# Patient Record
Sex: MALE | Race: OTHER | HISPANIC OR LATINO | Employment: OTHER | URBAN - METROPOLITAN AREA
[De-identification: names, ages, dates, MRNs, and addresses within clinical notes are randomized per-mention and may not be internally consistent; named-entity substitution may affect disease eponyms.]

---

## 2019-01-01 ENCOUNTER — HOSPITAL ENCOUNTER (EMERGENCY)
Facility: HOSPITAL | Age: 34
Discharge: HOME | End: 2019-01-01
Admitting: EMERGENCY MEDICINE

## 2019-01-01 VITALS
WEIGHT: 137 LBS | BODY MASS INDEX: 22.82 KG/M2 | OXYGEN SATURATION: 98 % | SYSTOLIC BLOOD PRESSURE: 174 MMHG | TEMPERATURE: 98.2 F | DIASTOLIC BLOOD PRESSURE: 91 MMHG | HEART RATE: 98 BPM | HEIGHT: 65 IN | RESPIRATION RATE: 18 BRPM

## 2019-01-01 DIAGNOSIS — L03.211 FACIAL CELLULITIS: Primary | ICD-10-CM

## 2019-01-01 DIAGNOSIS — L02.01 FACIAL ABSCESS: ICD-10-CM

## 2019-01-01 PROCEDURE — 99283 EMERGENCY DEPT VISIT LOW MDM: CPT

## 2019-01-01 PROCEDURE — 96372 THER/PROPH/DIAG INJ SC/IM: CPT

## 2019-01-01 RX ORDER — ACETAMINOPHEN 325 MG/1
650 TABLET ORAL ONCE
Status: COMPLETED | OUTPATIENT
Start: 2019-01-01 | End: 2019-01-01

## 2019-01-01 RX ORDER — BACITRACIN, NEOMYCIN, POLYMYXIN B 400; 3.5; 5 [USP'U]/G; MG/G; [USP'U]/G
1 OINTMENT TOPICAL ONCE
Status: COMPLETED | OUTPATIENT
Start: 2019-01-01 | End: 2019-01-01

## 2019-01-01 RX ORDER — CEPHALEXIN 250 MG/1
500 CAPSULE ORAL 4 TIMES DAILY
Qty: 56 CAPSULE | Refills: 0 | Status: SHIPPED | OUTPATIENT
Start: 2019-01-01 | End: 2019-01-08

## 2019-01-01 RX ORDER — KETOROLAC TROMETHAMINE 30 MG/ML
15 INJECTION, SOLUTION INTRAMUSCULAR; INTRAVENOUS ONCE
Status: COMPLETED | OUTPATIENT
Start: 2019-01-01 | End: 2019-01-01

## 2019-01-01 RX ORDER — LIDOCAINE HYDROCHLORIDE AND EPINEPHRINE 10; 10 MG/ML; UG/ML
1 INJECTION, SOLUTION INFILTRATION; PERINEURAL ONCE
Status: COMPLETED | OUTPATIENT
Start: 2019-01-01 | End: 2019-01-01

## 2019-01-01 RX ORDER — LIDOCAINE HYDROCHLORIDE 10 MG/ML
1 INJECTION, SOLUTION EPIDURAL; INFILTRATION; INTRACAUDAL; PERINEURAL ONCE
Status: DISCONTINUED | OUTPATIENT
Start: 2019-01-01 | End: 2019-01-01

## 2019-01-01 RX ORDER — CEPHALEXIN 250 MG/1
500 CAPSULE ORAL ONCE
Status: COMPLETED | OUTPATIENT
Start: 2019-01-01 | End: 2019-01-01

## 2019-01-01 RX ADMIN — BACITRACIN, NEOMYCIN, POLYMYXIN B 1 SMALL APPLICATION: 400; 3.5; 5 OINTMENT TOPICAL at 17:55

## 2019-01-01 RX ADMIN — KETOROLAC TROMETHAMINE 15 MG: 30 INJECTION, SOLUTION INTRAMUSCULAR at 17:28

## 2019-01-01 RX ADMIN — LIDOCAINE HYDROCHLORIDE,EPINEPHRINE BITARTRATE 1 ML: 10; .01 INJECTION, SOLUTION INFILTRATION; PERINEURAL at 17:28

## 2019-01-01 RX ADMIN — ACETAMINOPHEN 650 MG: 325 TABLET, FILM COATED ORAL at 17:54

## 2019-01-01 RX ADMIN — CEPHALEXIN 500 MG: 250 CAPSULE ORAL at 17:54

## 2019-01-01 NOTE — DISCHARGE INSTRUCTIONS
Abscess   WHAT YOU NEED TO KNOW:   A warm compress may help your abscess drain  Your healthcare provider may make a cut in the abscess so it can drain  You may need surgery to remove an abscess that is on your hands or buttocks  DISCHARGE INSTRUCTIONS:   Return to the emergency department if:   · The area around your abscess becomes very painful, warm, or has red streaks  · You have a fever and chills  · Your heart is beating faster than usual      · You feel faint or confused  Contact your healthcare provider if:   · Your abscess gets bigger or does not get better  · Your abscess returns  · You have questions or concerns about your condition or care  Medicines: You may  need any of the following:  · Antibiotics  help treat a bacterial infection  · Acetaminophen  decreases pain and fever  It is available without a doctor's order  Ask how much to take and how often to take it  Follow directions  Acetaminophen can cause liver damage if not taken correctly  · NSAIDs , such as ibuprofen, help decrease swelling, pain, and fever  This medicine is available with or without a doctor's order  NSAIDs can cause stomach bleeding or kidney problems in certain people  If you take blood thinner medicine, always ask your healthcare provider if NSAIDs are safe for you  Always read the medicine label and follow directions  · Take your medicine as directed  Contact your healthcare provider if you think your medicine is not helping or if you have side effects  Tell him or her if you are allergic to any medicine  Keep a list of the medicines, vitamins, and herbs you take  Include the amounts, and when and why you take them  Bring the list or the pill bottles to follow-up visits  Carry your medicine list with you in case of an emergency  Self-care:   · Apply a warm compress to your abscess  This will help it open and drain  Wet a washcloth in warm, but not hot, water  Apply the compress for 10 minutes  Repeat this 4 times each day  Do not  press on an abscess or try to open it with a needle  You may push the bacteria deeper or into your blood  · Do not share your clothes, towels, or sheets with anyone  This can spread the infection to others  · Wash your hands often  This can help prevent the spread of germs  Use soap and water or an alcohol-based hand rub  Care for your wound after it is drained:   · Care for your wound as directed  If your healthcare provider says it is okay, carefully remove the bandage and gauze packing  You may need to soak the gauze to get it out of your wound  Clean your wound and the area around it as directed  Dry the area and put on new, clean bandages  Change your bandages when they get wet or dirty  · Ask your healthcare provider how to change the gauze in your wound  Keep track of how many pieces of gauze are placed inside the wound  Do not put too much packing in the wound  Do not pack the gauze too tightly in your wound  Follow up with your healthcare provider in 1 to 3 days: You may need to have your packing removed or your bandage changed  Write down your questions so you remember to ask them during your visits  © 2017 [facility] Information is for End User's use only and may not be sold, redistributed or otherwise used for commercial purposes  All illustrations and images included in CareNotes® are the copyrighted property of A D A M [employer]  or Rashaun Gunn  The above information is an  only  It is not intended as medical advice for individual conditions or treatments  Talk to your doctor, nurse or pharmacist before following any medical regimen to see if it is safe and effective for you  Cellulitis   WHAT YOU NEED TO KNOW:   Cellulitis is a skin infection caused by bacteria  Cellulitis may go away on its own or you may need treatment   Your healthcare provider may draw a [place] around the outside edges of your cellulitis  If your cellulitis spreads, your healthcare provider will see it outside of the [place]  DISCHARGE INSTRUCTIONS:   Call 911 if:   · You have sudden trouble breathing or chest pain  Return to the emergency department if:   · Your wound gets larger and more painful  · You feel a crackling under your skin when you touch it  · You have purple dots or bumps on your skin, or you see bleeding under your skin  · You have new swelling and pain in your legs  · The red, warm, swollen area gets larger  · You see red streaks coming from the infected area  Contact your healthcare provider if:   · You have a fever  · Your fever or pain does not go away or gets worse  · The area does not get smaller after 2 days of antibiotics  · Your skin is flaking or peeling off  · You have questions or concerns about your condition or care  Medicines:   · Antibiotics  help treat the bacterial infection  · NSAIDs , such as ibuprofen, help decrease swelling, pain, and fever  NSAIDs can cause stomach bleeding or kidney problems in certain people  If you take blood thinner medicine, always ask if NSAIDs are safe for you  Always read the medicine label and follow directions  Do not give these medicines to children under 10months of age without direction from your child's healthcare provider  · Acetaminophen  decreases pain and fever  It is available without a doctor's order  Ask how much to take and how often to take it  Follow directions  Read the labels of all other medicines you are using to see if they also contain acetaminophen, or ask your doctor or pharmacist  Acetaminophen can cause liver damage if not taken correctly  Do not use more than 4 grams (4,000 milligrams) total of acetaminophen in one day  · Take your medicine as directed  Contact your healthcare provider if you think your medicine is not helping or if you have side effects   Tell him or her if you are allergic to any medicine  Keep a list of the medicines, vitamins, and herbs you take  Include the amounts, and when and why you take them  Bring the list or the pill bottles to follow-up visits  Carry your medicine list with you in case of an emergency  Self-care:   · Elevate the area above the level of your heart  as often as you can  This will help decrease swelling and pain  Prop the area on pillows or blankets to keep it elevated comfortably  · Clean the area daily until the wound scabs over  Gently wash the area with soap and water  Pat dry  Use dressings as directed  · Place cool or warm, wet cloths on the area as directed  Use clean cloths and clean water  Leave it on the area until the cloth is room temperature  Pat the area dry with a clean, dry cloth  The cloths may help decrease pain  Prevent cellulitis:   · Do not scratch bug bites or areas of injury  You increase your risk for cellulitis by scratching these areas  · Do not share personal items, such as towels, clothing, and razors  · Clean exercise equipment  with germ-killing  before and after you use it  · Wash your hands often  Use soap and water  Wash your hands after you use the bathroom, change a child's diapers, or sneeze  Wash your hands before you prepare or eat food  Use lotion to prevent dry, cracked skin  · Wear pressure stockings as directed  You may be told to wear the stockings if you have peripheral edema  The stockings improve blood flow and decrease swelling  · Treat athlete's foot  This can help prevent the spread of a bacterial skin infection  Follow up with your healthcare provider within 3 days, or as directed: Your healthcare provider will check if your cellulitis is getting better  You may need different medicine  Write down your questions so you remember to ask them during your visits    © 2017 [address] [employer] Information is for End User's use only and may not be sold, redistributed or otherwise used for commercial purposes  All illustrations and images included in CareNotes® are the copyrighted property of A D A M [employer] Inc  or Rashaun Gunn  The above information is an  only  It is not intended as medical advice for individual conditions or treatments  Talk to your doctor, nurse or pharmacist before following any medical regimen to see if it is safe and effective for you  Cellulitis   WHAT YOU NEED TO KNOW:   Cellulitis is a skin infection caused by bacteria  Cellulitis may go away on its own or you may need treatment  Your healthcare provider may draw a [place] around the outside edges of your cellulitis  If your cellulitis spreads, your healthcare provider will see it outside of the [place]  DISCHARGE INSTRUCTIONS:   Call 911 if:   · You have sudden trouble breathing or chest pain  Return to the emergency department if:   · Your wound gets larger and more painful  · You feel a crackling under your skin when you touch it  · You have purple dots or bumps on your skin, or you see bleeding under your skin  · You have new swelling and pain in your legs  · The red, warm, swollen area gets larger  · You see red streaks coming from the infected area  Contact your healthcare provider if:   · You have a fever  · Your fever or pain does not go away or gets worse  · The area does not get smaller after 2 days of antibiotics  · Your skin is flaking or peeling off  · You have questions or concerns about your condition or care  Medicines:   · Antibiotics  help treat the bacterial infection  · NSAIDs , such as ibuprofen, help decrease swelling, pain, and fever  NSAIDs can cause stomach bleeding or kidney problems in certain people  If you take blood thinner medicine, always ask if NSAIDs are safe for you  Always read the medicine label and follow directions   Do not give these medicines to children under 10months of age without direction from your child's healthcare provider  · Acetaminophen  decreases pain and fever  It is available without a doctor's order  Ask how much to take and how often to take it  Follow directions  Read the labels of all other medicines you are using to see if they also contain acetaminophen, or ask your doctor or pharmacist  Acetaminophen can cause liver damage if not taken correctly  Do not use more than 4 grams (4,000 milligrams) total of acetaminophen in one day  · Take your medicine as directed  Contact your healthcare provider if you think your medicine is not helping or if you have side effects  Tell him or her if you are allergic to any medicine  Keep a list of the medicines, vitamins, and herbs you take  Include the amounts, and when and why you take them  Bring the list or the pill bottles to follow-up visits  Carry your medicine list with you in case of an emergency  Self-care:   · Elevate the area above the level of your heart  as often as you can  This will help decrease swelling and pain  Prop the area on pillows or blankets to keep it elevated comfortably  · Clean the area daily until the wound scabs over  Gently wash the area with soap and water  Pat dry  Use dressings as directed  · Place cool or warm, wet cloths on the area as directed  Use clean cloths and clean water  Leave it on the area until the cloth is room temperature  Pat the area dry with a clean, dry cloth  The cloths may help decrease pain  Prevent cellulitis:   · Do not scratch bug bites or areas of injury  You increase your risk for cellulitis by scratching these areas  · Do not share personal items, such as towels, clothing, and razors  · Clean exercise equipment  with germ-killing  before and after you use it  · Wash your hands often  Use soap and water  Wash your hands after you use the bathroom, change a child's diapers, or sneeze  Wash your hands before you prepare or eat food   Use lotion to prevent dry, cracked skin  · Wear pressure stockings as directed  You may be told to wear the stockings if you have peripheral edema  The stockings improve blood flow and decrease swelling  · Treat athlete's foot  This can help prevent the spread of a bacterial skin infection  Follow up with your healthcare provider within 3 days, or as directed: Your healthcare provider will check if your cellulitis is getting better  You may need different medicine  Write down your questions so you remember to ask them during your visits  © 2017 [facility] Information is for End User's use only and may not be sold, redistributed or otherwise used for commercial purposes  All illustrations and images included in CareNotes® are the copyrighted property of A D A M [employer] Inc  or Rashaun Gunn  The above information is an  only  It is not intended as medical advice for individual conditions or treatments  Talk to your doctor, nurse or pharmacist before following any medical regimen to see if it is safe and effective for you

## 2019-01-04 ENCOUNTER — HOSPITAL ENCOUNTER (EMERGENCY)
Facility: HOSPITAL | Age: 34
Discharge: HOME | End: 2019-01-04
Attending: EMERGENCY MEDICINE

## 2019-01-04 VITALS
BODY MASS INDEX: 22.82 KG/M2 | HEIGHT: 65 IN | HEART RATE: 66 BPM | DIASTOLIC BLOOD PRESSURE: 85 MMHG | OXYGEN SATURATION: 97 % | SYSTOLIC BLOOD PRESSURE: 129 MMHG | WEIGHT: 137 LBS | RESPIRATION RATE: 18 BRPM | TEMPERATURE: 98 F

## 2019-01-04 DIAGNOSIS — M54.9 BACK PAIN: ICD-10-CM

## 2019-01-04 DIAGNOSIS — M54.2 NECK PAIN, CHRONIC: Primary | ICD-10-CM

## 2019-01-04 DIAGNOSIS — G89.29 NECK PAIN, CHRONIC: Primary | ICD-10-CM

## 2019-01-04 DIAGNOSIS — R29.898 UPPER EXTREMITY DYSFUNCTION: ICD-10-CM

## 2019-01-04 DIAGNOSIS — Z87.39 HISTORY OF HERNIATED INTERVERTEBRAL DISC: ICD-10-CM

## 2019-01-04 LAB
ANION GAP BLD CALC-SCNC: 16 MMOL/L (ref 4–13)
BUN BLD-MCNC: 25 MG/DL (ref 5–25)
CA-I BLD-SCNC: 1.18 MMOL/L (ref 1.12–1.32)
CHLORIDE BLD-SCNC: 106 MMOL/L (ref 100–108)
CREAT BLD-MCNC: 0.9 MG/DL (ref 0.6–1.3)
GFR SERPL CREATININE-BSD FRML MDRD: 110 ML/MIN/1.73SQ M
GLUCOSE SERPL-MCNC: 111 MG/DL (ref 65–140)
HCT VFR BLD CALC: 46 % (ref 36.5–49.3)
HGB BLDA-MCNC: 15.6 G/DL (ref 12–17)
PCO2 BLD: 25 MMOL/L (ref 21–32)
POTASSIUM BLD-SCNC: 4 MMOL/L (ref 3.5–5.3)
SODIUM BLD-SCNC: 142 MMOL/L (ref 136–145)
SPECIMEN SOURCE: ABNORMAL

## 2019-01-04 PROCEDURE — 80047 BASIC METABLC PNL IONIZED CA: CPT

## 2019-01-04 PROCEDURE — 99283 EMERGENCY DEPT VISIT LOW MDM: CPT

## 2019-01-04 PROCEDURE — 85014 HEMATOCRIT: CPT

## 2019-01-04 PROCEDURE — 96375 TX/PRO/DX INJ NEW DRUG ADDON: CPT

## 2019-01-04 PROCEDURE — 96374 THER/PROPH/DIAG INJ IV PUSH: CPT

## 2019-01-04 RX ORDER — KETOROLAC TROMETHAMINE 30 MG/ML
15 INJECTION, SOLUTION INTRAMUSCULAR; INTRAVENOUS ONCE
Status: COMPLETED | OUTPATIENT
Start: 2019-01-04 | End: 2019-01-04

## 2019-01-04 RX ORDER — DIAZEPAM 5 MG/ML
5 INJECTION, SOLUTION INTRAMUSCULAR; INTRAVENOUS ONCE
Status: COMPLETED | OUTPATIENT
Start: 2019-01-04 | End: 2019-01-04

## 2019-01-04 RX ORDER — ACETAMINOPHEN 325 MG/1
975 TABLET ORAL ONCE
Status: COMPLETED | OUTPATIENT
Start: 2019-01-04 | End: 2019-01-04

## 2019-01-04 RX ORDER — DIAZEPAM 5 MG/1
5 TABLET ORAL EVERY 12 HOURS PRN
Qty: 10 TABLET | Refills: 0 | Status: SHIPPED | OUTPATIENT
Start: 2019-01-04 | End: 2019-05-06

## 2019-01-04 RX ORDER — LIDOCAINE 50 MG/G
1 PATCH TOPICAL ONCE
Status: DISCONTINUED | OUTPATIENT
Start: 2019-01-04 | End: 2019-01-05 | Stop reason: HOSPADM

## 2019-01-04 RX ADMIN — KETOROLAC TROMETHAMINE 15 MG: 30 INJECTION, SOLUTION INTRAMUSCULAR at 20:44

## 2019-01-04 RX ADMIN — Medication 5 MG: at 20:45

## 2019-01-04 RX ADMIN — LIDOCAINE 1 PATCH: 50 PATCH CUTANEOUS at 20:43

## 2019-01-04 RX ADMIN — ACETAMINOPHEN 975 MG: 325 TABLET, FILM COATED ORAL at 20:44

## 2019-01-05 NOTE — ED ATTENDING ATTESTATION
Pearl Pressley MD, saw and evaluated the patient  I have discussed the patient with the resident/non-physician practitioner and agree with the resident's/non-physician practitioner's findings, Plan of Care, and MDM as documented in the resident's/non-physician practitioner's note, except where noted  All available labs and Radiology studies were reviewed  At this point I agree with the current assessment done in the Emergency Department  I have conducted an independent evaluation of this patient a history and physical is as follows:      Critical Care Time  CritCare Time    Procedures     25 yo male with neck pain  Pt with hx of herniated disc due to wrestling injury and acutely worse with radiation up to temple and arm movement hurts  Pt presented to [facility] weeks ago for same complaints and ct neck showed disc herniation  Pt taking robaxin and ibuprofen at home with no relief  Pt with no urinary retention  No saddle anesthesia  Vss, afebrile, lungs cta, rrr, abdomen soft nontender, cervical tenderness, limited rom of arms due to pain, no neuro deficits, sensation intacts, pain meds for acute on chronic pain

## 2019-01-05 NOTE — ED PROVIDER NOTES
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 wrestling neck injury that he never got fixed that has progressively gotten worse over the past few years  Pt states he needs to set up surgery but just moved here  HPI    25-year-old male pmhx cervical neck herniated disc who presents for evaluation of neck pain  Patient says he has had neck pain for last several years due to an injury he obtained during wrestling  Patient says neck pain has recently worsened and any movement of arms bilaterally exacerbate the pain  Neck pain is a 10/10 and radiates up toward his temples  Patient also notes back pain and numbness of his back  Patient says he feels like he cant do anything due to neck pain  Denies fever, chills, chest pain, SOB, abdominal pain, nausea, vomiting, urinary retention/incontinence, fecal retention/incontinence, saddle anesthesia or dysuria  Patient was evaluated at [facility] on 12/18/2018 for similar complaint  Denies IVDA  Prior to Admission Medications   Prescriptions Last Dose Informant Patient Reported? Taking? cephalexin (KEFLEX) 250 mg capsule   No No   Sig: Take 2 capsules (500 mg total) by mouth 4 (four) times a day for 7 days      Facility-Administered Medications: None       Past Medical History:   Diagnosis Date    Asthma     Hypertension        History reviewed  No pertinent surgical history  History reviewed  No pertinent family history  I have reviewed and agree with the history as documented  Social History   Substance Use Topics    Smoking status: Current Every Day Smoker    Smokeless tobacco: Never Used    Alcohol use Yes        Review of Systems   Constitutional: Negative for chills, diaphoresis, fatigue and fever  HENT: Negative for congestion, rhinorrhea and sore throat  Eyes: Negative for photophobia and visual disturbance  Respiratory: Negative for cough, chest tightness and shortness of breath  Cardiovascular: Negative for chest pain and palpitations  Gastrointestinal: Negative for abdominal pain, blood in stool, constipation, diarrhea, nausea and vomiting  Genitourinary: Negative for dysuria, frequency and hematuria  Musculoskeletal: Positive for back pain, gait problem and neck pain  Negative for myalgias and neck stiffness  Skin: Negative for pallor and rash  Neurological: Negative for weakness, light-headedness, numbness and headaches  Hematological: Negative for adenopathy  Does not bruise/bleed easily  All other systems reviewed and are negative  Physical Exam  ED Triage Vitals   Temperature Pulse Respirations Blood Pressure SpO2   01/04/19 1856 01/04/19 1854 01/04/19 1854 01/04/19 1856 01/04/19 1854   98 °F (36 7 °C) (!) 110 18 150/99 98 %      Temp src Heart Rate Source Patient Position - Orthostatic VS BP Location FiO2 (%)   -- 01/04/19 2011 01/04/19 2011 01/04/19 2101 --    Monitor Sitting Right arm       Pain Score       01/04/19 1854       9           Orthostatic Vital Signs  Vitals:    01/04/19 2011 01/04/19 2101 01/04/19 2130 01/04/19 2200   BP: 145/63 144/79 138/88 129/85   Pulse: 87 88 77 66   Patient Position - Orthostatic VS: Sitting Sitting Sitting Sitting       Physical Exam   Constitutional: He is oriented to person, place, and time  Patient alert and oriented, appears non-toxic, tearful and appears to be in pain   HENT:   Head: Normocephalic and atraumatic  Mouth/Throat: Oropharynx is clear and moist    Eyes: Pupils are equal, round, and reactive to light  Conjunctivae and EOM are normal    Neck: Normal range of motion  Neck supple  Diffuse cervical neck tenderness on palpation    Cardiovascular: Normal rate, regular rhythm, normal heart sounds and intact distal pulses  Pulmonary/Chest: Effort normal and breath sounds normal  No respiratory distress  Abdominal: Soft  Bowel sounds are normal  He exhibits no distension  There is no tenderness   There is no guarding  Musculoskeletal: He exhibits tenderness  He exhibits no deformity  Diffuse midline and paraspinal lower thoracic tenderness on palpation, pain of shoulders and upper arms b/l on palpation and passive movement    Lymphadenopathy:     He has no cervical adenopathy  Neurological: He is alert and oriented to person, place, and time  No sensory deficit  He exhibits normal muscle tone  No facial asymmetry noted, CN 2-12 intact, limited ROM  of upper and lower extremities due to pain, muscle strength 5/5 throughout, DTRs normal, sensation intact throughout, no gait disturbance noted   Skin: Skin is warm and dry  Capillary refill takes less than 2 seconds  No rash noted  He is not diaphoretic  No erythema  No pallor  Psychiatric: He has a normal mood and affect  His behavior is normal  Judgment and thought content normal    Nursing note and vitals reviewed        ED Medications  Medications   diazepam (VALIUM) injection 5 mg (5 mg Intravenous Given 1/4/19 2045)   ketorolac (TORADOL) injection 15 mg (15 mg Intravenous Given 1/4/19 2044)   acetaminophen (TYLENOL) tablet 975 mg (975 mg Oral Given 1/4/19 2044)       Diagnostic Studies  Results Reviewed     Procedure Component Value Units Date/Time    POCT Chem 8+ [[id number]]  (Abnormal) Collected:  01/04/19 2056    Lab Status:  Final result Updated:  01/04/19 2100     SODIUM, I-STAT 142 mmol/l      Potassium, i-STAT 4 0 mmol/L      Chloride, istat 106 mmol/L      CO2, i-STAT 25 mmol/L      Anion Gap, i-STAT 16 (H) mmol/L      Calcium, Ionized i-STAT 1 18 mmol/L      BUN, I-STAT 25 mg/dl      Creatinine, i-STAT 0 9 mg/dl      eGFR 110 ml/min/1 73sq m      Glucose, i-STAT 111 mg/dl      Hct, i-STAT 46 %      Hgb, i-STAT 15 6 g/dl      Specimen Type VENOUS                 No orders to display         Procedures  Procedures      Phone Consults  ED Phone Contact    ED Course  ED Course as of Jan 05 0125 Fri Jan 04, 2019   2220 On reassessment, patient has improved ROM of upper and lower extremities  Patient notes he continues to have neck pain but radiation of pain and arm discomfort have resolved  MDM  Number of Diagnoses or Management Options  Back pain:   History of herniated intervertebral disc:   Neck pain, chronic:   Upper extremity dysfunction:   Diagnosis management comments: Impression: 29yo male presents for evaluation of neck pain   Ddx: acute on chronic neck pain (recent CT head/neck 12/18/18)  Plan: pain control, reassess    The patient was instructed to follow up as documented  Strict return precautions were discussed with the patient and the patient was instructed to return to the emergency department immediately if symptoms worsen  The patient/patient family member acknowledged and were in agreement with plan  CritCare Time    Disposition  Final diagnoses:   Neck pain, chronic   Upper extremity dysfunction   Back pain   History of herniated intervertebral disc     Time reflects when diagnosis was documented in both MDM as applicable and the Disposition within this note     Time User Action Codes Description Comment    1/4/2019 10:21 PM Veronica Spangler Add [M54 2,  G89 29] Neck pain, chronic     1/4/2019 10:31 PM Veronica Spangler Add [R29 898] Upper extremity dysfunction     1/4/2019 10:32 PM Chrisa Amandaos Add [M54 9] Back pain     1/4/2019 10:32 PM Chrisa Katos Add [Z87 39] History of herniated intervertebral disc       ED Disposition     ED Disposition Condition Comment    Discharge  [facility] discharge to home/self care  Condition at discharge: Stable        Follow-up Information     Follow up With Specialties Details Why Contact Info    [facility] Physical Therapy Go to As needed, If symptoms worsen [phone]    Santiago Frost MD Spine Surgery Schedule an appointment as soon as possible for a visit As needed, If symptoms worsen [address]    [facility] [id number]  [phone]            Discharge Medication List as of 1/4/2019 10:33 PM      START taking these medications    Details   diazepam (VALIUM) 5 mg tablet Take 1 tablet (5 mg total) by mouth every 12 (twelve) hours as needed for anxiety for up to 10 days, Starting Fri 1/4/2019, Until Mon 1/14/2019, Print         CONTINUE these medications which have NOT CHANGED    Details   cephalexin (KEFLEX) 250 mg capsule Take 2 capsules (500 mg total) by mouth 4 (four) times a day for 7 days, Starting Tue 1/1/2019, Until Tue 1/8/2019, Print           No discharge procedures on file  ED Provider  Attending physically available and evaluated Michelle Cardinal PARIKH managed the patient along with the ED Attending      Electronically Signed by         Angelic Huff MD  01/05/19 0609

## 2019-05-06 ENCOUNTER — HOSPITAL ENCOUNTER (EMERGENCY)
Facility: HOSPITAL | Age: 34
Discharge: HOME | End: 2019-05-06
Attending: EMERGENCY MEDICINE | Admitting: EMERGENCY MEDICINE

## 2019-05-06 VITALS
HEART RATE: 66 BPM | DIASTOLIC BLOOD PRESSURE: 92 MMHG | RESPIRATION RATE: 16 BRPM | SYSTOLIC BLOOD PRESSURE: 167 MMHG | TEMPERATURE: 97.5 F | OXYGEN SATURATION: 98 %

## 2019-05-06 DIAGNOSIS — M62.838 TRAPEZIUS MUSCLE SPASM: ICD-10-CM

## 2019-05-06 DIAGNOSIS — M54.2 NECK PAIN: Primary | ICD-10-CM

## 2019-05-06 PROCEDURE — 20552 NJX 1/MLT TRIGGER POINT 1/2: CPT | Performed by: EMERGENCY MEDICINE

## 2019-05-06 PROCEDURE — 99284 EMERGENCY DEPT VISIT MOD MDM: CPT | Performed by: EMERGENCY MEDICINE

## 2019-05-06 PROCEDURE — 96372 THER/PROPH/DIAG INJ SC/IM: CPT

## 2019-05-06 PROCEDURE — 99283 EMERGENCY DEPT VISIT LOW MDM: CPT

## 2019-05-06 RX ORDER — LIDOCAINE 50 MG/G
1 PATCH TOPICAL ONCE
Status: DISCONTINUED | OUTPATIENT
Start: 2019-05-06 | End: 2019-05-06 | Stop reason: HOSPADM

## 2019-05-06 RX ORDER — ACETAMINOPHEN 325 MG/1
975 TABLET ORAL ONCE
Status: COMPLETED | OUTPATIENT
Start: 2019-05-06 | End: 2019-05-06

## 2019-05-06 RX ORDER — LIDOCAINE HYDROCHLORIDE 10 MG/ML
20 INJECTION, SOLUTION EPIDURAL; INFILTRATION; INTRACAUDAL; PERINEURAL ONCE
Status: COMPLETED | OUTPATIENT
Start: 2019-05-06 | End: 2019-05-06

## 2019-05-06 RX ORDER — METHOCARBAMOL 500 MG/1
500 TABLET, FILM COATED ORAL EVERY 8 HOURS PRN
Qty: 20 TABLET | Refills: 0 | Status: SHIPPED | OUTPATIENT
Start: 2019-05-06 | End: 2019-11-26

## 2019-05-06 RX ORDER — KETOROLAC TROMETHAMINE 30 MG/ML
15 INJECTION, SOLUTION INTRAMUSCULAR; INTRAVENOUS ONCE
Status: COMPLETED | OUTPATIENT
Start: 2019-05-06 | End: 2019-05-06

## 2019-05-06 RX ADMIN — ACETAMINOPHEN 975 MG: 325 TABLET ORAL at 15:01

## 2019-05-06 RX ADMIN — KETOROLAC TROMETHAMINE 15 MG: 30 INJECTION, SOLUTION INTRAMUSCULAR at 15:00

## 2019-05-06 RX ADMIN — LIDOCAINE HYDROCHLORIDE 20 ML: 10 INJECTION, SOLUTION EPIDURAL; INFILTRATION; INTRACAUDAL; PERINEURAL at 16:10

## 2019-05-06 RX ADMIN — LIDOCAINE 1 PATCH: 50 PATCH TOPICAL at 15:00

## 2019-09-04 ENCOUNTER — HOSPITAL ENCOUNTER (EMERGENCY)
Facility: HOSPITAL | Age: 34
Discharge: HOME | End: 2019-09-04
Attending: EMERGENCY MEDICINE | Admitting: EMERGENCY MEDICINE

## 2019-09-04 VITALS
RESPIRATION RATE: 16 BRPM | WEIGHT: 165 LBS | HEIGHT: 65 IN | DIASTOLIC BLOOD PRESSURE: 78 MMHG | OXYGEN SATURATION: 98 % | TEMPERATURE: 98.3 F | SYSTOLIC BLOOD PRESSURE: 132 MMHG | HEART RATE: 53 BPM | BODY MASS INDEX: 27.49 KG/M2
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
## 2019-09-04 DIAGNOSIS — M54.2 NECK PAIN: ICD-10-CM

## 2019-09-04 PROCEDURE — 99284 EMERGENCY DEPT VISIT MOD MDM: CPT | Performed by: EMERGENCY MEDICINE

## 2019-09-04 PROCEDURE — 99284 EMERGENCY DEPT VISIT MOD MDM: CPT

## 2019-09-04 RX ORDER — LIDOCAINE 50 MG/G
1 PATCH TOPICAL ONCE
Status: DISCONTINUED | OUTPATIENT
Start: 2019-09-04 | End: 2019-09-04 | Stop reason: HOSPADM

## 2019-09-04 RX ORDER — ACETAMINOPHEN 325 MG/1
975 TABLET ORAL ONCE
Status: COMPLETED | OUTPATIENT
Start: 2019-09-04 | End: 2019-09-04

## 2019-09-04 RX ORDER — METHOCARBAMOL 500 MG/1
500 TABLET, FILM COATED ORAL ONCE
Status: DISCONTINUED | OUTPATIENT
Start: 2019-09-04 | End: 2019-09-04

## 2019-09-04 RX ADMIN — ACETAMINOPHEN 975 MG: 325 TABLET ORAL at 01:02

## 2019-09-04 RX ADMIN — LIDOCAINE 1 PATCH: 50 PATCH CUTANEOUS at 01:03
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
## 2019-09-27 ENCOUNTER — HOSPITAL ENCOUNTER (EMERGENCY)
Facility: HOSPITAL | Age: 34
Discharge: HOME | End: 2019-09-27
Attending: EMERGENCY MEDICINE | Admitting: EMERGENCY MEDICINE
Payer: MEDICARE

## 2019-09-27 VITALS
BODY MASS INDEX: 27.44 KG/M2 | RESPIRATION RATE: 20 BRPM | DIASTOLIC BLOOD PRESSURE: 65 MMHG | WEIGHT: 164.9 LBS | TEMPERATURE: 97.5 F | SYSTOLIC BLOOD PRESSURE: 147 MMHG | OXYGEN SATURATION: 96 % | HEART RATE: 96 BPM

## 2019-09-27 DIAGNOSIS — F41.9 ANXIETY: ICD-10-CM

## 2019-09-27 DIAGNOSIS — J45.901 ASTHMA EXACERBATION: Primary | ICD-10-CM

## 2019-09-27 LAB
ATRIAL RATE: 84 BPM
P AXIS: 85 DEGREES
PR INTERVAL: 122 MS
QRS AXIS: 67 DEGREES
QRSD INTERVAL: 86 MS
QT INTERVAL: 366 MS
QTC INTERVAL: 432 MS
T WAVE AXIS: 60 DEGREES
VENTRICULAR RATE: 84 BPM

## 2019-09-27 PROCEDURE — 99285 EMERGENCY DEPT VISIT HI MDM: CPT

## 2019-09-27 PROCEDURE — 93005 ELECTROCARDIOGRAM TRACING: CPT

## 2019-09-27 PROCEDURE — 94760 N-INVAS EAR/PLS OXIMETRY 1: CPT

## 2019-09-27 PROCEDURE — 93010 ELECTROCARDIOGRAM REPORT: CPT | Performed by: INTERNAL MEDICINE

## 2019-09-27 PROCEDURE — 96365 THER/PROPH/DIAG IV INF INIT: CPT

## 2019-09-27 PROCEDURE — 99284 EMERGENCY DEPT VISIT MOD MDM: CPT | Performed by: EMERGENCY MEDICINE

## 2019-09-27 PROCEDURE — 94644 CONT INHLJ TX 1ST HOUR: CPT

## 2019-09-27 RX ORDER — ALBUTEROL SULFATE 2.5 MG/3ML
1 SOLUTION RESPIRATORY (INHALATION) ONCE
Status: COMPLETED | OUTPATIENT
Start: 2019-09-27 | End: 2019-09-27

## 2019-09-27 RX ORDER — IPRATROPIUM BROMIDE AND ALBUTEROL SULFATE 2.5; .5 MG/3ML; MG/3ML
SOLUTION RESPIRATORY (INHALATION)
Status: COMPLETED
Start: 2019-09-27 | End: 2019-09-27

## 2019-09-27 RX ORDER — METHYLPREDNISOLONE SOD SUCC 125 MG
1 VIAL (EA) INJECTION ONCE
Status: COMPLETED | OUTPATIENT
Start: 2019-09-27 | End: 2019-09-27

## 2019-09-27 RX ORDER — PREDNISONE 20 MG/1
40 TABLET ORAL DAILY
Qty: 10 TABLET | Refills: 0 | Status: SHIPPED | OUTPATIENT
Start: 2019-09-27 | End: 2019-10-02

## 2019-09-27 RX ORDER — ALBUTEROL SULFATE 90 UG/1
2 AEROSOL, METERED RESPIRATORY (INHALATION) ONCE
Status: DISCONTINUED | OUTPATIENT
Start: 2019-09-27 | End: 2019-09-27

## 2019-09-27 RX ORDER — SODIUM CHLORIDE FOR INHALATION 0.9 %
3 VIAL, NEBULIZER (ML) INHALATION ONCE
Status: COMPLETED | OUTPATIENT
Start: 2019-09-27 | End: 2019-09-27

## 2019-09-27 RX ORDER — IPRATROPIUM BROMIDE AND ALBUTEROL SULFATE 2.5; .5 MG/3ML; MG/3ML
3 SOLUTION RESPIRATORY (INHALATION) ONCE
Status: COMPLETED | OUTPATIENT
Start: 2019-09-27 | End: 2019-09-27

## 2019-09-27 RX ORDER — ALBUTEROL SULFATE 90 UG/1
2 AEROSOL, METERED RESPIRATORY (INHALATION) EVERY 4 HOURS PRN
Qty: 1 INHALER | Refills: 0 | Status: SHIPPED | OUTPATIENT
Start: 2019-09-27

## 2019-09-27 RX ORDER — MAGNESIUM SULFATE HEPTAHYDRATE 40 MG/ML
2 INJECTION, SOLUTION INTRAVENOUS ONCE
Status: COMPLETED | OUTPATIENT
Start: 2019-09-27 | End: 2019-09-27

## 2019-09-27 RX ADMIN — MAGNESIUM SULFATE HEPTAHYDRATE 2 G: 40 INJECTION, SOLUTION INTRAVENOUS at 00:38

## 2019-09-27 RX ADMIN — ALBUTEROL SULFATE 10 MG: 2.5 SOLUTION RESPIRATORY (INHALATION) at 00:48

## 2019-09-27 RX ADMIN — IPRATROPIUM BROMIDE 1 MG: 0.5 SOLUTION RESPIRATORY (INHALATION) at 00:47

## 2019-09-27 RX ADMIN — ISODIUM CHLORIDE 3 ML: 0.03 SOLUTION RESPIRATORY (INHALATION) at 00:48

## 2019-09-27 NOTE — ED PROVIDER NOTES
History  Chief Complaint   Patient presents with    Shortness of Breath     pt c/o SOB hx of  Pt increase SOB and WOB and brought in by EMS  Pt given 1 duo neb, 75mg Solumedrol, and 1 albuterol     68-year-old male with history of asthma reviewed require intubation once before coming in for increased shortness of breath and wheezing, coming in from prison holding tank  Patient is very anxious on arrival and endorses increased work of breathing  Appears uncomfortable  Patient is unsure what triggered this, but believes it may be due to the anxiety after being arrested tonight  Patient says that he does not have an inhaler with him and was unable to be given one tonight  Usually his albuterol inhaler helps with his work of breathing and wheezing  Has had a recent upper respiratory infection as well which he thinks may be contributing to his symptoms  Endorses recent rhinorrhea and a scratchy throat but otherwise no fevers, chills, cough, GI symptoms  Has no known allergies  Denies any lightheadedness, dizziness, headache or other neurological symptoms  ROS is otherwise negative as below  History provided by:  Patient   used: No        Prior to Admission Medications   Prescriptions Last Dose Informant Patient Reported? Taking? methocarbamol (ROBAXIN) 500 mg tablet Not Taking at Unknown time  No No   Sig: Take 1 tablet (500 mg total) by mouth every 8 (eight) hours as needed for muscle spasms Do not work or drive while taking this medication  Patient not taking: Reported on 9/27/2019      Facility-Administered Medications: None       Past Medical History:   Diagnosis Date    Asthma     Chronic pain     Hypertension        Past Surgical History:   Procedure Laterality Date    THYROID SURGERY         History reviewed  No pertinent family history  I have reviewed and agree with the history as documented      Social History     Tobacco Use    Smoking status: Current Every Day Smoker     Packs/day: 0 50     Types: Cigarettes    Smokeless tobacco: Never Used   Substance Use Topics    Alcohol use: Yes     Comment: occasional    Drug use: No        Review of Systems   Constitutional: Negative for appetite change, chills, diaphoresis, fatigue and fever  HENT: Positive for congestion, rhinorrhea and sore throat  Negative for sinus pressure and sinus pain  Eyes: Negative for photophobia, redness and visual disturbance  Respiratory: Positive for shortness of breath and wheezing  Negative for cough and chest tightness  Cardiovascular: Negative for chest pain, palpitations and leg swelling  Gastrointestinal: Negative for abdominal pain, blood in stool, constipation, diarrhea, nausea and vomiting  Genitourinary: Negative for dysuria, frequency, hematuria and urgency  Musculoskeletal: Negative for back pain and gait problem  Skin: Negative for pallor, rash and wound  Neurological: Negative for dizziness, syncope, weakness, light-headedness, numbness and headaches  Psychiatric/Behavioral: Negative for confusion  The patient is nervous/anxious  Physical Exam  ED Triage Vitals [09/27/19 0028]   Temperature Pulse Respirations Blood Pressure SpO2   97 5 °F (36 4 °C) 92 22 (!) 176/73 100 %      Temp Source Heart Rate Source Patient Position - Orthostatic VS BP Location FiO2 (%)   Oral Monitor Lying Right arm --      Pain Score       No Pain             Orthostatic Vital Signs  Vitals:    09/27/19 0028 09/27/19 0055 09/27/19 0115 09/27/19 0200   BP: (!) 176/73  138/68 147/65   Pulse: 92 64 88 96   Patient Position - Orthostatic VS: Lying  Lying Lying       Physical Exam   Constitutional: He appears well-developed and well-nourished  No distress  HENT:   Head: Normocephalic and atraumatic  Nose: Nose normal    Mouth/Throat: Oropharynx is clear and moist  No oropharyngeal exudate  Eyes: Pupils are equal, round, and reactive to light   Conjunctivae are normal  Right eye exhibits no discharge  Left eye exhibits no discharge  Neck: Normal range of motion  Cardiovascular: Normal rate and regular rhythm  No murmur heard  Pulmonary/Chest: Breath sounds normal  No tachypnea  He is in respiratory distress  He has no wheezes  He has no rales  Tachypneic to 35  Audible wheezing present  Is able to speak  Abdominal: Soft  Bowel sounds are normal  He exhibits no distension  There is no tenderness  Musculoskeletal: Normal range of motion  He exhibits no edema or deformity  Right lower leg: He exhibits no edema  Left lower leg: He exhibits no edema  Neurological: He is alert  The patient is mentating normally  Skin: Skin is warm and dry  No rash noted  He is not diaphoretic  No pallor  Psychiatric: He has a normal mood and affect  Nursing note and vitals reviewed        ED Medications  Medications   magnesium sulfate 2 g/50 mL IVPB (premix) 2 g (0 g Intravenous Stopped 9/27/19 0112)   albuterol inhalation solution 10 mg (10 mg Nebulization Given 9/27/19 0048)     And   ipratropium (ATROVENT) 0 02 % inhalation solution 1 mg (1 mg Nebulization Given 9/27/19 0047)     And   sodium chloride 0 9 % inhalation solution 3 mL (3 mL Nebulization Given 9/27/19 0048)   albuterol (FOR EMS ONLY) (2 5 mg/3 mL) 0 083 % inhalation solution 2 5 mg (0 mg Does not apply Given to EMS 9/27/19 0044)   methylPREDNISolone sodium succinate (FOR EMS ONLY) (Solu-MEDROL) 125 MG injection 125 mg (0 mg Does not apply Given to EMS 9/27/19 0044)   ipratropium-albuterol (DUO-NEB) 0 5-2 5 mg/3 mL inhalation solution 3 mL (0 mL Nebulization Given to EMS 9/27/19 0049)       Diagnostic Studies  Results Reviewed     None                 No orders to display         Procedures  ECG 12 Lead Documentation Only  Date/Time: 9/27/2019 2:02 AM  Performed by: Kam Ramsey MD  Authorized by: Kam Ramsey MD     Indications / Diagnosis:  Asthma exacerbation  ECG reviewed by me, the ED Provider: yes Patient location:  ED  Previous ECG:     Previous ECG:  Compared to current    Similarity:  Changes noted  Interpretation:     Interpretation: normal    Rate:     ECG rate:  84    ECG rate assessment: normal    Rhythm:     Rhythm: sinus rhythm    Ectopy:     Ectopy: none    QRS:     QRS axis:  Normal  Conduction:     Conduction: normal    ST segments:     ST segments: nonspecific st changes in anterior leads normal for demographic  T waves:     T waves: normal    Comments:      Normal sinus  ED Course  ED Course as of Sep 30 0138   Fri Sep 27, 2019   0201 Patient no longer wheezing on exam  Will discharge to police custody  MDM  Number of Diagnoses or Management Options  Anxiety: minor  Asthma exacerbation: established and worsening  Diagnosis management comments: 78-year-old male presenting with acute asthma exacerbation after having significant anxiety from being incarcerated this evening  Patient has mild respiratory distress, normal oxygen saturation  Patient's respiratory distress improved after receiving a VEGA neb, Mag, and solumedrol  Patient did not require epinephrine or positive-pressure ventilation  Patient reports increased comfort after his anxiety started to resolve  Patient breathing normally on room air, with no audible wheezing at time of discharge  Discharged with prescription for albuterol inhaler as well as 5 days of 40 mg of prednisone  Discussed return precautions with patient including severe shortness of breath, severe wheezing unresponsive to albuterol inhaler, lightheadedness  Discharged into police custody         Amount and/or Complexity of Data Reviewed  Decide to obtain previous medical records or to obtain history from someone other than the patient: yes  Obtain history from someone other than the patient: yes    Risk of Complications, Morbidity, and/or Mortality  Presenting problems: minimal  Diagnostic procedures: minimal  Management options: minimal    Patient Progress  Patient progress: improved      Disposition  Final diagnoses:   Asthma exacerbation   Anxiety     Time reflects when diagnosis was documented in both MDM as applicable and the Disposition within this note     Time User Action Codes Description Comment    9/27/2019  1:59 AM Anne-Marie Galindo Add [J45 901] Asthma exacerbation     9/27/2019  1:59 AM Elizabeth Barakat Add [F41 9] Anxiety       ED Disposition     ED Disposition Condition Date/Time Comment    Discharge Good Fri Sep 27, 2019  2:12 AM Katerin Chin discharge to police custody  Patient is cleared for incarceration  Follow-up Information     Follow up With Specialties Details Why Contact Info Additional [address], [facility], Nurse Practitioner Schedule an appointment as soon as possible for a visit in 1 week For reevaluation as we discussed  [address]  Emergency Department Emergency Medicine Go to  As needed [address] [id number]  [facility], [address] [place] [address], [address]   [phone]          Discharge Medication List as of 9/27/2019  2:15 AM      START taking these medications    Details   albuterol (PROVENTIL HFA,VENTOLIN HFA) 90 mcg/act inhaler Inhale 2 puffs every 4 (four) hours as needed for wheezing, Starting Fri 9/27/2019, Print      predniSONE 20 mg tablet Take 2 tablets (40 mg total) by mouth daily for 5 days, Starting Fri 9/27/2019, Until Wed 10/2/2019, Print         CONTINUE these medications which have NOT CHANGED    Details   methocarbamol (ROBAXIN) 500 mg tablet Take 1 tablet (500 mg total) by mouth every 8 (eight) hours as needed for muscle spasms Do not work or drive while taking this medication  , Starting Mon 5/6/2019, Print           No discharge procedures on file      ED Provider  Attending physically available and tammy Cunningham I managed the patient along with the ED Attending      Electronically Signed by         Ras Lawson MD  09/30/19 7713

## 2019-09-27 NOTE — DISCHARGE INSTRUCTIONS
Patient is medically cleared for incarceration  Today were seen for an asthma exacerbation, your treated with steroids, albuterol, and magnesium  He had good improvement  Please follow up with her primary care doctor as needed for your asthma  You may return to the emergency department at any time if you experience severe shortness of breath, wheezing, chest pain, lightheadedness, dizziness, or any other symptoms that are new or concerning to you

## 2019-09-27 NOTE — ED ATTENDING ATTESTATION
9/27/2019  IShay MD, saw and evaluated the patient  I have discussed the patient with the resident/non-physician practitioner and agree with the resident's/non-physician practitioner's findings, Plan of Care, and MDM as documented in the resident's/non-physician practitioner's note, except where noted  All available labs and Radiology studies were reviewed  I was present for key portions of any procedure(s) performed by the resident/non-physician practitioner and I was immediately available to provide assistance  At this point I agree with the current assessment done in the Emergency Department  I have conducted an independent evaluation of this patient a history and physical is as follows:    ED Course     Emergency Department Note- Edwar Jeffers 37 y o  male MRN: [id number]    Unit/Bed#: ED 15 Encounter: [id number]    Edwar Jeffers is a 37 y o  male who presents with   Chief Complaint   Patient presents with    Shortness of Breath     pt c/o SOB hx of  Pt increase SOB and WOB and brought in by EMS  Pt given 1 duo neb, 75mg Solumedrol, and 1 albuterol         History of Present Illness   HPI:  Edwar Jeffers is a 37 y o  male who presents for evaluation of:  Dyspnea and increased work of breathing after being arrested  Patient has a long h/o asthma and has an inhaler and a nebulizer at home  Patient continues to smoke cigarettes; I have counseled smoking cessation  Review of Systems   Constitutional: Positive for chills  Negative for fever  HENT: Positive for congestion and sore throat  Respiratory: Positive for cough, shortness of breath and wheezing  Cardiovascular: Positive for chest pain  Negative for palpitations  Musculoskeletal: Positive for neck pain  Negative for back pain  All other systems reviewed and are negative        Historical Information   Past Medical History:   Diagnosis Date    Asthma     Chronic pain     Hypertension      Past Surgical History:   Procedure Laterality Date    THYROID SURGERY       Social History   Social History     Substance and Sexual Activity   Alcohol Use Yes    Comment: occasional     Social History     Substance and Sexual Activity   Drug Use No     Social History     Tobacco Use   Smoking Status Current Every Day Smoker    Packs/day: 0 50    Types: Cigarettes   Smokeless Tobacco Never Used     Family History: non-contributory    Meds/Allergies   all medications and allergies reviewed  No Known Allergies    Objective   First Vitals:   Blood Pressure: (!) 176/73 (19)  Pulse: 92 (19)  Temperature: 97 5 °F (36 4 °C) (19)  Temp Source: Oral (19)  Respirations: 22 (19)  Weight - Scale: 74 8 kg (164 lb 14 5 oz) (19)  SpO2: 100 % (19)    Current Vitals:   Blood Pressure: (!) 176/73 (19)  Pulse: 92 (19)  Temperature: 97 5 °F (36 4 °C) (19)  Temp Source: Oral (19)  Respirations: 22 (19)  Weight - Scale: 74 8 kg (164 lb 14 5 oz) (19)  SpO2: 100 % (19)    No intake or output data in the 24 hours ending 19 0043    Invasive Devices     Peripheral Intravenous Line            Peripheral IV 19 Right Hand less than 1 day                Physical Exam   Constitutional: He is oriented to person, place, and time  He appears well-developed and well-nourished  HENT:   Head: Normocephalic and atraumatic  Neck: Normal range of motion  Neck supple  Cardiovascular: Normal rate and regular rhythm  Pulmonary/Chest: Breath sounds normal  He has no wheezes  Abdominal: Soft  Bowel sounds are normal    Neurological: He is alert and oriented to person, place, and time  Skin: Skin is warm and dry  Capillary refill takes less than 2 seconds  Psychiatric: He has a normal mood and affect  His behavior is normal    Nursing note and vitals reviewed  Medical Decision Makin   Acute asthma flair: albuterol ipratropium neb; corticosteroids administered en route    No results found for this or any previous visit (from the past 36 hour(s))  No orders to display         Portions of the record may have been created with voice recognition software  Occasional wrong word or "sound a like" substitutions may have occurred due to the inherent limitations of voice recognition software  Read the chart carefully and recognize, using context, where substitutions have occurred          Critical Care Time  Procedures

## 2019-11-26 ENCOUNTER — APPOINTMENT (EMERGENCY)
Dept: RADIOLOGY | Facility: HOSPITAL | Age: 34
End: 2019-11-26
Payer: MEDICARE

## 2019-11-26 ENCOUNTER — HOSPITAL ENCOUNTER (EMERGENCY)
Facility: HOSPITAL | Age: 34
Discharge: HOME | End: 2019-11-26
Attending: EMERGENCY MEDICINE
Payer: MEDICARE

## 2019-11-26 VITALS
RESPIRATION RATE: 20 BRPM | BODY MASS INDEX: 27.46 KG/M2 | OXYGEN SATURATION: 95 % | DIASTOLIC BLOOD PRESSURE: 95 MMHG | SYSTOLIC BLOOD PRESSURE: 134 MMHG | TEMPERATURE: 98.3 F | WEIGHT: 165 LBS | HEART RATE: 92 BPM

## 2019-11-26 DIAGNOSIS — L03.311 CELLULITIS OF ABDOMINAL WALL: Primary | ICD-10-CM

## 2019-11-26 DIAGNOSIS — L50.3 DERMATOGRAPHIA: ICD-10-CM

## 2019-11-26 LAB
ANION GAP SERPL CALCULATED.3IONS-SCNC: 6 MMOL/L (ref 4–13)
BASOPHILS # BLD AUTO: 0.05 THOUSANDS/ΜL (ref 0–0.1)
BASOPHILS NFR BLD AUTO: 1 % (ref 0–1)
BUN SERPL-MCNC: 20 MG/DL (ref 5–25)
CALCIUM SERPL-MCNC: 9.1 MG/DL (ref 8.3–10.1)
CHLORIDE SERPL-SCNC: 109 MMOL/L (ref 100–108)
CO2 SERPL-SCNC: 28 MMOL/L (ref 21–32)
CREAT SERPL-MCNC: 1.05 MG/DL (ref 0.6–1.3)
EOSINOPHIL # BLD AUTO: 0.27 THOUSAND/ΜL (ref 0–0.61)
EOSINOPHIL NFR BLD AUTO: 5 % (ref 0–6)
ERYTHROCYTE [DISTWIDTH] IN BLOOD BY AUTOMATED COUNT: 14.8 % (ref 11.6–15.1)
GFR SERPL CREATININE-BSD FRML MDRD: 91 ML/MIN/1.73SQ M
GLUCOSE SERPL-MCNC: 92 MG/DL (ref 65–140)
HCT VFR BLD AUTO: 42.3 % (ref 36.5–49.3)
HGB BLD-MCNC: 14.4 G/DL (ref 12–17)
IMM GRANULOCYTES # BLD AUTO: 0.01 THOUSAND/UL (ref 0–0.2)
IMM GRANULOCYTES NFR BLD AUTO: 0 % (ref 0–2)
LYMPHOCYTES # BLD AUTO: 1.64 THOUSANDS/ΜL (ref 0.6–4.47)
LYMPHOCYTES NFR BLD AUTO: 30 % (ref 14–44)
MCH RBC QN AUTO: 32.7 PG (ref 26.8–34.3)
MCHC RBC AUTO-ENTMCNC: 34 G/DL (ref 31.4–37.4)
MCV RBC AUTO: 96 FL (ref 82–98)
MONOCYTES # BLD AUTO: 0.79 THOUSAND/ΜL (ref 0.17–1.22)
MONOCYTES NFR BLD AUTO: 14 % (ref 4–12)
NEUTROPHILS # BLD AUTO: 2.75 THOUSANDS/ΜL (ref 1.85–7.62)
NEUTS SEG NFR BLD AUTO: 50 % (ref 43–75)
NRBC BLD AUTO-RTO: 0 /100 WBCS
PLATELET # BLD AUTO: 273 THOUSANDS/UL (ref 149–390)
PMV BLD AUTO: 9.9 FL (ref 8.9–12.7)
POTASSIUM SERPL-SCNC: 3.7 MMOL/L (ref 3.5–5.3)
RBC # BLD AUTO: 4.41 MILLION/UL (ref 3.88–5.62)
SODIUM SERPL-SCNC: 143 MMOL/L (ref 136–145)
WBC # BLD AUTO: 5.51 THOUSAND/UL (ref 4.31–10.16)

## 2019-11-26 PROCEDURE — 74177 CT ABD & PELVIS W/CONTRAST: CPT

## 2019-11-26 PROCEDURE — 96374 THER/PROPH/DIAG INJ IV PUSH: CPT

## 2019-11-26 PROCEDURE — 99284 EMERGENCY DEPT VISIT MOD MDM: CPT | Performed by: EMERGENCY MEDICINE

## 2019-11-26 PROCEDURE — 85025 COMPLETE CBC W/AUTO DIFF WBC: CPT | Performed by: EMERGENCY MEDICINE

## 2019-11-26 PROCEDURE — 96375 TX/PRO/DX INJ NEW DRUG ADDON: CPT

## 2019-11-26 PROCEDURE — 36415 COLL VENOUS BLD VENIPUNCTURE: CPT | Performed by: EMERGENCY MEDICINE

## 2019-11-26 PROCEDURE — 99284 EMERGENCY DEPT VISIT MOD MDM: CPT

## 2019-11-26 PROCEDURE — 80048 BASIC METABOLIC PNL TOTAL CA: CPT | Performed by: EMERGENCY MEDICINE

## 2019-11-26 RX ORDER — ACETAMINOPHEN 325 MG/1
650 TABLET ORAL ONCE
Status: COMPLETED | OUTPATIENT
Start: 2019-11-26 | End: 2019-11-26

## 2019-11-26 RX ORDER — SULFAMETHOXAZOLE AND TRIMETHOPRIM 800; 160 MG/1; MG/1
1 TABLET ORAL 2 TIMES DAILY
Qty: 20 TABLET | Refills: 0 | Status: SHIPPED | OUTPATIENT
Start: 2019-11-26 | End: 2019-11-26 | Stop reason: CLARIF

## 2019-11-26 RX ORDER — CEPHALEXIN 500 MG/1
500 CAPSULE ORAL EVERY 6 HOURS SCHEDULED
Qty: 40 CAPSULE | Refills: 0 | Status: SHIPPED | OUTPATIENT
Start: 2019-11-26 | End: 2019-11-26 | Stop reason: CLARIF

## 2019-11-26 RX ORDER — CEPHALEXIN 500 MG/1
500 CAPSULE ORAL EVERY 6 HOURS SCHEDULED
Qty: 40 CAPSULE | Refills: 0 | Status: SHIPPED | OUTPATIENT
Start: 2019-11-26 | End: 2019-12-06

## 2019-11-26 RX ORDER — SULFAMETHOXAZOLE AND TRIMETHOPRIM 800; 160 MG/1; MG/1
1 TABLET ORAL 2 TIMES DAILY
Qty: 20 TABLET | Refills: 0 | Status: SHIPPED | OUTPATIENT
Start: 2019-11-26 | End: 2019-12-06

## 2019-11-26 RX ORDER — DIPHENHYDRAMINE HYDROCHLORIDE 50 MG/ML
25 INJECTION INTRAMUSCULAR; INTRAVENOUS ONCE
Status: COMPLETED | OUTPATIENT
Start: 2019-11-26 | End: 2019-11-26

## 2019-11-26 RX ORDER — KETOROLAC TROMETHAMINE 30 MG/ML
15 INJECTION, SOLUTION INTRAMUSCULAR; INTRAVENOUS ONCE
Status: COMPLETED | OUTPATIENT
Start: 2019-11-26 | End: 2019-11-26

## 2019-11-26 RX ADMIN — KETOROLAC TROMETHAMINE 15 MG: 30 INJECTION, SOLUTION INTRAMUSCULAR at 05:40

## 2019-11-26 RX ADMIN — DIPHENHYDRAMINE HYDROCHLORIDE 25 MG: 50 INJECTION, SOLUTION INTRAMUSCULAR; INTRAVENOUS at 04:29

## 2019-11-26 RX ADMIN — ACETAMINOPHEN 650 MG: 325 TABLET ORAL at 05:39

## 2019-11-26 RX ADMIN — IOHEXOL 100 ML: 350 INJECTION, SOLUTION INTRAVENOUS at 05:16

## 2019-11-26 NOTE — DISCHARGE INSTRUCTIONS
Return to the emergency department if you experience worsening symptoms    Including worsening of pain, vomiting, inability to move your bowels, fevers, or if infection seems to worsen    Take medications as prescribed for a total of 10 days    You may take either oral Benadryl as needed for pain or you may apply topical Benadryl to the itchy areas

## 2019-11-26 NOTE — ED ATTENDING ATTESTATION
11/26/2019  I, Belem Ruiz MD, saw and evaluated the patient  I have discussed the patient with the resident/non-physician practitioner and agree with the resident's/non-physician practitioner's findings, Plan of Care, and MDM as documented in the resident's/non-physician practitioner's note, except where noted  All available labs and Radiology studies were reviewed  I was present for key portions of any procedure(s) performed by the resident/non-physician practitioner and I was immediately available to provide assistance  At this point I agree with the current assessment done in the Emergency Department  I have conducted an independent evaluation of this patient a history and physical is as follows:    ED Course         Critical Care Time  Procedures    Patient is a 37year old male who presents with 2, what appear to be MRSA, abscesses along theabdominal wall  No fluctuance as he reports draining them himself  No f/c/s      + abdominal tenderness below the abscesses  No abdominal tenderness to any quadrant when palpating the non-abscess area of his abdomen  No vomiting or diarrhea  MDM 37 yom, suspect indurated drained abscess, will ct to rule out deep space abscess or abdominal muscle infection  Will ft abdomen, followup with pcp

## 2019-11-26 NOTE — ED PROVIDER NOTES
History  Chief Complaint   Patient presents with    Abscess     Patient reports, "Well I have these bumps that have developed on my abdomen and they are very painful " Also reports "flu-like symptoms, dizziness, and fevers"     41-year-old male past medical history significant for asthma who presents the emergency department for evaluation of abdominal pustules and a pruritic rash  Patient states that for the past week he has noticed to small areas of redness in his lower abdomen  He initially tried to pop and drain them on his own however they returned and appeared to have been worsening  He states they are more painful and have been draining some scant pus  He states for the same duration has also been dealing with a pruritic rash on his back  He states he was sexually intimate with a woman who had similar symptoms a few days prior to his symptom onset  He states he has tried some topical antibiotic ointment without improvement in symptoms  He denies any headache, fever, chills, nausea, vomiting, diarrhea, chest pain, shortness of breath  Prior to Admission Medications   Prescriptions Last Dose Informant Patient Reported? Taking? albuterol (PROVENTIL HFA,VENTOLIN HFA) 90 mcg/act inhaler Past Month at Unknown time  No Yes   Sig: Inhale 2 puffs every 4 (four) hours as needed for wheezing      Facility-Administered Medications: None       Past Medical History:   Diagnosis Date    Asthma     Chronic pain     Hypertension        Past Surgical History:   Procedure Laterality Date    THYROID SURGERY         History reviewed  No pertinent family history  I have reviewed and agree with the history as documented      Social History     Tobacco Use    Smoking status: Current Every Day Smoker     Packs/day: 0 50     Types: Cigarettes    Smokeless tobacco: Never Used   Substance Use Topics    Alcohol use: Yes     Comment: occasional    Drug use: Yes     Types: Marijuana     Comment: "Occasionally" Review of Systems   Constitutional: Negative for activity change, appetite change, chills, fatigue, fever and unexpected weight change  HENT: Negative for congestion, ear discharge, ear pain, nosebleeds, postnasal drip, rhinorrhea, sinus pressure, sinus pain, sore throat and tinnitus  Eyes: Negative for photophobia, pain, discharge, redness, itching and visual disturbance  Respiratory: Negative for cough, choking, chest tightness, shortness of breath and wheezing  Cardiovascular: Negative for chest pain, palpitations and leg swelling  Gastrointestinal: Positive for abdominal pain  Negative for blood in stool, constipation, diarrhea, nausea and vomiting  Genitourinary: Negative for difficulty urinating, discharge, dysuria, enuresis, flank pain, genital sores, hematuria and testicular pain  Musculoskeletal: Negative for myalgias, neck pain and neck stiffness  Skin: Negative for color change, pallor, rash and wound  Neurological: Negative for dizziness, seizures, syncope, light-headedness, numbness and headaches  Hematological: Negative  Psychiatric/Behavioral: Negative  Physical Exam  ED Triage Vitals [11/26/19 0359]   Temperature Pulse Respirations Blood Pressure SpO2   98 3 °F (36 8 °C) 92 20 134/95 95 %      Temp Source Heart Rate Source Patient Position - Orthostatic VS BP Location FiO2 (%)   Oral Monitor Standing Left arm --      Pain Score       Worst Possible Pain             Orthostatic Vital Signs  Vitals:    11/26/19 0359   BP: 134/95   Pulse: 92   Patient Position - Orthostatic VS: Standing       Physical Exam   Constitutional: He is oriented to person, place, and time  He appears well-developed and well-nourished  HENT:   Head: Normocephalic and atraumatic  Right Ear: External ear normal    Left Ear: External ear normal    Nose: Nose normal    Eyes: Pupils are equal, round, and reactive to light  Conjunctivae and EOM are normal    Neck: Normal range of motion  Neck supple  Cardiovascular: Normal rate, regular rhythm, normal heart sounds and intact distal pulses  No murmur heard  Pulmonary/Chest: Effort normal and breath sounds normal  No respiratory distress  He has no wheezes  Abdominal: Soft  Normal aorta and bowel sounds are normal  There is tenderness (In the areas marked)  There is no rigidity, no rebound, no guarding, no CVA tenderness, no tenderness at McBurney's point and negative Gaspar's sign  Marked Areas are tender to palpation, with no active drainage, the lesion on the right side of the abdomen has underlying induration  Musculoskeletal: Normal range of motion  He exhibits no edema or deformity  Neurological: He is alert and oriented to person, place, and time  No cranial nerve deficit  Skin: Skin is warm and dry  Capillary refill takes less than 2 seconds  Rash noted  Rash is maculopapular  Maculopapular rash with overlying excoriations located on the upper back  A raised pink rash noted the left upper quadrant of the abdomen consistent with dermatographia   Psychiatric: He has a normal mood and affect  His behavior is normal  Judgment and thought content normal    Nursing note and vitals reviewed        ED Medications  Medications   diphenhydrAMINE (BENADRYL) injection 25 mg (25 mg Intravenous Given 11/26/19 0429)   ketorolac (TORADOL) injection 15 mg (15 mg Intravenous Given 11/26/19 0540)   acetaminophen (TYLENOL) tablet 650 mg (650 mg Oral Given 11/26/19 0539)   iohexol (OMNIPAQUE) 350 MG/ML injection (MULTI-DOSE) 100 mL (100 mL Intravenous Given 11/26/19 0516)       Diagnostic Studies  Results Reviewed     Procedure Component Value Units Date/Time    Basic metabolic panel [[id number]]  (Abnormal) Collected:  11/26/19 0428    Lab Status:  Final result Specimen:  Blood from Arm, Right Updated:  11/26/19 0456     Sodium 143 mmol/L      Potassium 3 7 mmol/L      Chloride 109 mmol/L      CO2 28 mmol/L      ANION GAP 6 mmol/L      BUN 20 mg/dL      Creatinine 1 05 mg/dL      Glucose 92 mg/dL      Calcium 9 1 mg/dL      eGFR 91 ml/min/1 73sq m     Narrative:       [place] guidelines for Chronic Kidney Disease (CKD):     Stage 1 with normal or high GFR (GFR > 90 mL/min/1 73 square meters)    Stage 2 Mild CKD (GFR = 60-89 mL/min/1 73 square meters)    Stage 3A Moderate CKD (GFR = 45-59 mL/min/1 73 square meters)    Stage 3B Moderate CKD (GFR = 30-44 mL/min/1 73 square meters)    Stage 4 Severe CKD (GFR = 15-29 mL/min/1 73 square meters)    Stage 5 End Stage CKD (GFR <15 mL/min/1 73 square meters)  Note: GFR calculation is accurate only with a steady state creatinine    CBC and differential [[id number]]  (Abnormal) Collected:  11/26/19 0428    Lab Status:  Final result Specimen:  Blood from Arm, Right Updated:  11/26/19 0440     WBC 5 51 Thousand/uL      RBC 4 41 Million/uL      Hemoglobin 14 4 g/dL      Hematocrit 42 3 %      MCV 96 fL      MCH 32 7 pg      MCHC 34 0 g/dL      RDW 14 8 %      MPV 9 9 fL      Platelets 625 Thousands/uL      nRBC 0 /100 WBCs      Neutrophils Relative 50 %      Immat GRANS % 0 %      Lymphocytes Relative 30 %      Monocytes Relative 14 %      Eosinophils Relative 5 %      Basophils Relative 1 %      Neutrophils Absolute 2 75 Thousands/µL      Immature Grans Absolute 0 01 Thousand/uL      Lymphocytes Absolute 1 64 Thousands/µL      Monocytes Absolute 0 79 Thousand/µL      Eosinophils Absolute 0 27 Thousand/µL      Basophils Absolute 0 05 Thousands/µL                  CT abdomen pelvis with contrast   Final Result by Princess Hopkins MD (11/26 0530)      2 small areas of subcutaneous inflammation without evidence for organized abscess  No extension to underlying musculature              Workstation performed: [id number]               Procedures  Procedures        ED Course  ED Course as of Nov 26 0719   Tue Nov 26, 2019   0407 Blood Pressure: 134/95   0407 Temperature: 98 3 °F (36 8 °C) 0407 Pulse: 92   0407 Respirations: 20   0407 SpO2: 95 %                               [facility]  Number of Diagnoses or Management Options  Cellulitis of abdominal wall:   Dermatographia:   Diagnosis management comments: 41-year-old male presents the ED for evaluation a pruritic rash on his back as well as 2 painful wounds on his abdomen  Patient had questionable crepitus on his abdomen during physical examination  Will check basic labs as well as CT abdomen and pelvis to rule out deep infection  Will give Benadryl for itchiness  Will give Toradol and Tylenol for pain  CT scan demonstrated 2 small areas of subcutaneous inflammation without evidence for organized abscess  No extension to underlying muscle  His pruritus and pain improved with medication  Patient will be discharged home with prescription for Bactrim and Keflex as MRSA is suspected  Patient remained hemodynamically stable throughout ED course  He was given strict return precautions  Disposition  Final diagnoses:   Cellulitis of abdominal wall   Dermatographia     Time reflects when diagnosis was documented in both MDM as applicable and the Disposition within this note     Time User Action Codes Description Comment    11/26/2019  5:38 AM CheckMyranda Add [K67 758] Cellulitis of abdominal wall     11/26/2019  5:39 AM Myranda Soto Add [L50 3] Dermatographia       ED Disposition     ED Disposition Condition Date/Time Comment    Discharge Stable Tue Nov 26, 2019  5:37 AM Ag Chin discharge to home/self care              Follow-up Information     Follow up With Specialties Details Why Contact Info Additional [address], [facility] [employer] [facility], Nurse Practitioner  As needed [address] [facility]  [address]       [address] [place] [address] Emergency Department Emergency Medicine  If symptoms worsen [address] [place] [address]  [phone]  ED, [address] [place] [address], [facility]          Discharge Medication List as of 11/26/2019  5:49 AM      START taking these medications    Details   cephalexin (KEFLEX) 500 mg capsule Take 1 capsule (500 mg total) by mouth every 6 (six) hours for 10 days, Starting Tue 11/26/2019, Until Fri 12/6/2019, Normal      sulfamethoxazole-trimethoprim (BACTRIM DS) 800-160 mg per tablet Take 1 tablet by mouth 2 (two) times a day for 10 days smx-tmp DS (BACTRIM) 800-160 mg tabs (1tab q12 D10), Starting Tue 11/26/2019, Until Fri 12/6/2019, Normal         CONTINUE these medications which have NOT CHANGED    Details   albuterol (PROVENTIL HFA,VENTOLIN HFA) 90 mcg/act inhaler Inhale 2 puffs every 4 (four) hours as needed for wheezing, Starting Fri 9/27/2019, Print           No discharge procedures on file  ED Provider  Attending physically available and evaluated Jose Alejandroshelia Solanoservando PARIKH managed the patient along with the ED Attending      Electronically Signed by         Kash Yin MD  11/26/19 1950

## 2020-02-16 ENCOUNTER — APPOINTMENT (EMERGENCY)
Dept: RADIOLOGY | Facility: HOSPITAL | Age: 35
End: 2020-02-16
Payer: MEDICARE

## 2020-02-16 ENCOUNTER — HOSPITAL ENCOUNTER (EMERGENCY)
Facility: HOSPITAL | Age: 35
Discharge: HOME | End: 2020-02-16
Attending: EMERGENCY MEDICINE | Admitting: EMERGENCY MEDICINE
Payer: MEDICARE

## 2020-02-16 VITALS
WEIGHT: 180 LBS | BODY MASS INDEX: 29.95 KG/M2 | OXYGEN SATURATION: 97 % | DIASTOLIC BLOOD PRESSURE: 105 MMHG | TEMPERATURE: 98.1 F | HEART RATE: 102 BPM | SYSTOLIC BLOOD PRESSURE: 162 MMHG | RESPIRATION RATE: 20 BRPM

## 2020-02-16 DIAGNOSIS — S16.1XXA STRAIN OF NECK MUSCLE, INITIAL ENCOUNTER: Primary | ICD-10-CM

## 2020-02-16 DIAGNOSIS — M62.838 TRAPEZIUS MUSCLE SPASM: ICD-10-CM

## 2020-02-16 PROCEDURE — 20552 NJX 1/MLT TRIGGER POINT 1/2: CPT | Performed by: EMERGENCY MEDICINE

## 2020-02-16 PROCEDURE — 72040 X-RAY EXAM NECK SPINE 2-3 VW: CPT

## 2020-02-16 PROCEDURE — 99283 EMERGENCY DEPT VISIT LOW MDM: CPT

## 2020-02-16 PROCEDURE — 96372 THER/PROPH/DIAG INJ SC/IM: CPT

## 2020-02-16 PROCEDURE — 99284 EMERGENCY DEPT VISIT MOD MDM: CPT | Performed by: EMERGENCY MEDICINE

## 2020-02-16 RX ORDER — IBUPROFEN 800 MG/1
800 TABLET ORAL 3 TIMES DAILY
Qty: 21 TABLET | Refills: 0 | Status: SHIPPED | OUTPATIENT
Start: 2020-02-16 | End: 2021-02-06 | Stop reason: HOSPADM

## 2020-02-16 RX ORDER — BUPIVACAINE HYDROCHLORIDE 5 MG/ML
10 INJECTION, SOLUTION EPIDURAL; INTRACAUDAL ONCE
Status: COMPLETED | OUTPATIENT
Start: 2020-02-16 | End: 2020-02-16

## 2020-02-16 RX ORDER — ACETAMINOPHEN 325 MG/1
975 TABLET ORAL ONCE
Status: COMPLETED | OUTPATIENT
Start: 2020-02-16 | End: 2020-02-16

## 2020-02-16 RX ORDER — ACETAMINOPHEN 500 MG
1000 TABLET ORAL EVERY 8 HOURS
Qty: 30 TABLET | Refills: 0 | Status: SHIPPED | OUTPATIENT
Start: 2020-02-16 | End: 2021-02-06 | Stop reason: HOSPADM

## 2020-02-16 RX ORDER — KETOROLAC TROMETHAMINE 30 MG/ML
15 INJECTION, SOLUTION INTRAMUSCULAR; INTRAVENOUS ONCE
Status: COMPLETED | OUTPATIENT
Start: 2020-02-16 | End: 2020-02-16

## 2020-02-16 RX ADMIN — KETOROLAC TROMETHAMINE 15 MG: 30 INJECTION, SOLUTION INTRAMUSCULAR at 15:36

## 2020-02-16 RX ADMIN — ACETAMINOPHEN 975 MG: 325 TABLET ORAL at 15:36

## 2020-02-16 RX ADMIN — BUPIVACAINE HYDROCHLORIDE 10 ML: 5 INJECTION, SOLUTION EPIDURAL; INTRACAUDAL; PERINEURAL at 15:36

## 2020-02-17 ENCOUNTER — TELEPHONE (OUTPATIENT)
Dept: PHYSICAL THERAPY | Facility: OTHER | Age: 35
End: 2020-02-17

## 2020-02-17 NOTE — TELEPHONE ENCOUNTER
This nurse did attempt to call Pt with the ph # provided  The number was called twice and it was a "busy" signal     Pt current health insurance is not accept by us, and this will need to be verified by Pt      This was our first attempt to contract this PT
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
## 2020-02-19 ENCOUNTER — TELEPHONE (OUTPATIENT)
Dept: PHYSICAL THERAPY | Facility: OTHER | Age: 35
End: 2020-02-19

## 2020-02-19 NOTE — TELEPHONE ENCOUNTER
This nurse did attempt to call Pt  This is our second attempt, and there is a "busy" signal audible with Pt's ph      Referral closed

## 2020-03-09 ENCOUNTER — HOSPITAL ENCOUNTER (EMERGENCY)
Facility: HOSPITAL | Age: 35
Discharge: HOME | End: 2020-03-09
Attending: EMERGENCY MEDICINE | Admitting: EMERGENCY MEDICINE
Payer: MEDICARE

## 2020-03-09 ENCOUNTER — APPOINTMENT (EMERGENCY)
Dept: RADIOLOGY | Facility: HOSPITAL | Age: 35
End: 2020-03-09
Payer: MEDICARE

## 2020-03-09 VITALS
OXYGEN SATURATION: 98 % | TEMPERATURE: 97.9 F | HEIGHT: 65 IN | DIASTOLIC BLOOD PRESSURE: 90 MMHG | SYSTOLIC BLOOD PRESSURE: 159 MMHG | BODY MASS INDEX: 29.16 KG/M2 | HEART RATE: 100 BPM | RESPIRATION RATE: 20 BRPM | WEIGHT: 175 LBS

## 2020-03-09 DIAGNOSIS — M54.9 BACK PAIN: ICD-10-CM

## 2020-03-09 DIAGNOSIS — M25.562 KNEE PAIN, LEFT: ICD-10-CM

## 2020-03-09 DIAGNOSIS — M79.672 LEFT FOOT PAIN: Primary | ICD-10-CM

## 2020-03-09 PROCEDURE — 99284 EMERGENCY DEPT VISIT MOD MDM: CPT | Performed by: EMERGENCY MEDICINE

## 2020-03-09 PROCEDURE — 73560 X-RAY EXAM OF KNEE 1 OR 2: CPT

## 2020-03-09 PROCEDURE — 99283 EMERGENCY DEPT VISIT LOW MDM: CPT

## 2020-03-09 PROCEDURE — 73630 X-RAY EXAM OF FOOT: CPT

## 2020-03-09 RX ORDER — ACETAMINOPHEN 325 MG/1
975 TABLET ORAL ONCE
Status: COMPLETED | OUTPATIENT
Start: 2020-03-09 | End: 2020-03-09

## 2020-03-09 RX ORDER — NAPROXEN 500 MG/1
500 TABLET ORAL ONCE
Status: COMPLETED | OUTPATIENT
Start: 2020-03-09 | End: 2020-03-09

## 2020-03-09 RX ADMIN — ACETAMINOPHEN 975 MG: 325 TABLET ORAL at 19:48

## 2020-03-09 RX ADMIN — NAPROXEN 500 MG: 500 TABLET ORAL at 19:48

## 2020-03-10 ENCOUNTER — TELEPHONE (OUTPATIENT)
Dept: PHYSICAL THERAPY | Facility: OTHER | Age: 35
End: 2020-03-10

## 2020-03-10 NOTE — TELEPHONE ENCOUNTER
Nurse spoke with patient regarding referral to  [facility] and offerings  Patient is not interested in physical therapy and voiced his frustration with "walking around in pain"  Nurse assured him she would try to assist him in finding the appropriate care and/or get necessary information to do so  Patient confirmed he currently uses [facility] which is not accepted at our facilities/program  Patient stated he has a referral to Orthopedic surgeon also  Nurse instructed the patient to call the number on his insurance card or go on-line to assist him in finding a provider to evaluate his neck and back  Earlier in the conversation nurse let the patient know the EMR was reviewed and wanted to know if his complaints of severe pain were discussed at the ED visit yesterday for his foot  Patient stated he did not "talk about my neck, I was more focused on my foot injury"  Again, the nurse discussed the best way to get him seen and evaluated in a timely manner would be to contact his insurance for approved providers etc  Reminded to return to ED/UC if needed, but encouraged to make necessary calls  Patient agreed and appreciative of help  Nurse wished him well and told to call our program if needed in the future  Agreed

## 2020-03-10 NOTE — ED PROVIDER NOTES
History  Chief Complaint   Patient presents with    Foot Pain     Pt reports injuring his left foot about 4 days ago while playing basketball, pt then ran a few blocks on the foot and reports making it worse  Also c/o left knee pain      66-year-old male with no previous medical history presenting emergency department after sustaining a foot injury  Patient states he was playing basketball 4 days ago when he collided knees with another player and then slapped his foot on the ground  Since that point patient is noted dorsal swelling over his 3rd and 4th metatarsal on his left foot  Patient is also noted left medial knee pain over his proximal tibia  Patient notes some numbness and tingling overlying the swelling on his foot  Patient denies fractures of his foot in the past   Pain is worse with movement  Pain is less when not moving  Patient has tried Tylenol without relief of symptoms  Pain is 9/10 and described as sharp  Prior to Admission Medications   Prescriptions Last Dose Informant Patient Reported? Taking?   acetaminophen (TYLENOL) 500 mg tablet   No No   Sig: Take 2 tablets (1,000 mg total) by mouth every 8 (eight) hours   albuterol (PROVENTIL HFA,VENTOLIN HFA) 90 mcg/act inhaler   No No   Sig: Inhale 2 puffs every 4 (four) hours as needed for wheezing   ibuprofen (MOTRIN) 800 mg tablet   No No   Sig: Take 1 tablet (800 mg total) by mouth 3 (three) times a day      Facility-Administered Medications: None       Past Medical History:   Diagnosis Date    Asthma     Chronic pain     Hypertension        Past Surgical History:   Procedure Laterality Date    THYROID SURGERY         History reviewed  No pertinent family history  I have reviewed and agree with the history as documented      E-Cigarette/Vaping    E-Cigarette Use Never User      E-Cigarette/Vaping Substances     Social History     Tobacco Use    Smoking status: Current Every Day Smoker     Packs/day: 0 50     Types: Cigarettes    Smokeless tobacco: Never Used   Substance Use Topics    Alcohol use: Yes     Frequency: 4 or more times a week     Comment: occasional    Drug use: Yes     Types: Marijuana     Comment: "Occasionally"        Review of Systems   Musculoskeletal: Positive for gait problem and myalgias  Neurological: Positive for numbness  All other systems reviewed and are negative  Physical Exam  ED Triage Vitals [03/09/20 1920]   Temperature Pulse Respirations Blood Pressure SpO2   97 9 °F (36 6 °C) 100 20 159/90 98 %      Temp Source Heart Rate Source Patient Position - Orthostatic VS BP Location FiO2 (%)   Oral Monitor Sitting Left arm --      Pain Score       9             Orthostatic Vital Signs  Vitals:    03/09/20 1920   BP: 159/90   Pulse: 100   Patient Position - Orthostatic VS: Sitting       Physical Exam   Constitutional: He appears well-developed and well-nourished  No distress  HENT:   Head: Normocephalic and atraumatic  Right Ear: External ear normal    Left Ear: External ear normal    Eyes: Conjunctivae and EOM are normal    Neck: No JVD present  No tracheal deviation present  Cardiovascular: Normal rate, regular rhythm, normal heart sounds and intact distal pulses  No murmur heard  DP and PT pulses normal bilaterally   Pulmonary/Chest: Breath sounds normal  No stridor  No respiratory distress  He has no wheezes  He has no rales  Abdominal: Soft  Bowel sounds are normal  He exhibits no distension and no mass  There is no tenderness  There is no rebound and no guarding  Genitourinary:   Genitourinary Comments: Deferred   Musculoskeletal: He exhibits edema and tenderness  He exhibits no deformity  Pain on palpation of the medial aspect of the left knee  Neurological: He exhibits normal muscle tone  Coordination normal    Able to plantar and dorsiflex left foot  Able to wiggle toes  Sensation intact light touch in the left foot  Skin: Skin is warm and dry   Capillary refill takes less than 2 seconds  No rash noted  He is not diaphoretic  No erythema  No pallor  Edema overlying the 3rd and 4th metatarsal proximally on the left foot  Pain on palpation of the 3rd, 4th metatarsal on the left foot  Psychiatric: He has a normal mood and affect  His behavior is normal  Judgment and thought content normal    Nursing note and vitals reviewed  ED Medications  Medications   acetaminophen (TYLENOL) tablet 975 mg (975 mg Oral Given 3/9/20 1948)   naproxen (NAPROSYN) tablet 500 mg (500 mg Oral Given 3/9/20 1948)       Diagnostic Studies  Results Reviewed     None                 XR foot 3+ views LEFT   ED Interpretation by Dayton Thomas DO (03/09 2022)   Correction:  Left foot x-ray interpreted by me shows no fracture, no acute pathology      XR knee 1 or 2 views left   ED Interpretation by Dayton Thomas DO (03/09 2020)   Left knee x-ray interpreted by me shows no fracture, no dislocation            Procedures  Procedures      ED Course                               MDM  Number of Diagnoses or Management Options  Back pain: minor  Knee pain, left: new and requires workup  Left foot pain: new and requires workup  Diagnosis management comments: 28-year-old male presenting emergency department 4 days after sustaining an injury by playing basketball  Patient has edema overlying the 3rd 4th metatarsal on the left foot and pain on palpation of the medial aspect left knee  Differential diagnosis:  Sprains, strain, fracture, dislocation  Patient be evaluated with x-rays of the left foot and left knee  X-rays without acute findings  Patient be discharged home  Patient told to follow-up with orthopedic surgery in the next 3 days of continues to have pain  Patient come back to the emergency department if he has new or worsening symptoms  [facility] will be [facility] to [facility] as he has chronic cervical neck pain  Attempted to give crutches    Patient states that he would rather have a cane   Will prescribe a cane  Amount and/or Complexity of Data Reviewed  Tests in the radiology section of CPT®: ordered and reviewed  Review and summarize past medical records: yes    Risk of Complications, Morbidity, and/or Mortality  Presenting problems: moderate  Diagnostic procedures: moderate  Management options: moderate          Disposition  Final diagnoses:   Left foot pain   Knee pain, left   Back pain     Time reflects when diagnosis was documented in both MDM as applicable and the Disposition within this note     Time User Action Codes Description Comment    3/9/2020  8:38 PM East Otto Pollen Add [L48 538] Left foot pain     3/9/2020  8:38 PM [place] Otto Pollen Add [M25 562] Knee pain, left     3/9/2020  8:51 PM East Otto Pollen Add [M54 9] Back pain       ED Disposition     ED Disposition Condition Date/Time Comment    Discharge Stable Mon Mar 9, 2020  8:38 PM Katerin Chin discharge to home/self care  Follow-up Information     Follow up With Specialties Details Why Contact Info Additional Information    Placido Suarez MD Orthopedic Surgery  if you continue to have pain in 3 days [address] Emergency Department Emergency Medicine  If symptoms worsen [address] [place] [address]  [phone]  ED, [address] [place] [address], [address] [facility] [address] Program Physical Therapy Call   [phone]          Patient's Medications   Discharge Prescriptions    No medications on file     Outpatient Discharge Orders   Ann Heading       PDMP Review     None           ED Provider  Attending physically available and evaluated Yoanna Adhikari I managed the patient along with the ED Attending      Electronically Signed by         Christophe Cox DO  03/09/20 6129

## 2020-03-10 NOTE — DISCHARGE INSTRUCTIONS
Please rest, elevate, ice, compress your left foot  Use Tylenol and ibuprofen as needed for pain  If he continue have to pain in 3 days, follow-up with orthopedic surgery

## 2020-03-10 NOTE — ED ATTENDING ATTESTATION
3/9/2020  IAna DO, saw and evaluated the patient  I have discussed the patient with the resident/non-physician practitioner and agree with the resident's/non-physician practitioner's findings, Plan of Care, and MDM as documented in the resident's/non-physician practitioner's note, except where noted  All available labs and Radiology studies were reviewed  I was present for key portions of any procedure(s) performed by the resident/non-physician practitioner and I was immediately available to provide assistance  At this point I agree with the current assessment done in the Emergency Department  I have conducted an independent evaluation of this patient a history and physical is as follows:    18-year-old male patient with a history of a prior foot fracture as a teenager presents because 3 days ago when he was playing basketball his left foot slapped down, and his knee hit another player  He did not fall down or suffer additional trauma but since then has been having some pain in the left foot and the medial aspect of the left knee  No numbness or tingling, no weakness  Hurts to weight bear  General:  Patient is well-appearing  Head:  Atraumatic  Eyes:  Conjunctiva pink  ENT:  Mucous membranes are moist  Neck:  Supple  Cardiac:  S1-S2, without murmurs  Lungs:  Clear to auscultation bilaterally  Abdomen:  Soft, nontender, normal bowel sounds, no CVA tenderness, no tympany, no rigidity, no guarding  Extremities:  Left leg is visibly atraumatic  He has tenderness to the left medial joint line  No ligamentous laxity, negative Lachman, negative Nimesh, negative lever sign, negative valgus and varus stress test   Does have some mild tenderness and soft tissue swelling to his lateral left mid foot  No tenderness to the proximal metatarsals, some mild mid 4th and 5th metatarsal tenderness  He can plantar flex and dorsiflex the ankle and toes but does hurt to do so    He can do a straight leg raise   Sensation is normal throughout the entire leg  He has no patellar tenderness, it tracks midline  It is not high riding  DP and PT pulses are intact  Neurologic:  Awake, fluent speech, normal comprehension  AAOx3  Skin:  Pink warm and dry  Psychiatric:  Alert, pleasant, cooperative            ED Course     XR foot 3+ views LEFT   ED Interpretation   Correction:  Left foot x-ray interpreted by me shows no fracture, no acute pathology      XR knee 1 or 2 views left   ED Interpretation   Left knee x-ray interpreted by me shows no fracture, no dislocation          Will treat symptomatically  Supportive care, importance of follow-up and return precautions were discussed with the patient, who expressed understanding          Critical Care Time  Procedures

## 2020-03-24 ENCOUNTER — HOSPITAL ENCOUNTER (EMERGENCY)
Facility: HOSPITAL | Age: 35
Discharge: HOME | End: 2020-03-24
Attending: EMERGENCY MEDICINE | Admitting: EMERGENCY MEDICINE
Payer: MEDICARE

## 2020-03-24 ENCOUNTER — APPOINTMENT (EMERGENCY)
Dept: RADIOLOGY | Facility: HOSPITAL | Age: 35
End: 2020-03-24
Payer: MEDICARE

## 2020-03-24 VITALS
TEMPERATURE: 98.3 F | RESPIRATION RATE: 19 BRPM | WEIGHT: 170 LBS | SYSTOLIC BLOOD PRESSURE: 164 MMHG | HEART RATE: 94 BPM | OXYGEN SATURATION: 98 % | HEIGHT: 65 IN | BODY MASS INDEX: 28.32 KG/M2 | DIASTOLIC BLOOD PRESSURE: 93 MMHG

## 2020-03-24 DIAGNOSIS — R07.81 RIB PAIN ON LEFT SIDE: Primary | ICD-10-CM

## 2020-03-24 LAB
ALBUMIN SERPL BCP-MCNC: 3.9 G/DL (ref 3.5–5)
ALP SERPL-CCNC: 128 U/L (ref 46–116)
ALT SERPL W P-5'-P-CCNC: 35 U/L (ref 12–78)
ANION GAP SERPL CALCULATED.3IONS-SCNC: 5 MMOL/L (ref 4–13)
AST SERPL W P-5'-P-CCNC: 25 U/L (ref 5–45)
BASOPHILS # BLD AUTO: 0.08 THOUSANDS/ΜL (ref 0–0.1)
BASOPHILS NFR BLD AUTO: 1 % (ref 0–1)
BILIRUB SERPL-MCNC: 0.31 MG/DL (ref 0.2–1)
BUN SERPL-MCNC: 23 MG/DL (ref 5–25)
CALCIUM SERPL-MCNC: 8.7 MG/DL (ref 8.3–10.1)
CHLORIDE SERPL-SCNC: 114 MMOL/L (ref 100–108)
CO2 SERPL-SCNC: 25 MMOL/L (ref 21–32)
CREAT SERPL-MCNC: 0.95 MG/DL (ref 0.6–1.3)
EOSINOPHIL # BLD AUTO: 0.25 THOUSAND/ΜL (ref 0–0.61)
EOSINOPHIL NFR BLD AUTO: 3 % (ref 0–6)
ERYTHROCYTE [DISTWIDTH] IN BLOOD BY AUTOMATED COUNT: 14.5 % (ref 11.6–15.1)
GFR SERPL CREATININE-BSD FRML MDRD: 103 ML/MIN/1.73SQ M
GLUCOSE SERPL-MCNC: 82 MG/DL (ref 65–140)
HCT VFR BLD AUTO: 39.6 % (ref 36.5–49.3)
HGB BLD-MCNC: 13.9 G/DL (ref 12–17)
IMM GRANULOCYTES # BLD AUTO: 0.03 THOUSAND/UL (ref 0–0.2)
IMM GRANULOCYTES NFR BLD AUTO: 0 % (ref 0–2)
LIPASE SERPL-CCNC: 86 U/L (ref 73–393)
LYMPHOCYTES # BLD AUTO: 2.22 THOUSANDS/ΜL (ref 0.6–4.47)
LYMPHOCYTES NFR BLD AUTO: 24 % (ref 14–44)
MCH RBC QN AUTO: 33.7 PG (ref 26.8–34.3)
MCHC RBC AUTO-ENTMCNC: 35.1 G/DL (ref 31.4–37.4)
MCV RBC AUTO: 96 FL (ref 82–98)
MONOCYTES # BLD AUTO: 1.02 THOUSAND/ΜL (ref 0.17–1.22)
MONOCYTES NFR BLD AUTO: 11 % (ref 4–12)
NEUTROPHILS # BLD AUTO: 5.68 THOUSANDS/ΜL (ref 1.85–7.62)
NEUTS SEG NFR BLD AUTO: 61 % (ref 43–75)
NRBC BLD AUTO-RTO: 0 /100 WBCS
PLATELET # BLD AUTO: 310 THOUSANDS/UL (ref 149–390)
PMV BLD AUTO: 10.2 FL (ref 8.9–12.7)
POTASSIUM SERPL-SCNC: 3.7 MMOL/L (ref 3.5–5.3)
PROT SERPL-MCNC: 7.1 G/DL (ref 6.4–8.2)
RBC # BLD AUTO: 4.12 MILLION/UL (ref 3.88–5.62)
SODIUM SERPL-SCNC: 144 MMOL/L (ref 136–145)
WBC # BLD AUTO: 9.28 THOUSAND/UL (ref 4.31–10.16)

## 2020-03-24 PROCEDURE — 99284 EMERGENCY DEPT VISIT MOD MDM: CPT

## 2020-03-24 PROCEDURE — 74177 CT ABD & PELVIS W/CONTRAST: CPT

## 2020-03-24 PROCEDURE — 85025 COMPLETE CBC W/AUTO DIFF WBC: CPT | Performed by: EMERGENCY MEDICINE

## 2020-03-24 PROCEDURE — 36415 COLL VENOUS BLD VENIPUNCTURE: CPT | Performed by: EMERGENCY MEDICINE

## 2020-03-24 PROCEDURE — 99284 EMERGENCY DEPT VISIT MOD MDM: CPT | Performed by: EMERGENCY MEDICINE

## 2020-03-24 PROCEDURE — 80053 COMPREHEN METABOLIC PANEL: CPT | Performed by: EMERGENCY MEDICINE

## 2020-03-24 PROCEDURE — 83690 ASSAY OF LIPASE: CPT | Performed by: EMERGENCY MEDICINE

## 2020-03-24 PROCEDURE — 71260 CT THORAX DX C+: CPT

## 2020-03-24 PROCEDURE — 96365 THER/PROPH/DIAG IV INF INIT: CPT

## 2020-03-24 PROCEDURE — 96375 TX/PRO/DX INJ NEW DRUG ADDON: CPT

## 2020-03-24 RX ORDER — ACETAMINOPHEN 325 MG/1
975 TABLET ORAL ONCE
Status: COMPLETED | OUTPATIENT
Start: 2020-03-24 | End: 2020-03-24

## 2020-03-24 RX ORDER — LIDOCAINE 50 MG/G
1 PATCH TOPICAL ONCE
Status: DISCONTINUED | OUTPATIENT
Start: 2020-03-24 | End: 2020-03-24 | Stop reason: HOSPADM

## 2020-03-24 RX ORDER — SODIUM CHLORIDE, SODIUM GLUCONATE, SODIUM ACETATE, POTASSIUM CHLORIDE, MAGNESIUM CHLORIDE, SODIUM PHOSPHATE, DIBASIC, AND POTASSIUM PHOSPHATE .53; .5; .37; .037; .03; .012; .00082 G/100ML; G/100ML; G/100ML; G/100ML; G/100ML; G/100ML; G/100ML
1000 INJECTION, SOLUTION INTRAVENOUS ONCE
Status: COMPLETED | OUTPATIENT
Start: 2020-03-24 | End: 2020-03-24

## 2020-03-24 RX ORDER — KETOROLAC TROMETHAMINE 30 MG/ML
15 INJECTION, SOLUTION INTRAMUSCULAR; INTRAVENOUS ONCE
Status: COMPLETED | OUTPATIENT
Start: 2020-03-24 | End: 2020-03-24

## 2020-03-24 RX ADMIN — ACETAMINOPHEN 975 MG: 325 TABLET ORAL at 03:15

## 2020-03-24 RX ADMIN — KETOROLAC TROMETHAMINE 15 MG: 30 INJECTION, SOLUTION INTRAMUSCULAR at 03:13

## 2020-03-24 RX ADMIN — LIDOCAINE 1 PATCH: 50 PATCH TOPICAL at 03:16

## 2020-03-24 RX ADMIN — IOHEXOL 100 ML: 350 INJECTION, SOLUTION INTRAVENOUS at 03:46

## 2020-03-24 RX ADMIN — SODIUM CHLORIDE, SODIUM GLUCONATE, SODIUM ACETATE, POTASSIUM CHLORIDE, MAGNESIUM CHLORIDE, SODIUM PHOSPHATE, DIBASIC, AND POTASSIUM PHOSPHATE 1000 ML: .53; .5; .37; .037; .03; .012; .00082 INJECTION, SOLUTION INTRAVENOUS at 03:21
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
## 2020-07-09 ENCOUNTER — HOSPITAL ENCOUNTER (EMERGENCY)
Facility: HOSPITAL | Age: 35
Discharge: HOME | End: 2020-07-09
Attending: EMERGENCY MEDICINE | Admitting: EMERGENCY MEDICINE
Payer: MEDICARE

## 2020-07-09 VITALS
WEIGHT: 174 LBS | TEMPERATURE: 97.8 F | DIASTOLIC BLOOD PRESSURE: 97 MMHG | HEART RATE: 84 BPM | RESPIRATION RATE: 18 BRPM | OXYGEN SATURATION: 98 % | SYSTOLIC BLOOD PRESSURE: 161 MMHG | HEIGHT: 65 IN | BODY MASS INDEX: 28.99 KG/M2

## 2020-07-09 DIAGNOSIS — L23.7 POISON IVY DERMATITIS: Primary | ICD-10-CM

## 2020-07-09 PROCEDURE — 99282 EMERGENCY DEPT VISIT SF MDM: CPT

## 2020-07-09 PROCEDURE — 99284 EMERGENCY DEPT VISIT MOD MDM: CPT | Performed by: EMERGENCY MEDICINE

## 2020-07-09 RX ORDER — TRIAMCINOLONE ACETONIDE 1 MG/G
CREAM TOPICAL 2 TIMES DAILY
Qty: 30 G | Refills: 0 | Status: SHIPPED | OUTPATIENT
Start: 2020-07-09 | End: 2021-02-06 | Stop reason: HOSPADM

## 2020-07-09 RX ORDER — HYDROCODONE BITARTRATE AND ACETAMINOPHEN 7.5; 3 MG/1; MG/1
1 TABLET ORAL EVERY 6 HOURS PRN
COMMUNITY
End: 2020-08-05 | Stop reason: HOSPADM

## 2020-07-09 RX ORDER — PREDNISONE 20 MG/1
TABLET ORAL
Qty: 14 TABLET | Refills: 0 | Status: SHIPPED | OUTPATIENT
Start: 2020-07-09 | End: 2020-07-19

## 2020-07-09 NOTE — ED PROVIDER NOTES
History  Chief Complaint   Patient presents with    Rash     pt reports doing yardwork 3 day PTA  Strated having a rxn to BUE and right knee and right eyebrow  Has not improved came to be evaled     57-year-old male with history of asthma and hypertension presents emergency department for poison ivy exposure  Patient says that he was in his yd weeding and doing yd work 3 days ago  In the evening later that day he developed faint rash and pruritus to bilateral arms  The following day the rash significantly worsened and he has had clear yellow weeping from the rash  Rash is localized to bilateral arms from wrists all the way up towards mid humerus, which are the areas exposed during yard work  He has use topical Benadryl with mild relief  He denies any oral or facial swelling, chest tightness, wheezing, shortness of breath, abdominal pain, nausea, vomiting, diarrhea, any other symptoms or complaints  Rash   Location:  Shoulder/arm  Shoulder/arm rash location:  L upper arm, R upper arm, L forearm and R forearm  Quality: itchiness and weeping    Severity:  Moderate  Onset quality:  Gradual  Duration:  3 days  Timing:  Constant  Progression:  Worsening  Chronicity:  New  Context: plant contact    Relieved by: Antihistamines  Associated symptoms: no abdominal pain, no diarrhea, no fever, no headaches, no nausea, no shortness of breath and not vomiting        Prior to Admission Medications   Prescriptions Last Dose Informant Patient Reported? Taking?    HYDROcodone-acetaminophen (XODOL) 7 5-300 MG per tablet  Self Yes Yes   Sig: Take 1 tablet by mouth every 6 (six) hours as needed for moderate pain   acetaminophen (TYLENOL) 500 mg tablet   No Yes   Sig: Take 2 tablets (1,000 mg total) by mouth every 8 (eight) hours   albuterol (PROVENTIL HFA,VENTOLIN HFA) 90 mcg/act inhaler   No Yes   Sig: Inhale 2 puffs every 4 (four) hours as needed for wheezing   ibuprofen (MOTRIN) 800 mg tablet   No Yes   Sig: Take 1 tablet (800 mg total) by mouth 3 (three) times a day      Facility-Administered Medications: None       Past Medical History:   Diagnosis Date    Asthma     Chronic pain     Hypertension        Past Surgical History:   Procedure Laterality Date    THYROID SURGERY         History reviewed  No pertinent family history  I have reviewed and agree with the history as documented  E-Cigarette/Vaping    E-Cigarette Use Never User      E-Cigarette/Vaping Substances     Social History     Tobacco Use    Smoking status: Current Every Day Smoker     Packs/day: 0 50     Types: Cigarettes    Smokeless tobacco: Never Used   Substance Use Topics    Alcohol use: Yes     Frequency: 4 or more times a week     Comment: occasional    Drug use: Yes     Types: Marijuana     Comment: "Occasionally"        Review of Systems   Constitutional: Negative  Negative for chills and fever  HENT: Negative  Negative for congestion and rhinorrhea  Eyes: Negative  Respiratory: Negative  Negative for cough and shortness of breath  Cardiovascular: Negative  Negative for chest pain and leg swelling  Gastrointestinal: Negative  Negative for abdominal distention, abdominal pain, diarrhea, nausea and vomiting  Musculoskeletal: Negative  Negative for back pain and neck pain  Skin: Positive for rash  Neurological: Negative  Negative for light-headedness and headaches  Hematological: Negative  All other systems reviewed and are negative  Physical Exam  ED Triage Vitals [07/09/20 1541]   Temperature Pulse Respirations Blood Pressure SpO2   97 8 °F (36 6 °C) 84 18 161/97 98 %      Temp Source Heart Rate Source Patient Position - Orthostatic VS BP Location FiO2 (%)   Oral Monitor -- Right arm --      Pain Score       --             Orthostatic Vital Signs  Vitals:    07/09/20 1541   BP: 161/97   Pulse: 84       Physical Exam   Constitutional: He is oriented to person, place, and time   He appears well-developed and well-nourished  No distress  HENT:   Head: Normocephalic and atraumatic  Mouth/Throat: Oropharynx is clear and moist    Eyes: EOM are normal    Neck: Normal range of motion  Neck supple  Cardiovascular: Normal rate, regular rhythm, normal heart sounds and intact distal pulses  Exam reveals no gallop and no friction rub  No murmur heard  Pulmonary/Chest: Effort normal and breath sounds normal  No respiratory distress  He has no wheezes  He has no rales  Abdominal: Soft  There is no tenderness  There is no rebound and no guarding  Musculoskeletal: He exhibits no edema or tenderness  Neurological: He is alert and oriented to person, place, and time  Clear fluent speech   Skin: Skin is warm and dry  Capillary refill takes less than 2 seconds  Rash (Linear raised erythematous rash to bilateral upper extremities, sparing palms) noted  Psychiatric: He has a normal mood and affect  Nursing note and vitals reviewed  ED Medications  Medications - No data to display    Diagnostic Studies  Results Reviewed     None                 No orders to display         Procedures  Procedures      ED Course       US AUDIT      Most Recent Value   Initial Alcohol Screen: US AUDIT-C    1  How often do you have a drink containing alcohol? 3 Filed at: 07/09/2020 1542   2  How many drinks containing alcohol do you have on a typical day you are drinking? 4 Filed at: 07/09/2020 1542   3a  Male UNDER 65: How often do you have five or more drinks on one occasion? 3 Filed at: 07/09/2020 1542   Audit-C Score  (!) 10 Filed at: 07/09/2020 1542   Full Alcohol Screen: US AUDIT   4  How often during the last year have you found that you were not able to stop drinking once you had started? 0 Filed at: 07/09/2020 1542   5  How often during past year have you failed to do what was normally expected of you because of drinking? 0 Filed at: 07/09/2020 1542   6   How often in past year have you needed a first drink in the morning to get yourself going after a heavy drinking session? 0 Filed at: 07/09/2020 1542   7  How often in past year have you had feeling of guilt or remorse after drinking? 0 Filed at: 07/09/2020 1542   8  How often in past year have you been unable to remember what happened night before because you had been drinking? 0 Filed at: 07/09/2020 1542   9  Have you or someone else been injured as a result of your drinking? 0 Filed at: 07/09/2020 1542   10  Has a relative, friend, doctor or other health worker been concerned about your drinking and suggested you cut down?   0 Filed at: 07/09/2020 1542   AUDIT Total Score  10 Filed at: 07/09/2020 1542                  YANELI/DAST-10      Most Recent Value   How many times in the past year have you    Used an illegal drug or used a prescription medication for non-medical reasons? Never Filed at: 07/09/2020 1543                              MDM  Number of Diagnoses or Management Options  Poison ivy dermatitis:   Diagnosis management comments: Patient presenting with poison ivy dermatitis  Will send home on steroid taper and topical steroids  Strict ED return precautions provided should symptoms worsen and patient can otherwise follow up outpatient  Patient expresses an understanding and agreement with the plan and remains in good condition for discharge  Disposition  Final diagnoses:   Poison ivy dermatitis     Time reflects when diagnosis was documented in both MDM as applicable and the Disposition within this note     Time User Action Codes Description Comment    7/9/2020  4:03 PM Lynette Schuster Add [L23 7] Poison ivy dermatitis       ED Disposition     ED Disposition Condition Date/Time Comment    Discharge Good Thu Jul 9, 2020  4:03 PM Marc Chin discharge to home/self care              Follow-up Information     Follow up With Specialties Details Why Contact Info Additional [address] Emergency Department Emergency Medicine Go to  If symptoms worsen [address]  [phone]  ED, [address] [place] [address], [facility] [address]    [address]  Call in 1 day  [phone]             Patient's Medications   Discharge Prescriptions    PREDNISONE 20 MG TABLET    Take 2 tablets (40 mg total) by mouth daily for 5 days, THEN 1 tablet (20 mg total) daily for 3 days, THEN 0 5 tablets (10 mg total) daily for 2 days  Start Date: 7/9/2020  End Date: 7/19/2020       Order Dose: --       Quantity: 14 tablet    Refills: 0    TRIAMCINOLONE (KENALOG) 0 1 % CREAM    Apply topically 2 (two) times a day       Start Date: 7/9/2020  End Date: --       Order Dose: --       Quantity: 30 g    Refills: 0     No discharge procedures on file  PDMP Review     None           ED Provider  Attending physically available and evaluated Jaye Chance I managed the patient along with the ED Attending      Electronically Signed by         Roly Sheppard MD  07/09/20 8493

## 2020-07-14 ENCOUNTER — HOSPITAL ENCOUNTER (EMERGENCY)
Facility: HOSPITAL | Age: 35
Discharge: HOME | End: 2020-07-14
Attending: EMERGENCY MEDICINE | Admitting: EMERGENCY MEDICINE
Payer: MEDICARE

## 2020-07-14 VITALS
HEART RATE: 71 BPM | BODY MASS INDEX: 27.79 KG/M2 | SYSTOLIC BLOOD PRESSURE: 166 MMHG | RESPIRATION RATE: 18 BRPM | DIASTOLIC BLOOD PRESSURE: 103 MMHG | TEMPERATURE: 98.3 F | OXYGEN SATURATION: 99 % | WEIGHT: 167 LBS

## 2020-07-14 DIAGNOSIS — L02.422 FURUNCLE OF LEFT AXILLA: Primary | ICD-10-CM

## 2020-07-14 PROCEDURE — 99284 EMERGENCY DEPT VISIT MOD MDM: CPT | Performed by: EMERGENCY MEDICINE

## 2020-07-14 PROCEDURE — 99282 EMERGENCY DEPT VISIT SF MDM: CPT

## 2020-07-14 RX ORDER — SULFAMETHOXAZOLE AND TRIMETHOPRIM 800; 160 MG/1; MG/1
1 TABLET ORAL 2 TIMES DAILY
Qty: 14 TABLET | Refills: 0 | Status: SHIPPED | OUTPATIENT
Start: 2020-07-14 | End: 2020-07-21

## 2020-07-14 NOTE — ED PROVIDER NOTES
History  Chief Complaint   Patient presents with   [facility]     pt reports "I'm here to check up on my poison ivy and I have a cyst under my arm  I feel a lot of pressure  I just started using a new deodorant     Cyst     Patient is a previously healthy 38-year-old male who presents with pain and cysts" under his left armpit  Patient states that about 10 days ago he started using a new deodorant that burned when he put it on  Patient states that 2-3 days after that he started noticing the lumps underneath his left armpit that became significantly more painful  Patient states today they are a 9/10 and are only slightly improved with ibuprofen  The pain is localized to the left axilla but also radiates down the medial aspect of his arm  Patient still has full motor and sensory but states that it does hurt to move  Patient stopped using deodorant today  Patient denies any symptoms on the right armpit or anywhere else on the body  Patient also has tried new soap recently but states that it is all natural he has not noticed any reaction anywhere else  States he had previous symptoms similar to this in his teens but nothing since then  Patient is otherwise healthy besides a history of asthma that is unchanged  Patient smokes half a pack of cigarettes per day, drinks 4-5 drinks a day every weekend, and smokes marijuana once a month  Patient was here 6 days prior for poison ivy and has been taking steroids regularly  Patient states that rash has significantly improved and he has no further concerns regarding his poison ivy  Prior to Admission Medications   Prescriptions Last Dose Informant Patient Reported? Taking?    HYDROcodone-acetaminophen (XODOL) 7 5-300 MG per tablet  Self Yes Yes   Sig: Take 1 tablet by mouth every 6 (six) hours as needed for moderate pain   acetaminophen (TYLENOL) 500 mg tablet   No No   Sig: Take 2 tablets (1,000 mg total) by mouth every 8 (eight) hours   albuterol (PROVENTIL HFA,VENTOLIN HFA) 90 mcg/act inhaler   No Yes   Sig: Inhale 2 puffs every 4 (four) hours as needed for wheezing   ibuprofen (MOTRIN) 800 mg tablet 7/14/2020 at Unknown time  No Yes   Sig: Take 1 tablet (800 mg total) by mouth 3 (three) times a day   predniSONE 20 mg tablet 7/14/2020 at Unknown time  No Yes   Sig: Take 2 tablets (40 mg total) by mouth daily for 5 days, THEN 1 tablet (20 mg total) daily for 3 days, THEN 0 5 tablets (10 mg total) daily for 2 days  triamcinolone (KENALOG) 0 1 % cream   No No   Sig: Apply topically 2 (two) times a day      Facility-Administered Medications: None       Past Medical History:   Diagnosis Date    Asthma     Chronic pain     Hypertension        Past Surgical History:   Procedure Laterality Date    THYROID SURGERY         History reviewed  No pertinent family history  I have reviewed and agree with the history as documented  E-Cigarette/Vaping    E-Cigarette Use Never User      E-Cigarette/Vaping Substances     Social History     Tobacco Use    Smoking status: Current Every Day Smoker     Packs/day: 0 50     Types: Cigarettes    Smokeless tobacco: Never Used   Substance Use Topics    Alcohol use: Yes     Frequency: 4 or more times a week     Comment: occasional    Drug use: Yes     Types: Marijuana     Comment: "Occasionally"        Review of Systems   Constitutional: Negative for activity change, appetite change, chills, fatigue and fever  HENT: Negative for congestion, ear discharge, ear pain, sinus pressure, sinus pain and sore throat  Eyes: Negative for photophobia, pain, redness and visual disturbance  Respiratory: Positive for shortness of breath (Due to asthma; unchanged)  Negative for cough, chest tightness and wheezing  Cardiovascular: Negative for chest pain, palpitations and leg swelling  Gastrointestinal: Negative for abdominal distention, abdominal pain, blood in stool, diarrhea, nausea and vomiting     Endocrine: Negative for cold intolerance and heat intolerance  Genitourinary: Negative for dysuria, frequency and urgency  Musculoskeletal: Positive for neck pain (chronic; unchanged)  Negative for arthralgias, back pain, gait problem and neck stiffness  Skin: Positive for rash (Previous poison ivy encounter on bilateral arms; improved)  Negative for color change, pallor and wound  5 raised, painful 'cysts' in left axilla   Neurological: Negative for dizziness, tremors, syncope, weakness, numbness and headaches  Psychiatric/Behavioral: Negative for agitation, behavioral problems and confusion  The patient is not nervous/anxious  Physical Exam  ED Triage Vitals [07/14/20 1641]   Temperature Pulse Respirations Blood Pressure SpO2   98 3 °F (36 8 °C) 71 18 (!) 166/103 99 %      Temp Source Heart Rate Source Patient Position - Orthostatic VS BP Location FiO2 (%)   Tympanic Monitor Sitting Left arm --      Pain Score       9             Orthostatic Vital Signs  Vitals:    07/14/20 1641   BP: (!) 166/103   Pulse: 71   Patient Position - Orthostatic VS: Sitting       Physical Exam   Constitutional: He is oriented to person, place, and time  He appears well-developed and well-nourished  No distress  HENT:   Head: Normocephalic and atraumatic  Right Ear: External ear normal    Left Ear: External ear normal    Nose: Nose normal    Mouth/Throat: Oropharynx is clear and moist    Eyes: Pupils are equal, round, and reactive to light  Conjunctivae and EOM are normal    Neck: Normal range of motion  Neck supple  Cardiovascular: Normal rate, regular rhythm, normal heart sounds and intact distal pulses  No murmur heard  Pulmonary/Chest: Effort normal and breath sounds normal  No stridor  No respiratory distress  He has no wheezes  Abdominal: Soft  Bowel sounds are normal  He exhibits no distension and no mass  There is no tenderness  There is no guarding  Musculoskeletal: Normal range of motion   He exhibits no edema, tenderness or deformity  Neurological: He is alert and oriented to person, place, and time  A sensory deficit (Slight decreased sensation on medial aspect of L hand) is present  Skin: Skin is warm and dry  Capillary refill takes less than 2 seconds  Rash (Light, erythematous rash on bilateral arms; pt states from poison ivy and improved since prior visit) noted  He is not diaphoretic  There is erythema  No pallor  6 raised, erythematous furuncles in left axilla  Painful to palpation  No obvious drainage  Psychiatric: He has a normal mood and affect  His behavior is normal  Judgment and thought content normal    Vitals reviewed  ED Medications  Medications - No data to display    Diagnostic Studies  Results Reviewed     None                 No orders to display         Procedures  Procedures      ED Course                                           MDM  Number of Diagnoses or Management Options  Furuncle of left axilla:   Diagnosis management comments: Patient is a healthy 64-year-old male who presents with pain and a raised rash under left axilla  Exam pertinent for 6 apparent raised papules under left axilla are painful to palpation without obvious drainage  Differential diagnosis to include but not limited to furuncle, abscess, soft tissue inflammatory reaction, soft tissue infection  Based on history and exam showing raised papules near hair follicles with no apparent drainage, furuncle most likely diagnosis at this time  Discussed with patient diagnosis and plan to prescribe antibiotics for 1 week to improve symptoms  Requested that patient follow-up with PCP in 3 days to monitor healing and for further care  Advised patient to take ibuprofen and Tylenol for pain and to not use deodorant until completely resolved  Patient expressed understanding and is amenable to plan          Disposition  Final diagnoses:   Furuncle of left axilla     Time reflects when diagnosis was documented in both MDM as applicable and the Disposition within this note     Time User Action Codes Description Comment    7/14/2020  5:15 PM Alvin Rebolledo Add [L02 422] Furuncle of left axilla       ED Disposition     ED Disposition Condition Date/Time Comment    Discharge Stable Tue Jul 14, 2020  5:15 PM Phil Chin discharge to home/self care  Follow-up Information     Follow up With Specialties Details Why Contact Info Additional [address] Internal Medicine In 3 days For re-check [address] [facility]  [place] [address] [id number]  [address] [place] [address], [address] [facility] [address], East, [facility], [address] [facility], [address] [phone]          Discharge Medication List as of 7/14/2020  5:19 PM      START taking these medications    Details   sulfamethoxazole-trimethoprim (BACTRIM DS) 800-160 mg per tablet Take 1 tablet by mouth 2 (two) times a day for 7 days smx-tmp DS (BACTRIM) 800-160 mg tabs (1tab q12 D10), Starting Tue 7/14/2020, Until Tue 7/21/2020, Normal         CONTINUE these medications which have NOT CHANGED    Details   albuterol (PROVENTIL HFA,VENTOLIN HFA) 90 mcg/act inhaler Inhale 2 puffs every 4 (four) hours as needed for wheezing, Starting Fri 9/27/2019, Print      HYDROcodone-acetaminophen (XODOL) 7 5-300 MG per tablet Take 1 tablet by mouth every 6 (six) hours as needed for moderate pain, Historical Med      ibuprofen (MOTRIN) 800 mg tablet Take 1 tablet (800 mg total) by mouth 3 (three) times a day, Starting Sun 2/16/2020, Print      predniSONE 20 mg tablet Multiple Dosages:Starting Thu 7/9/2020, Last dose on Mon 7/13/2020, THEN Starting Tue 7/14/2020, Last dose on Thu 7/16/2020, THEN Starting Fri 7/17/2020, Last dose on Sat 7/18/2020Take 2 tablets (40 mg total) by mouth daily for 5 days, THEN 1 tablet  (20 mg total) daily for 3 days, THEN 0 5 tablets (10 mg total) daily for 2 days  , Print      acetaminophen (TYLENOL) 500 mg tablet Take 2 tablets (1,000 mg total) by mouth every 8 (eight) hours, Starting Sun 2/16/2020, Print      triamcinolone (KENALOG) 0 1 % cream Apply topically 2 (two) times a day, Starting Thu 7/9/2020, Print           No discharge procedures on file  PDMP Review     None           ED Provider  Attending physically available and evaluated Monroe Camar[place] I managed the patient along with the ED Attending      Electronically Signed by         Emi Kowalski MD  07/14/20 7990

## 2020-07-14 NOTE — ED NOTES
Dr Marta Valverde at bedside for patient evaluation      John Alford RN  07/14/20 [address] [place] [address]

## 2020-07-14 NOTE — DISCHARGE INSTRUCTIONS
Follow-up with PCP (contact info listed below) for re-check and further care  Return to ED with fever or significant worsening of symptoms

## 2020-07-14 NOTE — ED ATTENDING ATTESTATION
7/14/2020  IConnie DO, saw and evaluated the patient  I have discussed the patient with the resident/non-physician practitioner and agree with the resident's/non-physician practitioner's findings, Plan of Care, and MDM as documented in the resident's/non-physician practitioner's note, except where noted  All available labs and Radiology studies were reviewed  I was present for key portions of any procedure(s) performed by the resident/non-physician practitioner and I was immediately available to provide assistance  At this point I agree with the current assessment done in the Emergency Department  I have conducted an independent evaluation of this patient a history and physical is as follows:    Patient is a 38-year-old male presents for evaluation of irritation under his left armpit  He says about 1 week ago he began using a new deodorant and 2 or 3 days later noticed some red bumps and irritation underneath the left armpit  He stopped using deodorant 2 days ago  No trauma, no fever, no chills  Despite nurse's notes, there is no numbness or tingling  He says the pain is irritating, worse with moving around and better with remaining still  About 10 years ago he had a similar episode of irritation under the left armpit, treat with antibiotics resolved spontaneously  No recurrent episodes since then  He was recently seen and evaluated for poison ivy, currently on steroids for that, says that is resolving  General:  Patient is well-appearing  Head:  Atraumatic  Eyes:  Conjunctiva pink  ENT:  Mucous membranes are moist  Neck:  Supple  Extremities: In his left axillary area are 6 small erythematous furuncles, no palpable abscess, no purulent drainage or discharge  No bony tenderness to the bilateral arms, compartments are soft  He does have some linear streaking on the bilateral forearms, no purulent drainage or discharge    Neurologic:  Awake, fluent speech, normal comprehension, AAOx3  Skin: Pink warm and dry  Psychiatric:  Alert, pleasant, cooperative      ED Course     Patient appears to have some mild furuncles, also maybe some localized irritation from the deodorant  Will treat conservatively with Bactrim, does not have a PCP, will refer to area PCP  Supportive care, importance of follow-up and return precautions were discussed with the patient, who expressed understanding        Critical Care Time  Procedures

## 2020-07-25 ENCOUNTER — HOSPITAL ENCOUNTER (EMERGENCY)
Facility: HOSPITAL | Age: 35
Discharge: HOME | End: 2020-07-25
Admitting: EMERGENCY MEDICINE
Payer: MEDICARE

## 2020-07-25 VITALS
SYSTOLIC BLOOD PRESSURE: 135 MMHG | DIASTOLIC BLOOD PRESSURE: 86 MMHG | OXYGEN SATURATION: 99 % | RESPIRATION RATE: 20 BRPM | HEART RATE: 74 BPM | TEMPERATURE: 97.6 F

## 2020-07-25 DIAGNOSIS — Z20.2 POSSIBLE EXPOSURE TO STD: ICD-10-CM

## 2020-07-25 DIAGNOSIS — L02.429 FURUNCLE OF AXILLA: Primary | ICD-10-CM

## 2020-07-25 PROCEDURE — 99284 EMERGENCY DEPT VISIT MOD MDM: CPT | Performed by: EMERGENCY MEDICINE

## 2020-07-25 PROCEDURE — 99283 EMERGENCY DEPT VISIT LOW MDM: CPT

## 2020-07-25 NOTE — ED ATTENDING ATTESTATION
7/25/2020  ICheyenne MD, saw and evaluated the patient  I have discussed the patient with the resident/non-physician practitioner and agree with the resident's/non-physician practitioner's findings, Plan of Care, and MDM as documented in the resident's/non-physician practitioner's note, except where noted  All available labs and Radiology studies were reviewed  I was present for key portions of any procedure(s) performed by the resident/non-physician practitioner and I was immediately available to provide assistance  At this point I agree with the current assessment done in the Emergency Department  I have conducted an independent evaluation of this patient a history and physical is as follows: This is a 37 y o  old male who presents to the ED for evaluation of wound check  History left axillary furuncle  Comes in for re-evaluation  States pain is improved  Swelling is going down  No fevers  Of them did drain some purulent material   Of note patient states a lady friend that he has seen recently was diagnosed with chlamydia  He is requesting to be tested  No symptoms  Patient appears well nourished, normally developed, no acute distress  Vital signs as documented  Head exam is atraumatic  Pupils EOMI, no scleral icterus or corneal injection noted  Neck is supple without JVD  Respirations are normal without increase work of breathing or audible noises  Cardiac exam shows regular rate and rhythm  Patient is moving all extremities equally, able to ambulate with normal gait under own power  Two small furuncles palpated under the left axilla  Patient is awake, alert, oriented ×4 without focal neurologic deficit  Skin is warm, dry, intact without rash  A/P: This is a 37 y o  male who presents to the ED for evaluation of furuncle  Hearing well  Can continue treatment  Outpatient surgical follow-up as needed  Will test for STI  No symptoms will not treat at this time      ED Course         Critical Care Time  Procedures

## 2020-07-25 NOTE — ED PROVIDER NOTES
Final Diagnosis:  1  Furuncle of axilla    2  Possible exposure to STD        Chief Complaint   Patient presents with    Rash     pt states here for f/u from 7/14/20 ER visit with rash  pt states rash has calmed down on forearms but still evident under left armpit  pt also wants to be tested for an STD     HPI  Patient presents with multiple complaints  Patient states that he was seen for a furuncle approximately 1 week ago and while has been improving 1 area started to drain about a 3 days ago and he wanted to have it re-evaluated  He denies general pain in the area stating that only her 20 scrubbed extensively  No fever chills, radiation of pain  Patient also is concerned about a possible chlamydia infection  He states that he had recently met another individual on [address] [place] and received a text message from her saying that she had been diagnosed with chlamydia  He is now concerned that he may have infection  He denies any testicular pain, penile discharge, dysuria, hematuria, fever chills, nausea or vomiting     - No language barrier    - History obtained from patient  - There are no limitations to the history obtained  - Previous charting was reviewed    PMH:   has a past medical history of Asthma, Chronic pain, and Hypertension  PSH:   has a past surgical history that includes Thyroid surgery  Social History:  pack year history  Patient drinks occasionally and socially  Patient with no illicit use  I have reviewed and verified the patients nicotine usage in the History section in the patients chart  I have provided face-to-face nicotine cessation counseling for a period of time  with the patient, during which time the patient was alert, oriented, and receptive to counseling  The patient was offered the following interventions:   ROS:  Review of Systems   Constitutional: Negative for activity change, chills, fatigue and fever     Respiratory: Negative for cough and shortness of breath  Cardiovascular: Negative for chest pain and palpitations  Gastrointestinal: Negative for abdominal distention, abdominal pain, constipation, diarrhea, nausea and vomiting  Genitourinary: Negative for dysuria and hematuria  Musculoskeletal: Negative for arthralgias, myalgias and neck pain  Neurological: Negative for dizziness, syncope, light-headedness and headaches  All other systems reviewed and are negative  PE:   Vitals:    07/25/20 1816   BP: 135/86   BP Location: Right arm   Pulse: 74   Resp: 20   Temp: 97 6 °F (36 4 °C)   TempSrc: Oral   SpO2: 99%     Vitals reviewed by me  Physical Exam   Constitutional: He is oriented to person, place, and time  He appears well-developed and well-nourished  No distress  HENT:   Head: Normocephalic and atraumatic  Right Ear: External ear normal    Left Ear: External ear normal    Eyes: Pupils are equal, round, and reactive to light  Conjunctivae and EOM are normal  Right eye exhibits no discharge  Left eye exhibits no discharge  Neck: Normal range of motion  Neck supple  No JVD present  No tracheal deviation present  Cardiovascular: Normal rate, regular rhythm, normal heart sounds and intact distal pulses  Exam reveals no gallop and no friction rub  No murmur heard  Pulmonary/Chest: Effort normal and breath sounds normal  No respiratory distress  He has no wheezes  He has no rales  Abdominal: Soft  Bowel sounds are normal  He exhibits no distension and no mass  There is no tenderness  There is no guarding  Musculoskeletal: Normal range of motion  He exhibits no edema, tenderness or deformity  Two small, firm masses in left axilla  Non erythematous, non fluctuant  The bottom mass appears to have opened, drained, smaller than superior mass  Neurological: He is alert and oriented to person, place, and time  No cranial nerve deficit or sensory deficit  He exhibits normal muscle tone   Coordination normal    Skin: He is not diaphoretic  Psychiatric: He has a normal mood and affect  His behavior is normal  Judgment and thought content normal         A:  - Nursing note reviewed  No orders to display     Orders Placed This Encounter   Procedures    Chlamydia/GC amplified DNA by PCR     Labs Reviewed - No data to display    Procedure Note: EKG  Date/Time: 07/25/20 7:28 PM   Interpreted by: Naida Lubin  ECG reviewed by me, the ED Provider: yes   The EKG demonstrates:  Rhythm: normal sinus  Intervals: normal intervals  Axis: normal axis  QRS/Blocks: normal QRS  ST Changes: No acute ST Changes, no STD/BACILIO  Final Diagnosis:  1  Furuncle of axilla    2  Possible exposure to STD        P:  - no medical intervention needed at this time  -urine for GC and chlamydia  -follow-up with primary care provider    Medications - No data to display  Time reflects when diagnosis was documented in both MDM as applicable and the Disposition within this note     Time User Action Codes Description Comment    7/25/2020  6:27 PM Charlene Gomez Add [L02 429] Furuncle of axilla     7/25/2020  6:27 PM Charlene Gomez Add [Z20 2] Possible exposure to STD       ED Disposition     ED Disposition Condition Date/Time Comment    Discharge Stable Sat Jul 25, 2020  6:26 PM [place] [address] discharge to home/self care              Follow-up Information     Follow up With Specialties Details Why Contact Info Additional [address] Emergency Department Emergency Medicine   [address]  [phone]  ED, [address] [place] [address], [facility] [address]    [address]  [facility] Surgery  If repeating masses in axilla occur [address] [id number]  [address] [place] [address], [address] [place] [address], [phone] Discharge Medication List as of 7/25/2020  6:28 PM      CONTINUE these medications which have NOT CHANGED    Details   acetaminophen (TYLENOL) 500 mg tablet Take 2 tablets (1,000 mg total) by mouth every 8 (eight) hours, Starting Sun 2/16/2020, Print      albuterol (PROVENTIL HFA,VENTOLIN HFA) 90 mcg/act inhaler Inhale 2 puffs every 4 (four) hours as needed for wheezing, Starting Fri 9/27/2019, Print      HYDROcodone-acetaminophen (XODOL) 7 5-300 MG per tablet Take 1 tablet by mouth every 6 (six) hours as needed for moderate pain, Historical Med      ibuprofen (MOTRIN) 800 mg tablet Take 1 tablet (800 mg total) by mouth 3 (three) times a day, Starting Sun 2/16/2020, Print      triamcinolone (KENALOG) 0 1 % cream Apply topically 2 (two) times a day, Starting Thu 7/9/2020, Print           No discharge procedures on file  Prior to Admission Medications   Prescriptions Last Dose Informant Patient Reported? Taking? HYDROcodone-acetaminophen (XODOL) 7 5-300 MG per tablet  Self Yes No   Sig: Take 1 tablet by mouth every 6 (six) hours as needed for moderate pain   acetaminophen (TYLENOL) 500 mg tablet   No No   Sig: Take 2 tablets (1,000 mg total) by mouth every 8 (eight) hours   albuterol (PROVENTIL HFA,VENTOLIN HFA) 90 mcg/act inhaler   No No   Sig: Inhale 2 puffs every 4 (four) hours as needed for wheezing   ibuprofen (MOTRIN) 800 mg tablet   No No   Sig: Take 1 tablet (800 mg total) by mouth 3 (three) times a day   triamcinolone (KENALOG) 0 1 % cream   No No   Sig: Apply topically 2 (two) times a day      Facility-Administered Medications: None       Portions of the record may have been created with voice recognition software  Occasional wrong word or "sound a like" substitutions may have occurred due to the inherent limitations of voice recognition software  Read the chart carefully and recognize, using context, where substitutions have occurred      Electronically signed by:  Cesar Trivedi, PGY 3, MD Moody Judge MD  07/25/20 230 Cecelia Meng MD  07/25/20 Idalia Garner

## 2020-08-04 ENCOUNTER — APPOINTMENT (OUTPATIENT)
Dept: RADIOLOGY | Facility: HOSPITAL | Age: 35
End: 2020-08-04
Payer: MEDICARE

## 2020-08-04 ENCOUNTER — HOSPITAL ENCOUNTER (OUTPATIENT)
Facility: HOSPITAL | Age: 35
Setting detail: OBSERVATION
Discharge: HOME | End: 2020-08-05
Attending: EMERGENCY MEDICINE | Admitting: EMERGENCY MEDICINE
Payer: MEDICARE

## 2020-08-04 ENCOUNTER — APPOINTMENT (EMERGENCY)
Dept: RADIOLOGY | Facility: HOSPITAL | Age: 35
End: 2020-08-04
Payer: MEDICARE

## 2020-08-04 DIAGNOSIS — R20.2 PARESTHESIA AND PAIN OF BOTH UPPER EXTREMITIES: ICD-10-CM

## 2020-08-04 DIAGNOSIS — S13.101A TRAUMATIC DISC HERNIATION OF CERVICAL SPINE: ICD-10-CM

## 2020-08-04 DIAGNOSIS — M50.20 PROTRUSION OF CERVICAL INTERVERTEBRAL DISC: ICD-10-CM

## 2020-08-04 DIAGNOSIS — M79.602 PARESTHESIA AND PAIN OF BOTH UPPER EXTREMITIES: ICD-10-CM

## 2020-08-04 DIAGNOSIS — M79.601 PARESTHESIA AND PAIN OF BOTH UPPER EXTREMITIES: ICD-10-CM

## 2020-08-04 DIAGNOSIS — M54.2 NECK PAIN: Primary | ICD-10-CM

## 2020-08-04 PROBLEM — L02.429 FURUNCLE OF AXILLA: Status: ACTIVE | Noted: 2020-08-04
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
## 2020-08-04 PROBLEM — S06.0X0A CONCUSSION WITHOUT LOSS OF CONSCIOUSNESS: Status: ACTIVE | Noted: 2020-08-04

## 2020-08-04 PROBLEM — M25.511 ACUTE PAIN OF RIGHT SHOULDER: Status: ACTIVE | Noted: 2020-08-04

## 2020-08-04 PROBLEM — T30.0 BURN: Status: ACTIVE | Noted: 2020-08-04

## 2020-08-04 PROBLEM — F10.10 ALCOHOL ABUSE: Chronic | Status: ACTIVE | Noted: 2020-08-04

## 2020-08-04 LAB
ANION GAP SERPL CALCULATED.3IONS-SCNC: 3 MMOL/L (ref 4–13)
BASOPHILS # BLD AUTO: 0.04 THOUSANDS/ΜL (ref 0–0.1)
BASOPHILS NFR BLD AUTO: 1 % (ref 0–1)
BUN SERPL-MCNC: 21 MG/DL (ref 5–25)
CALCIUM SERPL-MCNC: 8.9 MG/DL (ref 8.3–10.1)
CHLORIDE SERPL-SCNC: 111 MMOL/L (ref 100–108)
CO2 SERPL-SCNC: 25 MMOL/L (ref 21–32)
CREAT SERPL-MCNC: 0.88 MG/DL (ref 0.6–1.3)
EOSINOPHIL # BLD AUTO: 0.19 THOUSAND/ΜL (ref 0–0.61)
EOSINOPHIL NFR BLD AUTO: 3 % (ref 0–6)
ERYTHROCYTE [DISTWIDTH] IN BLOOD BY AUTOMATED COUNT: 14.6 % (ref 11.6–15.1)
GFR SERPL CREATININE-BSD FRML MDRD: 111 ML/MIN/1.73SQ M
GLUCOSE SERPL-MCNC: 111 MG/DL (ref 65–140)
HCT VFR BLD AUTO: 42.4 % (ref 36.5–49.3)
HGB BLD-MCNC: 14.3 G/DL (ref 12–17)
IMM GRANULOCYTES # BLD AUTO: 0.02 THOUSAND/UL (ref 0–0.2)
IMM GRANULOCYTES NFR BLD AUTO: 0 % (ref 0–2)
LYMPHOCYTES # BLD AUTO: 1.15 THOUSANDS/ΜL (ref 0.6–4.47)
LYMPHOCYTES NFR BLD AUTO: 19 % (ref 14–44)
MCH RBC QN AUTO: 32.9 PG (ref 26.8–34.3)
MCHC RBC AUTO-ENTMCNC: 33.7 G/DL (ref 31.4–37.4)
MCV RBC AUTO: 98 FL (ref 82–98)
MONOCYTES # BLD AUTO: 0.87 THOUSAND/ΜL (ref 0.17–1.22)
MONOCYTES NFR BLD AUTO: 14 % (ref 4–12)
NEUTROPHILS # BLD AUTO: 3.85 THOUSANDS/ΜL (ref 1.85–7.62)
NEUTS SEG NFR BLD AUTO: 63 % (ref 43–75)
NRBC BLD AUTO-RTO: 0 /100 WBCS
PLATELET # BLD AUTO: 267 THOUSANDS/UL (ref 149–390)
PLATELET # BLD AUTO: 275 THOUSANDS/UL (ref 149–390)
PMV BLD AUTO: 9.6 FL (ref 8.9–12.7)
PMV BLD AUTO: 9.8 FL (ref 8.9–12.7)
POTASSIUM SERPL-SCNC: 4 MMOL/L (ref 3.5–5.3)
RBC # BLD AUTO: 4.35 MILLION/UL (ref 3.88–5.62)
SODIUM SERPL-SCNC: 139 MMOL/L (ref 136–145)
WBC # BLD AUTO: 6.12 THOUSAND/UL (ref 4.31–10.16)

## 2020-08-04 PROCEDURE — 80048 BASIC METABOLIC PNL TOTAL CA: CPT | Performed by: EMERGENCY MEDICINE

## 2020-08-04 PROCEDURE — 96372 THER/PROPH/DIAG INJ SC/IM: CPT

## 2020-08-04 PROCEDURE — G1004 CDSM NDSC: HCPCS

## 2020-08-04 PROCEDURE — 99244 OFF/OP CNSLTJ NEW/EST MOD 40: CPT | Performed by: PHYSICIAN ASSISTANT

## 2020-08-04 PROCEDURE — 72141 MRI NECK SPINE W/O DYE: CPT

## 2020-08-04 PROCEDURE — 70450 CT HEAD/BRAIN W/O DYE: CPT

## 2020-08-04 PROCEDURE — 99285 EMERGENCY DEPT VISIT HI MDM: CPT | Performed by: EMERGENCY MEDICINE

## 2020-08-04 PROCEDURE — 36415 COLL VENOUS BLD VENIPUNCTURE: CPT | Performed by: EMERGENCY MEDICINE

## 2020-08-04 PROCEDURE — 85049 AUTOMATED PLATELET COUNT: CPT | Performed by: PHYSICIAN ASSISTANT

## 2020-08-04 PROCEDURE — 73030 X-RAY EXAM OF SHOULDER: CPT

## 2020-08-04 PROCEDURE — 85025 COMPLETE CBC W/AUTO DIFF WBC: CPT | Performed by: EMERGENCY MEDICINE

## 2020-08-04 PROCEDURE — 72125 CT NECK SPINE W/O DYE: CPT

## 2020-08-04 PROCEDURE — 70498 CT ANGIOGRAPHY NECK: CPT

## 2020-08-04 PROCEDURE — 99218 PR INITIAL OBSERVATION CARE/DAY 30 MINUTES: CPT | Performed by: EMERGENCY MEDICINE

## 2020-08-04 PROCEDURE — 96374 THER/PROPH/DIAG INJ IV PUSH: CPT

## 2020-08-04 PROCEDURE — 99285 EMERGENCY DEPT VISIT HI MDM: CPT

## 2020-08-04 RX ORDER — AMOXICILLIN 250 MG
2 CAPSULE ORAL DAILY
Status: DISCONTINUED | OUTPATIENT
Start: 2020-08-04 | End: 2020-08-05 | Stop reason: HOSPADM

## 2020-08-04 RX ORDER — OXYCODONE HYDROCHLORIDE 10 MG/1
10 TABLET ORAL EVERY 4 HOURS PRN
Status: DISCONTINUED | OUTPATIENT
Start: 2020-08-04 | End: 2020-08-05 | Stop reason: HOSPADM

## 2020-08-04 RX ORDER — ONDANSETRON 2 MG/ML
4 INJECTION INTRAMUSCULAR; INTRAVENOUS EVERY 4 HOURS PRN
Status: DISCONTINUED | OUTPATIENT
Start: 2020-08-04 | End: 2020-08-05 | Stop reason: HOSPADM

## 2020-08-04 RX ORDER — MORPHINE SULFATE 4 MG/ML
4 INJECTION, SOLUTION INTRAMUSCULAR; INTRAVENOUS ONCE
Status: COMPLETED | OUTPATIENT
Start: 2020-08-04 | End: 2020-08-04

## 2020-08-04 RX ORDER — FOLIC ACID 1 MG/1
1 TABLET ORAL DAILY
Status: DISCONTINUED | OUTPATIENT
Start: 2020-08-04 | End: 2020-08-05 | Stop reason: HOSPADM

## 2020-08-04 RX ORDER — OXYCODONE HYDROCHLORIDE 5 MG/1
2.5 TABLET ORAL EVERY 4 HOURS PRN
Status: DISCONTINUED | OUTPATIENT
Start: 2020-08-04 | End: 2020-08-04

## 2020-08-04 RX ORDER — METHOCARBAMOL 500 MG/1
500 TABLET, FILM COATED ORAL EVERY 6 HOURS SCHEDULED
Status: DISCONTINUED | OUTPATIENT
Start: 2020-08-04 | End: 2020-08-05 | Stop reason: HOSPADM

## 2020-08-04 RX ORDER — KETOROLAC TROMETHAMINE 30 MG/ML
15 INJECTION, SOLUTION INTRAMUSCULAR; INTRAVENOUS ONCE
Status: COMPLETED | OUTPATIENT
Start: 2020-08-04 | End: 2020-08-04

## 2020-08-04 RX ORDER — THIAMINE MONONITRATE (VIT B1) 100 MG
100 TABLET ORAL DAILY
Status: DISCONTINUED | OUTPATIENT
Start: 2020-08-04 | End: 2020-08-05 | Stop reason: HOSPADM

## 2020-08-04 RX ORDER — HYDROMORPHONE HCL/PF 1 MG/ML
0.5 SYRINGE (ML) INJECTION EVERY 4 HOURS PRN
Status: DISCONTINUED | OUTPATIENT
Start: 2020-08-04 | End: 2020-08-05

## 2020-08-04 RX ORDER — GABAPENTIN 100 MG/1
100 CAPSULE ORAL 3 TIMES DAILY
Status: DISCONTINUED | OUTPATIENT
Start: 2020-08-04 | End: 2020-08-05 | Stop reason: HOSPADM

## 2020-08-04 RX ORDER — OXYCODONE HYDROCHLORIDE 5 MG/1
5 TABLET ORAL EVERY 4 HOURS PRN
Status: DISCONTINUED | OUTPATIENT
Start: 2020-08-04 | End: 2020-08-04

## 2020-08-04 RX ORDER — ACETAMINOPHEN 325 MG/1
975 TABLET ORAL EVERY 8 HOURS SCHEDULED
Status: DISCONTINUED | OUTPATIENT
Start: 2020-08-04 | End: 2020-08-05 | Stop reason: HOSPADM

## 2020-08-04 RX ORDER — OXYCODONE HYDROCHLORIDE 5 MG/1
5 TABLET ORAL EVERY 4 HOURS PRN
Status: DISCONTINUED | OUTPATIENT
Start: 2020-08-04 | End: 2020-08-05 | Stop reason: HOSPADM

## 2020-08-04 RX ORDER — ALBUTEROL SULFATE 90 UG/1
2 AEROSOL, METERED RESPIRATORY (INHALATION) EVERY 4 HOURS PRN
Status: DISCONTINUED | OUTPATIENT
Start: 2020-08-04 | End: 2020-08-05 | Stop reason: HOSPADM

## 2020-08-04 RX ADMIN — KETOROLAC TROMETHAMINE 15 MG: 30 INJECTION, SOLUTION INTRAMUSCULAR at 10:46

## 2020-08-04 RX ADMIN — OXYCODONE HYDROCHLORIDE 2.5 MG: 5 TABLET ORAL at 18:44

## 2020-08-04 RX ADMIN — MORPHINE SULFATE 4 MG: 4 INJECTION INTRAVENOUS at 12:53

## 2020-08-04 RX ADMIN — GABAPENTIN 100 MG: 100 CAPSULE ORAL at 21:24

## 2020-08-04 RX ADMIN — Medication 1 TABLET: at 15:50

## 2020-08-04 RX ADMIN — FOLIC ACID 1 MG: 1 TABLET ORAL at 15:51

## 2020-08-04 RX ADMIN — HYDROMORPHONE HYDROCHLORIDE 0.5 MG: 1 INJECTION, SOLUTION INTRAMUSCULAR; INTRAVENOUS; SUBCUTANEOUS at 21:24

## 2020-08-04 RX ADMIN — ACETAMINOPHEN 975 MG: 325 TABLET, FILM COATED ORAL at 21:23

## 2020-08-04 RX ADMIN — THIAMINE HCL TAB 100 MG 100 MG: 100 TAB at 15:51

## 2020-08-04 RX ADMIN — METHOCARBAMOL 500 MG: 500 TABLET, FILM COATED ORAL at 15:52

## 2020-08-04 RX ADMIN — IOHEXOL 85 ML: 350 INJECTION, SOLUTION INTRAVENOUS at 11:50

## 2020-08-04 RX ADMIN — GABAPENTIN 100 MG: 100 CAPSULE ORAL at 15:52

## 2020-08-04 RX ADMIN — ACETAMINOPHEN 975 MG: 325 TABLET, FILM COATED ORAL at 15:50

## 2020-08-04 RX ADMIN — NICOTINE 1 PATCH: 7 PATCH TRANSDERMAL at 15:50

## 2020-08-04 NOTE — ASSESSMENT & PLAN NOTE
- Status post fall with the below noted injury/issues  - Fall precautions   - PT and OT evaluation and treatment as indicated  - Case management consult for disposition planning

## 2020-08-04 NOTE — ASSESSMENT & PLAN NOTE
- Right shoulder pain following fall with point tenderness of the anterior right shoulder   - right shoulder x-ray negative for injury  - Weight-bearing as tolerated on the right upper extremity less additional injury identified   - Multimodal analgesic regimen   - PT and OT evaluation and treatment as indicated

## 2020-08-04 NOTE — ASSESSMENT & PLAN NOTE
- Suspect mild concussion secondary to blunt traumatic injury to the back of the head in setting of associated headaches and dizziness  - Symptomatic management with analgesia for headaches and meclizine p r n  dizziness   - PT and OT evaluation and treatment as indicated

## 2020-08-04 NOTE — ASSESSMENT & PLAN NOTE
- Right shoulder pain following fall with point tenderness of the anterior right shoulder   - Obtain right shoulder x-ray to rule out underlying injury  - Weight-bearing as tolerated on the right upper extremity less additional injury identified   - Multimodal analgesic regimen   - PT and OT evaluation and treatment as indicated

## 2020-08-04 NOTE — ASSESSMENT & PLAN NOTE
- Self reported alcohol use with patient taking 3-4 shots on a daily basis after work  - Case management consult appreciated  - CIWA protocol    No signs or symptoms of withdrawal

## 2020-08-04 NOTE — NURSING NOTE
Two packs of menthol [place] cigarettes, a black lighter, and a small black flask containing clear-colored liquor found in patient's dora pocket  Patient was agreeable to allowing me to empty flask  I explained that he was being monitored for alcohol withdrawal symptoms as well as neurological symptoms, which alcohol may suppress and may make it less likely to be able to identify  The patient became slightly upset when I asked to store the cigarettes and lighter in the medication lock box, and I agreed to store them in his pants in the closet  Charge VELMA Howard made aware of situation  Will closely monitor patient for altered mental status  Ирина Caruso with trauma made aware

## 2020-08-04 NOTE — ASSESSMENT & PLAN NOTE
- Subacute burn wound to the dorsal aspect of the left index finger, healing appropriately  - Local wound care as indicated only  - Analgesia as needed

## 2020-08-04 NOTE — ASSESSMENT & PLAN NOTE
- MRI cervical spine reveals disc herniations at C5-C6 and C6-C7 with flattening of the right side of the spinal cord as well as impingement of the nerve roots at C6 and C7 on the right   -neurosurgery evaluated and feels that this is likely not completely acute but exacerbated by his recent injury  -no immediate intervention needed, they will follow him clinically in 2 weeks in the office   - he is to maintain cervical spine precautions in a [place] collar at all times  No working or driving   - continue multimodal analgesic regimen   -patient remains neurologically intact without focal deficits  He does have ulnar nerve distribution paresthesias bilaterally but no weakness or decreased sensation    - patient has been up and ambulatory without difficulty today and is maintaining his cervical spine precautions

## 2020-08-04 NOTE — ED ATTENDING ATTESTATION
8/4/2020  ISharlene MD, saw and evaluated the patient  I have discussed the patient with the resident/non-physician practitioner and agree with the resident's/non-physician practitioner's findings, Plan of Care, and MDM as documented in the resident's/non-physician practitioner's note, except where noted  All available labs and Radiology studies were reviewed  I was present for key portions of any procedure(s) performed by the resident/non-physician practitioner and I was immediately available to provide assistance  At this point I agree with the current assessment done in the Emergency Department  I have conducted an independent evaluation of this patient a history and physical is as follows:    Patient presents to the emergency department after suffering a fall this morning prior to arrival   The patient states he was walking down steps during heavy rain when he slipped on the wet steps causing the fall backwards  The patient states he struck his head on the steps and since that time his has had head pain and neck pain  The patient denies loss of consciousness, change in mental status, weakness, change in speech, change in comprehension, or confusion  The patient does admit to numbness down the posterior aspect of both arms since the fall which has been stable  The patient denies any other injuries  Specifically the patient denies any back or extremity pain  The patient was able to ambulate to the car to be driven to the hospital   Physical exam demonstrates a male no acute distress  The patient is wearing a cervical collar  There is no external signs of craniofacial trauma  The facial bones are nontender  The eyes any ears are normal   The mouth is normal   There is mild tenderness of posterior neck without any deformity or crepitance  The anterior neck is nontender  The thoracic and lumbar spines are nontender  Remainder of the back is nontender    All extremities are nontender with full range of motion  The chest abdomen and pelvis are nontender  The patient has normal strength in all extremities  The patient reports decreased light touch to the posterior aspect of both arms from the shoulder to the pinky fingers  Skin has no rash  the patient is alert and oriented x3    Mental status is normal   ED Course         Critical Care Time  Procedures

## 2020-08-04 NOTE — ASSESSMENT & PLAN NOTE
- Posterior neck pain in the midline as well as in the bilateral paraspinal musculature, right greater than left  Likely secondary to contusion from blunt trauma and or musculoskeletal strain   - Obtain MRI of the cervical spine to rule out underlying injury not identified on CT scan  - Maintain cervical collar at all times unless cleared by Neurosurgery  - Initiate multimodal analgesic regimen  - Monitor neurologic exam   - PT and OT evaluation and treatment as indicated

## 2020-08-04 NOTE — ASSESSMENT & PLAN NOTE
- Status post fall with the below noted injury/issues  - Fall precautions   - PT and OT evaluation and treatment as indicated    - discharge home today

## 2020-08-04 NOTE — ASSESSMENT & PLAN NOTE
- Left axillary erythema and induration with concern for possible developing furuncle  No evidence of fluctuance on exam   Minimal erythema and no drainage noted on exam   - Continue to monitor for now  - Analgesia as needed  - If this becomes more significant or worsens, may need incision and drainage

## 2020-08-04 NOTE — ASSESSMENT & PLAN NOTE
Bilateral lateral forearm and 4th-5th finger numbness and tingling after a fall down steps this morning  · No weakness or urinary incontinence, patient c/o trouble with balance and unsteadiness since the fall  · + head strike, no LOC, no AC/AP medications    Imaging:  · CT cervical w/o, 8/4/2020: No fracture or malalignment  · CT head w/o, 8/4/2020: No intracranial abnormality  · CTA neck w/wo, 8/4/2020: negative CTA    Plan:  · Continue collar for now at all times  · MRI cervical spine w/o to evaluate for SCI  · PT/OT in collar  · Frequent neuro checks  · Pain control per primary team  · DVT ppx: SCDs, ok for pharm ppx  · No neurosurgical intervention is anticipated at this time    Neurosurgery will continue to follow after MRI is obtained  Please call with questions or concerns

## 2020-08-04 NOTE — PLAN OF CARE
Problem: PAIN - ADULT  Goal: Verbalizes/displays adequate comfort level or baseline comfort level  Description: Interventions:  - Encourage patient to monitor pain and request assistance  - Assess pain using appropriate pain scale  - Administer analgesics based on type and severity of pain and evaluate response  - Implement non-pharmacological measures as appropriate and evaluate response  - Consider cultural and social influences on pain and pain management  - Notify physician/advanced practitioner if interventions unsuccessful or patient reports new pain  Outcome: Progressing     Problem: SAFETY ADULT  Goal: Patient will remain free of falls  Description: INTERVENTIONS:  - Assess patient frequently for physical needs  -  Identify cognitive and physical deficits and behaviors that affect risk of falls    -  [place] fall precautions as indicated by assessment   - Educate patient/family on patient safety including physical limitations  - Instruct patient to call for assistance with activity based on assessment  - Modify environment to reduce risk of injury  - Consider OT/PT consult to assist with strengthening/mobility  Outcome: Progressing  Goal: Maintain or return to baseline ADL function  Description: INTERVENTIONS:  -  Assess patient's ability to carry out ADLs; assess patient's baseline for ADL function and identify physical deficits which impact ability to perform ADLs (bathing, care of mouth/teeth, toileting, grooming, dressing, etc )  - Assess/evaluate cause of self-care deficits   - Assess range of motion  - Assess patient's mobility; develop plan if impaired  - Assess patient's need for assistive devices and provide as appropriate  - Encourage maximum independence but intervene and supervise when necessary  - Involve family in performance of ADLs  - Assess for home care needs following discharge   - Consider OT consult to assist with ADL evaluation and planning for discharge  - Provide patient education as appropriate  Outcome: Progressing  Goal: Maintain or return mobility status to optimal level  Description: INTERVENTIONS:  - Assess patient's baseline mobility status (ambulation, transfers, stairs, etc )    - Identify cognitive and physical deficits and behaviors that affect mobility  - Identify mobility aids required to assist with transfers and/or ambulation (gait belt, sit-to-stand, lift, walker, cane, etc )  - [place] fall precautions as indicated by assessment  - Record patient progress and toleration of activity level on Mobility SBAR; progress patient to next Phase/Stage  - Instruct patient to call for assistance with activity based on assessment  - Consider rehabilitation consult to assist with strengthening/weightbearing, etc   Outcome: Progressing     Problem: DISCHARGE PLANNING  Goal: Discharge to home or other facility with appropriate resources  Description: INTERVENTIONS:  - Identify barriers to discharge w/patient and caregiver  - Arrange for needed discharge resources and transportation as appropriate  - Identify discharge learning needs (meds, wound care, etc )  - Arrange for interpretive services to assist at discharge as needed  - Refer to Case Management Department for coordinating discharge planning if the patient needs post-hospital services based on physician/advanced practitioner order or complex needs related to functional status, cognitive ability, or social support system  Outcome: Progressing     Problem: Knowledge Deficit  Goal: Patient/family/caregiver demonstrates understanding of disease process, treatment plan, medications, and discharge instructions  Description: Complete learning assessment and assess knowledge base    Interventions:  - Provide teaching at level of understanding  - Provide teaching via preferred learning methods  Outcome: Progressing

## 2020-08-04 NOTE — ASSESSMENT & PLAN NOTE
- Paresthesias and pain in the bilateral upper extremities of unclear etiology with no obvious injuries identified on CT scans   - MRI results as stated above  Continue [place] collar and cervical spine precautions per Neurosurgery and follow up with Neurosurgery as an outpatient  No need for intervention at this time  - Initiate multimodal analgesic regimen  - patient has no weakness or decreased sensation on exam   - PT and OT evaluation and treatment as indicated

## 2020-08-04 NOTE — ASSESSMENT & PLAN NOTE
- Suspect mild concussion secondary to blunt traumatic injury to the back of the head in setting of associated headaches and dizziness  - Symptomatic management with analgesia for headaches and meclizine p r n  dizziness   - PT and OT evaluation and treatment as indicated  -follow-up with Trauma in 2 week  I discussed the importance of cognitive rest and activity to tolerance

## 2020-08-04 NOTE — CONSULTS
Consult- Patria Calvert 1985, 37 y o  male MRN: [id number]    Unit/Bed#: ED 23 Encounter: [id number]    Primary Care Provider: No primary care provider on file  Date and time admitted to hospital: 8/4/2020  9:33 AM      Inpatient consult to Neurosurgery  Consult performed by: Ese Pereira PA-C  Consult ordered by: Landen Beck PA-C      Imaging personally reviewed 8/4/2020 at 2:15pm  Patient examined at bedside 8/4/2020 at 2:30pm    * Paresthesia and pain of both upper extremities  Assessment & Plan  Bilateral lateral forearm and 4th-5th finger numbness and tingling after a fall down steps this morning  · No weakness or urinary incontinence, patient c/o trouble with balance and unsteadiness since the fall  · + head strike, no LOC, no AC/AP medications    Imaging:  · CT cervical w/o, 8/4/2020: No fracture or malalignment  · CT head w/o, 8/4/2020: No intracranial abnormality  · CTA neck w/wo, 8/4/2020: negative CTA    Plan:  · Continue collar for now at all times  · MRI cervical spine w/o to evaluate for SCI  · PT/OT in collar  · Frequent neuro checks  · Pain control per primary team  · DVT ppx: SCDs, ok for pharm ppx  · No neurosurgical intervention is anticipated at this time    Neurosurgery will continue to follow after MRI is obtained  Please call with questions or concerns  Alcohol abuse  Assessment & Plan  Patient admits to 4-5 drinks daily  Monitor for signs of ETOH withdrawal    [address]  S/p fall  See plan above      History of Present Illness     HPI: Ptaria Calvert is a 37y o  year old male with PMH including asthma who presents after a fall down his porch steps this morning in the rain  This happened at approximately 6:30am as he was leaving for work  He states he landed on his back and hit his head and neck  He denies LOC and remembers the entire fall  He is complaining of neck pain, headache, right sided ear pain, and tingling in his bilateral 4th and 5th fingers   He denies weakness or symptoms in his legs other than loss of balance secondary to dizziness  He denies difficulty with fine motor skills  He denies urinary incontinence but also has not urinated since early this morning  Imaging in the ED was negative for abnormality in his head and neck  MRI will be obtained to evaluated for SCI  Review of Systems   Constitutional: Negative  HENT: Negative  Eyes: Negative  Negative for photophobia and visual disturbance  Respiratory: Negative  Negative for chest tightness and shortness of breath  Cardiovascular: Negative  Negative for chest pain  Gastrointestinal: Negative  Endocrine: Negative  Genitourinary: Negative  Musculoskeletal: Positive for neck pain and neck stiffness  Negative for arthralgias  Skin: Negative  Neurological: Positive for dizziness, numbness and headaches  Negative for weakness and light-headedness  Hematological: Negative  Psychiatric/Behavioral: Negative  Historical Information   Past Medical History:   Diagnosis Date    Asthma     Chronic pain     Hypertension     Thyroglossal duct cyst      Past Surgical History:   Procedure Laterality Date    THYROGLOSSAL DUCT EXCISION      THYROID SURGERY       Social History     Substance and Sexual Activity   Alcohol Use Yes    Alcohol/week: 3 0 - 4 0 standard drinks    Types: 3 - 4 Shots of liquor per week    Frequency: 4 or more times a week    Comment: Typically takes 3-4 shots of liquor daily after work     Social History     Substance and Sexual Activity   Drug Use Yes    Types: Marijuana    Comment: "Occasionally"     Social History     Tobacco Use   Smoking Status Current Every Day Smoker    Packs/day: 0 50    Types: Cigarettes   Smokeless Tobacco Never Used     History reviewed  No pertinent family history      Meds/Allergies   all current active meds have been reviewed, current meds:   Current Facility-Administered Medications   Medication Dose Route Frequency    acetaminophen (TYLENOL) tablet 975 mg  975 mg Oral Q8H [address]    albuterol (PROVENTIL HFA,VENTOLIN HFA) inhaler 2 puff  2 puff Inhalation Q4H PRN    enoxaparin (LOVENOX) subcutaneous injection 30 mg  30 mg Subcutaneous [id number]    folic acid (FOLVITE) tablet 1 mg  1 mg Oral Daily    gabapentin (NEURONTIN) capsule 100 mg  100 mg Oral TID    methocarbamol (ROBAXIN) tablet 500 mg  500 mg Oral Q6H [address]    multivitamin-minerals (CENTRUM) tablet 1 tablet  1 tablet Oral Daily    naloxone (NARCAN) 0 04 mg/mL syringe 0 04 mg  0 04 mg Intravenous Q1MIN PRN    nicotine (NICODERM CQ) 7 mg/24hr TD 24 hr patch 1 patch  1 patch Transdermal Daily    ondansetron (ZOFRAN) injection 4 mg  4 mg Intravenous Q4H PRN    oxyCODONE (ROXICODONE) IR tablet 2 5 mg  2 5 mg Oral Q4H PRN    oxyCODONE (ROXICODONE) IR tablet 5 mg  5 mg Oral Q4H PRN    senna-docusate sodium (SENOKOT S) 8 6-50 mg per tablet 2 tablet  2 tablet Oral Daily    thiamine (VITAMIN B1) tablet 100 mg  100 mg Oral Daily    and PTA meds:   Prior to Admission Medications   Prescriptions Last Dose Informant Patient Reported? Taking? HYDROcodone-acetaminophen (XODOL) 7 5-300 MG per tablet  Self Yes Yes   Sig: Take 1 tablet by mouth every 6 (six) hours as needed for moderate pain   acetaminophen (TYLENOL) 500 mg tablet   No Yes   Sig: Take 2 tablets (1,000 mg total) by mouth every 8 (eight) hours   albuterol (PROVENTIL HFA,VENTOLIN HFA) 90 mcg/act inhaler   No Yes   Sig: Inhale 2 puffs every 4 (four) hours as needed for wheezing   ibuprofen (MOTRIN) 800 mg tablet   No Yes   Sig: Take 1 tablet (800 mg total) by mouth 3 (three) times a day   triamcinolone (KENALOG) 0 1 % cream   No Yes   Sig: Apply topically 2 (two) times a day      Facility-Administered Medications: None     No Known Allergies    Objective   I/O     None          Physical Exam   Constitutional: He is oriented to person, place, and time  He appears well-developed     HENT:   Head: Normocephalic and atraumatic  Eyes: Pupils are equal, round, and reactive to light  Neck:   Cervical collar in place  Decreased ROM with extension - pain limited  TTP bilateral paraspinal muscles    Cardiovascular: Normal rate  Pulmonary/Chest: Effort normal  No respiratory distress  Abdominal: Soft  Normal appearance  Musculoskeletal: Normal range of motion  Neurological: He is alert and oriented to person, place, and time  He has normal strength  Reflex Scores:       Tricep reflexes are 3+ on the right side and 3+ on the left side  Bicep reflexes are 3+ on the right side and 3+ on the left side  Brachioradialis reflexes are 3+ on the right side and 3+ on the left side  Patellar reflexes are 2+ on the right side and 2+ on the left side  Achilles reflexes are 2+ on the right side and 2+ on the left side  Skin: Skin is warm and dry  Psychiatric: His speech is normal and behavior is normal  Mood, judgment and thought content normal    Nursing note and vitals reviewed  Neurologic Exam     Mental Status   Oriented to person, place, and time  Follows 2 step commands  Attention: normal  Concentration: normal    Speech: speech is normal   Level of consciousness: alert  Knowledge: good  Able to perform simple calculations  Able to name object  Able to repeat  Normal comprehension  Cranial Nerves   Cranial nerves II through XII intact  CN III, IV, VI   Pupils are equal, round, and reactive to light  Motor Exam   Muscle bulk: normal  Overall muscle tone: normal    Strength   Strength 5/5 throughout       Sensory Exam   Numbness and tingling bilateral 5th fingers  Decreased sensation bilateral lateral forearms, R>L  SILT in all other major dermatomes     Gait, Coordination, and Reflexes     Tremor   Resting tremor: absent    Reflexes   Right brachioradialis: 3+  Left brachioradialis: 3+  Right biceps: 3+  Left biceps: 3+  Right triceps: 3+  Left triceps: 3+  Right patellar: 2+  Left patellar: 2+  Right achilles: 2+  Left achilles: 2+  Right Alexandre: present (trace)  Left Alexandre: absent  Right ankle clonus: absent  Left ankle clonus: absent        Vitals:Blood pressure 139/71, pulse 77, temperature 98 °F (36 7 °C), temperature source Oral, resp  rate 16, SpO2 99 %  ,There is no height or weight on file to calculate BMI  Lab Results:   Results from last 7 days   Lab Units 08/04/20  1046   WBC Thousand/uL 6 12   HEMOGLOBIN g/dL 14 3   HEMATOCRIT % 42 4   PLATELETS Thousands/uL 275   NEUTROS PCT % 63   MONOS PCT % 14*     Results from last 7 days   Lab Units 08/04/20  1046   POTASSIUM mmol/L 4 0   CHLORIDE mmol/L 111*   CO2 mmol/L 25   BUN mg/dL 21   CREATININE mg/dL 0 88   CALCIUM mg/dL 8 9                 No results found for: TROPONINT  ABG:No results found for: PHART, MUA0LMU, PO2ART, LXT0YIJ, Y0QNFRAJ, BEART, SOURCE    Imaging Studies: I have personally reviewed pertinent reports  and I have personally reviewed pertinent films in PACS     Ct Head Without Contrast    Result Date: 8/4/2020  Narrative: CT BRAIN - WITHOUT CONTRAST INDICATION:   fall with head strike ?loc  COMPARISON:  None  TECHNIQUE:  CT examination of the brain was performed  In addition to axial images, coronal 2D reformatted images were created and submitted for interpretation  Radiation dose length product (DLP) for this visit:  874 7 mGy-cm   This examination, like all CT scans performed in the [facility], was performed utilizing techniques to minimize radiation dose exposure, including the use of iterative reconstruction and automated exposure control  IMAGE QUALITY:  Diagnostic  FINDINGS: PARENCHYMA:  No intracranial mass, mass effect or midline shift  No CT signs of acute infarction  No acute parenchymal hemorrhage  VENTRICLES AND EXTRA-AXIAL SPACES:  Normal for the patient's age  VISUALIZED ORBITS AND PARANASAL SINUSES:  Unremarkable   CALVARIUM AND EXTRACRANIAL SOFT TISSUES: Normal      Impression: No acute intracranial abnormality  Workstation performed: [id number]     Cta Neck With And Without Contrast    Result Date: 8/4/2020  Narrative: CTA NECK INDICATION: Trauma - r/o cervical artery dissection COMPARISON: None TECHNIQUE:  0 625 mm images from the aortic arch through the [place] after administration of IV contrast   This examination, like all CT scans performed in the [facility], was performed utilizing techniques to minimize radiation  dose exposure, including the use of iterative reconstruction and automated exposure control  3-D reconstructions and multiplanar MIP images were obtained  3D rendering was performed on an independent workstation  Rad dose 422 39 mGy-cm IV Contrast:  85 mL of iohexol (OMNIPAQUE)  IMAGE QUALITY:   Diagnostic  FINDINGS: CERVICAL VASCULATURE AORTIC ARCH AND GREAT VESSELS:  Incidentally noted  abberrant right subclavian artery  Otherwise normal aortic arch RIGHT VERTEBRAL ARTERY CERVICAL SEGMENT:  Normal origin  The vessel is normal in caliber throughout the neck  LEFT VERTEBRAL ARTERY CERVICAL SEGMENT:  Normal origin  The vessel is normal in caliber throughout the neck  RIGHT EXTRACRANIAL CAROTID SEGMENT:  Normal caliber common carotid artery  Normal bifurcation and cervical internal carotid artery  No stenosis or dissection  LEFT EXTRACRANIAL CAROTID SEGMENT:  Normal caliber common carotid artery  Normal bifurcation and cervical internal carotid artery  No stenosis or dissection  NASCET criteria was used to determine the degree of internal carotid artery diameter stenosis  INTRACRANIAL VASCULATURE ANTERIOR CIRCULATION: The visualized intracranial internal carotid arteries are unremarkable  Normal visualized anterior and middle cerebral artery branches  POSTERIOR CIRCULATION: The intracranial portions of the vertebral arteries are unremarkable with normal posterior inferior cerebellar artery origins    Normal basilar artery and posterior cerebral arteries  Posterior communicating arteries are unremarkable  DURAL SINUSES:  The visualized dural venous sinuses are unremarkable  Please note this is not a complete CT angiogram of the brain  BONY STRUCTURES:  No acute osseous abnormality  SOFT TISSUES OF THE NECK:   Unremarkable  THORACIC INLET:  Unremarkable  VISUALIZED BRAIN PARENCHYMA:  No acute intracranial pathology  NASCET criteria was used to determine the degree of internal carotid artery diameter stenosis  Impression: Normal CT angiography  Workstation performed: [id number]     Ct Cervical Spine Without Contrast    Result Date: 8/4/2020  Narrative: CT CERVICAL SPINE - WITHOUT CONTRAST INDICATION:   fall  COMPARISON:  None  TECHNIQUE:  CT examination of the cervical spine was performed without intravenous contrast   Contiguous axial images were obtained  Sagittal and coronal reconstructions were performed  Radiation dose length product (DLP) for this visit:  356 38 mGy-cm   This examination, like all CT scans performed in the [facility], was performed utilizing techniques to minimize radiation dose exposure, including the use of iterative  reconstruction and automated exposure control  IMAGE QUALITY:  Diagnostic  FINDINGS: ALIGNMENT:  Normal alignment of the cervical spine  No subluxation  VERTEBRAL BODIES:  No fracture  DEGENERATIVE CHANGES:  No significant cervical degenerative changes are noted  PREVERTEBRAL AND PARASPINAL SOFT TISSUES:  Unremarkable  THORACIC INLET:  Normal      Impression: No cervical spine fracture or traumatic malalignment  Workstation performed: [id number]     EKG, Pathology, and Other Studies: I have personally reviewed pertinent reports        VTE Prophylaxis: Sequential compression device [place] Holter)     Code Status: Level 1 - Full Code  Advance Directive and Living Will:      Power of :    POLST:      Counseling / Coordination of Care  I spent 45 minutes with the patient

## 2020-08-04 NOTE — H&P
H&P- Stephannie Halsted 1985, 37 y o  male MRN: [id number]    Unit/Bed#: ED 23 Encounter: [id number]    Primary Care Provider: No primary care provider on file  Date and time admitted to hospital: 8/4/2020  9:33 AM      Fall  Assessment & Plan  - Status post fall with the below noted injury/issues  - Fall precautions   - PT and OT evaluation and treatment as indicated  - Case management consult for disposition planning  * Paresthesia and pain of both upper extremities  Assessment & Plan  - Paresthesias and pain in the bilateral upper extremities of unclear etiology with no obvious injuries identified on CT scans   - Await Neurosurgery evaluation and recommendations  - Obtain MRI of the cervical spine to rule out underlying injury not identified on CT scan  - Maintain cervical collar at all times unless cleared by Neurosurgery  - Initiate multimodal analgesic regimen  - Monitor neurologic exam   - PT and OT evaluation and treatment as indicated  Neck pain  Assessment & Plan  - Posterior neck pain in the midline as well as in the bilateral paraspinal musculature, right greater than left  Likely secondary to contusion from blunt trauma and or musculoskeletal strain   - Obtain MRI of the cervical spine to rule out underlying injury not identified on CT scan  - Maintain cervical collar at all times unless cleared by Neurosurgery  - Initiate multimodal analgesic regimen  - Monitor neurologic exam   - PT and OT evaluation and treatment as indicated  Concussion without loss of consciousness  Assessment & Plan  - Suspect mild concussion secondary to blunt traumatic injury to the back of the head in setting of associated headaches and dizziness  - Symptomatic management with analgesia for headaches and meclizine p r n  dizziness   - PT and OT evaluation and treatment as indicated      Acute pain of right shoulder  Assessment & Plan  - Right shoulder pain following fall with point tenderness of the anterior right shoulder   - Obtain right shoulder x-ray to rule out underlying injury  - Weight-bearing as tolerated on the right upper extremity less additional injury identified   - Multimodal analgesic regimen   - PT and OT evaluation and treatment as indicated  Alcohol abuse  Assessment & Plan  - Self reported alcohol use with patient taking 3-4 shots on a daily basis after work  - Case management consult   - CIWA protocol  Burn  Assessment & Plan  - Subacute burn wound to the dorsal aspect of the left index finger, healing appropriately  - Local wound care as indicated only  - Analgesia as needed  Furuncle of axilla  Assessment & Plan  - Left axillary erythema and induration with concern for possible developing furuncle  No evidence of fluctuance on exam   Minimal erythema and no drainage noted on exam   - Continue to monitor for now  - Analgesia as needed  - If this becomes more significant or worsens, may need incision and drainage  H&P Exam - Trauma   Adelaida Hossein 37 y o  male MRN: [id number]  Unit/Bed#: ED 23 Encounter: [id number]    Assessment/Plan   Trauma Alert: Evaluation  Model of Arrival: [facility]  Trauma Team: Attending Dr Lucy Church and Savage Mcnally PA-C  Consultants: Neurosurgery: I spoke with Jackelin Li NP 8/4/2020 at 1:58 PM  Time Called 1:58 PM    Trauma Active Problems and Trauma Plan: See above  Chief Complaint:  My head and neck hurt      History of Present Illness   HPI:  Adelaida Catalan is a 37 y o  male who presents with severe pain in the back of his head and neck  The patient reports that he slipped on some stairs in the rain this morning causing him to fall and strike the back of his head, neck and upper back  He denies losing consciousness  He experienced immediate pain in the back of his head, posterior neck and upper back    The back pain has since resolved, but the head and neck pain persist   He immediately experienced dizziness following the fall which did initially improve  He attempted to go to work, but had to leave work due to persistent/worsening pain and recurrent dizziness  He notes the head and neck pain are worst on the right side  In addition to those symptoms, he also noted burning pain shooting from his neck down his shoulders into his arms and the ring finger and little finger bilaterally  He denies any chest pain, abdominal pain, pelvic pain or lower extremity pain  He denies any nausea or vomiting  Mechanism:Fall    Review of Systems   Constitutional: Positive for activity change (Decreased activity secondary to pain)  Negative for appetite change, chills, fatigue, fever and unexpected weight change  HENT: Negative for congestion, ear pain, facial swelling, hearing loss and sore throat  Eyes: Negative  Negative for photophobia, pain and visual disturbance  Respiratory: Negative  Negative for cough, chest tightness, shortness of breath and wheezing  Cardiovascular: Negative  Negative for chest pain and leg swelling  Gastrointestinal: Negative  Negative for abdominal distention, abdominal pain, constipation, diarrhea, nausea and vomiting  Endocrine: Negative  Genitourinary: Negative  Negative for difficulty urinating, dysuria, flank pain, frequency and hematuria  Musculoskeletal: Positive for myalgias (Pain in the bilateral neck musculature and shoulders, right greater than left) and neck pain  Negative for arthralgias and back pain  Skin: Negative  Negative for color change, pallor, rash and wound  Allergic/Immunologic: Negative  Neurological: Positive for dizziness, numbness (Numbness in the bilateral upper extremities) and headaches  Negative for syncope, weakness and light-headedness  Hematological: Negative  Psychiatric/Behavioral: Negative  Negative for agitation and confusion  12-point, complete review of systems was reviewed and negative except as stated above         Historical Information   Efforts to obtain history included the following sources: other medical personnel, obtained from other records    Past Medical History:   Diagnosis Date    Asthma     Chronic pain     Hypertension     Thyroglossal duct cyst      Past Surgical History:   Procedure Laterality Date    THYROGLOSSAL DUCT EXCISION      THYROID SURGERY       Social History   Social History     Substance and Sexual Activity   Alcohol Use Yes    Alcohol/week: 3 0 - 4 0 standard drinks    Types: 3 - 4 Shots of liquor per week    Frequency: 4 or more times a week    Comment: Typically takes 3-4 shots of liquor daily after work     Social History     Substance and Sexual Activity   Drug Use Yes    Types: Marijuana    Comment: "Occasionally"     Social History     Tobacco Use   Smoking Status Current Every Day Smoker    Packs/day: 0 50    Types: Cigarettes   Smokeless Tobacco Never Used     E-Cigarette/Vaping    E-Cigarette Use Never User      E-Cigarette/Vaping Substances       There is no immunization history on file for this patient    Last Tetanus: Unknown  Family History: Non-contributory  Unable to obtain/limited by N/A      Meds/Allergies   all current active meds have been reviewed and current meds:   Current Facility-Administered Medications   Medication Dose Route Frequency    acetaminophen (TYLENOL) tablet 975 mg  975 mg Oral Q8H [facility]    albuterol (PROVENTIL HFA,VENTOLIN HFA) inhaler 2 puff  2 puff Inhalation Q4H PRN    enoxaparin (LOVENOX) subcutaneous injection 30 mg  30 mg Subcutaneous [id number]    folic acid (FOLVITE) tablet 1 mg  1 mg Oral Daily    gabapentin (NEURONTIN) capsule 100 mg  100 mg Oral TID    methocarbamol (ROBAXIN) tablet 500 mg  500 mg Oral Q6H [facility]    multivitamin-minerals (CENTRUM) tablet 1 tablet  1 tablet Oral Daily    naloxone (NARCAN) 0 04 mg/mL syringe 0 04 mg  0 04 mg Intravenous Q1MIN PRN    nicotine (NICODERM CQ) 7 mg/24hr TD 24 hr patch 1 patch  1 patch Transdermal Daily    ondansetron (ZOFRAN) injection 4 mg  4 mg Intravenous Q4H PRN    oxyCODONE (ROXICODONE) IR tablet 2 5 mg  2 5 mg Oral Q4H PRN    oxyCODONE (ROXICODONE) IR tablet 5 mg  5 mg Oral Q4H PRN    senna-docusate sodium (SENOKOT S) 8 6-50 mg per tablet 2 tablet  2 tablet Oral Daily    thiamine (VITAMIN B1) tablet 100 mg  100 mg Oral Daily       No Known Allergies      PHYSICAL EXAM    PE limited by: N/A    Objective   Vitals:   First set: Temperature: 98 °F (36 7 °C) (08/04/20 0944)  Pulse: 84 (08/04/20 0944)  Respirations: 16 (08/04/20 0944)  Blood Pressure: (!) 152/111 (08/04/20 0944)    Primary Survey:   (A) Airway:  Pain and intact  (B) Breathing:  Clear and equal bilaterally  (C) Circulation: Pulses:   normal, carotid  2/4, pedal  2/4, radial  2/4 and femoral  2/4  (D) Disabliity:  GCS Total:  15, Eye Opening:   Spontaneous = 4, Motor Response: Obeys commands = 6 and Verbal Response:  Oriented = 5  (E) Expose:  Completed    Secondary Survey: (Click on Physical Exam tab above)  Physical Exam  Vitals signs and nursing note reviewed  Constitutional:       General: He is not in acute distress  Appearance: Normal appearance  He is normal weight  He is not diaphoretic  Interventions: Cervical collar in place  HENT:      Head: Normocephalic and atraumatic  No abrasion, contusion or laceration  Right Ear: Hearing and external ear normal       Left Ear: Hearing and external ear normal       Nose: Nose normal  No nasal deformity, septal deviation, laceration or nasal tenderness  Eyes:      Extraocular Movements: Extraocular movements intact  Right eye: No nystagmus  Left eye: No nystagmus  Conjunctiva/sclera: Conjunctivae normal       Pupils: Pupils are equal, round, and reactive to light  Comments: Pupils 3 mm, round, reactive bilaterally   Neck:      Musculoskeletal: Neck supple   Spinous process tenderness and muscular tenderness (Midline cervical spine tenderness as well as tenderness in the bilateral paraspinal musculature, right greater than left ) present  Trachea: Trachea normal       Comments: Cervical collar in place  Cardiovascular:      Rate and Rhythm: Normal rate and regular rhythm  Pulses: Normal pulses  Carotid pulses are 2+ on the right side and 2+ on the left side  Radial pulses are 2+ on the right side and 2+ on the left side  Femoral pulses are 2+ on the right side and 2+ on the left side  Dorsalis pedis pulses are 2+ on the right side and 2+ on the left side  Heart sounds: Normal heart sounds  No murmur  No friction rub  No gallop  Pulmonary:      Effort: Pulmonary effort is normal  No tachypnea, accessory muscle usage, prolonged expiration or respiratory distress  Breath sounds: Normal breath sounds  No stridor or decreased air movement  No decreased breath sounds, wheezing, rhonchi or rales  Chest:      Chest wall: No deformity, tenderness or crepitus  Abdominal:      General: Abdomen is flat  Bowel sounds are normal  There is no distension  Palpations: Abdomen is soft  Tenderness: There is no abdominal tenderness  There is no right CVA tenderness, left CVA tenderness, guarding or rebound  Musculoskeletal: Normal range of motion  General: Tenderness (Mild tenderness over the anterior right shoulder) present  No swelling, deformity or signs of injury  Right shoulder: He exhibits tenderness (Right anterior shoulder tenderness) and swelling  He exhibits normal range of motion, no deformity and no pain  Cervical back: He exhibits tenderness and pain  He exhibits no deformity  Thoracic back: He exhibits no tenderness, no deformity and no pain  Lumbar back: He exhibits no tenderness, no deformity and no pain  Right lower leg: No edema  Left lower leg: No edema  Skin:     General: Skin is warm and dry  Capillary Refill: Capillary refill takes less than 2 seconds  Coloration: Skin is not jaundiced or pale  Findings: Erythema present  No bruising, lesion or rash  Neurological:      General: No focal deficit present  Mental Status: He is alert and oriented to person, place, and time  Mental status is at baseline  GCS: GCS eye subscore is 4  GCS verbal subscore is 5  GCS motor subscore is 6  Sensory: Sensation is intact  No sensory deficit  Motor: No weakness or tremor  Psychiatric:         Mood and Affect: Mood normal          Invasive Devices     Peripheral Intravenous Line            Peripheral IV 08/04/20 Left Antecubital less than 1 day                Lab Results:   Results: I have personally reviewed pertinent reports   , BMP/CMP:   Lab Results   Component Value Date    SODIUM 139 08/04/2020    K 4 0 08/04/2020     (H) 08/04/2020    CO2 25 08/04/2020    BUN 21 08/04/2020    CREATININE 0 88 08/04/2020    CALCIUM 8 9 08/04/2020    EGFR 111 08/04/2020    and CBC:   Lab Results   Component Value Date    WBC 6 12 08/04/2020    HGB 14 3 08/04/2020    HCT 42 4 08/04/2020    MCV 98 08/04/2020     08/04/2020    MCH 32 9 08/04/2020    MCHC 33 7 08/04/2020    RDW 14 6 08/04/2020    MPV 9 8 08/04/2020    NRBC 0 08/04/2020     Imaging/EKG Studies: Results: I have personally reviewed pertinent reports     and I have personally reviewed pertinent films in PACS  Other Studies: N/A    Code Status: Level 1 - Full Code  Advance Directive and Living Will:      Power of :    POLST:

## 2020-08-04 NOTE — ASSESSMENT & PLAN NOTE
- Paresthesias and pain in the bilateral upper extremities of unclear etiology with no obvious injuries identified on CT scans   - Await Neurosurgery evaluation and recommendations  - Obtain MRI of the cervical spine to rule out underlying injury not identified on CT scan  - Maintain cervical collar at all times unless cleared by Neurosurgery  - Initiate multimodal analgesic regimen  - Monitor neurologic exam   - PT and OT evaluation and treatment as indicated

## 2020-08-04 NOTE — ED PROVIDER NOTES
History  Chief Complaint   Patient presents with    Fall     Pt presents with severe neck pain after slipping and falling yesterday  ?LOC -LOC +head strike   Neck Pain     24-year-old male  presents with head and neck pain after fall today  Patient says he was descending steps, slipped backwards and fell hitting his head and neck  Denies LOC, n/v  Able to ambulated since falling  Says he was on his way to work  Says the pain has been constant, radiating from his neck to the bottom of his head  He thought about taking a few shots of alcohol to help with the pain but decided against it  He says he was encouraged to come to the hospital by his friends  Endorses numbness and tingling in his 4th and 5th digits extending up to the triceps area bilaterally  Denies any history of spinal surgery or hardware  Says he has not attempted to urinate since the fall  Asking for pain medication and work note  Several recent visits to the emergency department  Prior to Admission Medications   Prescriptions Last Dose Informant Patient Reported? Taking?    HYDROcodone-acetaminophen (XODOL) 7 5-300 MG per tablet Not Taking at Unknown time Self Yes No   Sig: Take 1 tablet by mouth every 6 (six) hours as needed for moderate pain   acetaminophen (TYLENOL) 500 mg tablet Past Week at Unknown time  No Yes   Sig: Take 2 tablets (1,000 mg total) by mouth every 8 (eight) hours   albuterol (PROVENTIL HFA,VENTOLIN HFA) 90 mcg/act inhaler 8/4/2020 at Unknown time  No Yes   Sig: Inhale 2 puffs every 4 (four) hours as needed for wheezing   ibuprofen (MOTRIN) 800 mg tablet 8/4/2020 at Unknown time  No Yes   Sig: Take 1 tablet (800 mg total) by mouth 3 (three) times a day   triamcinolone (KENALOG) 0 1 % cream Not Taking at Unknown time  No No   Sig: Apply topically 2 (two) times a day   Patient not taking: Reported on 8/4/2020      Facility-Administered Medications: None       Past Medical History:   Diagnosis Date    Asthma  Chronic pain     Hypertension     Thyroglossal duct cyst        Past Surgical History:   Procedure Laterality Date    THYROGLOSSAL DUCT EXCISION      THYROID SURGERY      THYROID SURGERY         History reviewed  No pertinent family history  I have reviewed and agree with the history as documented  E-Cigarette/Vaping    E-Cigarette Use Never User      E-Cigarette/Vaping Substances    Nicotine No     THC No     CBD No     Flavoring No     Other No     Unknown No      Social History     Tobacco Use    Smoking status: Current Every Day Smoker     Packs/day: 0 50     Types: Cigarettes    Smokeless tobacco: Never Used   Substance Use Topics    Alcohol use: Yes     Alcohol/week: 3 0 - 4 0 standard drinks     Types: 3 - 4 Shots of liquor per week     Frequency: 4 or more times a week     Comment: Typically takes 3-4 shots of liquor daily after work   [employer] use: Yes     Types: Marijuana     Comment: "Occasionally"        Review of Systems   Constitutional: Negative for chills and fever  HENT: Negative for ear pain, sinus pain and sore throat  Eyes: Negative for pain  Respiratory: Negative for shortness of breath  Cardiovascular: Negative for chest pain  Gastrointestinal: Negative for abdominal pain, diarrhea, nausea and vomiting  Genitourinary: Negative for difficulty urinating, dysuria and flank pain  Musculoskeletal: Positive for neck pain  Negative for back pain  Neurological: Positive for numbness and headaches  All other systems reviewed and are negative        Physical Exam  ED Triage Vitals [08/04/20 0944]   Temperature Pulse Respirations Blood Pressure SpO2   98 °F (36 7 °C) 84 16 (!) 152/111 98 %      Temp Source Heart Rate Source Patient Position - Orthostatic VS BP Location FiO2 (%)   Oral -- Sitting Left arm --      Pain Score       Worst Possible Pain             Orthostatic Vital Signs  Vitals:    08/04/20 1526 08/04/20 1527 08/04/20 1937 08/04/20 1959   BP: 134/83 134/83 135/80    Pulse:  66 58 58   Patient Position - Orthostatic VS:           Physical Exam  Vitals signs and nursing note reviewed  Constitutional:       General: He is not in acute distress  Appearance: He is well-developed  He is not ill-appearing, toxic-appearing or diaphoretic  HENT:      Head: Normocephalic and atraumatic  Comments: Occiput visualization limited by c collar     Nose: Nose normal       Mouth/Throat:      Mouth: Mucous membranes are moist    Eyes:      General: No scleral icterus  Right eye: No discharge  Left eye: No discharge  Extraocular Movements: Extraocular movements intact  Conjunctiva/sclera: Conjunctivae normal       Pupils: Pupils are equal, round, and reactive to light  Neck:      Comments: c collar  Cardiovascular:      Rate and Rhythm: Normal rate  Pulses: Normal pulses  Heart sounds: Normal heart sounds  Pulmonary:      Effort: Pulmonary effort is normal  No respiratory distress  Breath sounds: Normal breath sounds  No stridor  No wheezing, rhonchi or rales  Abdominal:      General: There is no distension  Palpations: Abdomen is soft  Tenderness: There is no abdominal tenderness  There is no guarding or rebound  Skin:     General: Skin is warm and dry  Capillary Refill: Capillary refill takes less than 2 seconds  Neurological:      Mental Status: He is alert and oriented to person, place, and time  Cranial Nerves: No cranial nerve deficit  Sensory: Sensory deficit (ulnar distrubtion b/l) present  Motor: No weakness     Psychiatric:      Comments: Irritable, appears depressed         ED Medications  Medications   albuterol (PROVENTIL HFA,VENTOLIN HFA) inhaler 2 puff (has no administration in time range)   nicotine (NICODERM CQ) 7 mg/24hr TD 24 hr patch 1 patch (1 patch Transdermal Medication Applied 8/4/20 1550)   enoxaparin (LOVENOX) subcutaneous injection 30 mg (30 mg Subcutaneous Not Given 8/4/20 1553)   thiamine (VITAMIN B1) tablet 100 mg (100 mg Oral Given 2/1/33 3271)   folic acid (FOLVITE) tablet 1 mg (1 mg Oral Given 8/4/20 1551)   multivitamin-minerals (CENTRUM) tablet 1 tablet (1 tablet Oral Given 8/4/20 1550)   naloxone (NARCAN) 0 04 mg/mL syringe 0 04 mg (has no administration in time range)   methocarbamol (ROBAXIN) tablet 500 mg (500 mg Oral Not Given 8/4/20 1716)   senna-docusate sodium (SENOKOT S) 8 6-50 mg per tablet 2 tablet (2 tablets Oral Not Given 8/4/20 1553)   gabapentin (NEURONTIN) capsule 100 mg (100 mg Oral Given 8/4/20 1552)   ondansetron (ZOFRAN) injection 4 mg (has no administration in time range)   acetaminophen (TYLENOL) tablet 975 mg (975 mg Oral Given 8/4/20 1550)   oxyCODONE (ROXICODONE) IR tablet 5 mg (has no administration in time range)   oxyCODONE (ROXICODONE) immediate release tablet 10 mg (has no administration in time range)   HYDROmorphone (DILAUDID) injection 0 5 mg (has no administration in time range)   ketorolac (TORADOL) injection 15 mg (15 mg Intramuscular Given 8/4/20 1046)   iohexol (OMNIPAQUE) 350 MG/ML injection (MULTI-DOSE) 85 mL (85 mL Intravenous Given 8/4/20 1150)   morphine (PF) 4 mg/mL injection 4 mg (4 mg Intravenous Given 8/4/20 1253)       Diagnostic Studies  Results Reviewed     Procedure Component Value Units Date/Time    Platelet count [[id number]]  (Normal) Collected:  08/04/20 1557    Lab Status:  Final result Specimen:  Blood from Hand, Right Updated:  08/04/20 1607     Platelets 730 Thousands/uL      MPV 9 6 fL     Basic metabolic panel [[id number]]  (Abnormal) Collected:  08/04/20 1046    Lab Status:  Final result Specimen:  Blood from Arm, Left Updated:  08/04/20 1118     Sodium 139 mmol/L      Potassium 4 0 mmol/L      Chloride 111 mmol/L      CO2 25 mmol/L      ANION GAP 3 mmol/L      BUN 21 mg/dL      Creatinine 0 88 mg/dL      Glucose 111 mg/dL      Calcium 8 9 mg/dL      eGFR 111 ml/min/1 73sq m     Narrative:       Consolidated [employer] guidelines for Chronic Kidney Disease (CKD):     Stage 1 with normal or high GFR (GFR > 90 mL/min/1 73 square meters)    Stage 2 Mild CKD (GFR = 60-89 mL/min/1 73 square meters)    Stage 3A Moderate CKD (GFR = 45-59 mL/min/1 73 square meters)    Stage 3B Moderate CKD (GFR = 30-44 mL/min/1 73 square meters)    Stage 4 Severe CKD (GFR = 15-29 mL/min/1 73 square meters)    Stage 5 End Stage CKD (GFR <15 mL/min/1 73 square meters)  Note: GFR calculation is accurate only with a steady state creatinine    CBC and differential [[id number]]  (Abnormal) Collected:  08/04/20 1046    Lab Status:  Final result Specimen:  Blood from Arm, Left Updated:  08/04/20 1057     WBC 6 12 Thousand/uL      RBC 4 35 Million/uL      Hemoglobin 14 3 g/dL      Hematocrit 42 4 %      MCV 98 fL      MCH 32 9 pg      MCHC 33 7 g/dL      RDW 14 6 %      MPV 9 8 fL      Platelets 987 Thousands/uL      nRBC 0 /100 WBCs      Neutrophils Relative 63 %      Immat GRANS % 0 %      Lymphocytes Relative 19 %      Monocytes Relative 14 %      Eosinophils Relative 3 %      Basophils Relative 1 %      Neutrophils Absolute 3 85 Thousands/µL      Immature Grans Absolute 0 02 Thousand/uL      Lymphocytes Absolute 1 15 Thousands/µL      Monocytes Absolute 0 87 Thousand/µL      Eosinophils Absolute 0 19 Thousand/µL      Basophils Absolute 0 04 Thousands/µL                  MRI cervical spine wo contrast   Final Result by Samuel Miller DO (08/04 1981)      Protrusion-type disc herniations are noted particularly at C5-6 and C6-7 flattening the right side of the cord most significantly at C5-6  This probable impingement on the exiting right C6 and exiting right C7 nerve roots  No definite cord signal    abnormality  Mild flattening of the ventral cord at C4-5 secondary to a small central and right paramedian protrusion type disc herniation  There is mild diffuse developmental spinal canal narrowing        Consultation with spinal surgery service recommended  The study was marked in [facility] for immediate notification  Workstation performed: [id number]         XR shoulder 2+ vw right   Final Result by Alfredo Villegas MD (08/04 1538)      No acute osseous abnormality  Workstation performed: [id number]         CT head without contrast   Final Result by Washington Sun DO (08/04 1238)      No acute intracranial abnormality  Workstation performed: [id number]         CT cervical spine without contrast   Final Result by Washington Sun DO (08/04 1240)      No cervical spine fracture or traumatic malalignment  Workstation performed: [id number]         CTA neck with and without contrast   Final Result by Washington Sun DO (08/04 1236)      Normal CT angiography  Workstation performed: [id number]               Procedures  Procedures      ED Course  ED Course as of Aug 04 2119   Tue Aug 04, 2020   1238 Platelet Count: 083                                           [facility]  Number of Diagnoses or Management Options  Traumatic disc herniation of cervical spine:   Diagnosis management comments: CT head, C-spine, CT neck negative  Toradol given for pain  Patient requesting pain medication, administered 4 mg morphine  Given reported new neurological symptoms will consult Trauma Service    Trauma to admit, will obtain MRI        Disposition  Final diagnoses:   Traumatic disc herniation of cervical spine     Time reflects when diagnosis was documented in both MDM as applicable and the Disposition within this note     Time User Action Codes Description Comment    8/4/2020  1:55 PM Lenon Ouch Add [M54 2] Neck pain     8/4/2020  1:55 PM Lenon Ouch Add [R20 2,  M79 601,  M79 602] Paresthesia and pain of both upper extremities     8/4/2020  8:03 PM Cira Barakat Add [T97 862R] Traumatic disc herniation of cervical spine       ED Disposition     None      Follow-up Information None         Current Discharge Medication List      CONTINUE these medications which have NOT CHANGED    Details   acetaminophen (TYLENOL) 500 mg tablet Take 2 tablets (1,000 mg total) by mouth every 8 (eight) hours  Qty: 30 tablet, Refills: 0    Associated Diagnoses: Strain of neck muscle, initial encounter; Trapezius muscle spasm      albuterol (PROVENTIL HFA,VENTOLIN HFA) 90 mcg/act inhaler Inhale 2 puffs every 4 (four) hours as needed for wheezing  Qty: 1 Inhaler, Refills: 0    Comments: Substitution to a formulary equivalent within the same pharmaceutical class is authorized  Associated Diagnoses: Asthma exacerbation      ibuprofen (MOTRIN) 800 mg tablet Take 1 tablet (800 mg total) by mouth 3 (three) times a day  Qty: 21 tablet, Refills: 0    Associated Diagnoses: Strain of neck muscle, initial encounter; Trapezius muscle spasm      HYDROcodone-acetaminophen (XODOL) 7 5-300 MG per tablet Take 1 tablet by mouth every 6 (six) hours as needed for moderate pain      triamcinolone (KENALOG) 0 1 % cream Apply topically 2 (two) times a day  Qty: 30 g, Refills: 0    Associated Diagnoses: Poison ivy dermatitis           No discharge procedures on file  PDMP Review     None           ED Provider  Attending physically available and evaluated Patria Calvert I managed the patient along with the ED Attending      Electronically Signed by         Basia Muller MD  08/04/20 8691

## 2020-08-05 VITALS
WEIGHT: 170 LBS | RESPIRATION RATE: 20 BRPM | SYSTOLIC BLOOD PRESSURE: 169 MMHG | OXYGEN SATURATION: 98 % | HEIGHT: 66 IN | DIASTOLIC BLOOD PRESSURE: 104 MMHG | BODY MASS INDEX: 27.32 KG/M2 | TEMPERATURE: 98.2 F | HEART RATE: 59 BPM

## 2020-08-05 PROBLEM — M50.20 PROTRUSION OF CERVICAL INTERVERTEBRAL DISC: Status: ACTIVE | Noted: 2020-08-04

## 2020-08-05 PROCEDURE — 99244 OFF/OP CNSLTJ NEW/EST MOD 40: CPT | Performed by: NURSE PRACTITIONER

## 2020-08-05 PROCEDURE — 97129 THER IVNTJ 1ST 15 MIN: CPT

## 2020-08-05 PROCEDURE — NC001 PR NO CHARGE: Performed by: PHYSICIAN ASSISTANT

## 2020-08-05 PROCEDURE — 97166 OT EVAL MOD COMPLEX 45 MIN: CPT

## 2020-08-05 PROCEDURE — 99217 PR OBSERVATION CARE DISCHARGE MANAGEMENT: CPT | Performed by: EMERGENCY MEDICINE

## 2020-08-05 PROCEDURE — 97163 PT EVAL HIGH COMPLEX 45 MIN: CPT

## 2020-08-05 RX ORDER — HYDROMORPHONE HCL/PF 1 MG/ML
0.2 SYRINGE (ML) INJECTION EVERY 4 HOURS PRN
Status: DISCONTINUED | OUTPATIENT
Start: 2020-08-05 | End: 2020-08-05 | Stop reason: HOSPADM

## 2020-08-05 RX ORDER — OXYCODONE HYDROCHLORIDE 5 MG/1
TABLET ORAL
Qty: 30 TABLET | Refills: 0 | Status: SHIPPED | OUTPATIENT
Start: 2020-08-05 | End: 2020-08-20

## 2020-08-05 RX ORDER — METHOCARBAMOL 500 MG/1
500 TABLET, FILM COATED ORAL EVERY 6 HOURS SCHEDULED
Qty: 60 TABLET | Refills: 0 | Status: SHIPPED | OUTPATIENT
Start: 2020-08-05 | End: 2020-08-05

## 2020-08-05 RX ORDER — METHOCARBAMOL 500 MG/1
500 TABLET, FILM COATED ORAL EVERY 6 HOURS SCHEDULED
Qty: 60 TABLET | Refills: 0 | Status: ON HOLD | OUTPATIENT
Start: 2020-08-05 | End: 2021-02-06 | Stop reason: SDUPTHER

## 2020-08-05 RX ORDER — GABAPENTIN 100 MG/1
100 CAPSULE ORAL 3 TIMES DAILY
Qty: 60 CAPSULE | Refills: 0 | Status: SHIPPED | OUTPATIENT
Start: 2020-08-05 | End: 2020-08-05

## 2020-08-05 RX ORDER — AMOXICILLIN 250 MG
2 CAPSULE ORAL DAILY
Refills: 0
Start: 2020-08-06 | End: 2022-02-02 | Stop reason: SDUPTHER

## 2020-08-05 RX ORDER — GABAPENTIN 100 MG/1
100 CAPSULE ORAL 3 TIMES DAILY
Qty: 60 CAPSULE | Refills: 0 | Status: SHIPPED | OUTPATIENT
Start: 2020-08-05 | End: 2021-02-06 | Stop reason: HOSPADM

## 2020-08-05 RX ORDER — OXYCODONE HYDROCHLORIDE 5 MG/1
TABLET ORAL
Qty: 30 TABLET | Refills: 0 | Status: SHIPPED | OUTPATIENT
Start: 2020-08-05 | End: 2020-08-05

## 2020-08-05 RX ADMIN — METHOCARBAMOL 500 MG: 500 TABLET, FILM COATED ORAL at 00:21

## 2020-08-05 RX ADMIN — ACETAMINOPHEN 975 MG: 325 TABLET, FILM COATED ORAL at 05:57

## 2020-08-05 RX ADMIN — HYDROMORPHONE HYDROCHLORIDE 0.2 MG: 1 INJECTION, SOLUTION INTRAMUSCULAR; INTRAVENOUS; SUBCUTANEOUS at 16:28

## 2020-08-05 RX ADMIN — METHOCARBAMOL 500 MG: 500 TABLET, FILM COATED ORAL at 12:56

## 2020-08-05 RX ADMIN — NICOTINE 1 PATCH: 7 PATCH TRANSDERMAL at 09:11

## 2020-08-05 RX ADMIN — Medication 1 TABLET: at 09:08

## 2020-08-05 RX ADMIN — OXYCODONE HYDROCHLORIDE 10 MG: 10 TABLET ORAL at 01:44

## 2020-08-05 RX ADMIN — THIAMINE HCL TAB 100 MG 100 MG: 100 TAB at 09:08

## 2020-08-05 RX ADMIN — METHOCARBAMOL 500 MG: 500 TABLET, FILM COATED ORAL at 05:57

## 2020-08-05 RX ADMIN — HYDROMORPHONE HYDROCHLORIDE 0.5 MG: 1 INJECTION, SOLUTION INTRAMUSCULAR; INTRAVENOUS; SUBCUTANEOUS at 06:41

## 2020-08-05 RX ADMIN — ACETAMINOPHEN 975 MG: 325 TABLET, FILM COATED ORAL at 13:01

## 2020-08-05 RX ADMIN — OXYCODONE HYDROCHLORIDE 10 MG: 10 TABLET ORAL at 05:57

## 2020-08-05 RX ADMIN — GABAPENTIN 100 MG: 100 CAPSULE ORAL at 09:08

## 2020-08-05 RX ADMIN — FOLIC ACID 1 MG: 1 TABLET ORAL at 09:08

## 2020-08-05 NOTE — UTILIZATION REVIEW
Initial Clinical Review    Admission: Date/Time/Statement:   Admission Orders (From admission, onward)     Ordered        08/04/20 1417  Place in Observation  Once                   Orders Placed This Encounter   Procedures    Place in Observation     Standing Status:   Standing     Number of Occurrences:   1     Order Specific Question:   Admitting Physician     Answer:   Onur Houser [[id number]]     Order Specific Question:   Level of Care     Answer:   Med Surg [16]     Order Specific Question:   Bed Type     Answer:   Trauma [7]     ED Arrival Information     Expected Arrival Acuity Means of Arrival Escorted By Service Admission Type    - 8/4/2020 09:03 Emergent Walk-In Self Trauma Emergency    Arrival Complaint    Fall        Chief Complaint   Patient presents with    Fall     Pt presents with severe neck pain after slipping and falling yesterday  ?LOC -LOC +head strike   Neck Pain     Assessment/Plan:   Mr Adams Jhaveri is a 36 yo male who presents to the ED from home as a trauma eval with c/o severe immediate pain in back of head and neck after slip and fall with + back of head strike, neck and upper back with dizziness  Pain is worse on R side  No LOC  Back  Pain resolved but head and neck pain persists described as shooting pain from neck down to shoulder and into arms and ring and little fingers bilaterally  In ED no obvious injuries on CT  PMH:  Alcohol abuse, burn , furuncle of axilla  He is admitted to OBSERVATION status with Parasthesia and pain BUE - neurosurgery eval, MRI C spine, maintain C collar, mulitmodal analgesia, neuro exams  Neck pain - PT/OT  Concussion w/o loss of consciousness - mild concussion , Meclizine PRN  Acute R shoulder pain - R shoulder xray, WBAT RUE  Alcohol abuse - CIWA  Subacute burn to L index finger - local wound care        ED Triage Vitals [08/04/20 0944]   Temperature Pulse Respirations Blood Pressure SpO2   98 °F (36 7 °C) 84 16 (!) 152/111 98 %      Temp Source Heart Rate Source Patient Position - Orthostatic VS BP Location FiO2 (%)   Oral -- Sitting Left arm --      Pain Score       Worst Possible Pain          Wt Readings from Last 1 Encounters:   08/04/20 77 1 kg (170 lb)     Additional Vital Signs:   08/05/20 07:39:51   97 7 °F (36 5 °C)   56   18   143/96   112             08/05/20 03:22:10   97 5 °F (36 4 °C)   52Abnormal        121/73   89             08/04/20 22:57:52   97 7 °F (36 5 °C)   55   19   144/106Abnormal     119   98 %   None (Room air)   Lying    08/04/20 1959      58            92 %          08/04/20 19:37:24   98 6 °F (37 °C)   58   20   135/80   98             08/04/20 1915                     None (Room air)       08/04/20 15:27:53   98 5 °F (36 9 °C)   66   16   134/83   100   97 %          08/04/20 15:26:19   98 5 °F (36 9 °C)         134/83   100             08/04/20 1406      77   16   139/71      99 %      Lying      Pertinent Labs/Diagnostic Test Results:     8/4 CT head, CT C spine, CTA neck, Xray R shoulder - no acute injury/disease  8/4 MRI C spine - Protrusion-type disc herniations are noted particularly at C5-6 and C6-7 flattening the right side of the cord most significantly at C5-6  This probable impingement on the exiting right C6 and exiting right C7 nerve roots  No definite cord signal abnormality  Mild flattening of the ventral cord at C4-5 secondary to a small central and right paramedian protrusion type disc herniation  There is mild diffuse developmental spinal canal narrowing    Consultation with spinal surgery service recommended    Results from last 7 days   Lab Units 08/04/20  1557 08/04/20  1046   WBC Thousand/uL  --  6 12   HEMOGLOBIN g/dL  --  14 3   HEMATOCRIT %  --  42 4   PLATELETS Thousands/uL 267 275   NEUTROS ABS Thousands/µL  --  3 85         Results from last 7 days   Lab Units 08/04/20  1046   SODIUM mmol/L 139   POTASSIUM mmol/L 4 0   CHLORIDE mmol/L 111*   CO2 mmol/L 25 ANION GAP mmol/L 3*   BUN mg/dL 21   CREATININE mg/dL 0 88   EGFR ml/min/1 73sq m 111   CALCIUM mg/dL 8 9     Results from last 7 days   Lab Units 08/04/20  1046   GLUCOSE RANDOM mg/dL 111     ED Treatment:   Medication Administration from 08/04/2020 0902 to 08/04/2020 1519    Date/Time Order Dose Route Action   08/04/2020 1046 ketorolac (TORADOL) injection 15 mg 15 mg Intramuscular Given   08/04/2020 1150 iohexol (OMNIPAQUE) 350 MG/ML injection (MULTI-DOSE) 85 mL 85 mL Intravenous Given   08/04/2020 1253 morphine (PF) 4 mg/mL injection 4 mg 4 mg Intravenous Given        Past Medical History:   Diagnosis Date    Asthma     Chronic pain     Hypertension     Thyroglossal duct cyst      Present on Admission:   Neck pain   Paresthesia and pain of both upper extremities   Fall   Concussion without loss of consciousness   Acute pain of right shoulder   Alcohol abuse   Burn   Furuncle of axilla    Admitting Diagnosis: Neck pain [M54 2]  Paresthesia and pain of both upper extremities [R20 2, M79 601, M79 602]  Unspecified multiple injuries, initial encounter [T07  XXXA]     Age/Sex: 37 y o  male     Admission Orders:  Scheduled Medications:  acetaminophen, 975 mg, Oral, Q8H PETER  enoxaparin, 30 mg, Subcutaneous, T85G  folic acid, 1 mg, Oral, Daily  gabapentin, 100 mg, Oral, TID  methocarbamol, 500 mg, Oral, Q6H PETER  multivitamin-minerals, 1 tablet, Oral, Daily  nicotine, 1 patch, Transdermal, Daily  senna-docusate sodium, 2 tablet, Oral, Daily  thiamine, 100 mg, Oral, Daily    Continuous IV Infusions:     PRN Meds:  albuterol, 2 puff, Inhalation, Q4H PRN  HYDROmorphone, 0 5 mg, Intravenous, Q4H PRN - x1 8/4, 8/5  naloxone, 0 04 mg, Intravenous, Q1MIN PRN  ondansetron, 4 mg, Intravenous, Q4H PRN  oxyCODONE, 10 mg, Oral, Q4H PRN - x 2 8/5  oxyCODONE, 5 mg, Oral, Q4H PRN  Oxycodone 2 5 mg oral PRN - x 1 8/4 and d/c     SCDs  Cande collar  Chair 3 x daily  Neuro checks q 4 hr  CIWA - initial 0, 3, 4, 1, 0   Regular diet  IP CONSULT TO NEUROSURGERY  IP CONSULT TO CASE MANAGEMENT      Network Utilization Review Department  [email or web address]  ATTENTION: Please call with any questions or concerns to [phone] and carefully listen to the prompts so that you are directed to the right person  All voicemails are confidential   Ty Limb all requests for admission clinical reviews, approved or denied determinations and any other requests to dedicated fax number below belonging to the campus where the patient is receiving treatment   List of dedicated fax numbers for the Facilities:  [address] DENIALS (Administrative/Medical Necessity) [phone]   [address] (Maternity/NICU/Pediatrics) [phone]   Linda Garcia [phone]   George Bruce [phone]   Shari Heady [phone]   Hettie Lips [phone]   [address] [place] [address] [phone]   [facility]  [phone]   [address] [facility]  [address] [facility] [address] [facility] [phone]

## 2020-08-05 NOTE — ASSESSMENT & PLAN NOTE
Bilateral lateral forearm and 4th-5th finger numbness and tingling after a fall down steps 8/4  · No weakness or urinary incontinence, patient c/o trouble with balance and unsteadiness since the fall  · + head strike, no LOC, no AC/AP medications    Imaging:  · CT cervical w/o, 8/4/2020: No fracture or malalignment  · CT head w/o, 8/4/2020: No intracranial abnormality  · CTA neck w/wo, 8/4/2020: negative CTA  · MRI cervical spine 8/4/20:Protrusion-type disc herniations are noted particularly at C5-6 and C6-7 flattening the right side of the cord most significantly at C5-6  This probable impingement on the exiting right C6 and exiting right C7 nerve roots  No definite cord signal abnormality  Mild flattening of the ventral cord at C4-5 secondary to a small central and right paramedian protrusion type disc herniation  There is mild diffuse developmental spinal canal narrowing  Plan:  · Continue neurological exams  · Aspen collar to be worn at all times, abilio collar for showering  · Ordered Vista collar  · Reviewed imaging with patient  · Medical management and pain control per primary team  · Mobilize patient as tolerated  · PT/OT eval  · DVT ppx: SCDs and SQ Lovenox   · No neurosurgical intervention is anticipated at this time  · Possible surgical discussion and ongoing monitoring of patient's symptoms and neurological status, patient will follow-up in 2 weeks in outpatient office  Neurosurgery will sign off, patient will follow-up in 2 weeks in outpatient, call if any questions or concerns

## 2020-08-05 NOTE — OCCUPATIONAL THERAPY NOTE
Occupational Therapy Evaluation     Patient Name: Jamilah Vail  [id number] Date: 8/5/2020  Problem List  Principal Problem:    Paresthesia and pain of both upper extremities  Active Problems:    Neck pain    Fall    Concussion without loss of consciousness    Acute pain of right shoulder    Alcohol abuse    Burn    Furuncle of axilla    Past Medical History  Past Medical History:   Diagnosis Date    Asthma     Chronic pain     Hypertension     Thyroglossal duct cyst      Past Surgical History  Past Surgical History:   Procedure Laterality Date    THYROGLOSSAL DUCT EXCISION      THYROID SURGERY      THYROID SURGERY             08/05/20 1525   Note Type   Note type Eval/Treat   Restrictions/Precautions   Weight Bearing Precautions Per Order No   Braces or Orthoses C/S Collar   Other Precautions Spinal precautions; Telemetry;Pain   Pain Assessment   Pain Assessment Tool Carreon-Baker FACES   Carreon-Baker FACES Pain Rating 4   Pain Location/Orientation Location: Neck   Hospital Pain Intervention(s) Ambulation/increased activity;Repositioned; Emotional support; Rest   Home Living   Type of Home Apartment  (3rd floor no elevator access)   Home Layout One level;Stairs to enter with rails   Bathroom Shower/Tub Tub/shower unit   Bathroom Toilet Standard   Bathroom Equipment   (no DME at baseline)   Bathroom Accessibility Accessible   Prior Function   Level of [place] Independent with ADLs and functional mobility   Lives With Alone   Receives Help From Family, friends   ADL Assistance Independent   IADLs Independent   Falls in the last 6 months 1 to 4   Vocational Full time employment   Lifestyle   Autonomy I with ADL's/IADL's, no  AD with functional ambulation, +drives, works full time   Reciprocal Relationships tenants, family   Service to Others full time employement, 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   [address] staying active   ADL   [address] 7  Independent   Grooming Assistance 7  Independent   UB Bathing Assistance 7  Independent-educated pt on tub/shower transfers with step in method to increase I /safety with bathing   LB [address] 7  Independent-educated pt on compensatory techniques to adhere to spinal precautions with LB dressing tasks  Toileting Assistance  7  Independent   Bed Mobility   Supine to Sit 7  Independent   Additional Comments pt left in chair upon departure with all needs met   Transfers   Sit to Stand 7  Independent   Stand to Sit 7  Independent   Functional Mobility   Functional Mobility 6  Modified independent   Additional Comments mod I without AD household distance funtional mobility, no LOB, unsteadiness, +C/S collar donned   Balance   Static Sitting Normal   Dynamic Sitting Normal   Static Standing Good   Dynamic Standing Good   Ambulatory Fair +   Activity Tolerance   Activity Tolerance Patient tolerated treatment well   Medical Staff Made Aware PT   Nurse Made Aware RN cleared pt for therapy   RUE Assessment   RUE Assessment WFL   LUE Assessment   LUE Assessment WFL   Hand Function   Gross Motor Coordination Functional   Fine Motor Coordination Functional   Sensation   Additional Comments pt reports numbness to R/L 4th and 5th fingers runing up forearm -not limiting self care tasks or hand writing with cognitive evaluation   Vision-Basic Assessment   Current Vision No visual deficits   Vision - Complex Assessment   Acuity Able to read normal print without difficulty   Cognition   Overall Cognitive Status [facility]   Arousal/Participation Alert; Cooperative   Attention Within functional limits   Orientation Level Oriented X4   Memory Within functional limits   Following Commands Follows all commands and directions without difficulty   Comments pt demonstrated WFL cognition with MOCA testing see results below   Cognition Assessment Tools MOCA   Score 29   Assessment Limitation Decreased high-level ADLs   Prognosis Good   Assessment Pt is a 37 y o  male who was admitted to [facility] on 8/4/2020 with fall down steps landing on back in the rain  +head and neck strike,  Paresthesia and pain of both upper extremities, +C/S collar to be worn , imaging/work up negative for acute changes, alcohol abuse  Pt's problem list also includes PMH of   At baseline pt was completing I with ADL's/IADL's, no AD with functional ambulation, +drives, works full time  Pt lives alone on 3rd floor apartment 2 full flights of stairs/no elevator access  Currently pt requires mod I for overall ADLS and mod I without AD for functional mobility/transfers  Pt currently presents with impairments in the following categories -steps to enter environment and difficulty performing IADLS    These impairments, as well as pt's fatigue, pain and decreased caregiver support  limit pt's ability to safely engage in all baseline areas of occupation, includinglaundry , driving, house maintenance, meal prep, cleaning and work/volunteer work  From [employer] standpoint, recommend home with family support upon D/C  OT will continue to follow to address the below stated goals  Goals   Patient Goals go home    STG Time Frame 1-3   Short Term Goal #1 *Pt to participate in further cognitive testing with good attention and participation to assist with safe d/c recommendations   Short Term Goal #2 *Demonstrate good recall with 1-2 home safety techniques and spinal precautions to ensure safety/I with all ADL's/IADL's  Plan   Treatment Interventions Continued evaluation; Energy conservation;Equipment evaluation/education;Patient/family training   Goal Expiration Date 08/08/20   OT Treatment Day 1   OT Frequency 3-5x/wk   Additional Treatment Session   Start Time 1505   End Time 8905   Treatment Assessment Pt seen for an additional OT treatment session with focus on MOCA cognitive assessment, home safety/fall prevention and C/S collar care with spinal precaution education  Pt scored a 29/30 with cognitive testing which indicates a normal score  Pt receptive to all above education and verbalized understanding  From OT standpoint recommend home with family support, no DME Needs at this time   No further acute OT needs indicated at this time - Recommend continued oob for meals, ambulation to/from BR, setup for self care tasks and mobility in hallway with nursing/restorative - d/c from caseload with above recommendations   Recommendation   OT Discharge Recommendation Return to previous environment with social support   Equipment Recommended   (no DME needs)   OT - OK to Discharge Yes   Barthel Index   Feeding 10   Bathing 5   Grooming Score 5   Dressing Score 10   Bladder Score 10   Bowels Score 10   Toilet Use Score 10   Transfers (Bed/Chair) Score 15   Mobility (Level Surface) Score 10   Stairs Score 10   Barthel Index Score 95   Modified Tillman Scale   Modified Tillman Scale 2   Raina Manning MOT, OTR/L

## 2020-08-05 NOTE — UTILIZATION REVIEW
Notification of Observation Admission/Observation Authorization Request   This is a Notification of Observation Admission for [address]  Be advised that this patient was admitted to our facility under Observation Status  Contact Fani Garnett at [phone] for additional admission information  [facility]  DEDICATED [phone]  Patient Name:   Jamilah Vail   YOB: 1985       [address]   [address]:   [facility]  Tax ID: [id number]  NPI: [id number] Attending Provider/NPI: Vielka Roach [[id number]]   Place of Service Code: 25     Place of Service Name:  CPT Code for Observation:  [facility]  CPT  / CPT 37971   Start Date: 08/04/2020     Discharge Date & Time: No discharge date for patient encounter  Type of Admission: Observation Status Discharge Disposition (if discharged): Home/Self Care   Patient Diagnoses: Neck pain [M54 2]  Paresthesia and pain of both upper extremities [R20 2, M79 601, M79 602]  Unspecified multiple injuries, initial encounter [T07  XXXA]     Orders: Admission Orders (From admission, onward)     Ordered        08/04/20 1417  Place in Observation  Once                    Assigned Utilization Review Contact: Fani Garnett  Utilization   Network Utilization Review Department  Phone: [phone]; Fax [phone]  Email: [email or web address]   ATTENTION PAYERS: Please call the assigned Utilization  directly with any questions or concerns ALL voicemails in the department are confidential  Send all requests for admission clinical reviews, approved or denied determinations and any other requests to dedicated fax number belonging to the campus where the patient is receiving treatment

## 2020-08-05 NOTE — PROGRESS NOTES
Upon administering IV dilaudid for breakthrough pain the patient stated "flush it more"  IV medication flushed in normal fashion as per hospital protocol

## 2020-08-05 NOTE — DISCHARGE SUMMARY
Discharge- Janine Ahn 1985, 37 y o  male MRN: [id number]    Unit/Bed#: [facility] 803-01 Encounter: [id number]    Primary Care Provider: No primary care provider on file  Date and time admitted to hospital: 8/4/2020  9:33 AM        Fall  Assessment & Plan  - Status post fall with the below noted injury/issues  - Fall precautions   - PT and OT evaluation and treatment as indicated  - discharge home today    Protrusion of cervical intervertebral disc  Assessment & Plan  - MRI cervical spine reveals disc herniations at C5-C6 and C6-C7 with flattening of the right side of the spinal cord as well as impingement of the nerve roots at C6 and C7 on the right   -neurosurgery evaluated and feels that this is likely not completely acute but exacerbated by his recent injury  -no immediate intervention needed, they will follow him clinically in 2 weeks in the office   - he is to maintain cervical spine precautions in a Vista collar at all times  No working or driving   - continue multimodal analgesic regimen   -patient remains neurologically intact without focal deficits  He does have ulnar nerve distribution paresthesias bilaterally but no weakness or decreased sensation  - patient has been up and ambulatory without difficulty today and is maintaining his cervical spine precautions    Furuncle of axilla  Assessment & Plan  - Left axillary erythema and induration with concern for possible developing furuncle  No evidence of fluctuance on exam   Minimal erythema and no drainage noted on exam   - Continue to monitor for now  - Analgesia as needed  - If this becomes more significant or worsens, may need incision and drainage  Burn  Assessment & Plan  - Subacute burn wound to the dorsal aspect of the left index finger, healing appropriately  - Local wound care as indicated only  - Analgesia as needed      Alcohol abuse  Assessment & Plan  - Self reported alcohol use with patient taking 3-4 shots on a daily basis after work  - Case management consult appreciated  - [facility] protocol  No signs or symptoms of withdrawal     Acute pain of right shoulder  Assessment & Plan  - Right shoulder pain following fall with point tenderness of the anterior right shoulder   - right shoulder x-ray negative for injury  - Weight-bearing as tolerated on the right upper extremity less additional injury identified   - Multimodal analgesic regimen   - PT and OT evaluation and treatment as indicated  Concussion without loss of consciousness  Assessment & Plan  - Suspect mild concussion secondary to blunt traumatic injury to the back of the head in setting of associated headaches and dizziness  - Symptomatic management with analgesia for headaches and meclizine p r n  dizziness   - PT and OT evaluation and treatment as indicated  -follow-up with Trauma in 2 week  I discussed the importance of cognitive rest and activity to tolerance  * Paresthesia and pain of both upper extremities  Assessment & Plan  - Paresthesias and pain in the bilateral upper extremities of unclear etiology with no obvious injuries identified on CT scans   - MRI results as stated above  Continue Vista collar and cervical spine precautions per Neurosurgery and follow up with Neurosurgery as an outpatient  No need for intervention at this time  - Initiate multimodal analgesic regimen  - patient has no weakness or decreased sensation on exam   - PT and OT evaluation and treatment as indicated  Resolved Problems  Date Reviewed: 8/5/2020    None          Admission Date:   Admission Orders (From admission, onward)     Ordered        08/04/20 1417  Place in Observation  Once                     Admitting Diagnosis: Neck pain [M54 2]  Paresthesia and pain of both upper extremities [R20 2, M79 601, M79 602]  Unspecified multiple injuries, initial encounter [T07  XXXA]    HPI:  As documented by Umair Dyson Germain Gallo is a 37 y o  male who presents with severe pain in the back of his head and neck  The patient reports that he slipped on some stairs in the rain this morning causing him to fall and strike the back of his head, neck and upper back  He denies losing consciousness  He experienced immediate pain in the back of his head, posterior neck and upper back  The back pain has since resolved, but the head and neck pain persist   He immediately experienced dizziness following the fall which did initially improve  He attempted to go to work, but had to leave work due to persistent/worsening pain and recurrent dizziness  He notes the head and neck pain are worst on the right side  In addition to those symptoms, he also noted burning pain shooting from his neck down his shoulders into his arms and the ring finger and little finger bilaterally  He denies any chest pain, abdominal pain, pelvic pain or lower extremity pain  He denies any nausea or vomiting "    Procedures Performed: No orders of the defined types were placed in this encounter  Summary of Hospital Course:  Patient was placed on the trauma service following fall down steps when he sustained bilateral upper extremity paresthesias and neck pain  He was placed in a cervical collar and ultimately had a MRI of his cervical spine after CT see cervical spine and CT head were negative for acute injury  His MRI of the cervical spine showed a trip protrusion type disc herniation at C5-C6 and C6-C7 Flattening of the right side of the cord most significantly at C5-C6 , with probable impingement on the exiting right C6 an exiting right C7 nerve roots  Neurosurgery followed up these results and examined the patient and due to no focal neurological deficits the and the appearance of his imaging it seems that this was not completely acute injury and he likely had some chronic disc herniations that were exacerbated by this injury    He was recommended conservative management for the time being in a Finch collar with outpatient follow-up with them in our office in 2 weeks  He is to maintain cervical spine precautions and pain controlled with multimodal analgesic regimen  He is not to be doing any heavy lifting, pushing, pulling, bending, twisting, crawling  He did have some postconcussive symptoms including mild headache and dizziness, however he was able to be up and ambulatory without difficulty  He was ready to go home on 08/05/2020 and was in agreement with follow-up as an outpatient with Neurosurgery and with Trauma regarding his cervical spine disc protrusions and his concussion  Today he states he does have some discomfort in his neck but it is relieved with his multimodal pain regimen  He only has paresthesias in his ulnar nerve distribution in his right and left 4th and 5th digits  He has no lack of sensation and no weakness  He has no new complaints on tertiary exam today  Exam:  GEN:  No acute distress  HEENT:  +Vista collar in place  NEURO:  GCS 15, nonfocal exam; + sensation intact throughout all dermatomes to light touch and strength is 5/5 throughout all extremities  CV:  Regular rate and rhythm, no murmurs gallops or rubs  PULM:  Clear to auscultation bilaterally  GI:  Soft, nontender, nondistended  :  Voiding  MSK:  Moving all extremities equally with full strength  SKIN:  Pink, warm, dry      Significant Findings, Care, Treatment and Services Provided:   Xr Shoulder 2+ Vw Right    Result Date: 8/4/2020  Impression: No acute osseous abnormality  Workstation performed: [id number]     Ct Head Without Contrast    Result Date: 8/4/2020  Impression: No acute intracranial abnormality  Workstation performed: [id number]     Cta Neck With And Without Contrast    Result Date: 8/4/2020  Impression: Normal CT angiography  Workstation performed: [id number]     Ct Cervical Spine Without Contrast    Result Date: 8/4/2020  Impression: No cervical spine fracture or traumatic malalignment  Workstation performed: [id number]     Mri Cervical Spine Wo Contrast    Result Date: 8/4/2020  Impression: Protrusion-type disc herniations are noted particularly at C5-6 and C6-7 flattening the right side of the cord most significantly at C5-6  This probable impingement on the exiting right C6 and exiting right C7 nerve roots  No definite cord signal abnormality  Mild flattening of the ventral cord at C4-5 secondary to a small central and right paramedian protrusion type disc herniation  There is mild diffuse developmental spinal canal narrowing  Consultation with spinal surgery service recommended  The study was marked in [facility] for immediate notification  Workstation performed: [id number]       Complications: none    Condition at Discharge: good         Discharge instructions/Information to patient and family:   See after visit summary for information provided to patient and family  Provisions for Follow-Up Care:  See after visit summary for information related to follow-up care and any pertinent home health orders  PCP: No primary care provider on file  Disposition: Home    Planned Readmission: No    Discharge Statement   I spent 30 minutes discharging the patient  This time was spent on the day of discharge  I had direct contact with the patient on the day of discharge  Additional documentation is required if more than 30 minutes were spent on discharge  Discharge Medications:  See after visit summary for reconciled discharge medications provided to patient and family

## 2020-08-05 NOTE — PROGRESS NOTES
Progress Note - Daly Villafuerte 1985, 37 y o  male MRN: [id number]    Unit/Bed#: [facility] 803-01 Encounter: [id number]    Primary Care Provider: No primary care provider on file  Date and time admitted to hospital: 8/4/2020  9:33 AM        * Paresthesia and pain of both upper extremities  Assessment & Plan  Bilateral lateral forearm and 4th-5th finger numbness and tingling after a fall down steps 8/4  · No weakness or urinary incontinence, patient c/o trouble with balance and unsteadiness since the fall  · + head strike, no LOC, no AC/AP medications    Imaging:  · CT cervical w/o, 8/4/2020: No fracture or malalignment  · CT head w/o, 8/4/2020: No intracranial abnormality  · CTA neck w/wo, 8/4/2020: negative CTA  · MRI cervical spine 8/4/20:Protrusion-type disc herniations are noted particularly at C5-6 and C6-7 flattening the right side of the cord most significantly at C5-6  This probable impingement on the exiting right C6 and exiting right C7 nerve roots  No definite cord signal abnormality  Mild flattening of the ventral cord at C4-5 secondary to a small central and right paramedian protrusion type disc herniation  There is mild diffuse developmental spinal canal narrowing  Plan:  · Continue neurological exams  · Aspen collar to be worn at all times, abilio collar for showering  · Ordered Vista collar  · Reviewed imaging with patient  · Medical management and pain control per primary team  · Mobilize patient as tolerated  · PT/OT eval  · DVT ppx: SCDs and SQ Lovenox   · No neurosurgical intervention is anticipated at this time  · Possible surgical discussion and ongoing monitoring of patient's symptoms and neurological status, patient will follow-up in 2 weeks in outpatient office  Neurosurgery will sign off, patient will follow-up in 2 weeks in outpatient, call if any questions or concerns            Alcohol abuse  Assessment & Plan  · Patient admits to 4-5 drinks daily  · Monitor for signs of ETOH withdrawal  · CIWA protocol ordered   · Continue thiamine and folic acid per primary team    [address]  See plan above    Subjective/Objective      Chief Complaint: "I dont know what is going on"    Subjective:  Patient complaining of slowly pulsating pain in neck 9/10 with occasional shocks into the arms or down his back  He reports his pain is well controlled on current regimen  Patient continues to report decreased sensation and tingling in bilateral lateral forearms into 4th and 5th fingers  Patient reports headache and dizziness is constant with intermittent blurry vision  He denies any chest pain, shortness of breath, abdominal pain, nausea, vomiting, diarrhea, no problems with bowel or bladder, no new weakness or numbness/tingling  Patient reports having a BM today  Objective:  Patient comfortably sitting on the edge of bed, NAD  I/O       08/03 0701 - 08/04 0700 08/04 0701 - 08/05 0700 08/05 0701 - 08/06 0700    P  O   700 300    Total Intake(mL/kg)  700 (9 1) 300 (3 9)    Net  +700 +300                 Invasive Devices     Peripheral Intravenous Line            Peripheral IV 08/04/20 Left Antecubital 1 day                Physical Exam:  Vitals: Blood pressure (!) 169/104, pulse 59, temperature 98 2 °F (36 8 °C), temperature source Oral, resp  rate 20, height 5' 6" (1 676 m), weight 77 1 kg (170 lb), SpO2 98 %  ,Body mass index is 27 44 kg/m²      General appearance: alert, appears stated age, cooperative and no distress  Head: Normocephalic, without obvious abnormality, atraumatic  Eyes: EOMI, PERRL, conjugate gaze  Neck:  Aspen collar in place, supple, symmetrical, trachea midline and some mild tenderness to palpation  Lungs: non labored breathing  Heart: regular heart rate  Neurologic:   Mental status: Alert, oriented x3, thought content appropriate, speech is clear, following commands  Cranial nerves: grossly intact (Cranial nerves II-XII)  Sensory: normal to LT in all extremities x4 with reported numbness and tingling in bilateral lateral forearms into 4th and 5th fingers, JPS and DST intact  Motor: moving all extremities without focal weakness strength 5/5 throughout  Reflexes: 3+ and symmetric in BUEs and +2 and symmetric in BLEs, trace R sided Hoffmans noted, no clonus appreciated  Coordination: finger to nose normal bilaterally, no drift bilaterally      Lab Results:  Results from last 7 days   Lab Units 08/04/20  1557 08/04/20  1046   WBC Thousand/uL  --  6 12   HEMOGLOBIN g/dL  --  14 3   HEMATOCRIT %  --  42 4   PLATELETS Thousands/uL 267 275   NEUTROS PCT %  --  63   MONOS PCT %  --  14*     Results from last 7 days   Lab Units 08/04/20  1046   POTASSIUM mmol/L 4 0   CHLORIDE mmol/L 111*   CO2 mmol/L 25   BUN mg/dL 21   CREATININE mg/dL 0 88   CALCIUM mg/dL 8 9                 No results found for: TROPONINT  ABG:No results found for: PHART, PDI2XWJ, PO2ART, PME0ZBV, W4HPCTYX, BEART, SOURCE    Imaging Studies: I have personally reviewed pertinent reports  and I have personally reviewed pertinent films in PACS    Xr Shoulder 2+ Vw Right    Result Date: 8/4/2020  Impression: No acute osseous abnormality  Workstation performed: [id number]     Ct Head Without Contrast    Result Date: 8/4/2020  Impression: No acute intracranial abnormality  Workstation performed: [id number]     Cta Neck With And Without Contrast    Result Date: 8/4/2020  Impression: Normal CT angiography  Workstation performed: [id number]     Ct Cervical Spine Without Contrast    Result Date: 8/4/2020  Impression: No cervical spine fracture or traumatic malalignment  Workstation performed: [id number]     Mri Cervical Spine Wo Contrast    Result Date: 8/4/2020  Impression: Protrusion-type disc herniations are noted particularly at C5-6 and C6-7 flattening the right side of the cord most significantly at C5-6  This probable impingement on the exiting right C6 and exiting right C7 nerve roots    No definite cord signal abnormality  Mild flattening of the ventral cord at C4-5 secondary to a small central and right paramedian protrusion type disc herniation  There is mild diffuse developmental spinal canal narrowing  Consultation with spinal surgery service recommended  The study was marked in [facility] for immediate notification  Workstation performed: [id number]       EKG, Pathology, and Other Studies: I have personally reviewed pertinent reports        VTE Pharmacologic Prophylaxis: Enoxaparin (Lovenox)    VTE Mechanical Prophylaxis: sequential compression device

## 2020-08-05 NOTE — ORTHOTIC NOTE
Orthotic Note            Date: 8/5/2020      Patient Name: Jeremie Tirado        Time: 11:35am    Reason for Consult:  Patient Active Problem List   Diagnosis    Neck pain    Paresthesia and pain of both upper extremities    Fall    Concussion without loss of consciousness    Acute pain of right shoulder    Alcohol abuse    Burn    Furuncle of axilla   Aspen Classic Collar/Cande Shower Collar    I was present to follow up with patient and deliver/educate patient on [employer]  Donning/doffing reviewed an additional padding changed education for Classic Collar  Patient understands and has no further questions  My contact information, classic collar pads, and Cande Shower collar at bedside  Recommendations:  Please call Mobility Coordinator at ext  [phone] in regards to bracing instruction and/or adjustment  Teo Shoemaker Mobility Coordinator LCFo, LCOF, ASOP R  O T, O B T

## 2020-08-05 NOTE — PHYSICAL THERAPY NOTE
Physical Therapy Evaluation    Patient's Name: Jerri Vazquez    Admitting Diagnosis  Neck pain [M54 2]  Paresthesia and pain of both upper extremities [R20 2, M79 601, M79 602]  Unspecified multiple injuries, initial encounter [T07  XXXA]    Problem List  Patient Active Problem List   Diagnosis    Protrusion of cervical intervertebral disc    Paresthesia and pain of both upper extremities    Fall    Concussion without loss of consciousness    Acute pain of right shoulder    Alcohol abuse    Burn    Furuncle of axilla       Past Medical History  Past Medical History:   Diagnosis Date    Asthma     Chronic pain     Hypertension     Thyroglossal duct cyst        Past Surgical History  Past Surgical History:   Procedure Laterality Date    THYROGLOSSAL DUCT EXCISION      THYROID SURGERY      THYROID SURGERY          08/05/20 1506   Note Type   Note type Eval only   Pain Assessment   Pain Assessment Tool 0-10   Pain Score 6   Pain Location/Orientation Orientation: Mid;Location: Neck  (posterior)   Hospital Pain Intervention(s) Repositioned; Ambulation/increased activity   Home Living   Type of Home Apartment  (3rd floor)   Home Layout Stairs to enter with rails  (5 [address]ACIL[address], 2 flights to apartment)   Bathroom Shower/Tub Tub/shower unit   Prior Function   Level of [place] Independent with ADLs and functional mobility   Lives With Alone   Falls in the last 6 months 1 to 4   Vocational Full time employment   Comments Fall down wet concrete stairs, manager of apartment building/gas station      Restrictions/Precautions   Weight Bearing Precautions Per Order No   Braces or Orthoses C/S Collar   Other Precautions Spinal precautions;Pain   Cognition   Overall Cognitive Status WFL   Arousal/Participation Alert   Attention Within functional limits   Orientation Level Oriented X4   Memory Within functional limits   Following Commands Follows all commands and directions without difficulty   RLE Assessment   RLE Assessment WNL   LLE Assessment   LLE Assessment WNL   Light Touch   RLE Light Touch Grossly intact   LLE Light Touch Grossly intact   Bed Mobility   Supine to Sit 7  Independent   Transfers   Sit to Stand 7  Independent   Stand to Sit 7  Independent   Additional Comments no AD   Ambulation/Elevation   Gait pattern Excessively slow   Gait Assistance 5  Supervision   Additional items Assist x 1   Assistive Device None   Distance 100 ft, initial supervision secondary to dizziness, decreased pace, improved with distance  Following 100' independent    Stair Management Assistance 5  Supervision   Additional items Assist x 1   Stair Management Technique One rail R;Reciprocal   Number of Stairs 7  (VC's for sequencing, awareness, positioning)   Balance   Static Sitting Normal   Dynamic Sitting Normal   Static Standing Fair +   Dynamic Standing Fair +   Ambulatory Fair +   Activity Tolerance   Activity Tolerance Patient tolerated treatment well   Medical Staff Made Aware Cleared by neurosurgery   Nurse Made Aware RN updated  Pt with OT at end of session   Assessment   Assessment Pt is a 37 y o  male seen for PT evaluation s/p admit to [facility] on 8/4/2020  Pt was admitted with a primary dx of: fall resulting in paresthesia of B/L UE's  PT now consulted for assessment of mobility and d/c needs  Pt with Up in chair orders  Pts current comorbidities and personal factors effecting treatment include: Asthma, HTN, Concussion, Alcohol abuse, lives in 2rd floor apartment, employed full-time  Pts current clinical presentation is Unstable/ Unpredictable (high complexity) due to Ongoing medical management for primary dx, Fall risk, Increased assistance needed from caregiver at current time, dizziness with mobility, pain with mobility  Prior to admission, pt was independent, resides alone   Upon evaluation, pt currently is independent with bed mobility; independent with transfers and requires supervision for ambulation 100 ft without AD  After first 100' dizziness resolved, pt able to climb 7 steps with 1 HR  Pt educated on C/S precautions, safety with stair climbing given limited visual feedback, pt verbalized understand and able to demonstrate appropriately  Pt with no concerns regarding potential discharge home today  No further acute PT needs identified  At conclusion of PT session pt left with OT at conclusion of session with phone and call bell within reach  Pt denies any further questions at this time  Recommend home with family care upon hospital D/C     Goals   Patient Goals "to go home today"   Plan   PT Frequency One time visit   Recommendation   PT Discharge Recommendation Return to previous environment with social support  (home with family assistance)   PT - OK to Discharge Yes   Barthel Index   Feeding 10   Bathing 5   Grooming Score 5   Dressing Score 10   Bladder Score 10   Bowels Score 10   Toilet Use Score 10   Transfers (Bed/Chair) Score 15   Mobility (Level Surface) Score 10   Stairs Score 5   Barthel Index Score 90       Sotero Burleson, PT, DPT

## 2020-08-05 NOTE — NURSING NOTE
Patient called and said he wishes to "speak to someone about the plan" because he "feels like he's just floating here " He was told he "just needed a CT scan", which I believe the patient is referring to the MRI C-spine he had, and he would be discharged  Nothing in the trauma H&P or neurosurgery consult note seems to reflect this  Katia Alfaro with trauma made aware via [employer], will be up to see the patient when he can  Will communicate this with patient

## 2020-08-05 NOTE — PROGRESS NOTES
Upon assessing patient's pain he stated "I am just having anxiety and that's why I took a swig of alcohol earlier"  When further questioned about when he had done this he said "5 minutes before you walked in earlier"  The patient was referring to when I had originally came into the room to do his shift assessment, upon when I had found a flask filled with clear liquor in which I dumped down the sink

## 2020-08-05 NOTE — PLAN OF CARE
Problem: OCCUPATIONAL THERAPY ADULT  Goal: Performs self-care activities at highest level of function for planned discharge setting  See evaluation for individualized goals  Description: Treatment Interventions: Continued evaluation, Energy conservation, Equipment evaluation/education, Patient/family training  Equipment Recommended: (no DME needs)       See flowsheet documentation for full assessment, interventions and recommendations  Note: Limitation: Decreased high-level ADLs  Prognosis: Good  Assessment: Pt is a 37 y o  male who was admitted to [facility] on 8/4/2020 with fall down steps landing on back in the rain  +head and neck strike,  Paresthesia and pain of both upper extremities, +C/S collar to be worn , imaging/work up negative for acute changes, alcohol abuse  Pt's problem list also includes PMH of   At baseline pt was completing I with ADL's/IADL's, no AD with functional ambulation, +drives, works full time  Pt lives alone on 3rd floor apartment 2 full flights of stairs/no elevator access  Currently pt requires mod I for overall ADLS and mod I without AD for functional mobility/transfers  Pt currently presents with impairments in the following categories -steps to enter environment and difficulty performing IADLS    These impairments, as well as pt's fatigue, pain and decreased caregiver support  limit pt's ability to safely engage in all baseline areas of occupation, includinglaundry , driving, house maintenance, meal prep, cleaning and work/volunteer work  From [facility] standpoint, recommend home with family support upon D/C  OT will continue to follow to address the below stated goals  OT Discharge Recommendation: Return to previous environment with social support  OT - OK to Discharge:  Yes

## 2020-08-05 NOTE — DISCHARGE INSTRUCTIONS
Neurosurgery discharge instructions:      VISTA collar at all times except for showering change to abilio (peach) collar   No bending, twisting or heavy lifting  No pushing or pulling over 10lbs  No strenuous activities  NO DRIVING  **Please notify MD immediately if you have increased neck or arm pain  New numbness and/or weakness in your arm  Difficulty swallowing or breathing especially while lying down  Numbness or weakness in arms or legs  Increased difficulty walking **    Seek medical attn if you develop worsening headaches, dizziness, visual changes, persistent nausea/vomiting, numbness/weakness/tingling of the extremities  Limit reading, texting, computer use and television to 15 minute intervals as tolerated  No working or driving until cleared  No strenuous physical activity until cleared by trauma  No contact sports for 6 weeks  Avoid repeat head trauma for 6 weeks

## 2020-08-05 NOTE — ASSESSMENT & PLAN NOTE
· Patient admits to 4-5 drinks daily  · Monitor for signs of ETOH withdrawal  · [facility] protocol ordered   · Continue thiamine and folic acid per primary team

## 2020-08-06 NOTE — UTILIZATION REVIEW
Notification of Discharge  This is a Notification of Discharge from our facility [facility]  Please be advised that this patient has been discharge from our facility  Below you will find the admission and discharge date and time including the patients disposition  PRESENTATION DATE: 8/4/2020  9:33 AM  OBS ADMISSION DATE:   IP ADMISSION DATE: N/A N/A   DISCHARGE DATE: 8/5/2020  4:43 PM  DISPOSITION: Home/Self Care Home/Self Care   Admission Orders listed below:  Admission Orders (From admission, onward)     Ordered        08/04/20 1417  Place in Observation  Once                   Please contact the UR Department if additional information is required to close this patient's authorization/case  [address] [place] Utilization Review Department  Main: [phone] x carefully listen to the prompts  All voicemails are confidential   [email or web address]  Send all requests for admission clinical reviews, approved or denied determinations and any other requests to dedicated fax number below belonging to the campus where the patient is receiving treatment   List of dedicated fax numbers:  [address] DENIALS (Administrative/Medical Necessity) [phone]   [address] (Maternity/NICU/Pediatrics) [phone]   Jeremiah Paiz [phone]   Nida Cortes [phone]   [facility] [phone]   [address] [phone]   [facility]  [phone]   [address] [facility]  [address] [facility] [address] [facility] [phone]

## 2020-08-11 ENCOUNTER — TELEPHONE (OUTPATIENT)
Dept: NEUROSURGERY | Facility: CLINIC | Age: 35
End: 2020-08-11

## 2020-08-11 NOTE — TELEPHONE ENCOUNTER
08/11/2020-CALLED PT, CONFIRMED 08/21/2020 APT WITH PT     08/05/2020-(STUART)WILL SIGN OFF, PATIENT WILL FOLLOW UP IN 2 WEEKS FOR POSSIBLE SURGERY DISCUSSION

## 2020-08-19 ENCOUNTER — TELEPHONE (OUTPATIENT)
Dept: NEUROSURGERY | Facility: CLINIC | Age: 35
End: 2020-08-19

## 2020-08-19 NOTE — TELEPHONE ENCOUNTER
Pt called reporting he has an appt tomorrow and Friday  Clarified trauma is tomorrow here Fri  He request 1 or 2 more pain pills until appt  Suggested he contact trauma since they prescribed and we do not manage unless post-op    He was agreeable

## 2020-08-20 ENCOUNTER — OFFICE VISIT (OUTPATIENT)
Dept: SURGERY | Facility: CLINIC | Age: 35
End: 2020-08-20
Payer: MEDICARE

## 2020-08-20 ENCOUNTER — TELEPHONE (OUTPATIENT)
Dept: NEUROSURGERY | Facility: CLINIC | Age: 35
End: 2020-08-20

## 2020-08-20 VITALS — HEIGHT: 66 IN | BODY MASS INDEX: 27.48 KG/M2 | TEMPERATURE: 97.9 F | WEIGHT: 171 LBS | HEART RATE: 66 BPM

## 2020-08-20 DIAGNOSIS — M54.2 CERVICAL SPINE PAIN: Primary | ICD-10-CM

## 2020-08-20 DIAGNOSIS — S06.0X0D CONCUSSION WITHOUT LOSS OF CONSCIOUSNESS, SUBSEQUENT ENCOUNTER: ICD-10-CM

## 2020-08-20 PROCEDURE — 99214 OFFICE O/P EST MOD 30 MIN: CPT | Performed by: SURGERY

## 2020-08-20 RX ORDER — OXYCODONE HYDROCHLORIDE 5 MG/1
5 TABLET ORAL EVERY 4 HOURS PRN
Qty: 5 TABLET | Refills: 0 | Status: SHIPPED | OUTPATIENT
Start: 2020-08-20 | End: 2021-02-06 | Stop reason: HOSPADM

## 2020-08-20 NOTE — ASSESSMENT & PLAN NOTE
- patient has symptoms related to neck pain  - he is having referred pain from spasm that is radiating from his neck to his head causing tension headaches  - he is asymptomatic otherwise from a concussion standpoint  - would recommend no further PT or OT  - will discharge from the trauma team  - however concerns secondary to patient coming into outpatient office with no cervical collar on  - he was supposed to be wearing his cervical collar at all times per Neurosurgery  - he does have an outpatient appointment on 08/21/2020  - he continues to complain of pain in his neck as radiating down both his arms in which an MRI was completed in his hospitalization  - he states that his pain is no worse than when he left the hospital however is not getting better  - he reports no new symptoms otherwise  - he is requesting oxycodone; I informed him that he will be given 5 tablets of the 5 mg however no further scripts will be provided by this office  - I informed him that we will be ordering an acute pain consultation for spine and back  - neurosurgery was made aware

## 2020-08-20 NOTE — PROGRESS NOTES
Office Visit - trauma  Macy Moya MRN: [id number]  Encounter: [id number]    Assessment and Plan    Problem List Items Addressed This Visit        Nervous and Auditory    Concussion without loss of consciousness     - patient has symptoms related to neck pain  - he is having referred pain from spasm that is radiating from his neck to his head causing tension headaches  - he is asymptomatic otherwise from a concussion standpoint  - would recommend no further PT or OT  - will discharge from the trauma team  - however concerns secondary to patient coming into outpatient office with no cervical collar on  - he was supposed to be wearing his cervical collar at all times per Neurosurgery  - he does have an outpatient appointment on 08/21/2020  - he continues to complain of pain in his neck as radiating down both his arms in which an MRI was completed in his hospitalization  - he states that his pain is no worse than when he left the hospital however is not getting better  - he reports no new symptoms otherwise  - he is requesting oxycodone; I informed him that he will be given 5 tablets of the 5 mg however no further scripts will be provided by this office  - I informed him that we will be ordering an acute pain consultation for spine and back  - neurosurgery was made aware           Other Visit Diagnoses     Cervical spine pain    -  Primary    Relevant Medications    oxyCODONE (ROXICODONE) 5 mg immediate release tablet    Other Relevant Orders    Ambulatory referral to Pain Management        Disposition:  DC from Trauma service  Outpatient follow-up neurosurgery    Follow-up the Spine and Pain service team     Chief Complaint:  Macy Moya is a 37 y o  male who presents for Fall (f/u fall)    Subjective      Past Medical History  Past Medical History:   Diagnosis Date    Asthma     Chronic pain     Hypertension     Thyroglossal duct cyst        Past Surgical History  Past Surgical History:   Procedure Laterality Date    THYROGLOSSAL DUCT EXCISION      THYROID SURGERY      THYROID SURGERY         Family History  Family History   Problem Relation Age of Onset    Hypertension Mother     No Known Problems Father        Social History  Social History     Socioeconomic History    Marital status: Single     Spouse name: None    Number of children: None    Years of education: None    Highest education level: None   Occupational History    None   Social Needs    Financial resource strain: None    Food insecurity     Worry: None     Inability: None    Transportation needs     Medical: None     Non-medical: None   Tobacco Use    Smoking status: Current Every Day Smoker     Packs/day: 0 50     Types: Cigarettes    Smokeless tobacco: Never Used   Substance and Sexual Activity    Alcohol use:  Yes     Alcohol/week: 3 0 - 4 0 standard drinks     Types: 3 - 4 Shots of liquor per week     Frequency: 4 or more times a week     Comment: Typically takes 3-4 shots of liquor daily after work   [facility] Drug use: Yes     Types: Marijuana     Comment: "Occasionally"    Sexual activity: None   Lifestyle    Physical activity     Days per week: None     Minutes per session: None    Stress: None   Relationships    Social connections     Talks on phone: None     Gets together: None     Attends [employer]oration[employer] service: None     Active member of club or organization: None     Attends meetings of clubs or organizations: None     Relationship status: None    Intimate partner violence     Fear of current or ex partner: None     Emotionally abused: None     Physically abused: None     Forced sexual activity: None   Other Topics Concern    None   Social History Narrative    None        Medications  Current Outpatient Medications on File Prior to Visit   Medication Sig Dispense Refill    acetaminophen (TYLENOL) 500 mg tablet Take 2 tablets (1,000 mg total) by mouth every 8 (eight) hours 30 tablet 0    albuterol (PROVENTIL HFA,VENTOLIN HFA) 90 mcg/act inhaler Inhale 2 puffs every 4 (four) hours as needed for wheezing 1 Inhaler 0    gabapentin (NEURONTIN) 100 mg capsule Take 1 capsule (100 mg total) by mouth 3 (three) times a day 60 capsule 0    ibuprofen (MOTRIN) 800 mg tablet Take 1 tablet (800 mg total) by mouth 3 (three) times a day 21 tablet 0    methocarbamol (ROBAXIN) 500 mg tablet Take 1 tablet (500 mg total) by mouth every 6 (six) hours 60 tablet 0    senna-docusate sodium (SENOKOT S) 8 6-50 mg per tablet Take 2 tablets by mouth daily  0    [DISCONTINUED] oxyCODONE (ROXICODONE) 5 mg immediate release tablet 5-10 mg p o  Every 4 hours as needed for moderate to severe pain 30 tablet 0    triamcinolone (KENALOG) 0 1 % cream Apply topically 2 (two) times a day (Patient not taking: Reported on 8/4/2020) 30 g 0     No current facility-administered medications on file prior to visit  Allergies  No Known Allergies    Review of Systems   Constitutional: Negative for activity change, appetite change and fever  HENT: Negative for ear discharge, ear pain, rhinorrhea, sore throat and trouble swallowing  Eyes: Negative for photophobia, pain and redness  Respiratory: Negative for apnea, cough, chest tightness, shortness of breath and stridor  Cardiovascular: Negative for chest pain and palpitations  Gastrointestinal: Negative for abdominal distention, abdominal pain, nausea and vomiting  Endocrine: Negative for cold intolerance and heat intolerance  Genitourinary: Negative  Musculoskeletal: Positive for neck pain and neck stiffness  Negative for arthralgias and back pain  Skin: Negative  Neurological: Positive for numbness  Negative for dizziness, weakness and light-headedness  Hematological: Negative  Objective  Vitals:    08/20/20 1400   Pulse: 66   Temp: 97 9 °F (36 6 °C)       Physical Exam  Constitutional:       General: He is not in acute distress  Appearance: Normal appearance     HENT:      Head: Comments: Currently not wearing a cervical collar  Nose: Nose normal       Mouth/Throat:      Mouth: Mucous membranes are moist       Pharynx: Oropharynx is clear  Eyes:      Extraocular Movements: Extraocular movements intact  Pupils: Pupils are equal, round, and reactive to light  Neck:      Musculoskeletal: Neck rigidity and muscular tenderness present  Comments: Currently not wearing cervical collar; currently complaining of paraspinal neck tenderness; paresthesias ranging down bilateral arms that is similar to previous exam    Cardiovascular:      Rate and Rhythm: Normal rate and regular rhythm  Pulses: Normal pulses  Heart sounds: Normal heart sounds  Pulmonary:      Effort: Pulmonary effort is normal       Breath sounds: Normal breath sounds  Abdominal:      General: Bowel sounds are normal  There is no distension  Palpations: Abdomen is soft  Tenderness: There is no abdominal tenderness  Musculoskeletal: Normal range of motion  General: No swelling or tenderness  Comments: Range of motion intact in upper extremity; slightly decreased sensation on the ulnar distribution of bilateral upper extremities   Skin:     General: Skin is warm and dry  Neurological:      General: No focal deficit present  Mental Status: He is alert and oriented to person, place, and time

## 2020-08-21 ENCOUNTER — OFFICE VISIT (OUTPATIENT)
Dept: NEUROSURGERY | Facility: CLINIC | Age: 35
End: 2020-08-21
Payer: MEDICARE

## 2020-08-21 ENCOUNTER — TRANSCRIBE ORDERS (OUTPATIENT)
Dept: RADIOLOGY | Facility: HOSPITAL | Age: 35
End: 2020-08-21

## 2020-08-21 VITALS
TEMPERATURE: 98.7 F | HEART RATE: 86 BPM | DIASTOLIC BLOOD PRESSURE: 100 MMHG | RESPIRATION RATE: 16 BRPM | SYSTOLIC BLOOD PRESSURE: 142 MMHG | WEIGHT: 170 LBS | BODY MASS INDEX: 27.32 KG/M2 | HEIGHT: 66 IN

## 2020-08-21 DIAGNOSIS — M50.20 CERVICAL DISC HERNIATION: Primary | ICD-10-CM

## 2020-08-21 PROCEDURE — 99214 OFFICE O/P EST MOD 30 MIN: CPT | Performed by: NURSE PRACTITIONER

## 2020-08-21 NOTE — ASSESSMENT & PLAN NOTE
Presents for 2-week post-hospital follow up for C5-C6 HNP resulting in bilateral lateral forearm and 4th-5th finger numbness and tingling after a fall down steps 8/3/2020  · Continues to endorse significant neck pain radiating down bilateral arms with C8 distribution numbness  · See by trauma team OP yesterday, referred to pain management for acute on chronic neck and back pain  Imaging:  · MRI cervical spine 8/4/2020: Protrusion-type disc herniations are noted particularly at C5-6 and C6-7 flattening the right side of the cord most significantly at C5-6  This probable impingement on the exiting right C6 and exiting right C7 nerve roots  No definite cord signal Abnormality  Mild flattening of the ventral cord at C4-5 secondary to a small central and right paramedian protrusion type disc herniation  There is mild diffuse developmental spinal canal narrowing  · CT cervical w/o, 8/4/2020: No fracture or malalignment  · CTA neck w/wo, 8/4/2020: negative CTA    Plan:  · Sent patient for cervical flexion/extension x-rays  Patient unable to obtain them in [place] 2/2 insurance  Will have to obtain in [place] and then follow up here again once completed  · Discussed again that patient may be a candidate for surgical intervention but that the primary purpose of this would be to reduce radicular symptoms and that he should still follow-up with pain management for pain control

## 2020-08-21 NOTE — PROGRESS NOTES
Neurosurgery Office Note  Prosper James 38 y o  male MRN: [id number]      Assessment/Plan     Protrusion of cervical intervertebral disc  Presents for 2-week post-hospital follow up for C5-C6 HNP resulting in bilateral lateral forearm and 4th-5th finger numbness and tingling after a fall down steps 8/3/2020  · Continues to endorse significant neck pain radiating down bilateral arms with C8 distribution numbness  · See by trauma team OP yesterday, referred to pain management for acute on chronic neck and back pain  Imaging:  · MRI cervical spine 8/4/2020: Protrusion-type disc herniations are noted particularly at C5-6 and C6-7 flattening the right side of the cord most significantly at C5-6  This probable impingement on the exiting right C6 and exiting right C7 nerve roots  No definite cord signal Abnormality  Mild flattening of the ventral cord at C4-5 secondary to a small central and right paramedian protrusion type disc herniation  There is mild diffuse developmental spinal canal narrowing  · CT cervical w/o, 8/4/2020: No fracture or malalignment  · CTA neck w/wo, 8/4/2020: negative CTA    Plan:  · Sent patient for cervical flexion/extension x-rays  Patient unable to obtain them in [place] 2/2 insurance  Will have to obtain in [place] and then follow up here again once completed  · Discussed again that patient may be a candidate for surgical intervention but that the primary purpose of this would be to reduce radicular symptoms and that he should still follow-up with pain management for pain control  Paresthesia and pain of both upper extremities  See above  Diagnoses and all orders for this visit:    Cervical disc herniation  -     XR spine cervical complete 6+ vw flex/ext/obl;  Future            CHIEF COMPLAINT    Chief Complaint   Patient presents with    Follow-up    Neck Pain       HISTORY    History of Present Illness     38y o  year old male with a history of asthma who initially presented to the hospital on 08/03/2020 after a fall down his porch steps in the rain  He landed on his back and his head and neck and has been complaining of occipital pain, pressure and midline neck and predominantly right shoulder pain since then  He also endorses some numbness to his bilateral 4th and 5th fingers  For this reason he was placed in an [place] collar  He had an MRI obtained at the time that she indicated a herniated disc at the C5-6 level  He was discharged from the hospital a few days later  He continues to endorse significant pain particularly at his right trapezius area with significant muscle spasm and pressure  States he has a lot of pain with any range of motion of his arms  Especially shoulder abduction  He has been wearing his [place] collar  He also continues to endorse the bilateral 4th and 5th finger numbness that has not improved  He is taking gabapentin, Robaxin, and oxycodone 3 times a day without any relief of his symptoms  He states his hand writing has become worse, he is right handed  He also endorses dropping objects out of both of his hands  He denies any radicular pain that shoots down his arms  HPI    See Discussion    REVIEW OF SYSTEMS    Review of Systems   Constitutional: Positive for appetite change (decreased)  HENT: Negative  Eyes: Positive for visual disturbance (Positional changes)  Respiratory: Positive for shortness of breath (at times)  H/o Asthma   Cardiovascular: Negative  Gastrointestinal: Positive for nausea (due to dizzines/Headaches)  Endocrine: Negative  Genitourinary: Positive for urgency  Negative for frequency  Musculoskeletal: Positive for myalgias (along shoulder and neck), neck pain (radiates to b/l arms  Constant pressure on his neck that goes up into his head) and neck stiffness  Patient takes Robaxin 500mg 2x day, Oxycodone 5mg 3 x a day which provide minimal relief   Skin: Negative  Allergic/Immunologic: Negative  Neurological: Positive for dizziness, tremors (arms get shaky at times, fingers twitch), speech difficulty (slurred speech at times), weakness (b/l arms ) and numbness  Headaches: due to pain  Hematological: Negative  Psychiatric/Behavioral: Positive for confusion, decreased concentration and sleep disturbance (due to pain)  All other systems reviewed and are negative  Meds/Allergies     Current Outpatient Medications   Medication Sig Dispense Refill    acetaminophen (TYLENOL) 500 mg tablet Take 2 tablets (1,000 mg total) by mouth every 8 (eight) hours 30 tablet 0    albuterol (PROVENTIL HFA,VENTOLIN HFA) 90 mcg/act inhaler Inhale 2 puffs every 4 (four) hours as needed for wheezing 1 Inhaler 0    gabapentin (NEURONTIN) 100 mg capsule Take 1 capsule (100 mg total) by mouth 3 (three) times a day 60 capsule 0    methocarbamol (ROBAXIN) 500 mg tablet Take 1 tablet (500 mg total) by mouth every 6 (six) hours 60 tablet 0    oxyCODONE (ROXICODONE) 5 mg immediate release tablet Take 1 tablet (5 mg total) by mouth every 4 (four) hours as needed for moderate pain for up to 20 dosesMax Daily Amount: 30 mg 5 tablet 0    triamcinolone (KENALOG) 0 1 % cream Apply topically 2 (two) times a day 30 g 0    ibuprofen (MOTRIN) 800 mg tablet Take 1 tablet (800 mg total) by mouth 3 (three) times a day (Patient not taking: Reported on 8/21/2020) 21 tablet 0    senna-docusate sodium (SENOKOT S) 8 6-50 mg per tablet Take 2 tablets by mouth daily (Patient not taking: Reported on 8/21/2020)  0     No current facility-administered medications for this visit          No Known Allergies    PAST HISTORY    Past Medical History:   Diagnosis Date    Asthma     Chronic pain     Hypertension     Thyroglossal duct cyst        Past Surgical History:   Procedure Laterality Date    THYROGLOSSAL DUCT EXCISION      THYROID SURGERY      THYROID SURGERY         Social History     Tobacco Use    Smoking status: Current Every Day Smoker     Packs/day: 1 00     Types: Cigarettes    Smokeless tobacco: Never Used   Substance Use Topics    Alcohol use: Yes     Alcohol/week: 3 0 - 4 0 standard drinks     Types: 3 - 4 Shots of liquor per week     Frequency: 4 or more times a week     Comment: Typically takes 3-4 shots of liquor daily after work   Edna Mariscal Drug use: Yes     Types: Marijuana     Comment: "Occasionally"       Family History   Problem Relation Age of Onset    Hypertension Mother     No Known Problems Father          Above history personally reviewed  EXAM    Vitals:Blood pressure 142/100, pulse 86, temperature 98 7 °F (37 1 °C), temperature source Tympanic, resp  rate 16, height 5' 6" (1 676 m), weight 77 1 kg (170 lb)  ,Body mass index is 27 44 kg/m²  Physical Exam  Constitutional:       General: He is not in acute distress  Appearance: He is well-developed  He is not diaphoretic  Eyes:      General:         Right eye: No discharge  Left eye: No discharge  Conjunctiva/sclera: Conjunctivae normal       Pupils: Pupils are equal, round, and reactive to light  Neck:      Musculoskeletal: Normal range of motion and neck supple  Muscular tenderness present  Comments: [place] collar in place  Midline tenderness throughout cervical spine  Right trapezius tenderness  Cardiovascular:      Rate and Rhythm: Normal rate and regular rhythm  Pulmonary:      Effort: Pulmonary effort is normal  No respiratory distress  Breath sounds: Normal breath sounds  Abdominal:      General: Bowel sounds are normal  There is no distension  Palpations: Abdomen is soft  Tenderness: There is no abdominal tenderness  Musculoskeletal: Normal range of motion  Skin:     General: Skin is warm and dry  Neurological:      Mental Status: He is alert and oriented to person, place, and time  Cranial Nerves: No cranial nerve deficit  Sensory: No sensory deficit  Motor: No weakness        Coordination: Coordination normal       Gait: Gait normal       Deep Tendon Reflexes: Reflexes normal    Psychiatric:         Behavior: Behavior normal          Thought Content: Thought content normal          Judgment: Judgment normal          Neurologic Exam     Mental Status   Oriented to person, place, and time  Cranial Nerves     CN III, IV, VI   Pupils are equal, round, and reactive to light  MEDICAL DECISION MAKING    Imaging Studies:     Xr Shoulder 2+ Vw Right    Result Date: 8/4/2020  Narrative: RIGHT SHOULDER INDICATION:   trauma; pain  COMPARISON:  None VIEWS:  XR SHOULDER 2+ VW RIGHT FINDINGS: There is no acute fracture or dislocation  No significant degenerative changes  No lytic or blastic osseous lesion  Soft tissues are unremarkable  Impression: No acute osseous abnormality  Workstation performed: [id number]     Ct Head Without Contrast    Result Date: 8/4/2020  Narrative: CT BRAIN - WITHOUT CONTRAST INDICATION:   fall with head strike ?loc  COMPARISON:  None  TECHNIQUE:  CT examination of the brain was performed  In addition to axial images, coronal 2D reformatted images were created and submitted for interpretation  Radiation dose length product (DLP) for this visit:  874 7 mGy-cm   This examination, like all CT scans performed in the [facility], was performed utilizing techniques to minimize radiation dose exposure, including the use of iterative reconstruction and automated exposure control  IMAGE QUALITY:  Diagnostic  FINDINGS: PARENCHYMA:  No intracranial mass, mass effect or midline shift  No CT signs of acute infarction  No acute parenchymal hemorrhage  VENTRICLES AND EXTRA-AXIAL SPACES:  Normal for the patient's age  VISUALIZED ORBITS AND PARANASAL SINUSES:  Unremarkable  CALVARIUM AND EXTRACRANIAL SOFT TISSUES:  Normal      Impression: No acute intracranial abnormality   Workstation performed: [id number]     Cta Neck With And Without Contrast    Result Date: 8/4/2020  Narrative: CTA NECK INDICATION: Trauma - r/o cervical artery dissection COMPARISON: None TECHNIQUE:  0 625 mm images from the aortic arch through the [place] after administration of IV contrast   This examination, like all CT scans performed in the [facility], was performed utilizing techniques to minimize radiation  dose exposure, including the use of iterative reconstruction and automated exposure control  3-D reconstructions and multiplanar MIP images were obtained  3D rendering was performed on an independent workstation  Rad dose 422 39 mGy-cm IV Contrast:  85 mL of iohexol (OMNIPAQUE)  IMAGE QUALITY:   Diagnostic  FINDINGS: CERVICAL VASCULATURE AORTIC ARCH AND GREAT VESSELS:  Incidentally noted  abberrant right subclavian artery  Otherwise normal aortic arch RIGHT VERTEBRAL ARTERY CERVICAL SEGMENT:  Normal origin  The vessel is normal in caliber throughout the neck  LEFT VERTEBRAL ARTERY CERVICAL SEGMENT:  Normal origin  The vessel is normal in caliber throughout the neck  RIGHT EXTRACRANIAL CAROTID SEGMENT:  Normal caliber common carotid artery  Normal bifurcation and cervical internal carotid artery  No stenosis or dissection  LEFT EXTRACRANIAL CAROTID SEGMENT:  Normal caliber common carotid artery  Normal bifurcation and cervical internal carotid artery  No stenosis or dissection  NASCET criteria was used to determine the degree of internal carotid artery diameter stenosis  INTRACRANIAL VASCULATURE ANTERIOR CIRCULATION: The visualized intracranial internal carotid arteries are unremarkable  Normal visualized anterior and middle cerebral artery branches  POSTERIOR CIRCULATION: The intracranial portions of the vertebral arteries are unremarkable with normal posterior inferior cerebellar artery origins  Normal basilar artery and posterior cerebral arteries  Posterior communicating arteries are unremarkable   DURAL SINUSES:  The visualized dural venous sinuses are unremarkable  Please note this is not a complete CT angiogram of the brain  BONY STRUCTURES:  No acute osseous abnormality  SOFT TISSUES OF THE NECK:   Unremarkable  THORACIC INLET:  Unremarkable  VISUALIZED BRAIN PARENCHYMA:  No acute intracranial pathology  NASCET criteria was used to determine the degree of internal carotid artery diameter stenosis  Impression: Normal CT angiography  Workstation performed: [id number]     Ct Cervical Spine Without Contrast    Result Date: 8/4/2020  Narrative: CT CERVICAL SPINE - WITHOUT CONTRAST INDICATION:   fall  COMPARISON:  None  TECHNIQUE:  CT examination of the cervical spine was performed without intravenous contrast   Contiguous axial images were obtained  Sagittal and coronal reconstructions were performed  Radiation dose length product (DLP) for this visit:  356 38 mGy-cm   This examination, like all CT scans performed in the [facility], was performed utilizing techniques to minimize radiation dose exposure, including the use of iterative  reconstruction and automated exposure control  IMAGE QUALITY:  Diagnostic  FINDINGS: ALIGNMENT:  Normal alignment of the cervical spine  No subluxation  VERTEBRAL BODIES:  No fracture  DEGENERATIVE CHANGES:  No significant cervical degenerative changes are noted  PREVERTEBRAL AND PARASPINAL SOFT TISSUES:  Unremarkable  THORACIC INLET:  Normal      Impression: No cervical spine fracture or traumatic malalignment  Workstation performed: [id number]     Mri Cervical Spine Wo Contrast    Result Date: 8/4/2020  Narrative: MRI CERVICAL SPINE WITHOUT CONTRAST INDICATION: eval cervical spine for possible traumatic injury ; Blunt trauma to neck with pain and paraesthesias   COMPARISON:  Prior CT earlier the same day  TECHNIQUE:  Sagittal T1, sagittal T2, sagittal inversion recovery, axial T2, axial  2D merge IMAGE QUALITY:  Diagnostic FINDINGS: ALIGNMENT:  Normal alignment of the cervical spine    No compression fracture  No subluxation  No scoliosis  MARROW SIGNAL:  Normal marrow signal is identified within the visualized bony structures  No discrete marrow lesion  CERVICAL AND VISUALIZED THORACIC CORD:  Normal signal within the visualized cord  PREVERTEBRAL AND PARASPINAL SOFT TISSUES:  Normal  VISUALIZED POSTERIOR FOSSA:  The visualized posterior fossa demonstrates no abnormal signal  CERVICAL DISC SPACES:  There is diffuse developmental spinal canal narrowing from C3 through C7  C2-C3:  Normal  C3-C4:  Normal  C4-C5:  Central and right paramedian protrusion type disc herniation superimposed on developmental spinal canal narrowing noted  There is flattening the ventral cord without cord signal abnormality  C5-C6:  Developmental spinal canal narrowing identified with a superimposed moderate to large right paramedian protrusion type disc herniation which flattens the right side of the cord without definite cord signal abnormality  Probable impingement on exiting right C6 nerve roots  C6-C7:  Moderate size right paramedian protrusion type disc herniation superimposed on developmental canal narrowing  There is slight flattening the ventral cord  Correlate for right C7 radiculopathy  C7-T1:  Normal  UPPER THORACIC DISC SPACES:  Normal      Impression: Protrusion-type disc herniations are noted particularly at C5-6 and C6-7 flattening the right side of the cord most significantly at C5-6  This probable impingement on the exiting right C6 and exiting right C7 nerve roots  No definite cord signal abnormality  Mild flattening of the ventral cord at C4-5 secondary to a small central and right paramedian protrusion type disc herniation  There is mild diffuse developmental spinal canal narrowing  Consultation with spinal surgery service recommended  The study was marked in [facility] for immediate notification  Workstation performed: [id number]       I have personally reviewed pertinent reports     and I have personally reviewed pertinent films in PACS

## 2020-10-10 ENCOUNTER — HOSPITAL ENCOUNTER (EMERGENCY)
Facility: HOSPITAL | Age: 35
Discharge: HOME | End: 2020-10-10
Attending: EMERGENCY MEDICINE | Admitting: EMERGENCY MEDICINE
Payer: MEDICARE

## 2020-10-10 VITALS
TEMPERATURE: 98.2 F | SYSTOLIC BLOOD PRESSURE: 147 MMHG | WEIGHT: 165 LBS | RESPIRATION RATE: 17 BRPM | BODY MASS INDEX: 26.52 KG/M2 | DIASTOLIC BLOOD PRESSURE: 87 MMHG | OXYGEN SATURATION: 98 % | HEART RATE: 65 BPM | HEIGHT: 66 IN

## 2020-10-10 DIAGNOSIS — L03.211 FACIAL CELLULITIS: ICD-10-CM

## 2020-10-10 DIAGNOSIS — L02.01 FACIAL ABSCESS: Primary | ICD-10-CM

## 2020-10-10 PROCEDURE — 99282 EMERGENCY DEPT VISIT SF MDM: CPT

## 2020-10-10 PROCEDURE — 10060 I&D ABSCESS SIMPLE/SINGLE: CPT | Performed by: EMERGENCY MEDICINE

## 2020-10-10 PROCEDURE — 99284 EMERGENCY DEPT VISIT MOD MDM: CPT | Performed by: EMERGENCY MEDICINE

## 2020-10-10 RX ORDER — ACETAMINOPHEN 325 MG/1
650 TABLET ORAL ONCE
Status: COMPLETED | OUTPATIENT
Start: 2020-10-10 | End: 2020-10-10

## 2020-10-10 RX ORDER — CEPHALEXIN 500 MG/1
500 CAPSULE ORAL EVERY 6 HOURS SCHEDULED
Qty: 20 CAPSULE | Refills: 0 | Status: SHIPPED | OUTPATIENT
Start: 2020-10-10 | End: 2020-10-15

## 2020-10-10 RX ORDER — LIDOCAINE HYDROCHLORIDE 10 MG/ML
10 INJECTION, SOLUTION EPIDURAL; INFILTRATION; INTRACAUDAL; PERINEURAL ONCE
Status: COMPLETED | OUTPATIENT
Start: 2020-10-10 | End: 2020-10-10

## 2020-10-10 RX ORDER — IBUPROFEN 600 MG/1
600 TABLET ORAL ONCE
Status: COMPLETED | OUTPATIENT
Start: 2020-10-10 | End: 2020-10-10

## 2020-10-10 RX ADMIN — ACETAMINOPHEN 650 MG: 325 TABLET, FILM COATED ORAL at 15:39

## 2020-10-10 RX ADMIN — LIDOCAINE HYDROCHLORIDE 10 ML: 10 INJECTION, SOLUTION EPIDURAL; INFILTRATION; INTRACAUDAL at 15:43

## 2020-10-10 RX ADMIN — IBUPROFEN 600 MG: 600 TABLET, FILM COATED ORAL at 15:39

## 2020-11-29 ENCOUNTER — HOSPITAL ENCOUNTER (EMERGENCY)
Facility: HOSPITAL | Age: 35
Discharge: HOME | End: 2020-11-29
Attending: EMERGENCY MEDICINE
Payer: MEDICARE

## 2020-11-29 VITALS
SYSTOLIC BLOOD PRESSURE: 138 MMHG | BODY MASS INDEX: 25.5 KG/M2 | RESPIRATION RATE: 16 BRPM | TEMPERATURE: 98.1 F | WEIGHT: 158 LBS | DIASTOLIC BLOOD PRESSURE: 69 MMHG | OXYGEN SATURATION: 97 % | HEART RATE: 76 BPM

## 2020-11-29 DIAGNOSIS — L02.91 ABSCESS: Primary | ICD-10-CM

## 2020-11-29 PROCEDURE — 99282 EMERGENCY DEPT VISIT SF MDM: CPT | Performed by: EMERGENCY MEDICINE

## 2020-11-29 PROCEDURE — 10061 I&D ABSCESS COMP/MULTIPLE: CPT | Performed by: EMERGENCY MEDICINE

## 2020-11-29 PROCEDURE — 99282 EMERGENCY DEPT VISIT SF MDM: CPT

## 2020-11-29 RX ORDER — CHLORHEXIDINE GLUCONATE 4 G/100ML
1 SOLUTION TOPICAL DAILY PRN
Qty: 120 ML | Refills: 0 | Status: SHIPPED | OUTPATIENT
Start: 2020-11-29 | End: 2020-11-29 | Stop reason: SDUPTHER

## 2020-11-29 RX ORDER — CHLORHEXIDINE GLUCONATE 4 G/100ML
1 SOLUTION TOPICAL
Qty: 118 ML | Refills: 0 | Status: SHIPPED | OUTPATIENT
Start: 2020-11-29 | End: 2021-02-06 | Stop reason: HOSPADM

## 2020-11-29 RX ORDER — CHLORHEXIDINE GLUCONATE 4 G/100ML
1 SOLUTION TOPICAL DAILY PRN
Qty: 120 ML | Refills: 0 | Status: SHIPPED | OUTPATIENT
Start: 2020-11-29 | End: 2021-02-06 | Stop reason: HOSPADM

## 2020-11-29 RX ORDER — LIDOCAINE HYDROCHLORIDE AND EPINEPHRINE 10; 10 MG/ML; UG/ML
10 INJECTION, SOLUTION INFILTRATION; PERINEURAL ONCE
Status: COMPLETED | OUTPATIENT
Start: 2020-11-29 | End: 2020-11-29

## 2020-11-29 RX ADMIN — LIDOCAINE HYDROCHLORIDE AND EPINEPHRINE 10 ML: 10; 10 INJECTION, SOLUTION INFILTRATION; PERINEURAL at 21:26

## 2020-12-08 ENCOUNTER — HOSPITAL ENCOUNTER (EMERGENCY)
Facility: HOSPITAL | Age: 35
Discharge: LEFT BEFORE DISCHARGE | End: 2020-12-08
Attending: EMERGENCY MEDICINE | Admitting: EMERGENCY MEDICINE
Payer: MEDICARE

## 2020-12-08 VITALS
OXYGEN SATURATION: 97 % | RESPIRATION RATE: 18 BRPM | WEIGHT: 155.7 LBS | TEMPERATURE: 98.2 F | HEART RATE: 80 BPM | DIASTOLIC BLOOD PRESSURE: 93 MMHG | SYSTOLIC BLOOD PRESSURE: 165 MMHG | BODY MASS INDEX: 25.02 KG/M2 | HEIGHT: 66 IN

## 2020-12-08 DIAGNOSIS — M54.2 NECK PAIN: Primary | ICD-10-CM

## 2020-12-08 PROCEDURE — 99284 EMERGENCY DEPT VISIT MOD MDM: CPT | Performed by: EMERGENCY MEDICINE

## 2020-12-08 PROCEDURE — 99283 EMERGENCY DEPT VISIT LOW MDM: CPT

## 2020-12-08 RX ORDER — ACETAMINOPHEN 325 MG/1
975 TABLET ORAL ONCE
Status: COMPLETED | OUTPATIENT
Start: 2020-12-08 | End: 2020-12-08

## 2020-12-08 RX ORDER — GABAPENTIN 100 MG/1
100 CAPSULE ORAL ONCE
Status: COMPLETED | OUTPATIENT
Start: 2020-12-08 | End: 2020-12-08

## 2020-12-08 RX ORDER — METHOCARBAMOL 500 MG/1
500 TABLET, FILM COATED ORAL ONCE
Status: COMPLETED | OUTPATIENT
Start: 2020-12-08 | End: 2020-12-08

## 2020-12-08 RX ADMIN — ACETAMINOPHEN 975 MG: 325 TABLET, FILM COATED ORAL at 17:13

## 2020-12-08 RX ADMIN — GABAPENTIN 100 MG: 100 CAPSULE ORAL at 17:13

## 2020-12-08 RX ADMIN — METHOCARBAMOL TABLETS 500 MG: 500 TABLET, COATED ORAL at 17:13

## 2021-02-03 ENCOUNTER — APPOINTMENT (INPATIENT)
Dept: RADIOLOGY | Facility: HOSPITAL | Age: 36
DRG: 347 | End: 2021-02-03
Payer: MEDICARE

## 2021-02-03 ENCOUNTER — HOSPITAL ENCOUNTER (INPATIENT)
Facility: HOSPITAL | Age: 36
LOS: 3 days | Discharge: HOME | DRG: 347 | End: 2021-02-06
Attending: EMERGENCY MEDICINE | Admitting: INTERNAL MEDICINE
Payer: MEDICARE

## 2021-02-03 DIAGNOSIS — M50.20 CERVICAL DISC HERNIATION: ICD-10-CM

## 2021-02-03 DIAGNOSIS — M50.20 PROTRUSION OF CERVICAL INTERVERTEBRAL DISC: ICD-10-CM

## 2021-02-03 DIAGNOSIS — M54.2 NECK PAIN: Primary | ICD-10-CM

## 2021-02-03 DIAGNOSIS — R53.1 WEAKNESS: ICD-10-CM

## 2021-02-03 DIAGNOSIS — R20.2 PARESTHESIA AND PAIN OF BOTH UPPER EXTREMITIES: ICD-10-CM

## 2021-02-03 DIAGNOSIS — R42 DISEQUILIBRIUM: ICD-10-CM

## 2021-02-03 DIAGNOSIS — K59.03 DRUG-INDUCED CONSTIPATION: ICD-10-CM

## 2021-02-03 DIAGNOSIS — M79.601 PARESTHESIA AND PAIN OF BOTH UPPER EXTREMITIES: ICD-10-CM

## 2021-02-03 DIAGNOSIS — M79.602 PARESTHESIA AND PAIN OF BOTH UPPER EXTREMITIES: ICD-10-CM

## 2021-02-03 PROBLEM — R03.0 ELEVATED BLOOD PRESSURE READING: Status: ACTIVE | Noted: 2021-02-03

## 2021-02-03 PROBLEM — M51.26 LUMBAR DISC HERNIATION: Status: ACTIVE | Noted: 2021-02-03

## 2021-02-03 LAB
ALBUMIN SERPL BCP-MCNC: 4.1 G/DL (ref 3.5–5)
ALP SERPL-CCNC: 73 U/L (ref 46–116)
ALT SERPL W P-5'-P-CCNC: 34 U/L (ref 12–78)
ANION GAP SERPL CALCULATED.3IONS-SCNC: 5 MMOL/L (ref 4–13)
AST SERPL W P-5'-P-CCNC: 18 U/L (ref 5–45)
BASOPHILS # BLD AUTO: 0.09 THOUSANDS/ΜL (ref 0–0.1)
BASOPHILS NFR BLD AUTO: 2 % (ref 0–1)
BILIRUB SERPL-MCNC: 0.54 MG/DL (ref 0.2–1)
BUN SERPL-MCNC: 23 MG/DL (ref 5–25)
CALCIUM SERPL-MCNC: 8.9 MG/DL (ref 8.3–10.1)
CHLORIDE SERPL-SCNC: 113 MMOL/L (ref 100–108)
CO2 SERPL-SCNC: 21 MMOL/L (ref 21–32)
CREAT SERPL-MCNC: 0.92 MG/DL (ref 0.6–1.3)
EOSINOPHIL # BLD AUTO: 0.21 THOUSAND/ΜL (ref 0–0.61)
EOSINOPHIL NFR BLD AUTO: 3 % (ref 0–6)
ERYTHROCYTE [DISTWIDTH] IN BLOOD BY AUTOMATED COUNT: 13.2 % (ref 11.6–15.1)
GFR SERPL CREATININE-BSD FRML MDRD: 106 ML/MIN/1.73SQ M
GLUCOSE SERPL-MCNC: 97 MG/DL (ref 65–140)
HCT VFR BLD AUTO: 41.5 % (ref 36.5–49.3)
HGB BLD-MCNC: 14.7 G/DL (ref 12–17)
IMM GRANULOCYTES # BLD AUTO: 0.02 THOUSAND/UL (ref 0–0.2)
IMM GRANULOCYTES NFR BLD AUTO: 0 % (ref 0–2)
LYMPHOCYTES # BLD AUTO: 1.78 THOUSANDS/ΜL (ref 0.6–4.47)
LYMPHOCYTES NFR BLD AUTO: 29 % (ref 14–44)
MCH RBC QN AUTO: 33.4 PG (ref 26.8–34.3)
MCHC RBC AUTO-ENTMCNC: 35.4 G/DL (ref 31.4–37.4)
MCV RBC AUTO: 94 FL (ref 82–98)
MONOCYTES # BLD AUTO: 0.85 THOUSAND/ΜL (ref 0.17–1.22)
MONOCYTES NFR BLD AUTO: 14 % (ref 4–12)
NEUTROPHILS # BLD AUTO: 3.16 THOUSANDS/ΜL (ref 1.85–7.62)
NEUTS SEG NFR BLD AUTO: 52 % (ref 43–75)
NRBC BLD AUTO-RTO: 0 /100 WBCS
PLATELET # BLD AUTO: 276 THOUSANDS/UL (ref 149–390)
PMV BLD AUTO: 10.1 FL (ref 8.9–12.7)
POTASSIUM SERPL-SCNC: 3.7 MMOL/L (ref 3.5–5.3)
PROT SERPL-MCNC: 7.6 G/DL (ref 6.4–8.2)
RBC # BLD AUTO: 4.4 MILLION/UL (ref 3.88–5.62)
SODIUM SERPL-SCNC: 139 MMOL/L (ref 136–145)
WBC # BLD AUTO: 6.11 THOUSAND/UL (ref 4.31–10.16)

## 2021-02-03 PROCEDURE — 96376 TX/PRO/DX INJ SAME DRUG ADON: CPT

## 2021-02-03 PROCEDURE — G1004 CDSM NDSC: HCPCS

## 2021-02-03 PROCEDURE — 72141 MRI NECK SPINE W/O DYE: CPT

## 2021-02-03 PROCEDURE — 80053 COMPREHEN METABOLIC PANEL: CPT | Performed by: EMERGENCY MEDICINE

## 2021-02-03 PROCEDURE — 99285 EMERGENCY DEPT VISIT HI MDM: CPT

## 2021-02-03 PROCEDURE — 96375 TX/PRO/DX INJ NEW DRUG ADDON: CPT

## 2021-02-03 PROCEDURE — 82746 ASSAY OF FOLIC ACID SERUM: CPT | Performed by: INTERNAL MEDICINE

## 2021-02-03 PROCEDURE — 85025 COMPLETE CBC W/AUTO DIFF WBC: CPT | Performed by: EMERGENCY MEDICINE

## 2021-02-03 PROCEDURE — 70551 MRI BRAIN STEM W/O DYE: CPT

## 2021-02-03 PROCEDURE — 99285 EMERGENCY DEPT VISIT HI MDM: CPT | Performed by: EMERGENCY MEDICINE

## 2021-02-03 PROCEDURE — 36415 COLL VENOUS BLD VENIPUNCTURE: CPT | Performed by: EMERGENCY MEDICINE

## 2021-02-03 PROCEDURE — 82607 VITAMIN B-12: CPT | Performed by: INTERNAL MEDICINE

## 2021-02-03 PROCEDURE — 72148 MRI LUMBAR SPINE W/O DYE: CPT

## 2021-02-03 PROCEDURE — 96374 THER/PROPH/DIAG INJ IV PUSH: CPT

## 2021-02-03 RX ORDER — HYDROMORPHONE HCL/PF 1 MG/ML
0.5 SYRINGE (ML) INJECTION ONCE
Status: COMPLETED | OUTPATIENT
Start: 2021-02-03 | End: 2021-02-03

## 2021-02-03 RX ORDER — METHOCARBAMOL 500 MG/1
500 TABLET, FILM COATED ORAL ONCE
Status: COMPLETED | OUTPATIENT
Start: 2021-02-03 | End: 2021-02-03

## 2021-02-03 RX ORDER — FENTANYL CITRATE 50 UG/ML
50 INJECTION, SOLUTION INTRAMUSCULAR; INTRAVENOUS ONCE
Status: COMPLETED | OUTPATIENT
Start: 2021-02-03 | End: 2021-02-03

## 2021-02-03 RX ORDER — GABAPENTIN 100 MG/1
100 CAPSULE ORAL ONCE
Status: COMPLETED | OUTPATIENT
Start: 2021-02-03 | End: 2021-02-03

## 2021-02-03 RX ORDER — HYDROMORPHONE HCL/PF 1 MG/ML
1 SYRINGE (ML) INJECTION ONCE
Status: COMPLETED | OUTPATIENT
Start: 2021-02-03 | End: 2021-02-03

## 2021-02-03 RX ORDER — DEXAMETHASONE SODIUM PHOSPHATE 4 MG/ML
4 INJECTION, SOLUTION INTRA-ARTICULAR; INTRALESIONAL; INTRAMUSCULAR; INTRAVENOUS; SOFT TISSUE ONCE
Status: COMPLETED | OUTPATIENT
Start: 2021-02-03 | End: 2021-02-03

## 2021-02-03 RX ADMIN — METHOCARBAMOL 500 MG: 500 TABLET ORAL at 19:07

## 2021-02-03 RX ADMIN — HYDROMORPHONE HYDROCHLORIDE 0.5 MG: 1 INJECTION, SOLUTION INTRAMUSCULAR; INTRAVENOUS; SUBCUTANEOUS at 19:07

## 2021-02-03 RX ADMIN — DEXAMETHASONE SODIUM PHOSPHATE 4 MG: 4 INJECTION INTRA-ARTICULAR; INTRALESIONAL; INTRAMUSCULAR; INTRAVENOUS; SOFT TISSUE at 19:07

## 2021-02-03 RX ADMIN — FENTANYL CITRATE 50 MCG: 50 INJECTION INTRAMUSCULAR; INTRAVENOUS at 21:49

## 2021-02-03 RX ADMIN — HYDROMORPHONE HYDROCHLORIDE 1 MG: 1 INJECTION, SOLUTION INTRAMUSCULAR; INTRAVENOUS; SUBCUTANEOUS at 20:32

## 2021-02-03 RX ADMIN — GABAPENTIN 100 MG: 100 CAPSULE ORAL at 19:07

## 2021-02-03 RX ADMIN — HYDROMORPHONE HYDROCHLORIDE 0.5 MG: 1 INJECTION, SOLUTION INTRAMUSCULAR; INTRAVENOUS; SUBCUTANEOUS at 19:58

## 2021-02-04 PROBLEM — R42 DIZZINESS: Status: ACTIVE | Noted: 2021-02-04

## 2021-02-04 PROBLEM — J45.909 ASTHMA: Status: ACTIVE | Noted: 2021-02-04

## 2021-02-04 PROBLEM — R29.898 WEAKNESS OF BOTH LOWER EXTREMITIES: Status: ACTIVE | Noted: 2021-02-03

## 2021-02-04 PROBLEM — Z72.0 TOBACCO ABUSE: Status: ACTIVE | Noted: 2021-02-04

## 2021-02-04 LAB
FOLATE SERPL-MCNC: 6.6 NG/ML (ref 3.1–17.5)
VIT B12 SERPL-MCNC: 255 PG/ML (ref 100–900)

## 2021-02-04 PROCEDURE — 99254 IP/OBS CNSLTJ NEW/EST MOD 60: CPT | Performed by: PSYCHIATRY & NEUROLOGY

## 2021-02-04 PROCEDURE — 36415 COLL VENOUS BLD VENIPUNCTURE: CPT | Performed by: STUDENT IN AN ORGANIZED HEALTH CARE EDUCATION/TRAINING PROGRAM

## 2021-02-04 PROCEDURE — 99254 IP/OBS CNSLTJ NEW/EST MOD 60: CPT | Performed by: NURSE PRACTITIONER

## 2021-02-04 RX ORDER — ONDANSETRON 2 MG/ML
4 INJECTION INTRAMUSCULAR; INTRAVENOUS EVERY 6 HOURS PRN
Status: DISCONTINUED | OUTPATIENT
Start: 2021-02-04 | End: 2021-02-06 | Stop reason: HOSPADM

## 2021-02-04 RX ORDER — GABAPENTIN 100 MG/1
100 CAPSULE ORAL 3 TIMES DAILY
Status: DISCONTINUED | OUTPATIENT
Start: 2021-02-04 | End: 2021-02-06 | Stop reason: HOSPADM

## 2021-02-04 RX ORDER — FENTANYL CITRATE 50 UG/ML
50 INJECTION, SOLUTION INTRAMUSCULAR; INTRAVENOUS ONCE
Status: COMPLETED | OUTPATIENT
Start: 2021-02-04 | End: 2021-02-04

## 2021-02-04 RX ORDER — AMOXICILLIN 250 MG
2 CAPSULE ORAL DAILY
Status: DISCONTINUED | OUTPATIENT
Start: 2021-02-04 | End: 2021-02-06 | Stop reason: HOSPADM

## 2021-02-04 RX ORDER — OXYCODONE HYDROCHLORIDE 10 MG/1
10 TABLET ORAL EVERY 4 HOURS PRN
Status: DISCONTINUED | OUTPATIENT
Start: 2021-02-04 | End: 2021-02-04

## 2021-02-04 RX ORDER — OXYCODONE HYDROCHLORIDE 5 MG/1
5 TABLET ORAL EVERY 4 HOURS PRN
Status: DISCONTINUED | OUTPATIENT
Start: 2021-02-04 | End: 2021-02-04

## 2021-02-04 RX ORDER — NICOTINE 21 MG/24HR
1 PATCH, TRANSDERMAL 24 HOURS TRANSDERMAL DAILY
Status: DISCONTINUED | OUTPATIENT
Start: 2021-02-04 | End: 2021-02-06 | Stop reason: HOSPADM

## 2021-02-04 RX ORDER — DULOXETIN HYDROCHLORIDE 30 MG/1
30 CAPSULE, DELAYED RELEASE ORAL 2 TIMES DAILY
Status: DISCONTINUED | OUTPATIENT
Start: 2021-02-04 | End: 2021-02-06 | Stop reason: HOSPADM

## 2021-02-04 RX ORDER — OXYCODONE HYDROCHLORIDE 5 MG/1
5 TABLET ORAL EVERY 4 HOURS PRN
Status: DISCONTINUED | OUTPATIENT
Start: 2021-02-04 | End: 2021-02-06 | Stop reason: HOSPADM

## 2021-02-04 RX ORDER — POLYETHYLENE GLYCOL 3350 17 G/17G
17 POWDER, FOR SOLUTION ORAL DAILY PRN
Status: DISCONTINUED | OUTPATIENT
Start: 2021-02-04 | End: 2021-02-06 | Stop reason: HOSPADM

## 2021-02-04 RX ORDER — MORPHINE SULFATE 4 MG/ML
4 INJECTION, SOLUTION INTRAMUSCULAR; INTRAVENOUS EVERY 4 HOURS PRN
Status: DISCONTINUED | OUTPATIENT
Start: 2021-02-04 | End: 2021-02-06 | Stop reason: HOSPADM

## 2021-02-04 RX ORDER — LIDOCAINE 50 MG/G
1 PATCH TOPICAL DAILY
Status: COMPLETED | OUTPATIENT
Start: 2021-02-04 | End: 2021-02-04

## 2021-02-04 RX ORDER — OXYCODONE HYDROCHLORIDE 10 MG/1
10 TABLET ORAL EVERY 4 HOURS PRN
Status: DISCONTINUED | OUTPATIENT
Start: 2021-02-04 | End: 2021-02-06 | Stop reason: HOSPADM

## 2021-02-04 RX ORDER — ACETAMINOPHEN 325 MG/1
975 TABLET ORAL EVERY 8 HOURS SCHEDULED
Status: DISCONTINUED | OUTPATIENT
Start: 2021-02-04 | End: 2021-02-06 | Stop reason: HOSPADM

## 2021-02-04 RX ORDER — METHOCARBAMOL 500 MG/1
500 TABLET, FILM COATED ORAL EVERY 6 HOURS SCHEDULED
Status: DISCONTINUED | OUTPATIENT
Start: 2021-02-04 | End: 2021-02-04

## 2021-02-04 RX ORDER — ALBUTEROL SULFATE 90 UG/1
2 AEROSOL, METERED RESPIRATORY (INHALATION) EVERY 4 HOURS PRN
Status: DISCONTINUED | OUTPATIENT
Start: 2021-02-04 | End: 2021-02-06 | Stop reason: HOSPADM

## 2021-02-04 RX ORDER — METHOCARBAMOL 500 MG/1
750 TABLET, FILM COATED ORAL EVERY 6 HOURS SCHEDULED
Status: DISCONTINUED | OUTPATIENT
Start: 2021-02-04 | End: 2021-02-06 | Stop reason: HOSPADM

## 2021-02-04 RX ORDER — HYDRALAZINE HYDROCHLORIDE 20 MG/ML
10 INJECTION INTRAMUSCULAR; INTRAVENOUS EVERY 6 HOURS PRN
Status: DISCONTINUED | OUTPATIENT
Start: 2021-02-04 | End: 2021-02-06 | Stop reason: HOSPADM

## 2021-02-04 RX ADMIN — MORPHINE SULFATE 4 MG: 4 INJECTION INTRAVENOUS at 18:09

## 2021-02-04 RX ADMIN — GABAPENTIN 100 MG: 100 CAPSULE ORAL at 22:05

## 2021-02-04 RX ADMIN — MORPHINE SULFATE 2 MG: 2 INJECTION, SOLUTION INTRAMUSCULAR; INTRAVENOUS at 02:41

## 2021-02-04 RX ADMIN — METHOCARBAMOL 500 MG: 500 TABLET ORAL at 02:43

## 2021-02-04 RX ADMIN — GABAPENTIN 100 MG: 100 CAPSULE ORAL at 09:19

## 2021-02-04 RX ADMIN — ACETAMINOPHEN 975 MG: 325 TABLET, FILM COATED ORAL at 22:05

## 2021-02-04 RX ADMIN — GABAPENTIN 100 MG: 100 CAPSULE ORAL at 17:25

## 2021-02-04 RX ADMIN — DOCUSATE SODIUM AND SENNOSIDES 2 TABLET: 8.6; 5 TABLET ORAL at 09:19

## 2021-02-04 RX ADMIN — DULOXETINE 30 MG: 30 CAPSULE, DELAYED RELEASE ORAL at 17:55

## 2021-02-04 RX ADMIN — Medication 1 PATCH: at 09:19

## 2021-02-04 RX ADMIN — FENTANYL CITRATE 50 MCG: 50 INJECTION INTRAMUSCULAR; INTRAVENOUS at 01:00

## 2021-02-04 RX ADMIN — GABAPENTIN 100 MG: 100 CAPSULE ORAL at 02:43

## 2021-02-04 RX ADMIN — METHOCARBAMOL 500 MG: 500 TABLET ORAL at 05:52

## 2021-02-04 RX ADMIN — METHOCARBAMOL 750 MG: 500 TABLET, FILM COATED ORAL at 17:55

## 2021-02-04 RX ADMIN — ACETAMINOPHEN 975 MG: 325 TABLET, FILM COATED ORAL at 05:52

## 2021-02-04 RX ADMIN — OXYCODONE HYDROCHLORIDE 10 MG: 10 TABLET ORAL at 15:30

## 2021-02-04 RX ADMIN — OXYCODONE HYDROCHLORIDE 10 MG: 10 TABLET ORAL at 20:42

## 2021-02-04 RX ADMIN — OXYCODONE HYDROCHLORIDE 10 MG: 10 TABLET ORAL at 11:35

## 2021-02-04 RX ADMIN — OXYCODONE HYDROCHLORIDE 5 MG: 5 TABLET ORAL at 04:35

## 2021-02-04 RX ADMIN — MORPHINE SULFATE 2 MG: 2 INJECTION, SOLUTION INTRAMUSCULAR; INTRAVENOUS at 08:20

## 2021-02-04 RX ADMIN — ALBUTEROL SULFATE 2 PUFF: 90 AEROSOL, METERED RESPIRATORY (INHALATION) at 17:26

## 2021-02-04 RX ADMIN — OXYCODONE HYDROCHLORIDE 5 MG: 5 TABLET ORAL at 08:20

## 2021-02-04 RX ADMIN — MORPHINE SULFATE 4 MG: 4 INJECTION INTRAVENOUS at 22:10

## 2021-02-04 RX ADMIN — MORPHINE SULFATE 2 MG: 2 INJECTION, SOLUTION INTRAMUSCULAR; INTRAVENOUS at 08:51

## 2021-02-04 RX ADMIN — METHOCARBAMOL 500 MG: 500 TABLET ORAL at 11:35

## 2021-02-04 RX ADMIN — MORPHINE SULFATE 4 MG: 4 INJECTION INTRAVENOUS at 13:52

## 2021-02-04 RX ADMIN — LIDOCAINE 1 PATCH: 50 PATCH TOPICAL at 09:19

## 2021-02-04 NOTE — UTILIZATION REVIEW
Initial Clinical Review    Admission: Date/Time/Statement:   Admission Orders (From admission, onward)     Ordered        02/03/21 2217  Inpatient Admission  Once                   Orders Placed This Encounter   Procedures    Inpatient Admission     Standing Status:   Standing     Number of Occurrences:   1     Order Specific Question:   Level of Care     Answer:   Med Surg [16]     Order Specific Question:   Estimated length of stay     Answer:   More than 2 Midnights     Order Specific Question:   Certification     Answer:   I certify that inpatient services are medically necessary for this patient for a duration of greater than two midnights  See H&P and MD Progress Notes for additional information about the patient's course of treatment  ED Arrival Information     Expected Arrival Acuity Means of Arrival Escorted By Service Admission Type    - 2/3/2021 17:07 Urgent [facility] Hospitalist Urgent    Arrival Complaint    neck pain         Chief Complaint   Patient presents with    Neck Pain     chronic neck pain states he needs something for pain he cannot take it anymore  10/10 pain  reports his hands are numb and he is dizzy     Assessment/Plan:   43y Male to ED presents with Acute on chronic neck pain with numbness to medial aspect of the arm, forearm extending to the 4th and 5th finger of both hands and weak / drooping of objects/ change in handwriting  In addition c/o episodes of dizziness, blurring of vision, seeing spots on the sides, confusion, decreased concentration and trouble sleep due to pain  Per pt since his injury back in August he had been c/o significant neck pain, muscle spasms and pressure  Cervical pain started at high school age when pt used to wrestle  PMH for C5-6 and C6-7 disc herniations, traumatic injury 8/2020, asthma and tobacco abuse  Noted elevated BP in ED   Pt given Robaxin 500 mg x 1, gabapentin 100 mg x 1, Decadron 4 mg x 1, Dilaudid 0 5 mg x 1 and 1 mg x 1 and fentanyl 50 mcg x1    2/4 Neurosurgery cons; Cervical disc herniation, Dizziness and Weakness of BLE  MRI cervical spine wo 2/4/2021: Reidentified protrusion-type disc herniations at C5/C6 and C6-C7 causing mild flattening of the right aspect of the cervical spinal cord with no cord signal abnormality  There is mild diffuse developmental spinal canal narrowing   The degree of degenerative changes have not significant changed when compared to an MRI cervical spine dated August 4, 2020  Continues to endorse significant cervical pain with bilateral arm numbness, along with urinary dribbling and urge incontinence, dizziness and dysarthria  No motor weakness appreciated on exam  Subjective diminished sensation bilaterally at C8 distribution  No myelopathy or radiculopathy  Continue pain control  Recommending Outpt f/u    2/4 Acute Pain Service cons; Schedule Tylenol, Gabapentin and Lidocaine  Suggest increase Robaxin to 750 mg po q6h  Prn Oxycodone and Morphine  2/4 Neurology cons; Started on Cymbalta for neck pain  Pain control  Vit B57 and folic acid level pending  Low suspicion for cauda equina syndrome       ED Triage Vitals   Temperature Pulse Respirations Blood Pressure SpO2   02/03/21 2001 02/03/21 1721 02/03/21 1721 02/03/21 1830 02/03/21 1721   98 1 °F (36 7 °C) 102 20 (!) 183/113 97 %      Temp Source Heart Rate Source Patient Position - Orthostatic VS BP Location FiO2 (%)   02/03/21 2001 02/03/21 1721 02/03/21 2000 02/03/21 2000 --   Oral Monitor Lying Right arm       Pain Score       02/03/21 1907       Worst Possible Pain          Wt Readings from Last 1 Encounters:   02/03/21 70 3 kg (155 lb)     Additional Vital Signs:   02/04/21 1114  --  59  18  157/90  --  97 %  None (Room air)  Sitting   02/04/21 0438  --  65  17  134/75  --  99 %  None (Room air)       02/03/21 2001  98 1 °F (36 7 °C)  --  --  --  --  --  --  --   02/03/21 2000  --  88  17  166/103Abnormal   125  96 %  None (Room air)  Lying Pertinent Labs/Diagnostic Test Results:   2/4  MRI Brain - Unremarkable unenhanced MRI of the brain  MRI Lumbar Spine - No evidence of acute lumbar spine injury  Mild degenerative changes of lower lumbar spine causing no significant canal stenosis or neural foraminal narrowing  MRI Cervical Spine - No evidence of acute cervical spine injury  Reidentified protrusion-type disc herniations at C5/C6 and C6-C7 causing mild flattening of the right aspect of the cervical spinal cord with no cord signal abnormality  There is mild diffuse developmental spinal canal narrowing  The degree of   degenerative changes have not significant changed when compared to an MRI cervical spine dated August 4, 2020        Results from last 7 days   Lab Units 02/03/21 1958   WBC Thousand/uL 6 11   HEMOGLOBIN g/dL 14 7   HEMATOCRIT % 41 5   PLATELETS Thousands/uL 276   NEUTROS ABS Thousands/µL 3 16         Results from last 7 days   Lab Units 02/03/21 1958   SODIUM mmol/L 139   POTASSIUM mmol/L 3 7   CHLORIDE mmol/L 113*   CO2 mmol/L 21   ANION GAP mmol/L 5   BUN mg/dL 23   CREATININE mg/dL 0 92   EGFR ml/min/1 73sq m 106   CALCIUM mg/dL 8 9     Results from last 7 days   Lab Units 02/03/21 1958   AST U/L 18   ALT U/L 34   ALK PHOS U/L 73   TOTAL PROTEIN g/dL 7 6   ALBUMIN g/dL 4 1   TOTAL BILIRUBIN mg/dL 0 54         Results from last 7 days   Lab Units 02/03/21 1958   GLUCOSE RANDOM mg/dL 97       ED Treatment:   Medication Administration from 02/03/2021 1706 to 02/04/2021 1226       Date/Time Order Dose Route Action     02/03/2021 1907 HYDROmorphone (DILAUDID) injection 0 5 mg 0 5 mg Intravenous Given     02/03/2021 1907 methocarbamol (ROBAXIN) tablet 500 mg 500 mg Oral Given     02/03/2021 1907 gabapentin (NEURONTIN) capsule 100 mg 100 mg Oral Given     02/03/2021 1907 dexamethasone (DECADRON) injection 4 mg 4 mg Intravenous Given     02/03/2021 1958 HYDROmorphone (DILAUDID) injection 0 5 mg 0 5 mg Intravenous Given 02/03/2021 2032 HYDROmorphone (DILAUDID) injection 1 mg 1 mg Intravenous Given     02/03/2021 2149 fentanyl citrate (PF) 100 MCG/2ML 50 mcg 50 mcg Intravenous Given     02/04/2021 0100 fentanyl citrate (PF) 100 MCG/2ML 50 mcg 50 mcg Intravenous Given     02/04/2021 0919 gabapentin (NEURONTIN) capsule 100 mg 100 mg Oral Given     02/04/2021 0243 gabapentin (NEURONTIN) capsule 100 mg 100 mg Oral Given     02/04/2021 1135 methocarbamol (ROBAXIN) tablet 500 mg 500 mg Oral Given     02/04/2021 0552 methocarbamol (ROBAXIN) tablet 500 mg 500 mg Oral Given     02/04/2021 0243 methocarbamol (ROBAXIN) tablet 500 mg 500 mg Oral Given     02/04/2021 0919 nicotine (NICODERM CQ) 14 mg/24hr TD 24 hr patch 1 patch 1 patch Transdermal Medication Applied     02/04/2021 0919 senna-docusate sodium (SENOKOT S) 8 6-50 mg per tablet 2 tablet 2 tablet Oral Given     02/04/2021 0552 acetaminophen (TYLENOL) tablet 975 mg 975 mg Oral Given     02/04/2021 0919 lidocaine (LIDODERM) 5 % patch 1 patch 1 patch Topical Medication Applied     02/04/2021 0820 morphine injection 2 mg 2 mg Intravenous Given     02/04/2021 0241 morphine injection 2 mg 2 mg Intravenous Given     02/04/2021 1135 oxyCODONE (ROXICODONE) IR tablet 5 mg   Oral See Alternative     02/04/2021 0820 oxyCODONE (ROXICODONE) IR tablet 5 mg 5 mg Oral Given     02/04/2021 0435 oxyCODONE (ROXICODONE) IR tablet 5 mg 5 mg Oral Given     02/04/2021 1135 oxyCODONE (ROXICODONE) immediate release tablet 10 mg 10 mg Oral Given     02/04/2021 0820 oxyCODONE (ROXICODONE) immediate release tablet 10 mg   Oral See Alternative     02/04/2021 0435 oxyCODONE (ROXICODONE) immediate release tablet 10 mg   Oral See Alternative     02/04/2021 0851 morphine injection 2 mg 2 mg Intravenous Given        Past Medical History:   Diagnosis Date    Asthma     Chronic pain     Hypertension     Thyroglossal duct cyst      Present on Admission:  **None**      Admitting Diagnosis: No admission diagnoses are documented for this encounter  Age/Sex: 38 y o  male     Admission Orders:  Scheduled Medications:  acetaminophen, 975 mg, Oral, Q8H [facility]  gabapentin, 100 mg, Oral, TID  lidocaine, 1 patch, Topical, Daily  methocarbamol, 750 mg, Oral, Q6H [facility]  nicotine, 1 patch, Transdermal, Daily  senna-docusate sodium, 2 tablet, Oral, Daily      Continuous IV Infusions:     PRN Meds:  albuterol, 2 puff, Inhalation, Q4H PRN  morphine injection, 4 mg, Intravenous, Q4H PRN  naloxone, 0 04 mg, Intravenous, Q1MIN PRN  ondansetron, 4 mg, Intravenous, Q6H PRN  oxyCODONE, 5 mg, Oral, Q4H PRN    Or  oxyCODONE, 10 mg, Oral, Q4H PRN  polyethylene glycol, 17 g, Oral, Daily PRN        IP CONSULT TO CASE MANAGEMENT  IP CONSULT TO NEUROSURGERY  IP CONSULT TO ACUTE PAIN SERVICE  IP CONSULT TO NEUROLOGY    Network Utilization Review Department  ATTENTION: Please call with any questions or concerns to [phone] and carefully listen to the prompts so that you are directed to the right person  All voicemails are confidential   Cynthia Hirsch all requests for admission clinical reviews, approved or denied determinations and any other requests to dedicated fax number below belonging to the campus where the patient is receiving treatment   List of dedicated fax numbers for the Facilities:  [address] DENIALS (Administrative/Medical Necessity) [phone]   [address] (Maternity/NICU/Pediatrics) [phone]   [address] Lori Tian [address] (  Luis Whitehead "Lynda" 103) [address] [place] [address] Abraham Arevalo 1481 [phone]   [address] [place] [address] [facility] [phone]

## 2021-02-04 NOTE — UTILIZATION REVIEW
Notification of Inpatient Admission/Inpatient Authorization Request   This is a Notification of Inpatient Admission for 5 Tobias Pérez  Be advised that this patient was admitted to our facility under Inpatient Status  Contact Alonzo Sheppard at [phone] for additional admission information  [facility] Melyssa Limon [phone]  Patient Name:   Jennyfer Landrum   YOB: 1985       [address]   [address]:   Jose Chauhan  Tax ID: [id number]  NPI: [id number] Attending Provider/NPI:  Phone:  Address: Vielka Duong [[id number]]  [phone]  Same as Facility   Place of Service Code: 24 Place of Service Name:  [address] [place] [address]   Start Date: 2/3/21 2217     Discharge Date & Time: No discharge date for patient encounter  Type of Admission: Inpatient Status Discharge Disposition (if discharged): Elopement/ER Elopement   Patient Diagnoses: Neck pain [M54 2]  Disequilibrium [R42]  Weakness [R53 1]  Cervical disc herniation [M50 20]     Orders: Admission Orders (From admission, onward)     Ordered        02/03/21 2217  Inpatient Admission  Once                    Assigned Utilization Review Contact: Alonzo Sheppard   Utilization   Network Utilization Review Department  Phone: [phone]; Fax [phone]  Email: [email or web address]   ATTENTION PAYERS: Please call the assigned Utilization  directly with any questions or concerns ALL voicemails in the department are confidential  Send all requests for admission clinical reviews, approved or denied determinations and any other requests to dedicated fax number belonging to the campus where the patient is receiving treatment

## 2021-02-04 NOTE — ASSESSMENT & PLAN NOTE
Patient complaining of ambulatory dysfunction and urinary incontinence/stress incontinence  Imaging:  · MRI lumbar wo 2/4/2021: No evidence of acute lumbar spine injury  Mild degenerative changes of lower lumbar spine causing no significant canal stenosis or neural foraminal narrowing  Imaging unremarkable for any lumbar pathology that would cause these symptoms

## 2021-02-04 NOTE — CONSULTS
Consult- Eulice Galeazzi 1985, 38 y o  male MRN: [id number]    Unit/Bed#: ED 24 Encounter: [id number]    Primary Care Provider: No primary care provider on file  Date and time admitted to hospital: 2/3/2021  6:03 PM      Inpatient consult to Neurosurgery  Consult performed by: SALAZAR Gamboa  Consult ordered by: Rogelio Garcia MD          Dizziness  Assessment & Plan  Complains of dizziness and dysarthria since injury in August     Imaging:  MRI brain wo 2/4/2021: Unremarkable unenhanced MRI of the brain  Weakness of both lower extremities  Assessment & Plan  Patient complaining of ambulatory dysfunction and urinary incontinence/stress incontinence  Imaging:  · MRI lumbar wo 2/4/2021: No evidence of acute lumbar spine injury  Mild degenerative changes of lower lumbar spine causing no significant canal stenosis or neural foraminal narrowing  Imaging unremarkable for any lumbar pathology that would cause these symptoms  Cervical disc herniation  Assessment & Plan  Presents to the ED for evaluation of severe neck pain ever since injury in August   · Seen outpatient on 8/21/2020 for  C5-C6 HNP resulting in bilateral lateral forearm and 4th-5th finger numbness and tingling after a fall down steps 8/3/2020  · Continues to endorse significant neck pain radiating down bilateral arms with C8 distribution numbness  · Previously had recommended flexion/extension x-rays and further outpatient follow up for consideration of arthroplasty vs fusion but this was not completed  · Continues to endorse significant cervical pain with bilateral arm numbness, along with urinary dribbling and urge incontinence, dizziness and dysarthria  No motor weakness appreciated on exam  Subjective diminished sensation bilaterally at C8 distribution  No myelopathy or radiculopathy       Imaging:  · MRI cervical spine wo 2/4/2021: Reidentified protrusion-type disc herniations at C5/C6 and C6-C7 causing mild flattening of the right aspect of the cervical spinal cord with no cord signal abnormality  There is mild diffuse developmental spinal canal narrowing  The degree of degenerative changes have not significant changed when compared to an MRI cervical spine dated August 4, 2020  Plan:  · Continue pain control per primary team  Recommend referral to pain management  · Will schedule outpatient follow up, recommend obtaining previously ordered flexion/extension x-rays prior to appointment  · PT/OT evaluation as ordered  · DVT ppx: SCDs  Follow up outpatient in 2-4 weeks as scheduled  Neurosurgery will sign off  Please call with any questions or concerns  History of Present Illness     HPI: Linda Ma is a 38 y o  male with PMH including Tobacco use who presents to the emergency department last night complaining of severe subacute neck pain  He states that on 08/03/2020 he slipped on his porch in the rain and fell hitting his neck and the back of his head  Ever since that time he has been experiencing what he describes as severe neck pain radiating up into his ears and at his occiput radiating down into his neck bilaterally  He continues to complain of numbness at his bilateral 4th and 5th digits  He was evaluated by us in August outpatient and an MRI imaging was noted with herniated disc at C5-6  We had discussed possible surgical intervention including a total disc replacement versus fusion and had recommended that he follow-up with flexion-extension x-rays but this was never completed  He states when he presented to the hospital for his x-rays they "could not find him in the system" and so he never followed up  He states since that time his pain has been ongoing and has not abated whatsoever  He has continued to take ibuprofen and muscle relaxers for the pain that have not relieved any of his symptoms    He has not been working since August   He states he continues to experience numbness in his fingers and is often dropping objects  He denies any pain radiating down his arms  He states he presented to the emergency department today because he could no longer stand the pain  He also complains of new symptoms of dizziness, difficulty speaking, and urinary stress incontinence  He has started placing paper towels in his underwear secondary to the incontinence  He also states that he is having difficulty with ambulation  He denies any bowel incontinence  He denies any saddle anesthesia  Review of Systems   Constitutional: Positive for activity change  Negative for appetite change and fatigue  HENT: Negative for ear pain, hearing loss, nosebleeds, postnasal drip, tinnitus, trouble swallowing and voice change  Eyes: Negative for pain and visual disturbance  Respiratory: Negative for chest tightness and shortness of breath  Cardiovascular: Negative for chest pain, palpitations and leg swelling  Gastrointestinal: Negative for abdominal pain, diarrhea, nausea and vomiting  Genitourinary: Positive for enuresis and urgency  Musculoskeletal: Positive for neck pain and neck stiffness  Negative for back pain  Skin: Negative for color change and pallor  Neurological: Positive for dizziness, speech difficulty, weakness and numbness  Negative for tremors, seizures, syncope, facial asymmetry, light-headedness and headaches  Psychiatric/Behavioral: Positive for decreased concentration  Negative for agitation, behavioral problems and confusion         Historical Information   Past Medical History:   Diagnosis Date    Asthma     Chronic pain     Hypertension     Thyroglossal duct cyst      Past Surgical History:   Procedure Laterality Date    THYROGLOSSAL DUCT EXCISION      THYROID SURGERY      THYROID SURGERY       Social History     Substance and Sexual Activity   Alcohol Use Yes    Alcohol/week: 3 0 - 4 0 standard drinks    Types: 3 - 4 Shots of liquor per week    Frequency: 4 or more times a week    Comment: Typically takes 3-4 shots of liquor daily after work     Social History     Substance and Sexual Activity   Drug Use Yes    Types: Marijuana    Comment: "Occasionally"     Social History     Tobacco Use   Smoking Status Current Every Day Smoker    Packs/day: 1 00    Types: Cigarettes   Smokeless Tobacco Never Used     Family History   Problem Relation Age of Onset    Hypertension Mother     No Known Problems Father        Meds/Allergies   all current active meds have been reviewed and current meds:   Current Facility-Administered Medications   Medication Dose Route Frequency    acetaminophen (TYLENOL) tablet 975 mg  975 mg Oral Q8H [address]    albuterol (PROVENTIL HFA,VENTOLIN HFA) inhaler 2 puff  2 puff Inhalation Q4H PRN    gabapentin (NEURONTIN) capsule 100 mg  100 mg Oral TID    lidocaine (LIDODERM) 5 % patch 1 patch  1 patch Topical Daily    methocarbamol (ROBAXIN) tablet 500 mg  500 mg Oral Q6H PETER    morphine (PF) 4 mg/mL injection 4 mg  4 mg Intravenous Q4H PRN    morphine injection 2 mg  2 mg Intravenous Once    naloxone (NARCAN) 0 04 mg/mL syringe 0 04 mg  0 04 mg Intravenous Q1MIN PRN    nicotine (NICODERM CQ) 14 mg/24hr TD 24 hr patch 1 patch  1 patch Transdermal Daily    ondansetron (ZOFRAN) injection 4 mg  4 mg Intravenous Q6H PRN    oxyCODONE (ROXICODONE) IR tablet 5 mg  5 mg Oral Q4H PRN    Or    oxyCODONE (ROXICODONE) immediate release tablet 10 mg  10 mg Oral Q4H PRN    polyethylene glycol (MIRALAX) packet 17 g  17 g Oral Daily PRN    senna-docusate sodium (SENOKOT S) 8 6-50 mg per tablet 2 tablet  2 tablet Oral Daily     No Known Allergies    Objective   I/O     None          Physical Exam  Constitutional:       General: He is not in acute distress  Appearance: He is well-developed  He is not diaphoretic  Eyes:      General:         Right eye: No discharge  Left eye: No discharge        Extraocular Movements: EOM normal       Conjunctiva/sclera: Conjunctivae normal       Pupils: Pupils are equal, round, and reactive to light  Neck:      Musculoskeletal: Normal range of motion and neck supple  Pulmonary:      Effort: Pulmonary effort is normal  No respiratory distress  Abdominal:      General: Bowel sounds are normal  There is no distension  Palpations: Abdomen is soft  Tenderness: There is no abdominal tenderness  Musculoskeletal: Normal range of motion  Skin:     General: Skin is warm and dry  Neurological:      Mental Status: He is alert and oriented to person, place, and time  Cranial Nerves: No cranial nerve deficit  Sensory: No sensory deficit  Motor: No weakness  Coordination: Coordination normal  Finger-Nose-Finger Test normal       Deep Tendon Reflexes: Reflexes normal       Reflex Scores:       Tricep reflexes are 2+ on the right side and 2+ on the left side  Bicep reflexes are 2+ on the right side and 2+ on the left side  Brachioradialis reflexes are 2+ on the right side and 2+ on the left side  Patellar reflexes are 2+ on the right side and 2+ on the left side  Achilles reflexes are 2+ on the right side and 2+ on the left side  Psychiatric:         Speech: Speech normal          Behavior: Behavior normal          Thought Content: Thought content normal          Judgment: Judgment normal        Neurologic Exam     Mental Status   Oriented to person, place, and time  Oriented to person  Oriented to place  Oriented to time  Oriented to year, month and date  Registration: recalls 3 of 3 objects  Attention: normal  Concentration: normal    Speech: speech is normal   Level of consciousness: alert  Knowledge: good and consistent with education  Able to name object  Cranial Nerves   Cranial nerves II through XII intact  CN III, IV, VI   Pupils are equal, round, and reactive to light  Extraocular motions are normal    Right pupil: Size: 3 mm  Shape: regular  Reactivity: brisk  Consensual response: intact  Accommodation: intact  Left pupil: Size: 3 mm  Shape: regular  Reactivity: brisk  Consensual response: intact  Accommodation: intact  Nystagmus: none   Diplopia: none  Conjugate gaze: present    CN V   Right facial sensation deficit: none  Left facial sensation deficit: none    CN VII   Facial expression full, symmetric       CN VIII   Hearing: intact    CN IX, X   Palate: symmetric    CN XI   Right sternocleidomastoid strength: normal  Left sternocleidomastoid strength: normal  Right trapezius strength: normal  Left trapezius strength: normal    CN XII   Tongue: not atrophic  Fasciculations: absent  Tongue deviation: none    Motor Exam   Muscle bulk: normal  Overall muscle tone: normal  Right arm pronator drift: absent  Left arm pronator drift: absent    Strength   Right deltoid: 5/5  Left deltoid: 5/5  Right biceps: 5/5  Left biceps: 5/5  Right triceps: 5/5  Left triceps: 5/5  Right wrist flexion: 5/5  Left wrist flexion: 5/5  Right wrist extension: 5/5  Left wrist extension: 5/5  Right interossei: 5/5  Left interossei: 5/5  Right quadriceps: 5/5  Left quadriceps: 5/5  Right hamstrin/5  Left hamstrin/5  Right anterior tibial: 5/5  Left anterior tibial: 5/5  Right posterior tibial: 5/5  Left posterior tibial: 5/5  Right peroneal: 5/5  Left peroneal: 5/5  Right gastroc: 5/5  Left gastroc: 5/5    Sensory Exam   Light touch normal    Proprioception normal    Sensory deficit distribution on right: C8  Sensory deficit distribution on left: C8    Gait, Coordination, and Reflexes     Coordination   Finger to nose coordination: normal    Tremor   Resting tremor: absent  Intention tremor: absent  Action tremor: absent    Reflexes   Right brachioradialis: 2+  Left brachioradialis: 2+  Right biceps: 2+  Left biceps: 2+  Right triceps: 2+  Left triceps: 2+  Right patellar: 2+  Left patellar: 2+  Right achilles: 2+  Left achilles: 2+  Right : 2+  Left : 2+  Right Alexandre: absent  Left Alexandre: absent  Right ankle clonus: absent  Left ankle clonus: absent      Vitals:Blood pressure 134/75, pulse 65, temperature 98 1 °F (36 7 °C), temperature source Oral, resp  rate 17, weight 70 3 kg (155 lb), SpO2 99 %  ,Body mass index is 25 02 kg/m²  Lab Results:   Results from last 7 days   Lab Units 02/03/21 1958   WBC Thousand/uL 6 11   HEMOGLOBIN g/dL 14 7   HEMATOCRIT % 41 5   PLATELETS Thousands/uL 276   NEUTROS PCT % 52   MONOS PCT % 14*     Results from last 7 days   Lab Units 02/03/21 1958   POTASSIUM mmol/L 3 7   CHLORIDE mmol/L 113*   CO2 mmol/L 21   BUN mg/dL 23   CREATININE mg/dL 0 92   CALCIUM mg/dL 8 9   ALK PHOS U/L 73   ALT U/L 34   AST U/L 18                 No results found for: TROPONINT  ABG:No results found for: PHART, GUD5ASN, PO2ART, NTM3JRC, I8TRIFMW, BEART, SOURCE    Imaging Studies: I have personally reviewed pertinent reports  and I have personally reviewed pertinent films in PACS    Mri Brain Wo Contrast    Result Date: 2/4/2021  Impression: Unremarkable unenhanced MRI of the brain  Workstation performed: [id number]     Mri Cervical Spine Wo Contrast    Result Date: 2/4/2021  Impression: No evidence of acute cervical spine injury  Reidentified protrusion-type disc herniations at C5/C6 and C6-C7 causing mild flattening of the right aspect of the cervical spinal cord with no cord signal abnormality  There is mild diffuse developmental spinal canal narrowing  The degree of degenerative changes have not significant changed when compared to an MRI cervical spine dated August 4, 2020  Workstation performed: [id number]     Mri Lumbar Spine Wo Contrast    Result Date: 2/4/2021  Impression: No evidence of acute lumbar spine injury  Mild degenerative changes of lower lumbar spine causing no significant canal stenosis or neural foraminal narrowing   Workstation performed: [id number]       EKG, Pathology, and Other Studies: I have personally reviewed pertinent reports  and I have personally reviewed pertinent films in PACS    VTE Prophylaxis: Sequential compression device (Venodyne)     Code Status: Level 1 - Full Code  Advance Directive and Living Will:      Power of :    POLST:      Counseling / Coordination of Care  I spent 20 minutes with the patient

## 2021-02-04 NOTE — ASSESSMENT & PLAN NOTE
Patient does report bilateral lower extremity weakness and does admit to bladder incontinence and numbness of buttocks and rectal area, however he does not exhibit any true BLE weakness on exam   Low suspicion for cauda equina syndrome

## 2021-02-04 NOTE — ED PROVIDER NOTES
History  Chief Complaint   Patient presents with    Neck Pain     chronic neck pain states he needs something for pain he cannot take it anymore  10/10 pain  reports his hands are numb and he is dizzy     40-year-old male with multiple cervical disc herniations presents with neck pain, dizziness, weakness, and numbness  Patient has had multiple cervical disc herniations for many years now  He used to follow with a pain specialist when he lived in [place]  Since he moved out here, he has not been seen by a pain management physician and has not been taking any pain medications aside from over-the-counter Advil  Patient had a traumatic injury in August of 2020 and stayed in the hospital here under the Trauma Service  Patient had a new cervical MRI at that time  He was seen by neurosurgery who determined that his new pain was likely exacerbated by his existing disc herniations  It was determined the patient could follow-up in the office  In the office, it was determined that patient was not a candidate for surgery because he was still in the acute phase of healing after his trauma  Patient states that since his trauma he has been having worsening pain, disequilibrium, weakness, and numbness and tingling in his upper extremities  Patient states that his disequilibrium has gotten so bad that he is unable to walk correctly  He says he feels like he is on a rocking ship  Patient says that his pain is so severe he is having a hard time concentrating  He is having a hard time writing due to decreased  and pain in his hands  Prior to Admission Medications   Prescriptions Last Dose Informant Patient Reported?  Taking?   acetaminophen (TYLENOL) 500 mg tablet   No No   Sig: Take 2 tablets (1,000 mg total) by mouth every 8 (eight) hours   albuterol (PROVENTIL HFA,VENTOLIN HFA) 90 mcg/act inhaler   No No   Sig: Inhale 2 puffs every 4 (four) hours as needed for wheezing   chlorhexidine (HIBICLENS) 4 % external liquid   No No   Sig: Apply 1 application topically every 14 (fourteen) days   chlorhexidine (HIBICLENS) 4 % external liquid   No No   Sig: Apply 1 application topically daily as needed for wound care   gabapentin (NEURONTIN) 100 mg capsule   No No   Sig: Take 1 capsule (100 mg total) by mouth 3 (three) times a day   ibuprofen (MOTRIN) 800 mg tablet   No No   Sig: Take 1 tablet (800 mg total) by mouth 3 (three) times a day   methocarbamol (ROBAXIN) 500 mg tablet   No No   Sig: Take 1 tablet (500 mg total) by mouth every 6 (six) hours   oxyCODONE (ROXICODONE) 5 mg immediate release tablet   No No   Sig: Take 1 tablet (5 mg total) by mouth every 4 (four) hours as needed for moderate pain for up to 20 dosesMax Daily Amount: 30 mg   senna-docusate sodium (SENOKOT S) 8 6-50 mg per tablet   No No   Sig: Take 2 tablets by mouth daily   Patient not taking: Reported on 8/21/2020   triamcinolone (KENALOG) 0 1 % cream   No No   Sig: Apply topically 2 (two) times a day      Facility-Administered Medications: None       Past Medical History:   Diagnosis Date    Asthma     Chronic pain     Hypertension     Thyroglossal duct cyst        Past Surgical History:   Procedure Laterality Date    THYROGLOSSAL DUCT EXCISION      THYROID SURGERY      THYROID SURGERY         Family History   Problem Relation Age of Onset    Hypertension Mother     No Known Problems Father      I have reviewed and agree with the history as documented  E-Cigarette/Vaping    E-Cigarette Use Never User      E-Cigarette/Vaping Substances    Nicotine No     THC No     CBD No     Flavoring No     Other No     Unknown No      Social History     Tobacco Use    Smoking status: Current Every Day Smoker     Packs/day: 1 00     Types: Cigarettes    Smokeless tobacco: Never Used   Substance Use Topics    Alcohol use:  Yes     Alcohol/week: 3 0 - 4 0 standard drinks     Types: 3 - 4 Shots of liquor per week     Frequency: 4 or more times a week     Comment: Typically takes 3-4 shots of liquor daily after work   [facility] Drug use: Yes     Types: Marijuana     Comment: "Occasionally"        Review of Systems   Constitutional: Negative for appetite change, chills, fatigue and fever  HENT: Negative  Eyes: Negative  Respiratory: Negative for cough, chest tightness and shortness of breath  Cardiovascular: Negative for chest pain and palpitations  Gastrointestinal: Negative for abdominal pain, diarrhea, nausea and vomiting  Endocrine: Negative  Genitourinary: Negative for difficulty urinating and hematuria  Musculoskeletal: Positive for gait problem, neck pain and neck stiffness  Negative for arthralgias and myalgias  Skin: Negative for pallor and rash  Allergic/Immunologic: Negative  Neurological: Positive for dizziness, weakness and numbness  Negative for light-headedness and headaches  Hematological: Negative  Physical Exam  ED Triage Vitals   Temperature Pulse Respirations Blood Pressure SpO2   02/03/21 2001 02/03/21 1721 02/03/21 1721 02/03/21 1830 02/03/21 1721   98 1 °F (36 7 °C) 102 20 (!) 183/113 97 %      Temp Source Heart Rate Source Patient Position - Orthostatic VS BP Location FiO2 (%)   02/03/21 2001 02/03/21 1721 02/03/21 2000 02/03/21 2000 --   Oral Monitor Lying Right arm       Pain Score       02/03/21 1907       Worst Possible Pain             Orthostatic Vital Signs  Vitals:    02/03/21 1721 02/03/21 1830 02/03/21 2000 02/03/21 2030   BP:  (!) 183/113 (!) 166/103 159/98   Pulse: 102  88 84   Patient Position - Orthostatic VS:   Lying Lying       Physical Exam  Vitals signs and nursing note reviewed  Constitutional:       General: He is in acute distress  Appearance: Normal appearance  Comments: tearful   HENT:      Head: Normocephalic and atraumatic  Right Ear: Tympanic membrane is not perforated, erythematous, retracted or bulging        Left Ear: Tympanic membrane is not perforated, erythematous, retracted or bulging  Eyes:      Conjunctiva/sclera: Conjunctivae normal    Neck:      Musculoskeletal: Decreased range of motion  Neck rigidity, pain with movement and muscular tenderness present  No spinous process tenderness  Cardiovascular:      Rate and Rhythm: Normal rate and regular rhythm  Pulmonary:      Effort: Pulmonary effort is normal    Skin:     General: Skin is warm and dry  Neurological:      General: No focal deficit present  Mental Status: He is alert and oriented to person, place, and time  Cranial Nerves: Cranial nerves are intact  Motor: No atrophy  Coordination: Finger-Nose-Finger Test normal       Gait: Gait abnormal       Comments: Decreased sensation and numbness/tingling in the ulnar distribution  Upper extremity weakness, maybe secondary to pain           ED Medications  Medications   HYDROmorphone (DILAUDID) injection 0 5 mg (0 5 mg Intravenous Given 2/3/21 1907)   methocarbamol (ROBAXIN) tablet 500 mg (500 mg Oral Given 2/3/21 1907)   gabapentin (NEURONTIN) capsule 100 mg (100 mg Oral Given 2/3/21 1907)   dexamethasone (DECADRON) injection 4 mg (4 mg Intravenous Given 2/3/21 1907)   HYDROmorphone (DILAUDID) injection 0 5 mg (0 5 mg Intravenous Given 2/3/21 1958)   HYDROmorphone (DILAUDID) injection 1 mg (1 mg Intravenous Given 2/3/21 2032)   fentanyl citrate (PF) 100 MCG/2ML 50 mcg (50 mcg Intravenous Given 2/3/21 2149)       Diagnostic Studies  Results Reviewed     Procedure Component Value Units Date/Time    Comprehensive metabolic panel [[id number]]  (Abnormal) Collected: 02/03/21 1958    Lab Status: Final result Specimen: Blood from Arm, Left Updated: 02/03/21 2029     Sodium 139 mmol/L      Potassium 3 7 mmol/L      Chloride 113 mmol/L      CO2 21 mmol/L      ANION GAP 5 mmol/L      BUN 23 mg/dL      Creatinine 0 92 mg/dL      Glucose 97 mg/dL      Calcium 8 9 mg/dL      AST 18 U/L      ALT 34 U/L      Alkaline Phosphatase 73 U/L      Total Protein 7 6 g/dL      Albumin 4 1 g/dL      Total Bilirubin 0 54 mg/dL      eGFR 106 ml/min/1 73sq m     Narrative:      [place] guidelines for Chronic Kidney Disease (CKD):     Stage 1 with normal or high GFR (GFR > 90 mL/min/1 73 square meters)    Stage 2 Mild CKD (GFR = 60-89 mL/min/1 73 square meters)    Stage 3A Moderate CKD (GFR = 45-59 mL/min/1 73 square meters)    Stage 3B Moderate CKD (GFR = 30-44 mL/min/1 73 square meters)    Stage 4 Severe CKD (GFR = 15-29 mL/min/1 73 square meters)    Stage 5 End Stage CKD (GFR <15 mL/min/1 73 square meters)  Note: GFR calculation is accurate only with a steady state creatinine    CBC and differential [[id number]]  (Abnormal) Collected: 02/03/21 1958    Lab Status: Final result Specimen: Blood from Arm, Left Updated: 02/03/21 2012     WBC 6 11 Thousand/uL      RBC 4 40 Million/uL      Hemoglobin 14 7 g/dL      Hematocrit 41 5 %      MCV 94 fL      MCH 33 4 pg      MCHC 35 4 g/dL      RDW 13 2 %      MPV 10 1 fL      Platelets 306 Thousands/uL      nRBC 0 /100 WBCs      Neutrophils Relative 52 %      Immat GRANS % 0 %      Lymphocytes Relative 29 %      Monocytes Relative 14 %      Eosinophils Relative 3 %      Basophils Relative 2 %      Neutrophils Absolute 3 16 Thousands/µL      Immature Grans Absolute 0 02 Thousand/uL      Lymphocytes Absolute 1 78 Thousands/µL      Monocytes Absolute 0 85 Thousand/µL      Eosinophils Absolute 0 21 Thousand/µL      Basophils Absolute 0 09 Thousands/µL                  MRI brain wo contrast    (Results Pending)   MRI cervical spine wo contrast    (Results Pending)         Procedures  Procedures      ED Course  ED Course as of Feb 03 2220   Wed Feb 03, 2021   1942 Pt feeling a little better but still having a significant amount of pain      2033 Dilaudid added                                             MDM  Number of Diagnoses or Management Options  Cervical disc herniation: established and improving  Disequilibrium: new and requires workup  Neck pain: established and improving  Weakness: established and improving  Diagnosis management comments: 57-year-old male with cervical disc herniation presents with neck pain, disequilibrium, weakness, and numbness and tingling  We will treat patient's pain and see we can get new MRI considering the new symptoms of disequilibrium  Amount and/or Complexity of Data Reviewed  Clinical lab tests: ordered and reviewed  Tests in the radiology section of CPT®: ordered and reviewed    Risk of Complications, Morbidity, and/or Mortality  Presenting problems: moderate  Diagnostic procedures: moderate  Management options: moderate    Patient Progress  Patient progress: improved    Patient felt better after receiving medications here in ED  He was admitted to [facility] pending MRI  Disposition  Final diagnoses:   Neck pain   Cervical disc herniation   Disequilibrium   Weakness     Time reflects when diagnosis was documented in both MDM as applicable and the Disposition within this note     Time User Action Codes Description Comment    2/3/2021 10:15 PM Mera Cormier Add [M54 2] Neck pain     2/3/2021 10:16 PM Mera Cormier Add [M50 20] Cervical disc herniation     2/3/2021 10:16 PM Mera Cormier Add [R42] Disequilibrium     2/3/2021 10:16 PM Mera Cormier Add [R53 1] Weakness       ED Disposition     ED Disposition Condition Date/Time Comment    Admit Fair Wed Feb 3, 2021 10:15 PM Case was discussed with Dr Cony Jones and the patient's admission status was agreed to be Admission Status: inpatient status to the service of Dr Larisa Levi   Follow-up Information    None         Patient's Medications   Discharge Prescriptions    No medications on file     No discharge procedures on file  PDMP Review       Value Time User    PDMP Reviewed  Yes 8/20/2020  2:20 PM Leonard Sheridan PA-C           ED Provider  Attending physically available and evaluated Elnoria Bence I managed the patient along with the ED Attending      Electronically Signed by         Lori Galan DO  02/03/21 [id number]

## 2021-02-04 NOTE — PLAN OF CARE
Problem: Potential for Falls  Goal: Patient will remain free of falls  Description: INTERVENTIONS:  - Assess patient frequently for physical needs  -  Identify cognitive and physical deficits and behaviors that affect risk of falls  -  [place] fall precautions as indicated by assessment   - Educate patient/family on patient safety including physical limitations  - Instruct patient to call for assistance with activity based on assessment  - Modify environment to reduce risk of injury  - Consider OT/PT consult to assist with strengthening/mobility  Outcome: Progressing     Problem: SAFETY ADULT  Goal: Patient will remain free of falls  Description: INTERVENTIONS:  - Assess patient frequently for physical needs  -  Identify cognitive and physical deficits and behaviors that affect risk of falls    -  [place] fall precautions as indicated by assessment   - Educate patient/family on patient safety including physical limitations  - Instruct patient to call for assistance with activity based on assessment  - Modify environment to reduce risk of injury  - Consider OT/PT consult to assist with strengthening/mobility  Outcome: Progressing  Goal: Maintain or return to baseline ADL function  Description: INTERVENTIONS:  -  Assess patient's ability to carry out ADLs; assess patient's baseline for ADL function and identify physical deficits which impact ability to perform ADLs (bathing, care of mouth/teeth, toileting, grooming, dressing, etc )  - Assess/evaluate cause of self-care deficits   - Assess range of motion  - Assess patient's mobility; develop plan if impaired  - Assess patient's need for assistive devices and provide as appropriate  - Encourage maximum independence but intervene and supervise when necessary  - Involve family in performance of ADLs  - Assess for home care needs following discharge   - Consider OT consult to assist with ADL evaluation and planning for discharge  - Provide patient education as appropriate  Outcome: Progressing  Goal: Maintain or return mobility status to optimal level  Description: INTERVENTIONS:  - Assess patient's baseline mobility status (ambulation, transfers, stairs, etc )    - Identify cognitive and physical deficits and behaviors that affect mobility  - Identify mobility aids required to assist with transfers and/or ambulation (gait belt, sit-to-stand, lift, walker, cane, etc )  - [place] fall precautions as indicated by assessment  - Record patient progress and toleration of activity level on Mobility SBAR; progress patient to next Phase/Stage  - Instruct patient to call for assistance with activity based on assessment  - Consider rehabilitation consult to assist with strengthening/weightbearing, etc   Outcome: Progressing     Problem: DISCHARGE PLANNING  Goal: Discharge to home or other facility with appropriate resources  Description: INTERVENTIONS:  - Identify barriers to discharge w/patient and caregiver  - Arrange for needed discharge resources and transportation as appropriate  - Identify discharge learning needs (meds, wound care, etc )  - Arrange for interpretive services to assist at discharge as needed  - Refer to Case Management Department for coordinating discharge planning if the patient needs post-hospital services based on physician/advanced practitioner order or complex needs related to functional status, cognitive ability, or social support system  Outcome: Progressing     Problem: PAIN - ADULT  Goal: Verbalizes/displays adequate comfort level or baseline comfort level  Description: Interventions:  - Encourage patient to monitor pain and request assistance  - Assess pain using appropriate pain scale  - Administer analgesics based on type and severity of pain and evaluate response  - Implement non-pharmacological measures as appropriate and evaluate response  - Consider cultural and social influences on pain and pain management  - Notify physician/advanced practitioner if interventions unsuccessful or patient reports new pain  Outcome: Not Progressing

## 2021-02-04 NOTE — ASSESSMENT & PLAN NOTE
Complains of dizziness and dysarthria since injury in August     Imaging:  MRI brain wo 2/4/2021: Unremarkable unenhanced MRI of the brain

## 2021-02-04 NOTE — ASSESSMENT & PLAN NOTE
Presents to the ED for evaluation of severe neck pain ever since injury in August   · Seen outpatient on 8/21/2020 for  C5-C6 HNP resulting in bilateral lateral forearm and 4th-5th finger numbness and tingling after a fall down steps 8/3/2020  · Continues to endorse significant neck pain radiating down bilateral arms with C8 distribution numbness  · Previously had recommended flexion/extension x-rays and further outpatient follow up for consideration of arthroplasty vs fusion but this was not completed  · Continues to endorse significant cervical pain with bilateral arm numbness, along with urinary dribbling and urge incontinence, dizziness and dysarthria  No motor weakness appreciated on exam  Subjective diminished sensation bilaterally at C8 distribution  No myelopathy or radiculopathy  Imaging:  · MRI cervical spine wo 2/4/2021: Reidentified protrusion-type disc herniations at C5/C6 and C6-C7 causing mild flattening of the right aspect of the cervical spinal cord with no cord signal abnormality  There is mild diffuse developmental spinal canal narrowing  The degree of degenerative changes have not significant changed when compared to an MRI cervical spine dated August 4, 2020  Plan:  · Continue pain control per primary team  Recommend referral to pain management  · Will schedule outpatient follow up, recommend obtaining previously ordered flexion/extension x-rays prior to appointment  · PT/OT evaluation as ordered  · DVT ppx: SCDs  Follow up outpatient in 2-4 weeks as scheduled  Neurosurgery will sign off  Please call with any questions or concerns

## 2021-02-04 NOTE — ASSESSMENT & PLAN NOTE
- Acute on chronic neck pain, patient with known C5-C6 and C6-C7 herniation secondary to traumatic injury (August 2020)  - Repeat MRI C spine imaging (02/04) reidentified the herniations at C5-C6 and C6-C7 causing mild flattening of the right aspect of cervical spine without cord signal abnormality  - Started on Cymbalta 30 mg BID  - Plan to obtain flexion/extension x-rays in the outpatient setting and follow up with Neurosurgery in 2-4 weeks for considerations of arthroplasty vs fusion

## 2021-02-04 NOTE — H&P
INTERNAL MEDICINE RESIDENCY ADMISSION H&P     Name: Maeve Cunningham   Age & Sex: 38 y o  male   MRN: [id number]  Unit/Bed#: ED 24   Encounter: [id number]  Primary Care Provider: No primary care provider on file  Code Status: Level 1 - Full Code  Admission Status: INPATIENT   Disposition: Patient requires Med/Surg    Admit to team: SOD Team B     ASSESSMENT/PLAN     Principal Problem:    Neck pain  Active Problems:    Lumbar disc herniation    Cervical disc herniation    Elevated blood pressure reading    Asthma    Tobacco abuse    Dizziness      * Neck pain  Assessment & Plan  Patient with history of C5-6 and C6-7 disc herniations presenting with acute on chronic intractable neck pain and b/l arm and hand numbness along the medial aspect of the arm, forearm and hand and b/l weak   MRI cervical spine completed showed no evidence of acute cervical spine injury  Reidentified protrusion-type disc herniations at C5/C6 and C6-C7 causing mild flattening of the right aspect of the cervical spinal cord with no cord signal abnormality and mild diffuse developmental spinal canal narrowing  The degree of degenerative changes have not significant changed when compared to an MRI cervical spine dated August 4, 2020  Plan:  Neurosurgery consult  Multimodal pain regimen: Lidocaine patch, Tylenol, Robaxin, Gabapentin, Oxycodone and morphine as needed  Can consider acute pain service consult  Scheduled bowel regimen  P r n  Narcan  PT/OT  Weakness of both lower extremities  Assessment & Plan  Patient with history of L4-5 and L5-S1 disc bluge and L5-S1 degenartive change on MRI 2009 [facility]  Now presenting with lower spine pain and bilateral leg weakness, no numbness or tingling, no urine retention questionable urine incontinence, no fecal incontinence or saddle anesthesia  Did report instability and 2 falls  Power 5/5 bilaterally and sensation intact  Straight leg raise test negative     MRI lumbar spine showed no evidence of acute lumbar spine injury, mild degenerative changes of lower lumbar spine causing no significant canal stenosis or neural foraminal narrowing  Unclear etiology, possibly lumbar strain    Plan:  - Analgesia as outlined above   - PT/OT          Dizziness  Assessment & Plan  Patient with complains of dizzy spells associated with blurry vision-described as room spinning  Had a normal CT angiography in 8/2020  BPV vs nonspecific dizziness  Plan:    Can try meclizine if persistent symptoms  Outpatient vestibular rehab         Tobacco abuse  Assessment & Plan  - Smokes half pack per day  - Nicotine patch ordered  - Advised tobacco cessation  Asthma  Assessment & Plan  - Albuterol inhaler as needed  Elevated blood pressure reading  Assessment & Plan  - Likely secondary to acute pain  - Continue to monitor  Cervical disc herniation  Assessment & Plan  See plan under neck pain  VTE Pharmacologic Prophylaxis: Reason for no pharmacologic prophylaxis low risk   VTE Mechanical Prophylaxis: sequential compression device    CHIEF COMPLAINT     Chief Complaint   Patient presents with    Neck Pain     chronic neck pain states he needs something for pain he cannot take it anymore  10/10 pain  reports his hands are numb and he is dizzy      HISTORY OF PRESENT ILLNESS     Renetta Bello is a 38 y o  patient with past medical history of C5-6 and C6-7 disc herniations, traumatic injury 8/2020, asthma and tobacco abuse presented to the ED with complaints of acute on chronic neck pain  Patient's cervical pain started as early as high school age when he used to wrestle  He did not have any cervical injury at that time and had negative evaluation and was followed by pain management in [place]  In August 2020 patient sustained a traumatic injury where he slipped on stairs causing him to fall and strike the back of his head and neck    MRI of the cervical spine at that time showed protrusion type disc herniation at C5-C6 and C6-C7  Neurosurgery was consulted and recommended conservative management  He followed with trauma service as well as Neurosurgery post discharge and was prescribed short course of oxycodone as well as gabapentin, Robaxin, Tylenol and was referred to pain management  He was sent for cervical fixation/extension x-rays to confirm no occult instability and evaluate stability for disc replacement versus fusion  This was not completed as patient was unable to obtain them in [place] secondary to insurance  Patient today is very anxious, tearful and crying throughout our encounter  He tells me that since his injury back in August he has been complaining of significant neck pain, muscle spasms and pressure  His pain is exacerbated by any motion of his neck and upper extremities  He also complains of numbness along the medial aspect of the arm, forearm extending to the 4th and 5th finger of both hands  This is associated with weak  and dropping of objects and change in handwriting  Patient also mentioned bilateral leg weakness and 2 episodes where he felt excruciating pain of his lower back extending to his thighs when jogging or bouncing down the stairs causing him to collapse and fall  No lower extremity numbness  He also tells me that he has had symptoms of urinary urgency and incontinence and has sometimes used "toilet paper as pads" for the dripping  No fecal incontinence  Also mentions intermittent episodes of dizziness, blurring of vision, seeing spots on the sides, confusion, decreased concentration and trouble sleep due to pain  In the ED initial vital signs significant for elevated blood pressure  CBC and CMP unremarkable  MRI cervical spine, lumbar spine and brain completed  Patient received Robaxin 500 mg x 1, gabapentin 100 mg x 1, Decadron 4 mg x 1, Dilaudid 0 5 mg x 1 and 1 mg x 1 and fentanyl 50 mcg x 1   Patient admitted for intractable acute on chronic neck pain  REVIEW OF SYSTEMS     Review of Systems   Constitutional: Negative for chills and fever  Respiratory: Negative for cough, shortness of breath and wheezing  Cardiovascular: Negative for chest pain and leg swelling  Gastrointestinal: Negative for abdominal pain, nausea and vomiting  Musculoskeletal: Positive for back pain and neck pain  Neurological: Positive for dizziness, weakness and numbness  OBJECTIVE     Vitals:    21 2030 21 2201 21 0115   BP:  159/98 154/92 169/99   BP Location:  Right arm Right arm Right arm   Pulse:  84 79 78   Resp:  18 17 19   Temp: 98 1 °F (36 7 °C)      TempSrc: Oral      SpO2:  95% 98% 99%   Weight:          Temperature:   Temp (24hrs), Av 1 °F (36 7 °C), Min:98 1 °F (36 7 °C), Max:98 1 °F (36 7 °C)    Temperature: 98 1 °F (36 7 °C)  Intake & Output:  I/O     None        Weights:   IBW: -88 kg    Body mass index is 25 02 kg/m²  Weight (last 2 days)     Date/Time   Weight    21 1721   70 3 (155)            Physical Exam  Constitutional:       General: He is in acute distress  Comments: tearful and crying throughout the encounter    HENT:      Head: Normocephalic and atraumatic  Neck:      Musculoskeletal: No muscular tenderness  Comments: Limited ROM  Cardiovascular:      Rate and Rhythm: Normal rate and regular rhythm  Heart sounds: Normal heart sounds  No murmur  Pulmonary:      Effort: No respiratory distress  Breath sounds: Normal breath sounds  No wheezing  Abdominal:      General: There is no distension  Palpations: Abdomen is soft  Tenderness: There is no abdominal tenderness  Musculoskeletal:      Right lower leg: No edema  Left lower leg: No edema  Skin:     General: Skin is warm  Neurological:      Mental Status: He is alert and oriented to person, place, and time  Cranial Nerves: No cranial nerve deficit        Comments: Lower extremities power 5/5, sensation intact, straight leg raise negative  Upper extremities: limited exam proximally due to pain, intact power distally  PAST MEDICAL HISTORY     Past Medical History:   Diagnosis Date    Asthma     Chronic pain     Hypertension     Thyroglossal duct cyst      PAST SURGICAL HISTORY     Past Surgical History:   Procedure Laterality Date    THYROGLOSSAL DUCT EXCISION      THYROID SURGERY      THYROID SURGERY       SOCIAL & FAMILY HISTORY     Social History     Substance and Sexual Activity   Alcohol Use Yes    Alcohol/week: 3 0 - 4 0 standard drinks    Types: 3 - 4 Shots of liquor per week    Frequency: 4 or more times a week    Comment: Typically takes 3-4 shots of liquor daily after work     Substance and Sexual Activity   Alcohol Use Yes    Alcohol/week: 3 0 - 4 0 standard drinks    Types: 3 - 4 Shots of liquor per week    Frequency: 4 or more times a week    Comment: Typically takes 3-4 shots of liquor daily after work        Substance and Sexual Activity   Drug Use Yes    Types: Marijuana    Comment: "Occasionally"     Social History     Tobacco Use   Smoking Status Current Every Day Smoker    Packs/day: 1 00    Types: Cigarettes   Smokeless Tobacco Never Used     Family History   Problem Relation Age of Onset    Hypertension Mother     No Known Problems Father      LABORATORY DATA     Labs: I have personally reviewed pertinent reports      Results from last 7 days   Lab Units 02/03/21 1958   WBC Thousand/uL 6 11   HEMOGLOBIN g/dL 14 7   HEMATOCRIT % 41 5   PLATELETS Thousands/uL 276   NEUTROS PCT % 52   MONOS PCT % 14*      Results from last 7 days   Lab Units 02/03/21 1958   POTASSIUM mmol/L 3 7   CHLORIDE mmol/L 113*   CO2 mmol/L 21   BUN mg/dL 23   CREATININE mg/dL 0 92   CALCIUM mg/dL 8 9   ALK PHOS U/L 73   ALT U/L 34   AST U/L 18                          Micro:  No results found for: BLOODCX, URINECX, WOUNDCULT, SPUTUMCULTUR  IMAGING & DIAGNOSTIC TESTS     Imaging: I have personally reviewed pertinent reports  No results found  EKG, Pathology, and Other Studies: I have personally reviewed pertinent reports  ALLERGIES   No Known Allergies  MEDICATIONS PRIOR TO ARRIVAL     Prior to Admission medications    Medication Sig Start Date End Date Taking?  Authorizing Provider   acetaminophen (TYLENOL) 500 mg tablet Take 2 tablets (1,000 mg total) by mouth every 8 (eight) hours 2/16/20   Davon Espino MD   albuterol (PROVENTIL HFA,VENTOLIN HFA) 90 mcg/act inhaler Inhale 2 puffs every 4 (four) hours as needed for wheezing 9/27/19   Nazanin Gutiérrez MD   chlorhexidine (HIBICLENS) 4 % external liquid Apply 1 application topically every 14 (fourteen) days 11/29/20   Richelle Whitfield MD   chlorhexidine (HIBICLENS) 4 % external liquid Apply 1 application topically daily as needed for wound care 11/29/20   Gallo Baig,    gabapentin (NEURONTIN) 100 mg capsule Take 1 capsule (100 mg total) by mouth 3 (three) times a day 8/5/20   Marbella Adam PA-C   ibuprofen (MOTRIN) 800 mg tablet Take 1 tablet (800 mg total) by mouth 3 (three) times a day 2/16/20   Davon Espino MD   methocarbamol (ROBAXIN) 500 mg tablet Take 1 tablet (500 mg total) by mouth every 6 (six) hours 8/5/20   Deepa Deleon PA-C   oxyCODONE (ROXICODONE) 5 mg immediate release tablet Take 1 tablet (5 mg total) by mouth every 4 (four) hours as needed for moderate pain for up to 20 dosesMax Daily Amount: 30 mg 8/20/20   Leonard Sheridan PA-C   senna-docusate sodium (SENOKOT S) 8 6-50 mg per tablet Take 2 tablets by mouth daily  Patient not taking: Reported on 8/21/2020 8/6/20   Osvaldo Deleon PA-C   triamcinolone (KENALOG) 0 1 % cream Apply topically 2 (two) times a day 7/9/20   Db Schuster MD     MEDICATIONS ADMINISTERED IN LAST 24 HOURS     Medication Administration - last 24 hours from 02/03/2021 0258 to 02/04/2021 0258       Date/Time Order Dose Route Action Action by     02/03/2021 8377 HYDROmorphone (DILAUDID) injection 0 5 mg 0 5 mg Intravenous Given Petr Nguyen RN     02/03/2021 1907 methocarbamol (ROBAXIN) tablet 500 mg 500 mg Oral Given Petr Nguyen RN     02/03/2021 1907 gabapentin (NEURONTIN) capsule 100 mg 100 mg Oral Given Petr Nguyen RN     02/03/2021 1907 dexamethasone (DECADRON) injection 4 mg 4 mg Intravenous Given Petr Nguyen RN     02/03/2021 1958 HYDROmorphone (DILAUDID) injection 0 5 mg 0 5 mg Intravenous Given Claudia Hernandez RN     02/03/2021 2032 HYDROmorphone (DILAUDID) injection 1 mg 1 mg Intravenous Given Claudia Hernandez RN     02/03/2021 2149 fentanyl citrate (PF) 100 MCG/2ML 50 mcg 50 mcg Intravenous Given Claudia Hernandez RN     02/04/2021 0100 fentanyl citrate (PF) 100 MCG/2ML 50 mcg 50 mcg Intravenous Given Edgar Morfin RN     02/04/2021 0243 gabapentin (NEURONTIN) capsule 100 mg 100 mg Oral Given Edgar Morfin RN     02/04/2021 0243 methocarbamol (ROBAXIN) tablet 500 mg 500 mg Oral Given Claudia Hernandez RN     02/04/2021 0241 morphine injection 2 mg 2 mg Intravenous Given Edgar Morfin RN        CURRENT MEDICATIONS            Admission Time  I spent 45 minutes admitting the patient  This involved direct patient contact where I performed a full history and physical, reviewing previous records, and reviewing laboratory and other diagnostic studies  Portions of the record may have been created with voice recognition software  Occasional wrong word or "sound a like" substitutions may have occurred due to the inherent limitations of voice recognition software    Read the chart carefully and recognize, using context, where substitutions have occurred     ==  Magaly Garcia MD  [facility]  Internal Medicine Residency PGY-2

## 2021-02-04 NOTE — ASSESSMENT & PLAN NOTE
Kris Dumont is a 38 y o  male with chronic neck pain, known C5-6 and C6-7 disc herniations from traumatic injury (August 2020), tobacco abuse, asthma, who presents to [facility] ED on 02/03/2021 with worsening of baseline neck pain, bilateral upper extremity numbness, bilateral  weakness, bilateral lower extremity weakness, ambulatory dysfunction, dizziness, and speech difficulties  Neurology asked to evaluate the patient for disequilibrium as patient has been reporting imbalance issues since his fall in August 2020      - Likely medication induced  Patient initially reports he had only been taking ibuprofen for pain  Upon further discussion, patient did report he had been talking Percocets that he had been prescribed from years ago as well as cyclobenzaprine    - Suspicion patients reported difficulty concentrating, slurred speech, and fogginess is also related to his medications  Plan:  - No further neuroimaging needed at this time   - Pending: Vitamin A08, folic  - Started on Cymbalta for neck pain  - Recommend cutting back on sedating medications/narcotics and introducing neuropathic pain medications    - PT/OT  - Pain management on board  - No further inpatient neurologic recommendations at this time  Call with questions    Imaging/Labs:  - MRI brain 02/04/2021:  · Unremarkable unenhanced MRI of the brain  - MRI lumbar spine 02/04/2021:  · No evidence of acute lumbar spine injury  · Mild degenerative changes of lower lumbar spine causing no significant canal stenosis or neural foraminal narrowing  - MRI C-spine 02/04/2021:  · No evidence of acute cervical spine injury  · Re-identified protrusion type disc herniations at C5-C6 and C6-C7 causing mild flattening of the right aspect of the cervical spinal cord with no cord signal abnormality  · There is mild diffuse developmental spinal canal narrowing    The degree of degenerative changes have not significantly changed when compared to MRI C-spine on 08/04/2020

## 2021-02-04 NOTE — ASSESSMENT & PLAN NOTE
Patient with history of L4-5 and L5-S1 disc bluge and L5-S1 degenartive change on MRI 2009 in [facility]  Now presenting with lower spine pain and bilateral leg weakness, no numbness or tingling, no urine retention questionable urine incontinence, no fecal incontinence or saddle anesthesia  Did report instability and 2 falls       Plan:  - Analgesia as outlined above   - PT/OT

## 2021-02-04 NOTE — ASSESSMENT & PLAN NOTE
- Elevated BP on presentation, 183/113   - Concern this may be related to his pain  - Goal normotension, medical management per primary team

## 2021-02-04 NOTE — CONSULTS
Consult Note- Acute Pain Service   Edda Zelaya 38 y o  male MRN: [id number]  Unit/Bed#: ED 24 Encounter: [id number]               Assessment/Plan     Assessment:   Patient Active Problem List   Diagnosis    Cervical disc herniation    Paresthesia and pain of both upper extremities    Fall    Concussion without loss of consciousness    Acute pain of right shoulder    Alcohol abuse    Burn    Furuncle of axilla    Neck pain    Elevated blood pressure reading    Weakness of both lower extremities    Asthma    Tobacco abuse    Dizziness      Edda Zelaya is a 38 y o  male  With a history of neck pain since August of 2020 following a fall at that time  Patient states he has had chronic neck pain since that point which has gotten worse over the past several days  Came to the emergency room due to uncontrolled pain  Plan:    Continue Tylenol 975 mg p o  q 8 hours scheduled   Continue oxycodone 5 mg p o  q 4 hours p r n  moderate pain   Continue oxycodone 10 mg p o  q 4 hours p r n  severe pain   Continue morphine 4 mg IV q 4 hours p r n  breakthrough pain   Continue gabapentin 100 mg p o  t i d  scheduled   Suggest increase Robaxin to 750 mg p o  q 6 hours scheduled   Continue lidocaine patch for up to 12 hours daily   Continue bowel regimen to avoid opioid induced constipation   Agree with Neurosurgery, patient would benefit from referral to outpatient pain management  Imaging reveals no changes since his initial injury 6 months ago and no neurosurgical interventions are planned   Patient would likely benefit from Neurology consult secondary to his complaints of dizziness, visual issues, etc    At discharge, continue Tylenol, gabapentin, Robaxin, lidocaine patch, bowel regimen as currently ordered   At discharge, suggest oxycodone 5 mg p o  q 4 hours p r n  moderate to severe pain times 5-7 days  APS will sign off at this time  Thank you for the consult   All opioids and other analgesics to be written at discretion of primary team  Please call  / [phone] or [facility] Acute Pain [facility] (/ between [phone] and on weekends) with questions or concerns    History of Present Illness    Admit Date:  2/3/2021  Hospital Day:  1 day  Primary Service:  Hospitalist  Attending Provider:  Rocio Tracey MD  Reason for Consult / Principal Problem:   Neck pain  HPI: Edwar Jeffers is a 38 y o  male who presents with  Worsening in the neck pain over the past several days  Patient has a history of chronic neck pain for which he has previously seen pain management following a fall in August of 2020  Patient was lost to follow-up and has been using over-the-counter medications without relief  States his pain increased over the past several days and feels this may be secondary to shoveling snow  Patient also seen by Neurosurgery at the time of his initial injury and was ordered flexion-extension and oblique x-rays of the cervical spine as an outpatient  This showed no acute abnormalities  Patient also complains of several neurologic complaints including blurred vision, seeing "spots", dizziness, ambulatory dysfunction  Patient also requesting a  due to inability to concentrate secondary to the pain  Patient also complains of numbness in a "thin line" along the triceps, medial forearm and into the 4th and 5th digits bilaterally  Current pain location(s):   Midline and bilateral posterior neck  Pain Scale:    10/10  Quality:  Sore, aching, "grinding"  Current Analgesic regimen:    Tylenol 975 mg p o  q 8 hours scheduled  Oxycodone 5 mg p o  q 4 hours p r n  moderate pain  Oxycodone 10 mg p o  q 4 hours p r n  severe pain  Morphine 4 mg IV q 4 hours p r n  breakthrough pain  Gabapentin 100 mg p o  t i d  scheduled  Robaxin 500 mg p o  q 6 hours scheduled  Lidocaine patch for up to 12 hours daily      Pain History:   History of chronic neck pain x6 months  Pain Management Provider:   Patient seen by pain management provider in [place], cannot recall provider's name  I have reviewed the patient's controlled substance dispensing history in the Prescription Drug Monitoring Program in compliance with the [facility] regulations before prescribing any controlled substances  Past 1 year review of PDMP:  Fill Date ID   Written Drug Qty Days Prescriber Rx # Pharmacy Refill   Daily Dose* Pymt Type      08/20/2020  1   08/20/2020  Oxycodone Hcl 5 MG Tablet  5 00  5 Merril Humbles   [id number]   Pen (2250)   0  7 50 MME  [place]   08/05/2020  2   08/05/2020  Oxycodone Hcl 5 MG Tablet  30 00  5 Da Panola Siu   [id number]   Rit (7345)   0  45 00 MME  [place]         Consults    Review of Systems   Eyes: Positive for visual disturbance  Musculoskeletal: Positive for gait problem, neck pain and neck stiffness  Neurological: Positive for dizziness, speech difficulty, numbness and headaches  All other systems reviewed and are negative        Historical Information   Past Medical History:   Diagnosis Date    Asthma     Chronic pain     Hypertension     Thyroglossal duct cyst      Past Surgical History:   Procedure Laterality Date    THYROGLOSSAL DUCT EXCISION      THYROID SURGERY      THYROID SURGERY       Social History   Social History     Substance and Sexual Activity   Alcohol Use Yes    Alcohol/week: 3 0 - 4 0 standard drinks    Types: 3 - 4 Shots of liquor per week    Frequency: 4 or more times a week    Comment: Typically takes 3-4 shots of liquor daily after work     Social History     Substance and Sexual Activity   Drug Use Yes    Types: Marijuana    Comment: "Occasionally"     Social History     Tobacco Use   Smoking Status Current Every Day Smoker    Packs/day: 1 00    Types: Cigarettes   Smokeless Tobacco Never Used     Family History:   Family History   Problem Relation Age of Onset    Hypertension Mother     No Known Problems Father Meds/Allergies   all current active meds have been reviewed, current meds:   Current Facility-Administered Medications   Medication Dose Route Frequency    acetaminophen (TYLENOL) tablet 975 mg  975 mg Oral Q8H [facility]    albuterol (PROVENTIL HFA,VENTOLIN HFA) inhaler 2 puff  2 puff Inhalation Q4H PRN    gabapentin (NEURONTIN) capsule 100 mg  100 mg Oral TID    lidocaine (LIDODERM) 5 % patch 1 patch  1 patch Topical Daily    methocarbamol (ROBAXIN) tablet 500 mg  500 mg Oral Q6H [facility]    morphine (PF) 4 mg/mL injection 4 mg  4 mg Intravenous Q4H PRN    naloxone (NARCAN) 0 04 mg/mL syringe 0 04 mg  0 04 mg Intravenous Q1MIN PRN    nicotine (NICODERM CQ) 14 mg/24hr TD 24 hr patch 1 patch  1 patch Transdermal Daily    ondansetron (ZOFRAN) injection 4 mg  4 mg Intravenous Q6H PRN    oxyCODONE (ROXICODONE) IR tablet 5 mg  5 mg Oral Q4H PRN    Or    oxyCODONE (ROXICODONE) immediate release tablet 10 mg  10 mg Oral Q4H PRN    polyethylene glycol (MIRALAX) packet 17 g  17 g Oral Daily PRN    senna-docusate sodium (SENOKOT S) 8 6-50 mg per tablet 2 tablet  2 tablet Oral Daily    and PTA meds:   Prior to Admission Medications   Prescriptions Last Dose Informant Patient Reported?  Taking?   acetaminophen (TYLENOL) 500 mg tablet Not Taking at Unknown time  No No   Sig: Take 2 tablets (1,000 mg total) by mouth every 8 (eight) hours   Patient not taking: Reported on 2/4/2021   albuterol (PROVENTIL HFA,VENTOLIN HFA) 90 mcg/act inhaler Not Taking at Unknown time  No No   Sig: Inhale 2 puffs every 4 (four) hours as needed for wheezing   Patient not taking: Reported on 2/4/2021   chlorhexidine (HIBICLENS) 4 % external liquid Not Taking at Unknown time  No No   Sig: Apply 1 application topically every 14 (fourteen) days   Patient not taking: Reported on 2/4/2021   chlorhexidine (HIBICLENS) 4 % external liquid Not Taking at Unknown time  No No   Sig: Apply 1 application topically daily as needed for wound care   Patient not taking: Reported on 2/4/2021   gabapentin (NEURONTIN) 100 mg capsule Not Taking at Unknown time  No No   Sig: Take 1 capsule (100 mg total) by mouth 3 (three) times a day   Patient not taking: Reported on 2/4/2021   ibuprofen (MOTRIN) 800 mg tablet Not Taking at Unknown time  No No   Sig: Take 1 tablet (800 mg total) by mouth 3 (three) times a day   Patient not taking: Reported on 2/4/2021   methocarbamol (ROBAXIN) 500 mg tablet Not Taking at Unknown time  No No   Sig: Take 1 tablet (500 mg total) by mouth every 6 (six) hours   Patient not taking: Reported on 2/4/2021   oxyCODONE (ROXICODONE) 5 mg immediate release tablet Not Taking at Unknown time  No No   Sig: Take 1 tablet (5 mg total) by mouth every 4 (four) hours as needed for moderate pain for up to 20 dosesMax Daily Amount: 30 mg   Patient not taking: Reported on 2/4/2021   senna-docusate sodium (SENOKOT S) 8 6-50 mg per tablet Not Taking at Unknown time  No No   Sig: Take 2 tablets by mouth daily   Patient not taking: Reported on 8/21/2020   triamcinolone (KENALOG) 0 1 % cream Not Taking at Unknown time  No No   Sig: Apply topically 2 (two) times a day   Patient not taking: Reported on 2/4/2021      Facility-Administered Medications: None       No Known Allergies    Objective   Temp:  [98 1 °F (36 7 °C)] 98 1 °F (36 7 °C)  HR:  [] 65  Resp:  [17-20] 17  BP: (134-183)/() 134/75  No intake or output data in the 24 hours ending 02/04/21 1046    Physical Exam  Vitals signs and nursing note reviewed  Constitutional:       General: He is awake  He is not in acute distress  Appearance: He is not ill-appearing, toxic-appearing or diaphoretic  Comments: Appears uncomfortable  HENT:      Head: Normocephalic and atraumatic  Eyes:      Conjunctiva/sclera: Conjunctivae normal       Pupils: Pupils are equal, round, and reactive to light  Neck:      Musculoskeletal: Decreased range of motion   Pain with movement, spinous process tenderness and muscular tenderness present  No edema  Cardiovascular:      Rate and Rhythm: Normal rate and regular rhythm  Skin:     General: Skin is warm and dry  Neurological:      General: No focal deficit present  Mental Status: He is alert and oriented to person, place, and time  GCS: GCS eye subscore is 4  GCS verbal subscore is 5  GCS motor subscore is 6  Psychiatric:         Behavior: Behavior is cooperative  Lab Results:   I have personally reviewed pertinent labs  , CBC:   Lab Results   Component Value Date    WBC 6 11 02/03/2021    HGB 14 7 02/03/2021    HCT 41 5 02/03/2021    MCV 94 02/03/2021     02/03/2021    MCH 33 4 02/03/2021    MCHC 35 4 02/03/2021    RDW 13 2 02/03/2021    MPV 10 1 02/03/2021    NRBC 0 02/03/2021   , CMP:   Lab Results   Component Value Date    SODIUM 139 02/03/2021    K 3 7 02/03/2021     (H) 02/03/2021    CO2 21 02/03/2021    BUN 23 02/03/2021    CREATININE 0 92 02/03/2021    CALCIUM 8 9 02/03/2021    AST 18 02/03/2021    ALT 34 02/03/2021    ALKPHOS 73 02/03/2021    EGFR 106 02/03/2021   , BMP:  Lab Results   Component Value Date    SODIUM 139 02/03/2021    K 3 7 02/03/2021     (H) 02/03/2021    CO2 21 02/03/2021    BUN 23 02/03/2021    CREATININE 0 92 02/03/2021    GLUC 97 02/03/2021    CALCIUM 8 9 02/03/2021    AGAP 5 02/03/2021    EGFR 106 02/03/2021   , PT/PTT:No results found for: PT, PTT    Imaging Studies: I have personally reviewed pertinent reports  EKG, Pathology, and Other Studies: I have personally reviewed pertinent reports  Counseling / Coordination of Care  Total floor / unit time spent today Level 5 = 110 minutes  Greater than 50% of total time was spent with the patient and / or family counseling and / or coordination of care   A description of the counseling / coordination of care:  Patient interview, physical examination, review of PDMP, review of medical record, review of imaging and laboratory data, development of pain management plan, discussion of pain management plan with patient and primary service  Please note that the APS provides consultative services regarding pain management only  With the exception of ketamine and epidural infusions and except when indicated, final decisions regarding starting or changing doses of analgesic medications are at the discretion of the consulting service  Off hours consultation and/or medication management is generally not available      Kahlil Conner PA-C  Acute Pain Service

## 2021-02-04 NOTE — ASSESSMENT & PLAN NOTE
Patient with history of C5-6 and C6-7 disc herniations presenting with acute on chronic intractable neck pain and b/l arm and hand numbness along the medial aspect of the arm, forearm and hand and b/l weak   MRI cervical spine completed showed no evidence of acute cervical spine injury  Reidentified protrusion-type disc herniations at C5/C6 and C6-C7 causing mild flattening of the right aspect of the cervical spinal cord with no cord signal abnormality and mild diffuse developmental spinal canal narrowing  The degree of degenerative changes have not significant changed when compared to an MRI cervical spine dated August 4, 2020  Plan:  · Neuro surgery signed off; no acute interventions  · Neurology assessed and no further workup necessary  · Pain regimen  · Lidocaine patch  · Tylenol 975 mg t i d   · Robaxin 750 mg Q 6 hour  · Cymbalta 30 mg b i d   · Gabapentin 100 mg t i d   · Oxycodone 5 mg q 4 hours p r n  Moderate pain  · Oxycodone 10 mg q 4 hours severe pain  · Morphine 4 mg q 4 hours breakthrough pain  · No further recommendations forearm acute pain at this time   · Scheduled bowel regimen  · P r n  Narcan    · PT/OT to evaluate

## 2021-02-05 ENCOUNTER — TELEPHONE (OUTPATIENT)
Dept: NEUROSURGERY | Facility: CLINIC | Age: 36
End: 2021-02-05

## 2021-02-05 PROBLEM — E53.8 VITAMIN B12 DEFICIENCY: Status: ACTIVE | Noted: 2021-02-05

## 2021-02-05 PROCEDURE — 97167 OT EVAL HIGH COMPLEX 60 MIN: CPT

## 2021-02-05 PROCEDURE — 97163 PT EVAL HIGH COMPLEX 45 MIN: CPT

## 2021-02-05 RX ORDER — ACETAMINOPHEN 325 MG/1
975 TABLET ORAL EVERY 8 HOURS SCHEDULED
Qty: 90 TABLET | Refills: 0 | Status: CANCELLED | OUTPATIENT
Start: 2021-02-05 | End: 2021-02-15

## 2021-02-05 RX ORDER — CALCIUM CARBONATE 200(500)MG
500 TABLET,CHEWABLE ORAL DAILY PRN
Status: DISCONTINUED | OUTPATIENT
Start: 2021-02-05 | End: 2021-02-06 | Stop reason: HOSPADM

## 2021-02-05 RX ORDER — DULOXETIN HYDROCHLORIDE 30 MG/1
30 CAPSULE, DELAYED RELEASE ORAL 2 TIMES DAILY
Qty: 60 CAPSULE | Refills: 0 | Status: CANCELLED | OUTPATIENT
Start: 2021-02-05 | End: 2021-03-07

## 2021-02-05 RX ORDER — METHOCARBAMOL 750 MG/1
750 TABLET, FILM COATED ORAL EVERY 6 HOURS SCHEDULED
Qty: 56 TABLET | Refills: 0 | Status: CANCELLED | OUTPATIENT
Start: 2021-02-05 | End: 2021-02-19

## 2021-02-05 RX ORDER — GABAPENTIN 100 MG/1
100 CAPSULE ORAL 3 TIMES DAILY
Qty: 90 CAPSULE | Refills: 0 | Status: CANCELLED | OUTPATIENT
Start: 2021-02-05 | End: 2021-03-07

## 2021-02-05 RX ADMIN — GABAPENTIN 100 MG: 100 CAPSULE ORAL at 21:21

## 2021-02-05 RX ADMIN — OXYCODONE HYDROCHLORIDE 10 MG: 10 TABLET ORAL at 16:07

## 2021-02-05 RX ADMIN — OXYCODONE HYDROCHLORIDE 10 MG: 10 TABLET ORAL at 02:43

## 2021-02-05 RX ADMIN — MORPHINE SULFATE 4 MG: 4 INJECTION INTRAVENOUS at 21:37

## 2021-02-05 RX ADMIN — OXYCODONE HYDROCHLORIDE 10 MG: 10 TABLET ORAL at 20:24

## 2021-02-05 RX ADMIN — ONDANSETRON 4 MG: 2 INJECTION INTRAMUSCULAR; INTRAVENOUS at 09:07

## 2021-02-05 RX ADMIN — METHOCARBAMOL 750 MG: 500 TABLET, FILM COATED ORAL at 02:45

## 2021-02-05 RX ADMIN — Medication 1 PATCH: at 09:09

## 2021-02-05 RX ADMIN — DULOXETINE 30 MG: 30 CAPSULE, DELAYED RELEASE ORAL at 09:07

## 2021-02-05 RX ADMIN — METHOCARBAMOL 750 MG: 500 TABLET, FILM COATED ORAL at 09:08

## 2021-02-05 RX ADMIN — ONDANSETRON 4 MG: 2 INJECTION INTRAMUSCULAR; INTRAVENOUS at 20:25

## 2021-02-05 RX ADMIN — MORPHINE SULFATE 4 MG: 4 INJECTION INTRAVENOUS at 17:06

## 2021-02-05 RX ADMIN — ACETAMINOPHEN 975 MG: 325 TABLET, FILM COATED ORAL at 22:10

## 2021-02-05 RX ADMIN — MORPHINE SULFATE 4 MG: 4 INJECTION INTRAVENOUS at 09:26

## 2021-02-05 RX ADMIN — DOCUSATE SODIUM AND SENNOSIDES 2 TABLET: 8.6; 5 TABLET ORAL at 09:07

## 2021-02-05 RX ADMIN — ACETAMINOPHEN 975 MG: 325 TABLET, FILM COATED ORAL at 06:27

## 2021-02-05 RX ADMIN — GABAPENTIN 100 MG: 100 CAPSULE ORAL at 09:07

## 2021-02-05 RX ADMIN — ALBUTEROL SULFATE 2 PUFF: 90 AEROSOL, METERED RESPIRATORY (INHALATION) at 15:58

## 2021-02-05 RX ADMIN — METHOCARBAMOL 750 MG: 500 TABLET, FILM COATED ORAL at 14:12

## 2021-02-05 RX ADMIN — GABAPENTIN 100 MG: 100 CAPSULE ORAL at 15:58

## 2021-02-05 RX ADMIN — ACETAMINOPHEN 975 MG: 325 TABLET, FILM COATED ORAL at 14:10

## 2021-02-05 RX ADMIN — CALCIUM CARBONATE (ANTACID) CHEW TAB 500 MG 500 MG: 500 CHEW TAB at 21:21

## 2021-02-05 RX ADMIN — DULOXETINE 30 MG: 30 CAPSULE, DELAYED RELEASE ORAL at 18:22

## 2021-02-05 RX ADMIN — METHOCARBAMOL 750 MG: 500 TABLET, FILM COATED ORAL at 20:19

## 2021-02-05 RX ADMIN — OXYCODONE HYDROCHLORIDE 10 MG: 10 TABLET ORAL at 06:53

## 2021-02-05 RX ADMIN — ALBUTEROL SULFATE 2 PUFF: 90 AEROSOL, METERED RESPIRATORY (INHALATION) at 06:41

## 2021-02-05 RX ADMIN — POLYETHYLENE GLYCOL 3350 17 G: 17 POWDER, FOR SOLUTION ORAL at 20:41

## 2021-02-05 NOTE — PLAN OF CARE
Problem: Potential for Falls  Goal: Patient will remain free of falls  Description: INTERVENTIONS:  - Assess patient frequently for physical needs  -  Identify cognitive and physical deficits and behaviors that affect risk of falls  -  [place] fall precautions as indicated by assessment   - Educate patient/family on patient safety including physical limitations  - Instruct patient to call for assistance with activity based on assessment  - Modify environment to reduce risk of injury  - Consider OT/PT consult to assist with strengthening/mobility  Outcome: Progressing     Problem: PAIN - ADULT  Goal: Verbalizes/displays adequate comfort level or baseline comfort level  Description: Interventions:  - Encourage patient to monitor pain and request assistance  - Assess pain using appropriate pain scale  - Administer analgesics based on type and severity of pain and evaluate response  - Implement non-pharmacological measures as appropriate and evaluate response  - Consider cultural and social influences on pain and pain management  - Notify physician/advanced practitioner if interventions unsuccessful or patient reports new pain  Outcome: Progressing     Problem: SAFETY ADULT  Goal: Patient will remain free of falls  Description: INTERVENTIONS:  - Assess patient frequently for physical needs  -  Identify cognitive and physical deficits and behaviors that affect risk of falls    -  [place] fall precautions as indicated by assessment   - Educate patient/family on patient safety including physical limitations  - Instruct patient to call for assistance with activity based on assessment  - Modify environment to reduce risk of injury  - Consider OT/PT consult to assist with strengthening/mobility  Outcome: Progressing  Goal: Maintain or return to baseline ADL function  Description: INTERVENTIONS:  -  Assess patient's ability to carry out ADLs; assess patient's baseline for ADL function and identify physical deficits which impact ability to perform ADLs (bathing, care of mouth/teeth, toileting, grooming, dressing, etc )  - Assess/evaluate cause of self-care deficits   - Assess range of motion  - Assess patient's mobility; develop plan if impaired  - Assess patient's need for assistive devices and provide as appropriate  - Encourage maximum independence but intervene and supervise when necessary  - Involve family in performance of ADLs  - Assess for home care needs following discharge   - Consider OT consult to assist with ADL evaluation and planning for discharge  - Provide patient education as appropriate  Outcome: Progressing  Goal: Maintain or return mobility status to optimal level  Description: INTERVENTIONS:  - Assess patient's baseline mobility status (ambulation, transfers, stairs, etc )    - Identify cognitive and physical deficits and behaviors that affect mobility  - Identify mobility aids required to assist with transfers and/or ambulation (gait belt, sit-to-stand, lift, walker, cane, etc )  - [place] fall precautions as indicated by assessment  - Record patient progress and toleration of activity level on Mobility SBAR; progress patient to next Phase/Stage  - Instruct patient to call for assistance with activity based on assessment  - Consider rehabilitation consult to assist with strengthening/weightbearing, etc   Outcome: Progressing     Problem: DISCHARGE PLANNING  Goal: Discharge to home or other facility with appropriate resources  Description: INTERVENTIONS:  - Identify barriers to discharge w/patient and caregiver  - Arrange for needed discharge resources and transportation as appropriate  - Identify discharge learning needs (meds, wound care, etc )  - Arrange for interpretive services to assist at discharge as needed  - Refer to Case Management Department for coordinating discharge planning if the patient needs post-hospital services based on physician/advanced practitioner order or complex needs related to functional status, cognitive ability, or social support system  Outcome: Progressing

## 2021-02-05 NOTE — UTILIZATION REVIEW
Continued Stay Review    Date: 02-05-21                     Current Patient Class: inpatient  Current Level of Care: medical    HPI:35 y o  male initially admitted on 02-03-21    Assessment/Plan: Patient with history of L4-5 and L5-S1 disc bluge and L5-S1 degenartive change on MRI 2009 in [facility]  Now presenting with lower spine pain and bilateral leg weakness, no numbness or tingling, no urine retention questionable urine incontinence, no fecal incontinence or saddle anesthesia  Did report instability and 2 falls  :  · Neuro surgery signed off; no acute interventions  · Neurology assessed and no further workup necessary  · Pain regimen  ? Lidocaine patch  ? Tylenol 975 mg t i d   ? Robaxin 750 mg Q 6 hour  ? Cymbalta 30 mg b i d   ? Gabapentin 100 mg t i d   ? Oxycodone 5 mg q 4 hours p r n  Moderate pain  ? Oxycodone 10 mg q 4 hours severe pain    X 5   ? Morphine 4 mg q 4 hours breakthrough pain   X 1   ? No further recommendations forearm acute pain at this time   · Scheduled bowel regimen  · P r n  Narcan    PT/OT to evaluate    Pertinent Labs/Diagnostic Results:       Results from last 7 days   Lab Units 02/03/21 1958   WBC Thousand/uL 6 11   HEMOGLOBIN g/dL 14 7   HEMATOCRIT % 41 5   PLATELETS Thousands/uL 276   NEUTROS ABS Thousands/µL 3 16         Results from last 7 days   Lab Units 02/03/21 1958   SODIUM mmol/L 139   POTASSIUM mmol/L 3 7   CHLORIDE mmol/L 113*   CO2 mmol/L 21   ANION GAP mmol/L 5   BUN mg/dL 23   CREATININE mg/dL 0 92   EGFR ml/min/1 73sq m 106   CALCIUM mg/dL 8 9     Results from last 7 days   Lab Units 02/03/21 1958   AST U/L 18   ALT U/L 34   ALK PHOS U/L 73   TOTAL PROTEIN g/dL 7 6   ALBUMIN g/dL 4 1   TOTAL BILIRUBIN mg/dL 0 54         Results from last 7 days   Lab Units 02/03/21 1958   GLUCOSE RANDOM mg/dL 97     Vital Signs:   Date/Time  Temp  Pulse  Resp  BP  MAP (mmHg)  SpO2  O2 Device  Patient Position - Orthostatic VS   02/05/21 0800  97 8 °F (36 6 °C)  59  16 146/82  --  99 %  None (Room air)  Lying   02/05/21 0627  --  --  --  151/97  --  --  --  Lying   02/05/21 0343  --  --  --  --  --  --  --  --   Comment rows:   OBSERV: sleeping at 02/05/21 0343   02/05/21 0226  97 5 °F (36 4 °C)  58  16  148/91  --  96 %  None (Room air)  Lying   Comment rows:   OBSERV: sleeping; woke w/vitals at 02/05/21 0226 02/04/21 2325  --  --  --  176/94Abnormal   --  --  --  Sitting   02/04/21 2220  --  54Abnormal   --  184/111Abnormal   --  --  --  Lying   02/04/21 2045  97 6 °F (36 4 °C)  64  20  154/108Abnormal   --  100 %  None (Room air)  Lying   02/04/21 1500  98 4 °F (36 9 °C)  66  20  179/119Abnormal   --  99 %  None (Room air)  Lying   02/04/21 1335  --  70  22  139/81  --  100 %  None (Room air)  Lying   02/04/21 1114  --  59  18  157/90  --  97 %  None (Room air)           Medications:   Scheduled Medications:  acetaminophen, 975 mg, Oral, Q8H PETER  DULoxetine, 30 mg, Oral, BID  gabapentin, 100 mg, Oral, TID  methocarbamol, 750 mg, Oral, Q6H PETER  nicotine, 1 patch, Transdermal, Daily  senna-docusate sodium, 2 tablet, Oral, Daily      Continuous IV Infusions:     PRN Meds:  albuterol, 2 puff, Inhalation, Q4H PRN  hydrALAZINE, 10 mg, Intravenous, Q6H PRN  morphine injection, 4 mg, Intravenous, Q4H PRN  naloxone, 0 04 mg, Intravenous, Q1MIN PRN  ondansetron, 4 mg, Intravenous, Q6H PRN  oxyCODONE, 5 mg, Oral, Q4H PRN    Or  oxyCODONE, 10 mg, Oral, Q4H PRN  polyethylene glycol, 17 g, Oral, Daily PRN        Discharge Plan: [facility]    Network Utilization Review Department  ATTENTION: Please call with any questions or concerns to [phone] and carefully listen to the prompts so that you are directed to the right person  All voicemails are confidential   Sudhakar Tadeo all requests for admission clinical reviews, approved or denied determinations and any other requests to dedicated fax number below belonging to the campus where the patient is receiving treatment   List of dedicated fax numbers for the Facilities:  FACILITY NAME UR FAX NUMBER   ADMISSION DENIALS (Administrative/Medical Necessity) [phone]   [address]  (Maternity/NICU/Pediatrics) [address] [facility],[address] Lori Tian 2530 (Chuy Morrissey) [address] [place] [address] Abraham Arevalo [address] [place], [place] [address] [phone]

## 2021-02-05 NOTE — OCCUPATIONAL THERAPY NOTE
Occupational Therapy Evaluation      Maribelia Leodan    2/5/2021    Principal Problem:    Neck pain  Active Problems:    Cervical disc herniation    Elevated blood pressure reading    Weakness of both lower extremities    Asthma    Tobacco abuse    Dizziness    Vitamin B12 deficiency      Past Medical History:   Diagnosis Date    Asthma     Chronic pain     Hypertension     Thyroglossal duct cyst        Past Surgical History:   Procedure Laterality Date    THYROGLOSSAL DUCT EXCISION      THYROID SURGERY      THYROID SURGERY          02/05/21 1350   OT Last Visit   OT Visit Date 02/05/21   Note Type   Note type Evaluation   Restrictions/Precautions   Weight Bearing Precautions Per Order No   Other Precautions Fall Risk;Pain   Pain Assessment   Pain Assessment Tool Pain Assessment not indicated - pt denies pain   Pain Score Worst Possible Pain   Pain Location/Orientation Location: Head;Location: Neck   Home Living   Type of Home House   Bathroom Shower/Tub Tub/shower unit   Bathroom Toilet Standard   Additional Comments pt reports living in the basement of his mothers home   Prior Function   Level of [place] Independent with ADLs and functional mobility   Lives With Alone; Family   Receives Help From Friend(s); Family   ADL Assistance Independent   IADLs Needs assistance   Comments pt is an inconsistent historian, and sometimes contradicts self    Lifestyle   Autonomy pt reports being independent w self care but with difficulty due to neck stiffness and pain      Reciprocal Relationships reports having a girlfriend, a sister and another friend for support   Intrinsic Gratification used to love to go to they gym, wrestle   Psychosocial   Psychosocial (WDL) WDL   Subjective   Subjective "I have so much pain I can't even think straight"   ADL   Eating Assistance 7  Independent   Grooming Assistance 7  Independent   UB Bathing Assistance 7  Independent   LB Dressing Assistance 7  Independent   Additional Comments pt able to complete self care by himself, increased time   Bed Mobility   Supine to Sit 6  Modified independent   Sit to Supine 6  Modified independent   Transfers   Sit to Stand 7  Independent   Stand to Sit 7  Independent   Stand pivot 5  Supervision   Functional Mobility   Functional Mobility 5  Supervision   Additional Comments 2 small instances of LOB while walking in room   Balance   Static Sitting Good   Static Standing Fair   Dynamic Standing Poor   Activity Tolerance   Activity Tolerance Patient limited by pain   Medical Staff Made Aware PT [place]   Nurse Made Aware OK to see per RN   RUE Assessment   RUE Assessment   (WFL, able to reach top of head, states he cannot lift higher)   LUE Assessment   LUE Assessment   (same as R UE)   Hand Function   Gross Motor Coordination Functional   Fine Motor Coordination Functional   Sensation   Additional Comments c/o some tingling 2 fingers R hand   Cognition   Overall Cognitive Status Impaired   Arousal/Participation Alert; Cooperative   Attention Attends with cues to redirect   Orientation Level Oriented X4   Comments pt reports feeling disoriented, cant keep a straight thought, difficulty w attention etc  Pt reports "I think I need a social  worker, someone who can keep track of me and all my appointments"   Assessment   Limitation Decreased ADL status; Decreased self-care trans;Decreased high-level ADLs   Assessment Pt is a 38 y o  male seen for OT evaluation s/p admit to [facility] on 2/3/2021 w/ Neck pain  Pt reports dizziness, weakness/numbness  He has been seen by both neurosurgery and acute pain department  Comorbidities affecting pt's functional performance at time of assessment include: cervical disc herniation, asthma, tobacco abuse, fall, concussion, alcohol abuse, chronic pain  Pt has MULTIPLE visits to the ED for varies complaints    Personal factors affecting pt at time of IE include:steps to enter environment, behavioral pattern and difficulty performing IADLS   Prior to admission, pt was living in his moms basement, being independent w self care (w difficulty per patient)  Upon evaluation: Pt demonstrates the ability to complete self care by himself w increased time  He c/o severe pain, being unable to move well  He also repeatedly kept saying "I can't concentrate, I can't keep a straight thought, I am confused and disoriented"  Pt with decreased balance in stance, had a couple of losses of balance  Pt scored  70/100 on the barthel index  Based on findings from OT evaluation and functional performance deficits, pt has been identified as a  Highcomplexity evaluation  Pt was educated on going for Outpt PT for pain, outpt OT for ongoing cog assessment etc due to hx of concussion, etc   From OT standpoint, recommendation at time of d/c would be home w family support and outpt therapy  At this time, skilled OT while in acute care is not indicated   Will DC OT   Goals   Patient Goals to be pain free   Plan   OT Frequency Eval only   Recommendation   OT Discharge Recommendation Return to previous environment with social support   OT - OK to Discharge Yes   Additional Comments  outpt OT for formal cognitive assessment due to patient reports of difficulty concentrating/thinking etc   AM-PAC Daily Activity Inpatient   Lower Body Dressing 4   Bathing 4   Toileting 4   Upper Body Dressing 4   Grooming 4   Eating 4   Daily Activity Raw Score 24   Daily Activity Standardized Score (Calc for Raw Score >=11) 57 54   Barthel Index   Feeding 10   Bathing 5   Grooming Score 5   Dressing Score 10   Bladder Score 10   Bowels Score 10   Toilet Use Score 10   Transfers (Bed/Chair) Score 10   Mobility (Level Surface) Score 0   Stairs Score 0   Barthel Index Score 70

## 2021-02-05 NOTE — CASE MANAGEMENT
Case management consult received for outpatient resources  CM spoke with bedside RN who states pt needs information on pain management specialist      TOMA spoke w/ pt bedside and provided pt with contact information for [facility] [phone]  CM inquired into whether pt has obtained a PCP since his last admission to hospital  Pt states he still has no PCP at this time  CM also [facility] [address] [facility]# [phone]  Pt stating he has issues with his current health insurance plan that prevented him from finding PCP previously and states he is in process of switching his insurance  CM informed pt that if has any problems finding an outpatient provider he can call the ph# on the back of his insurance card and they can direct him to the closest providers in network with his insurance  Pt verbalized understanding  CM asked pt if he would like TOMA to call any friends or family and provide them with information she just gave for followup care so they can assist him if needed  Pt declined - stating he will call and he has cellphone bedside  CM encouraged pt to follow up with pain management specialist and PCP  Pt stated to understand  Patient states to have no d/c concerns or additional questions at this time

## 2021-02-05 NOTE — PROGRESS NOTES
INTERNAL MEDICINE RESIDENCY PROGRESS NOTE     Name: Charlotte Canada   Age & Sex: 38 y o  male   MRN: [id number]  Unit/Bed#: [facility] 863-02   Encounter: [id number]  Team: SOD Team B     PATIENT INFORMATION     Name: Charlotte Canada   Age & Sex: 38 y o  male   MRN: [id number]  Hospital Stay Days: 2    ASSESSMENT/PLAN     Principal Problem:    Neck pain  Active Problems:    Dizziness    Cervical disc herniation    Elevated blood pressure reading    Weakness of both lower extremities    Asthma    Tobacco abuse      * Neck pain  Assessment & Plan  Patient with history of C5-6 and C6-7 disc herniations presenting with acute on chronic intractable neck pain and b/l arm and hand numbness along the medial aspect of the arm, forearm and hand and b/l weak   MRI cervical spine completed showed no evidence of acute cervical spine injury  Reidentified protrusion-type disc herniations at C5/C6 and C6-C7 causing mild flattening of the right aspect of the cervical spinal cord with no cord signal abnormality and mild diffuse developmental spinal canal narrowing  The degree of degenerative changes have not significant changed when compared to an MRI cervical spine dated August 4, 2020  Plan:  Neuro surgery signed off; no acute interventions  Neurology assessed and no further workup necessary  Pain regimen  Lidocaine patch  Tylenol 975 mg t i d   Robaxin 750 mg Q 6 hour  Cymbalta 30 mg b i d   Gabapentin 100 mg t i d   Oxycodone 5 mg q 4 hours p r n  Moderate pain  Oxycodone 10 mg q 4 hours severe pain  Morphine 4 mg q 4 hours breakthrough pain  No further recommendations forearm acute pain at this time   Scheduled bowel regimen  P r n  Narcan  PT/OT to evaluate    Dizziness  Assessment & Plan  Neurology evaluated and recommend outpatient follow-up with PCP      Tobacco abuse  Assessment & Plan  - Smokes half pack per day  - Nicotine patch ordered  - Advised tobacco cessation      Asthma  Assessment & Plan  - Albuterol inhaler as needed  Weakness of both lower extremities  Assessment & Plan  Patient with history of L4-5 and L5-S1 disc bluge and L5-S1 degenartive change on MRI 2009 in [facility]  Now presenting with lower spine pain and bilateral leg weakness, no numbness or tingling, no urine retention questionable urine incontinence, no fecal incontinence or saddle anesthesia  Did report instability and 2 falls  Plan:  - Analgesia as outlined above   - PT/OT          Elevated blood pressure reading  Assessment & Plan  - Likely secondary to acute pain  - Continue to monitor  Cervical disc herniation  Assessment & Plan  See plan under neck pain  Disposition:  Continue inpatient treatment  SUBJECTIVE     Patient seen and examined  No acute events overnight  Slight distress on exam and complaining of pain and upper neck and head  OBJECTIVE     Vitals:    21 2325 21 0226 21 0627 21 0800   BP: (!) 176/94 148/91 151/97 146/82   BP Location: Right arm Right arm Right arm Right arm   Pulse:  58  59   Resp:  16  16   Temp:  97 5 °F (36 4 °C)  97 8 °F (36 6 °C)   TempSrc:  Oral  Oral   SpO2:  96%  99%   Weight:       Height:          Temperature:   Temp (24hrs), Av 8 °F (36 6 °C), Min:97 5 °F (36 4 °C), Max:98 4 °F (36 9 °C)    Temperature: 97 8 °F (36 6 °C)  Intake & Output:  I/O     None        Weights:   IBW: 63 8 kg    Body mass index is 25 98 kg/m²  Weight (last 2 days)     Date/Time   Weight    21   --    Comment rows:    OBSERV: sleeping at 21   --    Comment rows:    OBSERV: sleeping; woke w/vitals at 21 1809   73 (160 94)    21 1721   70 3 (155)            Physical Exam  Vitals signs and nursing note reviewed  Constitutional:       Appearance: He is well-developed  HENT:      Head: Normocephalic and atraumatic  Eyes:      General: No scleral icterus       Conjunctiva/sclera: Conjunctivae normal  Cardiovascular:      Rate and Rhythm: Normal rate and regular rhythm  Heart sounds: Normal heart sounds  No murmur  No friction rub  No gallop  Pulmonary:      Effort: Pulmonary effort is normal  No respiratory distress  Breath sounds: Normal breath sounds  No wheezing or rales  Abdominal:      General: Bowel sounds are normal  There is no distension  Palpations: Abdomen is soft  Tenderness: There is no abdominal tenderness  Musculoskeletal: Normal range of motion  Comments: Diffuse tenderness when palpating trapezius upper neck   Strength difficult to assess given consider rule on a pain 4+/5 all extremities   Skin:     General: Skin is warm  Findings: No rash  Neurological:      Mental Status: He is alert and oriented to person, place, and time  LABORATORY DATA     Labs: I have personally reviewed pertinent reports  Results from last 7 days   Lab Units 02/03/21 1958   WBC Thousand/uL 6 11   HEMOGLOBIN g/dL 14 7   HEMATOCRIT % 41 5   PLATELETS Thousands/uL 276   NEUTROS PCT % 52   MONOS PCT % 14*      Results from last 7 days   Lab Units 02/03/21 1958   POTASSIUM mmol/L 3 7   CHLORIDE mmol/L 113*   CO2 mmol/L 21   BUN mg/dL 23   CREATININE mg/dL 0 92   CALCIUM mg/dL 8 9   ALK PHOS U/L 73   ALT U/L 34   AST U/L 18                            IMAGING & DIAGNOSTIC TESTING     Radiology Results: I have personally reviewed pertinent reports  Mri Brain Wo Contrast    Result Date: 2/4/2021  Impression: Unremarkable unenhanced MRI of the brain  Workstation performed: [id number]     Mri Cervical Spine Wo Contrast    Result Date: 2/4/2021  Impression: No evidence of acute cervical spine injury  Reidentified protrusion-type disc herniations at C5/C6 and C6-C7 causing mild flattening of the right aspect of the cervical spinal cord with no cord signal abnormality  There is mild diffuse developmental spinal canal narrowing    The degree of degenerative changes have not significant changed when compared to an MRI cervical spine dated August 4, 2020  Workstation performed: [id number]     Mri Lumbar Spine Wo Contrast    Result Date: 2/4/2021  Impression: No evidence of acute lumbar spine injury  Mild degenerative changes of lower lumbar spine causing no significant canal stenosis or neural foraminal narrowing  Workstation performed: [id number]     Other Diagnostic Testing: I have personally reviewed pertinent reports  ACTIVE MEDICATIONS     Current Facility-Administered Medications   Medication Dose Route Frequency    acetaminophen (TYLENOL) tablet 975 mg  975 mg Oral Q8H [address]    albuterol (PROVENTIL HFA,VENTOLIN HFA) inhaler 2 puff  2 puff Inhalation Q4H PRN    DULoxetine (CYMBALTA) delayed release capsule 30 mg  30 mg Oral BID    gabapentin (NEURONTIN) capsule 100 mg  100 mg Oral TID    hydrALAZINE (APRESOLINE) injection 10 mg  10 mg Intravenous Q6H PRN    methocarbamol (ROBAXIN) tablet 750 mg  750 mg Oral Q6H [address]    morphine (PF) 4 mg/mL injection 4 mg  4 mg Intravenous Q4H PRN    naloxone (NARCAN) 0 04 mg/mL syringe 0 04 mg  0 04 mg Intravenous Q1MIN PRN    nicotine (NICODERM CQ) 14 mg/24hr TD 24 hr patch 1 patch  1 patch Transdermal Daily    ondansetron (ZOFRAN) injection 4 mg  4 mg Intravenous Q6H PRN    oxyCODONE (ROXICODONE) IR tablet 5 mg  5 mg Oral Q4H PRN    Or    oxyCODONE (ROXICODONE) immediate release tablet 10 mg  10 mg Oral Q4H PRN    polyethylene glycol (MIRALAX) packet 17 g  17 g Oral Daily PRN    senna-docusate sodium (SENOKOT S) 8 6-50 mg per tablet 2 tablet  2 tablet Oral Daily       VTE Pharmacologic Prophylaxis: Reason for no pharmacologic prophylaxis low risk   VTE Mechanical Prophylaxis: sequential compression device    Portions of the record may have been created with voice recognition software  Occasional wrong word or "sound a like" substitutions may have occurred due to the inherent limitations of voice recognition software    Read the chart carefully and recognize, using context, where substitutions have occurred   ==  Yas Montelongo  Internal Medicine Residency PGY-3

## 2021-02-05 NOTE — TELEPHONE ENCOUNTER
PLEASE SEE 02/11/2021 MAY[facility]'S TELEPHONE NOTE      02/05/2021-PT STILL IN HOSPITAL  03/01/2021 [facility] W/C-SPINE XRAY       ----- Message from Stevie Rodrigues sent at 2/4/2021  8:51 AM EST -----  Can we please schedule this gentleman for outpatient visit as snpx with [facility] in 2-4 weeks? Thanks  I have ordered x-rays

## 2021-02-05 NOTE — PHYSICAL THERAPY NOTE
Physical Therapy Evaluation     Patient's Name: Romayne Gaines    Admitting Diagnosis  Neck pain [M54 2]  Disequilibrium [R42]  Weakness [R53 1]  Cervical disc herniation [M50 20]    Problem List  Patient Active Problem List   Diagnosis    Cervical disc herniation    Paresthesia and pain of both upper extremities    Fall    Concussion without loss of consciousness    Acute pain of right shoulder    Alcohol abuse    Burn    Furuncle of axilla    Neck pain    Elevated blood pressure reading    Weakness of both lower extremities    Asthma    Tobacco abuse    Dizziness    Vitamin B12 deficiency       Past Medical History  Past Medical History:   Diagnosis Date    Asthma     Chronic pain     Hypertension     Thyroglossal duct cyst        Past Surgical History  Past Surgical History:   Procedure Laterality Date    THYROGLOSSAL DUCT EXCISION      THYROID SURGERY      THYROID SURGERY          02/05/21 1351   PT Last Visit   PT Visit Date 02/05/21   Note Type   Note type Evaluation   Pain Assessment   Pain Assessment Tool Pain Assessment not indicated - pt denies pain   Pain Score Worst Possible Pain   Pain Location/Orientation Location: Neck   Hospital Pain Intervention(s) Repositioned; Ambulation/increased activity   Home Living   Type of [address] [place] [address] One level;Stairs to enter with rails   Bathroom Shower/Tub Tub/shower unit   [employer] Standard   Additional Comments Pt reports living in basement at [facility], has full flight with B/L HR   Prior Function   Level of [employer] Independent with ADLs and functional mobility   Lives With Alone; Family;Friend(s)   Receives Help From Friend(s); Family   ADL Assistance Independent   IADLs Needs assistance   Comments Pt inconsistant with A available and PLOF   Restrictions/Precautions   Weight Bearing Precautions Per Order No   Other Precautions Fall Risk;Pain;Multiple lines; Impulsive   General   Family/Caregiver Present No   Cognition Orientation Level Oriented X4   Comments Reports frequent confusion   RLE Assessment   RLE Assessment WFL   LLE Assessment   LLE Assessment WFL   Coordination   Movements are Fluid and Coordinated 0   Coordination and Movement Description slow and guarded with pain   Sensation   (pt reports numbness in ring and little finger on each hand)   Bed Mobility   Supine to Sit 6  Modified independent   Sit to Supine 6  Modified independent   Transfers   Sit to Stand 5  Supervision   Stand to Sit 5  Supervision   Stand pivot 5  Supervision   Ambulation/Elevation   Gait pattern Excessively slow; Shuffling;Decreased foot clearance   Gait Assistance 4  Minimal assist   Additional items Assist x 1   Assistive Device None   Distance 12+12   Balance   Static Sitting Good   Static Standing Fair +   Ambulatory Poor   Endurance Deficit   Endurance Deficit Yes   Endurance Deficit Description limited by willingness to participate 2* to pain   Activity Tolerance   Activity Tolerance Patient limited by fatigue;Patient limited by pain   Medical Staff Made Aware OT   Nurse Made Aware yes, nsg gave clearance to work with pt   Assessment   Prognosis Good   Problem List Impaired balance;Decreased endurance;Decreased mobility;Pain;Decreased safety awareness;Decreased cognition;Decreased coordination   Assessment Pt is 38 y o  male seen for PT evaluation s/p admit to [facility] on 2/3/2021 w/ Neck pain  PT consulted to assess pt's functional mobility and d/c needs  Order placed for PT eval and tx  Comorbidities affecting pt's physical performance at time of assessment include:  has a past medical history of Asthma, Chronic pain, Hypertension, and Thyroglossal duct cyst  PTA, pt was ambulates community distances and elevations and has 15 BACILIO  Pt inconsistent throughout interview with prior appointments stating "I have so much confusion"  Initially reported that never had injury prior to fall in august when all pain began/ confusion  Later reporting that he has been seen in the past by pain management 4 years prior  Pt has been in hospital multiple times for various complaints  Unable to recall type of physician or group seen  Pt reports he has not attempted OP PT for cervical pain or post concussive symptoms  Personal factors affecting pt at time of IE include: stairs to enter home, decreased cognition, positive fall history, inability to perform IADLs and inability to perform ADLs  Please find objective findings from PT assessment regarding body systems outlined above with impairments and limitations including impaired balance, gait deviations, pain, decreased activity tolerance and decreased safety awareness  Pt demonstrated ability to complete bed mobility with increased time and S  Noted increased pain sitting EOB  Good dynamic sitting balance to complete LE dressing  Pt reports increased dizziness in standing which he contributed to medication he received in the AM  Noted 2 LOB during ambulation requiring lateral stepping response & close therapist S/tactile cue to recover  The following objective measures performed on IE also reveal limitations: AM-PAC 6-Clicks: 23/50  Pt's clinical presentation is currently unstable/unpredictable seen in pt's presentation of pain  Pt to benefit from continued PT tx to address deficits as defined above and maximize level of functional independent mobility and consistency  Pt perseverating on wishes to have case management to follow him and A with all appointments and need for cervical surgery  From PT/mobility standpoint, recommendation at time of d/c would be home with increased support from family and friends, OP PT pending progress in order to facilitate return to PLOF  Goals   Patient Goals To have sx and be painfree   STG Expiration Date 02/17/21   Short Term Goal #1 1  Complete bed mobility and transfers I to decrease need for caregiver in home   2  Ambulate 300' I to complete household and community mobility without A  3  Improve dynamic balance to good to decrease need for UE support during ambulation  4  Be educated & demonstate 12 steps to be able to enter home without A  Plan   Treatment/Interventions OT; Spoke to case management;Spoke to nursing;Gait training;Bed mobility; Patient/family training; Endurance training;LE strengthening/ROM; Functional transfer training   PT Frequency Other (Comment);2-3x/wk   Recommendation   PT Discharge Recommendation Return to previous environment with social support;Home with skilled therapy  (recommend pt have increased A prn 2* to LOB   OP PT)   PT - OK to Discharge Yes   Additional Comments with increased A    AM-PAC Basic Mobility Inpatient   Turning in Bed Without Bedrails 4   Lying on Back to Sitting on Edge of Flat Bed 4   Moving Bed to Chair 4   Standing Up From Chair 3   Walk in Room 2   Climb 3-5 Stairs 2   Basic Mobility Inpatient Raw Score 19   Basic Mobility Standardized Score 42 48           Faith Bhatti, PT

## 2021-02-06 VITALS
TEMPERATURE: 96.4 F | OXYGEN SATURATION: 99 % | DIASTOLIC BLOOD PRESSURE: 92 MMHG | BODY MASS INDEX: 25.86 KG/M2 | RESPIRATION RATE: 16 BRPM | HEIGHT: 66 IN | WEIGHT: 160.94 LBS | HEART RATE: 64 BPM | SYSTOLIC BLOOD PRESSURE: 139 MMHG

## 2021-02-06 RX ORDER — OXYCODONE HYDROCHLORIDE 10 MG/1
10 TABLET ORAL EVERY 4 HOURS PRN
Qty: 42 TABLET | Refills: 0 | Status: SHIPPED | OUTPATIENT
Start: 2021-02-06 | End: 2021-02-13

## 2021-02-06 RX ORDER — LIDOCAINE 50 MG/G
1 PATCH TOPICAL DAILY
Qty: 30 PATCH | Refills: 0 | Status: SHIPPED | OUTPATIENT
Start: 2021-02-06 | End: 2022-02-02 | Stop reason: ALTCHOICE

## 2021-02-06 RX ORDER — POLYETHYLENE GLYCOL 3350 17 G/17G
17 POWDER, FOR SOLUTION ORAL DAILY PRN
Qty: 30 EACH | Refills: 0 | Status: SHIPPED | OUTPATIENT
Start: 2021-02-06 | End: 2022-02-02 | Stop reason: SDUPTHER

## 2021-02-06 RX ORDER — CELECOXIB 100 MG/1
100 CAPSULE ORAL 2 TIMES DAILY
Qty: 60 CAPSULE | Refills: 0 | Status: SHIPPED | OUTPATIENT
Start: 2021-02-06 | End: 2022-02-02 | Stop reason: ALTCHOICE

## 2021-02-06 RX ORDER — ACETAMINOPHEN 325 MG/1
975 TABLET ORAL EVERY 8 HOURS SCHEDULED
Qty: 1 BOTTLE | Refills: 1 | Status: SHIPPED | OUTPATIENT
Start: 2021-02-06 | End: 2022-04-04 | Stop reason: HOSPADM

## 2021-02-06 RX ORDER — DULOXETIN HYDROCHLORIDE 30 MG/1
30 CAPSULE, DELAYED RELEASE ORAL 2 TIMES DAILY
Qty: 60 CAPSULE | Refills: 0 | Status: SHIPPED | OUTPATIENT
Start: 2021-02-06 | End: 2022-02-02 | Stop reason: ALTCHOICE

## 2021-02-06 RX ORDER — METHOCARBAMOL 500 MG/1
500 TABLET, FILM COATED ORAL EVERY 6 HOURS PRN
Qty: 60 TABLET | Refills: 0 | Status: SHIPPED | OUTPATIENT
Start: 2021-02-06 | End: 2022-02-02 | Stop reason: ALTCHOICE

## 2021-02-06 RX ADMIN — MORPHINE SULFATE 4 MG: 4 INJECTION INTRAVENOUS at 03:38

## 2021-02-06 RX ADMIN — GABAPENTIN 100 MG: 100 CAPSULE ORAL at 08:39

## 2021-02-06 RX ADMIN — MORPHINE SULFATE 4 MG: 4 INJECTION INTRAVENOUS at 08:36

## 2021-02-06 RX ADMIN — METHOCARBAMOL 750 MG: 500 TABLET, FILM COATED ORAL at 08:38

## 2021-02-06 RX ADMIN — OXYCODONE HYDROCHLORIDE 10 MG: 10 TABLET ORAL at 02:38

## 2021-02-06 RX ADMIN — OXYCODONE HYDROCHLORIDE 10 MG: 10 TABLET ORAL at 06:45

## 2021-02-06 RX ADMIN — ALBUTEROL SULFATE 2 PUFF: 90 AEROSOL, METERED RESPIRATORY (INHALATION) at 09:07

## 2021-02-06 RX ADMIN — MORPHINE SULFATE 4 MG: 4 INJECTION INTRAVENOUS at 12:42

## 2021-02-06 RX ADMIN — DOCUSATE SODIUM AND SENNOSIDES 2 TABLET: 8.6; 5 TABLET ORAL at 08:53

## 2021-02-06 RX ADMIN — METHOCARBAMOL 750 MG: 500 TABLET, FILM COATED ORAL at 02:38

## 2021-02-06 RX ADMIN — OXYCODONE HYDROCHLORIDE 10 MG: 10 TABLET ORAL at 11:12

## 2021-02-06 RX ADMIN — ALBUTEROL SULFATE 2 PUFF: 90 AEROSOL, METERED RESPIRATORY (INHALATION) at 05:02

## 2021-02-06 RX ADMIN — ACETAMINOPHEN 975 MG: 325 TABLET, FILM COATED ORAL at 05:49

## 2021-02-06 RX ADMIN — DULOXETINE 30 MG: 30 CAPSULE, DELAYED RELEASE ORAL at 08:39

## 2021-02-06 NOTE — PLAN OF CARE
Problem: Potential for Falls  Goal: Patient will remain free of falls  Description: INTERVENTIONS:  - Assess patient frequently for physical needs  -  Identify cognitive and physical deficits and behaviors that affect risk of falls  -  [place] fall precautions as indicated by assessment   - Educate patient/family on patient safety including physical limitations  - Instruct patient to call for assistance with activity based on assessment  - Modify environment to reduce risk of injury  - Consider OT/PT consult to assist with strengthening/mobility  Outcome: Adequate for Discharge     Problem: PAIN - ADULT  Goal: Verbalizes/displays adequate comfort level or baseline comfort level  Description: Interventions:  - Encourage patient to monitor pain and request assistance  - Assess pain using appropriate pain scale  - Administer analgesics based on type and severity of pain and evaluate response  - Implement non-pharmacological measures as appropriate and evaluate response  - Consider cultural and social influences on pain and pain management  - Notify physician/advanced practitioner if interventions unsuccessful or patient reports new pain  Outcome: Adequate for Discharge     Problem: SAFETY ADULT  Goal: Patient will remain free of falls  Description: INTERVENTIONS:  - Assess patient frequently for physical needs  -  Identify cognitive and physical deficits and behaviors that affect risk of falls    -  [place] fall precautions as indicated by assessment   - Educate patient/family on patient safety including physical limitations  - Instruct patient to call for assistance with activity based on assessment  - Modify environment to reduce risk of injury  - Consider OT/PT consult to assist with strengthening/mobility  Outcome: Adequate for Discharge  Goal: Maintain or return to baseline ADL function  Description: INTERVENTIONS:  -  Assess patient's ability to carry out ADLs; assess patient's baseline for ADL function and identify physical deficits which impact ability to perform ADLs (bathing, care of mouth/teeth, toileting, grooming, dressing, etc )  - Assess/evaluate cause of self-care deficits   - Assess range of motion  - Assess patient's mobility; develop plan if impaired  - Assess patient's need for assistive devices and provide as appropriate  - Encourage maximum independence but intervene and supervise when necessary  - Involve family in performance of ADLs  - Assess for home care needs following discharge   - Consider OT consult to assist with ADL evaluation and planning for discharge  - Provide patient education as appropriate  Outcome: Adequate for Discharge  Goal: Maintain or return mobility status to optimal level  Description: INTERVENTIONS:  - Assess patient's baseline mobility status (ambulation, transfers, stairs, etc )    - Identify cognitive and physical deficits and behaviors that affect mobility  - Identify mobility aids required to assist with transfers and/or ambulation (gait belt, sit-to-stand, lift, walker, cane, etc )  - [place] fall precautions as indicated by assessment  - Record patient progress and toleration of activity level on Mobility SBAR; progress patient to next Phase/Stage  - Instruct patient to call for assistance with activity based on assessment  - Consider rehabilitation consult to assist with strengthening/weightbearing, etc   Outcome: Adequate for Discharge     Problem: DISCHARGE PLANNING  Goal: Discharge to home or other facility with appropriate resources  Description: INTERVENTIONS:  - Identify barriers to discharge w/patient and caregiver  - Arrange for needed discharge resources and transportation as appropriate  - Identify discharge learning needs (meds, wound care, etc )  - Arrange for interpretive services to assist at discharge as needed  - Refer to Case Management Department for coordinating discharge planning if the patient needs post-hospital services based on physician/advanced practitioner order or complex needs related to functional status, cognitive ability, or social support system  Outcome: Completed

## 2021-02-06 NOTE — NURSING NOTE
Patient called this nurse to room this morning and asked for PRN morphine  I told patient I had his morning medication and I would like to give him them, take his vital signs and then evaluate what PRN pain medication he could receive  Patient became very angry stating he would not allow me to take vital signs or take his scheduled medication until he received morphine  I attempted to explain to the patient that morphine can lower his blood pressure, so I wanted to ensure it was safe to give it to him, however, he began screaming and cursing at this nurse stating I was not listening to him and that he wanted a new nurse  Informed patient he was on a pediatric floor and that he could not be cursing or screaming so I would give him some time to calm down while I message the physician to see if we can get the dosage of his medications increased  Patient continued to curse and scream and request new nurse  Charge nurse made aware  Report given to [place], RN who will take over patient's care at this time

## 2021-02-06 NOTE — DISCHARGE INSTRUCTIONS
Cervical Disc Herniation   WHAT YOU NEED TO KNOW:   Cervical disc herniation occurs when a cervical disc bulges out  Cervical discs are natural, spongy cushions between the vertebrae (bones) in your neck  The bulging disc may press on your nerves or spinal cord  DISCHARGE INSTRUCTIONS:   Return to the emergency department if:   · You suddenly have trouble breathing  · You lose feeling in one or both of your arms  · You are suddenly not able to move your neck, or one or both of your arms  · You are not able to move one or both of your legs  Contact your healthcare provider if:   · Your pain gets worse, even after you take medicine  · Your voice suddenly becomes hoarse  · You have trouble swallowing  · You have questions or concerns about your condition or care  Medicines: You may need any of the following:  · NSAIDs , such as ibuprofen, help decrease swelling and pain  NSAIDs can cause stomach bleeding or kidney problems in certain people  If you take blood thinner medicine, always ask your healthcare provider if NSAIDs are safe for you  Always read the medicine label and follow directions  · Prescription pain medicine  may be given  Ask how to take this medicine safely  · Muscle relaxers  decrease pain and muscle spasms  · Take your medicine as directed  Contact your healthcare provider if you think your medicine is not helping or if you have side effects  Tell him of her if you are allergic to any medicine  Keep a list of the medicines, vitamins, and herbs you take  Include the amounts, and when and why you take them  Bring the list or the pill bottles to follow-up visits  Carry your medicine list with you in case of an emergency  Activity:  Your healthcare provider may have you rest in bed to prevent further injury to your neck  Ask how long you should rest and when you can return to your daily activities    Heat:  Apply heat on your neck for 20 to 30 minutes every 2 hours for as many days as directed  Heat helps decrease pain and muscle spasms  Ice:  Apply ice on your neck for 15 to 20 minutes every hour or as directed  Use an ice pack, or put crushed ice in a plastic bag  Cover it with a towel before you apply it to your skin  Ice helps prevent tissue damage and decreases swelling and pain  Physical therapy:  A physical therapist teaches you exercises to make your neck muscles stronger  A physical therapist also teaches you stretches to decrease your pain  Follow up with your healthcare provider as directed:  Write down your questions so you remember to ask them during your visits  © Copyright [address] Information is for End User's use only and may not be sold, redistributed or otherwise used for commercial purposes  All illustrations and images included in CareNotes® are the copyrighted property of [employer]  or Renee Quinonez  The above information is an  only  It is not intended as medical advice for individual conditions or treatments  Talk to your doctor, nurse or pharmacist before following any medical regimen to see if it is safe and effective for you

## 2021-02-06 NOTE — DISCHARGE SUMMARY
INTERNAL MEDICINE RESIDENCY DISCHARGE SUMMARY     Romayne Gaines   38 y o  male  MRN: [id number]  Room/Bed: [facility] 863/[facility] 468-37     [address] [facility]    Encounter: [id number]    Principal Problem:    Neck pain  Active Problems:    Cervical disc herniation    Elevated blood pressure reading    Weakness of both lower extremities    Asthma    Tobacco abuse    Dizziness    Vitamin B12 deficiency      [address] COURSE     Patient is a 66-year-old male with past medical history significant for chronic neck pain secondary to C5-C7 cervical disc herniation  Patient presented secondary to acute worsening of his neck pain  Repeat MRI of the cervical spine re-identified protrusion type disc herniations at C5-C6 and C6-C7 causing mild flattening of the right aspect of the cervical cord with no signal abnormality  Degree of degenerative changes were not significantly changed from prior MRI in August of 2020  Was evaluated by neuro surgery who recommend no interventions at this time and recommend outpatient follow-up  Patient was also evaluated by acute pain service and medications were adjusted to help with better pain control  Patient also had been complaining of dizziness and disequilibrium and was evaluated by Neurology who recommended outpatient follow-up with PCP  Patient must establish with primary care doctor outside of the hospital and will need to establish with acute pain as well  Patient was discharged on a regimen of scheduled Tylenol, Celebrex, Cymbalta, in addition to lidocaine patches and Robaxin as needed  Patient was provided with a prescription for oxycodone 10 mg to be used every 4 hours as needed for severe pain (42 pills) for a total of 7 day supply    Patient understands that this medication is only to be prescribed in the short term and that he will need to establish with pain management and continue with multimodal pain approach for long-term care of his chronic pain     DISCHARGE INFORMATION       Physical Exam  Constitutional:       Appearance: Normal appearance  He is not ill-appearing  HENT:      Head: Normocephalic and atraumatic  Eyes:      General: No scleral icterus  Right eye: No discharge  Left eye: No discharge  Cardiovascular:      Rate and Rhythm: Normal rate and regular rhythm  Heart sounds: No murmur  No friction rub  Pulmonary:      Effort: Pulmonary effort is normal       Breath sounds: Normal breath sounds  No wheezing or rales  Abdominal:      General: Abdomen is flat  There is no distension  Palpations: Abdomen is soft  Tenderness: There is no abdominal tenderness  Musculoskeletal:         General: Tenderness (Diffuse tenderness to palpation in trapezius area) present  No swelling or deformity  Skin:     General: Skin is warm and dry  Findings: No erythema  Neurological:      Mental Status: He is alert and oriented to person, place, and time  Mental status is at baseline  Motor: No weakness  Psychiatric:         Mood and Affect: Mood normal          Behavior: Behavior normal        PCP at Discharge: None    Admitting Provider: Shruti Sherman MD  Admission Date: 2/3/2021    Discharge Provider: No att  providers found  Discharge Date:  2/6/21    Discharge Disposition: Home/Self Care  Discharge Condition: stable  Discharge with Lines: no    Discharge Diet: regular diet  Activity Restrictions: none  Test Results Pending at Discharge: None    Discharge Diagnoses:  Principal Problem:    Neck pain  Active Problems:    Cervical disc herniation    Elevated blood pressure reading    Weakness of both lower extremities    Asthma    Tobacco abuse    Dizziness    Vitamin B12 deficiency  Resolved Problems:    * No resolved hospital problems   *    Consulting Providers:     Diagnostic & Therapeutic Procedures Performed:  Mri Brain Wo Contrast    Result Date: 2/4/2021  Impression: Unremarkable unenhanced MRI of the brain  Workstation performed: [id number]     Mri Cervical Spine Wo Contrast    Result Date: 2/4/2021  Impression: No evidence of acute cervical spine injury  Reidentified protrusion-type disc herniations at C5/C6 and C6-C7 causing mild flattening of the right aspect of the cervical spinal cord with no cord signal abnormality  There is mild diffuse developmental spinal canal narrowing  The degree of degenerative changes have not significant changed when compared to an MRI cervical spine dated August 4, 2020  Workstation performed: [id number]     Mri Lumbar Spine Wo Contrast    Result Date: 2/4/2021  Impression: No evidence of acute lumbar spine injury  Mild degenerative changes of lower lumbar spine causing no significant canal stenosis or neural foraminal narrowing   Workstation performed: [id number]     Code Status: Level 1 - Full Code  Advance Directive & Living Will: <no information>  Power of :    POLST:    Discharge Medication List as of 2/6/2021  1:01 PM      START taking these medications    Details   celecoxib (CeleBREX) 100 mg capsule Take 1 capsule (100 mg total) by mouth 2 (two) times a day, Starting Sat 2/6/2021, Normal      DULoxetine (CYMBALTA) 30 mg delayed release capsule Take 1 capsule (30 mg total) by mouth 2 (two) times a day, Starting Sat 2/6/2021, Normal      lidocaine (LIDODERM) 5 % Apply 1 patch topically daily Remove & Discard patch within 12 hours or as directed by MD, Starting Sat 2/6/2021, Normal      polyethylene glycol (MIRALAX) 17 g packet Take 17 g by mouth daily as needed (Constipation), Starting Sat 2/6/2021, Normal           Discharge Medication List as of 2/6/2021  1:01 PM      STOP taking these medications       chlorhexidine (HIBICLENS) 4 % external liquid Comments:   Reason for Stopping:         chlorhexidine (HIBICLENS) 4 % external liquid Comments:   Reason for Stopping:         gabapentin (NEURONTIN) 100 mg capsule Comments:   Reason for Stopping: ibuprofen (MOTRIN) 800 mg tablet Comments:   Reason for Stopping:         triamcinolone (KENALOG) 0 1 % cream Comments:   Reason for Stopping:             Discharge Medication List as of 2/6/2021  1:01 PM      CONTINUE these medications which have CHANGED    Details   acetaminophen (TYLENOL) 325 mg tablet Take 3 tablets (975 mg total) by mouth every 8 (eight) hours, Starting Sat 2/6/2021, Normal      methocarbamol (ROBAXIN) 500 mg tablet Take 1 tablet (500 mg total) by mouth every 6 (six) hours as needed for muscle spasms, Starting Sat 2/6/2021, Normal      oxyCODONE (ROXICODONE) 10 MG TABS Take 1 tablet (10 mg total) by mouth every 4 (four) hours as needed for severe pain for up to 7 daysMax Daily Amount: 60 mg, Starting Sat 2/6/2021, Until Sat 2/13/2021, Normal           Discharge Medication List as of 2/6/2021  1:01 PM      CONTINUE these medications which have NOT CHANGED    Details   albuterol (PROVENTIL HFA,VENTOLIN HFA) 90 mcg/act inhaler Inhale 2 puffs every 4 (four) hours as needed for wheezing, Starting Fri 9/27/2019, Print      senna-docusate sodium (SENOKOT S) 8 6-50 mg per tablet Take 2 tablets by mouth daily, Starting Thu 8/6/2020, No Print           Allergies:  No Known Allergies    FOLLOW-UP     PCP Outpatient Follow-up:  Provided with information for [facility] to establish with PCP    Consulting Providers Follow-up:  Pain management    Active Issues Requiring Follow-up:   none    Discharge Statement:   I spent 30 minutes minutes discharging the patient  This time was spent on the day of discharge  I had direct contact with the patient on the day of discharge  Additional documentation is required if more than 30 minutes were spent on discharge  Portions of the record may have been created with voice recognition software  Occasional wrong word or "sound a like" substitutions may have occurred due to the inherent limitations of voice recognition software    Read the chart carefully and recognize, using context, where substitutions have occurred     ==  Tyler Tristan, [address] [facility]  Internal Medicine Resident PGY-3

## 2021-02-11 ENCOUNTER — TELEPHONE (OUTPATIENT)
Dept: NEUROSURGERY | Facility: CLINIC | Age: 36
End: 2021-02-11

## 2021-02-11 NOTE — TELEPHONE ENCOUNTER
02/11/2021-CALLED PT, CONFIRMED 03/01/2021 APT AND TO HAVE XRAY'S COMPLETED PRIOR TO APT  02/04/2021-(MARCELLE)SIGNED OFF  FOLLOW UP IN 2 WEEKS OUTPATIENT WITH X-RAYS

## 2021-02-26 DIAGNOSIS — M50.20 CERVICAL DISC HERNIATION: Primary | ICD-10-CM

## 2021-02-27 DIAGNOSIS — M54.2 NECK PAIN: ICD-10-CM

## 2021-02-27 DIAGNOSIS — M50.20 CERVICAL DISC HERNIATION: ICD-10-CM

## 2021-03-01 ENCOUNTER — TELEPHONE (OUTPATIENT)
Dept: NEUROSURGERY | Facility: CLINIC | Age: 36
End: 2021-03-01

## 2021-03-01 NOTE — TELEPHONE ENCOUNTER
03/01/2021-CALLED PT, LEFT MESSAGE ON MACHINE TO RESCHEDULE TODAY'S "NO SHOW" APT, XRAY NEEDED PRIOR TO APT, AND TO ADVISE THAT SINCE HE IS A HOSPITAL F/U WE CAN SEE HIM FOR 1 VISIT BUT DO NOT PAR WITH HIS INSURANCE

## 2021-03-14 RX ORDER — CELECOXIB 100 MG/1
CAPSULE ORAL
Qty: 60 CAPSULE | Refills: 0 | OUTPATIENT
Start: 2021-03-14

## 2021-03-14 RX ORDER — DULOXETIN HYDROCHLORIDE 30 MG/1
CAPSULE, DELAYED RELEASE ORAL
Qty: 60 CAPSULE | Refills: 0 | OUTPATIENT
Start: 2021-03-14

## 2021-03-17 ENCOUNTER — TELEPHONE (OUTPATIENT)
Dept: NEUROSURGERY | Facility: CLINIC | Age: 36
End: 2021-03-17

## 2021-10-27 ENCOUNTER — HOSPITAL ENCOUNTER (EMERGENCY)
Facility: HOSPITAL | Age: 36
Discharge: HOME | End: 2021-10-27
Attending: EMERGENCY MEDICINE | Admitting: EMERGENCY MEDICINE
Payer: MEDICARE

## 2021-10-27 VITALS
HEART RATE: 87 BPM | SYSTOLIC BLOOD PRESSURE: 157 MMHG | DIASTOLIC BLOOD PRESSURE: 106 MMHG | RESPIRATION RATE: 24 BRPM | OXYGEN SATURATION: 99 %

## 2021-10-27 DIAGNOSIS — M54.2 NECK PAIN: ICD-10-CM

## 2021-10-27 DIAGNOSIS — M79.601 PARESTHESIA AND PAIN OF BOTH UPPER EXTREMITIES: ICD-10-CM

## 2021-10-27 DIAGNOSIS — M50.20 CERVICAL DISC HERNIATION: Primary | ICD-10-CM

## 2021-10-27 DIAGNOSIS — M79.602 PARESTHESIA AND PAIN OF BOTH UPPER EXTREMITIES: ICD-10-CM

## 2021-10-27 DIAGNOSIS — R20.2 PARESTHESIA AND PAIN OF BOTH UPPER EXTREMITIES: ICD-10-CM

## 2021-10-27 PROCEDURE — 96372 THER/PROPH/DIAG INJ SC/IM: CPT

## 2021-10-27 PROCEDURE — 99283 EMERGENCY DEPT VISIT LOW MDM: CPT

## 2021-10-27 PROCEDURE — 99284 EMERGENCY DEPT VISIT MOD MDM: CPT | Performed by: EMERGENCY MEDICINE

## 2021-10-27 RX ORDER — LIDOCAINE 50 MG/G
1 PATCH TOPICAL DAILY
Qty: 7 PATCH | Refills: 0 | Status: SHIPPED | OUTPATIENT
Start: 2021-10-27 | End: 2022-02-23 | Stop reason: ALTCHOICE

## 2021-10-27 RX ORDER — METHOCARBAMOL 500 MG/1
500 TABLET, FILM COATED ORAL 2 TIMES DAILY
Qty: 20 TABLET | Refills: 0 | Status: SHIPPED | OUTPATIENT
Start: 2021-10-27 | End: 2022-02-02 | Stop reason: ALTCHOICE

## 2021-10-27 RX ORDER — LIDOCAINE 50 MG/G
1 PATCH TOPICAL ONCE
Status: DISCONTINUED | OUTPATIENT
Start: 2021-10-27 | End: 2021-10-27 | Stop reason: HOSPADM

## 2021-10-27 RX ORDER — KETOROLAC TROMETHAMINE 30 MG/ML
15 INJECTION, SOLUTION INTRAMUSCULAR; INTRAVENOUS ONCE
Status: COMPLETED | OUTPATIENT
Start: 2021-10-27 | End: 2021-10-27

## 2021-10-27 RX ORDER — OXYCODONE HYDROCHLORIDE 10 MG/1
10 TABLET ORAL ONCE
Status: COMPLETED | OUTPATIENT
Start: 2021-10-27 | End: 2021-10-27

## 2021-10-27 RX ORDER — METHOCARBAMOL 500 MG/1
500 TABLET, FILM COATED ORAL ONCE
Status: COMPLETED | OUTPATIENT
Start: 2021-10-27 | End: 2021-10-27

## 2021-10-27 RX ORDER — OXYCODONE HYDROCHLORIDE 5 MG/1
5 TABLET ORAL EVERY 4 HOURS PRN
Qty: 15 TABLET | Refills: 0 | Status: SHIPPED | OUTPATIENT
Start: 2021-10-27 | End: 2022-02-02 | Stop reason: ALTCHOICE

## 2021-10-27 RX ORDER — ACETAMINOPHEN 325 MG/1
975 TABLET ORAL ONCE
Status: COMPLETED | OUTPATIENT
Start: 2021-10-27 | End: 2021-10-27

## 2021-10-27 RX ORDER — IBUPROFEN 600 MG/1
600 TABLET ORAL EVERY 6 HOURS PRN
Qty: 30 TABLET | Refills: 0 | Status: SHIPPED | OUTPATIENT
Start: 2021-10-27 | End: 2022-02-23 | Stop reason: CLARIF

## 2021-10-27 RX ORDER — HYDROMORPHONE HYDROCHLORIDE 2 MG/1
1 TABLET ORAL ONCE
Status: DISCONTINUED | OUTPATIENT
Start: 2021-10-27 | End: 2021-10-27 | Stop reason: HOSPADM

## 2021-10-27 RX ORDER — GABAPENTIN 100 MG/1
100 CAPSULE ORAL ONCE
Status: DISCONTINUED | OUTPATIENT
Start: 2021-10-27 | End: 2021-10-27

## 2021-10-27 RX ORDER — OXYCODONE HCL 10 MG/1
10 TABLET, FILM COATED, EXTENDED RELEASE ORAL EVERY 12 HOURS SCHEDULED
Status: DISCONTINUED | OUTPATIENT
Start: 2021-10-27 | End: 2021-10-27

## 2021-10-27 RX ADMIN — METHOCARBAMOL 500 MG: 500 TABLET ORAL at 16:05

## 2021-10-27 RX ADMIN — ACETAMINOPHEN 975 MG: 325 TABLET, FILM COATED ORAL at 16:04

## 2021-10-27 RX ADMIN — LIDOCAINE 5% 1 PATCH: 700 PATCH TOPICAL at 16:05

## 2021-10-27 RX ADMIN — KETOROLAC TROMETHAMINE 15 MG: 30 INJECTION, SOLUTION INTRAMUSCULAR at 16:05

## 2021-10-27 RX ADMIN — OXYCODONE HYDROCHLORIDE 10 MG: 10 TABLET ORAL at 16:05

## 2021-11-22 ENCOUNTER — HOSPITAL ENCOUNTER (OUTPATIENT)
Facility: HOSPITAL | Age: 36
Setting detail: OBSERVATION
Discharge: LEFT BEFORE DISCHARGE | End: 2021-11-22
Attending: EMERGENCY MEDICINE | Admitting: INTERNAL MEDICINE
Payer: COMMERCIAL

## 2021-11-22 ENCOUNTER — APPOINTMENT (EMERGENCY)
Dept: RADIOLOGY | Facility: HOSPITAL | Age: 36
End: 2021-11-22
Payer: COMMERCIAL

## 2021-11-22 VITALS
TEMPERATURE: 97.9 F | HEIGHT: 66 IN | SYSTOLIC BLOOD PRESSURE: 167 MMHG | HEART RATE: 86 BPM | BODY MASS INDEX: 24.91 KG/M2 | DIASTOLIC BLOOD PRESSURE: 90 MMHG | WEIGHT: 155 LBS | OXYGEN SATURATION: 100 % | RESPIRATION RATE: 20 BRPM

## 2021-11-22 DIAGNOSIS — F11.10 NARCOTIC ABUSE (HCC): ICD-10-CM

## 2021-11-22 DIAGNOSIS — R52 INTRACTABLE PAIN: Primary | ICD-10-CM

## 2021-11-22 DIAGNOSIS — Z76.5 DRUG-SEEKING BEHAVIOR: ICD-10-CM

## 2021-11-22 DIAGNOSIS — M50.20 CERVICAL DISC HERNIATION: ICD-10-CM

## 2021-11-22 DIAGNOSIS — R06.2 WHEEZING: ICD-10-CM

## 2021-11-22 DIAGNOSIS — M54.2 NECK PAIN: ICD-10-CM

## 2021-11-22 PROCEDURE — NC001 PR NO CHARGE: Performed by: INTERNAL MEDICINE

## 2021-11-22 PROCEDURE — 72141 MRI NECK SPINE W/O DYE: CPT

## 2021-11-22 PROCEDURE — 96376 TX/PRO/DX INJ SAME DRUG ADON: CPT

## 2021-11-22 PROCEDURE — 99285 EMERGENCY DEPT VISIT HI MDM: CPT

## 2021-11-22 PROCEDURE — G1004 CDSM NDSC: HCPCS

## 2021-11-22 PROCEDURE — 99285 EMERGENCY DEPT VISIT HI MDM: CPT | Performed by: EMERGENCY MEDICINE

## 2021-11-22 PROCEDURE — 96374 THER/PROPH/DIAG INJ IV PUSH: CPT

## 2021-11-22 PROCEDURE — 96375 TX/PRO/DX INJ NEW DRUG ADDON: CPT

## 2021-11-22 PROCEDURE — 94640 AIRWAY INHALATION TREATMENT: CPT

## 2021-11-22 RX ORDER — KETOROLAC TROMETHAMINE 30 MG/ML
15 INJECTION, SOLUTION INTRAMUSCULAR; INTRAVENOUS ONCE
Status: COMPLETED | OUTPATIENT
Start: 2021-11-22 | End: 2021-11-22

## 2021-11-22 RX ORDER — DIAZEPAM 5 MG/ML
5 INJECTION, SOLUTION INTRAMUSCULAR; INTRAVENOUS ONCE
Status: COMPLETED | OUTPATIENT
Start: 2021-11-22 | End: 2021-11-22

## 2021-11-22 RX ORDER — HYDROMORPHONE HCL/PF 1 MG/ML
1 SYRINGE (ML) INJECTION
Status: DISCONTINUED | OUTPATIENT
Start: 2021-11-22 | End: 2021-11-23 | Stop reason: HOSPADM

## 2021-11-22 RX ORDER — MORPHINE SULFATE 15 MG/1
15 TABLET ORAL EVERY 4 HOURS PRN
Status: DISCONTINUED | OUTPATIENT
Start: 2021-11-22 | End: 2021-11-23 | Stop reason: HOSPADM

## 2021-11-22 RX ORDER — HYDROMORPHONE HCL/PF 1 MG/ML
0.5 SYRINGE (ML) INJECTION ONCE
Status: DISCONTINUED | OUTPATIENT
Start: 2021-11-22 | End: 2021-11-22

## 2021-11-22 RX ORDER — MORPHINE SULFATE 15 MG/1
15 TABLET, FILM COATED, EXTENDED RELEASE ORAL EVERY 8 HOURS SCHEDULED
Status: DISCONTINUED | OUTPATIENT
Start: 2021-11-22 | End: 2021-11-23 | Stop reason: HOSPADM

## 2021-11-22 RX ORDER — FENTANYL CITRATE 50 UG/ML
1 INJECTION, SOLUTION INTRAMUSCULAR; INTRAVENOUS ONCE
Status: COMPLETED | OUTPATIENT
Start: 2021-11-22 | End: 2021-11-22

## 2021-11-22 RX ORDER — HYDROMORPHONE HCL/PF 1 MG/ML
1 SYRINGE (ML) INJECTION ONCE
Status: COMPLETED | OUTPATIENT
Start: 2021-11-22 | End: 2021-11-22

## 2021-11-22 RX ORDER — SENNOSIDES 8.6 MG
1 TABLET ORAL
Status: DISCONTINUED | OUTPATIENT
Start: 2021-11-22 | End: 2021-11-23 | Stop reason: HOSPADM

## 2021-11-22 RX ORDER — NICOTINE 21 MG/24HR
1 PATCH, TRANSDERMAL 24 HOURS TRANSDERMAL DAILY
Status: DISCONTINUED | OUTPATIENT
Start: 2021-11-23 | End: 2021-11-23 | Stop reason: HOSPADM

## 2021-11-22 RX ORDER — ALBUTEROL SULFATE 2.5 MG/3ML
5 SOLUTION RESPIRATORY (INHALATION) ONCE
Status: COMPLETED | OUTPATIENT
Start: 2021-11-22 | End: 2021-11-22

## 2021-11-22 RX ORDER — HYDROMORPHONE HCL/PF 1 MG/ML
1 SYRINGE (ML) INJECTION ONCE
Status: DISCONTINUED | OUTPATIENT
Start: 2021-11-22 | End: 2021-11-22

## 2021-11-22 RX ORDER — MORPHINE SULFATE 4 MG/ML
4 INJECTION, SOLUTION INTRAMUSCULAR; INTRAVENOUS ONCE
Status: COMPLETED | OUTPATIENT
Start: 2021-11-22 | End: 2021-11-22

## 2021-11-22 RX ORDER — METHOCARBAMOL 500 MG/1
500 TABLET, FILM COATED ORAL EVERY 6 HOURS SCHEDULED
Status: DISCONTINUED | OUTPATIENT
Start: 2021-11-23 | End: 2021-11-23 | Stop reason: HOSPADM

## 2021-11-22 RX ORDER — ACETAMINOPHEN 325 MG/1
975 TABLET ORAL EVERY 8 HOURS
Status: DISCONTINUED | OUTPATIENT
Start: 2021-11-22 | End: 2021-11-23 | Stop reason: HOSPADM

## 2021-11-22 RX ORDER — POLYETHYLENE GLYCOL 3350 17 G/17G
17 POWDER, FOR SOLUTION ORAL DAILY
Status: DISCONTINUED | OUTPATIENT
Start: 2021-11-23 | End: 2021-11-23 | Stop reason: HOSPADM

## 2021-11-22 RX ADMIN — KETOROLAC TROMETHAMINE 15 MG: 30 INJECTION, SOLUTION INTRAMUSCULAR at 19:46

## 2021-11-22 RX ADMIN — Medication 0.5 MG: at 14:02

## 2021-11-22 RX ADMIN — IPRATROPIUM BROMIDE 0.5 MG: 0.5 SOLUTION RESPIRATORY (INHALATION) at 14:02

## 2021-11-22 RX ADMIN — HYDROMORPHONE HYDROCHLORIDE 1 MG: 1 INJECTION, SOLUTION INTRAMUSCULAR; INTRAVENOUS; SUBCUTANEOUS at 18:09

## 2021-11-22 RX ADMIN — MORPHINE SULFATE 4 MG: 4 INJECTION INTRAVENOUS at 19:45

## 2021-11-22 RX ADMIN — DIAZEPAM 5 MG: 10 INJECTION, SOLUTION INTRAMUSCULAR; INTRAVENOUS at 13:59

## 2021-11-22 RX ADMIN — ALBUTEROL SULFATE 5 MG: 2.5 SOLUTION RESPIRATORY (INHALATION) at 14:00

## 2021-11-22 RX ADMIN — DIAZEPAM 5 MG: 10 INJECTION, SOLUTION INTRAMUSCULAR; INTRAVENOUS at 16:15

## 2022-02-02 ENCOUNTER — OFFICE VISIT (OUTPATIENT)
Dept: FAMILY MEDICINE CLINIC | Facility: CLINIC | Age: 37
End: 2022-02-02
Payer: MEDICARE

## 2022-02-02 VITALS
DIASTOLIC BLOOD PRESSURE: 84 MMHG | HEIGHT: 66 IN | OXYGEN SATURATION: 98 % | SYSTOLIC BLOOD PRESSURE: 142 MMHG | WEIGHT: 147 LBS | HEART RATE: 78 BPM | BODY MASS INDEX: 23.63 KG/M2 | RESPIRATION RATE: 17 BRPM | TEMPERATURE: 97.6 F

## 2022-02-02 DIAGNOSIS — R20.2 PARESTHESIA AND PAIN OF EXTREMITY: ICD-10-CM

## 2022-02-02 DIAGNOSIS — M50.20 CERVICAL DISC HERNIATION: Primary | ICD-10-CM

## 2022-02-02 DIAGNOSIS — M79.609 PARESTHESIA AND PAIN OF EXTREMITY: ICD-10-CM

## 2022-02-02 DIAGNOSIS — Z13.6 SCREENING FOR CARDIOVASCULAR CONDITION: ICD-10-CM

## 2022-02-02 DIAGNOSIS — Z13.1 SCREENING FOR DIABETES MELLITUS: ICD-10-CM

## 2022-02-02 DIAGNOSIS — R39.81 FUNCTIONAL URINARY INCONTINENCE: ICD-10-CM

## 2022-02-02 DIAGNOSIS — R15.1 FECAL SMEARING: ICD-10-CM

## 2022-02-02 DIAGNOSIS — R53.83 FATIGUE, UNSPECIFIED TYPE: ICD-10-CM

## 2022-02-02 DIAGNOSIS — K59.03 DRUG-INDUCED CONSTIPATION: ICD-10-CM

## 2022-02-02 DIAGNOSIS — Z13.29 SCREENING FOR THYROID DISORDER: ICD-10-CM

## 2022-02-02 PROBLEM — R32 URINARY INCONTINENCE: Status: ACTIVE | Noted: 2022-02-02

## 2022-02-02 PROCEDURE — 99204 OFFICE O/P NEW MOD 45 MIN: CPT | Performed by: FAMILY MEDICINE

## 2022-02-02 RX ORDER — OXYCODONE HYDROCHLORIDE AND ACETAMINOPHEN 5; 325 MG/1; MG/1
1 TABLET ORAL EVERY 4 HOURS PRN
Qty: 30 TABLET | Refills: 0 | Status: SHIPPED | OUTPATIENT
Start: 2022-02-02 | End: 2022-02-23 | Stop reason: ALTCHOICE

## 2022-02-02 RX ORDER — TIZANIDINE HYDROCHLORIDE 4 MG/1
4 CAPSULE, GELATIN COATED ORAL 3 TIMES DAILY PRN
Qty: 90 CAPSULE | Refills: 0 | Status: SHIPPED | OUTPATIENT
Start: 2022-02-02 | End: 2022-02-23 | Stop reason: CLARIF

## 2022-02-02 RX ORDER — POLYETHYLENE GLYCOL 3350 17 G/17G
17 POWDER, FOR SOLUTION ORAL DAILY PRN
Qty: 30 EACH | Refills: 0 | Status: SHIPPED | OUTPATIENT
Start: 2022-02-02

## 2022-02-02 RX ORDER — AMOXICILLIN 250 MG
2 CAPSULE ORAL DAILY
Refills: 0
Start: 2022-02-02 | End: 2022-04-04 | Stop reason: HOSPADM
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
## 2022-02-02 NOTE — ASSESSMENT & PLAN NOTE
Patient is drug-induced constipation secondary to medications  Will refill MiraLax and Senokot today  Advised patient take as needed    Follow-up in month

## 2022-02-02 NOTE — ASSESSMENT & PLAN NOTE
Some fecal incontinence may be secondary to neck trauma  Advised patient to schedule regular bathroom breaks

## 2022-02-02 NOTE — ASSESSMENT & PLAN NOTE
Will continue to monitor for now      Will address neck issue 1st     Advised patient to go to back from regularly to urinate  Follow-up on next visit

## 2022-02-02 NOTE — PROGRESS NOTES
Assessment/Plan:    Urinary incontinence  Will continue to monitor for now  Will address neck issue 1st     Advised patient to go to back from regularly to urinate  Follow-up on next visit    Fecal smearing  Some fecal incontinence may be secondary to neck trauma  Advised patient to schedule regular bathroom breaks  Paresthesia and pain of extremity  Patient is having paresthesias and pain throughout all limbs  This likely secondary from neck trauma  Rest of management as in cervical disc herniation    Cervical disc herniation  ER notes, labs and imaging reviewed  Patient has had quite complex medical history since trauma from 2016  Patient is in quite severe pain in limited due to injuries  Will have patient start on Percocet 5 mg q 4 hours p r n  for severe pain  In the meanwhile, will have patient on scheduled Tylenol 500 mg 2 tabs t i d  and also ibuprofen 600 mg b i d  as needed for  Start patient on Zanaflex 4 mg t i d  p r n  for muscle spasms  Referral placed for a neuro surgery in pain management for surgical evaluation as well as management of pain  Advised patient to schedule appointment also to get on to cancellation list   Once establish with pain management will have intake over regarding pain meds  Patient will likely stay benefit from surgical intervention and may also require injections for pain or ablation  Follow-up in 1 month    Drug-induced constipation  Patient is drug-induced constipation secondary to medications  Will refill MiraLax and Senokot today  Advised patient take as needed  Follow-up in month       Diagnoses and all orders for this visit:    Cervical disc herniation  -     Ambulatory Referral to Neurosurgery; Future  -     Ambulatory Referral to Pain Management; Future  -     TiZANidine (ZANAFLEX) 4 MG capsule;  Take 1 capsule (4 mg total) by mouth 3 (three) times a day as needed for muscle spasms  -     oxyCODONE-acetaminophen (Percocet) 5-325 mg per tablet; Take 1 tablet by mouth every 4 (four) hours as needed for moderate pain Max Daily Amount: 6 tablets    Functional urinary incontinence  -     Ambulatory Referral to Neurosurgery; Future    Fecal smearing  -     Ambulatory Referral to Neurosurgery; Future    Paresthesia and pain of extremity  -     Ambulatory Referral to Neurosurgery; Future  -     Ambulatory Referral to Pain Management; Future    Drug-induced constipation  -     polyethylene glycol (MIRALAX) 17 g packet; Take 17 g by mouth daily as needed (Constipation)  -     senna-docusate sodium (SENOKOT S) 8 6-50 mg per tablet; Take 2 tablets by mouth daily    Screening for thyroid disorder  -     TSH, 3rd generation with Free T4 reflex; Future    Screening for cardiovascular condition  -     Comprehensive metabolic panel; Future  -     Lipid panel; Future    Fatigue, unspecified type  -     CBC and differential; Future    Screening for diabetes mellitus  -     HEMOGLOBIN A1C W/ EAG ESTIMATION; Future          Subjective:   Chief Complaint   Patient presents with   [place] [address] Maintenance   Topic Date Due    Hepatitis C Screening  Never done    COVID-19 Vaccine (1) Never done    Pneumococcal Vaccine: Pediatrics (0 to 5 Years) and At-Risk Patients (6 to 59 Years) (1 of 2 - PPSV23) Never done    HIV Screening  Never done    Annual Physical  Never done    DTaP,Tdap,and Td Vaccines (1 - Tdap) Never done    Influenza Vaccine (1) Never done    Depression Screening  02/02/2023    BMI: Adult  02/02/2023    HIB Vaccine  Aged Out    Hepatitis B Vaccine  Aged Out    IPV Vaccine  Aged Out    Hepatitis A Vaccine  Aged Out    Meningococcal ACWY Vaccine  Aged Out    HPV Vaccine  Aged Out        Patient ID: Larry Contreras is a 43 y o  male  HPI    Patient presents to \[facility]"" care  Patient fell down stairs in 2016 causing multiple disc herniations in his neck  Since then patient has been pain    Pain is gotten progressively worse over time  Due to insurance limitations/lack insurance is, patient was unable to get appropriate care  Patient was most recently hospital 11/22/2021 for intractable pain  MRI showing severe C6-C7 right neural foraminal narrowing  Also there is stenosis of the central canal multiple levels  Patient has been on pain medications, muscle relaxers, OTC Tylenol and ibuprofen with minimal improvement  Would like referral to nurse surgery in pain management  Prior injury not on medications  No allergies medications  Currently states that pain radiates up and down neck causing severe headaches, dizziness, he instability  Also neck pain radiates down arms and legs  Causing numbness and tingling  Patient has noted also some fecal and urinary incontinence  Started recently minute noticing erectile dysfunction as well  The following portions of the patient's history were reviewed and updated as appropriate: allergies, current medications, past family history, past medical history, past social history, past surgical history, and problem list     Review of Systems   Constitutional: Negative for chills and fever  HENT: Negative for congestion  Eyes: Negative for visual disturbance  Respiratory: Negative for cough and shortness of breath  Cardiovascular: Negative for chest pain and palpitations  Gastrointestinal: Positive for constipation and nausea  Negative for abdominal pain, diarrhea and vomiting  Genitourinary: Negative for dysuria  Musculoskeletal: Positive for neck pain  Negative for back pain  Skin: Negative for rash  Neurological: Positive for dizziness and headaches  Objective:  /84 (BP Location: Left arm, Patient Position: Sitting, Cuff Size: Adult)   Pulse 78   Temp 97 6 °F (36 4 °C) (Tympanic)   Resp 17   Ht 5' 6" (1 676 m)   Wt 66 7 kg (147 lb)   SpO2 98%   BMI 23 73 kg/m²      Physical Exam  Vitals and nursing note reviewed     Constitutional: Comments: Patient was wearing neck brace on exam   Examination was severely limited due to neck pain  HENT:      Head: Normocephalic  Eyes:      Conjunctiva/sclera: Conjunctivae normal    Neck:      Comments: Unable to perform neck exam due to neck brace  Cardiovascular:      Rate and Rhythm: Normal rate and regular rhythm  Pulses: Normal pulses  Heart sounds: No murmur heard  Pulmonary:      Effort: Pulmonary effort is normal  No respiratory distress  Breath sounds: No wheezing, rhonchi or rales  Abdominal:      General: Bowel sounds are normal  There is no distension  Palpations: Abdomen is soft  Tenderness: There is no abdominal tenderness  Musculoskeletal:         General: No swelling  Neurological:      Mental Status: He is alert  Comments: Strength was 5/5 in  strength  Unable to do flexion extension of extremities secondary to pain  Strength in lower extremities 5/5 in extension flexion of knee               This note has been constructed using a voice recognition system  There may be translation, syntax, or grammatical errors  If you have an questions, please contact the dictating provider

## 2022-02-02 NOTE — ASSESSMENT & PLAN NOTE
Patient is having paresthesias and pain throughout all limbs  This likely secondary from neck trauma      Rest of management as in cervical disc herniation

## 2022-02-08 ENCOUNTER — OFFICE VISIT (OUTPATIENT)
Dept: NEUROSURGERY | Facility: CLINIC | Age: 37
End: 2022-02-08
Payer: MEDICARE

## 2022-02-08 VITALS
WEIGHT: 147 LBS | DIASTOLIC BLOOD PRESSURE: 100 MMHG | HEART RATE: 88 BPM | TEMPERATURE: 97.6 F | SYSTOLIC BLOOD PRESSURE: 140 MMHG | HEIGHT: 66 IN | RESPIRATION RATE: 16 BRPM | BODY MASS INDEX: 23.63 KG/M2

## 2022-02-08 DIAGNOSIS — M50.20 CERVICAL DISC HERNIATION: ICD-10-CM

## 2022-02-08 DIAGNOSIS — R15.1 FECAL SMEARING: ICD-10-CM

## 2022-02-08 DIAGNOSIS — M79.609 PARESTHESIA AND PAIN OF EXTREMITY: ICD-10-CM

## 2022-02-08 DIAGNOSIS — R20.2 PARESTHESIA AND PAIN OF EXTREMITY: ICD-10-CM

## 2022-02-08 DIAGNOSIS — R39.81 FUNCTIONAL URINARY INCONTINENCE: ICD-10-CM

## 2022-02-08 PROCEDURE — 99213 OFFICE O/P EST LOW 20 MIN: CPT | Performed by: NEUROLOGICAL SURGERY

## 2022-02-08 NOTE — ASSESSMENT & PLAN NOTE
Presents for evaluation of severe neck, low back pain with radiation down his arms with associated numbness and tingling  · Ongoing symptoms since fall backwards down steps 8/3/2020  · States symptoms are diffuse and severe, wearing aspen collar off and on since injury and back brace  States he was told to wear collar by us at last visit  · Seen in various EDs multiple times for same symptoms, states he was told he has cord compression and needs surgical intervention  · Last seen by our service 2/4/2021 during hospitalization for above symptoms  Was scheduled for follow up but unable to do so due to issues with insurance  · On exam with very limited ROM of neck secondary to pain  Generalized upper and lower extremity strength weakness that appears antalgic  No upper motor neuron signs  No signs of myelopathy  Imaging:  · MRI cervical spine wo, 11/22/2021: Disc and uncovertebral osteophytosis and protrusion at C6-7 resulting in severe right neural foraminal narrowing, not significantly changed since the prior exam     Plan:  · When I asked patient if I could examine him he requested to see another provider stating he no longer wanted me to evaluate him and that he wanted to see someone else  · Dr Angel Luis Pleitez and I reviewed his imaging with him and his girlfriend thoroughly  The patient demanded that Dr Angel Luis Pleitez operate on him stating he is certain he has "cord compression " He told Dr Angel Luis Pleitez that he is being rude and that he can "get whatever I want because I want surgery to fix this pain " He repeatedly told Dr Angel Luis Pleitez that he does not know what he is talking about and threatened to punch him if he pulled on his arms during his physical exam   · Recommended the patient follow up with pain management as recommended by his PCP  He states they have not been phoned by pain management yet to schedule an appointment     · We also recommended that he stop wearing cervical brace as this is causing neck and core muscle strength atrophy  Patient states "it helps me" and placed it back on before leaving the room  He then began cursing both me and Dr Calin Fletcher upon his departure   · I advised the  and staff that this patient is belligerent and threatening and poses a risk to providers and staff  I recommended that he no longer be allowed to see anyone in our practice

## 2022-02-08 NOTE — PROGRESS NOTES
Neurosurgery Office Note  Anne-Marie Guaman 43 y o  male MRN: [id number]      Assessment/Plan     Cervical disc herniation  Presents for evaluation of severe neck, low back pain with radiation down his arms with associated numbness and tingling  · Ongoing symptoms since fall backwards down steps 8/3/2020  · States symptoms are diffuse and severe, wearing aspen collar off and on since injury and back brace  States he was told to wear collar by us at last visit  · Seen in various EDs multiple times for same symptoms, states he was told he has cord compression and needs surgical intervention  · Last seen by our service 2/4/2021 during hospitalization for above symptoms  Was scheduled for follow up but unable to do so due to issues with insurance  · On exam with very limited ROM of neck secondary to pain  Generalized upper and lower extremity strength weakness that appears antalgic  No upper motor neuron signs  No signs of myelopathy  Imaging:  · MRI cervical spine wo, 11/22/2021: Disc and uncovertebral osteophytosis and protrusion at C6-7 resulting in severe right neural foraminal narrowing, not significantly changed since the prior exam     Plan:  · When I asked patient if I could examine him he requested to see another provider stating he no longer wanted me to evaluate him and that he wanted to see someone else  · Dr Aline Muse and I reviewed his imaging with him and his girlfriend thoroughly  The patient demanded that Dr Aline Muse operate on him stating he is certain he has "cord compression " He told Dr Aline Muse that he is being rude and that he can "get whatever I want because I want surgery to fix this pain " He repeatedly told Dr Aline Muse that he does not know what he is talking about and threatened to punch him if he pulled on his arms during his physical exam   · Recommended the patient follow up with pain management as recommended by his PCP   He states they have not been phoned by pain management yet to schedule an appointment  · We also recommended that he stop wearing cervical brace as this is causing neck and core muscle strength atrophy  Patient states "it helps me" and placed it back on before leaving the room  He then began cursing both me and Dr Juni Lainez upon his departure   · I advised the  and staff that this patient is belligerent and threatening and poses a risk to providers and staff  I recommended that he no longer be allowed to see anyone in our practice  Diagnoses and all orders for this visit:    Cervical disc herniation  -     Ambulatory Referral to Neurosurgery    Functional urinary incontinence  -     Ambulatory Referral to Neurosurgery    Fecal smearing  -     Ambulatory Referral to Neurosurgery    Paresthesia and pain of extremity  -     Ambulatory Referral to Neurosurgery            CHIEF COMPLAINT    Chief Complaint   Patient presents with    Consult    Neck Pain       HISTORY    History of Present Illness     43y o  year old male with past medical history asthma, chronic neck pain, who presents for evaluation of neck pain  He has previously been seen by our practice during hospitalization in August of 2020 after sustaining a fall backwards onto his head  When I asked more specifically what had happened to him he states he cannot remember when exactly he fell and he does not remember exactly what happened  He states ever since that time he has had ongoing severe neck pain with associated arm pain and burning and numbness and tingling  Also states that he has begun to lose continence of his bowel and bladder since that time  He is experiencing ambulatory dysfunction as well and states that his legs give out on him and he falls  Previously we have recommended conservative management after reviewing his MRI and he was scheduled to follow-up with flexion-extension x-rays but was unable to do so secondary to insurance issues    He states since that time he has acquired new insurance and now presents for his surgery  He states that he is still taking Percocet and oxycodone for his pain without any relief  States that feels like he walks in his under arms at all times that her digging into him causing severe pain  He is right-hand dominant  He states that the pain begins at the base of his skull and moves around his entire body  He is unable to specify exactly where his worse pain is because he states his whole entire body hurts  He asks me Ysabel Query you going to help me?  " He states that he was told in the past that he needs surgery for cord compression  He appears pale and anxious  He states when he looks up he see stars secondary to the degree of pain that he experiences  He states that he has been wearing his cervical brace on and off since his original injury because he was told by our practice to do so  He states he has difficulty using a knife and fork as well as gripping objects secondary to his numbness and weakness in his bilateral arms  He states that his right arm is slightly worse than his left  He appears very agitated and very limited in his movements and range of motion secondary to his discomfort  HPI    See Discussion    REVIEW OF SYSTEMS    Review of Systems   Eyes: Negative  Respiratory: Negative  Cardiovascular: Negative  Gastrointestinal: Negative  Bowel incontinence    Genitourinary: Negative  Per pt difficulty controlling urination      Musculoskeletal: Positive for gait problem (due to weakness in legs) and neck pain (pain radiates b/l shoulder and arms  )  Previously [facility] by [facility] 08/21/2020   wearing cervical collar     8/10 neck pain    Taking oxycodone acetaminophen 5mg and is not helping pain currently     PT: NONE    LAURENCE: referral placed for rosalba   Neurological: Positive for weakness (b/l arms and b/l legs) and numbness (b/l arms and b/l legs)  Hematological: Does not bruise/bleed easily  Psychiatric/Behavioral: Negative  ROS reviewed and edited as needed  Meds/Allergies     Current Outpatient Medications   Medication Sig Dispense Refill    acetaminophen (TYLENOL) 325 mg tablet Take 3 tablets (975 mg total) by mouth every 8 (eight) hours 1 Bottle 1    albuterol (PROVENTIL HFA,VENTOLIN HFA) 90 mcg/act inhaler Inhale 2 puffs every 4 (four) hours as needed for wheezing 1 Inhaler 0    ibuprofen (MOTRIN) 600 mg tablet Take 1 tablet (600 mg total) by mouth every 6 (six) hours as needed for mild pain 30 tablet 0    lidocaine (Lidoderm) 5 % Apply 1 patch topically daily Remove & Discard patch within 12 hours or as directed by MD 7 patch 0    oxyCODONE-acetaminophen (Percocet) 5-325 mg per tablet Take 1 tablet by mouth every 4 (four) hours as needed for moderate pain Max Daily Amount: 6 tablets 30 tablet 0    polyethylene glycol (MIRALAX) 17 g packet Take 17 g by mouth daily as needed (Constipation) 30 each 0    senna-docusate sodium (SENOKOT S) 8 6-50 mg per tablet Take 2 tablets by mouth daily  0    TiZANidine (ZANAFLEX) 4 MG capsule Take 1 capsule (4 mg total) by mouth 3 (three) times a day as needed for muscle spasms 90 capsule 0     No current facility-administered medications for this visit  No Known Allergies    PAST HISTORY    Past Medical History:   Diagnosis Date    Asthma     Chronic pain     Hypertension     Thyroglossal duct cyst        Past Surgical History:   Procedure Laterality Date    THYROGLOSSAL DUCT EXCISION      THYROID SURGERY      THYROID SURGERY         Social History     Tobacco Use    Smoking status: Current Every Day Smoker     Packs/day: 0 25     Types: Cigarettes    Smokeless tobacco: Never Used    Tobacco comment: 5 cigerettes a day    Vaping Use    Vaping Use: Never used   Substance Use Topics    Alcohol use:  Yes     Alcohol/week: 3 0 - 4 0 standard drinks     Types: 3 - 4 Shots of liquor per week     Comment: Typically takes 3-4 shots of liquor daily after work   Salomón Dhaliwal Drug use: Yes     Types: Marijuana     Comment: "Occasionally"       Family History   Problem Relation Age of Onset    Hypertension Mother     No Known Problems Father          Above history personally reviewed  EXAM    Vitals:Blood pressure 140/100, pulse 88, temperature 97 6 °F (36 4 °C), resp  rate 16, height 5' 6" (1 676 m), weight 66 7 kg (147 lb)  ,Body mass index is 23 73 kg/m²  Physical Exam  Constitutional:       Comments: Exam deferred to attending as patient requested that I not examine him  Neurologic Exam      MEDICAL DECISION MAKING    Imaging Studies:     No results found  I have personally reviewed pertinent reports     and I have personally reviewed pertinent films in PACS

## 2022-02-10 ENCOUNTER — HOSPITAL ENCOUNTER (INPATIENT)
Facility: HOSPITAL | Age: 37
LOS: 1 days | Discharge: LEFT BEFORE DISCHARGE | DRG: 347 | End: 2022-02-11
Attending: EMERGENCY MEDICINE | Admitting: INTERNAL MEDICINE
Payer: MEDICARE

## 2022-02-10 DIAGNOSIS — M54.2 NECK PAIN: ICD-10-CM

## 2022-02-10 DIAGNOSIS — M50.20 CERVICAL DISC HERNIATION: ICD-10-CM

## 2022-02-10 DIAGNOSIS — G89.29 CHRONIC PAIN: Primary | ICD-10-CM

## 2022-02-10 DIAGNOSIS — M54.9 BACK PAIN: ICD-10-CM

## 2022-02-10 DIAGNOSIS — R52 INTRACTABLE PAIN: ICD-10-CM

## 2022-02-10 DIAGNOSIS — K59.03 DRUG-INDUCED CONSTIPATION: ICD-10-CM

## 2022-02-10 PROBLEM — F11.10 OPIOID ABUSE (HCC): Status: ACTIVE | Noted: 2022-02-10

## 2022-02-10 LAB
ANION GAP SERPL CALCULATED.3IONS-SCNC: 7 MMOL/L (ref 4–13)
BASOPHILS # BLD AUTO: 0.06 THOUSANDS/ΜL (ref 0–0.1)
BASOPHILS NFR BLD AUTO: 1 % (ref 0–1)
BUN SERPL-MCNC: 18 MG/DL (ref 5–25)
CALCIUM SERPL-MCNC: 9.1 MG/DL (ref 8.3–10.1)
CHLORIDE SERPL-SCNC: 110 MMOL/L (ref 100–108)
CO2 SERPL-SCNC: 23 MMOL/L (ref 21–32)
CREAT SERPL-MCNC: 0.97 MG/DL (ref 0.6–1.3)
EOSINOPHIL # BLD AUTO: 0.1 THOUSAND/ΜL (ref 0–0.61)
EOSINOPHIL NFR BLD AUTO: 2 % (ref 0–6)
ERYTHROCYTE [DISTWIDTH] IN BLOOD BY AUTOMATED COUNT: 13.1 % (ref 11.6–15.1)
GFR SERPL CREATININE-BSD FRML MDRD: 98 ML/MIN/1.73SQ M
GLUCOSE SERPL-MCNC: 115 MG/DL (ref 65–140)
HCT VFR BLD AUTO: 39.1 % (ref 36.5–49.3)
HGB BLD-MCNC: 13.8 G/DL (ref 12–17)
IMM GRANULOCYTES # BLD AUTO: 0.01 THOUSAND/UL (ref 0–0.2)
IMM GRANULOCYTES NFR BLD AUTO: 0 % (ref 0–2)
LYMPHOCYTES # BLD AUTO: 1.44 THOUSANDS/ΜL (ref 0.6–4.47)
LYMPHOCYTES NFR BLD AUTO: 22 % (ref 14–44)
MCH RBC QN AUTO: 31.9 PG (ref 26.8–34.3)
MCHC RBC AUTO-ENTMCNC: 35.3 G/DL (ref 31.4–37.4)
MCV RBC AUTO: 91 FL (ref 82–98)
MONOCYTES # BLD AUTO: 0.6 THOUSAND/ΜL (ref 0.17–1.22)
MONOCYTES NFR BLD AUTO: 9 % (ref 4–12)
NEUTROPHILS # BLD AUTO: 4.32 THOUSANDS/ΜL (ref 1.85–7.62)
NEUTS SEG NFR BLD AUTO: 66 % (ref 43–75)
NRBC BLD AUTO-RTO: 0 /100 WBCS
PLATELET # BLD AUTO: 304 THOUSANDS/UL (ref 149–390)
PMV BLD AUTO: 9.2 FL (ref 8.9–12.7)
POTASSIUM SERPL-SCNC: 3.4 MMOL/L (ref 3.5–5.3)
RBC # BLD AUTO: 4.32 MILLION/UL (ref 3.88–5.62)
SODIUM SERPL-SCNC: 140 MMOL/L (ref 136–145)
WBC # BLD AUTO: 6.53 THOUSAND/UL (ref 4.31–10.16)

## 2022-02-10 PROCEDURE — 80048 BASIC METABOLIC PNL TOTAL CA: CPT | Performed by: INTERNAL MEDICINE

## 2022-02-10 PROCEDURE — 99285 EMERGENCY DEPT VISIT HI MDM: CPT | Performed by: EMERGENCY MEDICINE

## 2022-02-10 PROCEDURE — 99223 1ST HOSP IP/OBS HIGH 75: CPT | Performed by: INTERNAL MEDICINE

## 2022-02-10 PROCEDURE — 85025 COMPLETE CBC W/AUTO DIFF WBC: CPT | Performed by: INTERNAL MEDICINE

## 2022-02-10 PROCEDURE — 99285 EMERGENCY DEPT VISIT HI MDM: CPT

## 2022-02-10 PROCEDURE — 99447 NTRPROF PH1/NTRNET/EHR 11-20: CPT | Performed by: EMERGENCY MEDICINE

## 2022-02-10 RX ORDER — ALBUTEROL SULFATE 90 UG/1
2 AEROSOL, METERED RESPIRATORY (INHALATION) EVERY 4 HOURS PRN
Status: DISCONTINUED | OUTPATIENT
Start: 2022-02-10 | End: 2022-02-11 | Stop reason: HOSPADM

## 2022-02-10 RX ORDER — DIAZEPAM 5 MG/1
5 TABLET ORAL ONCE
Status: COMPLETED | OUTPATIENT
Start: 2022-02-10 | End: 2022-02-10

## 2022-02-10 RX ORDER — DIAZEPAM 5 MG/1
5 TABLET ORAL EVERY 6 HOURS PRN
Status: DISCONTINUED | OUTPATIENT
Start: 2022-02-10 | End: 2022-02-10

## 2022-02-10 RX ORDER — POLYETHYLENE GLYCOL 3350 17 G/17G
17 POWDER, FOR SOLUTION ORAL DAILY
Status: DISCONTINUED | OUTPATIENT
Start: 2022-02-11 | End: 2022-02-11 | Stop reason: HOSPADM

## 2022-02-10 RX ORDER — ACETAMINOPHEN 325 MG/1
975 TABLET ORAL EVERY 8 HOURS SCHEDULED
Status: DISCONTINUED | OUTPATIENT
Start: 2022-02-10 | End: 2022-02-10

## 2022-02-10 RX ORDER — IBUPROFEN 600 MG/1
600 TABLET ORAL EVERY 6 HOURS PRN
Status: DISCONTINUED | OUTPATIENT
Start: 2022-02-10 | End: 2022-02-11

## 2022-02-10 RX ORDER — CALCIUM CARBONATE 200(500)MG
1000 TABLET,CHEWABLE ORAL DAILY PRN
Status: DISCONTINUED | OUTPATIENT
Start: 2022-02-10 | End: 2022-02-11 | Stop reason: HOSPADM

## 2022-02-10 RX ORDER — LIDOCAINE 50 MG/G
2 PATCH TOPICAL ONCE
Status: COMPLETED | OUTPATIENT
Start: 2022-02-10 | End: 2022-02-11

## 2022-02-10 RX ORDER — SENNOSIDES 8.6 MG
1 TABLET ORAL DAILY
Status: DISCONTINUED | OUTPATIENT
Start: 2022-02-11 | End: 2022-02-11 | Stop reason: HOSPADM

## 2022-02-10 RX ORDER — NICOTINE 21 MG/24HR
1 PATCH, TRANSDERMAL 24 HOURS TRANSDERMAL DAILY
Status: DISCONTINUED | OUTPATIENT
Start: 2022-02-11 | End: 2022-02-11 | Stop reason: HOSPADM

## 2022-02-10 RX ORDER — HYDROMORPHONE HCL/PF 1 MG/ML
0.5 SYRINGE (ML) INJECTION EVERY 4 HOURS PRN
Status: DISCONTINUED | OUTPATIENT
Start: 2022-02-10 | End: 2022-02-11 | Stop reason: HOSPADM

## 2022-02-10 RX ORDER — AMOXICILLIN 250 MG
2 CAPSULE ORAL DAILY
Status: DISCONTINUED | OUTPATIENT
Start: 2022-02-11 | End: 2022-02-11 | Stop reason: HOSPADM

## 2022-02-10 RX ORDER — POLYETHYLENE GLYCOL 3350 17 G/17G
17 POWDER, FOR SOLUTION ORAL DAILY PRN
Status: DISCONTINUED | OUTPATIENT
Start: 2022-02-10 | End: 2022-02-11 | Stop reason: HOSPADM

## 2022-02-10 RX ORDER — METHOCARBAMOL 500 MG/1
500 TABLET, FILM COATED ORAL ONCE
Status: COMPLETED | OUTPATIENT
Start: 2022-02-10 | End: 2022-02-10

## 2022-02-10 RX ORDER — OXYCODONE HYDROCHLORIDE AND ACETAMINOPHEN 5; 325 MG/1; MG/1
2 TABLET ORAL EVERY 6 HOURS PRN
Status: DISCONTINUED | OUTPATIENT
Start: 2022-02-10 | End: 2022-02-11 | Stop reason: HOSPADM

## 2022-02-10 RX ORDER — ONDANSETRON 2 MG/ML
4 INJECTION INTRAMUSCULAR; INTRAVENOUS EVERY 6 HOURS PRN
Status: DISCONTINUED | OUTPATIENT
Start: 2022-02-10 | End: 2022-02-11 | Stop reason: HOSPADM

## 2022-02-10 RX ORDER — DOCUSATE SODIUM 100 MG/1
100 CAPSULE, LIQUID FILLED ORAL 2 TIMES DAILY
Status: DISCONTINUED | OUTPATIENT
Start: 2022-02-10 | End: 2022-02-11 | Stop reason: HOSPADM

## 2022-02-10 RX ORDER — OXYCODONE HYDROCHLORIDE AND ACETAMINOPHEN 5; 325 MG/1; MG/1
1 TABLET ORAL EVERY 4 HOURS PRN
Status: DISCONTINUED | OUTPATIENT
Start: 2022-02-10 | End: 2022-02-11 | Stop reason: HOSPADM

## 2022-02-10 RX ORDER — LIDOCAINE 50 MG/G
1 PATCH TOPICAL DAILY
Status: DISCONTINUED | OUTPATIENT
Start: 2022-02-11 | End: 2022-02-11 | Stop reason: HOSPADM

## 2022-02-10 RX ORDER — OLANZAPINE 10 MG/1
10 INJECTION, POWDER, LYOPHILIZED, FOR SOLUTION INTRAMUSCULAR ONCE
Status: COMPLETED | OUTPATIENT
Start: 2022-02-10 | End: 2022-02-10

## 2022-02-10 RX ORDER — TIZANIDINE 2 MG/1
4 TABLET ORAL 3 TIMES DAILY PRN
Status: DISCONTINUED | OUTPATIENT
Start: 2022-02-10 | End: 2022-02-11

## 2022-02-10 RX ADMIN — IBUPROFEN 600 MG: 600 TABLET ORAL at 20:10

## 2022-02-10 RX ADMIN — OXYCODONE HYDROCHLORIDE AND ACETAMINOPHEN 1 TABLET: 5; 325 TABLET ORAL at 19:41

## 2022-02-10 RX ADMIN — TIZANIDINE 4 MG: 2 TABLET ORAL at 21:15

## 2022-02-10 RX ADMIN — DIAZEPAM 5 MG: 5 TABLET ORAL at 20:10

## 2022-02-10 RX ADMIN — OLANZAPINE 10 MG: 10 INJECTION, POWDER, LYOPHILIZED, FOR SOLUTION INTRAMUSCULAR at 21:33

## 2022-02-10 RX ADMIN — HYDROMORPHONE HYDROCHLORIDE 0.5 MG: 1 INJECTION, SOLUTION INTRAMUSCULAR; INTRAVENOUS; SUBCUTANEOUS at 21:16

## 2022-02-10 RX ADMIN — DIAZEPAM 5 MG: 5 TABLET ORAL at 16:45

## 2022-02-10 RX ADMIN — DOCUSATE SODIUM 100 MG: 100 CAPSULE ORAL at 21:14

## 2022-02-10 RX ADMIN — METHOCARBAMOL 500 MG: 500 TABLET ORAL at 16:44

## 2022-02-10 RX ADMIN — OXYCODONE HYDROCHLORIDE AND ACETAMINOPHEN 2 TABLET: 5; 325 TABLET ORAL at 23:54

## 2022-02-10 RX ADMIN — LIDOCAINE 5% 2 PATCH: 700 PATCH TOPICAL at 16:45

## 2022-02-10 NOTE — ED NOTES
Pt screaming and banging on railing in room  When provided pt with meds he stated "this isn't going to work! When i'm in the hospital they give me morphine! I need something stronger " explained to pt in the ER we start with non-narcotic meds for chronic pain relief  Pt agitated and reluctant to swallow meds  Pt swallowed meds and continued to thrash in stretcher       Jose Daniel Swain RN  02/10/22 [id number]
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
## 2022-02-10 NOTE — ED NOTES
Pt screaming from room "I NEED A CALL BELL, SOMEONE GIVE ME A CALL BELL!!!" rn @ bedside speaking with pt, charge RN also at bedside  Security called @ this time   Pt continues to scream profanities @ RN     Carmen Carl RN  02/10/22 3511

## 2022-02-10 NOTE — ED NOTES
Pt sleeping on stretcher with mouth open, appears comfortable at this time       Pablo Jalloh, VELMA  02/10/22 4070
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
## 2022-02-10 NOTE — ED NOTES
Pt screaming @ Dr Gianna Talbot  Security on stand-by outside of room  Dr Gianna Talbot explained to pt that he is now admitted to the hsopital and Admitting will handle pain medication orders from here forward  Pt continues to scream @ Dr Gianna Talbot left the room  Pt began to ring call bell and ask for pain meds  Explained to pt that we need to wait for the admitting team to see him, there is no other pain medications ordered       Kristal Murillo RN  02/10/22 9154

## 2022-02-11 VITALS
RESPIRATION RATE: 20 BRPM | BODY MASS INDEX: 24.91 KG/M2 | OXYGEN SATURATION: 98 % | TEMPERATURE: 98.1 F | WEIGHT: 155 LBS | HEART RATE: 52 BPM | DIASTOLIC BLOOD PRESSURE: 90 MMHG | SYSTOLIC BLOOD PRESSURE: 140 MMHG | HEIGHT: 66 IN

## 2022-02-11 LAB
ALBUMIN SERPL BCP-MCNC: 3.7 G/DL (ref 3.5–5)
ALP SERPL-CCNC: 62 U/L (ref 46–116)
ALT SERPL W P-5'-P-CCNC: 27 U/L (ref 12–78)
ANION GAP SERPL CALCULATED.3IONS-SCNC: 4 MMOL/L (ref 4–13)
AST SERPL W P-5'-P-CCNC: 11 U/L (ref 5–45)
BASOPHILS # BLD AUTO: 0.06 THOUSANDS/ΜL (ref 0–0.1)
BASOPHILS NFR BLD AUTO: 1 % (ref 0–1)
BILIRUB SERPL-MCNC: 0.61 MG/DL (ref 0.2–1)
BUN SERPL-MCNC: 16 MG/DL (ref 5–25)
CALCIUM SERPL-MCNC: 9.1 MG/DL (ref 8.3–10.1)
CHLORIDE SERPL-SCNC: 111 MMOL/L (ref 100–108)
CO2 SERPL-SCNC: 26 MMOL/L (ref 21–32)
CREAT SERPL-MCNC: 0.89 MG/DL (ref 0.6–1.3)
EOSINOPHIL # BLD AUTO: 0.2 THOUSAND/ΜL (ref 0–0.61)
EOSINOPHIL NFR BLD AUTO: 3 % (ref 0–6)
ERYTHROCYTE [DISTWIDTH] IN BLOOD BY AUTOMATED COUNT: 13.3 % (ref 11.6–15.1)
GFR SERPL CREATININE-BSD FRML MDRD: 108 ML/MIN/1.73SQ M
GLUCOSE SERPL-MCNC: 96 MG/DL (ref 65–140)
HCT VFR BLD AUTO: 39.2 % (ref 36.5–49.3)
HGB BLD-MCNC: 13.7 G/DL (ref 12–17)
IMM GRANULOCYTES # BLD AUTO: 0.01 THOUSAND/UL (ref 0–0.2)
IMM GRANULOCYTES NFR BLD AUTO: 0 % (ref 0–2)
LYMPHOCYTES # BLD AUTO: 2.05 THOUSANDS/ΜL (ref 0.6–4.47)
LYMPHOCYTES NFR BLD AUTO: 31 % (ref 14–44)
MAGNESIUM SERPL-MCNC: 2 MG/DL (ref 1.6–2.6)
MCH RBC QN AUTO: 31.9 PG (ref 26.8–34.3)
MCHC RBC AUTO-ENTMCNC: 34.9 G/DL (ref 31.4–37.4)
MCV RBC AUTO: 91 FL (ref 82–98)
MONOCYTES # BLD AUTO: 0.78 THOUSAND/ΜL (ref 0.17–1.22)
MONOCYTES NFR BLD AUTO: 12 % (ref 4–12)
NEUTROPHILS # BLD AUTO: 3.42 THOUSANDS/ΜL (ref 1.85–7.62)
NEUTS SEG NFR BLD AUTO: 53 % (ref 43–75)
NRBC BLD AUTO-RTO: 0 /100 WBCS
PHOSPHATE SERPL-MCNC: 4.4 MG/DL (ref 2.7–4.5)
PLATELET # BLD AUTO: 312 THOUSANDS/UL (ref 149–390)
PMV BLD AUTO: 9.5 FL (ref 8.9–12.7)
POTASSIUM SERPL-SCNC: 3.3 MMOL/L (ref 3.5–5.3)
PROT SERPL-MCNC: 7.1 G/DL (ref 6.4–8.2)
RBC # BLD AUTO: 4.29 MILLION/UL (ref 3.88–5.62)
SODIUM SERPL-SCNC: 141 MMOL/L (ref 136–145)
WBC # BLD AUTO: 6.52 THOUSAND/UL (ref 4.31–10.16)

## 2022-02-11 PROCEDURE — 99217 PR OBSERVATION CARE DISCHARGE MANAGEMENT: CPT | Performed by: INTERNAL MEDICINE

## 2022-02-11 PROCEDURE — 99232 SBSQ HOSP IP/OBS MODERATE 35: CPT | Performed by: INTERNAL MEDICINE

## 2022-02-11 PROCEDURE — 97163 PT EVAL HIGH COMPLEX 45 MIN: CPT

## 2022-02-11 PROCEDURE — 80053 COMPREHEN METABOLIC PANEL: CPT | Performed by: INTERNAL MEDICINE

## 2022-02-11 PROCEDURE — 85025 COMPLETE CBC W/AUTO DIFF WBC: CPT | Performed by: INTERNAL MEDICINE

## 2022-02-11 PROCEDURE — NC001 PR NO CHARGE: Performed by: PHYSICIAN ASSISTANT

## 2022-02-11 PROCEDURE — 83735 ASSAY OF MAGNESIUM: CPT | Performed by: INTERNAL MEDICINE

## 2022-02-11 PROCEDURE — 84100 ASSAY OF PHOSPHORUS: CPT | Performed by: INTERNAL MEDICINE

## 2022-02-11 RX ORDER — GABAPENTIN 100 MG/1
100 CAPSULE ORAL 3 TIMES DAILY
Status: DISCONTINUED | OUTPATIENT
Start: 2022-02-11 | End: 2022-02-11 | Stop reason: HOSPADM

## 2022-02-11 RX ORDER — ACETAMINOPHEN 325 MG/1
975 TABLET ORAL EVERY 8 HOURS SCHEDULED
Status: DISCONTINUED | OUTPATIENT
Start: 2022-02-11 | End: 2022-02-11 | Stop reason: HOSPADM

## 2022-02-11 RX ORDER — CYCLOBENZAPRINE HCL 10 MG
5 TABLET ORAL EVERY 6 HOURS PRN
Status: DISCONTINUED | OUTPATIENT
Start: 2022-02-11 | End: 2022-02-11 | Stop reason: HOSPADM

## 2022-02-11 RX ORDER — IBUPROFEN 600 MG/1
600 TABLET ORAL EVERY 6 HOURS SCHEDULED
Status: DISCONTINUED | OUTPATIENT
Start: 2022-02-11 | End: 2022-02-11 | Stop reason: HOSPADM

## 2022-02-11 RX ORDER — POTASSIUM CHLORIDE 20 MEQ/1
20 TABLET, EXTENDED RELEASE ORAL ONCE
Status: COMPLETED | OUTPATIENT
Start: 2022-02-11 | End: 2022-02-11

## 2022-02-11 RX ADMIN — HYDROMORPHONE HYDROCHLORIDE 0.5 MG: 1 INJECTION, SOLUTION INTRAMUSCULAR; INTRAVENOUS; SUBCUTANEOUS at 07:22

## 2022-02-11 RX ADMIN — IBUPROFEN 600 MG: 600 TABLET ORAL at 04:45

## 2022-02-11 RX ADMIN — ACETAMINOPHEN 975 MG: 325 TABLET, FILM COATED ORAL at 11:05

## 2022-02-11 RX ADMIN — DOCUSATE SODIUM 100 MG: 100 CAPSULE ORAL at 08:20

## 2022-02-11 RX ADMIN — OXYCODONE HYDROCHLORIDE AND ACETAMINOPHEN 2 TABLET: 5; 325 TABLET ORAL at 05:08

## 2022-02-11 RX ADMIN — SENNOSIDES AND DOCUSATE SODIUM 2 TABLET: 50; 8.6 TABLET ORAL at 08:21

## 2022-02-11 RX ADMIN — TIZANIDINE 4 MG: 2 TABLET ORAL at 02:45

## 2022-02-11 RX ADMIN — OXYCODONE HYDROCHLORIDE AND ACETAMINOPHEN 2 TABLET: 5; 325 TABLET ORAL at 11:05

## 2022-02-11 RX ADMIN — OXYCODONE HYDROCHLORIDE AND ACETAMINOPHEN 2 TABLET: 5; 325 TABLET ORAL at 16:45

## 2022-02-11 RX ADMIN — HYDROMORPHONE HYDROCHLORIDE 0.5 MG: 1 INJECTION, SOLUTION INTRAMUSCULAR; INTRAVENOUS; SUBCUTANEOUS at 02:47

## 2022-02-11 RX ADMIN — CYCLOBENZAPRINE HYDROCHLORIDE 5 MG: 10 TABLET, FILM COATED ORAL at 14:41

## 2022-02-11 RX ADMIN — POTASSIUM CHLORIDE 20 MEQ: 1500 TABLET, EXTENDED RELEASE ORAL at 11:04

## 2022-02-11 RX ADMIN — IBUPROFEN 600 MG: 600 TABLET ORAL at 16:44

## 2022-02-11 RX ADMIN — GABAPENTIN 100 MG: 100 CAPSULE ORAL at 11:05

## 2022-02-11 RX ADMIN — HYDROMORPHONE HYDROCHLORIDE 0.5 MG: 1 INJECTION, SOLUTION INTRAMUSCULAR; INTRAVENOUS; SUBCUTANEOUS at 14:38

## 2022-02-11 RX ADMIN — IBUPROFEN 600 MG: 600 TABLET ORAL at 11:05

## 2022-02-11 RX ADMIN — DOCUSATE SODIUM 100 MG: 100 CAPSULE ORAL at 16:44

## 2022-02-11 RX ADMIN — POLYETHYLENE GLYCOL 3350 17 G: 17 POWDER, FOR SOLUTION ORAL at 08:21

## 2022-02-11 RX ADMIN — GABAPENTIN 100 MG: 100 CAPSULE ORAL at 16:44

## 2022-02-11 NOTE — ASSESSMENT & PLAN NOTE
Bowel regimen  Encouraged adequate fiber hydration and physical activity towards a regular bowel movement
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
## 2022-02-11 NOTE — UTILIZATION REVIEW
Inpatient Admission Authorization Request   NOTIFICATION OF INPATIENT ADMISSION/INPATIENT AUTHORIZATION REQUEST   SERVICING FACILITY:   [facility]  Address: [address] [facility], [address] [facility] [address]  Tax ID: [id number]  NPI: [id number]  Place of Service: Inpatient [address] Code: 24     ATTENDING PROVIDER:  Attending Name and NPI#: Rafa Farrell, [place] [[id number]]  Address: [address] [place] [address], [address] [place] [address]  Phone: [phone]     UTILIZATION REVIEW CONTACT:  [facility] Utilization   Network Utilization Review Department  Phone: [phone]  Fax: [phone]  Email: [email or web address]     PHYSICIAN ADVISORY SERVICES:  FOR [id number] REVIEW - MEDICAL NECESSITY DENIAL  Phone: [phone]  Fax: [phone]  Email: [email or web address]     TYPE OF REQUEST:  Inpatient Status     ADMISSION INFORMATION:  ADMISSION DATE/TIME: 2/10/22  4:56 PM  PATIENT DIAGNOSIS CODE/DESCRIPTION:  Back pain [M54 9]  Chronic pain [G89 29]  Pain [R52]  Cervical disc herniation [M50 20]  DISCHARGE DATE/TIME: No discharge date for patient encounter  DISCHARGE DISPOSITION (IF DISCHARGED): Left against medical advice or discontinued care     IMPORTANT INFORMATION:  Please contact the [facility] directly with any questions or concerns regarding this request  Department voicemails are confidential     Send requests for admission clinical reviews, concurrent reviews, approvals, and administrative denials due to lack of clinical to fax [phone]

## 2022-02-11 NOTE — DISCHARGE SUMMARY
[address] [facility]  Discharge- Dylan Mcelroy 1985, 43 y o  male MRN: [id number]  Unit/Bed#: [facility] 809-01 Encounter: [id number]  Primary Care Provider: Pineda Zelaya DO   Date and time admitted to hospital: 2/10/2022  3:32 PM    * Cervical disc herniation  Assessment & Plan  Patient reports severe intractable cervical neck pain radiating to his lower extremities back  Follow-up on MRI imaging of cervical spine  Analgesics for pain management service  Neurosurgery input noted  Supportive care      Opioid abuse [facility])  Assessment & Plan  Reports he has been using medications from the Street  He is requesting increasing dose of opioid medications  Reports intractable pain however he seems to be comfortable  Acute Pain service input noted  Judicious use of opioids    Drug-induced constipation  Assessment & Plan  Bowel regimen  Encouraged adequate fiber hydration and physical activity towards a regular bowel movement    Tobacco abuse  Assessment & Plan  Tobacco cessation encouraged          Discharge Summary - [facility]    Patient Information: Dylan Mcelroy 43 y o  male MRN: [id number]  [facility] Encounter: [id number]    Discharging Physician / Practitioner: Indu Schmidt MD  PCP: Pineda Zelaya DO  Admission Date: 2/10/2022  Discharge Date: 02/11/22    Disposition:     Other: Against medical advice, home     Reason for Admission:  Neck pain    Discharge Diagnoses:     Principal Problem:    Cervical disc herniation  Active Problems:    Neck pain    Tobacco abuse    Fecal smearing    Urinary incontinence    Drug-induced constipation    Opioid abuse ([address] )  Resolved Problems:    * No resolved hospital problems   *      Consultations During Hospital Stay:  · Neurosurgery  · Acute Pain service    Procedures Performed:     · None       Hospital Course:     Dylan Mcelroy is a 43 y o  male patient who originally presented to the hospital on 2/10/2022 due to neck pain   Patient with history of cervical disc disease presented with neck pain reported intractable pain  He also reported symptoms of bowel or bladder incontinence  He was admitted seen in consultation with Neurosurgery and acute Pain service  He was provided with analgesics including IV opioid analgesics  Informed by RN patient would like to be discharged  Discussed with the patient reports he would like to be discharged presently, he reports he has plans for outpatient rehabilitation along with his family and friends  He has signed against medical advice form  He reports his symptoms are improved, is ambulating the room  Kindly review the chart for details  Condition at Discharge: fair     Discharge Day Visit / Exam:     * Please refer to separate progress note for these details *    Discussion with Family:     Discharge plan discussed with the patient, reports he is keeping his family updated  Outpatient follow-up with primary care physician      Discharge instructions/Information to patient and family:   See after visit summary for information provided to patient and family  Provisions for Follow-Up Care:  See after visit summary for information related to follow-up care and any pertinent home health orders  Planned Readmission: no    Discharge Statement:  I spent 45 minutes discharging the patient  This time was spent on the day of discharge  I had direct contact with the patient on the day of discharge  Greater than 50% of the total time was spent examining patient, answering all patient questions, arranging and discussing plan of care with patient as well as directly providing post-discharge instructions  Additional time then spent on discharge activities  Discharge Medications:  See after visit summary for reconciled discharge medications provided to patient and family  ** Please Note: This note has been constructed using a voice recognition system   **

## 2022-02-11 NOTE — OCCUPATIONAL THERAPY NOTE
Occupational Therapy Cancel Note     02/11/22 0939   Note Type   Note type Cancelled Session   Cancel Reasons Medical status       OT orders received and chart reviewed  Pt admitted to [facility] with cervical disc herniation  Pt with inpatient consult to neurosurgery and acute pain management  Will hold skilled OT services at this time  Will continue to follow and complete OT evaluation as appropriate         Westley Diaz MS, OTR/L

## 2022-02-11 NOTE — ASSESSMENT & PLAN NOTE
· Patient states that he takes 1/3 of a 30 mg Percocet 3 times a day that he gets on the street for his continuing pain  · States that he can not get prescriptions or see physicians to manage his pain due to insurance issues  · Denies use of more than this amount Percocet in a day, use of any other drugs, alcohol use  · Patient was counseled on use of medications not prescribed by a provider and the risks thereof  · Due to patient's use of non-prescribed opioids, would not discharge patient with prescription for opioid pain medication

## 2022-02-11 NOTE — H&P
[address] 1985, 43 y o  male MRN: [id number]  Unit/Bed#: [facility] 809-01 Encounter: [id number]  Primary Care Provider: Jennifer Birmingham DO   Date and time admitted to hospital: 2/10/2022  3:32 PM    * Cervical disc herniation  Assessment & Plan  Patient is in a lot of pain and he is obtaining medications outpatient  He is here for help from acute pain management  zanaflex 4 mg tid prn  Percocet 5-325 mg every 4 hours  Percocet 5-325 mg x 2 every 6 hours  Added dilaudid 0 5 mg every 4 hours as needed for break through pain  Discussed with toxicology appreciate recommendations, will give opioids overnight and  No benzodiazapines   Needs to see acute pain management tomorrow  MRI of C spine consider   Neurosurgery evaluation, there is no change in patient condition      Opioid abuse [facility])  Assessment & Plan  Patient says he uses highest doses to feel better  After he yung tomorrow discuss if he wants to stop opioid use  Acute pain assistance in stopping opioid, could not inform me if he wants to stop    Drug-induced constipation  Assessment & Plan  Senna and docusate   MIralax daily       Urinary incontinence  Assessment & Plan  Patient is overdosing on medications, unsure if this has something to do with it or C spine injury has   Would need neurosurgery evaluation  Fecal smearing  Assessment & Plan  Unsure if this has to do with C spine injury  Educate patient regarding frequent stooling and avoid accidents  VTE Pharmacologic Prophylaxis: VTE Score: 1 Low Risk (Score 0-2) - Encourage Ambulation  Code Status: Level 1 - Full Code   Discussion with family: Updated  (uncle) via phone  Anticipated Length of Stay: Patient will be admitted on an observation basis with an anticipated length of stay of less than 2 midnights secondary to needs pain management      Total Time for Visit, including Counseling / Coordination of Care: 45 minutes Greater than 50% of this total time spent on direct patient counseling and coordination of care  Chief Complaint: pain neck     History of Present Illness:  Crow Franco is a 43 y o  male with a PMH of c spine injury with disc herniation who presents with increased pain, intractable  He is using street drugs iv and po due to pain his uncle informed me  Patient was agitated and not coopering to help with questions  He will need repeat MRI C-spine, neurosurgery evaluation and acute pain management  Review of Systems:  Review of Systems   Constitutional: Negative for chills and fever  HENT: Negative for ear pain and sore throat  Eyes: Negative for pain and visual disturbance  Respiratory: Negative for cough and shortness of breath  Cardiovascular: Negative for chest pain and palpitations  Gastrointestinal: Negative for abdominal pain and vomiting  Genitourinary: Negative for dysuria and hematuria  Musculoskeletal: Positive for gait problem, neck pain and neck stiffness  Negative for arthralgias and back pain  Skin: Negative for color change and rash  Neurological: Positive for weakness  Negative for seizures and syncope  Psychiatric/Behavioral: Positive for agitation  The patient is nervous/anxious and is hyperactive  All other systems reviewed and are negative  Past Medical and Surgical History:   Past Medical History:   Diagnosis Date    Asthma     Chronic pain     Hypertension     Thyroglossal duct cyst        Past Surgical History:   Procedure Laterality Date    THYROGLOSSAL DUCT EXCISION      THYROID SURGERY      THYROID SURGERY         Meds/Allergies:  Prior to Admission medications    Medication Sig Start Date End Date Taking?  Authorizing Provider   acetaminophen (TYLENOL) 325 mg tablet Take 3 tablets (975 mg total) by mouth every 8 (eight) hours 2/6/21  Yes Max Theta Downer, DO   albuterol (PROVENTIL HFA,VENTOLIN HFA) 90 mcg/act inhaler Inhale 2 puffs every 4 (four) hours as needed for wheezing 9/27/19  Yes Eb Fragoso MD   ibuprofen (MOTRIN) 600 mg tablet Take 1 tablet (600 mg total) by mouth every 6 (six) hours as needed for mild pain 10/27/21  Yes Kay Freire MD   lidocaine (Lidoderm) 5 % Apply 1 patch topically daily Remove & Discard patch within 12 hours or as directed by MD 10/27/21  Yes Kay Freire MD   oxyCODONE-acetaminophen (Percocet) 5-325 mg per tablet Take 1 tablet by mouth every 4 (four) hours as needed for moderate pain Max Daily Amount: 6 tablets 2/2/22  Yes Brigid Chopra, DO   polyethylene glycol (MIRALAX) 17 g packet Take 17 g by mouth daily as needed (Constipation) 2/2/22  Yes Brigid Michael Keysha DO   senna-docusate sodium (SENOKOT S) 8 6-50 mg per tablet Take 2 tablets by mouth daily 2/2/22  Yes Brigid Chopra DO   TiZANidine (ZANAFLEX) 4 MG capsule Take 1 capsule (4 mg total) by mouth 3 (three) times a day as needed for muscle spasms 2/2/22  Yes Brigid Chopra DO     I have reviewed home medications with patient personally      Allergies: No Known Allergies    Social History:  Marital Status: Single   Occupation:    Patient Pre-hospital Living Situation: Home  Patient Pre-hospital Level of Mobility: walks  Patient Pre-hospital Diet Restrictions: regular   Substance Use History:   Social History     Substance and Sexual Activity   Alcohol Use Yes    Alcohol/week: 3 0 - 4 0 standard drinks    Types: 3 - 4 Shots of liquor per week    Comment: Typically takes 3-4 shots of liquor daily after work     Social History     Tobacco Use   Smoking Status Current Every Day Smoker    Packs/day: 0 25    Types: Cigarettes   Smokeless Tobacco Never Used   Tobacco Comment    5 cigerettes a day      Social History     Substance and Sexual Activity   Drug Use Yes    Types: Marijuana    Comment: "Occasionally"       Family History:  Family History   Problem Relation Age of Onset    Hypertension Mother     No Known Problems Father        Physical Exam: Vitals:   Blood Pressure: 147/75 (02/10/22 2157)  Pulse: 64 (02/10/22 2157)  Temperature: 98 9 °F (37 2 °C) (02/10/22 2157)  Temp Source: Oral (02/10/22 1948)  Respirations: 16 (02/10/22 2157)  Height: 5' 6" (167 6 cm) (02/10/22 1948)  Weight - Scale: 70 3 kg (155 lb) (02/10/22 1948)  SpO2: 96 % (02/10/22 2157)    Physical Exam  Vitals and nursing note reviewed  Constitutional:       Appearance: He is well-developed  HENT:      Head: Normocephalic and atraumatic  Eyes:      Conjunctiva/sclera: Conjunctivae normal    Cardiovascular:      Rate and Rhythm: Normal rate and regular rhythm  Heart sounds: No murmur heard  Pulmonary:      Effort: Pulmonary effort is normal  No respiratory distress  Breath sounds: Normal breath sounds  Abdominal:      Palpations: Abdomen is soft  Tenderness: There is no abdominal tenderness  Musculoskeletal:      Cervical back: Neck supple  Skin:     General: Skin is warm and dry  Neurological:      Mental Status: He is alert  Additional Data:     Lab Results:  Results from last 7 days   Lab Units 02/10/22  2035   WBC Thousand/uL 6 53   HEMOGLOBIN g/dL 13 8   HEMATOCRIT % 39 1   PLATELETS Thousands/uL 304   NEUTROS PCT % 66   LYMPHS PCT % 22   MONOS PCT % 9   EOS PCT % 2     Results from last 7 days   Lab Units 02/10/22  2035   SODIUM mmol/L 140   POTASSIUM mmol/L 3 4*   CHLORIDE mmol/L 110*   CO2 mmol/L 23   BUN mg/dL 18   CREATININE mg/dL 0 97   ANION GAP mmol/L 7   CALCIUM mg/dL 9 1   GLUCOSE RANDOM mg/dL 115                       Imaging: Reviewed radiology reports from this admission including: MRI spine  No orders to display       EKG and Other Studies Reviewed on Admission:   · EKG: NSR  HR 90      ** Please Note: This note has been constructed using a voice recognition system   **

## 2022-02-11 NOTE — ASSESSMENT & PLAN NOTE
Patient says he uses highest doses to feel better  After he yung tomorrow discuss if he wants to stop opioid use     Acute pain assistance in stopping opioid, could not inform me if he wants to stop
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
## 2022-02-11 NOTE — ASSESSMENT & PLAN NOTE
Reports he has been using medications from the Street  He is requesting increasing dose of opioid medications  Reports intractable pain however he seems to be comfortable  Acute Pain service input noted  Judicious use of opioids

## 2022-02-11 NOTE — ASSESSMENT & PLAN NOTE
Patient is in a lot of pain and he is obtaining medications outpatient  He is here for help from acute pain management  zanaflex 4 mg tid prn  Percocet 5-325 mg every 4 hours  Percocet 5-325 mg x 2 every 6 hours  Added dilaudid 0 5 mg every 4 hours as needed for break through pain     Discussed with toxicology appreciate recommendations, will give opioids overnight and  No benzodiazapines   Needs to see acute pain management tomorrow  MRI of C spine consider   Neurosurgery evaluation, there is no change in patient condition
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
## 2022-02-11 NOTE — CONSULTS
INTERPROFESSIONAL (PHONE) [facility]  Coretha Ganser 43 y o  male MRN: [id number]  Unit/Bed#: [facility] 809-01 Encounter: [id number]      Reason for Consult / Principal Problem: opioid use  Consults  02/10/22  Discussed with Dr Sergio Herrera    ASSESSMENT:  Severe cervical pain with radiculopathy     RECOMMENDATIONS:  Continue routine medical evaluation  For pain, consider reaching out to pain management  I would be liberal with pain medicine during initial evaluation and consider use of weight-based IV morphine while avoiding any benzodiazepines  Pain dose ketamine is also a consideration  For further questions, please contact the medical  on call via [employer] or throughl the [employer] or [employer]  Please see additional teaching note below:    Hx and PE limited by the dynamics of a phone consultation  I have not personally interviewed or evaluated the patient, but only advised based on the information provided to me  Primary provider is responsible for all clinical decisions  Pertinent history, physical exam and clinical findings and course discussed: Coretha Ganser is a 43y o  year old male who presents with radiculopathy and severe pain, requesting opioids     Review of systems and physical exam not performed by me      Historical Information   Past Medical History:   Diagnosis Date    Asthma     Chronic pain     Hypertension     Thyroglossal duct cyst      Past Surgical History:   Procedure Laterality Date    THYROGLOSSAL DUCT EXCISION      THYROID SURGERY      THYROID SURGERY       Social History   Social History     Substance and Sexual Activity   Alcohol Use Yes    Alcohol/week: 3 0 - 4 0 standard drinks    Types: 3 - 4 Shots of liquor per week    Comment: Typically takes 3-4 shots of liquor daily after work     Social History     Substance and Sexual Activity   Drug Use Yes    Types: Marijuana    Comment: "Occasionally"     Social History     Tobacco Use   Smoking Status Current Every Day Smoker    Packs/day: 0 25    Types: Cigarettes   Smokeless Tobacco Never Used   Tobacco Comment    5 cigerettes a day      Family History   Problem Relation Age of Onset    Hypertension Mother     No Known Problems Father         Prior to Admission medications    Medication Sig Start Date End Date Taking?  Authorizing Provider   acetaminophen (TYLENOL) 325 mg tablet Take 3 tablets (975 mg total) by mouth every 8 (eight) hours 2/6/21  Yes Tyler Ibarra,    albuterol (PROVENTIL HFA,VENTOLIN HFA) 90 mcg/act inhaler Inhale 2 puffs every 4 (four) hours as needed for wheezing 9/27/19  Yes Juanita Chandler MD   ibuprofen (MOTRIN) 600 mg tablet Take 1 tablet (600 mg total) by mouth every 6 (six) hours as needed for mild pain 10/27/21  Yes Jeremiah Conklin MD   lidocaine (Lidoderm) 5 % Apply 1 patch topically daily Remove & Discard patch within 12 hours or as directed by MD 10/27/21  Yes Jeremiah Conklin MD   oxyCODONE-acetaminophen (Percocet) 5-325 mg per tablet Take 1 tablet by mouth every 4 (four) hours as needed for moderate pain Max Daily Amount: 6 tablets 2/2/22  Yes Brigid Chopra DO   polyethylene glycol (MIRALAX) 17 g packet Take 17 g by mouth daily as needed (Constipation) 2/2/22  Yes Brigid Chopra DO   senna-docusate sodium (SENOKOT S) 8 6-50 mg per tablet Take 2 tablets by mouth daily 2/2/22  Yes Brigid Chopra DO   TiZANidine (ZANAFLEX) 4 MG capsule Take 1 capsule (4 mg total) by mouth 3 (three) times a day as needed for muscle spasms 2/2/22  Yes Brigid Chopra DO       Current Facility-Administered Medications   Medication Dose Route Frequency    albuterol (PROVENTIL HFA,VENTOLIN HFA) inhaler 2 puff  2 puff Inhalation Q4H PRN    calcium carbonate (TUMS) chewable tablet 1,000 mg  1,000 mg Oral Daily PRN    docusate sodium (COLACE) capsule 100 mg  100 mg Oral BID    HYDROmorphone (DILAUDID) injection 0 5 mg  0 5 mg Intravenous Q4H PRN    ibuprofen (MOTRIN) tablet 600 mg  600 mg Oral Q6H PRN    [START ON 2/11/2022] lidocaine (LIDODERM) 5 % patch 1 patch  1 patch Topical Daily    lidocaine (LIDODERM) 5 % patch 2 patch  2 patch Topical Once    [START ON 2/11/2022] nicotine (NICODERM CQ) 14 mg/24hr TD 24 hr patch 1 patch  1 patch Transdermal Daily    OLANZapine (ZyPREXA) IM injection 10 mg  10 mg Intramuscular Once    ondansetron (ZOFRAN) injection 4 mg  4 mg Intravenous Q6H PRN    oxyCODONE-acetaminophen (PERCOCET) 5-325 mg per tablet 1 tablet  1 tablet Oral Q4H PRN    oxyCODONE-acetaminophen (PERCOCET) 5-325 mg per tablet 2 tablet  2 tablet Oral Q6H PRN    polyethylene glycol (MIRALAX) packet 17 g  17 g Oral Daily PRN    [START ON 2/11/2022] polyethylene glycol (MIRALAX) packet 17 g  17 g Oral Daily    [START ON 2/11/2022] senna (SENOKOT) tablet 8 6 mg  1 tablet Oral Daily    [START ON 2/11/2022] senna-docusate sodium (SENOKOT S) 8 6-50 mg per tablet 2 tablet  2 tablet Oral Daily    tiZANidine (ZANAFLEX) tablet 4 mg  4 mg Oral TID PRN       No Known Allergies    Objective     No intake or output data in the 24 hours ending 02/10/22 2057    Invasive Devices:   Peripheral IV 02/10/22 Left Antecubital (Active)   Site Assessment WDL; Clean;Dry; Intact 02/10/22 1918   Dressing Type Transparent 02/10/22 1918   Line Status Blood return noted; Flushed 02/10/22 1918   Dressing Status Clean;Dry; Intact 02/10/22 1918       Vitals   Vitals:    02/10/22 1542 02/10/22 1948   BP: (!) 168/119 146/91   TempSrc: Tympanic Oral   Pulse: 102 81   Resp: 18 20   Patient Position - Orthostatic VS: Sitting    Temp: 97 9 °F (36 6 °C) 98 1 °F (36 7 °C)       Lab Results: I have personally reviewed pertinent reports        Labs:    Results from last 7 days   Lab Units 02/10/22  2035   WBC Thousand/uL 6 53   HEMOGLOBIN g/dL 13 8   HEMATOCRIT % 39 1   PLATELETS Thousands/uL 304   NEUTROS PCT % 66   LYMPHS PCT % 22   MONOS PCT % 9        Counseling / Coordination of Care  Total time spent today 15 minutes  This was a phone consultation

## 2022-02-11 NOTE — PLAN OF CARE
Problem: Prexisting or High Potential for Compromised Skin Integrity  Goal: Skin integrity is maintained or improved  Description: INTERVENTIONS:  - Identify patients at risk for skin breakdown  - Assess and monitor skin integrity  - Assess and monitor nutrition and hydration status  - Monitor labs   - Assess for incontinence   - Turn and reposition patient  - Assist with mobility/ambulation  - Relieve pressure over bony prominences  - Avoid friction and shearing  - Provide appropriate hygiene as needed including keeping skin clean and dry  - Evaluate need for skin moisturizer/barrier cream  - Collaborate with interdisciplinary team   - Patient/family teaching  - Consider wound care consult   Outcome: Progressing     Problem: Potential for Falls  Goal: Patient will remain free of falls  Description: INTERVENTIONS:  - Educate patient/family on patient safety including physical limitations  - Instruct patient to call for assistance with activity   - Consult OT/PT to assist with strengthening/mobility   - Keep Call bell within reach  - Keep bed low and locked with side rails adjusted as appropriate  - Keep care items and personal belongings within reach  - Initiate and maintain comfort rounds  - Make Fall Risk Sign visible to staff  - Offer Toileting every two Hours, in advance of need  - Initiate/Maintain bedalarm  - Apply yellow socks and bracelet for high fall risk patients  - Consider moving patient to room near nurses station  Outcome: Progressing     Problem: MOBILITY - ADULT  Goal: Maintain or return to baseline ADL function  Description: INTERVENTIONS:  -  Assess patient's ability to carry out ADLs; assess patient's baseline for ADL function and identify physical deficits which impact ability to perform ADLs (bathing, care of mouth/teeth, toileting, grooming, dressing, etc )  - Assess/evaluate cause of self-care deficits   - Assess range of motion  - Assess patient's mobility; develop plan if impaired  - Assess patient's need for assistive devices and provide as appropriate  - Encourage maximum independence but intervene and supervise when necessary  - Involve family in performance of ADLs  - Assess for home care needs following discharge   - Consider OT consult to assist with ADL evaluation and planning for discharge  - Provide patient education as appropriate  Outcome: Not Progressing  Goal: Maintains/Returns to pre admission functional level  Description: INTERVENTIONS:  - Perform BMAT or MOVE assessment daily    - Set and communicate daily mobility goal to care team and patient/family/caregiver  - Collaborate with rehabilitation services on mobility goals if consulted  - Perform Range of Motion three times a day  - Reposition patient every two hours    - Dangle patient three times a day  - Stand patient three times a day  - Ambulate patient three times a day  - Out of bed to chair three times a day   - Out of bed for meals three times a day  - Out of bed for toileting  - Record patient progress and toleration of activity level   Outcome: Not Progressing

## 2022-02-11 NOTE — PLAN OF CARE
Problem: MOBILITY - ADULT  Goal: Maintain or return to baseline ADL function  Description: INTERVENTIONS:  -  Assess patient's ability to carry out ADLs; assess patient's baseline for ADL function and identify physical deficits which impact ability to perform ADLs (bathing, care of mouth/teeth, toileting, grooming, dressing, etc )  - Assess/evaluate cause of self-care deficits   - Assess range of motion  - Assess patient's mobility; develop plan if impaired  - Assess patient's need for assistive devices and provide as appropriate  - Encourage maximum independence but intervene and supervise when necessary  - Involve family in performance of ADLs  - Assess for home care needs following discharge   - Consider OT consult to assist with ADL evaluation and planning for discharge  - Provide patient education as appropriate  Outcome: Not Progressing  Goal: Maintains/Returns to pre admission functional level  Description: INTERVENTIONS:  - Perform BMAT or MOVE assessment daily    - Set and communicate daily mobility goal to care team and patient/family/caregiver     - Collaborate with rehabilitation services on mobility goals if consulted  - Out of bed for toileting  - Record patient progress and toleration of activity level   Outcome: Not Progressing     Problem: Potential for Falls  Goal: Patient will remain free of falls  Description: INTERVENTIONS:  - Educate patient/family on patient safety including physical limitations  - Instruct patient to call for assistance with activity   - Consult OT/PT to assist with strengthening/mobility   - Keep Call bell within reach  - Keep bed low and locked with side rails adjusted as appropriate  - Keep care items and personal belongings within reach  - Initiate and maintain comfort rounds  - Make Fall Risk Sign visible to staff  - Apply yellow socks and bracelet for high fall risk patients  - Consider moving patient to room near nurses station  Outcome: Not Progressing     Problem: PAIN - ADULT  Goal: Verbalizes/displays adequate comfort level or baseline comfort level  Description: Interventions:  - Encourage patient to monitor pain and request assistance  - Assess pain using appropriate pain scale  - Administer analgesics based on type and severity of pain and evaluate response  - Implement non-pharmacological measures as appropriate and evaluate response  - Consider cultural and social influences on pain and pain management  - Notify physician/advanced practitioner if interventions unsuccessful or patient reports new pain  Outcome: Not Progressing     Problem: SAFETY ADULT  Goal: Maintain or return to baseline ADL function  Description: INTERVENTIONS:  -  Assess patient's ability to carry out ADLs; assess patient's baseline for ADL function and identify physical deficits which impact ability to perform ADLs (bathing, care of mouth/teeth, toileting, grooming, dressing, etc )  - Assess/evaluate cause of self-care deficits   - Assess range of motion  - Assess patient's mobility; develop plan if impaired  - Assess patient's need for assistive devices and provide as appropriate  - Encourage maximum independence but intervene and supervise when necessary  - Involve family in performance of ADLs  - Assess for home care needs following discharge   - Consider OT consult to assist with ADL evaluation and planning for discharge  - Provide patient education as appropriate  Outcome: Not Progressing  Goal: Maintains/Returns to pre admission functional level  Description: INTERVENTIONS:  - Perform BMAT or MOVE assessment daily    - Set and communicate daily mobility goal to care team and patient/family/caregiver  - Collaborate with rehabilitation services on mobility goals if consulted  - Reposition patient every 2 hours    - Out of bed for toileting  - Record patient progress and toleration of activity level   Outcome: Not Progressing  Goal: Patient will remain free of falls  Description: INTERVENTIONS:  - Educate patient/family on patient safety including physical limitations  - Instruct patient to call for assistance with activity   - Consult OT/PT to assist with strengthening/mobility   - Keep Call bell within reach  - Keep bed low and locked with side rails adjusted as appropriate  - Keep care items and personal belongings within reach  - Initiate and maintain comfort rounds  - Make Fall Risk Sign visible to staff  - Offer Toileting every 2 Hours, in advance of need  - Apply yellow socks and bracelet for high fall risk patients  - Consider moving patient to room near nurses station  Outcome: Not Progressing     Problem: Knowledge Deficit  Goal: Patient/family/caregiver demonstrates understanding of disease process, treatment plan, medications, and discharge instructions  Description: Complete learning assessment and assess knowledge base    Interventions:  - Provide teaching at level of understanding  - Provide teaching via preferred learning methods  Outcome: Not Progressing

## 2022-02-11 NOTE — CONSULTS
[facility]  Consult Note - En Ahn 1985, 43 y o  male MRN: [id number]  Unit/Bed#: [facility] 809-01 Encounter: [id number]  Primary Care Provider: Jose Ruiz,    Date and time admitted to hospital: 2/10/2022  3:32 PM    Opioid abuse [facility])  Assessment & Plan  · Patient states that he takes 1/3 of a 30 mg Percocet 3 times a day that he gets on the street for his continuing pain  · States that he can not get prescriptions or see physicians to manage his pain due to insurance issues  · Denies use of more than this amount Percocet in a day, use of any other drugs, alcohol use  · Patient was counseled on use of medications not prescribed by a provider and the risks thereof  · Due to patient's use of non-prescribed opioids, would not discharge patient with prescription for opioid pain medication  Tobacco abuse  Assessment & Plan   Smokers have been shown to require higher doses of opioid pain medication and are more likely to develop chronic pain   Encourage smoking cessation  Neck pain  Assessment & Plan  · Patient with reported chronic neck pain secondary to bulging disc  · States he has been unable to get this treated secondary to inadequate insurance coverage  · Patient states pain worsened significantly 2 days ago without any known inciting factors  · Of note, patient was seen in the neurosurgery office 3 days ago with complaints of severe pain in his neck radiating down the back, into the head and in to both arms and legs along with urinary incontinence  · Per the documentation of that office visit, patient was belligerent and stormed out of the office  · Also per the documentation from Neurosurgery, there is no evidence of any particular reason for surgical intervention, although patient states that he needs this  · Discussed patient's current complaints with Neurosurgery who states that there is no plan for intervention    · On my arrival in the patient's room, he was yelling out complaining of pain  · Continue Tylenol 975 mg p o  q 8 hours scheduled  · Continue Neurontin 100 mg p o  t i d  scheduled  · Suggest ibuprofen 600 mg p o  q 6 hours scheduled  · Continue lidocaine patch, on for 12 hours and off for 12 hours  · Continue Percocet 5-325 mg, 1 tablet p o  q 6 hours p r n  moderate pain or 2 tablets p o  q 6 hours p r n  severe pain     · Suggest discontinue IV Dilaudid  · Suggest discontinue Zanaflex  · Suggest start Flexeril 5 mg p o  q 6 hours p r n  muscle spasm  · Continue bowel regimen while on opioid pain medication to avoid opioid induced constipation  · Patient would benefit from outpatient pain management  · It would be unsafe to discharge patient home with prescription for opioid pain medications secondary to his admission of using opioid medications which were not prescribed to him  · As the patient's neck pain is acute, there are no new findings on imaging, and neurosurgery plans no interventions, the acute pain service will sign off  Patria Calvert is a 43 y o  male  complaining of severe neck pain      APS will sign off at this time  Thank you for the consult  All opioids and other analgesics to be written at discretion of primary team  Please contact Acute Pain Service - [facility] via menuvox from [phone] with additional questions or concerns  See [employer] or [facility] for additional contacts and after hours information  History of Present Illness    Admit Date:  2/10/2022  Hospital Day:  1 day  Primary Service:  Hospitalist  Attending Provider:  Arlyn Koyanagi, MD  Reason for Consult / Principal Problem:  Neck pain  HPI: Patria Clavert is a 43 y o  male who presents with complaint of severe posterior neck pain which radiates to his head, down his back and into all 4 extremities  Complains of intermittent weakness, urinary incontinence, fecal smearing, headaches and inability to concentrate    Patient fell in 2020 and has had chronic neck pain since that time  Imaging at that time showed disc herniation with right C6-C7 foraminal narrowing  Also noted was mild cervical canal stenosis with no cord compression  Patient has been seen in the emergency room several times for this since the injury occurred and has been seen by Neurosurgery  Patient has also had several admissions for this complaint and has signed out [facility] previously  Repeat imaging, including on this admission, show no new or different abnormalities  Patient was seen 3 days ago by Neurosurgery who again felt that no intervention was necessary  Patient states that during that visit he was treated rudely by the neurosurgical providers  Notes from the neurosurgical provider state that the patient was belligerent and yelling at them  Patient reportedly stormed out of the office at that time  Patient has also had a scheduled appointment with Neurosurgery at [facility] on 3/8/21, but did not show up for that appointment  Currently, patient yelling out prior to my arrival   On my arrival in the room, patient was writhing around in bed moaning loudly and stating I need something stronger for pain  Patient states his pain is greater than a 10/10 in his neck and radiates to his head, lower back and all 4 extremities  When asked further questions, patient states that his pain is so bad that he cannot concentrate in order to answer questions  Patient, however, did answer more questions and also admitted that he has been using Percocet 30 mg that he gets on the street  He states that he takes 1/3 of this tablet 3 times a day  He denies any other drug use, alcohol use  Current pain location(s):  Neck  Pain Scale:   10/10  Quality:  It just hurts  Current Analgesic regimen:    Ibuprofen 600 mg p o  q 6 hours p r n  mild pain  Zanaflex 4 mg p o  t i d  scheduled  Percocet 5-325 mg, 1 tablet p o  Q 6 hours p r n  moderate pain    Percocet 5-325 mg, 2 tablets p o  q 6 hours p r n  severe pain  Dilaudid 0 5 mg IV Q 4 hours p r n  breakthrough pain  Pain History:  Reported history of chronic pain  Pain Management Provider:  None    I have reviewed the patient's controlled substance dispensing history in the Prescription Drug Monitoring Program in compliance with the [facility] regulations before prescribing any controlled substances  Past 1 year review of PDMP:  Fill Date ID   Written Drug Qty Days Prescriber Rx # Pharmacy Refill   Daily Dose* Pymt Type      02/02/2022  2   02/02/2022  Oxycodone-Acetaminophen 5-325    30 00  5 Hs Li,   [id number]   Pen (9690)   0  45 00 [place]   10/27/2021  2   10/27/2021  Oxycodone Hcl (Ir) 5 MG Tablet    15 00  3 Br Nakul   [id number]   Pen ([id number])   0  37 50 [place]         Consults    Review of Systems   Musculoskeletal: Positive for arthralgias, back pain, myalgias and neck pain  Neurological: Positive for weakness and headaches  All other systems reviewed and are negative        Historical Information   Past Medical History:   Diagnosis Date    Asthma     Chronic pain     Hypertension     Thyroglossal duct cyst      Past Surgical History:   Procedure Laterality Date    THYROGLOSSAL DUCT EXCISION      THYROID SURGERY      THYROID SURGERY       Social History   Social History     Substance and Sexual Activity   Alcohol Use Not Currently    Alcohol/week: 3 0 - 4 0 standard drinks    Types: 3 - 4 Shots of liquor per week     Social History     Substance and Sexual Activity   Drug Use Yes    Types: Marijuana    Comment: "Occasionally"     Social History     Tobacco Use   Smoking Status Current Every Day Smoker    Packs/day: 0 25    Types: Cigarettes   Smokeless Tobacco Never Used   Tobacco Comment    5 cigerettes a day      Family History:   Family History   Problem Relation Age of Onset    Hypertension Mother     No Known Problems Father        Meds/Allergies   all current active meds have been reviewed, current meds:   Current Facility-Administered Medications   Medication Dose Route Frequency    acetaminophen (TYLENOL) tablet 975 mg  975 mg Oral Q8H [address]    albuterol (PROVENTIL HFA,VENTOLIN HFA) inhaler 2 puff  2 puff Inhalation Q4H PRN    calcium carbonate (TUMS) chewable tablet 1,000 mg  1,000 mg Oral Daily PRN    cyclobenzaprine (FLEXERIL) tablet 5 mg  5 mg Oral Q6H PRN    docusate sodium (COLACE) capsule 100 mg  100 mg Oral BID    gabapentin (NEURONTIN) capsule 100 mg  100 mg Oral TID    HYDROmorphone (DILAUDID) injection 0 5 mg  0 5 mg Intravenous Q4H PRN    ibuprofen (MOTRIN) tablet 600 mg  600 mg Oral Q6H PETER    lidocaine (LIDODERM) 5 % patch 1 patch  1 patch Topical Daily    nicotine (NICODERM CQ) 14 mg/24hr TD 24 hr patch 1 patch  1 patch Transdermal Daily    ondansetron (ZOFRAN) injection 4 mg  4 mg Intravenous Q6H PRN    oxyCODONE-acetaminophen (PERCOCET) 5-325 mg per tablet 1 tablet  1 tablet Oral Q4H PRN    oxyCODONE-acetaminophen (PERCOCET) 5-325 mg per tablet 2 tablet  2 tablet Oral Q6H PRN    polyethylene glycol (MIRALAX) packet 17 g  17 g Oral Daily PRN    polyethylene glycol (MIRALAX) packet 17 g  17 g Oral Daily    senna (SENOKOT) tablet 8 6 mg  1 tablet Oral Daily    senna-docusate sodium (SENOKOT S) 8 6-50 mg per tablet 2 tablet  2 tablet Oral Daily    and PTA meds:   Prior to Admission Medications   Prescriptions Last Dose Informant Patient Reported? Taking?    TiZANidine (ZANAFLEX) 4 MG capsule 2/10/2022 at 0900  No Yes   Sig: Take 1 capsule (4 mg total) by mouth 3 (three) times a day as needed for muscle spasms   acetaminophen (TYLENOL) 325 mg tablet 2/9/2022 at Unknown time  No Yes   Sig: Take 3 tablets (975 mg total) by mouth every 8 (eight) hours   albuterol (PROVENTIL HFA,VENTOLIN HFA) 90 mcg/act inhaler 2/10/2022 at Unknown time  No Yes   Sig: Inhale 2 puffs every 4 (four) hours as needed for wheezing   ibuprofen (MOTRIN) 600 mg tablet 2/10/2022 at 0800  No Yes   Sig: Take 1 tablet (600 mg total) by mouth every 6 (six) hours as needed for mild pain   lidocaine (Lidoderm) 5 % 2/10/2022 at 0800  No Yes   Sig: Apply 1 patch topically daily Remove & Discard patch within 12 hours or as directed by MD   oxyCODONE-acetaminophen (Percocet) 5-325 mg per tablet 2/9/2022 at Unknown time  No Yes   Sig: Take 1 tablet by mouth every 4 (four) hours as needed for moderate pain Max Daily Amount: 6 tablets   polyethylene glycol (MIRALAX) 17 g packet Past Week at Unknown time  No Yes   Sig: Take 17 g by mouth daily as needed (Constipation)   senna-docusate sodium (SENOKOT S) 8 6-50 mg per tablet Past Week at Unknown time  No Yes   Sig: Take 2 tablets by mouth daily      Facility-Administered Medications: None       No Known Allergies    Objective   Temp:  [97 9 °F (36 6 °C)-98 9 °F (37 2 °C)] 98 2 °F (36 8 °C)  HR:  [] 52  Resp:  [16-20] 16  BP: (146-168)/() 146/89  No intake or output data in the 24 hours ending 02/11/22 1341    Physical Exam  Vitals and nursing note reviewed  Constitutional:       General: He is awake  He is in acute distress  Appearance: He is not ill-appearing, toxic-appearing or diaphoretic  Comments: Writhing in bed and yelling out  Eyes:      Conjunctiva/sclera: Conjunctivae normal       Pupils: Pupils are equal, round, and reactive to light  Cardiovascular:      Rate and Rhythm: Normal rate and regular rhythm  Skin:     General: Skin is warm and dry  Neurological:      Mental Status: He is alert and oriented to person, place, and time  GCS: GCS eye subscore is 4  GCS verbal subscore is 5  GCS motor subscore is 6  Psychiatric:         Attention and Perception: Attention normal          Speech: Speech normal          Behavior: Behavior is agitated and hyperactive  Behavior is cooperative  Lab Results:   I have personally reviewed pertinent labs  , CBC:   Lab Results   Component Value Date    WBC 6 52 02/11/2022    HGB 13 7 02/11/2022    HCT 39 2 02/11/2022    MCV 91 02/11/2022     02/11/2022    MCH 31 9 02/11/2022    MCHC 34 9 02/11/2022    RDW 13 3 02/11/2022    MPV 9 5 02/11/2022    NRBC 0 02/11/2022   , CMP:   Lab Results   Component Value Date    SODIUM 141 02/11/2022    K 3 3 (L) 02/11/2022     (H) 02/11/2022    CO2 26 02/11/2022    BUN 16 02/11/2022    CREATININE 0 89 02/11/2022    CALCIUM 9 1 02/11/2022    AST 11 02/11/2022    ALT 27 02/11/2022    ALKPHOS 62 02/11/2022    EGFR 108 02/11/2022   , BMP:  Lab Results   Component Value Date    SODIUM 141 02/11/2022    K 3 3 (L) 02/11/2022     (H) 02/11/2022    CO2 26 02/11/2022    BUN 16 02/11/2022    CREATININE 0 89 02/11/2022    GLUC 96 02/11/2022    CALCIUM 9 1 02/11/2022    AGAP 4 02/11/2022    EGFR 108 02/11/2022   , PT/PTT:No results found for: PT, PTT Estimated Creatinine Clearance: 101 6 mL/min (by C-G formula based on SCr of 0 89 mg/dL)  Imaging Studies: I have personally reviewed pertinent reports  EKG, Pathology, and Other Studies: I have personally reviewed pertinent reports  Counseling / Coordination of Care  Greater than 50% of total time was spent with the patient and / or family counseling and / or coordination of care  A description of the counseling / coordination of care:  Patient interview, physical examination, review of PDMP, review of medical record, review of imaging and laboratory data, development of pain management plan, discussion of pain management plan with patient and primary service  Please note that the APS provides consultative services regarding pain management only  With the exception of ketamine and epidural infusions and except when indicated, final decisions regarding starting or changing doses of analgesic medications are at the discretion of the consulting service  Off hours consultation and/or medication management is generally not available      Ernst Canseco PA-C  Acute Pain Service

## 2022-02-11 NOTE — ASSESSMENT & PLAN NOTE
· Patient with reported chronic neck pain secondary to bulging disc  · States he has been unable to get this treated secondary to inadequate insurance coverage  · Patient states pain worsened significantly 2 days ago without any known inciting factors  · Of note, patient was seen in the neurosurgery office 3 days ago with complaints of severe pain in his neck radiating down the back, into the head and in to both arms and legs along with urinary incontinence  · Per the documentation of that office visit, patient was belligerent and stormed out of the office  · Also per the documentation from Neurosurgery, there is no evidence of any particular reason for surgical intervention, although patient states that he needs this  · Discussed patient's current complaints with Neurosurgery who states that there is no plan for intervention  · On my arrival in the patient's room, he was yelling out complaining of pain  · Continue Tylenol 975 mg p o  q 8 hours scheduled  · Continue Neurontin 100 mg p o  t i d  scheduled  · Suggest ibuprofen 600 mg p o  q 6 hours scheduled  · Continue lidocaine patch, on for 12 hours and off for 12 hours  · Continue Percocet 5-325 mg, 1 tablet p o  q 6 hours p r n  moderate pain or 2 tablets p o  q 6 hours p r n  severe pain     · Suggest discontinue IV Dilaudid  · Suggest discontinue Zanaflex  · Suggest start Flexeril 5 mg p o  q 6 hours p r n  muscle spasm  · Continue bowel regimen while on opioid pain medication to avoid opioid induced constipation  · Patient would benefit from outpatient pain management  · It would be unsafe to discharge patient home with prescription for opioid pain medications secondary to his admission of using opioid medications which were not prescribed to him  · As the patient's neck pain is acute, there are no new findings on imaging, and neurosurgery plans no interventions, the acute pain service will sign off
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
## 2022-02-11 NOTE — ASSESSMENT & PLAN NOTE
Unsure if this has to do with C spine injury  Educate patient regarding frequent stooling and avoid accidents

## 2022-02-11 NOTE — PHYSICAL THERAPY NOTE
Physical Therapy Evaluation    Patient Name: Jeanmarie Ames    IHMSZ'O Date: 2/11/2022     Problem List  Principal Problem:    Cervical disc herniation  Active Problems:    Neck pain    Tobacco abuse    Fecal smearing    Urinary incontinence    Drug-induced constipation    Opioid abuse [facility])       Past Medical History  Past Medical History:   Diagnosis Date    Asthma     Chronic pain     Hypertension     Thyroglossal duct cyst         Past Surgical History  Past Surgical History:   Procedure Laterality Date    THYROGLOSSAL DUCT EXCISION      THYROID SURGERY      THYROID SURGERY          02/11/22 1527   PT Last Visit   PT Visit Date 02/11/22   Note Type   Note type Evaluation   Pain Assessment   Pain Assessment Tool 0-10   Pain Score 10 - Worst Possible Pain   Pain Location/Orientation Location: Neck; Location: Back; Location: Hip;Orientation: Bilateral   Hospital Pain Intervention(s) Medication (See MAR); Repositioned; Ambulation/increased activity; Emotional support;Relaxation technique   Restrictions/Precautions   Weight Bearing Precautions Per Order No   Braces or Orthoses   (d/c c/s collar per neurosurgery OP visit )   Other Precautions Spinal precautions;Cognitive; Bed Alarm; Chair Alarm; Fall Risk;Pain   Home Living   Type of Home House  (boarding house )   Home Layout Bed/bath upstairs;Stairs to enter with rails  (2 Full flights of stairs to 3rd floor living area  )   Additional Comments Pt agitated throughout session  above info obtained from EMR  pt denies any DME use PTA   Prior Function   Level of [place] Independent with ADLs and functional mobility   Lives With Significant other   Receives Help From Family   ADL Assistance Independent   IADLs Independent   Comments pt reports living with fiance who works during day  Per chart review, pt able to get help from others in boarding home      General   Additional Pertinent History Per neurosurgery note: pt first evaluated in august 2020 s/p fall down steps with c/o b/l forearm and 4th and 5th digit numbness and tingling without red flag symptoms  "MRI at that time demonstrated mostly right sided foraminal narrowing at C5-6 and C6-7 without cord signal abnormality or significant cord compression despite mild diffuse developmental spinal canal narrowing  At that time patient was kept in a cervical spine collar and was meant to follow-up in the office in 2 weeks"  Seen again in hospital feb 2021 with c/o of ongoing pain but unable to follow up in office 2* insurance issues  Recently seen 2/8/22 in office for ongoing symptoms after getting insurance  Continues to c/ of pain, numbness and new incontinence of bowel and bladder as well as ambulatory dysfunction  Pt unable to specify his symptoms saying his whole body hurt  He was wearing c-spine collar at the time  "MRI completed in 11/2021 reviewed with patient and did not show significant cord compression or cord injury, he does have foraminal narrowing at C6-7 however given his diffuse symptoms, bowel or bladder incontinence, etcetera, this would not explain his current presentation, very much out of proportion to his imaging"  Pt was instructed he no longer required c/s collar and became very resistance and belligerent throughout exam  " It was documented that patient became combative, verbally abusive and implied threat of physical harm  He ended up leaving the office abruptly, security was called, and office staff and manager were alerted that patient would no longer be seen in our office moving forward "  Per EMR, pt continues to belligerent demanding pain medication and intervention and has been seen in multiple hospital systems for same  "Can review imaging if needed once completed however it is unlikely that any surgical intervention will ever be offered to this patient and again NSX will not be seeing this patient "   During PT exam, pt with difficulty explaining symptoms, crying, and visibly agitated  Pt fixated on being provided pain medications (ex morphine) and required redirection to complete exam  Presentation does not seem to be correlated to c/s disc herniations other than UE wong C7-C8 numbness  Family/Caregiver Present No   Cognition   Arousal/Participation Responsive   Orientation Level Oriented X4   Memory Within functional limits   Following Commands Follows one step commands with increased time or repetition   Comments pt very agitated and tearful regarding current situation and not being able to get medications he desires  Provided emotional support and redirection   RLE Assessment   RLE Assessment X  (unwilling to participate in MMT)   Strength RLE   RLE Overall Strength 3+/5  (functionally )   LLE Assessment   LLE Assessment X  (unwilling to participate in MMT)   Strength LLE   LLE Overall Strength 3+/5  (functionally)   Bed Mobility   Supine to Sit 5  Supervision   Additional items HOB elevated; Increased time required;Verbal cues   Sit to Supine 4  Minimal assistance   Additional items Assist x 1; Increased time required;LE management   Transfers   Sit to Stand 4  Minimal assistance   Additional items Assist x 1; Increased time required;Verbal cues   Stand to Sit 4  Minimal assistance   Additional items Assist x 1; Increased time required;Verbal cues   Additional Comments Pt stood from bed with knees crouched and pushed self up with LE and b/l UE; inconsistent with true knee buckling    Ambulation/Elevation   Gait pattern Improper Weight shift; Antalgic;Decreased foot clearance; Step to;Short stride; Excessively slow   Gait Assistance 4  Minimal assist   Additional items Assist x 1;Verbal cues   Assistive Device Rolling walker   Distance 3'x2  (to<>From chair )   Ambulation/Elevation Additional Comments cues for step sequencing   Crying and leaning forward over walker throughout  limited by b/l hip spasms   Balance   Static Sitting Good   Dynamic Sitting Fair   Static Standing Poor +   Dynamic Standing Poor +   Ambulatory Poor +   Endurance Deficit   Endurance Deficit Yes   Endurance Deficit Description pain   Activity Tolerance   Activity Tolerance Patient limited by pain   Medical Staff Made Tamela Bledsoe RN present at time of eval   Nurse Made Aware yes   Assessment   Prognosis Fair   Problem List Decreased strength;Decreased endurance; Impaired balance;Decreased mobility; Impaired judgement;Decreased safety awareness;Pain;Orthopedic restrictions;Decreased range of motion   Assessment Pt is a 43 y o  male [facility] with PMH of c/s injury with disc herniation (2020) with increased intractable pain  Per chart review and uncle, he has been using IV and po street drugs due to pain  Please see neurosurgery note on 2/11/2022 for pt's clinical course s/p neck injury in 2020  Pt has the following comorbidities which affect their treatment:h/o asthma, chronic pain, opioid abuse as well as personal factors including 2 full flights of stairs to access home  Pt has a high complexity clinical presentation due to Ongoing medical management for primary dx, Increased reliance on more restrictive AD compared to baseline, Decreased activity tolerance compared to baseline, Fall risk, Increased assistance needed from caregiver at current time, Cog status, Diagnostic imaging pending, Continuous pulse oximetry monitoring  , and PMH  PT was consulted to evaluate pt's functional mobility and discharge needs  Upon evaluation, patient required S/minAx1 for bed mobility, minAx1 for STS transfers, minAx1 for steps to chair  Pt's functional impairments include: decreased strength, balance, activity tolerance, and mobility, UE paraesthesias, and elevated pain  Pt very agitated and upset throughout session about not receiving morphine as he did last admission  Pt educated on harm of using pain medications like morphine on pain receptors and recovery process   Pt requesting summary of PT evaluation  Informed pt that from PT evaluation, UE symptoms seem consisted with prior c/s injury  However, other LE symptoms and presentation do not appear consistent and will need to find further explanation  Pt with no response  At conclusion of eval, pt remained supine in bed with bed alarm, phone, call bell, and all other personal needs within reach  Pt would benefit from skilled PT to address their functional mobility limitations  The patient's AM-PAC Basic Mobility Inpatient Short Form Raw Score is 16  A Raw score of less than or equal to 16 suggests the patient may benefit from discharge to post-acute rehabilitation services  Please also refer to the recommendation of the Physical Therapist for safe discharge planning  However, pt mainly limited by pain and anticipate pt progress during hospital stay  D/C recommendations are home with OPPT pending progress  Barriers to Discharge Inaccessible home environment  (2 Full flights of stairs to access room )   Goals   Patient Goals to get pain medication   STG Expiration Date 02/25/22   Short Term Goal #1 In 14 days, pt will: 1) perform bed mobility with mod I to promote functional independence  2) perform transfers with mod I to promote safety  3) ambulate 150' with mod I and least restrictive device to promote safe return to home environment  4) negotiate 13 stairs with mod I to promote safe return to home  5) improve balance grades by 1/2 to promote safety with functional mobility  6) improve strength grades by 1/2 to promote safety with functional mobility  PT Treatment Day 0   Plan   Treatment/Interventions Functional transfer training;LE strengthening/ROM; Elevations; Therapeutic exercise; Endurance training;Patient/family training;Equipment eval/education; Bed mobility;Gait training;Spoke to nursing   PT Frequency 3-5x/wk   Recommendation   PT Discharge Recommendation Home with outpatient rehabilitation  (pending progress ) Equipment Recommended [address] [facility] Recommended Wheeled walker   Additional Comments pt requires further progress prior to d/c home      AM-PAC Basic Mobility Inpatient   Turning in Bed Without Bedrails 3   Lying on Back to Sitting on Edge of Flat Bed 3   Moving Bed to Chair 3   Standing Up From Chair 3   Walk in Room 2   Climb 3-5 Stairs 2   Basic Mobility Inpatient Raw Score 16   Basic Mobility Standardized Score 38 32   Highest Level Of Mobility   -[facility] Goal 5: Stand one or more mins   -[facility] Highest Level of Mobility 4: Move to chair/commode   -[facility] Goal Achieved No   Modified Hays Scale   Modified Hays Scale 4     Pato Izquierdo, PT, DPT

## 2022-02-11 NOTE — PROGRESS NOTES
[address] [facility]  Progress Note - Sheran Collet 1985, 43 y o  male MRN: [id number]  Unit/Bed#: [facility] 809-01 Encounter: [id number]  Primary Care Provider: Kellie Lewis DO   Date and time admitted to hospital: 2/10/2022  3:32 PM    * Cervical disc herniation  Assessment & Plan  Patient reports severe intractable cervical neck pain radiating to his lower extremities back  Follow-up on MRI imaging of cervical spine  Analgesics for pain management service  Neurosurgery input noted  Supportive care      Opioid abuse [facility])  Assessment & Plan  Reports he has been using medications from the Street  He is requesting increasing dose of opioid medications  Reports intractable pain however he seems to be comfortable  Acute Pain service input noted  Judicious use of opioids    Drug-induced constipation  Assessment & Plan  Bowel regimen  Encouraged adequate fiber hydration and physical activity towards a regular bowel movement    Tobacco abuse  Assessment & Plan  Tobacco cessation encouraged            VTE Pharmacologic Prophylaxis: VTE Score: 1 Low Risk (Score 0-2) - Encourage Ambulation  Patient Centered Rounds: I performed bedside rounds with nursing staff today  Discussions with Specialists or Other Care Team Provider:  Acute Pain Service, Neurosurgery    Education and Discussions with Family / Patient: Discussed with the patient  Time Spent for Care: 30 minutes  More than 50% of total time spent on counseling and coordination of care as described above      Current Length of Stay: 1 day(s)  Current Patient Status: Inpatient   Certification Statement: The patient will continue to require additional inpatient hospital stay due to As outlined  Discharge Plan: Awaiting cervical imaging, symptomatic improvement    Code Status: Level 1 - Full Code    Subjective:     Reports severe pain in his neck radiating to his entire spine upper extremities  He reports he needs increasing doses of opioids for control of his pain  He complains he is not receiving opioids he needs    History chart labs medications reviewed    Objective:     Vitals:   Temp (24hrs), Av 3 °F (36 8 °C), Min:98 1 °F (36 7 °C), Max:98 9 °F (37 2 °C)    Temp:  [98 1 °F (36 7 °C)-98 9 °F (37 2 °C)] 98 1 °F (36 7 °C)  HR:  [52-81] 52  Resp:  [16-20] 20  BP: (140-147)/(75-91) 140/90  SpO2:  [96 %-100 %] 98 %  Body mass index is 25 02 kg/m²       Input and Output Summary (last 24 hours):   No intake or output data in the 24 hours ending 22 1637    Physical Exam:   Physical Exam     Comfortably in bed  Neck supple  Lungs diminished breath sounds bases  No additional sounds  Heart sounds S1-S2 noted  Abdomen soft  Awake obeys simple commands  No pedal edema  No rash    Additional Data:     Labs:  Results from last 7 days   Lab Units 22  0514   WBC Thousand/uL 6 52   HEMOGLOBIN g/dL 13 7   HEMATOCRIT % 39 2   PLATELETS Thousands/uL 312   NEUTROS PCT % 53   LYMPHS PCT % 31   MONOS PCT % 12   EOS PCT % 3     Results from last 7 days   Lab Units 22  0514   SODIUM mmol/L 141   POTASSIUM mmol/L 3 3*   CHLORIDE mmol/L 111*   CO2 mmol/L 26   BUN mg/dL 16   CREATININE mg/dL 0 89   ANION GAP mmol/L 4   CALCIUM mg/dL 9 1   ALBUMIN g/dL 3 7   TOTAL BILIRUBIN mg/dL 0 61   ALK PHOS U/L 62   ALT U/L 27   AST U/L 11   GLUCOSE RANDOM mg/dL 96                       Lines/Drains:  Invasive Devices  Report    Peripheral Intravenous Line            Peripheral IV 02/10/22 Left Antecubital <1 day                Imaging: Reviewed radiology reports from this admission including: Prior cervical MRI noted    Recent Cultures (last 7 days):         Last 24 Hours Medication List:   Current Facility-Administered Medications   Medication Dose Route Frequency Provider Last Rate    acetaminophen  975 mg Oral Q8H [facility] Devonte Ashford MD      albuterol  2 puff Inhalation Q4H PRN Saul Guzman MD      calcium carbonate  1,000 mg Oral Daily PRN Flaco Hawkins MD      cyclobenzaprine  5 mg Oral Q6H PRN Hang Singleton MD      docusate sodium  100 mg Oral BID Flaco Hawkins MD      gabapentin  100 mg Oral TID Hang Singleton MD      HYDROmorphone  0 5 mg Intravenous Q4H PRN Flaco Hawkins MD      ibuprofen  600 mg Oral Q6H Lisa Leo MD      lidocaine  1 patch Topical Daily Flaco Hawkins MD      nicotine  1 patch Transdermal Daily Flaco Hawkins MD      ondansetron  4 mg Intravenous Q6H PRN Flaco Hawkins MD      oxyCODONE-acetaminophen  1 tablet Oral Q4H PRN Flaco Hawkins MD      oxyCODONE-acetaminophen  2 tablet Oral Q6H PRN Flaco Hawkins MD      polyethylene glycol  17 g Oral Daily PRN Flaco Hawkins MD      polyethylene glycol  17 g Oral Daily Flaco Hawkins MD      senna  1 tablet Oral Daily Flaco Hawkins MD      senna-docusate sodium  2 tablet Oral Daily Flaco Hawkins MD          Today, Patient Was Seen By: Hang Singleton MD    **Please Note: This note may have been constructed using a voice recognition system  **

## 2022-02-11 NOTE — ASSESSMENT & PLAN NOTE
 Smokers have been shown to require higher doses of opioid pain medication and are more likely to develop chronic pain   Encourage smoking cessation

## 2022-02-11 NOTE — ASSESSMENT & PLAN NOTE
Patient is overdosing on medications, unsure if this has something to do with it or C spine injury has   Would need neurosurgery evaluation

## 2022-02-11 NOTE — ASSESSMENT & PLAN NOTE
Patient reports severe intractable cervical neck pain radiating to his lower extremities back  Follow-up on MRI imaging of cervical spine  Analgesics for pain management service  Neurosurgery input noted  Supportive care

## 2022-02-11 NOTE — UTILIZATION REVIEW
Initial Clinical Review    Admission: Date/Time/Statement:   Admission Orders (From admission, onward)     Ordered        02/10/22 1656  Inpatient Admission  Once                      Orders Placed This Encounter   Procedures    Inpatient Admission     Standing Status:   Standing     Number of Occurrences:   1     Order Specific Question:   Level of Care     Answer:   Med Surg [16]     Order Specific Question:   Estimated length of stay     Answer:   More than 2 Midnights     Order Specific Question:   Certification     Answer:   I certify that inpatient services are medically necessary for this patient for a duration of greater than two midnights  See H&P and MD Progress Notes for additional information about the patient's course of treatment  ED Arrival Information     Expected Arrival Acuity    - 2/10/2022 15:32 Less Urgent         Means of arrival Escorted by Service Admission type    Ambulance [facility] EMS Hospitalist Urgent         Arrival complaint    Pain        Chief Complaint   Patient presents with    Pain     pt with cervical disc herniation, reports chronic pain  Initial Presentation: 44 y/o male presents to ED by EMS with c/o neck pain that radiates down arms and legs  Pt with PMHx of c-spine injury with disc herniation presenting now with increased intractable pain  Pt family member reports pt has been using IV and po street drugs to help with pain  Pt very agitated in ED demanding narcotic IV morphine for pain  Admit inpatient to M/S/Tele unit with Intractable pain, h/o cervical disc herniation  -- Toxicology consulted  Pain management; percocet prn, IV dilaudid added  No benzos  APS consulted  MRI c-spine ordered  Bowel regimen  NSx consult  Toxicology tele-consult 2/10 --  A: Severe cervical pain with radiculopathy    RECOMMENDATIONS: Continue routine medical evaluation  For pain, consider reaching out to pain management   I would be liberal with pain medicine during initial evaluation and consider use of weight-based IV morphine while avoiding any benzodiazepines  Pain dose ketamine is also a consideration  NSx consult 2/11 -- Pt with reported chronic neck pain secondary to bulging disc  , states he has been unable to get this treated 2/2 inadequate insurance coverage  · Patient states pain worsened significantly 2 days ago without any known inciting factors  · Of note, patient was seen in the neurosurgery office 3 days ago with complaints of severe pain in his neck radiating down the back, into the head and in to both arms and legs along with urinary incontinence  · Per the documentation of that office visit, patient was belligerent and stormed out of the office  · Also per the documentation from Neurosurgery, there is no evidence of any particular reason for surgical intervention, although patient states that he needs this  · Discussed patient's current complaints with Neurosurgery who states that there is no plan for intervention  · On my arrival in the patient's room, he was yelling out complaining of pain  · Continue Tylenol 975 mg p o  q 8 hours scheduled  · Continue Neurontin 100 mg p o  t i d  scheduled  · Suggest ibuprofen 600 mg p o  q 6 hours scheduled  · Continue lidocaine patch, on for 12 hours and off for 12 hours  · Continue Percocet 5-325 mg, 1 tablet p o  q 6 hours p r n  moderate pain or 2 tablets p o  q 6 hours p r n  severe pain     · Suggest discontinue IV Dilaudid  · Suggest discontinue Zanaflex  · Suggest start Flexeril 5 mg p o  q 6 hours p r n  muscle spasm  · Continue bowel regimen while on opioid pain medication to avoid opioid induced constipation  · Patient would benefit from outpatient pain management  · It would be unsafe to discharge patient home with prescription for opioid pain medications secondary to his admission of using opioid medications which were not prescribed to him          ED Triage Vitals   Temperature Pulse Respirations Blood Pressure SpO2   02/10/22 1542 02/10/22 1542 02/10/22 1542 02/10/22 1542 02/10/22 1542   97 9 °F (36 6 °C) 102 18 (!) 168/119 100 %      Temp Source Heart Rate Source Patient Position - Orthostatic VS BP Location FiO2 (%)   02/10/22 1542 02/10/22 1542 02/10/22 1542 02/10/22 1542 --   Tympanic Monitor Sitting Right arm       Pain Score       02/10/22 1941       10 - Worst Possible Pain          Wt Readings from Last 1 Encounters:   02/10/22 70 3 kg (155 lb)     Additional Vital Signs:   Date/Time Temp Pulse Resp BP MAP (mmHg) SpO2 O2 Device   02/11/22 07:13:52 98 2 °F (36 8 °C) 52 Abnormal  -- 146/89 108 98 % --   02/11/22 07:13:35 98 2 °F (36 8 °C) -- 16 146/89 108 -- --   02/10/22 21:57:21 98 9 °F (37 2 °C) 64 16 147/75 99 96 % --   02/10/22 19:48:02 98 1 °F (36 7 °C) 81 20 146/91 109 100 % --   02/10/22 1545 -- -- -- -- -- -- None (Room air)   02/10/22 1542 97 9 °F (36 6 °C) 102 18 168/119 Abnormal  137 100 % None (Room air)       Pertinent Labs/Diagnostic Test Results:     Results from last 7 days   Lab Units 02/11/22  0514 02/10/22  2035   WBC Thousand/uL 6 52 6 53   HEMOGLOBIN g/dL 13 7 13 8   HEMATOCRIT % 39 2 39 1   PLATELETS Thousands/uL 312 304   NEUTROS ABS Thousands/µL 3 42 4 32     Results from last 7 days   Lab Units 02/11/22  0514 02/10/22  2035   SODIUM mmol/L 141 140   POTASSIUM mmol/L 3 3* 3 4*   CHLORIDE mmol/L 111* 110*   CO2 mmol/L 26 23   ANION GAP mmol/L 4 7   BUN mg/dL 16 18   CREATININE mg/dL 0 89 0 97   EGFR ml/min/1 73sq m 108 98   CALCIUM mg/dL 9 1 9 1   PHOSPHORUS mg/dL 4 4  --      Results from last 7 days   Lab Units 02/11/22 0514   AST U/L 11   ALT U/L 27   ALK PHOS U/L 62   TOTAL PROTEIN g/dL 7 1   ALBUMIN g/dL 3 7   TOTAL BILIRUBIN mg/dL 0 61     Results from last 7 days   Lab Units 02/11/22  0514 02/10/22  2035   GLUCOSE RANDOM mg/dL 96 115         ED Treatment:   Medication Administration from 02/10/2022 1532 to 02/10/2022 1939       Date/Time Order Dose Route Action     02/10/2022 1644 methocarbamol (ROBAXIN) tablet 500 mg 500 mg Oral Given     02/10/2022 1645 lidocaine (LIDODERM) 5 % patch 2 patch 2 patch Topical Medication Applied     02/10/2022 1645 diazepam (VALIUM) tablet 5 mg 5 mg Oral Given     Past Medical History:   Diagnosis Date    Asthma     Chronic pain     Hypertension     Thyroglossal duct cyst      Present on Admission:   Cervical disc herniation   Fecal smearing   Urinary incontinence   Drug-induced constipation   Opioid abuse ([facility] [address] )      Admitting Diagnosis: Back pain [M54 9]  Chronic pain [G89 29]  Pain [R52]  Cervical disc herniation [M50 20]  Age/Sex: 43 y o  male  Admission Orders:  Scheduled Medications:  docusate sodium, 100 mg, Oral, BID  lidocaine, 1 patch, Topical, Daily  nicotine, 1 patch, Transdermal, Daily  polyethylene glycol, 17 g, Oral, Daily  senna, 1 tablet, Oral, Daily  senna-docusate sodium, 2 tablet, Oral, Daily    PRN Meds:  albuterol, 2 puff, Inhalation, Q4H PRN  calcium carbonate, 1,000 mg, Oral, Daily PRN  HYDROmorphone, 0 5 mg, Intravenous, Q4H PRN 2/10 x1, 2/11 x2  ibuprofen, 600 mg, Oral, Q6H PRN 2/10 x1, 2/11 x1  ondansetron, 4 mg, Intravenous, Q6H PRN  oxyCODONE-acetaminophen, 1 tablet, Oral, Q4H PRN  oxyCODONE-acetaminophen, 2 tablet, Oral, Q6H PRN 2/10 x1, 2/11 x1  polyethylene glycol, 17 g, Oral, Daily PRN  tiZANidine, 4 mg, Oral, TID PRN 2/10 x1, 2/11 x1        IP CONSULT TO ACUTE PAIN SERVICE  IP CONSULT TO NEUROSURGERY  IP CONSULT TO CASE MANAGEMENT    Network Utilization Review Department  ATTENTION: Please call with any questions or concerns to [phone] and carefully listen to the prompts so that you are directed to the right person  All voicemails are confidential   Sophia Monk all requests for admission clinical reviews, approved or denied determinations and any other requests to dedicated fax number below belonging to the campus where the patient is receiving treatment   List of dedicated fax numbers for the Facilities:  FACILITY NAME UR FAX NUMBER   ADMISSION DENIALS (Administrative/Medical Necessity) [phone]   [address] (Maternity/NICU/Pediatrics) [facility],7[address] Floor [address]  [phone]   Karlee Kimbrough [address] [place] [address] [id number] [place] [address] [address] [facility] [place] [address] [phone]

## 2022-02-12 ENCOUNTER — NURSE TRIAGE (OUTPATIENT)
Dept: OTHER | Facility: OTHER | Age: 37
End: 2022-02-12
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
## 2022-02-12 NOTE — TELEPHONE ENCOUNTER
Regarding: neck/back pain  ----- Message from [facility] sent at 2/12/2022  1:54 PM EST -----  "I am calling about my spouse, hes in a lot of pain in neck and back and we just had a few questions prior to the office re-opening again on Monday"

## 2022-02-12 NOTE — TELEPHONE ENCOUNTER
Regarding: neck/back pain  ----- Message from Panchito Cadena sent at 2/12/2022  2:55 PM EST -----  Federico Onofre called back again after missing first call

## 2022-02-14 ENCOUNTER — PATIENT OUTREACH (OUTPATIENT)
Dept: FAMILY MEDICINE CLINIC | Facility: CLINIC | Age: 37
End: 2022-02-14

## 2022-02-14 ENCOUNTER — TRANSITIONAL CARE MANAGEMENT (OUTPATIENT)
Dept: FAMILY MEDICINE CLINIC | Facility: CLINIC | Age: 37
End: 2022-02-14

## 2022-02-14 DIAGNOSIS — Z78.9 NEED FOR FOLLOW-UP BY SOCIAL WORKER: Primary | ICD-10-CM

## 2022-02-14 NOTE — PROGRESS NOTES
Spoke with Nilda López and Teresa López has never been seen by pain management per Teresa Flores  Provided pain management resources for Comprehesive pain management and Dr Chas Flores asked for addiction specialist who also treat medical conditions  I will have Fatmata Alonso  reach out with those resources  Teresa Flores said to call the home number to be able to speak to her and Nilda López at the same time  They have my contact information if needed

## 2022-02-16 ENCOUNTER — PATIENT OUTREACH (OUTPATIENT)
Dept: FAMILY MEDICINE CLINIC | Facility: CLINIC | Age: 37
End: 2022-02-16

## 2022-02-16 NOTE — PROGRESS NOTES
If the patient has was not discharged from Neurosurgery at [facility], please refer him over there  Otherwise patient will likely need to go out of town to get care  [facility] in [place], [place] or going [place] to [place]

## 2022-02-16 NOTE — PROGRESS NOTES
Referral received with request for OP SWCM to assist patient in finding Addiction Medicine Provider  PCP noted that patient informed medical team of opiate use  Patient has history with [facility] and [facility] Neurosurgery teams as well  OP RNCM Fay Blas provided patient and patient's Sahra Nolasco with resources for [facility] & Dr Lia Jiang  Spoke with patient's SO Ekaterinachencho Hyattbhavin (on medical communication consent form)  Ekaterina Monroe confirmed that she and patient discussed addiction treatment over the weekend and patient is interested in seeking assistance for this  Ekaterina Monroe stated she does not think patient has participated in treatment programs in the past   Patient last used substances last week; did not use over the weekend  Patient buys "what he can find" per Ekaterina Monroe; usually percocets  Discussed inpatient, detox, and outpatient services  Ekaterina Monroe understanding of the differences  Discussed available assistance through [facility] to discuss treatment options and the Centers of Excellence that can assist with Level of Care Assessment for treatment programs  Provided information to Ekaterina Monroe for [facility] and Dr Osmel Aviles in [place]  Ekaterina Monroe will review with patient  Ekaterina Monroe works out of town Monday through Friday  States she transports patient when she can and also stated he received [employer] application  Offered to assist with completion of this  Ekaterina Monroe states she will help patient with completing  Informed of physical evaluation that he will need to complete  Patient has been in communication with Pain Management team per Ekaterina Monroe  Patient met with Neurosurgeon on 2/8 but Ekaterina Monroe stated this appt did not go well  She asked for new recommendation  Will route to Dr Agustín Buitrago for further advisement on this

## 2022-02-21 ENCOUNTER — PATIENT OUTREACH (OUTPATIENT)
Dept: FAMILY MEDICINE CLINIC | Facility: CLINIC | Age: 37
End: 2022-02-21

## 2022-02-21 NOTE — PROGRESS NOTES
Dr Riaz Chow suggested [facility] Neurosurgery, [address], or towards [place] as patient's SO Bernadette states [address] is not an option  Chart reviewed  Patient had previous appt scheduled with [facility] Neurosurgery on 3/8/2021 but did not attend  Spoke with Bonnie Chatman who would like to try [facility] Neurosurgery again  Provided contact information (found below)  Bonnie Chatman and Belinda Brittle to still discuss addiction medicine options and information previously provided by OP [facility]  Encouraged Bonnie Chatman to call with any questions  Bonnie Chatman was appreciative for this information  Will follow      [facility] Neurosurgery  [address], [address] [place] [address], [address]  [phone]

## 2022-02-23 ENCOUNTER — OFFICE VISIT (OUTPATIENT)
Dept: FAMILY MEDICINE CLINIC | Facility: CLINIC | Age: 37
End: 2022-02-23
Payer: MEDICARE

## 2022-02-23 VITALS
RESPIRATION RATE: 17 BRPM | DIASTOLIC BLOOD PRESSURE: 78 MMHG | HEIGHT: 66 IN | HEART RATE: 86 BPM | WEIGHT: 158 LBS | SYSTOLIC BLOOD PRESSURE: 136 MMHG | TEMPERATURE: 97 F | BODY MASS INDEX: 25.39 KG/M2 | OXYGEN SATURATION: 98 %

## 2022-02-23 DIAGNOSIS — Z09 HOSPITAL DISCHARGE FOLLOW-UP: ICD-10-CM

## 2022-02-23 DIAGNOSIS — M54.2 NECK PAIN: Primary | ICD-10-CM

## 2022-02-23 DIAGNOSIS — M50.20 CERVICAL DISC HERNIATION: ICD-10-CM

## 2022-02-23 PROCEDURE — 99495 TRANSJ CARE MGMT MOD F2F 14D: CPT | Performed by: FAMILY MEDICINE

## 2022-02-23 RX ORDER — CYCLOBENZAPRINE HCL 5 MG
5 TABLET ORAL EVERY 8 HOURS PRN
Qty: 90 TABLET | Refills: 1 | Status: SHIPPED | OUTPATIENT
Start: 2022-02-23 | End: 2022-04-04 | Stop reason: HOSPADM

## 2022-02-23 RX ORDER — TIZANIDINE 4 MG/1
TABLET ORAL
COMMUNITY
Start: 2022-02-02 | End: 2022-02-23 | Stop reason: CLARIF

## 2022-02-23 RX ORDER — IBUPROFEN 600 MG/1
600 TABLET ORAL EVERY 6 HOURS PRN
Qty: 120 TABLET | Refills: 0 | Status: SHIPPED | OUTPATIENT
Start: 2022-02-23 | End: 2022-04-04 | Stop reason: HOSPADM

## 2022-02-23 RX ORDER — GABAPENTIN 100 MG/1
100 CAPSULE ORAL 3 TIMES DAILY
Qty: 90 CAPSULE | Refills: 1 | Status: SHIPPED | OUTPATIENT
Start: 2022-02-23 | End: 2022-04-04 | Stop reason: HOSPADM

## 2022-02-23 NOTE — ASSESSMENT & PLAN NOTE
Patient presents for transition care  patient was admitted to the hospital for severe pain and pain  signed out AMA after day because he felt that his pain was not being managed  Advised to follow up with Neurosurgery as an outpatient  Previous neurosurgery notes reviewed  The patient appeared to have been possibly belligerent in the office and was discharged from the practice  Review the chart by Neurosurgery in size states that he does not need any surgical intervention at that time  May be better served with pain management  Discussed with patient about results  Patient states that he has not called pain management because he is unable to focus during the day  Girlfriend has yet to call either  will have staff contact the pain management as well as [facility] Neurosurgery to schedule appointments and then contact the patient about appointment times  Per pain management while in the inpatient setting, recommended no narcotics being given as patient has been using opiates off the streets  Will not prescribe any additional narcotic medications  per recommendations, will start patient on Flexeril 5 milligrams t i d  p r n , gabapentin 100 milligrams t i d , scheduled doses of acetaminophen 975 milligrams t i d  and ibuprofen 600 milligrams q i d   Contacted  to help with appointments  Will contact patient once appointments arrange  Advised patient to go to ED for any worsening pain or if pain is have control      Follow-up in 3 months

## 2022-02-23 NOTE — PROGRESS NOTES
Assessment/Plan:     Cervical disc herniation  Patient presents for transition care  patient was admitted to the hospital for severe pain and pain  signed out AMA after day because he felt that his pain was not being managed  Advised to follow up with Neurosurgery as an outpatient  Previous neurosurgery notes reviewed  The patient appeared to have been possibly belligerent in the office and was discharged from the practice  Review the chart by Neurosurgery in [facility] the [facility] states that he does not need any surgical intervention at that time  May be better served with pain management  Discussed with patient about results  Patient states that he has not called pain management because he is unable to focus during the day  Girlfriend has yet to call either  will have staff contact the pain management as well as [facility] Neurosurgery to schedule appointments and then contact the patient about appointment times  Per pain management while in the inpatient setting, recommended no narcotics being given as patient has been using opiates off the streets  Will not prescribe any additional narcotic medications  per recommendations, will start patient on Flexeril 5 milligrams t i d  p r n , gabapentin 100 milligrams t i d , scheduled doses of acetaminophen 975 milligrams t i d  and ibuprofen 600 milligrams q i d   Contacted  to help with appointments  Will contact patient once appointments arrange  Advised patient to go to ED for any worsening pain or if pain is have control  Follow-up in 3 months       Diagnoses and all orders for this visit:    Neck pain  -     ibuprofen (MOTRIN) 600 mg tablet; Take 1 tablet (600 mg total) by mouth every 6 (six) hours as needed for mild pain  -     gabapentin (Neurontin) 100 mg capsule; Take 1 capsule (100 mg total) by mouth 3 (three) times a day  -     cyclobenzaprine (FLEXERIL) 5 mg tablet;  Take 1 tablet (5 mg total) by mouth every 8 (eight) hours as needed for muscle spasms    Cervical disc herniation  -     ibuprofen (MOTRIN) 600 mg tablet; Take 1 tablet (600 mg total) by mouth every 6 (six) hours as needed for mild pain  -     gabapentin (Neurontin) 100 mg capsule; Take 1 capsule (100 mg total) by mouth 3 (three) times a day  -     cyclobenzaprine (FLEXERIL) 5 mg tablet; Take 1 tablet (5 mg total) by mouth every 8 (eight) hours as needed for muscle spasms    Hospital discharge follow-up    Other orders  -     Discontinue: tiZANidine (ZANAFLEX) 4 mg tablet; TAKE 1 TABLET BY MOUTH 3 TIMES A DAY AS NEEDED FOR MUSCLE SPASMS  Subjective:     Patient ID: Cal Ba is a 43 y o  male  HPI    Patient was recently in the hospital for severe neck and body pain  Patient was admitted on 02/10 and discharged on 02/11  Patient left AMA from hospital because he felt that his pain was not being managed  Today patient states that he is in severe pain  Not wearing his C-collar  Able to move his arms and legs  Although walking so at a slower rate  States that he is not a drug addict a  Needs help pain and possibly surgery  Has not use street drugs since becoming established  Patient states that he has not called pain management or [facility] her surgery at to make appointments because he is unable to do so due to pain  Girlfriend who has been trying to help with this but works full-time  Review of Systems   Constitutional: Negative for chills and fever  HENT: Negative for congestion  Respiratory: Negative for cough and shortness of breath  Cardiovascular: Negative for chest pain and palpitations  Gastrointestinal: Negative for nausea and vomiting  Genitourinary: Negative for dysuria  Musculoskeletal: Positive for back pain and neck pain  Skin: Negative for wound  Neurological: Positive for dizziness  Negative for headaches  Objective:     Physical Exam  Vitals and nursing note reviewed     Constitutional: General: He is not in acute distress  HENT:      Head: Normocephalic and atraumatic  Eyes:      Conjunctiva/sclera: Conjunctivae normal    Cardiovascular:      Rate and Rhythm: Normal rate and regular rhythm  Pulses: Normal pulses  Heart sounds: No murmur heard  Pulmonary:      Effort: Pulmonary effort is normal  No respiratory distress  Breath sounds: No wheezing, rhonchi or rales  Abdominal:      General: There is no distension  Palpations: Abdomen is soft  Tenderness: There is no abdominal tenderness  Musculoskeletal:         General: No swelling  Comments: Patient exam is limited secondary to pain  Patient does not want to move head or neck too far in rotation, extension or flexion due to pain  Was able to observe patient moved arms and hands up to at least 60 degrees in flexion with no problems  Neurological:      Mental Status: He is alert  Psychiatric:      Comments: Mood is tearful  Vitals:    02/23/22 1015   BP: 136/78   BP Location: Left arm   Patient Position: Sitting   Cuff Size: Adult   Pulse: 86   Resp: 17   Temp: (!) 97 °F (36 1 °C)   TempSrc: Tympanic   SpO2: 98%   Weight: 71 7 kg (158 lb)   Height: 5' 6" (1 676 m)       Transitional Care Management Review:  Oziel Garner is a 43 y o  male here for TCM follow up  During the TCM phone call patient stated:    TCM Call (since 1/23/2022)     Date and time call was made  2/14/2022  [address] reviewed  Records reviewed        Date of Admission  02/10/22    Date of discharge  02/11/22    Diagnosis  Cervical disc herniation    Disposition  Home    Current Symptoms  --  Body pain, mostly in the spine       TCM Call (since 1/23/2022)     Scheduled for follow up?   Yes    Did you obtain your prescribed medications  No    Why were you unable to obtain your medications  There were no new medications prescribed     Do you need help managing your prescriptions or medications  No    Is transportation to your appointment needed  Yes    I have advised the patient to call PCP with any new or worsening symptoms  Mihir Lewis MA     Living Arrangements  Spouse or Significiant other    Are you recieving any outpatient services  No    Are you recieving home care services  No    Are you using any community resources  No    Current waiver services  No    Have you fallen in the last 12 months  Yes    How many times  Patient reports more falls than he can count     Interperter language line needed  No    Counseling  Patient    Counseling topics  Activities of daily living        This note has been constructed using a voice recognition system  There may be translation, syntax, or grammatical errors  If you have an questions, please contact the dictating provider        Brigid Manuel, DO

## 2022-02-24 ENCOUNTER — PATIENT OUTREACH (OUTPATIENT)
Dept: FAMILY MEDICINE CLINIC | Facility: CLINIC | Age: 37
End: 2022-02-24

## 2022-02-24 NOTE — PROGRESS NOTES
Spoke with [facility] pain management referral their department was declined   A list of other pain specialists was mailed to patient on 2/17/22

## 2022-02-24 NOTE — PROGRESS NOTES
Spoke with Patrice Soto, provided him appointment information for [facility] neurosurgery 3/14/22 at 140 pm  He has his MRI disc instructed to bring it to appointment

## 2022-02-24 NOTE — PROGRESS NOTES
Spoke with [facility] neurosurgery appointment made for Monday 3/14/22 at 140pm  Patient needs to bring disc for MRI

## 2022-02-24 NOTE — PROGRESS NOTES
Spoke with Karen Palacios again he so far has not received the list of other pain specialist he an call but he does not check his mailbox daily due to limited mobility  I did inform him I provided ted with two pain management providers on 2/14/22 when I spoke with her  He try them also

## 2022-03-02 ENCOUNTER — PATIENT OUTREACH (OUTPATIENT)
Dept: FAMILY MEDICINE CLINIC | Facility: CLINIC | Age: 37
End: 2022-03-02

## 2022-03-02 NOTE — PROGRESS NOTES
Chart reviewed  OP RNCM Elena Bhardwaj assisted patient in scheduling Neurosurgery appt for 3/14 at 1:40PM   Patient aware to bring MRI disco to upcoming appt  [place] also provided list of other pain specialists that patient can outreach to schedule new patient appt  Patient had not yet reviewed this list of providers  Patient's SO Rebekah Cunningham was provided with pain management provider information as well  Call placed to check status and also follow-up on Substance Use Treatment Program information that was discussed previously with Rebekah Cunningham  Briefly spoke with Higinio Yari who stated he is not doing well today; hands are shaking and he is in a lot of pain  He asked OP SWCM for help to find Pain Management Specialist   Asked if Higinio Greenberg received list of Pain Management Specialists from [facility]  Patient states he received this but did not call any offices yet as he is by himself and cannot concentrate to complete a phone call due to the pain he is in  Patient then stated he needed to end call with OP SWCM due to pain level  Attempted to outreach patient's SO Rebekah Cunningham to follow-up and ask if she was able to review the substance use treatment resources & pain management specialist information  No answer, voicemail left, and awaiting return call

## 2022-03-03 ENCOUNTER — PATIENT OUTREACH (OUTPATIENT)
Dept: FAMILY MEDICINE CLINIC | Facility: CLINIC | Age: 37
End: 2022-03-03

## 2022-03-03 NOTE — PROGRESS NOTES
Spoke with Dilcia Stanford he did receive list of pain management doctors in the mail   Per Dilcia Stanford it is in his room and he will find it has not called anyone yet  Reminded him of appointment with Neurosurgery on 3/14/22

## 2022-03-04 ENCOUNTER — PATIENT OUTREACH (OUTPATIENT)
Dept: INTERNAL MEDICINE CLINIC | Facility: CLINIC | Age: 37
End: 2022-03-04

## 2022-03-04 NOTE — PROGRESS NOTES
Message received from patient's Sohail Rizvibury in response to OP SWCM's recent outreach to discuss substance use treatment options and pain management providers  Return call placed to [facility]; no answer, voicemail left, and awaiting return call  Per chart review, OP RNCM Maria E Roblero spoke with patient yesterday who confirmed he received list of pain management providers    He will review this list

## 2022-03-17 ENCOUNTER — PATIENT OUTREACH (OUTPATIENT)
Dept: FAMILY MEDICINE CLINIC | Facility: CLINIC | Age: 37
End: 2022-03-17

## 2022-03-17 NOTE — PROGRESS NOTES
Left message with my contact information for patient to return my call if he needs my assistance       Confirmed with [facility] neurosurgery patient did not show for appointments on 3/14/22 and 3/15/22

## 2022-03-18 ENCOUNTER — PATIENT OUTREACH (OUTPATIENT)
Dept: FAMILY MEDICINE CLINIC | Facility: CLINIC | Age: 37
End: 2022-03-18

## 2022-03-18 NOTE — PROGRESS NOTES
Call placed to patient to check status, follow-up on substance use disorder treatment programs (resources provided), addiction medicine options (resources provider), pain management options (list provided), and missed neurosurgery appts on 3/14 & 3/15  Briefly spoke with patient who was in a lot of pain, is aware he missed recent neurosurgery appts, and states he needs to establish care with pain management provider before rescheduling neurosurgery appt  Asked if patient called pain management providers to schedule new patient appt; patient did not and lost list   Requested list be sent to [facility] for Meredith Centeno to inform of this  Email sent with below information  Will follow  OP RNCM Jeannie Ledbetter aware      Pain Management  [facility] [phone]  Dr Alfa Kim [phone]  [address] Neurosurgery  [address], [address] [facility], [address]  [phone]

## 2022-03-31 ENCOUNTER — HOSPITAL ENCOUNTER (INPATIENT)
Facility: HOSPITAL | Age: 37
LOS: 4 days | Discharge: HOME | DRG: 751 | End: 2022-04-04
Attending: STUDENT IN AN ORGANIZED HEALTH CARE EDUCATION/TRAINING PROGRAM | Admitting: STUDENT IN AN ORGANIZED HEALTH CARE EDUCATION/TRAINING PROGRAM
Payer: COMMERCIAL

## 2022-03-31 ENCOUNTER — HOSPITAL ENCOUNTER (EMERGENCY)
Facility: HOSPITAL | Age: 37
End: 2022-03-31
Attending: EMERGENCY MEDICINE | Admitting: EMERGENCY MEDICINE
Payer: MEDICARE

## 2022-03-31 ENCOUNTER — PATIENT OUTREACH (OUTPATIENT)
Dept: FAMILY MEDICINE CLINIC | Facility: CLINIC | Age: 37
End: 2022-03-31

## 2022-03-31 VITALS
OXYGEN SATURATION: 100 % | RESPIRATION RATE: 20 BRPM | DIASTOLIC BLOOD PRESSURE: 96 MMHG | SYSTOLIC BLOOD PRESSURE: 150 MMHG | TEMPERATURE: 97.3 F | HEART RATE: 94 BPM

## 2022-03-31 DIAGNOSIS — F11.10 OPIOID ABUSE (HCC): ICD-10-CM

## 2022-03-31 DIAGNOSIS — R45.851 SUICIDAL IDEATIONS: ICD-10-CM

## 2022-03-31 DIAGNOSIS — R52 INTRACTABLE PAIN: ICD-10-CM

## 2022-03-31 DIAGNOSIS — F33.9 RECURRENT MAJOR DEPRESSIVE DISORDER (HCC): Primary | ICD-10-CM

## 2022-03-31 DIAGNOSIS — Z00.8 MEDICAL CLEARANCE FOR PSYCHIATRIC ADMISSION: Primary | ICD-10-CM

## 2022-03-31 DIAGNOSIS — M50.20 CERVICAL DISC HERNIATION: ICD-10-CM

## 2022-03-31 DIAGNOSIS — Z00.8 MEDICAL CLEARANCE FOR PSYCHIATRIC ADMISSION: ICD-10-CM

## 2022-03-31 DIAGNOSIS — Z72.0 TOBACCO ABUSE: ICD-10-CM

## 2022-03-31 DIAGNOSIS — M54.2 NECK PAIN: ICD-10-CM

## 2022-03-31 LAB
AMPHETAMINES SERPL QL SCN: POSITIVE
BARBITURATES UR QL: NEGATIVE
BENZODIAZ UR QL: NEGATIVE
COCAINE UR QL: NEGATIVE
ETHANOL EXG-MCNC: 0 MG/DL
FLUAV RNA RESP QL NAA+PROBE: NEGATIVE
FLUBV RNA RESP QL NAA+PROBE: NEGATIVE
METHADONE UR QL: NEGATIVE
OPIATES UR QL SCN: POSITIVE
OXYCODONE+OXYMORPHONE UR QL SCN: POSITIVE
PCP UR QL: NEGATIVE
RSV RNA RESP QL NAA+PROBE: NEGATIVE
SARS-COV-2 RNA RESP QL NAA+PROBE: NEGATIVE
THC UR QL: POSITIVE

## 2022-03-31 PROCEDURE — 82075 ASSAY OF BREATH ETHANOL: CPT | Performed by: EMERGENCY MEDICINE

## 2022-03-31 PROCEDURE — 99285 EMERGENCY DEPT VISIT HI MDM: CPT | Performed by: EMERGENCY MEDICINE

## 2022-03-31 PROCEDURE — 99285 EMERGENCY DEPT VISIT HI MDM: CPT

## 2022-03-31 PROCEDURE — 80307 DRUG TEST PRSMV CHEM ANLYZR: CPT | Performed by: EMERGENCY MEDICINE

## 2022-03-31 PROCEDURE — 0241U HB NFCT DS VIR RESP RNA 4 TRGT: CPT | Performed by: EMERGENCY MEDICINE

## 2022-03-31 RX ORDER — BISACODYL 10 MG
10 SUPPOSITORY, RECTAL RECTAL DAILY PRN
Status: DISCONTINUED | OUTPATIENT
Start: 2022-03-31 | End: 2022-04-04 | Stop reason: HOSPADM

## 2022-03-31 RX ORDER — IBUPROFEN 600 MG/1
600 TABLET ORAL EVERY 6 HOURS PRN
Status: CANCELLED | OUTPATIENT
Start: 2022-03-31

## 2022-03-31 RX ORDER — MINERAL OIL AND PETROLATUM 150; 830 MG/G; MG/G
1 OINTMENT OPHTHALMIC
Status: CANCELLED | OUTPATIENT
Start: 2022-03-31

## 2022-03-31 RX ORDER — OLANZAPINE 10 MG/1
10 INJECTION, POWDER, LYOPHILIZED, FOR SOLUTION INTRAMUSCULAR
Status: DISCONTINUED | OUTPATIENT
Start: 2022-03-31 | End: 2022-04-04 | Stop reason: HOSPADM

## 2022-03-31 RX ORDER — LORAZEPAM 2 MG/ML
2 INJECTION INTRAMUSCULAR EVERY 6 HOURS PRN
Status: CANCELLED | OUTPATIENT
Start: 2022-03-31

## 2022-03-31 RX ORDER — OLANZAPINE 2.5 MG/1
2.5 TABLET ORAL
Status: CANCELLED | OUTPATIENT
Start: 2022-03-31

## 2022-03-31 RX ORDER — LOPERAMIDE HYDROCHLORIDE 2 MG/1
2 CAPSULE ORAL 4 TIMES DAILY PRN
Status: DISCONTINUED | OUTPATIENT
Start: 2022-03-31 | End: 2022-04-04 | Stop reason: HOSPADM

## 2022-03-31 RX ORDER — IPRATROPIUM BROMIDE AND ALBUTEROL SULFATE 2.5; .5 MG/3ML; MG/3ML
3 SOLUTION RESPIRATORY (INHALATION)
Status: DISCONTINUED | OUTPATIENT
Start: 2022-03-31 | End: 2022-03-31 | Stop reason: HOSPADM

## 2022-03-31 RX ORDER — NICOTINE 21 MG/24HR
1 PATCH, TRANSDERMAL 24 HOURS TRANSDERMAL DAILY
Status: DISCONTINUED | OUTPATIENT
Start: 2022-04-01 | End: 2022-04-04 | Stop reason: HOSPADM

## 2022-03-31 RX ORDER — IBUPROFEN 400 MG/1
800 TABLET ORAL EVERY 8 HOURS PRN
Status: CANCELLED | OUTPATIENT
Start: 2022-03-31

## 2022-03-31 RX ORDER — HYDROXYZINE 50 MG/1
100 TABLET, FILM COATED ORAL
Status: DISCONTINUED | OUTPATIENT
Start: 2022-03-31 | End: 2022-04-04 | Stop reason: HOSPADM

## 2022-03-31 RX ORDER — DIPHENHYDRAMINE HYDROCHLORIDE 50 MG/ML
50 INJECTION INTRAMUSCULAR; INTRAVENOUS EVERY 6 HOURS PRN
Status: DISCONTINUED | OUTPATIENT
Start: 2022-03-31 | End: 2022-04-04 | Stop reason: HOSPADM

## 2022-03-31 RX ORDER — CLONIDINE HYDROCHLORIDE 0.1 MG/1
0.1 TABLET ORAL 2 TIMES DAILY PRN
Status: DISCONTINUED | OUTPATIENT
Start: 2022-03-31 | End: 2022-04-01

## 2022-03-31 RX ORDER — OLANZAPINE 10 MG/1
5 INJECTION, POWDER, LYOPHILIZED, FOR SOLUTION INTRAMUSCULAR
Status: DISCONTINUED | OUTPATIENT
Start: 2022-03-31 | End: 2022-04-04 | Stop reason: HOSPADM

## 2022-03-31 RX ORDER — TRAZODONE HYDROCHLORIDE 50 MG/1
50 TABLET ORAL
Status: DISCONTINUED | OUTPATIENT
Start: 2022-03-31 | End: 2022-04-04 | Stop reason: HOSPADM

## 2022-03-31 RX ORDER — POLYETHYLENE GLYCOL 3350 17 G/17G
17 POWDER, FOR SOLUTION ORAL DAILY PRN
Status: DISCONTINUED | OUTPATIENT
Start: 2022-03-31 | End: 2022-04-04 | Stop reason: HOSPADM

## 2022-03-31 RX ORDER — DIPHENHYDRAMINE HYDROCHLORIDE 50 MG/ML
50 INJECTION INTRAMUSCULAR; INTRAVENOUS EVERY 6 HOURS PRN
Status: CANCELLED | OUTPATIENT
Start: 2022-03-31

## 2022-03-31 RX ORDER — OLANZAPINE 2.5 MG/1
2.5 TABLET ORAL
Status: DISCONTINUED | OUTPATIENT
Start: 2022-03-31 | End: 2022-04-04 | Stop reason: HOSPADM

## 2022-03-31 RX ORDER — BENZTROPINE MESYLATE 1 MG/1
1 TABLET ORAL
Status: DISCONTINUED | OUTPATIENT
Start: 2022-03-31 | End: 2022-04-04 | Stop reason: HOSPADM

## 2022-03-31 RX ORDER — IBUPROFEN 800 MG/1
800 TABLET ORAL EVERY 8 HOURS PRN
Status: DISCONTINUED | OUTPATIENT
Start: 2022-03-31 | End: 2022-04-01

## 2022-03-31 RX ORDER — TRAZODONE HYDROCHLORIDE 50 MG/1
50 TABLET ORAL
Status: CANCELLED | OUTPATIENT
Start: 2022-03-31

## 2022-03-31 RX ORDER — AMOXICILLIN 250 MG
1 CAPSULE ORAL DAILY PRN
Status: DISCONTINUED | OUTPATIENT
Start: 2022-03-31 | End: 2022-04-04 | Stop reason: HOSPADM

## 2022-03-31 RX ORDER — CLONIDINE HYDROCHLORIDE 0.1 MG/1
0.1 TABLET ORAL 2 TIMES DAILY PRN
Status: CANCELLED | OUTPATIENT
Start: 2022-03-31

## 2022-03-31 RX ORDER — OLANZAPINE 10 MG/1
5 INJECTION, POWDER, LYOPHILIZED, FOR SOLUTION INTRAMUSCULAR
Status: CANCELLED | OUTPATIENT
Start: 2022-03-31

## 2022-03-31 RX ORDER — NICOTINE 21 MG/24HR
1 PATCH, TRANSDERMAL 24 HOURS TRANSDERMAL DAILY
Status: CANCELLED | OUTPATIENT
Start: 2022-04-01

## 2022-03-31 RX ORDER — IBUPROFEN 600 MG/1
600 TABLET ORAL EVERY 6 HOURS PRN
Status: DISCONTINUED | OUTPATIENT
Start: 2022-03-31 | End: 2022-04-04 | Stop reason: HOSPADM

## 2022-03-31 RX ORDER — POLYETHYLENE GLYCOL 3350 17 G/17G
17 POWDER, FOR SOLUTION ORAL DAILY PRN
Status: CANCELLED | OUTPATIENT
Start: 2022-03-31

## 2022-03-31 RX ORDER — HYDROXYZINE HYDROCHLORIDE 25 MG/1
25 TABLET, FILM COATED ORAL
Status: CANCELLED | OUTPATIENT
Start: 2022-03-31

## 2022-03-31 RX ORDER — HYDROXYZINE HYDROCHLORIDE 25 MG/1
50 TABLET, FILM COATED ORAL
Status: CANCELLED | OUTPATIENT
Start: 2022-03-31

## 2022-03-31 RX ORDER — LOPERAMIDE HYDROCHLORIDE 2 MG/1
2 CAPSULE ORAL 4 TIMES DAILY PRN
Status: CANCELLED | OUTPATIENT
Start: 2022-03-31

## 2022-03-31 RX ORDER — METHOCARBAMOL 500 MG/1
500 TABLET, FILM COATED ORAL EVERY 6 HOURS PRN
Status: DISCONTINUED | OUTPATIENT
Start: 2022-03-31 | End: 2022-04-04 | Stop reason: HOSPADM

## 2022-03-31 RX ORDER — BENZTROPINE MESYLATE 1 MG/1
1 TABLET ORAL
Status: CANCELLED | OUTPATIENT
Start: 2022-03-31

## 2022-03-31 RX ORDER — HYDROXYZINE 50 MG/1
50 TABLET, FILM COATED ORAL
Status: DISCONTINUED | OUTPATIENT
Start: 2022-03-31 | End: 2022-04-04 | Stop reason: HOSPADM

## 2022-03-31 RX ORDER — OXYCODONE HYDROCHLORIDE 5 MG/1
5 TABLET ORAL ONCE
Status: COMPLETED | OUTPATIENT
Start: 2022-03-31 | End: 2022-03-31

## 2022-03-31 RX ORDER — MAGNESIUM HYDROXIDE/ALUMINUM HYDROXICE/SIMETHICONE 120; 1200; 1200 MG/30ML; MG/30ML; MG/30ML
30 SUSPENSION ORAL EVERY 4 HOURS PRN
Status: CANCELLED | OUTPATIENT
Start: 2022-03-31

## 2022-03-31 RX ORDER — OLANZAPINE 10 MG/1
10 INJECTION, POWDER, LYOPHILIZED, FOR SOLUTION INTRAMUSCULAR
Status: CANCELLED | OUTPATIENT
Start: 2022-03-31

## 2022-03-31 RX ORDER — LORAZEPAM 2 MG/ML
2 INJECTION INTRAMUSCULAR EVERY 6 HOURS PRN
Status: DISCONTINUED | OUTPATIENT
Start: 2022-03-31 | End: 2022-04-04 | Stop reason: HOSPADM

## 2022-03-31 RX ORDER — METHOCARBAMOL 500 MG/1
500 TABLET, FILM COATED ORAL EVERY 6 HOURS PRN
Status: CANCELLED | OUTPATIENT
Start: 2022-03-31

## 2022-03-31 RX ORDER — MINERAL OIL AND PETROLATUM 150; 830 MG/G; MG/G
1 OINTMENT OPHTHALMIC
Status: DISCONTINUED | OUTPATIENT
Start: 2022-03-31 | End: 2022-04-04 | Stop reason: HOSPADM

## 2022-03-31 RX ORDER — IBUPROFEN 400 MG/1
400 TABLET ORAL EVERY 4 HOURS PRN
Status: CANCELLED | OUTPATIENT
Start: 2022-03-31

## 2022-03-31 RX ORDER — OLANZAPINE 5 MG/1
5 TABLET ORAL
Status: DISCONTINUED | OUTPATIENT
Start: 2022-03-31 | End: 2022-04-04 | Stop reason: HOSPADM

## 2022-03-31 RX ORDER — OLANZAPINE 10 MG/1
10 TABLET ORAL
Status: DISCONTINUED | OUTPATIENT
Start: 2022-03-31 | End: 2022-04-04 | Stop reason: HOSPADM

## 2022-03-31 RX ORDER — BENZTROPINE MESYLATE 1 MG/ML
1 INJECTION INTRAMUSCULAR; INTRAVENOUS
Status: DISCONTINUED | OUTPATIENT
Start: 2022-03-31 | End: 2022-04-04 | Stop reason: HOSPADM

## 2022-03-31 RX ORDER — OLANZAPINE 10 MG/1
10 TABLET ORAL
Status: CANCELLED | OUTPATIENT
Start: 2022-03-31

## 2022-03-31 RX ORDER — ONDANSETRON 4 MG/1
4 TABLET, ORALLY DISINTEGRATING ORAL EVERY 6 HOURS PRN
Status: CANCELLED | OUTPATIENT
Start: 2022-03-31

## 2022-03-31 RX ORDER — DICYCLOMINE HYDROCHLORIDE 10 MG/1
10 CAPSULE ORAL 4 TIMES DAILY PRN
Status: DISCONTINUED | OUTPATIENT
Start: 2022-03-31 | End: 2022-04-04 | Stop reason: HOSPADM

## 2022-03-31 RX ORDER — DICYCLOMINE HYDROCHLORIDE 10 MG/1
10 CAPSULE ORAL 4 TIMES DAILY PRN
Status: CANCELLED | OUTPATIENT
Start: 2022-03-31

## 2022-03-31 RX ORDER — HYDROXYZINE HYDROCHLORIDE 25 MG/1
100 TABLET, FILM COATED ORAL
Status: CANCELLED | OUTPATIENT
Start: 2022-03-31

## 2022-03-31 RX ORDER — BENZTROPINE MESYLATE 1 MG/ML
1 INJECTION INTRAMUSCULAR; INTRAVENOUS
Status: CANCELLED | OUTPATIENT
Start: 2022-03-31

## 2022-03-31 RX ORDER — ONDANSETRON 4 MG/1
4 TABLET, ORALLY DISINTEGRATING ORAL EVERY 6 HOURS PRN
Status: DISCONTINUED | OUTPATIENT
Start: 2022-03-31 | End: 2022-04-04 | Stop reason: HOSPADM

## 2022-03-31 RX ORDER — MAGNESIUM HYDROXIDE/ALUMINUM HYDROXICE/SIMETHICONE 120; 1200; 1200 MG/30ML; MG/30ML; MG/30ML
30 SUSPENSION ORAL EVERY 4 HOURS PRN
Status: DISCONTINUED | OUTPATIENT
Start: 2022-03-31 | End: 2022-04-04 | Stop reason: HOSPADM

## 2022-03-31 RX ORDER — HYDROXYZINE HYDROCHLORIDE 25 MG/1
25 TABLET, FILM COATED ORAL
Status: DISCONTINUED | OUTPATIENT
Start: 2022-03-31 | End: 2022-04-04 | Stop reason: HOSPADM

## 2022-03-31 RX ORDER — OLANZAPINE 5 MG/1
5 TABLET ORAL
Status: CANCELLED | OUTPATIENT
Start: 2022-03-31

## 2022-03-31 RX ORDER — IBUPROFEN 400 MG/1
400 TABLET ORAL EVERY 4 HOURS PRN
Status: DISCONTINUED | OUTPATIENT
Start: 2022-03-31 | End: 2022-04-04 | Stop reason: HOSPADM

## 2022-03-31 RX ORDER — AMOXICILLIN 250 MG
1 CAPSULE ORAL DAILY PRN
Status: CANCELLED | OUTPATIENT
Start: 2022-03-31

## 2022-03-31 RX ORDER — BISACODYL 10 MG
10 SUPPOSITORY, RECTAL RECTAL DAILY PRN
Status: CANCELLED | OUTPATIENT
Start: 2022-03-31

## 2022-03-31 RX ADMIN — IPRATROPIUM BROMIDE AND ALBUTEROL SULFATE 3 ML: 2.5; .5 SOLUTION RESPIRATORY (INHALATION) at 13:50

## 2022-03-31 RX ADMIN — OXYCODONE HYDROCHLORIDE 5 MG: 5 TABLET ORAL at 16:18

## 2022-03-31 RX ADMIN — IBUPROFEN 800 MG: 800 TABLET ORAL at 21:51
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
## 2022-03-31 NOTE — ED NOTES
Pt aware of need for urine sample, unable to provide at this time, water given to pt  1:1 remains at bedside      Michael Singh RN  03/31/22 5073

## 2022-03-31 NOTE — ED NOTES
Report called to [facility] 2W   Pt left with all belongings and cell phone via wheelchair with ambulance crew      Alicia Marte RN  03/31/22 7444
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
## 2022-03-31 NOTE — ED NOTES
Pt laying in bed talking on cell phone no distress noted   1:1 remains at [address], RN  03/31/22 5199

## 2022-03-31 NOTE — ED ATTENDING ATTESTATION
3/31/2022  I, Sen Frias MD, saw and evaluated the patient  I have discussed the patient with the resident/non-physician practitioner and agree with the resident's/non-physician practitioner's findings, Plan of Care, and MDM as documented in the resident's/non-physician practitioner's note, except where noted  All available labs and Radiology studies were reviewed  I was present for key portions of any procedure(s) performed by the resident/non-physician practitioner and I was immediately available to provide assistance  At this point I agree with the current assessment done in the Emergency Department    I have conducted an independent evaluation of this patient a history and physical is as follows:  Patient has narcotic  misuse disorder he obtains is narcotics on the street  He does not inject IV drugs  Patient is here for depression and suicidal thoughts  Apparently barricaded himself in his room threatening to kill himself police were called and brought him to the emergency room  Patient has a history chronic pain  He denies fever chills cough denies abdominal pain denies vomiting or diarrhea denies melena  Denies alcohol use  Exam patient is in no acute distress lungs clear heart regular abdomen soft nontender neurologic exam nonfocal    Crisis consult  201 signed   ED Course         Critical Care Time  Procedures

## 2022-03-31 NOTE — ED NOTES
Insurance Authorization for admission:   Phone call placed to [employer] number: [phone]     Spoke to Stella     4 days approved  Level of care: inpatient psych   Review on **  Authorization # Accepting facility will need to call for auth number upon admission         EVS (Eligibility Verification System) called - [phone]    Automated system indicates: Tappr

## 2022-03-31 NOTE — ED NOTES
Pt ambulated to bathroom with steady gait and 1:1, aware of need for urine sample     Kim Freitas RN  03/31/22 3663

## 2022-03-31 NOTE — NURSING NOTE
Adriana Childs   Upon arrival PT belongings came in ed bags   nike air max shoes black and red   Umair hat   Black joggers  Silver watch   Keys   cellphone    box and usb cord   Blue and gray tank top   Blue sweatshirt hooded   Bed side   White socks pt came in upon arrival   Underwear   No wallet uppon arrival     Pt did not want his jogger strings taken out  By this writer MARCIE LARA  PT will be in hospital attire  Clothing will remain in locker

## 2022-03-31 NOTE — ED PROVIDER NOTES
History  Chief Complaint   Patient presents with    Psychiatric Evaluation     Pt tested his girlfriend stating he was going to hang himself off the back porch, and said he was going to cut hid face to "get the demons out "  PD was called and he baracaded himself inside  HPI     44 yo M with past medical history of neck injury in 2020 with chronic neuropathic pain who presents for psychiatric evaluation  Patient was brought in by EMS and police  Per EMS, patient was talking to his girlfriend on the phone and made comments that he was going to hang himself  He said he was going to cut his face and his scalp to "cut the demons out " His girlfriend was concerned and called for police to come  He was barricaded in his room for about 30 minutes before coming outside  Patient denies making these comments  He denies SI or HI  Denies auditory or visual hallucinations  He states he has chronic pain all over his body since his neck injury two years ago  Denies new or worsening pain  Denies fevers, chills, shortness of breath, chest pain, abdominal pain, nausea or vomiting  Endorses marijuana use, denies any other recreational drug use  Endorses occasional alcohol use  Prior to Admission Medications   Prescriptions Last Dose Informant Patient Reported?  Taking?   acetaminophen (TYLENOL) 325 mg tablet   No No   Sig: Take 3 tablets (975 mg total) by mouth every 8 (eight) hours   albuterol (PROVENTIL HFA,VENTOLIN HFA) 90 mcg/act inhaler   No No   Sig: Inhale 2 puffs every 4 (four) hours as needed for wheezing   cyclobenzaprine (FLEXERIL) 5 mg tablet   No No   Sig: Take 1 tablet (5 mg total) by mouth every 8 (eight) hours as needed for muscle spasms   gabapentin (Neurontin) 100 mg capsule  Self No No   Sig: Take 1 capsule (100 mg total) by mouth 3 (three) times a day   Patient taking differently: Take 100 mg by mouth 3 (three) times a day Pt reports 200mg BID    ibuprofen (MOTRIN) 600 mg tablet   No No   Sig: Take 1 tablet (600 mg total) by mouth every 6 (six) hours as needed for mild pain   polyethylene glycol (MIRALAX) 17 g packet   No No   Sig: Take 17 g by mouth daily as needed (Constipation)   senna-docusate sodium (SENOKOT S) 8 6-50 mg per tablet   No No   Sig: Take 2 tablets by mouth daily      Facility-Administered Medications: None       Past Medical History:   Diagnosis Date    Asthma     Chronic pain     Hypertension     Thyroglossal duct cyst        Past Surgical History:   Procedure Laterality Date    THYROGLOSSAL DUCT EXCISION      THYROID SURGERY      THYROID SURGERY         Family History   Problem Relation Age of Onset    Hypertension Mother     No Known Problems Father      I have reviewed and agree with the history as documented  E-Cigarette/Vaping    E-Cigarette Use Current Every Day User     Cartridges/Day 1      E-Cigarette/Vaping Substances    Nicotine Yes     THC No     CBD No     Flavoring Yes     Other No     Unknown No      Social History     Tobacco Use    Smoking status: Former Smoker     Packs/day: 0 25     Types: Cigarettes    Smokeless tobacco: Never Used    Tobacco comment: 5 cigerettes a day    Vaping Use    Vaping Use: Every day    Substances: Nicotine, Flavoring   Substance Use Topics    Alcohol use: Not Currently    Drug use: Yes     Types: Marijuana, Methamphetamines        Review of Systems   Constitutional: Negative for chills and fever  HENT: Negative for congestion, rhinorrhea and sore throat  Respiratory: Negative for shortness of breath  Cardiovascular: Negative for chest pain  Gastrointestinal: Negative for abdominal pain, diarrhea, nausea and vomiting  Genitourinary: Negative for dysuria, frequency, hematuria and urgency  Musculoskeletal: Negative for arthralgias and myalgias  Skin: Negative for rash  Neurological: Negative for dizziness, weakness, light-headedness, numbness and headaches     All other systems reviewed and are negative  Physical Exam  ED Triage Vitals   Temperature Pulse Respirations Blood Pressure SpO2   03/31/22 1319 03/31/22 1319 03/31/22 1319 03/31/22 1319 03/31/22 1319   (!) 97 3 °F (36 3 °C) 94 20 150/96 100 %      Temp Source Heart Rate Source Patient Position - Orthostatic VS BP Location FiO2 (%)   03/31/22 1319 03/31/22 1319 -- 03/31/22 1319 --   Oral Monitor  Right arm       Pain Score       03/31/22 1618       8             Orthostatic Vital Signs  Vitals:    03/31/22 1319   BP: 150/96   Pulse: 94       Physical Exam  Vitals and nursing note reviewed  Constitutional:       General: He is not in acute distress  Appearance: Normal appearance  He is well-developed and normal weight  He is not ill-appearing, toxic-appearing or diaphoretic  HENT:      Head: Normocephalic and atraumatic  Comments: Old scabs and abrasions over the face and chin  Right Ear: Tympanic membrane normal       Left Ear: Tympanic membrane normal       Nose: Nose normal       Mouth/Throat:      Mouth: Mucous membranes are moist       Pharynx: Oropharynx is clear  Eyes:      Extraocular Movements: Extraocular movements intact  Conjunctiva/sclera: Conjunctivae normal    Cardiovascular:      Rate and Rhythm: Normal rate and regular rhythm  Pulses: Normal pulses  Heart sounds: Normal heart sounds  No murmur heard  No friction rub  No gallop  Pulmonary:      Effort: Pulmonary effort is normal  No respiratory distress  Breath sounds: Normal breath sounds  No wheezing or rales  Abdominal:      General: There is no distension  Palpations: Abdomen is soft  Tenderness: There is no abdominal tenderness  There is no guarding or rebound  Musculoskeletal:         General: No swelling or tenderness  Cervical back: Neck supple  Right lower leg: No edema  Left lower leg: No edema  Skin:     General: Skin is warm and dry  Coloration: Skin is not pale        Findings: No erythema or rash  Neurological:      General: No focal deficit present  Mental Status: He is alert and oriented to person, place, and time  Cranial Nerves: No cranial nerve deficit  Sensory: No sensory deficit  Motor: No weakness  ED Medications  Medications   oxyCODONE (ROXICODONE) IR tablet 5 mg (5 mg Oral Given 3/31/22 1618)       Diagnostic Studies  Results Reviewed     Procedure Component Value Units Date/Time    Rapid drug screen, urine [[id number]]  (Abnormal) Collected: 03/31/22 1537    Lab Status: Final result Specimen: Urine, Other Updated: 03/31/22 1724     Amph/Meth UR Positive     Barbiturate Ur Negative     Benzodiazepine Urine Negative     Cocaine Urine Negative     Methadone Urine Negative     Opiate Urine Positive     PCP Ur Negative     THC Urine Positive     Oxycodone Urine Positive    Narrative:      Presumptive report  If requested, specimen will be sent to reference lab for confirmation  FOR MEDICAL PURPOSES ONLY  IF CONFIRMATION NEEDED PLEASE CONTACT THE LAB WITHIN 5 DAYS  Drug Screen Cutoff Levels:  AMPHETAMINE/METHAMPHETAMINES  1000 ng/mL  BARBITURATES     200 ng/mL  BENZODIAZEPINES     200 ng/mL  COCAINE      300 ng/mL  METHADONE      300 ng/mL  OPIATES      300 ng/mL  PHENCYCLIDINE     25 ng/mL  THC       50 ng/mL  OXYCODONE      100 ng/mL    COVID/FLU/RSV [[id number]]  (Normal) Collected: 03/31/22 1347    Lab Status: Final result Specimen: Nares from Nose Updated: 03/31/22 1434     SARS-CoV-2 Negative     INFLUENZA A PCR Negative     INFLUENZA B PCR Negative     RSV PCR Negative    Narrative:      FOR PEDIATRIC PATIENTS - copy/paste COVID Guidelines URL to browser: [email or web address]  [employer]    SARS-CoV-2 assay is a Nucleic Acid Amplification assay intended for the  qualitative detection of nucleic acid from SARS-CoV-2 in nasopharyngeal  swabs   Results are for the presumptive identification of SARS-CoV-2 RNA     Positive results are indicative of infection with SARS-CoV-2, the virus  causing COVID-19, but do not rule out bacterial infection or co-infection  with other viruses  Laboratories within the [place] and its  territories are required to report all positive results to the appropriate  public health authorities  Negative results do not preclude SARS-CoV-2  infection and should not be used as the sole basis for treatment or other  patient management decisions  Negative results must be combined with  clinical observations, patient history, and epidemiological information  This test has not been FDA cleared or approved  This test has been authorized by FDA under an Emergency Use Authorization  (EUA)  This test is only authorized for the duration of time the  declaration that circumstances exist justifying the authorization of the  emergency use of an in vitro diagnostic tests for detection of SARS-CoV-2  virus and/or diagnosis of COVID-19 infection under section 564(b)(1) of  the Act, 21 U  S C  790QBD-3(R)(3), unless the authorization is terminated  or revoked sooner  The test has been validated but independent review by FDA  and CLIA is pending  Test performed using "[employer] GeneXpert: This RT-PCR assay targets N2,  a region unique to SARS-CoV-2  A conserved region in the E-gene was chosen  for pan-Sarbecovirus detection which includes SARS-CoV-2  POCT alcohol breath test [[id number]]  (Normal) Resulted: 03/31/22 1348    Lab Status: Final result Updated: 03/31/22 1348     EXTBreath Alcohol 0 000                 No orders to display         Procedures  Procedures      ED Course  ED Course as of 03/31/22 2030   Thu Mar 31, 2022   1342 Patient medically cleared for behavioral health treatment  201 signed, pending placement      1638 Accepted at [facility], pending transport time                             SBIRT 20yo+      Most Recent Value   SBIRT (22 yo +)    In order to provide better care to our patients, we are screening all of our patients for alcohol and drug use  Would it be okay to ask you these screening questions? Unable to answer at this time Filed at: 03/31/2022 1351                MDM     42 yo M with past medical history of neck injury in 2020 with chronic neuropathic pain who presents for psychiatric evaluation  Patient is medically cleared for crisis evaluation  Will check UDS, COVID and BAT  Patient will be offered 201, if he declines, he will require 302 as he has demonstrated behavior that he may be a danger to himself  Patient signed 12  Patient accepted at [facility]  EMTALA completed  Transported to hospitals  Disposition  Final diagnoses:   Suicidal ideations     Time reflects when diagnosis was documented in both MDM as applicable and the Disposition within this note     Time User Action Codes Description Comment    3/31/2022  4:01 PM Rudy Scott Add [Z00 8] Medical clearance for psychiatric admission     3/31/2022  4:01 PM Rudy  Add [F11 10] Opioid abuse ([address] )     3/31/2022  4:01 PM Rudy Mc Add [M50 20] Cervical disc herniation     3/31/2022  4:48 PM Junious Port Add [Q27 490] Suicidal ideations       ED Disposition     ED Disposition Condition Date/Time Comment    Transfer to [address] [place] [address] Mar 31, 2022  4:48 PM Noemi Nathan should be transferred out to [facility] and has been medically cleared          MD Documentation      Most Recent Value   Accepting Physician PA [address] [place] [address] Name, Jeannathaogreggzakia 64 2w   Sending MD [address] Name, Enriqueta  2w      Follow-up Information    None         Discharge Medication List as of 3/31/2022  6:17 PM      CONTINUE these medications which have NOT CHANGED    Details   acetaminophen (TYLENOL) 325 mg tablet Take 3 tablets (975 mg total) by mouth every 8 (eight) hours, Starting Sat 2/6/2021, Normal      albuterol (PROVENTIL HFA,VENTOLIN HFA) 90 mcg/act inhaler Inhale 2 puffs every 4 (four) hours as needed for wheezing, Starting Fri 9/27/2019, Print      cyclobenzaprine (FLEXERIL) 5 mg tablet Take 1 tablet (5 mg total) by mouth every 8 (eight) hours as needed for muscle spasms, Starting Wed 2/23/2022, Normal      gabapentin (Neurontin) 100 mg capsule Take 1 capsule (100 mg total) by mouth 3 (three) times a day, Starting Wed 2/23/2022, Normal      ibuprofen (MOTRIN) 600 mg tablet Take 1 tablet (600 mg total) by mouth every 6 (six) hours as needed for mild pain, Starting Wed 2/23/2022, Normal      polyethylene glycol (MIRALAX) 17 g packet Take 17 g by mouth daily as needed (Constipation), Starting Wed 2/2/2022, Normal      senna-docusate sodium (SENOKOT S) 8 6-50 mg per tablet Take 2 tablets by mouth daily, Starting Wed 2/2/2022, No Print           No discharge procedures on file  PDMP Review       Value Time User    PDMP Reviewed  Yes 3/31/2022  3:57 PM Coni Scanlon PA-C           ED Provider  Attending physically available and evaluated Renetta Eugenia PARIKH managed the patient along with the ED Attending      Electronically Signed by         Lona Khalil MD  03/31/22 2030

## 2022-03-31 NOTE — ED NOTES
Pt talking on cell phone no distress noted  Updated on plan of care   Verbalized understanding 1:1 remains at [address], RN  03/31/22 2671

## 2022-03-31 NOTE — PLAN OF CARE
Problem: SELF HARM/SUICIDALITY  Goal: Will have no self-injury during hospital stay  Description: INTERVENTIONS:  - Q 15 MINUTES: Routine safety checks  - Q WAKING SHIFT & PRN: Assess risk to determine if routine checks are adequate to maintain patient safety  - Encourage patient to participate actively in care by formulating a plan to combat response to suicidal ideation, identify supports and resources  Outcome: Progressing     Problem: ADAMS  Goal: Will exhibit normal sleep and speech and no impulsivity  Description: INTERVENTIONS:  - Administer medication as ordered  - Set limits on impulsive behavior  - Make attempts to decrease external stimuli as possible  Outcome: Progressing     Problem: SUBSTANCE USE/ABUSE  Goal: Will have no detox symptoms and will verbalize plan for changing substance-related behavior  Description: INTERVENTIONS:  - Monitor physical status and assess for symptoms of withdrawal  - Administer medication as ordered  - Provide emotional support with 1 on 1 interaction with staff  - Encourage recovery focused program/ addiction education  - Assess for verbalization of changing behaviors related to substance abuse  - Initiate consults and referrals as appropriate (Case Management, Spiritual Care, etc )  Outcome: Progressing  Goal: By discharge, will develop insight into their chemical dependency and sustain motivation to continue in recovery  Description: INTERVENTIONS:  - Attends all daily group sessions and scheduled AA groups  - Actively practices coping skills through participation in the therapeutic community and adherence to program rules  - Reviews and completes assignments from individual treatment plan  - Assist patient development of understanding of their personal cycle of addiction and relapse triggers  Outcome: Progressing  Goal: By discharge, patient will have ongoing treatment plan addressing chemical dependency  Description: INTERVENTIONS:  - Assist patient with resources and/or appointments for ongoing recovery based living  Outcome: Progressing     Problem: SLEEP DISTURBANCE  Goal: Will exhibit normal sleeping pattern  Description: Interventions:  -  Assess the patients sleep pattern, noting recent changes  - Administer medication as ordered  - Decrease environmental stimuli, including noise, as appropriate during the night  - Encourage the patient to actively participate in unit groups and or exercise during the day to enhance ability to achieve adequate sleep at night  - Assess the patient, in the morning, encouraging a description of sleep experience  Outcome: Progressing     Problem: SKIN/TISSUE INTEGRITY - ADULT  Goal: Incision(s), wounds(s) or drain site(s) healing without S/S of infection  Description: INTERVENTIONS  - Assess and document dressing, incision, wound bed, drain sites and surrounding tissue  - Provide patient and family education  -Outcome: Progressing

## 2022-03-31 NOTE — ED NOTES
Pt resting with eyes closed, 1:1 remains at bedside  Pt covered with blankets for comfort   No distress noted     Suly Bui RN  03/31/22 4766

## 2022-03-31 NOTE — ED NOTES
Patient is accepted at [facility] 2w  Patient is accepted by MAURIC[facility] Degroot per Celine    Waiting for pickup time  Patient may go to the floor before 2000 or after 2300      Nurse report is to be called to [phone] prior to patient transfer

## 2022-03-31 NOTE — ED CARE HANDOFF
Patient is medically cleared for inpatient behavioral health        Edgar Arellano MD  03/31/22 6950

## 2022-03-31 NOTE — ED NOTES
Pt comes to the ed along with [facility] [employer]  She stated pts girlfriend Ashkan Menchaca called police after pt texted her that he was going to hang himself from a balcony  Police went to pts home where he initially barricaded himself inside but soon let police inside  Pt is tearful and appears to be in physical pain  Pt states he has nerve pain from his neck and admits to using percocets for the pain  Pt claims to have a prescription at home  Pt denies other D&A use with the exception of THC  Pt denies any past psych hx but admits to depression  Pt denies current suicidal ideation but the officer said pts girlfriend is on her way to the ed  The girlfriend is willing to petition a 36 however pt agreed to sign a 201 for inpatient psych tx  He denies homicidal ideations as well as hallucinations  The girlfriend told police that pt also said he wanted to cut the demons off his face  He does have several red marks on his face  Pt is alert and oriented X4  He is calm and cooperative at this time  Sleep and appetite are reported as good

## 2022-04-01 ENCOUNTER — PATIENT OUTREACH (OUTPATIENT)
Dept: FAMILY MEDICINE CLINIC | Facility: CLINIC | Age: 37
End: 2022-04-01

## 2022-04-01 PROBLEM — F39 MOOD DISORDER (HCC): Status: ACTIVE | Noted: 2022-04-01

## 2022-04-01 PROBLEM — Z00.8 MEDICAL CLEARANCE FOR PSYCHIATRIC ADMISSION: Status: ACTIVE | Noted: 2022-04-01

## 2022-04-01 PROBLEM — F33.9 RECURRENT MAJOR DEPRESSIVE DISORDER (HCC): Status: ACTIVE | Noted: 2022-04-01

## 2022-04-01 PROBLEM — F19.94 SUBSTANCE INDUCED MOOD DISORDER (HCC): Status: ACTIVE | Noted: 2022-04-01

## 2022-04-01 PROBLEM — F06.30 MOOD DISORDER DUE TO MEDICAL CONDITION: Status: ACTIVE | Noted: 2022-04-01

## 2022-04-01 PROBLEM — F41.9 ANXIETY: Status: ACTIVE | Noted: 2022-04-01

## 2022-04-01 LAB
ALBUMIN SERPL BCP-MCNC: 4.1 G/DL (ref 3.5–5)
ALP SERPL-CCNC: 70 U/L (ref 46–116)
ALT SERPL W P-5'-P-CCNC: 22 U/L (ref 12–78)
ANION GAP SERPL CALCULATED.3IONS-SCNC: 10 MMOL/L (ref 4–13)
AST SERPL W P-5'-P-CCNC: 32 U/L (ref 5–45)
ATRIAL RATE: 56 BPM
BASOPHILS # BLD AUTO: 0.05 THOUSANDS/ΜL (ref 0–0.1)
BASOPHILS NFR BLD AUTO: 1 % (ref 0–1)
BILIRUB SERPL-MCNC: 0.6 MG/DL (ref 0.2–1)
BUN SERPL-MCNC: 23 MG/DL (ref 5–25)
CALCIUM SERPL-MCNC: 9.1 MG/DL (ref 8.3–10.1)
CHLORIDE SERPL-SCNC: 104 MMOL/L (ref 100–108)
CHOLEST SERPL-MCNC: 158 MG/DL
CO2 SERPL-SCNC: 26 MMOL/L (ref 21–32)
CREAT SERPL-MCNC: 1.1 MG/DL (ref 0.6–1.3)
EOSINOPHIL # BLD AUTO: 0.4 THOUSAND/ΜL (ref 0–0.61)
EOSINOPHIL NFR BLD AUTO: 6 % (ref 0–6)
ERYTHROCYTE [DISTWIDTH] IN BLOOD BY AUTOMATED COUNT: 14 % (ref 11.6–15.1)
EST. AVERAGE GLUCOSE BLD GHB EST-MCNC: 100 MG/DL
GFR SERPL CREATININE-BSD FRML MDRD: 84 ML/MIN/1.73SQ M
GLUCOSE P FAST SERPL-MCNC: 95 MG/DL (ref 65–99)
GLUCOSE SERPL-MCNC: 95 MG/DL (ref 65–140)
HBA1C MFR BLD: 5.1 %
HCT VFR BLD AUTO: 46.5 % (ref 36.5–49.3)
HDLC SERPL-MCNC: 35 MG/DL
HGB BLD-MCNC: 15.9 G/DL (ref 12–17)
IMM GRANULOCYTES # BLD AUTO: 0.01 THOUSAND/UL (ref 0–0.2)
IMM GRANULOCYTES NFR BLD AUTO: 0 % (ref 0–2)
LDLC SERPL CALC-MCNC: 106 MG/DL (ref 0–100)
LYMPHOCYTES # BLD AUTO: 1.7 THOUSANDS/ΜL (ref 0.6–4.47)
LYMPHOCYTES NFR BLD AUTO: 24 % (ref 14–44)
MCH RBC QN AUTO: 32 PG (ref 26.8–34.3)
MCHC RBC AUTO-ENTMCNC: 34.2 G/DL (ref 31.4–37.4)
MCV RBC AUTO: 94 FL (ref 82–98)
MONOCYTES # BLD AUTO: 0.85 THOUSAND/ΜL (ref 0.17–1.22)
MONOCYTES NFR BLD AUTO: 12 % (ref 4–12)
NEUTROPHILS # BLD AUTO: 4.09 THOUSANDS/ΜL (ref 1.85–7.62)
NEUTS SEG NFR BLD AUTO: 57 % (ref 43–75)
NONHDLC SERPL-MCNC: 123 MG/DL
NRBC BLD AUTO-RTO: 0 /100 WBCS
P AXIS: 39 DEGREES
PLATELET # BLD AUTO: 319 THOUSANDS/UL (ref 149–390)
PMV BLD AUTO: 9.8 FL (ref 8.9–12.7)
POTASSIUM SERPL-SCNC: 3.7 MMOL/L (ref 3.5–5.3)
PR INTERVAL: 124 MS
PROT SERPL-MCNC: 7.8 G/DL (ref 6.4–8.2)
QRS AXIS: 36 DEGREES
QRSD INTERVAL: 92 MS
QT INTERVAL: 448 MS
QTC INTERVAL: 432 MS
RBC # BLD AUTO: 4.97 MILLION/UL (ref 3.88–5.62)
SODIUM SERPL-SCNC: 140 MMOL/L (ref 136–145)
T WAVE AXIS: 20 DEGREES
TRIGL SERPL-MCNC: 84 MG/DL
TSH SERPL DL<=0.05 MIU/L-ACNC: 0.7 UIU/ML (ref 0.36–3.74)
VENTRICULAR RATE: 56 BPM
WBC # BLD AUTO: 7.1 THOUSAND/UL (ref 4.31–10.16)

## 2022-04-01 PROCEDURE — 93005 ELECTROCARDIOGRAM TRACING: CPT

## 2022-04-01 PROCEDURE — 80053 COMPREHEN METABOLIC PANEL: CPT | Performed by: PHYSICIAN ASSISTANT

## 2022-04-01 PROCEDURE — 84443 ASSAY THYROID STIM HORMONE: CPT | Performed by: PHYSICIAN ASSISTANT

## 2022-04-01 PROCEDURE — 83036 HEMOGLOBIN GLYCOSYLATED A1C: CPT | Performed by: PHYSICIAN ASSISTANT

## 2022-04-01 PROCEDURE — 85025 COMPLETE CBC W/AUTO DIFF WBC: CPT | Performed by: PHYSICIAN ASSISTANT

## 2022-04-01 PROCEDURE — 93010 ELECTROCARDIOGRAM REPORT: CPT | Performed by: INTERNAL MEDICINE

## 2022-04-01 PROCEDURE — 99253 IP/OBS CNSLTJ NEW/EST LOW 45: CPT

## 2022-04-01 PROCEDURE — 99223 1ST HOSP IP/OBS HIGH 75: CPT | Performed by: STUDENT IN AN ORGANIZED HEALTH CARE EDUCATION/TRAINING PROGRAM

## 2022-04-01 PROCEDURE — 80061 LIPID PANEL: CPT | Performed by: PHYSICIAN ASSISTANT

## 2022-04-01 PROCEDURE — 86592 SYPHILIS TEST NON-TREP QUAL: CPT | Performed by: PHYSICIAN ASSISTANT

## 2022-04-01 RX ORDER — DULOXETIN HYDROCHLORIDE 30 MG/1
30 CAPSULE, DELAYED RELEASE ORAL DAILY
Status: DISCONTINUED | OUTPATIENT
Start: 2022-04-01 | End: 2022-04-01

## 2022-04-01 RX ORDER — GABAPENTIN 300 MG/1
300 CAPSULE ORAL 3 TIMES DAILY
Status: DISCONTINUED | OUTPATIENT
Start: 2022-04-01 | End: 2022-04-02

## 2022-04-01 RX ORDER — BACITRACIN, NEOMYCIN, POLYMYXIN B 400; 3.5; 5 [USP'U]/G; MG/G; [USP'U]/G
1 OINTMENT TOPICAL 2 TIMES DAILY PRN
Status: DISCONTINUED | OUTPATIENT
Start: 2022-04-01 | End: 2022-04-04 | Stop reason: HOSPADM

## 2022-04-01 RX ORDER — CLONIDINE HYDROCHLORIDE 0.1 MG/1
0.2 TABLET ORAL EVERY 6 HOURS PRN
Status: DISCONTINUED | OUTPATIENT
Start: 2022-04-01 | End: 2022-04-04 | Stop reason: HOSPADM

## 2022-04-01 RX ORDER — BACITRACIN, NEOMYCIN, POLYMYXIN B 400; 3.5; 5 [USP'U]/G; MG/G; [USP'U]/G
1 OINTMENT TOPICAL 2 TIMES DAILY PRN
Status: CANCELLED | OUTPATIENT
Start: 2022-04-01

## 2022-04-01 RX ORDER — ALBUTEROL SULFATE 90 UG/1
2 AEROSOL, METERED RESPIRATORY (INHALATION) EVERY 4 HOURS PRN
Status: DISCONTINUED | OUTPATIENT
Start: 2022-04-01 | End: 2022-04-04 | Stop reason: HOSPADM

## 2022-04-01 RX ORDER — DULOXETIN HYDROCHLORIDE 20 MG/1
20 CAPSULE, DELAYED RELEASE ORAL DAILY
Status: DISCONTINUED | OUTPATIENT
Start: 2022-04-01 | End: 2022-04-04 | Stop reason: HOSPADM

## 2022-04-01 RX ORDER — GABAPENTIN 100 MG/1
100 CAPSULE ORAL 3 TIMES DAILY
Status: DISCONTINUED | OUTPATIENT
Start: 2022-04-01 | End: 2022-04-01

## 2022-04-01 RX ORDER — OXYCODONE HYDROCHLORIDE 5 MG/1
5 TABLET ORAL EVERY 6 HOURS PRN
Status: DISCONTINUED | OUTPATIENT
Start: 2022-04-01 | End: 2022-04-02

## 2022-04-01 RX ORDER — QUETIAPINE FUMARATE 25 MG/1
50 TABLET, FILM COATED ORAL
Status: DISCONTINUED | OUTPATIENT
Start: 2022-04-01 | End: 2022-04-02

## 2022-04-01 RX ORDER — ACETAMINOPHEN 325 MG/1
975 TABLET ORAL EVERY 8 HOURS SCHEDULED
Status: COMPLETED | OUTPATIENT
Start: 2022-04-01 | End: 2022-04-03

## 2022-04-01 RX ORDER — LIDOCAINE 50 MG/G
3 PATCH TOPICAL DAILY
Status: DISCONTINUED | OUTPATIENT
Start: 2022-04-01 | End: 2022-04-04 | Stop reason: HOSPADM

## 2022-04-01 RX ADMIN — IBUPROFEN 800 MG: 800 TABLET ORAL at 11:11

## 2022-04-01 RX ADMIN — METHOCARBAMOL 500 MG: 500 TABLET ORAL at 02:15

## 2022-04-01 RX ADMIN — ALBUTEROL SULFATE 2 PUFF: 90 AEROSOL, METERED RESPIRATORY (INHALATION) at 17:08

## 2022-04-01 RX ADMIN — DICYCLOMINE HYDROCHLORIDE 10 MG: 10 CAPSULE ORAL at 08:42

## 2022-04-01 RX ADMIN — QUETIAPINE FUMARATE 50 MG: 25 TABLET ORAL at 21:51

## 2022-04-01 RX ADMIN — ACETAMINOPHEN 325MG 975 MG: 325 TABLET ORAL at 08:40

## 2022-04-01 RX ADMIN — NICOTINE 1 PATCH: 14 PATCH, EXTENDED RELEASE TRANSDERMAL at 08:42

## 2022-04-01 RX ADMIN — CLONIDINE HYDROCHLORIDE 0.1 MG: 0.1 TABLET ORAL at 01:49

## 2022-04-01 RX ADMIN — HYDROXYZINE HYDROCHLORIDE 100 MG: 50 TABLET, FILM COATED ORAL at 08:42

## 2022-04-01 RX ADMIN — ACETAMINOPHEN 325MG 975 MG: 325 TABLET ORAL at 21:51

## 2022-04-01 RX ADMIN — METHOCARBAMOL 500 MG: 500 TABLET ORAL at 15:51

## 2022-04-01 RX ADMIN — METHOCARBAMOL 500 MG: 500 TABLET ORAL at 08:42

## 2022-04-01 RX ADMIN — GABAPENTIN 300 MG: 300 CAPSULE ORAL at 15:51

## 2022-04-01 RX ADMIN — HYDROXYZINE HYDROCHLORIDE 50 MG: 50 TABLET, FILM COATED ORAL at 02:15

## 2022-04-01 RX ADMIN — DULOXETINE HYDROCHLORIDE 20 MG: 20 CAPSULE, DELAYED RELEASE ORAL at 11:11

## 2022-04-01 RX ADMIN — GABAPENTIN 100 MG: 100 CAPSULE ORAL at 08:40

## 2022-04-01 RX ADMIN — LIDOCAINE 5% 3 PATCH: 700 PATCH TOPICAL at 08:41

## 2022-04-01 RX ADMIN — IBUPROFEN 600 MG: 600 TABLET ORAL at 20:12

## 2022-04-01 RX ADMIN — GABAPENTIN 300 MG: 300 CAPSULE ORAL at 20:11

## 2022-04-01 RX ADMIN — OXYCODONE HYDROCHLORIDE 5 MG: 5 TABLET ORAL at 17:24

## 2022-04-01 RX ADMIN — ACETAMINOPHEN 325MG 975 MG: 325 TABLET ORAL at 13:03

## 2022-04-01 RX ADMIN — BACITRACIN, NEOMYCIN, POLYMYXIN B 1 SMALL APPLICATION: 400; 3.5; 5 OINTMENT TOPICAL at 21:52

## 2022-04-01 RX ADMIN — CLONIDINE HYDROCHLORIDE 0.2 MG: 0.1 TABLET ORAL at 11:11

## 2022-04-01 RX ADMIN — ALBUTEROL SULFATE 2 PUFF: 90 AEROSOL, METERED RESPIRATORY (INHALATION) at 09:13

## 2022-04-01 NOTE — PLAN OF CARE
Problem: SELF HARM/SUICIDALITY  Goal: Will have no self-injury during hospital stay  Description: INTERVENTIONS:  - Q 15 MINUTES: Routine safety checks  - Q WAKING SHIFT & PRN: Assess risk to determine if routine checks are adequate to maintain patient safety  - Encourage patient to participate actively in care by formulating a plan to combat response to suicidal ideation, identify supports and resources  Outcome: Progressing     Problem: ADAMS  Goal: Will exhibit normal sleep and speech and no impulsivity  Description: INTERVENTIONS:  - Administer medication as ordered  - Set limits on impulsive behavior  - Make attempts to decrease external stimuli as possible  Outcome: Progressing     Problem: SLEEP DISTURBANCE  Goal: Will exhibit normal sleeping pattern  Description: Interventions:  -  Assess the patients sleep pattern, noting recent changes  - Administer medication as ordered  - Decrease environmental stimuli, including noise, as appropriate during the night  - Encourage the patient to actively participate in unit groups and or exercise during the day to enhance ability to achieve adequate sleep at night  - Assess the patient, in the morning, encouraging a description of sleep experience  Outcome: Progressing     Problem: SKIN/TISSUE INTEGRITY - ADULT  Goal: Incision(s), wounds(s) or drain site(s) healing without S/S of infection  Description: INTERVENTIONS  - Assess and document dressing, incision, wound bed, drain sites and surrounding tissue  - Provide patient and family education  Outcome: Progressing     Problem: Ineffective Coping  Goal: Participates in unit activities  Description: Interventions:  - Provide therapeutic environment   - Provide required programming   - Redirect inappropriate behaviors   Outcome: Progressing     Problem: SUBSTANCE USE/ABUSE  Goal: Will have no detox symptoms and will verbalize plan for changing substance-related behavior  Description: INTERVENTIONS:  - Monitor physical status and assess for symptoms of withdrawal  - Administer medication as ordered  - Provide emotional support with 1 on 1 interaction with staff  - Encourage recovery focused program/ addiction education  - Assess for verbalization of changing behaviors related to substance abuse  - Initiate consults and referrals as appropriate (Case Management, Spiritual Care, etc )  Outcome: Not Progressing  Goal: By discharge, will develop insight into their chemical dependency and sustain motivation to continue in recovery  Description: INTERVENTIONS:  - Attends all daily group sessions and scheduled AA groups  - Actively practices coping skills through participation in the therapeutic community and adherence to program rules  - Reviews and completes assignments from individual treatment plan  - Assist patient development of understanding of their personal cycle of addiction and relapse triggers  Outcome: Not Progressing  Goal: By discharge, patient will have ongoing treatment plan addressing chemical dependency  Description: INTERVENTIONS:  - Assist patient with resources and/or appointments for ongoing recovery based living  Outcome: Not Progressing

## 2022-04-01 NOTE — NURSING NOTE
Pt irritable at times, focused on getting Percocet for pain  Pt told they would need to discuss further with provider in morning  Received Ibuprofen at 2151  Pt approached desk at [phone] stating, "I have a hole in my head and blood is pouring out " Writer examined head, there is a reddened area to the top of head, just off to the Right  No bleeding  Pt was offered an ice pack but stated would rather place a napkin on it   Encouraged not to touch but rather discuss with medical provider in AM

## 2022-04-01 NOTE — ASSESSMENT & PLAN NOTE
Admission labs pending  Vitals stable   UDS positive for THC, Methamphetamines, Oxycodone/opiates  COVID-19 negative  EKG reveals NSR HR 56,   Medically stable for continued inpatient psychiatric treatment based on available results
· Continue PRN albuterol   · Patient denies wheezing/cough/SOB currently
· Patient is s/p neck injury in 2020 sustained during fall backwards resulting in significant chronic pain, has been previously evaluated by outpatient neuro   · Last MRI 11/2021: Disc and uncovertebral osteophytosis and protrusion at C6-7 resulting in severe right neural foraminal narrowing, not significantly changed since the prior exam   · Patient states he takes percocet "from the street" for pain control since having difficulties with prior prescription  · PDMP reviewed- patient was prescribed acetaminophen/oxycodone in February 2022, has not been filled since  · Currently experiencing typical patterns of diffuse pain, he states the pain radiates from his spine to "everywhere" with associated paresthesias but no weakness- no worsening or new symptoms currently  · Will prescribe scheduled tylenol x 3 days currently + re-initiation of home gabapentin 100 mg TID (patient has not been taking)  · Will add lidocaine patches for back as needed  · Monitor symptoms and LFTs  · Outpatient follow up with neurosurgery recommended
acuteness of illness

## 2022-04-01 NOTE — NURSING NOTE
Pt is a 10 from [facility] ED, made suicidal statement to hand self to girlfriend who called PD, per report, pt barricaded self in room, stating pt was going to cut demons out of their face  UDS+ meth, opiates/oxy, THC  Pt only admits to [facility] and states being prescribed oxycodone 15mg, no documentation in PTA meds  Reports chronic neck pain/nerve pain from a past fall  Reports PMH Vertigo, placed on Fall Risk  Pt has multiple abrasions to face, Right LE, Right lower back  Tangential, labile, somatic during admission

## 2022-04-01 NOTE — CONSULTS
[address] Potter 1985, 43 y o  male MRN: [id number]  Unit/Bed#: [facility] 261-01 Encounter: [id number]  Primary Care Provider: Joanna Encinas DO   Date and time admitted to hospital: 3/31/2022  6:45 PM    Inpatient consult for Medical Clearance for [facility] patient  Consult performed by: Gene Celestin PA-C  Consult ordered by: Veronica Aldana PA-C          Medical clearance for psychiatric admission  Assessment & Plan  Admission labs pending  Vitals stable   UDS positive for THC, Methamphetamines, Oxycodone/opiates  COVID-19 negative  EKG reveals NSR HR 56,   Medically stable for continued inpatient psychiatric treatment based on available results    Paresthesia and pain of extremity  Assessment & Plan  · Patient is s/p neck injury in 2020 sustained during fall backwards resulting in significant chronic pain, has been previously evaluated by outpatient neuro   · Last MRI 11/2021: Disc and uncovertebral osteophytosis and protrusion at C6-7 resulting in severe right neural foraminal narrowing, not significantly changed since the prior exam   · Patient states he takes percocet "from the street" for pain control since having difficulties with prior prescription  · PDMP reviewed- patient was prescribed acetaminophen/oxycodone in February 2022, has not been filled since  · Currently experiencing typical patterns of diffuse pain, he states the pain radiates from his spine to "everywhere" with associated paresthesias but no weakness- no worsening or new symptoms currently  · Will prescribe scheduled tylenol x 3 days currently + re-initiation of home gabapentin 100 mg TID (patient has not been taking)  · Will add lidocaine patches for back as needed  · Monitor symptoms and LFTs  · Outpatient follow up with neurosurgery recommended     Asthma  Assessment & Plan  · Continue PRN albuterol   · Patient denies wheezing/cough/SOB currently       Counseling / Coordination of Care Time: 30 minutes  Greater than 50% of total time spent on patient counseling and coordination of care  Collaboration of Care: Were Recommendations Directly Discussed with Primary Treatment Team? - No     History of Present Illness:  (Patient resting during interview with eyes closed, intermittently responds to questions with brief answers  ROS and physical exam very limited secondary to patient minimal cooperation )    Henry Luke is a 43 y o  male with PMH of neck injury in 2020 with chronic generalized pain/paresthesias who is originally admitted to the psychiatry service due to expressions of SI  We are consulted for medical clearance for admission to [facility] and treatment of underlying psychiatric illness  Patient endorses significant back pain with radiation to "everywhere" following his neck injury in 2020  He states he was previously prescribed percocet but has not been taking medications recently and was obtaining percocet from street  Patient states he does use his albuterol inhaler for asthma frequently (nearly daily) but denies symptoms at this time  Patient should continue all prior to admission medications as prescribed by primary care provider/outpatient specialists  Patient endorses smoking 1/2 pack daily of tobacco cigarettes but denies drinking or drug use  UDS was positive for methamphetamines, opiates/oxycodone, THC  Available admission lab work and vitals are acceptable  Patient complains of diffuse significant pain radiating from his neck/back to "everywhere"  He states the pain is not different than his usual patterns  Patient appears medically stable for inpatient psychiatric treatment at this time  Review of Systems:    Review of Systems   Constitutional: Positive for fatigue  Negative for chills and fever  HENT: Negative for congestion and sore throat  Respiratory: Negative for cough, shortness of breath and wheezing      Cardiovascular: Negative for chest pain  Gastrointestinal:        Patient did not respond regarding N/V/D   Genitourinary:        Patient did not respond regarding dysuria, incontinence, retention   Musculoskeletal: Positive for arthralgias, back pain, myalgias, neck pain and neck stiffness  Skin: Negative for rash and wound  Denies rashes/wounds but did note his history of falls   Neurological: Negative for dizziness and headaches  All other systems reviewed and are negative  Past Medical and Surgical History:     Past Medical History:   Diagnosis Date    Asthma     Chronic pain     Hypertension     Thyroglossal duct cyst        Past Surgical History:   Procedure Laterality Date    THYROGLOSSAL DUCT EXCISION      THYROID SURGERY      THYROID SURGERY         Meds/Allergies:    PTA meds:   Prior to Admission Medications   Prescriptions Last Dose Informant Patient Reported?  Taking?   acetaminophen (TYLENOL) 325 mg tablet   No No   Sig: Take 3 tablets (975 mg total) by mouth every 8 (eight) hours   albuterol (PROVENTIL HFA,VENTOLIN HFA) 90 mcg/act inhaler   No No   Sig: Inhale 2 puffs every 4 (four) hours as needed for wheezing   cyclobenzaprine (FLEXERIL) 5 mg tablet   No No   Sig: Take 1 tablet (5 mg total) by mouth every 8 (eight) hours as needed for muscle spasms   gabapentin (Neurontin) 100 mg capsule  Self No No   Sig: Take 1 capsule (100 mg total) by mouth 3 (three) times a day   Patient taking differently: Take 100 mg by mouth 3 (three) times a day Pt reports 200mg BID    ibuprofen (MOTRIN) 600 mg tablet   No No   Sig: Take 1 tablet (600 mg total) by mouth every 6 (six) hours as needed for mild pain   polyethylene glycol (MIRALAX) 17 g packet   No No   Sig: Take 17 g by mouth daily as needed (Constipation)   senna-docusate sodium (SENOKOT S) 8 6-50 mg per tablet   No No   Sig: Take 2 tablets by mouth daily      Facility-Administered Medications: None       Allergies: No Known Allergies    Social History:     Marital Status: Single    Substance Use History:   Social History     Substance and Sexual Activity   Alcohol Use Not Currently     Social History     Tobacco Use   Smoking Status Former Smoker    Packs/day: 0 25    Types: Cigarettes   Smokeless Tobacco Never Used   Tobacco Comment    5 cigerettes a day      Social History     Substance and Sexual Activity   Drug Use Yes    Types: Marijuana, Methamphetamines       Family History:    Family History   Problem Relation Age of Onset    Hypertension Mother     No Known Problems Father        Physical Exam:     Vitals:   Blood Pressure: (!) 157/112 (04/01/22 0737)  Pulse: 78 (04/01/22 0737)  Temperature: 98 7 °F (37 1 °C) (04/01/22 0737)  Temp Source: Tympanic (04/01/22 0737)  Respirations: 16 (04/01/22 0737)  Height: 5' 5" (165 1 cm) (03/31/22 1847)  Weight - Scale: 70 3 kg (155 lb) (03/31/22 1847)  SpO2: 100 % (04/01/22 0149)    Physical Exam  Vitals and nursing note reviewed  Constitutional:       General: He is not in acute distress  Comments: Patient laying with eyes closed but responds to voice   HENT:      Head: Normocephalic and atraumatic  Eyes:      Comments: Eyes closed   Cardiovascular:      Rate and Rhythm: Normal rate and regular rhythm  Pulmonary:      Effort: Pulmonary effort is normal  No respiratory distress  Breath sounds: Normal breath sounds  No wheezing, rhonchi or rales  Abdominal:      Comments: Unable to assess due to patient cooperation   Musculoskeletal:      Right lower leg: No edema  Left lower leg: No edema  Skin:     General: Skin is warm and dry  Comments: Unable to assess for wounds/rash due to patient cooperation    Neurological:      Mental Status: He is oriented to person, place, and time           Additional Data:     Lab Results: pending                     No results found for: HGBA1C        EKG, Pathology, and Other Studies Reviewed on Admission:   · EKG (04/01/2022): NSR HR 56 QTC 432    ** Please Note: This note has been constructed using a voice recognition system   **

## 2022-04-01 NOTE — CMS CERTIFICATION NOTE
Recertification: Based upon physical, mental and social evaluations, I certify that inpatient psychiatric services continue to be medically necessary for this patient for a duration of 7 midnights for the treatment of  Recurrent major depressive disorder ([facility] [address] )   Available alternative community resources still do not meet the patient's mental health care needs  I further attest that an established written individualized plan of care has been updated and is outlined in the patient's medical records

## 2022-04-01 NOTE — PROGRESS NOTES
Chart reviewed  Patient was brought to ED by police department due to suicidal plan  Patient signed 12 and was admitted to [facility] Inpatient Psychiatry Unit  Will follow

## 2022-04-01 NOTE — H&P
Psychiatric Evaluation - [place] [address] Giuseppe 43 y o  male MRN: [id number]  Unit/Bed#: U 261-01 Encounter: [id number]    Assessment/Plan   Principal Problem:    Recurrent major depressive disorder ([facility] )  Active Problems:    Paresthesia and pain of extremity    Asthma    Medical clearance for psychiatric admission    Mood disorder ([facility] )    Anxiety    Substance induced mood disorder ([place] [address] )    Mood disorder due to medical condition    Plan:     Admit to [address] on 201 status for safety and treatment  No 1:1 CO needed at this time as patient feels safe on the unit  Start Cymbalta 20 mg PO Daily  Increase gabapentin to 300 mg daily  Seroquel 50 mg PO HS  Check admission labs  Collaborate with family for baseline assessment and disposition planning  Case discussed with treatment team     Treatment options and alternatives were reviewed with the patient, who concurs with the above plan  Risks, benefits, and possible side effects of medications were explained to the patient, and he verbalizes understanding       -----------------------------------    Chief Complaint: "I wanted to die"    History of Present Illness     Per ED provider on 3/31: "44 yo M with past medical history of neck injury in 2020 with chronic neuropathic pain who presents for psychiatric evaluation  Patient was brought in by EMS and police  Per EMS, patient was talking to his girlfriend on the phone and made comments that he was going to hang himself  He said he was going to cut his face and his scalp to "cut the demons out " His girlfriend was concerned and called for police to come  He was barricaded in his room for about 30 minutes before coming outside  Patient denies making these comments  He denies SI or HI  Denies auditory or visual hallucinations  He states he has chronic pain all over his body since his neck injury two years ago  Denies new or worsening pain   Denies fevers, chills, shortness of breath, chest pain, abdominal pain, nausea or vomiting  Endorses marijuana use, denies any other recreational drug use  Endorses occasional alcohol use "    Per Crisis worker on 3/31: "Pt comes to the ed along with Alexsander Yeager  She stated pts girlfriend Ger Saucedo called police after pt texted her that he was going to hang himself from a balcony  Police went to pts home where he initially barricaded himself inside but soon let police inside  Pt is tearful and appears to be in physical pain  Pt states he has nerve pain from his neck and admits to using percocets for the pain  Pt claims to have a prescription at home  Pt denies other D&A use with the exception of THC  Pt denies any past psych hx but admits to depression  Pt denies current suicidal ideation but the officer said pts girlfriend is on her way to the ed  The girlfriend is willing to petition a [phone] however pt agreed to sign a 201 for inpatient psych tx  He denies homicidal ideations as well as hallucinations  The girlfriend told police that pt also said he wanted to cut the demons off his face  He does have several red marks on his face  Pt is alert and oriented X4  He is calm and cooperative at this time  Sleep and appetite are reported as good  "    This is 42 yo male with hx of depression/anxiety, chronic pain and substance use admitted to inpatient unit on voluntary status for worsening of mood and suicidal ideations with plan in the context of psychosocial stressors, pain and substance use  Patient endorses depressed mood, anhedonia, low energy, poor motivation, anxiety and poor sleep  He also endorses paranoia, nightmares and hypervigilance  Patient appears guarded, withdrawn, depressed and anxious     Psychiatric Review Of Systems:  Problems with sleep: yes, decreased  Appetite changes: no  Weight changes: no  Low energy/anergy: yes  Low interest/pleasure/anhedonia: yes  Somatic symptoms: yes  Anxiety/panic: anxiety  Hilaria: no  Guilt/hopeless: no  Self injurious behavior/risky behavior: yes    Medical Review Of Systems:  Complete review of systems is negative except as noted above  02/02/2022  1   02/02/2022  Oxycodone-Acetaminophen 5-325    30 00  5 Hs Li   [id number]   Pen (1764)    45 00 MME  [place]   10/27/2021  1   10/27/2021  Oxycodone Hcl (Ir) 5 MG Tablet    15 00  3 Br Nakul   [id number]   Pen (6164)    37 50 MME  [place]       Historical Information     Psychiatric History:   Psychiatric medication trial: Cymbalta gabapentin  Inpatient hospitalizations: Yes "for emotional; breakdown"  Suicide attempts: patient denies  Violent behavior: patient denies  Outpatient treatment: NO    Substance Abuse History:  Social History     Tobacco Use    Smoking status: Former Smoker     Packs/day: 0 25     Types: Cigarettes    Smokeless tobacco: Never Used    Tobacco comment: 5 cigerettes a day    Vaping Use    Vaping Use: Every day    Substances: Nicotine, Flavoring   Substance Use Topics    Alcohol use: Not Currently    Drug use: Yes     Types: Marijuana, Methamphetamines      Patient reports occassional opiates, cannabis and methamphetamines use  UDS + Meth THC and opiates  I have assessed this patient for substance use within the past 12 months  I spent time with Neymar Acharya in counseling and education on risk of substance abuse  I assessed motivation and encouraged him for treatment as appropriate  Family Psychiatric History:   Patient denies any known family history of psychiatric illness, suicide attempt, or substance abuse    Social History:  Education: high school diploma/GED  Learning Disabilities: denies  Occupational History: unemployed  Functioning Relationships: good support system    Other Pertinent History: None      Traumatic History:   Abuse: denies  Other Traumatic Events: none reported    Past Medical History:   Past Medical History:   Diagnosis Date    Asthma     Chronic pain     Hypertension     Thyroglossal duct cyst -----------------------------------  Objective    Temp:  [97 3 °F (36 3 °C)-98 7 °F (37 1 °C)] 98 7 °F (37 1 °C)  HR:  [] 78  Resp:  [16-20] 16  BP: (150-169)/() 157/112    Mental Status Evaluation:  Appearance:  drowsy, no eye contact, marginal grooming/hygiene, disheveled and facial scabs   Behavior:  calm, guarded and evasive   Speech:  slow, soft, scant and coherent   Mood:  depressed, anxious and irritable   Affect:  constricted, blunted and irritable   Thought Process:  Organized, logical, goal-directed   Thought Content: mild paranoia   Perceptual disturbances: no reported hallucinations and does not appear to be responding to internal stimuli at this time   Risk Potential: No active or passive suicidal or homicidal ideation was verbalized during interview   Sensorium: person, place and time/date   Memory: recent and remote memory grossly intact   Consciousness: alert and awake   Attention: attention span appeared shorter than expected for age   Insight:  Improving   Judgment: Fair   Gait/Station: normal gait/station   Motor Activity: no abnormal movements     Meds/Allergies   No Known Allergies  all current active meds have been reviewed    Behavioral Health Medications: all current active meds have been reviewed  Changes as above  Laboratory results:  I have personally reviewed all pertinent laboratory/tests results    Recent Results (from the past 48 hour(s))   COVID/FLU/RSV    Collection Time: 03/31/22  1:47 PM    Specimen: Nose; Nares   Result Value Ref Range    SARS-CoV-2 Negative Negative    INFLUENZA A PCR Negative Negative    INFLUENZA B PCR Negative Negative    RSV PCR Negative Negative   POCT alcohol breath test    Collection Time: 03/31/22  1:48 PM   Result Value Ref Range    EXTBreath Alcohol 0 000    Rapid drug screen, urine    Collection Time: 03/31/22  3:37 PM   Result Value Ref Range    Amph/Meth UR Positive (A) Negative    Barbiturate Ur Negative Negative    Benzodiazepine Urine Negative Negative    Cocaine Urine Negative Negative    Methadone Urine Negative Negative    Opiate Urine Positive (A) Negative    PCP Ur Negative Negative    THC Urine Positive (A) Negative    Oxycodone Urine Positive (A) Negative   CBC and differential    Collection Time: 04/01/22  6:35 AM   Result Value Ref Range    WBC 7 10 4 31 - 10 16 Thousand/uL    RBC 4 97 3 88 - 5 62 Million/uL    Hemoglobin 15 9 12 0 - 17 0 g/dL    Hematocrit 46 5 36 5 - 49 3 %    MCV 94 82 - 98 fL    MCH 32 0 26 8 - 34 3 pg    MCHC 34 2 31 4 - 37 4 g/dL    RDW 14 0 11 6 - 15 1 %    MPV 9 8 8 9 - 12 7 fL    Platelets 303 345 - 157 Thousands/uL    nRBC 0 /100 WBCs    Neutrophils Relative 57 43 - 75 %    Immat GRANS % 0 0 - 2 %    Lymphocytes Relative 24 14 - 44 %    Monocytes Relative 12 4 - 12 %    Eosinophils Relative 6 0 - 6 %    Basophils Relative 1 0 - 1 %    Neutrophils Absolute 4 09 1 85 - 7 62 Thousands/µL    Immature Grans Absolute 0 01 0 00 - 0 20 Thousand/uL    Lymphocytes Absolute 1 70 0 60 - 4 47 Thousands/µL    Monocytes Absolute 0 85 0 17 - 1 22 Thousand/µL    Eosinophils Absolute 0 40 0 00 - 0 61 Thousand/µL    Basophils Absolute 0 05 0 00 - 0 10 Thousands/µL   Comprehensive metabolic panel    Collection Time: 04/01/22  6:35 AM   Result Value Ref Range    Sodium 140 136 - 145 mmol/L    Potassium 3 7 3 5 - 5 3 mmol/L    Chloride 104 100 - 108 mmol/L    CO2 26 21 - 32 mmol/L    ANION GAP 10 4 - 13 mmol/L    BUN 23 5 - 25 mg/dL    Creatinine 1 10 0 60 - 1 30 mg/dL    Glucose 95 65 - 140 mg/dL    Glucose, Fasting 95 65 - 99 mg/dL    Calcium 9 1 8 3 - 10 1 mg/dL    AST 32 5 - 45 U/L    ALT 22 12 - 78 U/L    Alkaline Phosphatase 70 46 - 116 U/L    Total Protein 7 8 6 4 - 8 2 g/dL    Albumin 4 1 3 5 - 5 0 g/dL    Total Bilirubin 0 60 0 20 - 1 00 mg/dL    eGFR 84 ml/min/1 73sq m   Lipid panel    Collection Time: 04/01/22  6:35 AM   Result Value Ref Range    Cholesterol 158 See Comment mg/dL    Triglycerides 84 See Comment mg/dL    HDL, Direct 35 (L) >=40 mg/dL    LDL Calculated 106 (H) 0 - 100 mg/dL    Non-HDL-Chol (CHOL-HDL) 123 mg/dl   TSH, 3rd generation with Free T4 reflex    Collection Time: 04/01/22  6:35 AM   Result Value Ref Range    TSH 3RD GENERATON 0 698 0 358 - 3 740 uIU/mL              -----------------------------------    Risks / Benefits of Treatment:     Risks, benefits, and possible side effects of medications explained to patient  The patient verbalizes understanding and agreement for treatment  Counseling / Coordination of Care:     Patient's presentation on admission and proposed treatment plan were discussed with the treatment team   Diagnosis, medication changes and treatment plan were reviewed with the patient  Recent stressors were discussed with the patient  Events leading to admission were reviewed with the patient  Importance of medication and treatment compliance was reviewed with the patient  Inpatient Psychiatric Certification:     Certification: Based upon physical, mental and social evaluations, I certify that inpatient psychiatric services are medically necessary for this patient for a duration of 7 midnights for the treatment of Recurrent major depressive disorder ([facility] [address] )          This note has been constructed using a voice recognition system  There may be translation, syntax, or grammatical errors  If you have any questions, please contact the dictating provider

## 2022-04-01 NOTE — TREATMENT PLAN
TREATMENT PLAN REVIEW - Marina [address] 43 y o  1985 male MRN: [id number]    [address] [place] [address] Room / Bed: Naresh Licona/[facility] 753-50 Encounter: [id number]          Admit Date/Time:  3/31/2022  6:45 PM    Treatment Team: Attending Provider: Esdras Johnston MD; Consulting Physician: Erika Pereira MD; Care Manager: Janki العراقي RN; Security: Dipak Keatings; Registered Nurse: Nadia Pascual, VELMA; Charge Nurse: Marci Muñoz RN; Registered Nurse: Sarah Dallas, VELMA; Occupational Therapy Assistant: ANIL Lennon; : Elias Allen; Patient Care Technician: Brenden Ortiz; Patient Care Technician: Edelmira Phillips; Security: López Johnston    Diagnosis: Principal Problem:    Recurrent major depressive disorder [facility])  Active Problems:    Paresthesia and pain of extremity    Asthma    Medical clearance for psychiatric admission    Mood disorder ([facility] )    Anxiety    Substance induced mood disorder ([place] [address] )    Mood disorder due to medical condition      Patient Strengths/Assets: cooperative, motivation for treatment/growth, patient is on a voluntary commitment    Patient Barriers/Limitations: poor self-care, substance abuse    Short Term Goals: decrease in depressive symptoms, decrease in anxiety symptoms, decrease in paranoid thoughts, decrease in suicidal thoughts, decrease in self abusive behaviors, improvement in self care, sleep improvement, improvement in appetite    Long Term Goals: improvement in depression, improvement in anxiety, resolution of depressive symptoms, stabilization of mood, free of suicidal thoughts, no self abusive behavior, adequate self care, adequate sleep, adequate appetite    Progress Towards Goals: starting psychiatric medications as prescribed, starting opioid detoxification protocol    Recommended Treatment: medication management, patient medication education, group therapy, milieu therapy, continued Behavioral Health psychiatric evaluation/assessment process    Treatment Frequency: daily medication monitoring, group and milieu therapy daily, monitoring through interdisciplinary rounds, monitoring through weekly patient care conferences    Expected Discharge Date:  5-7 days    Discharge Plan: referral for outpatient medication management with a psychiatrist, referral for outpatient psychotherapy    Treatment Plan Created/Updated By: Siri Quesada MD

## 2022-04-01 NOTE — PROGRESS NOTES
Status: Pt is a 201 from [facility], who presented via police after texting his girlfriend suicidal statements with plan to hang self  She contacted police & Pt barricaded himself inside, but did come out after 20-30 minutes  Pt has chronic neck/back pain from a fall/injury about 2 years ago  Pt cooperative upon arrival to the unit  He slept in the quiet room, so as not to disturb his roommate further  Pt awake several times diaphoretic, & reporting nerve pain in arm when checking blood pressure  Pt appears to be going through opiate withdraw    Reviewed readmit score: 19(Yellow), AUDIT: 0, PAWSS: 0 , BAT: 0, UDS: +amph/meth, opiate, thc, & oxycodone  Medication: to be reviewed / PRN - Clonidine, Atarax, Motrin, & Robaxin  D/C: TBD / new admission     04/01/22 [address] Type Daily Rounds   Team Members Present   Team Members Present Physician;Nurse;;Occupational Therapist   Physician Team Member Dr Ave Dejesus / Alex Reid / Jacqui Song / Stacey Ryder Team Member MARCELA [facility] Management Team Member Elvi Casey / Charleen Dean   OT Team Member Renny   Patient/Family Present   Patient Present No   Patient's Family Present No

## 2022-04-01 NOTE — NURSING NOTE
Scant, witdraw to self/bed  Reports poor sleep overnight  States "I'm really going through it right now" referring to sx of withdraw  PRN Robaxin, Bently and Atarax provided as comfort medications  Appear to be effective on follow up assessment as Pt is seen sleeping in bed  Denies SI/HI/AVH  Compliant with scheduled medications

## 2022-04-01 NOTE — NURSING NOTE
Pt reports swelling and aching in his spine and arms at change of shift, received scheduled gabapentin and PRN Robaxin 500 mg PO for muscle spasms at 1551  Pt reported increasing pain rated "12/10," states pain is worse than earlier, states "I can't walk, I can't move " Pt was shouting due to pain and appeared to be writhing and grimacing, pt states "everyone is focused on my withdrawal but this pain will still be there " Pt reports that he feels inflamed and that severe pain is spreading from his neck and spine into his arms and legs and around his abdomen  Pt reported that he was prescribed Percocet, Dr Suresh Chisholm was notified and reported that pt had been prescribed Percocet for five days a month ago and recommended contacting [facility]  PA Cecily Najjar was notified of pt's pain and ordered PRN oxycodone 5 mg PO for 3 days  Pt received PRN oxycodone 5 mg PO at 1724 and reports PRN was partially effective, pt states "it's always a 10 but it was higher before " Pt denies SI/HI or AH/VH  Out in milieu to use phones but otherwise resting in his room  Pt requested topical antibiotic for numerous abrasions, small bumps on his scalp, and reports having nicked himself in his groin area while shaving prior to admission, reports it looks "angry" and he wants to avoid it getting worse  Dr Kate Garcia notified and PRN neosporin ordered and administered

## 2022-04-01 NOTE — NURSING NOTE
Pt offered and accepted the Quiet Room to sleep, as to not disturb roommate who was already awoken  Pt awake several times, diaphoretic, soaking through hospital gowns  BP checked for parameters, 169/109, 73  During Vitals pt yelling that BP cuff was causing nerve pain in Left arm, stomping foot hard on floor  Cuff removed, deep breathing tried to help pt calm  PRN Clonidine 0 1mg given po at 0149 for what appears to be opiate withdrawal symptoms  Pt reported being on "Percocet 15mg" upon admission  Did receive a dose of Oxycodone IR 5mg po in ED at 1618  UDS+ for opiates/oxycodone, meth/amph, THC  Pt denies all drug use other than THC  Pt then reported bleeding from ear  Writer assessed the Left ear, which was actively bleeding  Instructed pt to stop picking scabs  Provided tissues  Pt then out at desk within minutes with c/o asthma symptoms  At this time received Atarax 50mg and Robaxin 500mg po  Will continue to monitor

## 2022-04-02 PROCEDURE — 99232 SBSQ HOSP IP/OBS MODERATE 35: CPT | Performed by: PSYCHIATRY & NEUROLOGY

## 2022-04-02 RX ORDER — QUETIAPINE FUMARATE 100 MG/1
100 TABLET, FILM COATED ORAL
Status: DISCONTINUED | OUTPATIENT
Start: 2022-04-02 | End: 2022-04-04 | Stop reason: HOSPADM

## 2022-04-02 RX ORDER — GABAPENTIN 400 MG/1
400 CAPSULE ORAL 3 TIMES DAILY
Status: DISCONTINUED | OUTPATIENT
Start: 2022-04-02 | End: 2022-04-03

## 2022-04-02 RX ORDER — OXYCODONE HYDROCHLORIDE 5 MG/1
5 TABLET ORAL EVERY 4 HOURS PRN
Status: DISCONTINUED | OUTPATIENT
Start: 2022-04-02 | End: 2022-04-03

## 2022-04-02 RX ADMIN — ACETAMINOPHEN 325MG 975 MG: 325 TABLET ORAL at 06:12

## 2022-04-02 RX ADMIN — GABAPENTIN 300 MG: 300 CAPSULE ORAL at 09:13

## 2022-04-02 RX ADMIN — METHOCARBAMOL 500 MG: 500 TABLET ORAL at 09:17

## 2022-04-02 RX ADMIN — CLONIDINE HYDROCHLORIDE 0.2 MG: 0.1 TABLET ORAL at 11:28

## 2022-04-02 RX ADMIN — GABAPENTIN 400 MG: 400 CAPSULE ORAL at 22:00

## 2022-04-02 RX ADMIN — DULOXETINE HYDROCHLORIDE 20 MG: 20 CAPSULE, DELAYED RELEASE ORAL at 09:13

## 2022-04-02 RX ADMIN — NICOTINE 1 PATCH: 14 PATCH, EXTENDED RELEASE TRANSDERMAL at 09:15

## 2022-04-02 RX ADMIN — LIDOCAINE 5% 3 PATCH: 700 PATCH TOPICAL at 09:17

## 2022-04-02 RX ADMIN — ACETAMINOPHEN 325MG 975 MG: 325 TABLET ORAL at 22:11

## 2022-04-02 RX ADMIN — ACETAMINOPHEN 325MG 975 MG: 325 TABLET ORAL at 16:00

## 2022-04-02 RX ADMIN — OXYCODONE HYDROCHLORIDE 5 MG: 5 TABLET ORAL at 12:10

## 2022-04-02 RX ADMIN — OXYCODONE HYDROCHLORIDE 5 MG: 5 TABLET ORAL at 06:36

## 2022-04-02 RX ADMIN — ALBUTEROL SULFATE 2 PUFF: 90 AEROSOL, METERED RESPIRATORY (INHALATION) at 11:53

## 2022-04-02 RX ADMIN — GABAPENTIN 300 MG: 300 CAPSULE ORAL at 16:10

## 2022-04-02 RX ADMIN — QUETIAPINE FUMARATE 100 MG: 100 TABLET ORAL at 22:11

## 2022-04-02 NOTE — NURSING NOTE
Pt cont's to struggle with chronic pain issues and remains primarily focused on same  Denies any thoughts of suicide or self harm  Ate bfst in room due to weakness at that time and difficulty ambulating  BP's have been elevated, 189/89 at 1126, pt slightly diaphoretic, c/o severe pain at that time, given clonidine 0 2mg at 1130 and oxycodone 5mg at 1210 as ordered Q 6 hours prn  Pt also utilizing lidocaine patches and prn robaxin as needed  Pt states on follow up that pain is reduced, BP also improved 122/74  Pt has been ambulating in hallway at times and on phone, at times more steady and gait improved  Denies any N/V/D or other s/s of opiate withdrawal  "I think I'm through the worst of that"  States he is depressed over his ongoing health issues, questions whether or not he should have surgery "I've had different opinions from doctors"  Pt currently in bed, resting quietly, not interactive in the milieu, withdrawn to self  Restlessness/irritability improved at present

## 2022-04-02 NOTE — TREATMENT TEAM
04/02/22 0700   Team Meeting   Meeting Type Daily Rounds   Team Members Present   Team Members Present Physician;Nurse   Physician Team Member Dr Mitchell Uribe Team Member [facility]   Patient/Family Present   Patient Present No   Patient's Family Present No     Daily Rounds: Pt denies SI/HI/AVH  Screaming in pain in the evening  Improved with motrin and heat pack, able to sleep  Oxy PRN ordered x3 days    C/o infected area to groin-will ask [facility] to eval

## 2022-04-02 NOTE — NURSING NOTE
At this time pt reports have "the least amount of pain I have had in a long time " Last received Tylenol at 1610  Discussed hx of fall and pain several years ago, states that no doctor is helping to manage pain  Pt is calm, cooperative  Appears restless and fidgety during conversation  Denies SI, HI, AVH  Waiting for someone to drop of belongings this evening

## 2022-04-03 PROCEDURE — 99232 SBSQ HOSP IP/OBS MODERATE 35: CPT | Performed by: PSYCHIATRY & NEUROLOGY

## 2022-04-03 RX ORDER — OXYCODONE HYDROCHLORIDE 5 MG/1
5 TABLET ORAL
Status: DISCONTINUED | OUTPATIENT
Start: 2022-04-03 | End: 2022-04-04

## 2022-04-03 RX ADMIN — CLONIDINE HYDROCHLORIDE 0.2 MG: 0.1 TABLET ORAL at 14:00

## 2022-04-03 RX ADMIN — QUETIAPINE FUMARATE 100 MG: 100 TABLET ORAL at 22:21

## 2022-04-03 RX ADMIN — GABAPENTIN 500 MG: 100 CAPSULE ORAL at 22:21

## 2022-04-03 RX ADMIN — OXYCODONE HYDROCHLORIDE 5 MG: 5 TABLET ORAL at 10:13

## 2022-04-03 RX ADMIN — OXYCODONE HYDROCHLORIDE 5 MG: 5 TABLET ORAL at 05:28

## 2022-04-03 RX ADMIN — ACETAMINOPHEN 325MG 975 MG: 325 TABLET ORAL at 22:21

## 2022-04-03 RX ADMIN — METHOCARBAMOL 500 MG: 500 TABLET ORAL at 09:05

## 2022-04-03 RX ADMIN — LIDOCAINE 5% 3 PATCH: 700 PATCH TOPICAL at 10:15

## 2022-04-03 RX ADMIN — GABAPENTIN 400 MG: 400 CAPSULE ORAL at 09:04

## 2022-04-03 RX ADMIN — NICOTINE 1 PATCH: 14 PATCH, EXTENDED RELEASE TRANSDERMAL at 09:09

## 2022-04-03 RX ADMIN — GABAPENTIN 500 MG: 100 CAPSULE ORAL at 16:13

## 2022-04-03 RX ADMIN — ACETAMINOPHEN 325MG 975 MG: 325 TABLET ORAL at 14:00

## 2022-04-03 RX ADMIN — OXYCODONE HYDROCHLORIDE 5 MG: 5 TABLET ORAL at 14:00

## 2022-04-03 RX ADMIN — ALBUTEROL SULFATE 2 PUFF: 90 AEROSOL, METERED RESPIRATORY (INHALATION) at 09:07

## 2022-04-03 RX ADMIN — ALBUTEROL SULFATE 2 PUFF: 90 AEROSOL, METERED RESPIRATORY (INHALATION) at 17:07

## 2022-04-03 RX ADMIN — ACETAMINOPHEN 325MG 975 MG: 325 TABLET ORAL at 05:59

## 2022-04-03 RX ADMIN — DULOXETINE HYDROCHLORIDE 20 MG: 20 CAPSULE, DELAYED RELEASE ORAL at 09:04

## 2022-04-03 RX ADMIN — OXYCODONE HYDROCHLORIDE 5 MG: 5 TABLET ORAL at 20:49

## 2022-04-03 RX ADMIN — NICOTINE POLACRILEX 4 MG: 4 GUM, CHEWING BUCCAL at 18:28

## 2022-04-03 NOTE — NURSING NOTE
Pt c/o severe pain this am even after prn of oxy 5mg given by previous shift at 0530  Having difficulty ambulating to dayroom for bfst, unable to remain in sitting position, ate 100% of bfst in room in laying position on side  Pt crying off and on, yelling out in pain  Seen by medical PA  New orders for scheduled oxy 5mg explained to pt by PA with RN present  Pt cont's to request increase in pain med to oxy 10mg  Dose to remain at 5mg with increased frequency, and pt to take consistently per PA, therefore medication is now scheduled regularly  Pt also instructed to utilize prn robaxin, given at 0905, and that pt may take oxy and robaxin together if needed for more effective pain relief  Pt cont's to deny current SI although his mood is depressed, anxious, and pt appears in despair regarding ongoing chronic pain issues  Withdrawn to room/bed throughout the morning

## 2022-04-03 NOTE — PLAN OF CARE
Problem: SELF HARM/SUICIDALITY  Goal: Will have no self-injury during hospital stay  Description: INTERVENTIONS:  - Q 15 MINUTES: Routine safety checks  - Q WAKING SHIFT & PRN: Assess risk to determine if routine checks are adequate to maintain patient safety  - Encourage patient to participate actively in care by formulating a plan to combat response to suicidal ideation, identify supports and resources  Outcome: Progressing     Problem: ADAMS  Goal: Will exhibit normal sleep and speech and no impulsivity  Description: INTERVENTIONS:  - Administer medication as ordered  - Set limits on impulsive behavior  - Make attempts to decrease external stimuli as possible  Outcome: Progressing     Problem: SUBSTANCE USE/ABUSE  Goal: Will have no detox symptoms and will verbalize plan for changing substance-related behavior  Description: INTERVENTIONS:  - Monitor physical status and assess for symptoms of withdrawal  - Administer medication as ordered  - Provide emotional support with 1 on 1 interaction with staff  - Encourage recovery focused program/ addiction education  - Assess for verbalization of changing behaviors related to substance abuse  - Initiate consults and referrals as appropriate (Case Management, Spiritual Care, etc )  Outcome: Progressing  Goal: By discharge, will develop insight into their chemical dependency and sustain motivation to continue in recovery  Description: INTERVENTIONS:  - Attends all daily group sessions and scheduled AA groups  - Actively practices coping skills through participation in the therapeutic community and adherence to program rules  - Reviews and completes assignments from individual treatment plan  - Assist patient development of understanding of their personal cycle of addiction and relapse triggers  Outcome: Progressing  Goal: By discharge, patient will have ongoing treatment plan addressing chemical dependency  Description: INTERVENTIONS:  - Assist patient with resources and/or appointments for ongoing recovery based living  Outcome: Progressing     Problem: SLEEP DISTURBANCE  Goal: Will exhibit normal sleeping pattern  Description: Interventions:  -  Assess the patients sleep pattern, noting recent changes  - Administer medication as ordered  - Decrease environmental stimuli, including noise, as appropriate during the night  - Encourage the patient to actively participate in unit groups and or exercise during the day to enhance ability to achieve adequate sleep at night  - Assess the patient, in the morning, encouraging a description of sleep experience  Outcome: Progressing     Problem: SKIN/TISSUE INTEGRITY - ADULT  Goal: Incision(s), wounds(s) or drain site(s) healing without S/S of infection  Description: INTERVENTIONS  - Assess and document dressing, incision, wound bed, drain sites and surrounding tissue  - Provide patient and family education  Outcome: Progressing     Problem: Ineffective Coping  Goal: Participates in unit activities  Description: Interventions:  - Provide therapeutic environment   - Provide required programming   - Redirect inappropriate behaviors   Outcome: Progressing     Problem: DISCHARGE PLANNING  Goal: Discharge to home or other facility with appropriate resources  Description: INTERVENTIONS:  - Identify barriers to discharge w/patient and caregiver  - Arrange for needed discharge resources and transportation as appropriate  - Identify discharge learning needs (meds, wound care, etc )  - Arrange for interpretive services to assist at discharge as needed  - Refer to Case Management Department for coordinating discharge planning if the patient needs post-hospital services based on physician/advanced practitioner order or complex needs related to functional status, cognitive ability, or social support system  Outcome: Progressing

## 2022-04-03 NOTE — QUICK NOTE
Regarding patient's chronic pain secondary to prior neck injury:     Patient continues to c/o severe uncontrolled pain while on BHU, states that he would like 10 mg oxycodone as opposed to 5 mg  He states he has been taking 10 mg prescribed to him as outpatient prior to admission  Upon PDMP review, patient was last prescribed oxycodone/acetaminophen 5-325 30 tablets for 5 days (equivalent to 5 mg oxy q 4 hours) in February 2022  Patient is currently prescribed 5 mg oxycodone q 4 hrs prn severe pain as of 4/2 (was previously q 6 hr)  Per chart review, patient has only received 5 doses total since admission, which is likely contributing to uncontrolled pain  At this time, encourage patient to take oxycodone 5 mg q 4 hours *as needed* for severe pain prior to increasing dose to 10 mg as patient does state decent pain relief with 5 mg  Continue with additional pain meds including:  - gabapentin 400 mg TID  - robaxin 500 mg q 8hr prn muscle spasm/pain   - lidocaine patches   - scheduled tylenol     Continue to monitor patient symptoms and provide prn medications when patient voices severe pain  Patient will need referral to outpatient pain management and neurosurgery following discharge

## 2022-04-03 NOTE — TREATMENT TEAM
04/03/22 0700   Team Meeting   Meeting Type Daily Rounds   Team Members Present   Team Members Present Physician;Nurse   Physician Team Member Dr Binta Gamez Team Member 31 Saray Mcgowan   Patient/Family Present   Patient Present No   Patient's Family Present No     Daily Rounds: Pt denies SI/HI/AVH  Focused on pain and wanting increased dose of Oxycodone  [facility] ordered oxy IR 5mg scheduled q4 hours

## 2022-04-03 NOTE — NURSING NOTE
Patient woke this morning at 0530 in sever 10/10 pain using the wall handrail to lower himself to the floor on one knee  Patient received prn oxycodone IR 5mg for severe pain  Patient shallow breathing with tears streaming down face holding wall handrail tightly  Patient has scheduled tylenol 975mg at 0600  Contacted [facility] provider Camron Perez, acknowledged patient to receive scheduled tylenol dose at 0600

## 2022-04-03 NOTE — NURSING NOTE
Pt has been visible and social on unit throughout the afternoon  Showered to help with physical pain in back  Reports frustration with OP doctors and ED doctors "shunning me" when pt is talking about pain management  Pt states that in [place] pt had "proper treatment" for their back/neck pain and is not able to find that since being in [place]  Pt reports mood is good  Pt then began crying during conversation and repeating previous statements about OP and ED doctors and then looking at pt as if pt is "young and healthy and fit and does need help " Pt states "I didn't want to have to go to the streets to use drugs, but I wanted to be able to move around " Pt denies any needs, grateful that oxycodone is now scheduled

## 2022-04-03 NOTE — PROGRESS NOTES
Progress Note - [address] 43 y o  male MRN: [id number]  Unit/Bed#: U 261-01 Encounter: [id number]    Assessment/Plan   Principal Problem:    Recurrent major depressive disorder ([facility] )  Active Problems:    Paresthesia and pain of extremity    Asthma    Medical clearance for psychiatric admission    Mood disorder ([facility] )    Anxiety    Substance induced mood disorder (HCC)    Mood disorder due to medical condition  Patient is in better spirits today with clear improvements in his pain control that have made him less perseverative on this fact  Internal Medicine was able to schedule his oxycodone which has provided pain relief today  Mood was improved last night with pain control but did have some disrupted sleep and pain in the evening  Did encourage patient to continue using Lidoderm patches, heat packs, and Motrin for pain in addition to his scheduled oxycodone  Very appreciative to this provider and medical team   Nerve pain was also notably improved on gabapentin which will be further increased to help with daytime anxiety and his neuropathic pain  Will continue Seroquel at current dose without changes he reports his sleep otherwise is well controlled and he feels rested  Continue Cymbalta at current dose for mood and anxiety support  Patient denies any safety concerning symptoms or hallucinogenic material and does not appear to be responding to internal stimuli  Denies nicotine cravings currently  Will continue to monitor  Recommended Treatment:   1) Continue Cymbalta DR 60 mg PO QD for mood and anxiety  2) Increase Gabapentin to 500 mg PO TID for daytime anxiety and pain  3) Continue Seroquel 100 mg PO QHS for agitation and sleep support  4) Continue use of Lidoderm patches, heat packs, and motrin for pain  5) IM scheduled patient Oxycodone Q4 hours    Continue with group therapy, milieu therapy and occupational therapy      Continue frequent safety checks and vitals per unit protocol  Case discussed with treatment team   Risks, benefits and possible side effects of Medications: Risks, benefits, and possible side effects of medications have been explained to the patient, who verbalizes understanding    ------------------------------------------------------------    Subjective: Per nursing report, Bradley Dillno has been cooperative on the unit and compliant with medications  Today, Bradley Dillon is consenting for safety on the unit  He reports his current mood is pretty good    Indicates that better pain control has assisted with decreased anxiety which remains at mild level  Does report increased dose of gabapentin was helpful for his neuropathic pain and inquires about additional help/increase of this  Does report nicotine cravings are currently under control  Denies any feelings of depression  Denies SI/HI/AVH  States that yesterday afternoon his psychiatric symptoms were under best control which continues in today but did have some disrupted sleep last night due to his pain but overall does feel rested today  States he is eating without difficulty  Denies any side effects to medication regimen or other physical symptoms at this time  Per nursing staff, patient did signed 72 hour notice on Friday at 2128 which will need to be addressed by tomorrow 04/04/2022  Felt better with pain control now that his medications are being prescribed every 4 hours  Blood pressure is also under better control      Progress Toward Goals: slow improvement    Psychiatric Review of Systems:  Behavior over the last 24 hours: mild improvement  Sleep: improving  Appetite: adequate  Medication side effects: none verbalized  ROS: pain diffuse, both neuropathic and musculoskeletal, improved    Vital signs in last 24 hours:  Temp:  [98 °F (36 7 °C)-99 1 °F (37 3 °C)] 98 °F (36 7 °C)  HR:  [53-67] 53  Resp:  [16-18] 18  BP: (120-131)/(73-87) 131/87    Mental Status Exam:  Appearance:  alert, good eye contact, appears stated age, casually dressed, marginal grooming/hygiene and facial scabs   Behavior:  calm, cooperative, guarded and sitting comfortably   Motor: no abnormal movements, restless and fidgety and normal gait and balance   Speech:  spontaneous, normal rate, normal volume and coherent   Mood:  "pretty good"   Affect:  mood-congruent, constricted, anxious and brighter than previous   Thought Process:  perseverative, improved   Thought Content: no verbalized delusions or overt paranoia, somatic preoccupation   Perceptual disturbances: no reported hallucinations and does not appear to be responding to internal stimuli at this time   Risk Potential: No active or passive suicidal or homicidal ideation was verbalized during interview, Low potential for aggression based on previous behavior   Cognition: oriented to self and situation, memory grossly intact, appears to be of average intelligence, normal abstract reasoning, attention span appeared shorter than expected for age and cognition not formally tested   Insight:  Fair   Judgment: Fair     Current Medications:  Current Facility-Administered Medications   Medication Dose Route Frequency Provider Last Rate    acetaminophen  975 mg Oral [facility] Judy Armstrong, PA-C      albuterol  2 puff Inhalation Q4H PRN Carla Greenberg MD      aluminum-magnesium hydroxide-simethicone  30 mL Oral Q4H PRN Larinda Flies, PA-C      artificial tear  1 application Both Eyes K1Y PRN Larinda Flies, PA-C      benztropine  1 mg Intramuscular Q4H PRN Max 6/day Larinda Flies, PA-C      benztropine  1 mg Oral Q4H PRN Max 6/day Larinda Flies, PA-C      bisacodyl  10 mg Rectal Daily PRN Larinda Flies, PA-C      cloNIDine  0 2 mg Oral Q6H PRN Jose Garcia MD      dicyclomine  10 mg Oral 4x Daily PRN Larinda Flies, PA-C      hydrOXYzine HCL  50 mg Oral Q6H PRN Max 4/day Larinda Flies, PA-C      Or    diphenhydrAMINE  50 mg Intramuscular Q6H PRN Stacey Aparicio PA-C      DULoxetine  20 mg Oral Daily Fay Gallardo MD      gabapentin  500 mg Oral TID Dilip Downy, DO      hydrOXYzine HCL  100 mg Oral Q6H PRN Max 4/day Stacey Aparicio PA-C      Or    LORazepam  2 mg Intramuscular Q6H PRN Stacey Aparicio PA-C      hydrOXYzine HCL  25 mg Oral Q6H PRN Max 4/day Stacey Aparicio PA-C      ibuprofen  400 mg Oral Q4H PRN Stacey Aparicio PA-C      ibuprofen  600 mg Oral Q6H PRN Stacey Aparicio PA-C      lidocaine  3 patch Topical Daily [place], [place]      loperamide  2 mg Oral 4x Daily PRN Stacey Aparicio PA-C      methocarbamol  500 mg Oral Q6H PRN Stacey Aparicio PA-C      neomycin-bacitracin-polymyxin b  1 small application Topical BID PRN Marti Peters MD      nicotine  1 patch Transdermal Daily Stacey Aparciio PA-C      nicotine polacrilex  4 mg Oral Q2H PRN Stacey Aparicio PA-C      OLANZapine  10 mg Oral Q3H PRN Max 3/day Stacey Aparicio PA-C      Or    OLANZapine  10 mg Intramuscular Q3H PRN Max 3/day Stacey Aparicio PA-C      OLANZapine  5 mg Oral Q3H PRN Max 6/day Stacey Aparicio PA-C      Or    OLANZapine  5 mg Intramuscular Q3H PRN Max 6/day Stacey Aparicio PA-C      OLANZapine  2 5 mg Oral Q3H PRN Max 8/day Stacey Aparicio PA-C      ondansetron  4 mg Oral Q6H PRN Stacey Aparicio PA-C      oxyCODONE  5 mg Oral Q4H [facility] Judy Armstrong PA-C      polyethylene glycol  17 g Oral Daily PRN Stacey Aparicio PA-C      QUEtiapine  100 mg Oral HS Dilip Downy, DO      senna-docusate sodium  1 tablet Oral Daily PRN Stacey Aparicio PA-C      traZODone  50 mg Oral HS PRN Stacey Aparicio PA-C         Behavioral Health Medications: all current active meds have been reviewed  Changes as in plan section above  Laboratory results:  I have personally reviewed all pertinent laboratory/tests results  No results found for this or any previous visit (from the past 48 hour(s))       Dilip Downy, DO

## 2022-04-04 VITALS
TEMPERATURE: 97.8 F | OXYGEN SATURATION: 98 % | HEIGHT: 65 IN | SYSTOLIC BLOOD PRESSURE: 107 MMHG | BODY MASS INDEX: 25.79 KG/M2 | DIASTOLIC BLOOD PRESSURE: 63 MMHG | RESPIRATION RATE: 14 BRPM | HEART RATE: 58 BPM | WEIGHT: 154.76 LBS

## 2022-04-04 PROBLEM — F39 MOOD DISORDER (HCC): Status: RESOLVED | Noted: 2022-04-01 | Resolved: 2022-04-04

## 2022-04-04 PROBLEM — Z00.8 MEDICAL CLEARANCE FOR PSYCHIATRIC ADMISSION: Status: RESOLVED | Noted: 2022-04-01 | Resolved: 2022-04-04

## 2022-04-04 LAB — RPR SER QL: NORMAL

## 2022-04-04 PROCEDURE — 99238 HOSP IP/OBS DSCHRG MGMT 30/<: CPT | Performed by: PHYSICIAN ASSISTANT

## 2022-04-04 RX ORDER — DULOXETIN HYDROCHLORIDE 20 MG/1
20 CAPSULE, DELAYED RELEASE ORAL DAILY
Qty: 30 CAPSULE | Refills: 0 | Status: SHIPPED | OUTPATIENT
Start: 2022-04-04 | End: 2022-05-04

## 2022-04-04 RX ORDER — NICOTINE 21 MG/24HR
1 PATCH, TRANSDERMAL 24 HOURS TRANSDERMAL DAILY
Qty: 28 PATCH | Refills: 0 | Status: SHIPPED | OUTPATIENT
Start: 2022-04-04 | End: 2022-05-02

## 2022-04-04 RX ORDER — GABAPENTIN 100 MG/1
500 CAPSULE ORAL 3 TIMES DAILY
Qty: 450 CAPSULE | Refills: 0 | Status: SHIPPED | OUTPATIENT
Start: 2022-04-04 | End: 2022-05-04

## 2022-04-04 RX ORDER — QUETIAPINE FUMARATE 100 MG/1
100 TABLET, FILM COATED ORAL
Qty: 30 TABLET | Refills: 0 | Status: SHIPPED | OUTPATIENT
Start: 2022-04-04 | End: 2022-05-04

## 2022-04-04 RX ADMIN — DULOXETINE HYDROCHLORIDE 20 MG: 20 CAPSULE, DELAYED RELEASE ORAL at 07:38

## 2022-04-04 RX ADMIN — METHOCARBAMOL 500 MG: 500 TABLET ORAL at 07:40

## 2022-04-04 RX ADMIN — ALBUTEROL SULFATE 2 PUFF: 90 AEROSOL, METERED RESPIRATORY (INHALATION) at 05:50

## 2022-04-04 RX ADMIN — NICOTINE 1 PATCH: 14 PATCH, EXTENDED RELEASE TRANSDERMAL at 08:05

## 2022-04-04 RX ADMIN — OXYCODONE HYDROCHLORIDE 5 MG: 5 TABLET ORAL at 05:40

## 2022-04-04 RX ADMIN — GABAPENTIN 500 MG: 100 CAPSULE ORAL at 07:38

## 2022-04-04 NOTE — DISCHARGE INSTR - OTHER ORDERS
You requested names for pain management providers  Your primary care provider, had mailed you the following list of providers to contact:  Pain Management  [facility] [phone]  Dr Shana Lentz [phone]  [address]  [address], [address] [facility], [address]  [address] declined any referrals for therapy/psychiatric medication management  Included in your discharge folder is a [address], if you change your mind & would want services  Crisis Intervention services are available 24 hours a day by calling [phone]  The crisis unit is located at [place] [address]  Services include telephone counseling, mobile crisis, walk-in crisis, and assistance in accessing inpatient care and short-term crisis residential services  The PEER LINE is a toll-free phone number for people in [facility] who are seeking a listening ear for additional support in their recovery from mental illness  The PEER LINE is peer-run and peer-friendly  Callers to the [address] will speak directly to other individuals who have experienced the mental health recovery process  Hours of operation: 8:30 am - 4:30 pm, Monday through Friday   [phone] ([phone])   [facility]  [address]   [address] [facility], [address]    Text CONNECT to 156284 from anywhere in the [place], anytime, about any type of crisis  A live, trained Crisis Counselor receives the text and lets you know that they are here to listen  The volunteer Crisis Counselor will help you move from a hot moment to a cool moment  Warm Line: [phone], [phone], [phone]  If it is not quite a crisis, but you want to talk to someone, 24 hours/day, 7 days/week:  Someone to listen; someone who cares      The [facility] ([facility]) offers various education & support groups for you & your family  For more information visit their website at http://www [employer]/     Dial 2-1-1 to get connected/get help  Free, confidential information & referral available 24/7: Aging Services, Child & Youth Services, Counseling, Education/Training, Food/Shelter/Clothing, Health Services, Parenting, Substance Abuse, Support Groups, Volunteer Opportunities, & much more  Phone: 2-1-1 or [phone], Web: Marcelina Crain, Email: Abby@[employer]    The [address] are open to all mental health consumers in [facility] who are interested in meeting people and making new friends  They provide a friendly social atmosphere with scheduled daily activities including games, arts & crafts, discussion and education groups, vocational activities, and much more  Light refreshments are served daily  The locations are:    [facility] - operated by [address] [place] [address]   Phone: [phone]   Fax: [phone]   E-mail: [email or web address]  Hours of Operation:  Monday 3:00 PM - 8:00 PM  Tuesday 12:00 PM - 8:00 PM   Wednesday 3:00 PM - 8:00 PM  Thursday 12:00 PM - 8:00 PM   Friday 3:00 PM - 8:00 PM   Saturday Closed     [address] [facility] - operated by [place] [address]  Phone: [phone]  Fax: [phone]  Hours of Operation  Monday 12:00 PM - 8:00 PM  Tuesday 12:00 PM - 8:00 PM  Wednesday 12:00 PM - 8:00 PM   Thursday 12:00 PM - 8:00 PM  Friday 12:00 PM - 9:00 PM   Saturday 11:00 AM - 4:00 PM  [facility] transportation is available on scheduled days for transportation      Psych Rehab Program:  Modeled after the [employer] program in [place], the [employer] offers consumers interested in fulfilling work a guaranteed place to come, to belong, and to enjoy meaningful relationships as they seek the confidence and skills necessary to lead vocationally productive and socially satisfying lives  Here is their contact information:  [address], [address] [place] [address]  Phone: [phone]  [email or web address]  This site is open Monday thru Thursday from 8:30 am till 4:00 pm and Fridays from 8:30 am till 3:00 pm  Consumers are encouraged to stop by for a visit  After-hour social activities are also scheduled

## 2022-04-04 NOTE — NURSING NOTE
Patient appeared asleep without discomfort throughout the night  Will continue to monitor for safety till shift change  Patient received scheduled Oxycodone 5 mg for generalized pain at 5:40 Am  Redirected for screaming at nurse and demanding all medications at this time "my medications were all put close together, my gabapentin, Dilaudid and Motrin !!"  Pt given education on prescribed medication frequency,he did not seem pleased but was  able to quickly calm down and went back to his room

## 2022-04-04 NOTE — NURSING NOTE
Patient's scheduled Oxycodone 5 mg for pain currently held  Patient  in bed appears sound  asleep without distress  Clinical judgment/not to interfer with sleep  Med will be administered if pain disrupts patient's sleep

## 2022-04-04 NOTE — NURSING NOTE
Pt states he is being discharged today  Reports feeling much better as his pain is relatively controlled on current pain regimine  Given prn robaxin this am at 0831 which was effective  Pt ambulation improved, gait steady, less guarding noted  Reinforced with pt need to follow through with recommended treatment for medical issues in order to avoid relapse and cont'd drug abuse to control pain  Pt acknowledges but insight is limited   Denies any current SI

## 2022-04-04 NOTE — PLAN OF CARE
Problem: SELF HARM/SUICIDALITY  Goal: Will have no self-injury during hospital stay  Description: INTERVENTIONS:  - Q 15 MINUTES: Routine safety checks  - Q WAKING SHIFT & PRN: Assess risk to determine if routine checks are adequate to maintain patient safety  - Encourage patient to participate actively in care by formulating a plan to combat response to suicidal ideation, identify supports and resources  Outcome: Adequate for Discharge     Problem: ADAMS  Goal: Will exhibit normal sleep and speech and no impulsivity  Description: INTERVENTIONS:  - Administer medication as ordered  - Set limits on impulsive behavior  - Make attempts to decrease external stimuli as possible  Outcome: Adequate for Discharge     Problem: SUBSTANCE USE/ABUSE  Goal: Will have no detox symptoms and will verbalize plan for changing substance-related behavior  Description: INTERVENTIONS:  - Monitor physical status and assess for symptoms of withdrawal  - Administer medication as ordered  - Provide emotional support with 1 on 1 interaction with staff  - Encourage recovery focused program/ addiction education  - Assess for verbalization of changing behaviors related to substance abuse  - Initiate consults and referrals as appropriate (Case Management, Spiritual Care, etc )  Outcome: Adequate for Discharge  Goal: By discharge, will develop insight into their chemical dependency and sustain motivation to continue in recovery  Description: INTERVENTIONS:  - Attends all daily group sessions and scheduled AA groups  - Actively practices coping skills through participation in the therapeutic community and adherence to program rules  - Reviews and completes assignments from individual treatment plan  - Assist patient development of understanding of their personal cycle of addiction and relapse triggers  Outcome: Adequate for Discharge  Goal: By discharge, patient will have ongoing treatment plan addressing chemical dependency  Description: INTERVENTIONS:  - Assist patient with resources and/or appointments for ongoing recovery based living  Outcome: Adequate for Discharge     Problem: SLEEP DISTURBANCE  Goal: Will exhibit normal sleeping pattern  Description: Interventions:  -  Assess the patients sleep pattern, noting recent changes  - Administer medication as ordered  - Decrease environmental stimuli, including noise, as appropriate during the night  - Encourage the patient to actively participate in unit groups and or exercise during the day to enhance ability to achieve adequate sleep at night  - Assess the patient, in the morning, encouraging a description of sleep experience  Outcome: Adequate for Discharge     Problem: SKIN/TISSUE INTEGRITY - ADULT  Goal: Incision(s), wounds(s) or drain site(s) healing without S/S of infection  Description: INTERVENTIONS  - Assess and document dressing, incision, wound bed, drain sites and surrounding tissue  - Provide patient and family education  Outcome: Adequate for Discharge     Problem: Ineffective Coping  Goal: Participates in unit activities  Description: Interventions:  - Provide therapeutic environment   - Provide required programming   - Redirect inappropriate behaviors   Outcome: Adequate for Discharge     Problem: DISCHARGE PLANNING  Goal: Discharge to home or other facility with appropriate resources  Description: INTERVENTIONS:  - Identify barriers to discharge w/patient and caregiver  - Arrange for needed discharge resources and transportation as appropriate  - Identify discharge learning needs (meds, wound care, etc )  - Arrange for interpretive services to assist at discharge as needed  - Refer to Case Management Department for coordinating discharge planning if the patient needs post-hospital services based on physician/advanced practitioner order or complex needs related to functional status, cognitive ability, or social support system  Outcome: Adequate for Discharge

## 2022-04-04 NOTE — DISCHARGE INSTR - APPOINTMENTS
Ana Maria Quan RN, our [employer], will be calling you after your discharge, on the phone number that you provided  She will be available as an additional support, if needed  If you wish to speak with her, you may contact Sheree Honeycutt at [phone]

## 2022-04-04 NOTE — BH TRANSITION RECORD
Contact Information: If you have any questions, concerns, pended studies, tests and/or procedures, or emergencies regarding your inpatient behavioral health visit  Please contact [address] [place] [address] behavioral health unit [phone] and ask to speak to a , nurse or physician  A contact is available 24 hours/ 7 days a week at this number  Summary of Procedures Performed During your Stay:  Below is a list of major procedures performed during your hospital stay and a summary of results:  - Cardiac Procedures/Studies: ekg  Pending Studies (From admission, onward)     Start     Ordered    04/01/22 0600  RPR  Morning draw         03/31/22 1847              If studies are pending at discharge, follow up with your PCP and/or referring provider

## 2022-04-04 NOTE — DISCHARGE SUMMARY
Discharge Summary - Kans[address]inrinne [address] 43 y o  male MRN: [id number]  Unit/Bed#: -01 Encounter: [id number]     Admission Date: 3/31/2022         Discharge Date: 4/4/22    Attending Psychiatrist: Edwina Alfaro*    Reason for Admission/HPI:     Per initial H&P from Dr Ole Valadez on 4/1/22:    "Per ED provider on 3/31: "44 yo M with past medical history of neck injury in 2020 with chronic neuropathic pain who presents for psychiatric evaluation  Patient was brought in by EMS and police  Per EMS, patient was talking to his girlfriend on the phone and made comments that he was going to hang himself  He said he was going to cut his face and his scalp to "cut the demons out " His girlfriend was concerned and called for police to come  He was barricaded in his room for about 30 minutes before coming outside  Patient denies making these comments  He denies SI or HI  Denies auditory or visual hallucinations  He states he has chronic pain all over his body since his neck injury two years ago  Denies new or worsening pain  Denies fevers, chills, shortness of breath, chest pain, abdominal pain, nausea or vomiting  Endorses marijuana use, denies any other recreational drug use  Endorses occasional alcohol use "     Per Crisis worker on 3/31: "Pt comes to the ed along with Alexsander Yeager  She stated pts girlfriend Mary Job called police after pt texted her that he was going to hang himself from a balcony  Police went to pts home where he initially barricaded himself inside but soon let police inside  Pt is tearful and appears to be in physical pain  Pt states he has nerve pain from his neck and admits to using percocets for the pain  Pt claims to have a prescription at home  Pt denies other D&A use with the exception of THC  Pt denies any past psych hx but admits to depression  Pt denies current suicidal ideation but the officer said pts girlfriend is on her way to the ed   The girlfriend is willing to petition a 36 however pt agreed to sign a 201 for inpatient psych tx  He denies homicidal ideations as well as hallucinations  The girlfriend told police that pt also said he wanted to cut the demons off his face  He does have several red marks on his face  Pt is alert and oriented X4  He is calm and cooperative at this time  Sleep and appetite are reported as good  "     This is 44 yo male with hx of depression/anxiety, chronic pain and substance use admitted to inpatient unit on voluntary status for worsening of mood and suicidal ideations with plan in the context of psychosocial stressors, pain and substance use  Patient endorses depressed mood, anhedonia, low energy, poor motivation, anxiety and poor sleep  He also endorses paranoia, nightmares and hypervigilance  Patient appears guarded, withdrawn, depressed and anxious "      Social History     Tobacco History     Smoking Status  Former Smoker Smoking Frequency  0 25 packs/day Smoking Tobacco Type  Cigarettes    Smokeless Tobacco Use  Never Used    Tobacco Comment  5 cigerettes a day           Alcohol History     Alcohol Use Status  Not Currently          Drug Use     Drug Use Status  Yes Types  Marijuana, Methamphetamines          Sexual Activity     Sexually Active  Yes Partners  Female Birth Control/Protection  Condom Male          Activities of Daily Living    Not Asked               Additional Substance Use Detail     Questions Responses    Problems Due to Past Use of Alcohol? No    Problems Due to Past Use of Substances?  No    Substance Use Assessment Substance use within the past 12 months    Alcohol Use Frequency Experimented    Cannabis frequency 3 or more times/week    Comment: 3 or more times/week on 3/31/2022     Heroin Frequency Denies use in past 12 months    Cannabis method Smoke    Comment: Smoke on 3/31/2022     Cocaine frequency Never used    Comment: Never used on 3/31/2022     Crack Cocaine Frequency Denies use in past 12 months    Methamphetamine Frequency Experimented    Methamphetamine Longest Abstinence denies use, UDS+ 3/31/22    Narcotic Frequency Denies use in past 12 months    Benzodiazepine Frequency Denies use in past 12 months    Amphetamine frequency Denies use in past 12 months    Barbituate Frequency Denies use use in past 12 months    Inhalant frequency Never used    Comment: Never used on 3/31/2022     Hallucinogen frequency Never used    Comment: Never used on 3/31/2022     Ecstasy frequency Never used    Comment: Never used on 3/31/2022     Other drug frequency Never used    Comment: Never used on 3/31/2022     Opiate frequency Denies use in past 12 months    Last reviewed by Nadir Livingston RN on 3/31/2022          Past Medical History:   Diagnosis Date    Asthma     Chronic pain     Hypertension     Thyroglossal duct cyst      Past Surgical History:   Procedure Laterality Date    THYROGLOSSAL DUCT EXCISION      THYROID SURGERY      THYROID SURGERY         Medications: All current active medications have been reviewed  Medications prior to admission:    Prior to Admission Medications   Prescriptions Last Dose Informant Patient Reported?  Taking?   acetaminophen (TYLENOL) 325 mg tablet   No No   Sig: Take 3 tablets (975 mg total) by mouth every 8 (eight) hours   albuterol (PROVENTIL HFA,VENTOLIN HFA) 90 mcg/act inhaler   No No   Sig: Inhale 2 puffs every 4 (four) hours as needed for wheezing   cyclobenzaprine (FLEXERIL) 5 mg tablet   No No   Sig: Take 1 tablet (5 mg total) by mouth every 8 (eight) hours as needed for muscle spasms   gabapentin (Neurontin) 100 mg capsule  Self No No   Sig: Take 1 capsule (100 mg total) by mouth 3 (three) times a day   Patient taking differently: Take 100 mg by mouth 3 (three) times a day Pt reports 200mg BID    ibuprofen (MOTRIN) 600 mg tablet   No No   Sig: Take 1 tablet (600 mg total) by mouth every 6 (six) hours as needed for mild pain   polyethylene glycol (MIRALAX) 17 g packet   No No   Sig: Take 17 g by mouth daily as needed (Constipation)   senna-docusate sodium (SENOKOT S) 8 6-50 mg per tablet   No No   Sig: Take 2 tablets by mouth daily      Facility-Administered Medications: None       Allergies:     No Known Allergies    Objective     Vital signs in last 24 hours:    Temp:  [97 8 °F (36 6 °C)-99 °F (37 2 °C)] 97 8 °F (36 6 °C)  HR:  [] 58  Resp:  [14-18] 14  BP: (107-160)/() 107/63    No intake or output data in the 24 hours ending 04/04/22 [address] 6558:     Antonia Ruggiero was admitted to the inpatient psychiatric unit and started on Behavioral Health checks every 7 minutes  During the hospitalization he was encouraged to attend individual therapy, group therapy, milieu therapy and occupational therapy  Psychiatric medications were adjusted over the hospital stay  To address depressive symptoms, self-abusive behavior, paranoid ideation, flashbacks and insomnia, Antonia Ruggiero was treated with antidepressant Cymbalta, mood stabilizer Neurontin and antipsychotic medication Seroquel  Medication doses were adjusted during the hospital course  Cymbalta was added at the dose of 20mg daily for both mood and complaints of chronic neuropathic pain  Neurontin was added and titrated to 500mg TID for anxiety and neuropathic pain  Seroquel was added and titrated to 100mg qhs  Prior to beginning of treatment medications risks and benefits and possible side effects including risk of parkinsonian symptoms, Tardive Dyskinesia and metabolic syndrome related to treatment with antipsychotic medications and risk of suicidality and serotonin syndrome related to treatment with antidepressants were reviewed with Antonia Ruggiero  He verbalized understanding and agreement for treatment  Upon admission Antonia Ruggiero was seen by medical service for medical clearance for inpatient treatment and medical follow up   Antonia Ruggiero was focused on obtaining various pain medications for his chronic neck pain during this hospitalization  He was informed that he will not be receiving a prescription for pain medications upon discharge and was encouraged to follow up with a pain management specialist  Referral provided  Amado's symptoms resolved over the hospital course  Initially after admission he was still feeling depressed, frustrated, overwhelmed, labile and irritable  With adjustment of medications and therapeutic milieu his symptoms resolved  Patient signed a 72 hour notice voluntarily withdrawing themselves from treatment  At the end of treatment Mehreen Jalloh was improved  His mood was more stable at the time of discharge  Mehreen Jalloh denied suicidal ideation, intent or plan at the time of discharge and denied homicidal ideation, intent or plan at the time of discharge  There was no overt psychosis at the time of discharge  Delusional thoughts were no longer present  Mehreen Jalloh was participating appropriately in milieu at the time of discharge  Behavior was appropriate on the unit at the time of discharge  Sleep and appetite were improved  Mehreen Jalloh was tolerating medications and was not reporting any significant side effects at the time of discharge  Mehreen Jalloh signed 72 hour notice and requested discharge  At the time of the 72 hour notice expiration he had no criteria for involuntary commitment and denied any suicidal or homicidal ideation  Patient was attending to all ADLs, taking medications appropriately, eating meals, and not demonstrating any clinical stigmata of acute psychosis  At the time of discharge, they were goal oriented, forward thinking and optimistic about the future      Mental Status at Time of Discharge:     Appearance:  age appropriate, casually dressed, adequate grooming, scabs on face   Behavior:  cooperative, calm   Speech:  normal rate and volume   Mood:  less anxious, less depressed   Affect:  constricted   Thought Process:  perseverative   Associations: intact associations   Thought Content:  no overt delusions, preoccupied with obtaining pain medications and seeing pain specialist   Perceptual Disturbances: no auditory hallucinations, no visual hallucinations, does not appear responding to internal stimuli   Risk Potential: Suicidal ideation - None at present, contracts for safety on the unit  Homicidal ideation - None at present  Potential for aggression - No   Sensorium:  oriented to person, place and time/date   Memory:  recent and remote memory grossly intact   Consciousness:  alert and awake   Attention/Concentration: attention span and concentration appear shorter than expected for age   Insight:  limited   Judgment: limited   Gait/Station: normal gait/station   Motor Activity: no abnormal movements       Admission Diagnosis:    Principal Problem:    Recurrent major depressive disorder ([facility] )  Active Problems:    Paresthesia and pain of extremity    Asthma    Anxiety    Substance induced mood disorder ([place] [address] )    Mood disorder due to medical condition      Discharge Diagnosis:     Principal Problem:    Recurrent major depressive disorder ([place] [address] )  Active Problems:    Paresthesia and pain of extremity    Asthma    Anxiety    Substance induced mood disorder ([place] [address] )    Mood disorder due to medical condition  Resolved Problems:    Medical clearance for psychiatric admission    Mood disorder [facility])      Lab Results:   I have personally reviewed all pertinent laboratory/tests results    Most Recent Labs:   Lab Results   Component Value Date    WBC 7 10 04/01/2022    RBC 4 97 04/01/2022    HGB 15 9 04/01/2022    HCT 46 5 04/01/2022     04/01/2022    RDW 14 0 04/01/2022    NEUTROABS 4 09 04/01/2022    SODIUM 140 04/01/2022    K 3 7 04/01/2022     04/01/2022    CO2 26 04/01/2022    BUN 23 04/01/2022    CREATININE 1 10 04/01/2022    GLUC 95 04/01/2022    GLUF 95 04/01/2022    CALCIUM 9 1 04/01/2022    AST 32 04/01/2022    ALT 22 04/01/2022    ALKPHOS 70 04/01/2022    TP 7 8 04/01/2022    ALB 4 1 04/01/2022 TBILI 0 60 04/01/2022    CHOLESTEROL 158 04/01/2022    HDL 35 (L) 04/01/2022    TRIG 84 04/01/2022    LDLCALC 106 (H) 04/01/2022    [place] 123 04/01/2022    KNG2FVTWVSWQ 0 698 04/01/2022    RPR Non-Reactive 04/01/2022    HGBA1C 5 1 04/01/2022     04/01/2022       Discharge Medications:    See after visit summary for all reconciled discharge medications provided to patient and family  Discharge instructions/Information to patient and family:     See after visit summary for information provided to patient and family  Provisions for Follow-Up Care:    See after visit summary for information related to follow-up care and any pertinent home health orders  Discharge Statement:    I spent 30 minutes discharging the patient  This time was spent on the day of discharge  I had direct contact with the patient on the day of discharge  Additional documentation is required if more than 30 minutes were spent on discharge:    I reviewed with Jennifer Garcia importance of compliance with medications and outpatient treatment after discharge  I discussed the medication regimen and possible side effects of the medications with Jennifer Garcia prior to discharge  At the time of discharge he was tolerating psychiatric medications  I discussed outpatient follow up with Jennifer Garcia  I reviewed with Jennifer Garcia crisis plan and safety plan upon discharge  Jennifer Garcia was competent to understand risks and benefits of withholding information and risks and benefits of his actions  Jennifer Garcia signed 72 hour notice and requested discharge  At the time of the 72 hour notice expiration he had no criteria for involuntary commitment and denied any suicidal or homicidal ideation      Discharge on Two Antipsychotic Medications: Kassy Vasquez PA-C 04/04/22

## 2022-04-04 NOTE — NURSING NOTE
Patient received Ventolin for slight wheezing at 0550 with positive results  Patient currently without discomfort

## 2022-04-04 NOTE — QUICK NOTE
Discussed with [facility] neurosurgery team regarding patient's neck pain after patient was evaluated crying secondary to his pain on 4/3  Per neurosurgery, the patient was discharged from their practice due to threatening behavior and harrassment of staff  From a clinical standpoint, there is no need for repeat imaging or surgical intervention for the patient's current injury  Per their judgement, the imaging findings of the patient injury do not correlate to the severe pain that he reports  Since patient presented to ED and transferred to inpatient [facility], he received total of 6 doses oxycodone 5 mg as well as supportive gabapentin/muscle relaxers and topical treatments, however this did not appear to offer relief of symptoms as he continuously requested increase in medication doses, demanded "dilauded" from nursing staff, as well as adjusted timing of medications  Patient also became agitated when medication doses were not increased  Patient has already been provided ambulatory referral to outpatient Pain Management by his PCP  As patient has exhibited increasing signs of drug seeking behavior throughout admission and has not seemed to gain relief despite treatment, will not prescribe additional narcotics on discharge  Patient will need to follow up with pain management for further treatment  Patient may also seek neurosurgery follow up outside of network at his discretion

## 2022-04-05 NOTE — CASE MANAGEMENT
CM met with Pt, who was resting in bed  Pt said that he didn't feel like talking to CM  CM asked if there was anyone she could contact for him, or any referrals he would want made for therapy and/or medication management, but Pt said no  Pt reported, "I need you to get my pain medications increased, that's it"  CM said that she would notify the treatment team of his reports of pain & request for higher dosing  Pt declined to sign any ROIs or read/sign his Treatment Plan

## 2022-04-05 NOTE — CASE MANAGEMENT
TOMA notified by nursing staff that Pt now needs a ride home, as everyone he called is at work already  CM met with Pt to confirm address & have him sign Suhail denton CM electronically sent transportation request for today; EAT 1100

## 2022-04-05 NOTE — PROGRESS NOTES
Treatment Plan Meeting:  Diagnosis of recurrent major depressive disorder, mood disorder, anxiety, substance induced mood disorder, & mood disorder due to medical condition reviewed  Discussed short term goals of decrease in depressive symptoms, decrease in anxiety symptoms, decrease in paranoid thoughts, decrease in suicidal thoughts, decrease in self abusive behaviors, improvement in self care, sleep improvement, improvement in appetite  Pt focused on his pain & wanting more pain medication  At this time, no discharge date is set  All parties in agreement & treatment plan was signed       04/01/22 1205   Team Meeting   Meeting Type Tx Team Meeting   Initial Conference Date 04/01/22   Next Conference Date 04/28/22   Team Members Present   Team Members Present Physician;Nurse;   Physician Team Member Dr Dejon Stearns Team Member [facility] Management Team Member Deon   Patient/Family Present   Patient Present No  (Pt declined to meet with the treatment team )   Patient's Family Present No

## 2022-04-05 NOTE — CASE MANAGEMENT
CM met with Pt to inquire about referrals  Pt said that he just needs the names for pain management doctors, & he will call himself  Pt said that he didn't want any referrals for therapy/psych med management, because he needs to get his pain under control before he can think about anything else  CM provided the names of pain management & neuro that Pt's PCP office had mail to him previously:   Pain Management  [facility] [phone]  Dr Marlene Mackenzie [phone]  [address] Neurosurgery [address], [place], [address]  [phone]    CM inquired about transportation & Pt said that he has his own ride, & he would call them now to come get him

## 2022-04-05 NOTE — PROGRESS NOTES
AUDIT: 0, PAWSS: 0, BAT: 0, UDS: +amph/meth, opiate, thc, & oxycodone    Pt declined to meet with CM or complete intake  CM does not know Pt's history of use  Pt declined to sign any ROIs & refused any referrals, only wanting CM to provide him with a listing of pain management doctors, which his PCP had already done so

## 2022-04-05 NOTE — PROGRESS NOTES
Status: Pt denies any SI, & remains focused on his pain medications  Pt's blood pressure is elevated & medical made oxy scheduled  Pt agitated already this morning, as he was not woken overnight for his pain medication, so he missed a dose  Medical recommended he follow-up with neuro upon discharge  Medication: Neurontin increased to 500mg TID, Roxicodone 5mg Q 4 hours started / PRN - Albuterol(x3), Clonidine, Robaxin, & Roxicodone  D/C: Today - Pt signed a 72 hour notice in 4/1 @ 2128 / Pt declined to meet with CM on Fri to sign any ROIs or discuss referrals        04/04/22 0750   Team Meeting   Meeting Type Daily Rounds   Team Members Present   Team Members Present Physician;Nurse;;Occupational Therapist   Physician Team Member Dr Jc Martinez / Dmitriy Castillo / Jenn Jain Team Member Nani Ceja / Manuela Love Management Team Member David Hensley / Nicole Headley   OT Team Member Renny   Patient/Family Present   Patient Present No   Patient's Family Present No

## 2022-04-08 ENCOUNTER — PATIENT OUTREACH (OUTPATIENT)
Dept: FAMILY MEDICINE CLINIC | Facility: CLINIC | Age: 37
End: 2022-04-08

## 2022-04-08 NOTE — PROGRESS NOTES
Outreached patient to check status, follow-up from recent inpatient behavioral health admission, and ask if patient scheduled pain management appt  Patient answered but then hung up  Return call placed, no answer, voicemail left, and awaiting return call      Pain Management  [facility] [phone]  Dr Reji Coleman [phone]  [address] [place] [address] Neurosurgery  [address], [address] [place] [address], [address] [place] [address]  [phone]

## 2022-04-15 ENCOUNTER — PATIENT OUTREACH (OUTPATIENT)
Dept: FAMILY MEDICINE CLINIC | Facility: CLINIC | Age: 37
End: 2022-04-15

## 2022-04-15 NOTE — PROGRESS NOTES
2nd outreach attempt to patient to check status, follow-up from recent inpatient behavioral health admission, and ask if patient scheduled pain management appt  No answer, voicemail left, and awaiting return call to discuss below specialists    Will mail unable to reach letter and close case at this time      Pain Management  [facility] [phone]  Dr Al Walls [phone]  [address] Neurosurgery [address], [address] [facility], [address] [place] [address]  [phone]

## 2022-04-15 NOTE — LETTER
[address] [facility] [id number]    Re: Mirta Abram to Reach   4/15/2022       Dear Jessica Johnson,    I tried to reach you by phone and was unfortunately unable to reach you  Please call if I can assist with any additional needs  I have included contact information below for the speciality offices we discussed previously  Thank you      Pain Management  [facility] [phone]  Dr Joo Driver [phone]  [address] Neurosurgery [address], [address] [facility], [address] [place] [address]  [phone]    Sincerely,     Julissa Barrios, MSW     [phone]

## 2022-08-05 ENCOUNTER — HOSPITAL ENCOUNTER (EMERGENCY)
Facility: HOSPITAL | Age: 37
Discharge: HOME | End: 2022-08-06
Attending: EMERGENCY MEDICINE
Payer: MEDICARE

## 2022-08-05 DIAGNOSIS — M50.20 CERVICAL DISC HERNIATION: Primary | ICD-10-CM

## 2022-08-05 DIAGNOSIS — M79.609 PARESTHESIA AND PAIN OF EXTREMITY: ICD-10-CM

## 2022-08-05 DIAGNOSIS — R20.2 PARESTHESIA AND PAIN OF EXTREMITY: ICD-10-CM

## 2022-08-05 DIAGNOSIS — M54.2 NECK PAIN: ICD-10-CM

## 2022-08-05 PROCEDURE — 99285 EMERGENCY DEPT VISIT HI MDM: CPT | Performed by: EMERGENCY MEDICINE

## 2022-08-05 PROCEDURE — 96375 TX/PRO/DX INJ NEW DRUG ADDON: CPT

## 2022-08-05 PROCEDURE — 96374 THER/PROPH/DIAG INJ IV PUSH: CPT

## 2022-08-05 PROCEDURE — 99283 EMERGENCY DEPT VISIT LOW MDM: CPT

## 2022-08-05 RX ORDER — DIAZEPAM 5 MG/ML
5 INJECTION, SOLUTION INTRAMUSCULAR; INTRAVENOUS ONCE
Status: COMPLETED | OUTPATIENT
Start: 2022-08-05 | End: 2022-08-05

## 2022-08-05 RX ORDER — KETOROLAC TROMETHAMINE 30 MG/ML
30 INJECTION, SOLUTION INTRAMUSCULAR; INTRAVENOUS ONCE
Status: COMPLETED | OUTPATIENT
Start: 2022-08-05 | End: 2022-08-05

## 2022-08-05 RX ORDER — HYDROMORPHONE HCL/PF 1 MG/ML
0.5 SYRINGE (ML) INJECTION ONCE
Status: COMPLETED | OUTPATIENT
Start: 2022-08-05 | End: 2022-08-05

## 2022-08-05 RX ORDER — ACETAMINOPHEN 325 MG/1
650 TABLET ORAL ONCE
Status: DISCONTINUED | OUTPATIENT
Start: 2022-08-05 | End: 2022-08-06 | Stop reason: HOSPADM

## 2022-08-05 RX ORDER — KETAMINE HCL IN NACL, ISO-OSM 100MG/10ML
0.25 SYRINGE (ML) INJECTION ONCE
Status: COMPLETED | OUTPATIENT
Start: 2022-08-05 | End: 2022-08-05

## 2022-08-05 RX ORDER — LIDOCAINE 50 MG/G
1 PATCH TOPICAL ONCE
Status: DISCONTINUED | OUTPATIENT
Start: 2022-08-05 | End: 2022-08-06 | Stop reason: HOSPADM

## 2022-08-05 RX ORDER — METHOCARBAMOL 500 MG/1
500 TABLET, FILM COATED ORAL ONCE
Status: COMPLETED | OUTPATIENT
Start: 2022-08-05 | End: 2022-08-05

## 2022-08-05 RX ADMIN — DIAZEPAM 5 MG: 10 INJECTION, SOLUTION INTRAMUSCULAR; INTRAVENOUS at 22:10

## 2022-08-05 RX ADMIN — LIDOCAINE 5% 1 PATCH: 700 PATCH TOPICAL at 22:11

## 2022-08-05 RX ADMIN — Medication 18 MG: at 22:24

## 2022-08-05 RX ADMIN — METHOCARBAMOL 500 MG: 500 TABLET ORAL at 23:00

## 2022-08-05 RX ADMIN — KETOROLAC TROMETHAMINE 30 MG: 30 INJECTION, SOLUTION INTRAMUSCULAR; INTRAVENOUS at 22:10

## 2022-08-05 RX ADMIN — HYDROMORPHONE HYDROCHLORIDE 0.5 MG: 1 INJECTION, SOLUTION INTRAMUSCULAR; INTRAVENOUS; SUBCUTANEOUS at 23:46

## 2022-08-06 ENCOUNTER — APPOINTMENT (EMERGENCY)
Dept: RADIOLOGY | Facility: HOSPITAL | Age: 37
End: 2022-08-06
Payer: MEDICARE

## 2022-08-06 VITALS
RESPIRATION RATE: 16 BRPM | DIASTOLIC BLOOD PRESSURE: 95 MMHG | TEMPERATURE: 99.1 F | SYSTOLIC BLOOD PRESSURE: 179 MMHG | HEART RATE: 96 BPM | OXYGEN SATURATION: 98 %

## 2022-08-06 VITALS
SYSTOLIC BLOOD PRESSURE: 172 MMHG | HEART RATE: 90 BPM | DIASTOLIC BLOOD PRESSURE: 110 MMHG | OXYGEN SATURATION: 100 % | TEMPERATURE: 97.9 F | RESPIRATION RATE: 18 BRPM

## 2022-08-06 DIAGNOSIS — M54.50 ACUTE EXACERBATION OF CHRONIC LOW BACK PAIN: Primary | ICD-10-CM

## 2022-08-06 DIAGNOSIS — G89.29 ACUTE EXACERBATION OF CHRONIC LOW BACK PAIN: Primary | ICD-10-CM

## 2022-08-06 LAB
ALBUMIN SERPL BCP-MCNC: 3.8 G/DL (ref 3.5–5)
ALP SERPL-CCNC: 90 U/L (ref 46–116)
ALT SERPL W P-5'-P-CCNC: 33 U/L (ref 12–78)
ANION GAP SERPL CALCULATED.3IONS-SCNC: 6 MMOL/L (ref 4–13)
AST SERPL W P-5'-P-CCNC: 21 U/L (ref 5–45)
BASOPHILS # BLD AUTO: 0.09 THOUSANDS/ΜL (ref 0–0.1)
BASOPHILS NFR BLD AUTO: 1 % (ref 0–1)
BILIRUB SERPL-MCNC: 1.02 MG/DL (ref 0.2–1)
BUN SERPL-MCNC: 24 MG/DL (ref 5–25)
CALCIUM SERPL-MCNC: 9.2 MG/DL (ref 8.3–10.1)
CHLORIDE SERPL-SCNC: 108 MMOL/L (ref 96–108)
CO2 SERPL-SCNC: 24 MMOL/L (ref 21–32)
CREAT SERPL-MCNC: 1 MG/DL (ref 0.6–1.3)
CRP SERPL QL: 18.2 MG/L
EOSINOPHIL # BLD AUTO: 0.4 THOUSAND/ΜL (ref 0–0.61)
EOSINOPHIL NFR BLD AUTO: 5 % (ref 0–6)
ERYTHROCYTE [DISTWIDTH] IN BLOOD BY AUTOMATED COUNT: 14.3 % (ref 11.6–15.1)
ERYTHROCYTE [SEDIMENTATION RATE] IN BLOOD: 8 MM/HOUR (ref 0–14)
GFR SERPL CREATININE-BSD FRML MDRD: 95 ML/MIN/1.73SQ M
GLUCOSE SERPL-MCNC: 99 MG/DL (ref 65–140)
HCT VFR BLD AUTO: 47.3 % (ref 36.5–49.3)
HGB BLD-MCNC: 17 G/DL (ref 12–17)
IMM GRANULOCYTES # BLD AUTO: 0.03 THOUSAND/UL (ref 0–0.2)
IMM GRANULOCYTES NFR BLD AUTO: 0 % (ref 0–2)
LYMPHOCYTES # BLD AUTO: 1.44 THOUSANDS/ΜL (ref 0.6–4.47)
LYMPHOCYTES NFR BLD AUTO: 18 % (ref 14–44)
MCH RBC QN AUTO: 33.7 PG (ref 26.8–34.3)
MCHC RBC AUTO-ENTMCNC: 35.9 G/DL (ref 31.4–37.4)
MCV RBC AUTO: 94 FL (ref 82–98)
MONOCYTES # BLD AUTO: 1.14 THOUSAND/ΜL (ref 0.17–1.22)
MONOCYTES NFR BLD AUTO: 15 % (ref 4–12)
NEUTROPHILS # BLD AUTO: 4.77 THOUSANDS/ΜL (ref 1.85–7.62)
NEUTS SEG NFR BLD AUTO: 61 % (ref 43–75)
NRBC BLD AUTO-RTO: 0 /100 WBCS
PLATELET # BLD AUTO: 283 THOUSANDS/UL (ref 149–390)
PMV BLD AUTO: 10.3 FL (ref 8.9–12.7)
POTASSIUM SERPL-SCNC: 3.1 MMOL/L (ref 3.5–5.3)
PROT SERPL-MCNC: 7.7 G/DL (ref 6.4–8.4)
RBC # BLD AUTO: 5.04 MILLION/UL (ref 3.88–5.62)
SODIUM SERPL-SCNC: 138 MMOL/L (ref 135–147)
WBC # BLD AUTO: 7.87 THOUSAND/UL (ref 4.31–10.16)

## 2022-08-06 PROCEDURE — 72131 CT LUMBAR SPINE W/O DYE: CPT

## 2022-08-06 PROCEDURE — 85652 RBC SED RATE AUTOMATED: CPT | Performed by: EMERGENCY MEDICINE

## 2022-08-06 PROCEDURE — 96374 THER/PROPH/DIAG INJ IV PUSH: CPT

## 2022-08-06 PROCEDURE — 96375 TX/PRO/DX INJ NEW DRUG ADDON: CPT

## 2022-08-06 PROCEDURE — 99285 EMERGENCY DEPT VISIT HI MDM: CPT | Performed by: EMERGENCY MEDICINE

## 2022-08-06 PROCEDURE — G1004 CDSM NDSC: HCPCS

## 2022-08-06 PROCEDURE — 85025 COMPLETE CBC W/AUTO DIFF WBC: CPT | Performed by: EMERGENCY MEDICINE

## 2022-08-06 PROCEDURE — 96372 THER/PROPH/DIAG INJ SC/IM: CPT

## 2022-08-06 PROCEDURE — 99283 EMERGENCY DEPT VISIT LOW MDM: CPT

## 2022-08-06 PROCEDURE — 86140 C-REACTIVE PROTEIN: CPT | Performed by: EMERGENCY MEDICINE

## 2022-08-06 PROCEDURE — 80053 COMPREHEN METABOLIC PANEL: CPT | Performed by: EMERGENCY MEDICINE

## 2022-08-06 PROCEDURE — 36415 COLL VENOUS BLD VENIPUNCTURE: CPT | Performed by: EMERGENCY MEDICINE

## 2022-08-06 PROCEDURE — 96376 TX/PRO/DX INJ SAME DRUG ADON: CPT

## 2022-08-06 RX ORDER — METHOCARBAMOL 500 MG/1
500 TABLET, FILM COATED ORAL 2 TIMES DAILY
Qty: 20 TABLET | Refills: 0 | Status: SHIPPED | OUTPATIENT
Start: 2022-08-06

## 2022-08-06 RX ORDER — HYDROMORPHONE HCL/PF 1 MG/ML
0.5 SYRINGE (ML) INJECTION ONCE
Status: COMPLETED | OUTPATIENT
Start: 2022-08-06 | End: 2022-08-06

## 2022-08-06 RX ORDER — GABAPENTIN 100 MG/1
100 CAPSULE ORAL 3 TIMES DAILY
Qty: 90 CAPSULE | Refills: 0 | Status: SHIPPED | OUTPATIENT
Start: 2022-08-06

## 2022-08-06 RX ORDER — HYDROMORPHONE HCL/PF 1 MG/ML
1 SYRINGE (ML) INJECTION ONCE
Status: DISCONTINUED | OUTPATIENT
Start: 2022-08-06 | End: 2022-08-06

## 2022-08-06 RX ORDER — DIAZEPAM 5 MG/ML
5 INJECTION, SOLUTION INTRAMUSCULAR; INTRAVENOUS ONCE
Status: COMPLETED | OUTPATIENT
Start: 2022-08-06 | End: 2022-08-06

## 2022-08-06 RX ORDER — POTASSIUM CHLORIDE 20 MEQ/1
40 TABLET, EXTENDED RELEASE ORAL ONCE
Status: DISCONTINUED | OUTPATIENT
Start: 2022-08-06 | End: 2022-08-06 | Stop reason: HOSPADM

## 2022-08-06 RX ORDER — HYDROMORPHONE HCL IN WATER/PF 6 MG/30 ML
0.2 PATIENT CONTROLLED ANALGESIA SYRINGE INTRAVENOUS ONCE
Status: COMPLETED | OUTPATIENT
Start: 2022-08-06 | End: 2022-08-06

## 2022-08-06 RX ORDER — KETOROLAC TROMETHAMINE 30 MG/ML
15 INJECTION, SOLUTION INTRAMUSCULAR; INTRAVENOUS ONCE
Status: COMPLETED | OUTPATIENT
Start: 2022-08-06 | End: 2022-08-06

## 2022-08-06 RX ORDER — LIDOCAINE 50 MG/G
1 PATCH TOPICAL DAILY
Qty: 30 PATCH | Refills: 0 | Status: SHIPPED | OUTPATIENT
Start: 2022-08-06

## 2022-08-06 RX ADMIN — HYDROMORPHONE HYDROCHLORIDE 0.5 MG: 1 INJECTION, SOLUTION INTRAMUSCULAR; INTRAVENOUS; SUBCUTANEOUS at 00:39

## 2022-08-06 RX ADMIN — HYDROMORPHONE HYDROCHLORIDE 0.5 MG: 1 INJECTION, SOLUTION INTRAMUSCULAR; INTRAVENOUS; SUBCUTANEOUS at 13:04

## 2022-08-06 RX ADMIN — KETOROLAC TROMETHAMINE 15 MG: 30 INJECTION, SOLUTION INTRAMUSCULAR; INTRAVENOUS at 11:49

## 2022-08-06 RX ADMIN — HYDROMORPHONE HYDROCHLORIDE 0.2 MG: 0.2 INJECTION, SOLUTION INTRAMUSCULAR; INTRAVENOUS; SUBCUTANEOUS at 02:21

## 2022-08-06 RX ADMIN — DIAZEPAM 5 MG: 10 INJECTION, SOLUTION INTRAMUSCULAR; INTRAVENOUS at 11:49

## 2022-08-06 NOTE — DISCHARGE INSTRUCTIONS
Please follow up with the comprehensive spine program and with pain management as soon as possible  Take medications as prescribed  You can also take tylenol, motrin, and use heating pads as needed  Return to the emergency department for any new or worsening symptoms including inability to urinate, inability to walk, loss of control of your bowels, or other concerning symptoms

## 2022-08-06 NOTE — ED NOTES
Pt d/c by provider  Unwitnessed by this VELMA Jang called for pt  Pt ambulatory, gait steady       Jaxson Allen RN  08/06/22 [address] Refugio Puckett RN  08/06/22 6004

## 2022-08-06 NOTE — ED PROVIDER NOTES
History  Chief Complaint   Patient presents with    Back Pain     Pt states he was in an accident which caused him to have severe back/shoulder pain  States its unbearable today     58-year-old male with history of fall 08/2020 resulting in severe C6-C7 right neural foraminal narrowing presents due to uncontrollable neck and back pain  He notes that the pain has been there persistently since this event  He has been treating this on his own using drugs obtained on the street including opioids, cocaine, as well as alcohol  He notes that these generall took the edge off  He decided to stop using these drugs and alcohol last week because he started dating someone new and wanted to quit  He has also been lifting recently, but denies other recent trauma that could trigger this specific flare  He also notes tingling sensation in both hands, and also feels weak  strength  He notes chronic urinary incontinence since the event  He denies fecal incontinence  He denies numbness, headache, n/v, blurred vision, cp/sob, or other subjective symptoms  He has not taken any medications for the pain today  Prior to Admission Medications   Prescriptions Last Dose Informant Patient Reported? Taking?    DULoxetine (CYMBALTA) 20 mg capsule   No No   Sig: Take 1 capsule (20 mg total) by mouth daily   QUEtiapine (SEROquel) 100 mg tablet   No No   Sig: Take 1 tablet (100 mg total) by mouth daily at bedtime   albuterol (PROVENTIL HFA,VENTOLIN HFA) 90 mcg/act inhaler   No No   Sig: Inhale 2 puffs every 4 (four) hours as needed for wheezing   gabapentin (NEURONTIN) 100 mg capsule   No No   Sig: Take 5 capsules (500 mg total) by mouth 3 (three) times a day   polyethylene glycol (MIRALAX) 17 g packet   No No   Sig: Take 17 g by mouth daily as needed (Constipation)      Facility-Administered Medications: None       Past Medical History:   Diagnosis Date    Asthma     Chronic pain     Hypertension     Thyroglossal duct cyst Past Surgical History:   Procedure Laterality Date    THYROGLOSSAL DUCT EXCISION      THYROID SURGERY      THYROID SURGERY         Family History   Problem Relation Age of Onset    Hypertension Mother     No Known Problems Father      I have reviewed and agree with the history as documented  E-Cigarette/Vaping    E-Cigarette Use Current Every Day User     Cartridges/Day 1      E-Cigarette/Vaping Substances    Nicotine Yes     THC No     CBD No     Flavoring Yes     Other No     Unknown No      Social History     Tobacco Use    Smoking status: Former Smoker     Packs/day: 0 25     Types: Cigarettes    Smokeless tobacco: Never Used    Tobacco comment: 5 cigerettes a day    Vaping Use    Vaping Use: Every day    Substances: Nicotine, Flavoring   Substance Use Topics    Alcohol use: Not Currently    Drug use: Yes     Types: Marijuana, Methamphetamines        Review of Systems   All other systems reviewed and are negative  Physical Exam  ED Triage Vitals   Temperature Pulse Respirations Blood Pressure SpO2   08/05/22 2133 08/05/22 2133 08/05/22 2133 08/05/22 2133 08/05/22 2133   99 1 °F (37 3 °C) (!) 137 20 (!) 184/121 99 %      Temp Source Heart Rate Source Patient Position - Orthostatic VS BP Location FiO2 (%)   08/05/22 2133 08/05/22 2133 08/05/22 2215 08/05/22 2215 --   Oral Monitor Lying Right arm       Pain Score       08/05/22 2133       10 - Worst Possible Pain             Orthostatic Vital Signs  Vitals:    08/05/22 2350 08/06/22 0000 08/06/22 0015 08/06/22 0045   BP: 161/88 160/85 157/74 (!) 179/95   Pulse: 100 96  96   Patient Position - Orthostatic VS:           Physical Exam  Vitals and nursing note reviewed  Constitutional:       Appearance: He is well-developed  Comments: Tearing   HENT:      Head: Normocephalic and atraumatic        Right Ear: External ear normal       Left Ear: External ear normal       Nose: Nose normal       Mouth/Throat:      Mouth: Mucous membranes are moist    Eyes:      Conjunctiva/sclera: Conjunctivae normal    Cardiovascular:      Rate and Rhythm: Normal rate and regular rhythm  Heart sounds: No murmur heard  Pulmonary:      Effort: Pulmonary effort is normal  No respiratory distress  Breath sounds: Normal breath sounds  Comments: Rapid breathing  Abdominal:      Palpations: Abdomen is soft  Tenderness: There is no abdominal tenderness  Musculoskeletal:      Cervical back: Neck supple  Tenderness (diffusely along posterior neck) present  Skin:     General: Skin is warm and dry  Neurological:      Mental Status: He is alert  Comments:  strength 4/5 bilaterally  Strength and sensation otherwise intact  ED Medications  Medications   Ketamine HCl 18 mg (18 mg Intravenous Given 8/5/22 2224)   ketorolac (TORADOL) injection 30 mg (30 mg Intravenous Given 8/5/22 2210)   diazepam (VALIUM) injection 5 mg (5 mg Intravenous Given 8/5/22 2210)   methocarbamol (ROBAXIN) tablet 500 mg (500 mg Oral Given 8/5/22 2300)   HYDROmorphone (DILAUDID) injection 0 5 mg (0 5 mg Intravenous Given 8/5/22 2346)   HYDROmorphone (DILAUDID) injection 0 5 mg (0 5 mg Intravenous Given 8/6/22 0039)   HYDROmorphone HCl (DILAUDID) injection 0 2 mg (0 2 mg Intravenous Given 8/6/22 0221)       Diagnostic Studies  Results Reviewed     None                 No orders to display         Procedures  Procedures      ED Course                             SBIRT 20yo+    Flowsheet Row Most Recent Value   SBIRT (23 yo +)    In order to provide better care to our patients, we are screening all of our patients for alcohol and drug use  Would it be okay to ask you these screening questions? Yes Filed at: 08/06/2022 0043   Initial Alcohol Screen: US AUDIT-C     1  How often do you have a drink containing alcohol? 6 Filed at: 08/06/2022 0043   2  How many drinks containing alcohol do you have on a typical day you are drinking?   6 Filed at: 08/06/2022 5338 3a  Male UNDER 65: How often do you have five or more drinks on one occasion? 6 Filed at: 08/06/2022 0043   Audit-C Score 18 Filed at: 08/06/2022 0043   Full Alcohol Screen: US AUDIT    4  How often during the last year have you found that you were not able to stop drinking once you had started? 0 Filed at: 08/06/2022 0043   5  How often during past year have you failed to do what was normally expected of you because of drinking? 0 Filed at: 08/06/2022 0043   6  How often in past year have you needed a first drink in the morning to get yourself going after a heavy drinking session? 0 Filed at: 08/06/2022 0043   7  How often in past year have you had feeling of guilt or remorse after drinking? 4 Filed at: 08/06/2022 0043   8  How often in past year have you been unable to remember what happened night before because you had been drinking? 0 Filed at: 08/06/2022 0043   9  Have you or someone else been injured as a result of your drinking? 0 Filed at: 08/06/2022 0043   10  Has a relative, friend, doctor or other health worker been concerned about your drinking and suggested you cut down?  0 Filed at: 08/06/2022 0043   AUDIT Total Score 22 Filed at: 08/06/2022 1424   YANELI: How many times in the past year have you    Used an illegal drug or used a prescription medication for non-medical reasons? Once or Twice Filed at: 08/06/2022 0043   DAST-10: In the past 12 months       1  Have you used drugs other than those required for medical reasons? 1 Filed at: 08/06/2022 0043   2  Do you use more than one drug at a time? 1 Filed at: 08/06/2022 0043   3  Have you had medical problems as a result of your drug use (e g , memory loss, hepatitis, convulsions, bleeding, etc )? 0 Filed at: 08/06/2022 0043   4  Have you had "blackouts" or "flashbacks" as a result of drug use? YesNo 0 Filed at: 08/06/2022 0043   5  Do you ever feel bad or guilty about your drug use? 1 Filed at: 08/06/2022 0043   6   Does your spouse (or parent) ever complain about your involvement with drugs? 1 Filed at: 08/06/2022 0043   7  Have you neglected your family because of your use of drugs? 0 Filed at: 08/06/2022 0043   8  Have you engaged in illegal activities in order to obtain drugs? 0 Filed at: 08/06/2022 0043   9  Have you ever experienced withdrawal symptoms (felt sick) when you stopped taking drugs? 0 Filed at: 08/06/2022 0043   10  Are you always able to stop using drugs when you want to? 0 Filed at: 08/06/2022 0043   DAST-10 Score 4 Filed at: 08/06/2022 0043                MDM  Number of Diagnoses or Management Options  Cervical disc herniation  Neck pain  Paresthesia and pain of extremity  Diagnosis management comments: 57-year-old male with history of opioid use presenting with severe pain secondary to prior trauma  Will trial multimodal pain relief including ketamine drip  Patient reports continued pain not controlled with initial regimen  Ordered further pain relief  Patient pain controlled on further follow up  Patient reports drinking a significant amount of alcohol (up to 10-15 shots per day)  Offered detox evaluation, however patient refused  Patient requesting discharge with Rx for opioid medications  Discussed that we do not provide maintenance pain medication prescriptions and he will have to follow up with [facility]  Referral sent  Patient discharged        Disposition  Final diagnoses:   Cervical disc herniation   Neck pain   Paresthesia and pain of extremity     Time reflects when diagnosis was documented in both MDM as applicable and the Disposition within this note     Time User Action Codes Description Comment    8/5/2022 11:15 PM Karel Ramriezs Add [M50 20] Cervical disc herniation     8/5/2022 11:15 PM Karel Batters Add [M54 2] Neck pain     8/5/2022 11:15 PM Yokubaitis, Ramond Robert Add [R20 2,  [id number]] Paresthesia and pain of extremity       ED Disposition     ED Disposition   Discharge    Condition   Stable    Date/Time   Sat Aug 6, 2022  2:02 AM    Comment   Cheryl Chin discharge to home/self care  Follow-up Information     Follow up With Specialties Details Why Contact Info Additional Information    [facility]   [phone]    [facility]   [phone]           Discharge Medication List as of 8/6/2022  2:02 AM      START taking these medications    Details   lidocaine (Lidoderm) 5 % Apply 1 patch topically daily Remove & Discard patch within 12 hours or as directed by MD, Starting Sat 8/6/2022, Normal      methocarbamol (ROBAXIN) 500 mg tablet Take 1 tablet (500 mg total) by mouth 2 (two) times a day, Starting Sat 8/6/2022, Normal         CONTINUE these medications which have CHANGED    Details   gabapentin (Neurontin) 100 mg capsule Take 1 capsule (100 mg total) by mouth 3 (three) times a day, Starting Sat 8/6/2022, Normal         CONTINUE these medications which have NOT CHANGED    Details   albuterol (PROVENTIL HFA,VENTOLIN HFA) 90 mcg/act inhaler Inhale 2 puffs every 4 (four) hours as needed for wheezing, Starting Fri 9/27/2019, Print      DULoxetine (CYMBALTA) 20 mg capsule Take 1 capsule (20 mg total) by mouth daily, Starting Mon 4/4/2022, Until Wed 5/4/2022, Print      polyethylene glycol (MIRALAX) 17 g packet Take 17 g by mouth daily as needed (Constipation), Starting Wed 2/2/2022, Normal      QUEtiapine (SEROquel) 100 mg tablet Take 1 tablet (100 mg total) by mouth daily at bedtime, Starting Mon 4/4/2022, Until Wed 5/4/2022, Print               PDMP Review       Value Time User    PDMP Reviewed  Yes 3/31/2022  3:57 PM Miguel Linares PA-C           ED Provider  Attending physically available and evaluated En Ahn  I managed the patient along with the ED Attending      Electronically Signed by         Kathy Lanier MD  08/08/22 5223

## 2022-08-06 NOTE — ED NOTES
Patient standing at doorway with backpack on, demanding IV to be removed at this time  RN removed IV and informed Dr Sai Dorsey  Patient stated he would stay for CT scan       Kris Bartlett RN  08/06/22 1733

## 2022-08-06 NOTE — ED PROVIDER NOTES
History  Chief Complaint   Patient presents with    Back Pain     Pt presents by hospital wheelchair with c/o acute on chronic back pain radiating from neck  Patient is a 40-year-old male past medical history of chronic neck and low back pain presents for evaluation of worsening low back pain  Patient was seen in the emergency department overnight, where he was given multimodal analgesia for neck and low back pain  Patient states that he did have relief of symptoms for a short period of time after medications, but when medications wore off patient began to experience worsening low back pain  Patient denies any new injury to the area  Endorses pain in the paraspinal musculature of the lumbar spine radiating around both sides into his abdomen and up his back into his shoulders and neck  Patient states that he has pins and needle sensation in bilateral feet  He is also experiencing a burning sensation down the backs of both legs  Patient states that he has had these symptoms before, but states that they are currently worse than previous  Patient states that he does feel as though he is having difficulty going to the bathroom, also has difficulty stopping his stream of urination  No issues with bowel movements  Patient did note that he was recently using street opioids to treat his pain, but stopped a few days ago  Patient denied any IV drug use  Currently denies any fevers, chills, chest pain, abdominal pain, nausea, or vomiting  Prior to Admission Medications   Prescriptions Last Dose Informant Patient Reported? Taking?    DULoxetine (CYMBALTA) 20 mg capsule   No No   Sig: Take 1 capsule (20 mg total) by mouth daily   QUEtiapine (SEROquel) 100 mg tablet   No No   Sig: Take 1 tablet (100 mg total) by mouth daily at bedtime   albuterol (PROVENTIL HFA,VENTOLIN HFA) 90 mcg/act inhaler   No No   Sig: Inhale 2 puffs every 4 (four) hours as needed for wheezing   gabapentin (NEURONTIN) 100 mg capsule   No No   Sig: Take 5 capsules (500 mg total) by mouth 3 (three) times a day   gabapentin (Neurontin) 100 mg capsule   No No   Sig: Take 1 capsule (100 mg total) by mouth 3 (three) times a day   lidocaine (Lidoderm) 5 %   No No   Sig: Apply 1 patch topically daily Remove & Discard patch within 12 hours or as directed by MD   methocarbamol (ROBAXIN) 500 mg tablet   No No   Sig: Take 1 tablet (500 mg total) by mouth 2 (two) times a day   polyethylene glycol (MIRALAX) 17 g packet   No No   Sig: Take 17 g by mouth daily as needed (Constipation)      Facility-Administered Medications: None       Past Medical History:   Diagnosis Date    Asthma     Chronic pain     Hypertension     Thyroglossal duct cyst        Past Surgical History:   Procedure Laterality Date    THYROGLOSSAL DUCT EXCISION      THYROID SURGERY      THYROID SURGERY         Family History   Problem Relation Age of Onset    Hypertension Mother     No Known Problems Father      I have reviewed and agree with the history as documented  E-Cigarette/Vaping    E-Cigarette Use Current Every Day User     Cartridges/Day 1      E-Cigarette/Vaping Substances    Nicotine Yes     THC No     CBD No     Flavoring Yes     Other No     Unknown No      Social History     Tobacco Use    Smoking status: Former Smoker     Packs/day: 0 25     Types: Cigarettes    Smokeless tobacco: Never Used    Tobacco comment: 5 cigerettes a day    Vaping Use    Vaping Use: Every day    Substances: Nicotine, Flavoring   Substance Use Topics    Alcohol use: Not Currently    Drug use: Yes     Types: Marijuana, Methamphetamines        Review of Systems   Constitutional: Negative for chills and fever  HENT: Negative  Respiratory: Negative  Cardiovascular: Negative  Gastrointestinal: Negative for abdominal pain, nausea and vomiting  Genitourinary: Positive for difficulty urinating  Musculoskeletal: Positive for back pain  Negative for neck pain  Skin: Negative  Neurological: Positive for weakness and numbness  All other systems reviewed and are negative  Physical Exam  ED Triage Vitals   Temperature Pulse Respirations Blood Pressure SpO2   08/06/22 1119 08/06/22 1130 08/06/22 1130 08/06/22 1130 08/06/22 1130   97 9 °F (36 6 °C) 90 18 (!) 172/110 100 %      Temp Source Heart Rate Source Patient Position - Orthostatic VS BP Location FiO2 (%)   08/06/22 1119 08/06/22 1130 08/06/22 1130 08/06/22 1130 --   Oral Monitor Lying Right arm       Pain Score       08/06/22 1149       10 - Worst Possible Pain             Orthostatic Vital Signs  Vitals:    08/06/22 1130   BP: (!) 172/110   Pulse: 90   Patient Position - Orthostatic VS: Lying       Physical Exam  Vitals and nursing note reviewed  Constitutional:       General: He is awake  Appearance: He is not toxic-appearing or diaphoretic  Comments: Patient appears uncomfortable   HENT:      Head: Normocephalic and atraumatic  Eyes:      General: Vision grossly intact  Gaze aligned appropriately  Cardiovascular:      Rate and Rhythm: Regular rhythm  Tachycardia present  Pulmonary:      Effort: Pulmonary effort is normal  No respiratory distress  Musculoskeletal:      Cervical back: Full passive range of motion without pain and neck supple  Skin:     General: Skin is warm and dry  Neurological:      Mental Status: He is alert and oriented to person, place, and time  Comments: Patient able to move his feet bilaterally  No numbness of BL LE  Unable to perform further examination secondary to pain            ED Medications  Medications   ketorolac (TORADOL) injection 15 mg (15 mg Intravenous Given 8/6/22 1149)   diazepam (VALIUM) injection 5 mg (5 mg Intravenous Given 8/6/22 1149)   HYDROmorphone (DILAUDID) injection 0 5 mg (0 5 mg Subcutaneous Given 8/6/22 1304)       Diagnostic Studies  Results Reviewed     Procedure Component Value Units Date/Time    Sedimentation rate, automated [[id number]]  (Normal) Collected: 08/06/22 1153    Lab Status: Final result Specimen: Blood from Arm, Left Updated: 08/06/22 1252     Sed Rate 8 mm/hour     Comprehensive metabolic panel [[id number]]  (Abnormal) Collected: 08/06/22 1153    Lab Status: Final result Specimen: Blood from Arm, Left Updated: 08/06/22 1227     Sodium 138 mmol/L      Potassium 3 1 mmol/L      Chloride 108 mmol/L      CO2 24 mmol/L      ANION GAP 6 mmol/L      BUN 24 mg/dL      Creatinine 1 00 mg/dL      Glucose 99 mg/dL      Calcium 9 2 mg/dL      AST 21 U/L      ALT 33 U/L      Alkaline Phosphatase 90 U/L      Total Protein 7 7 g/dL      Albumin 3 8 g/dL      Total Bilirubin 1 02 mg/dL      eGFR 95 ml/min/1 73sq m     Narrative:      National Kidney Disease Foundation guidelines for Chronic Kidney Disease (CKD):     Stage 1 with normal or high GFR (GFR > 90 mL/min/1 73 square meters)    Stage 2 Mild CKD (GFR = 60-89 mL/min/1 73 square meters)    Stage 3A Moderate CKD (GFR = 45-59 mL/min/1 73 square meters)    Stage 3B Moderate CKD (GFR = 30-44 mL/min/1 73 square meters)    Stage 4 Severe CKD (GFR = 15-29 mL/min/1 73 square meters)    Stage 5 End Stage CKD (GFR <15 mL/min/1 73 square meters)  Note: GFR calculation is accurate only with a steady state creatinine    C-reactive protein [[id number]]  (Abnormal) Collected: 08/06/22 1153    Lab Status: Final result Specimen: Blood from Arm, Left Updated: 08/06/22 1227     CRP 18 2 mg/L     CBC and differential [[id number]]  (Abnormal) Collected: 08/06/22 1153    Lab Status: Final result Specimen: Blood from Arm, Left Updated: 08/06/22 1221     WBC 7 87 Thousand/uL      RBC 5 04 Million/uL      Hemoglobin 17 0 g/dL      Hematocrit 47 3 %      MCV 94 fL      MCH 33 7 pg      MCHC 35 9 g/dL      RDW 14 3 %      MPV 10 3 fL      Platelets 707 Thousands/uL      nRBC 0 /100 WBCs      Neutrophils Relative 61 %      Immat GRANS % 0 %      Lymphocytes Relative 18 %      Monocytes Relative 15 % Eosinophils Relative 5 %      Basophils Relative 1 %      Neutrophils Absolute 4 77 Thousands/µL      Immature Grans Absolute 0 03 Thousand/uL      Lymphocytes Absolute 1 44 Thousands/µL      Monocytes Absolute 1 14 Thousand/µL      Eosinophils Absolute 0 40 Thousand/µL      Basophils Absolute 0 09 Thousands/µL                  CT spine lumbar without contrast   Final Result by Gene Renie Hammans, DO (08/06 1354)      Normal computed tomography of the lumbar spine  Workstation performed: [id number]               Procedures  Procedures      ED Course  ED Course as of 08/16/22 0518   Sat Aug 06, 2022   1253 Potassium(!): 3 1  Repleted    1253 C-REACTIVE PROTEIN(!): 18 2   1253 Sed Rate: 8  Can rule out epidural abscess   1253 WBC: 7 87   1402 CT spine lumbar without contrast  Negative                              SBIRT 20yo+    Flowsheet Row Most Recent Value   SBIRT (25 yo +)    In order to provide better care to our patients, we are screening all of our patients for alcohol and drug use  Would it be okay to ask you these screening questions? No Filed at: 08/06/2022 1135                MDM  Number of Diagnoses or Management Options  Acute exacerbation of chronic low back pain: new and requires workup  Diagnosis management comments: [de-identified] year male presents for evaluation of worsening of his chronic low back pain  On exam, patient appears uncomfortable, vitals within normal limits  No focal neurologic deficits on exam   Given patient's history of drug use, will evaluate with CBC, CMP, ESR and CRP to rule out epidural abscess  Will obtain CT of lumbar spine to evaluate for obvious stenosis  Patient given Toradol and Valium for pain management  Patient's CBC revealed normal white blood cell count  ESR within normal limits, CRP only mildly elevated  Do not suspect epidural abscess at this time  CT of the spine negative for acute abnormalities or major spinal stenosis    Patient still in pain after initial medications, given subcutaneous Dilaudid  Patient given follow-up with comprehensive spine program   Patient discharged home in stable condition with symptomatic care instructions and strict ED return precautions  Amount and/or Complexity of Data Reviewed  Clinical lab tests: ordered and reviewed  Tests in the radiology section of CPT®: ordered and reviewed    Patient Progress  Patient progress: stable      Disposition  Final diagnoses:   Acute exacerbation of chronic low back pain     Time reflects when diagnosis was documented in both MDM as applicable and the Disposition within this note     Time User Action Codes Description Comment    8/6/2022  2:02 PM Shobha Hartley Add [M54 50,  G89 29] Acute exacerbation of chronic low back pain       ED Disposition     ED Disposition   Discharge    Condition   Stable    Date/Time   Sat Aug 6, 2022  2:02 PM    [place] discharge to home/self care                 Follow-up Information     Follow up With Specialties Details Why Contact Info Additional Information    [facility] Physical Therapy Call today to make an appointment to be seen within 2 weeks     Hsin Michael Malika Moyer, DO Family Medicine Schedule an appointment as soon as possible for a visit in 1 week As needed [address] [facility]  [phone]       [address] [place] [address] Emergency Department Emergency Medicine Go to  If symptoms worsen [address] Emergency Department, [address] [place], [place], [address]          Discharge Medication List as of 8/6/2022  2:22 PM      START taking these medications    Details   Diclofenac Sodium (VOLTAREN) 1 % Apply 2 g topically 4 (four) times a day for 7 days, Starting Sat 8/6/2022, Until Sat 8/13/2022, Normal         CONTINUE these medications which have NOT CHANGED Details   albuterol (PROVENTIL HFA,VENTOLIN HFA) 90 mcg/act inhaler Inhale 2 puffs every 4 (four) hours as needed for wheezing, Starting Fri 9/27/2019, Print      DULoxetine (CYMBALTA) 20 mg capsule Take 1 capsule (20 mg total) by mouth daily, Starting Mon 4/4/2022, Until Wed 5/4/2022, Print      gabapentin (Neurontin) 100 mg capsule Take 1 capsule (100 mg total) by mouth 3 (three) times a day, Starting Sat 8/6/2022, Normal      lidocaine (Lidoderm) 5 % Apply 1 patch topically daily Remove & Discard patch within 12 hours or as directed by MD, Starting Sat 8/6/2022, Normal      methocarbamol (ROBAXIN) 500 mg tablet Take 1 tablet (500 mg total) by mouth 2 (two) times a day, Starting Sat 8/6/2022, Normal      polyethylene glycol (MIRALAX) 17 g packet Take 17 g by mouth daily as needed (Constipation), Starting Wed 2/2/2022, Normal      QUEtiapine (SEROquel) 100 mg tablet Take 1 tablet (100 mg total) by mouth daily at bedtime, Starting Mon 4/4/2022, Until Wed 5/4/2022, Print           No discharge procedures on file  PDMP Review       Value Time User    PDMP Reviewed  Yes 3/31/2022  3:57 PM Ursula Bernard PA-C           ED Provider  Attending physically available and evaluated Gloria Rea  JL managed the patient along with the ED Attending      Electronically Signed by         Letty Wynn DO  08/16/22 7419

## 2022-08-06 NOTE — Clinical Note
Herber Julien was seen and treated in our emergency department on 8/6/2022  Diagnosis: Back pain    Alycia Alvarez  is off the rest of the shift today  He may return on this date: If you have any questions or concerns, please don't hesitate to call        Papi Camacho,     ______________________________           _______________          _______________  Hospital Representative                              Date                                Time

## 2022-08-06 NOTE — ED NOTES
Upon re-evaluating pt, he disclosed that he drinks several shots every few hours to manage his pain at home  Last drink was last night  Reports feeling withdrawal s/s as he was diaphoretic at home  At this time, JERAMY 6  Provider updated on new information  Pt resting in bed at this time  Reports significant decrease in pain to acceptable level       Giovanna Fraser RN  08/06/22 4423

## 2022-08-06 NOTE — DISCHARGE INSTRUCTIONS
Thank you for coming to the ED for your neck pain  We have sent prescriptions for medications to help with your pain to the pharmacy  We also recommend following up with the acute pain management program as well as the comprehensive spine program as they will be able to determine the correct treatment for this pain going forward  Please return to the ED with any new/worsening symptoms

## 2022-08-06 NOTE — ED ATTENDING ATTESTATION
8/6/2022  I, Kelsea Vizcaino MD, saw and evaluated the patient  I have discussed the patient with the resident/non-physician practitioner and agree with the resident's/non-physician practitioner's findings, Plan of Care, and MDM as documented in the resident's/non-physician practitioner's note, except where noted  All available labs and Radiology studies were reviewed  I was present for key portions of any procedure(s) performed by the resident/non-physician practitioner and I was immediately available to provide assistance  At this point I agree with the current assessment done in the Emergency Department  I have conducted an independent evaluation of this patient a history and physical is as follows:  Pt has chronic neck and low back pain  Pt was seen yesterday for pain and treated Pain got worse in low back Neck is ok today pt has pain that radiates to both legs and parasthesias  Pt believes his lower back symptoms ar from his neck problem in past  Pt requested dilaudid for pain   When he did not immedicately receive this he jumped up and ran out of room pulled iv and said he was leaving if we didn't treat pain PE: alert and moving lower ext freely when wasked to push with toes limits his movement and effort no spine tenderness in lower back only neck area to touch abd soft nontender MDM: will check ct sed rate  ED Course         Critical Care Time  Procedures

## 2022-08-06 NOTE — ED ATTENDING ATTESTATION
8/5/2022  Ida Cruz DO, saw and evaluated the patient  I have discussed the patient with the resident/non-physician practitioner and agree with the resident's/non-physician practitioner's findings, Plan of Care, and MDM as documented in the resident's/non-physician practitioner's note, except where noted  All available labs and Radiology studies were reviewed  I was present for key portions of any procedure(s) performed by the resident/non-physician practitioner and I was immediately available to provide assistance  At this point I agree with the current assessment done in the Emergency Department  I have conducted an independent evaluation of this patient a history and physical is as follows:    49-year-old male presents for evaluation of neck pain, tingling in bilateral hands, bilateral lower back pain  Patient has known C6-C7 stenosis of the right from a disc herniation following fall downstairs about a year ago  He was self medicating with opioids he was obtaining on the street  He stopped doing this about a week ago when he met a new partner  He is not having significant pain likely due to cessation his prior opioid use  He has never injected drugs  Denies fever  Impression:  Neck pain, chronic plan:  Multimodal analgesia, follow-up with Comprehensive Sp[facility]        ED Course         Critical Care Time  Procedures

## 2022-08-08 ENCOUNTER — NURSE TRIAGE (OUTPATIENT)
Dept: PHYSICAL THERAPY | Facility: OTHER | Age: 37
End: 2022-08-08

## 2022-08-08 DIAGNOSIS — G89.29 ACUTE EXACERBATION OF CHRONIC LOW BACK PAIN: ICD-10-CM

## 2022-08-08 DIAGNOSIS — M54.2 NECK PAIN: Primary | ICD-10-CM

## 2022-08-08 DIAGNOSIS — M54.50 ACUTE EXACERBATION OF CHRONIC LOW BACK PAIN: ICD-10-CM

## 2022-08-08 NOTE — TELEPHONE ENCOUNTER
Additional Information   Negative: Is this related to a work injury?  Negative: Is this related to an MVA?  Negative: Are you currently recieving homecare services?  Negative: Has the patient had unexplained weight loss?  Negative: Does the patient have a fever?  Negative: Is the patient experiencing urine retention?  Negative: Is the patient experiencing blood in sputum?  Negative: Has the patient experienced major trauma? (fall from height, high speed collision, direct blow to spine) and is also experiencing nausea, light-headedness, or loss of consciousness?  Negative: Is the patient experiencing acute drop foot or paralysis?  Affirmative: Is this a chronic condition? Background - Initial Assessment  Clinical complaint: Posterior neck pain that radiates to B/L arms and also c/o bilateral low back pain that radiates to B/L LE's  Date of onset: Hx of this pain for about 2 years- progressively worsened over the last few months-NKI  Frequency of pain: constant  Quality of pain: dull ache, sharp, shooting, and stabbing    Protocols used: [facility] COMPREHENSIVE SPINE PROGRAM PROTOCOL    Patient seen in ED 8/5 & 8/6/22  for neck and back pain-PLEASE SEE NOTES WHEN COMPLETE  This RN did review the Comprehensive Spine Program in detail and what we offer for their back or neck pain  Patient is agreeable to triage and would like to proceed w/ Evaluation/Sessions with [facility] therapist  [place]  47 PM-DENIED  TRIAGED FOR PT EVAL AND NO RF S/S PRESENT  Patients information was sent to the preferred site and patient made aware clerical would be calling to schedule appointment  Contact information for site was provided for the patient as well  Patient appreciative of call   Nurse wished patient well and referral closed per protocol  Patient did not voice any questions and/or concerns  All information about patients intended careplan reviewed   Patient in agreement with nurse's explanation of referral and treatment plan

## 2022-08-10 ENCOUNTER — EVALUATION (OUTPATIENT)
Dept: PHYSICAL THERAPY | Facility: REHABILITATION | Age: 37
End: 2022-08-10
Payer: MEDICARE

## 2022-08-10 VITALS — SYSTOLIC BLOOD PRESSURE: 166 MMHG | TEMPERATURE: 99.2 F | HEART RATE: 98 BPM | DIASTOLIC BLOOD PRESSURE: 89 MMHG

## 2022-08-10 DIAGNOSIS — M54.2 NECK PAIN: Primary | ICD-10-CM

## 2022-08-10 DIAGNOSIS — G89.29 ACUTE EXACERBATION OF CHRONIC LOW BACK PAIN: ICD-10-CM

## 2022-08-10 DIAGNOSIS — M54.50 ACUTE EXACERBATION OF CHRONIC LOW BACK PAIN: ICD-10-CM

## 2022-08-10 PROCEDURE — 97162 PT EVAL MOD COMPLEX 30 MIN: CPT | Performed by: PHYSICAL THERAPIST
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
## 2022-08-16 NOTE — ED PROVIDER NOTES
History  Chief Complaint   Patient presents with    Back Pain     Pt presents by hospital wheelchair with c/o acute on chronic back pain radiating from neck  Patient is a 42-year-old male past medical history of chronic neck and low back pain presents for evaluation of worsening low back pain  Patient was seen in the emergency department overnight, where he was given multimodal analgesia for neck and low back pain  Patient states that he did have relief of symptoms for a short period of time after medications, but when medications wore off patient began to experience worsening low back pain  Patient denies any new injury to the area  Endorses pain in the paraspinal musculature of the lumbar spine radiating around both sides into his abdomen and up his back into his shoulders and neck  Patient states that he has pins and needle sensation in bilateral feet  He is also experiencing a burning sensation down the backs of both legs  Patient states that he has had these symptoms before, but states that they are currently worse than previous  Patient states that he does feel as though he is having difficulty going to the bathroom, also has difficulty stopping his stream of urination  No issues with bowel movements  Patient did note that he was recently using street opioids to treat his pain, but stopped a few days ago  Patient denied any IV drug use  Currently denies any fevers, chills, chest pain, abdominal pain, nausea, or vomiting  Prior to Admission Medications   Prescriptions Last Dose Informant Patient Reported? Taking?    DULoxetine (CYMBALTA) 20 mg capsule   No No   Sig: Take 1 capsule (20 mg total) by mouth daily   QUEtiapine (SEROquel) 100 mg tablet   No No   Sig: Take 1 tablet (100 mg total) by mouth daily at bedtime   albuterol (PROVENTIL HFA,VENTOLIN HFA) 90 mcg/act inhaler   No No   Sig: Inhale 2 puffs every 4 (four) hours as needed for wheezing   gabapentin (NEURONTIN) 100 mg capsule   No No   Sig: Take 5 capsules (500 mg total) by mouth 3 (three) times a day   gabapentin (Neurontin) 100 mg capsule   No No   Sig: Take 1 capsule (100 mg total) by mouth 3 (three) times a day   lidocaine (Lidoderm) 5 %   No No   Sig: Apply 1 patch topically daily Remove & Discard patch within 12 hours or as directed by MD   methocarbamol (ROBAXIN) 500 mg tablet   No No   Sig: Take 1 tablet (500 mg total) by mouth 2 (two) times a day   polyethylene glycol (MIRALAX) 17 g packet   No No   Sig: Take 17 g by mouth daily as needed (Constipation)      Facility-Administered Medications: None       Past Medical History:   Diagnosis Date    Asthma     Chronic pain     Hypertension     Thyroglossal duct cyst        Past Surgical History:   Procedure Laterality Date    THYROGLOSSAL DUCT EXCISION      THYROID SURGERY      THYROID SURGERY         Family History   Problem Relation Age of Onset    Hypertension Mother     No Known Problems Father      I have reviewed and agree with the history as documented  E-Cigarette/Vaping    E-Cigarette Use Current Every Day User     Cartridges/Day 1      E-Cigarette/Vaping Substances    Nicotine Yes     THC No     CBD No     Flavoring Yes     Other No     Unknown No      Social History     Tobacco Use    Smoking status: Former Smoker     Packs/day: 0 25     Types: Cigarettes    Smokeless tobacco: Never Used    Tobacco comment: 5 cigerettes a day    Vaping Use    Vaping Use: Every day    Substances: Nicotine, Flavoring   Substance Use Topics    Alcohol use: Not Currently    Drug use: Yes     Types: Marijuana, Methamphetamines        Review of Systems   Constitutional: Negative for chills and fever  HENT: Negative  Respiratory: Negative  Cardiovascular: Negative  Gastrointestinal: Negative for abdominal pain, nausea and vomiting  Genitourinary: Positive for difficulty urinating  Musculoskeletal: Positive for back pain  Negative for neck pain  Skin: Negative  Neurological: Positive for weakness and numbness  All other systems reviewed and are negative  Physical Exam  ED Triage Vitals   Temperature Pulse Respirations Blood Pressure SpO2   08/06/22 1119 08/06/22 1130 08/06/22 1130 08/06/22 1130 08/06/22 1130   97 9 °F (36 6 °C) 90 18 (!) 172/110 100 %      Temp Source Heart Rate Source Patient Position - Orthostatic VS BP Location FiO2 (%)   08/06/22 1119 08/06/22 1130 08/06/22 1130 08/06/22 1130 --   Oral Monitor Lying Right arm       Pain Score       --                    Orthostatic Vital Signs  Vitals:    08/06/22 1130   BP: (!) 172/110   Pulse: 90   Patient Position - Orthostatic VS: Lying       Physical Exam  Vitals and nursing note reviewed  Constitutional:       General: He is awake  Appearance: He is not toxic-appearing or diaphoretic  Comments: Patient appears uncomfortable   HENT:      Head: Normocephalic and atraumatic  Eyes:      General: Vision grossly intact  Gaze aligned appropriately  Cardiovascular:      Rate and Rhythm: Regular rhythm  Tachycardia present  Pulmonary:      Effort: Pulmonary effort is normal  No respiratory distress  Musculoskeletal:      Cervical back: Full passive range of motion without pain and neck supple  Skin:     General: Skin is warm and dry  Neurological:      Mental Status: He is alert and oriented to person, place, and time  Comments: Patient able to move his feet bilaterally  No numbness of BL LE  Unable to perform further examination secondary to pain            ED Medications  Medications   ketorolac (TORADOL) injection 15 mg (has no administration in time range)   diazepam (VALIUM) injection 5 mg (has no administration in time range)       Diagnostic Studies  Results Reviewed     Procedure Component Value Units Date/Time    CBC and differential [[id number]]     Lab Status: No result Specimen: Blood     Comprehensive metabolic panel [[id number]]     Lab Status: No result Specimen: Blood     Sedimentation rate, automated [[id number]]     Lab Status: No result Specimen: Blood     C-reactive protein [[id number]]     Lab Status: No result Specimen: Blood                  CT spine lumbar without contrast    (Results Pending)         Procedures  Procedures      ED Course  ED Course as of 08/16/22 0510   Sat Aug 06, 2022   1253 Potassium(!): 3 1  Repleted    1253 C-REACTIVE PROTEIN(!): 18 2   1253 Sed Rate: 8  Can rule out epidural abscess   1253 WBC: 7 87   1402 CT spine lumbar without contrast  Negative                              SBIRT 20yo+    Flowsheet Row Most Recent Value   SBIRT (25 yo +)    In order to provide better care to our patients, we are screening all of our patients for alcohol and drug use  Would it be okay to ask you these screening questions? No Filed at: 08/06/2022 1135                MDM    Disposition  Final diagnoses:   None     ED Disposition     None      Follow-up Information    None         Patient's Medications   Discharge Prescriptions    No medications on file     No discharge procedures on file  PDMP Review       Value Time User    PDMP Reviewed  Yes 3/31/2022  3:57 PM Tone Whiteside PA-C           ED Provider  Attending physically available and evaluated Marylen Panther  I managed the patient along with the ED Attending      Electronically Signed by

## 2022-08-17 ENCOUNTER — OFFICE VISIT (OUTPATIENT)
Dept: PHYSICAL THERAPY | Facility: REHABILITATION | Age: 37
End: 2022-08-17
Payer: MEDICARE

## 2022-08-17 DIAGNOSIS — M54.2 NECK PAIN: Primary | ICD-10-CM

## 2022-08-17 DIAGNOSIS — M54.50 ACUTE EXACERBATION OF CHRONIC LOW BACK PAIN: ICD-10-CM

## 2022-08-17 DIAGNOSIS — G89.29 ACUTE EXACERBATION OF CHRONIC LOW BACK PAIN: ICD-10-CM

## 2022-08-17 PROCEDURE — 97110 THERAPEUTIC EXERCISES: CPT | Performed by: PHYSICAL THERAPIST

## 2022-08-17 PROCEDURE — 97112 NEUROMUSCULAR REEDUCATION: CPT | Performed by: PHYSICAL THERAPIST

## 2022-08-17 NOTE — PROGRESS NOTES
Daily Note     Today's date: 2022  Patient name: Prashanth Rock  : 1985  MRN: [id number]  Referring provider: Ele Encarnacion PT  Dx:   Encounter Diagnosis     ICD-10-CM    1  Neck pain  M54 2    2  Acute exacerbation of chronic low back pain  M54 50     G89 29                   Subjective: States that his quad is feeling better, just pain in the lower back when he braces hard  Objective: See treatment diary below    Assessment: Minor pain w/ listed interventions, not made worse after  Instructed to monitor symptom response to initial program      Plan: Continue per plan of care        Precautions: Chronic pain, hx of substance abuse      Manuals 8/10 8/17                                                               Neuro Re-Ed 8/10 8/17           Hinge pulldown   BTB 3x8           Pallof sidestep  BTB 2x8           KB carry  20# 2 laps ea                                                               Ther Ex 8/10 8/17                                                                                                      VG  L7 6'           Ther Activity 8/10 8/17                                     Gait Training                                       Modalities

## 2022-08-24 ENCOUNTER — OFFICE VISIT (OUTPATIENT)
Dept: PHYSICAL THERAPY | Facility: REHABILITATION | Age: 37
End: 2022-08-24
Payer: MEDICARE

## 2022-08-24 DIAGNOSIS — G89.29 ACUTE EXACERBATION OF CHRONIC LOW BACK PAIN: ICD-10-CM

## 2022-08-24 DIAGNOSIS — M54.50 ACUTE EXACERBATION OF CHRONIC LOW BACK PAIN: ICD-10-CM

## 2022-08-24 DIAGNOSIS — M54.2 NECK PAIN: Primary | ICD-10-CM

## 2022-08-24 PROCEDURE — 97112 NEUROMUSCULAR REEDUCATION: CPT | Performed by: PHYSICAL THERAPIST

## 2022-08-24 PROCEDURE — 97110 THERAPEUTIC EXERCISES: CPT | Performed by: PHYSICAL THERAPIST

## 2022-08-24 NOTE — PROGRESS NOTES
Daily Note     Today's date: 2022  Patient name: Adelaida Catalan  : 1985  MRN: [id number]  Referring provider: Bonnie Perez, PT  Dx:   Encounter Diagnosis     ICD-10-CM    1  Neck pain  M54 2    2  Acute exacerbation of chronic low back pain  M54 50     G89 29                   Subjective: Reports back and neck pain, he went to the ER last week and is thinking about going again  Objective: See treatment diary below    Assessment: Pain increased throughout session but completes, subjectively reports wanting to work through the pain  Plan: Continue per plan of care        Precautions: Chronic pain, hx of substance abuse      Manuals 8/10 8/17 8/24                                                              Neuro Re-Ed 8/10 8/17 8/24          Hinge pulldown   BTB 3x8           Pallof sidestep  BTB 2x8 Armin 2x8 ea          KB carry  20# 2 laps ea           Row walkback   2x10 armin                                                 Ther Ex 8/10 8/17 8/24                                                                                                     VG  L7 6' L7 11'          Ther Activity 8/10 8/17 8/24                                    Gait Training                                       Modalities

## 2022-08-31 ENCOUNTER — APPOINTMENT (OUTPATIENT)
Dept: PHYSICAL THERAPY | Facility: REHABILITATION | Age: 37
End: 2022-08-31
Payer: MEDICARE

## 2022-09-08 ENCOUNTER — OFFICE VISIT (OUTPATIENT)
Dept: PHYSICAL THERAPY | Facility: REHABILITATION | Age: 37
End: 2022-09-08
Payer: MEDICARE

## 2022-09-08 DIAGNOSIS — M54.2 NECK PAIN: Primary | ICD-10-CM

## 2022-09-08 DIAGNOSIS — G89.29 ACUTE EXACERBATION OF CHRONIC LOW BACK PAIN: ICD-10-CM

## 2022-09-08 DIAGNOSIS — M54.50 ACUTE EXACERBATION OF CHRONIC LOW BACK PAIN: ICD-10-CM

## 2022-09-08 PROCEDURE — 97110 THERAPEUTIC EXERCISES: CPT | Performed by: PHYSICAL THERAPIST

## 2022-09-08 PROCEDURE — 97112 NEUROMUSCULAR REEDUCATION: CPT | Performed by: PHYSICAL THERAPIST

## 2022-09-08 NOTE — PROGRESS NOTES
Daily Note     Today's date: 2022  Patient name: Ritu Barrios  : 1985  MRN: [id number]  Referring provider: Lis Noel PT  Dx:   Encounter Diagnosis     ICD-10-CM    1  Neck pain  M54 2    2  Acute exacerbation of chronic low back pain  M54 50     G89 29                   Subjective: States sore today, he has pain management appointment set up in a few weeks  Objective: See treatment diary below    Assessment: Pain increased throughout session but completes, subjectively reports wanting to stop early due to pain  Plan: Continue per plan of care        Precautions: Chronic pain, hx of substance abuse      Manuals 8/10 8/17 8/24 9/8                                                             Neuro Re-Ed 8/10 8/17 8/24 9/8         Hinge pulldown   BTB 3x8           Pallof sidestep  BTB 2x8 Armin 2x8 ea          KB carry  20# 2 laps ea           Row walkback   2x10 armin 3x8 armin         Resisted walking    armin 12x fwd/bck                                   Ther Ex 8/10 8/17 8/24 9/8                                                                                                    VG  L7 6' L7 11' L7 15'         Ther Activity 8/10 8/17 8/24                                    Gait Training                                       Modalities

## 2023-10-02 ENCOUNTER — HOSPITAL ENCOUNTER (EMERGENCY)
Facility: HOSPITAL | Age: 38
Discharge: HOME | End: 2023-10-03
Attending: EMERGENCY MEDICINE | Admitting: EMERGENCY MEDICINE
Payer: MEDICARE

## 2023-10-02 VITALS
TEMPERATURE: 98.2 F | RESPIRATION RATE: 18 BRPM | DIASTOLIC BLOOD PRESSURE: 110 MMHG | HEART RATE: 75 BPM | SYSTOLIC BLOOD PRESSURE: 158 MMHG | OXYGEN SATURATION: 98 %

## 2023-10-02 DIAGNOSIS — F06.30 MOOD DISORDER DUE TO MEDICAL CONDITION: ICD-10-CM

## 2023-10-02 DIAGNOSIS — F41.9 ANXIETY: ICD-10-CM

## 2023-10-02 DIAGNOSIS — Z00.8 ENCOUNTER FOR PSYCHOLOGICAL EVALUATION: Primary | ICD-10-CM

## 2023-10-02 DIAGNOSIS — F33.9 RECURRENT MAJOR DEPRESSIVE DISORDER (HCC): ICD-10-CM

## 2023-10-02 LAB — ETHANOL EXG-MCNC: 0 MG/DL

## 2023-10-02 PROCEDURE — 82075 ASSAY OF BREATH ETHANOL: CPT

## 2023-10-02 PROCEDURE — 99285 EMERGENCY DEPT VISIT HI MDM: CPT | Performed by: EMERGENCY MEDICINE

## 2023-10-02 PROCEDURE — 80307 DRUG TEST PRSMV CHEM ANLYZR: CPT

## 2023-10-02 PROCEDURE — 99283 EMERGENCY DEPT VISIT LOW MDM: CPT

## 2023-10-02 RX ORDER — ACETAMINOPHEN 325 MG/1
650 TABLET ORAL ONCE
Status: COMPLETED | OUTPATIENT
Start: 2023-10-02 | End: 2023-10-02

## 2023-10-02 RX ORDER — IBUPROFEN 400 MG/1
400 TABLET ORAL ONCE
Status: COMPLETED | OUTPATIENT
Start: 2023-10-02 | End: 2023-10-02

## 2023-10-02 RX ADMIN — ACETAMINOPHEN 650 MG: 325 TABLET, FILM COATED ORAL at 22:45

## 2023-10-02 RX ADMIN — IBUPROFEN 400 MG: 400 TABLET, FILM COATED ORAL at 22:45

## 2023-10-03 LAB
AMPHETAMINES SERPL QL SCN: POSITIVE
BARBITURATES UR QL: NEGATIVE
BENZODIAZ UR QL: NEGATIVE
COCAINE UR QL: NEGATIVE
OPIATES UR QL SCN: NEGATIVE
OXYCODONE+OXYMORPHONE UR QL SCN: NEGATIVE
PCP UR QL: NEGATIVE
THC UR QL: POSITIVE
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
## 2023-10-03 NOTE — ED PROVIDER NOTES
History  Chief Complaint   Patient presents with   • Psychiatric Evaluation     Per EMS pt is living at a house that he feels unsafe. Pt denies HI/SI. His roommates are stating that they don't feel safe around him. Patient is a 33 yo male with PMH of polysubstance abuse, major depressive disorder, anxiety, and bipolar 1. He was brought to the ED this evening by EMS for psychiatric evaluation. Per police and EMS, police were called several times today due to concern for patient's abnormal behavior at home. The patient's roommates reportedly filed a 302 against, concerned that he has not slept in several days and that he is a harm to himself. Upon speaking to patient, he is very upset to be at the hospital. States he is safe at home but roommates are "terrible people" and abuse drugs. He has not been able to sleep due to them keeping him up at night. He adamantly denies SI, HI, and hallucinations. The patient admits to using methamphetamines a few days ago but has not used any substances since. Prior to Admission Medications   Prescriptions Last Dose Informant Patient Reported? Taking?    DULoxetine (CYMBALTA) 20 mg capsule   No No   Sig: Take 1 capsule (20 mg total) by mouth daily   Diclofenac Sodium (VOLTAREN) 1 %   No No   Sig: Apply 2 g topically 4 (four) times a day for 7 days   QUEtiapine (SEROquel) 100 mg tablet   No No   Sig: Take 1 tablet (100 mg total) by mouth daily at bedtime   albuterol (PROVENTIL HFA,VENTOLIN HFA) 90 mcg/act inhaler   No No   Sig: Inhale 2 puffs every 4 (four) hours as needed for wheezing   gabapentin (Neurontin) 100 mg capsule   No No   Sig: Take 1 capsule (100 mg total) by mouth 3 (three) times a day   lidocaine (Lidoderm) 5 %   No No   Sig: Apply 1 patch topically daily Remove & Discard patch within 12 hours or as directed by MD   methocarbamol (ROBAXIN) 500 mg tablet   No No   Sig: Take 1 tablet (500 mg total) by mouth 2 (two) times a day   polyethylene glycol (MIRALAX) 17 g packet   No No   Sig: Take 17 g by mouth daily as needed (Constipation)      Facility-Administered Medications: None       Past Medical History:   Diagnosis Date   • Asthma    • Chronic pain    • Hypertension    • Thyroglossal duct cyst        Past Surgical History:   Procedure Laterality Date   • THYROGLOSSAL DUCT EXCISION     • THYROID SURGERY     • THYROID SURGERY         Family History   Problem Relation Age of Onset   • Hypertension Mother    • No Known Problems Father      I have reviewed and agree with the history as documented. E-Cigarette/Vaping   • E-Cigarette Use Current Every Day User    • Cartridges/Day 1      E-Cigarette/Vaping Substances   • Nicotine Yes    • THC No    • CBD No    • Flavoring Yes    • Other No    • Unknown No      Social History     Tobacco Use   • Smoking status: Former     Packs/day: 0.25     Types: Cigarettes   • Smokeless tobacco: Never   • Tobacco comments:     5 cigerettes a day    Vaping Use   • Vaping Use: Every day   • Substances: Nicotine, Flavoring   Substance Use Topics   • Alcohol use: Not Currently   • Drug use: Yes     Types: Marijuana, Methamphetamines        Review of Systems   Psychiatric/Behavioral: Positive for sleep disturbance. Negative for agitation, confusion, decreased concentration, hallucinations, self-injury and suicidal ideas. The patient is nervous/anxious. Physical Exam  ED Triage Vitals [10/02/23 2154]   Temperature Pulse Respirations Blood Pressure SpO2   98.2 °F (36.8 °C) 75 18 (!) 158/110 98 %      Temp Source Heart Rate Source Patient Position - Orthostatic VS BP Location FiO2 (%)   Oral Monitor Lying Right arm --      Pain Score       10 - Worst Possible Pain             Orthostatic Vital Signs  Vitals:    10/02/23 2154   BP: (!) 158/110   Pulse: 75   Patient Position - Orthostatic VS: Lying       Physical Exam  Constitutional:       General: He is in acute distress.       Appearance: He is not ill-appearing, toxic-appearing or diaphoretic. Comments: Crying but non-combative, cooperative. HENT:      Head: Normocephalic and atraumatic. Comments: Multiple excoriations on face, patient endorses picking 2/2 anxiety     Mouth/Throat:      Mouth: Mucous membranes are moist.      Pharynx: Oropharynx is clear. Eyes:      Extraocular Movements: Extraocular movements intact. Conjunctiva/sclera: Conjunctivae normal.   Cardiovascular:      Rate and Rhythm: Normal rate. Pulmonary:      Effort: Pulmonary effort is normal.   Musculoskeletal:         General: No deformity or signs of injury. Normal range of motion. Cervical back: Normal range of motion and neck supple. Skin:     General: Skin is warm and dry. Comments: Excoriations on face 2/2 skin picking     Neurological:      General: No focal deficit present. Mental Status: He is alert and oriented to person, place, and time. Psychiatric:         Attention and Perception: Attention and perception normal. He is attentive. He does not perceive auditory or visual hallucinations. Mood and Affect: Mood is anxious. Affect is tearful. Speech: Speech is rapid and pressured. Speech is not tangential.         Behavior: Behavior is not agitated, aggressive, hyperactive or combative. Behavior is cooperative. Thought Content: Thought content is paranoid. Thought content does not include homicidal or suicidal ideation. Thought content does not include homicidal or suicidal plan. Cognition and Memory: Cognition normal.         Judgment: Judgment is not impulsive or inappropriate.          ED Medications  Medications   acetaminophen (TYLENOL) tablet 650 mg (650 mg Oral Given 10/2/23 2245)   ibuprofen (MOTRIN) tablet 400 mg (400 mg Oral Given 10/2/23 2245)       Diagnostic Studies  Results Reviewed     Procedure Component Value Units Date/Time    Rapid drug screen, urine [[id number]]  (Abnormal) Collected: 10/02/23 0447    Lab Status: Final result Specimen: Urine, Clean Catch Updated: 10/03/23 0049     Amph/Meth UR Positive     Barbiturate Ur Negative     Benzodiazepine Urine Negative     Cocaine Urine Negative     Methadone Urine --     Opiate Urine Negative     PCP Ur Negative     THC Urine Positive     Oxycodone Urine Negative    Narrative:      Presumptive report. If requested, specimen will be sent to reference lab for confirmation. FOR MEDICAL PURPOSES ONLY. IF CONFIRMATION NEEDED PLEASE CONTACT THE LAB WITHIN 5 DAYS. Drug Screen Cutoff Levels:  AMPHETAMINE/METHAMPHETAMINES  1000 ng/mL  BARBITURATES     200 ng/mL  BENZODIAZEPINES     200 ng/mL  COCAINE      300 ng/mL  METHADONE      300 ng/mL  OPIATES      300 ng/mL  PHENCYCLIDINE     25 ng/mL  THC       50 ng/mL  OXYCODONE      100 ng/mL    POCT alcohol breath test [[id number]]  (Normal) Resulted: 10/02/23 2247    Lab Status: Final result Updated: 10/02/23 2247     EXTBreath Alcohol 0.000                 No orders to display         Procedures  Procedures      ED Course                                       Medical Decision Making  Roman Garland is a 34 y.o. who presents for psychiatric evaluation    Vital signs are stable, afebrile  PE: see psych exam     DDx: intoxication vs. Substance abuse vs. Anxiety vs. Manic episode vs other mental health issue  Not SI/HI      Plan: UDS positive, BAT negative, crisis eval  Patient is cooperative. Exhibiting pressured speech and some paranoia regarding roommates but denying SI/HI. I do not believe patient is a harm to himself or others this time. Declined 302. Crisis offered outpatient resources. Patient declining 201. Disposition: Patient stable for discharge. Provided referral for outpatient behavioral health. Return precautions provided. Patient understands ans is agreeable to plan. Amount and/or Complexity of Data Reviewed  Labs: ordered. Risk  OTC drugs. Prescription drug management.             Disposition  Final diagnoses: Encounter for psychological evaluation   Mood disorder due to medical condition   Anxiety   Recurrent major depressive disorder ([address])     Time reflects when diagnosis was documented in both MDM as applicable and the Disposition within this note     Time User Action Codes Description Comment    10/3/2023 12:44 AM Tonna Carbine Add [Z00.8] Encounter for psychological evaluation     10/3/2023 12:45 AM Tonna Carbine Add [F06.30] Mood disorder due to medical condition     10/3/2023 12:45 AM Tonna Carbine Add [F41.9] Anxiety     10/3/2023 12:45 AM Tonna Carbine Add [F33.9] Recurrent major depressive disorder [facility])       ED Disposition     ED Disposition   Discharge    Condition   Stable    Date/Time   Tue Oct 3, 2023 12:44 AM    Comment   Marcie Chin discharge to home/self care.                Follow-up Information     Follow up With Specialties Details Why Contact Adolfo Delgado, DO Family Medicine Go in 3 days for reevaluation [address]  [phone]            Discharge Medication List as of 10/3/2023 12:46 AM      CONTINUE these medications which have NOT CHANGED    Details   albuterol (PROVENTIL HFA,VENTOLIN HFA) 90 mcg/act inhaler Inhale 2 puffs every 4 (four) hours as needed for wheezing, Starting Fri 9/27/2019, Print      Diclofenac Sodium (VOLTAREN) 1 % Apply 2 g topically 4 (four) times a day for 7 days, Starting Sat 8/6/2022, Until Sat 8/13/2022, Normal      DULoxetine (CYMBALTA) 20 mg capsule Take 1 capsule (20 mg total) by mouth daily, Starting Mon 4/4/2022, Until Wed 5/4/2022, Print      gabapentin (Neurontin) 100 mg capsule Take 1 capsule (100 mg total) by mouth 3 (three) times a day, Starting Sat 8/6/2022, Normal      lidocaine (Lidoderm) 5 % Apply 1 patch topically daily Remove & Discard patch within 12 hours or as directed by MD, Starting Sat 8/6/2022, Normal      methocarbamol (ROBAXIN) 500 mg tablet Take 1 tablet (500 mg total) by mouth 2 (two) times a day, Starting Sat 8/6/2022, Normal      polyethylene glycol (MIRALAX) 17 g packet Take 17 g by mouth daily as needed (Constipation), Starting Wed 2/2/2022, Normal      QUEtiapine (SEROquel) 100 mg tablet Take 1 tablet (100 mg total) by mouth daily at bedtime, Starting Mon 4/4/2022, Until Wed 5/4/2022, Print               PDMP Review       Value Time User    PDMP Reviewed  Yes 3/31/2022  3:57 PM Justen Santiago PA-C           ED Provider  Attending physically available and evaluated Kennedy Soulier. I managed the patient along with the ED Attending.     Electronically Signed by         Al Salazar MD  10/03/23 5794

## 2023-10-03 NOTE — DISCHARGE INSTRUCTIONS
Please return to the ED if you have thoughts of harming yourself or others. Referral provided to behavioral health.

## 2023-10-03 NOTE — ED NOTES
34year old male presents on a petition by [facility] due to abnormal behavior. Patient hd a  manic type presentation. Patient had meth in his system. Once patient started to feel more relaxed, he was spoken to. Patient was alert and oriented times four with a paranoid and guarded demeanor but with better judgement and insight. Patient stated he did not have SI/HI/AH/VH. Patient stated his roommates do drugs and do not like him. Patient denied any and all history. Since patient did not meet grounds for any continued reasons to be detained against his will, ED discharged him. Patient declined 201 but given  resources.

## 2023-10-04 DIAGNOSIS — Z78.9 NEED FOR FOLLOW-UP BY SOCIAL WORKER: Primary | ICD-10-CM

## 2023-10-16 ENCOUNTER — PATIENT OUTREACH (OUTPATIENT)
Dept: FAMILY MEDICINE CLINIC | Facility: CLINIC | Age: 38
End: 2023-10-16

## 2023-10-16 NOTE — PROGRESS NOTES
Referral received from [address] [place] [address] for Outpatient Social Work Care Manager ([facility]) to outreach patient and assist with outpatient behavioral health services. Patient presented with risk score of 61+, has [employer], history of substance use, and diagnoses of depression, anxiety, & bipolar. Call placed to patient to discuss outpatient mental health services. No answer, voicemail left, and awaiting return call.

## 2023-10-23 ENCOUNTER — PATIENT OUTREACH (OUTPATIENT)
Dept: FAMILY MEDICINE CLINIC | Facility: CLINIC | Age: 38
End: 2023-10-23

## 2023-10-23 NOTE — PROGRESS NOTES
2nd outreach attempt to patient to assess needs and discuss OP mental health services. Spoke with patient who states he is doing okay but feeling sore. Asked if patient needs to schedule follow-up PCP appt. Patient shared he did not have positive experience with previous PCP due to a "falling out". Patient wanting to establish with Dr. Rodriguez Chapman but would like to explain what previously occurred. Encouraged patient to call office and request to schedule appt with Dr. Rodriguez Chapman. Provided office phone number and patient will call. Patient states he was staying with his mother but she asked him to leave. He is now staying with a friend; is otherwise homeless. Will assist with housing resources once new patient appt is scheduled. Patient agreeable to this plan. Will follow-up.

## 2023-11-12 ENCOUNTER — HOSPITAL ENCOUNTER (EMERGENCY)
Facility: HOSPITAL | Age: 38
Discharge: HOME | End: 2023-11-12
Attending: EMERGENCY MEDICINE
Payer: MEDICARE

## 2023-11-12 VITALS
RESPIRATION RATE: 20 BRPM | SYSTOLIC BLOOD PRESSURE: 164 MMHG | HEART RATE: 107 BPM | DIASTOLIC BLOOD PRESSURE: 104 MMHG | TEMPERATURE: 97.6 F | OXYGEN SATURATION: 99 %

## 2023-11-12 DIAGNOSIS — M54.2 CHRONIC NECK PAIN: Primary | ICD-10-CM

## 2023-11-12 DIAGNOSIS — G89.29 CHRONIC NECK PAIN: Primary | ICD-10-CM

## 2023-11-12 PROCEDURE — 99284 EMERGENCY DEPT VISIT MOD MDM: CPT | Performed by: EMERGENCY MEDICINE

## 2023-11-12 PROCEDURE — 99283 EMERGENCY DEPT VISIT LOW MDM: CPT

## 2023-11-12 PROCEDURE — 96372 THER/PROPH/DIAG INJ SC/IM: CPT

## 2023-11-12 RX ORDER — METHOCARBAMOL 500 MG/1
500 TABLET, FILM COATED ORAL 2 TIMES DAILY
Qty: 20 TABLET | Refills: 0 | Status: SHIPPED | OUTPATIENT
Start: 2023-11-12

## 2023-11-12 RX ORDER — KETOROLAC TROMETHAMINE 30 MG/ML
15 INJECTION, SOLUTION INTRAMUSCULAR; INTRAVENOUS ONCE
Status: COMPLETED | OUTPATIENT
Start: 2023-11-12 | End: 2023-11-12

## 2023-11-12 RX ORDER — DIAZEPAM 5 MG/1
5 TABLET ORAL ONCE
Status: COMPLETED | OUTPATIENT
Start: 2023-11-12 | End: 2023-11-12

## 2023-11-12 RX ORDER — NAPROXEN 500 MG/1
500 TABLET ORAL 2 TIMES DAILY WITH MEALS
Qty: 30 TABLET | Refills: 0 | Status: SHIPPED | OUTPATIENT
Start: 2023-11-12

## 2023-11-12 RX ADMIN — KETOROLAC TROMETHAMINE 15 MG: 30 INJECTION, SOLUTION INTRAMUSCULAR; INTRAVENOUS at 07:51

## 2023-11-12 RX ADMIN — DIAZEPAM 5 MG: 5 TABLET ORAL at 07:52

## 2023-11-12 NOTE — ED PROVIDER NOTES
History  Chief Complaint   Patient presents with    Back Pain     Pt c/o back pain and generalized pain     HPI    Patient is a 34year old male with PMHx HTN, asthma, polysubstance abuse, Bipolar 1, presenting to the ED for evaluation of neck pain and generalized full body pain. Patient states that a few years ago he sustained a fall and had subsequent disc herniation at C3-C5. Patient never followed up for this. Since then, patient states that his pain has gradually been worsening. He now experiences pain in both sides of his neck with radiation to the shoulders and upper extremities, with  subjective paresthesias. Patient notes that the most severe pain is in bilateral shoulder blades. He also reports experiencing pain across the lower back as well. Denies any new trauma to the back. Despite pain, patient has been exercising daily. Is ambulatory without issue. He has prior hx of meth abuse, states he has not used in 3 months. Patient denies any saddle anesthesia, IVDA, urinary or bowel incontinence. Patient has been referred to [facility] in [facility] past, but states that he has not had time to follow up. There are no fevers, chills, cough or congestion, abdominal pain. Prior to Admission Medications   Prescriptions Last Dose Informant Patient Reported? Taking?    DULoxetine (CYMBALTA) 20 mg capsule   No No   Sig: Take 1 capsule (20 mg total) by mouth daily   Diclofenac Sodium (VOLTAREN) 1 %   No No   Sig: Apply 2 g topically 4 (four) times a day for 7 days   QUEtiapine (SEROquel) 100 mg tablet   No No   Sig: Take 1 tablet (100 mg total) by mouth daily at bedtime   albuterol (PROVENTIL HFA,VENTOLIN HFA) 90 mcg/act inhaler   No No   Sig: Inhale 2 puffs every 4 (four) hours as needed for wheezing   gabapentin (Neurontin) 100 mg capsule   No No   Sig: Take 1 capsule (100 mg total) by mouth 3 (three) times a day   lidocaine (Lidoderm) 5 %   No No   Sig: Apply 1 patch topically daily Remove & Discard patch within 12 hours or as directed by MD   methocarbamol (ROBAXIN) 500 mg tablet   No No   Sig: Take 1 tablet (500 mg total) by mouth 2 (two) times a day   polyethylene glycol (MIRALAX) 17 g packet   No No   Sig: Take 17 g by mouth daily as needed (Constipation)      Facility-Administered Medications: None       Past Medical History:   Diagnosis Date    Asthma     Chronic pain     Hypertension     Thyroglossal duct cyst        Past Surgical History:   Procedure Laterality Date    THYROGLOSSAL DUCT EXCISION      THYROID SURGERY      THYROID SURGERY         Family History   Problem Relation Age of Onset    Hypertension Mother     No Known Problems Father      I have reviewed and agree with the history as documented. E-Cigarette/Vaping    E-Cigarette Use Current Every Day User     Cartridges/Day 1      E-Cigarette/Vaping Substances    Nicotine Yes     THC No     CBD No     Flavoring Yes     Other No     Unknown No      Social History     Tobacco Use    Smoking status: Every Day     Packs/day: 0.50     Types: Cigarettes    Smokeless tobacco: Never    Tobacco comments:     5 cigerettes a day    Vaping Use    Vaping Use: Every day    Substances: Nicotine, Flavoring   Substance Use Topics    Alcohol use: Not Currently    Drug use: Not Currently     Types: Marijuana, Methamphetamines        Review of Systems   All other systems reviewed and are negative. Physical Exam  ED Triage Vitals   Temperature Pulse Respirations Blood Pressure SpO2   11/12/23 0649 11/12/23 0649 11/12/23 0649 11/12/23 0651 11/12/23 0649   97.6 °F (36.4 °C) (!) 107 20 (!) 164/104 99 %      Temp src Heart Rate Source Patient Position - Orthostatic VS BP Location FiO2 (%)   -- -- -- -- --             Pain Score       11/12/23 0649       8             Orthostatic Vital Signs  Vitals:    11/12/23 0649 11/12/23 0651   BP:  (!) 164/104   Pulse: (!) 107        Physical Exam  Vitals and nursing note reviewed.    Constitutional:       General: He is not in acute distress. Appearance: He is well-developed and normal weight. He is not ill-appearing or toxic-appearing. HENT:      Head: Normocephalic and atraumatic. Comments: Multiple healed facial abrasions     Right Ear: External ear normal.      Left Ear: External ear normal.      Nose: No congestion. Mouth/Throat:      Mouth: Mucous membranes are moist.   Eyes:      Conjunctiva/sclera: Conjunctivae normal.   Neck:      Comments: Normal ROM of the C spine; no masses    Cardiovascular:      Rate and Rhythm: Regular rhythm. Tachycardia present. Pulses: Normal pulses. Heart sounds: Normal heart sounds. No murmur heard. Pulmonary:      Effort: Pulmonary effort is normal. No respiratory distress. Breath sounds: Normal breath sounds. No wheezing or rhonchi. Abdominal:      General: Abdomen is flat. Palpations: Abdomen is soft. Tenderness: There is no abdominal tenderness. There is no guarding. Musculoskeletal:         General: No swelling. Normal range of motion. Cervical back: Normal range of motion and neck supple. Tenderness (paracervical tenderness no midline tenderness) present. Comments: Patient has full ROM of the upper and lower extremities without pain. There is no midline thoracic or lumbar tenderness. No masses or edema, no bruising to the spine. Patient has pain over the bilateral shoulder blades and thoracic paraspinal musculature. There are no open wounds on the back. Pain made worse with movement of the arms but he is able to complete this. Skin:     General: Skin is warm and dry. Capillary Refill: Capillary refill takes less than 2 seconds. Neurological:      Mental Status: He is alert and oriented to person, place, and time. Cranial Nerves: No cranial nerve deficit. Sensory: No sensory deficit. Motor: No weakness.       Coordination: Coordination normal.      Gait: Gait normal.      Comments: Patient has 5/5 strength bilaterally upper and lower extremities. Sensation intact. Psychiatric:         Mood and Affect: Mood normal.         ED Medications  Medications   ketorolac (TORADOL) injection 15 mg (15 mg Intramuscular Given 11/12/23 0854)   diazepam (VALIUM) tablet 5 mg (5 mg Oral Given 11/12/23 9583)       Diagnostic Studies  Results Reviewed       None                   No orders to display         Procedures  Procedures      ED Course       Patient is a 34year old male with hx of chronic pain. No new traumatic injury. No evidence of acute spinal cord dysfunction on exam or concern for spinal abscess. Patient will be treated with Toradol, Valium as a muscle relaxant, and will be discharged to follow-up with [facility] and pain management as an outpatient. I gave oral return precautions for what to return for in addition to the written return precautions. The patient verbalized understanding of the discharge instructions and warnings that would necessitate return to the Emergency Department. I specifically highlighted areas of special concern regarding the written and verbal discharge instructions and return precautions. All questions were answered prior to discharge. SBIRT 20yo+      Flowsheet Row Most Recent Value   Initial Alcohol Screen: US AUDIT-C     1. How often do you have a drink containing alcohol? 0 Filed at: 11/12/2023 0650   2. How many drinks containing alcohol do you have on a typical day you are drinking? 0 Filed at: 11/12/2023 0650   3a. Male UNDER 65: How often do you have five or more drinks on one occasion? 0 Filed at: 11/12/2023 0650   Audit-C Score 0 Filed at: 11/12/2023 4521   YANELI: How many times in the past year have you. .. Used an illegal drug or used a prescription medication for non-medical reasons? Never Filed at: 11/12/2023 1545                  Medical Decision Making  Risk  Prescription drug management.           Disposition  Final diagnoses:   Chronic neck pain     Time reflects when diagnosis was documented in both MDM as applicable and the Disposition within this note       Time User Action Codes Description Comment    11/12/2023  7:44 AM Hiram Gustafson Add [M54.2,  G89.29] Chronic neck pain           ED Disposition       ED Disposition   Discharge    Condition   Stable    Date/Time   Sun Nov 12, 2023 [address] discharge to home/self care. Follow-up Information       Follow up With Specialties Details Why Contact Info Additional Information    [facility] Radiology Schedule an appointment as soon as possible for a visit   [address] [place] [address] and [address] [facility], [address] [facility],[address], [place], [place], [address] [place] [address]            Discharge Medication List as of 11/12/2023  7:47 AM        START taking these medications    Details   !! methocarbamol (ROBAXIN) 500 mg tablet Take 1 tablet (500 mg total) by mouth 2 (two) times a day, Starting Sun 11/12/2023, Normal      naproxen (Naprosyn) 500 mg tablet Take 1 tablet (500 mg total) by mouth 2 (two) times a day with meals, Starting Sun 11/12/2023, Normal       !! - Potential duplicate medications found. Please discuss with provider.         CONTINUE these medications which have NOT CHANGED    Details   albuterol (PROVENTIL HFA,VENTOLIN HFA) 90 mcg/act inhaler Inhale 2 puffs every 4 (four) hours as needed for wheezing, Starting Fri 9/27/2019, Print      Diclofenac Sodium (VOLTAREN) 1 % Apply 2 g topically 4 (four) times a day for 7 days, Starting Sat 8/6/2022, Until Sat 8/13/2022, Normal      DULoxetine (CYMBALTA) 20 mg capsule Take 1 capsule (20 mg total) by mouth daily, Starting Mon 4/4/2022, Until Wed 5/4/2022, Print      gabapentin (Neurontin) 100 mg capsule Take 1 capsule (100 mg total) by mouth 3 (three) times a day, Starting Sat 8/6/2022, Normal      lidocaine (Lidoderm) 5 % Apply 1 patch topically daily Remove & Discard patch within 12 hours or as directed by MD, Starting Sat 8/6/2022, Normal      !! methocarbamol (ROBAXIN) 500 mg tablet Take 1 tablet (500 mg total) by mouth 2 (two) times a day, Starting Sat 8/6/2022, Normal      polyethylene glycol (MIRALAX) 17 g packet Take 17 g by mouth daily as needed (Constipation), Starting Wed 2/2/2022, Normal      QUEtiapine (SEROquel) 100 mg tablet Take 1 tablet (100 mg total) by mouth daily at bedtime, Starting Mon 4/4/2022, Until Wed 5/4/2022, Print       !! - Potential duplicate medications found. Please discuss with provider. PDMP Review         Value Time User    PDMP Reviewed  Yes 3/31/2022  3:57 PM Rubi Heard PA-C             ED Provider  Attending physically available and evaluated Manny Grey. I managed the patient along with the ED Attending.     Electronically Signed by           Jamey Stewart DO  11/12/23 3851

## 2023-11-12 NOTE — DISCHARGE INSTRUCTIONS
Follow up with [facility] as directed. Take medications as prescribed. Please follow up with [facility].

## 2023-11-12 NOTE — ED ATTENDING ATTESTATION
11/12/2023  IFabio MD, saw and evaluated the patient. I have discussed the patient with the resident/non-physician practitioner and agree with the resident's/non-physician practitioner's findings, Plan of Care, and MDM as documented in the resident's/non-physician practitioner's note, except where noted. All available labs and Radiology studies were reviewed. I was present for key portions of any procedure(s) performed by the resident/non-physician practitioner and I was immediately available to provide assistance. At this point I agree with the current assessment done in the Emergency Department. I have conducted an independent evaluation of this patient a history and physical is as follows:    ED Course     47-year-old male, history of chronic pain in his whole body, pruritic presenting to the emergency department for evaluation of slowly progressive allover body pain. Patient in particular locates the pain in his mid thoracic back. Patient also complains of tightness in his right forearm. Patient also complains of tightness in his bilateral latissimus area and low back. No acute traumatic injuries. Patient does admit that he works out daily, performing body weight exercises. Patient states that he does stretching associated with his daily workout regimen. Patient reports that he has been seen for pain in these locations before and has been referred to pain management, however does not have an appointment with them until after Rola. No bowel or bladder incontinence. No fever. No cough. No shortness of breath. Patient is sitting upright on the stretcher. Skin is notable for multiple excoriations on the face. Mucosal membranes are moist.  Neck is supple without any meningismus. Lungs are clear bilaterally. Heart is regular rate and rhythm with no murmurs rubs or gallops. Patient has full range of motion bilateral upper and lower extremities.   Patient is diffusely tender and hyperesthetic wherever she is touched. GCS is 15. Motor is 5 out of 5 bilateral upper and lower extremities. Sensation is intact in all nerve distributions. Patient presenting with chronic pain. Patient will be treated with Toradol, Valium as a muscle relaxant, and will be discharged to follow-up with comprehensive spine program and pain management as an outpatient. Patient was informed he would not be admitted for pain control and that his pain did not require narcotic pain medication.     Critical Care Time  Procedures

## 2023-11-13 ENCOUNTER — PATIENT OUTREACH (OUTPATIENT)
Dept: FAMILY MEDICINE CLINIC | Facility: CLINIC | Age: 38
End: 2023-11-13

## 2023-11-13 ENCOUNTER — TELEPHONE (OUTPATIENT)
Dept: PHYSICAL THERAPY | Facility: OTHER | Age: 38
End: 2023-11-13

## 2023-11-13 NOTE — TELEPHONE ENCOUNTER
Call placed to the patient per [facility] referral.    V/m left for patient to call back. Phone number and hours of business provided. This is the 1st attempt to reach the patient. Will defer referral per protocol.

## 2023-11-13 NOTE — PROGRESS NOTES
ADT Notification received. Patient presented to ED due to chronic neck pain. Per chart review, patient has new patient appt scheduled with Dr. Adan Eagle on 12/26/23. Call placed to discuss housing resource needs and confirm current income. Briefly spoke with patient who requested to call OP SWCM back at later time as he was picking up prescriptions. Will await return call.

## 2023-11-16 NOTE — TELEPHONE ENCOUNTER
Nurse reached out to discuss recent referral entered for [facility].    This nurse briefly reviewed the triage/referral process with him and reminded of prior triage. Referrals placed in the system in 2022 for [facility]- Reviewed by the provider. Please see notes on the referral if necessary. Patient declined to participate in the program at this time d/t availability. Patient asked to call back later or tomorrow to discuss further. This RN agreed and stated the chart would be noted as well. Nurse confirmed patient has [facility] contact information and encouraged to call program back when/if he wishes to discuss options. Patient agreed and very appreciative of call and discussion. Patient was pleasant and thanked the nurse for understanding. Nurse wished him well and referral closed per protocol. Will await CB from the patient.

## 2023-11-20 ENCOUNTER — HOSPITAL ENCOUNTER (EMERGENCY)
Facility: HOSPITAL | Age: 38
Discharge: HOME | End: 2023-11-20
Attending: EMERGENCY MEDICINE
Payer: MEDICARE

## 2023-11-20 VITALS
DIASTOLIC BLOOD PRESSURE: 98 MMHG | TEMPERATURE: 97.7 F | HEART RATE: 100 BPM | RESPIRATION RATE: 18 BRPM | OXYGEN SATURATION: 100 % | SYSTOLIC BLOOD PRESSURE: 163 MMHG

## 2023-11-20 DIAGNOSIS — M62.838 MUSCLE SPASM: ICD-10-CM

## 2023-11-20 DIAGNOSIS — G89.29 CHRONIC NECK PAIN: Primary | ICD-10-CM

## 2023-11-20 DIAGNOSIS — M54.2 CHRONIC NECK PAIN: Primary | ICD-10-CM

## 2023-11-20 PROCEDURE — 99284 EMERGENCY DEPT VISIT MOD MDM: CPT | Performed by: EMERGENCY MEDICINE

## 2023-11-20 PROCEDURE — 96372 THER/PROPH/DIAG INJ SC/IM: CPT

## 2023-11-20 PROCEDURE — 99283 EMERGENCY DEPT VISIT LOW MDM: CPT

## 2023-11-20 RX ORDER — KETOROLAC TROMETHAMINE 30 MG/ML
15 INJECTION, SOLUTION INTRAMUSCULAR; INTRAVENOUS ONCE
Status: COMPLETED | OUTPATIENT
Start: 2023-11-20 | End: 2023-11-20

## 2023-11-20 RX ORDER — DIAZEPAM 5 MG/1
5 TABLET ORAL ONCE
Status: COMPLETED | OUTPATIENT
Start: 2023-11-20 | End: 2023-11-20

## 2023-11-20 RX ADMIN — KETOROLAC TROMETHAMINE 15 MG: 30 INJECTION, SOLUTION INTRAMUSCULAR; INTRAVENOUS at 01:16

## 2023-11-20 RX ADMIN — DIAZEPAM 5 MG: 5 TABLET ORAL at 01:16

## 2023-11-20 NOTE — ED ATTENDING ATTESTATION
11/20/2023  IBryanna MD, saw and evaluated the patient. I have discussed the patient with the resident/non-physician practitioner and agree with the resident's/non-physician practitioner's findings, Plan of Care, and MDM as documented in the resident's/non-physician practitioner's note, except where noted. All available labs and Radiology studies were reviewed. I was present for key portions of any procedure(s) performed by the resident/non-physician practitioner and I was immediately available to provide assistance. At this point I agree with the current assessment done in the Emergency Department. I have conducted an independent evaluation of this patient a history and physical is as follows:    ED Course     Emergency Department Note- Delmis Restrepo 34 y.o. male MRN: [id number]    Unit/Bed#: ED 19 Encounter: [id number]    Delmis Restrepo is a 34 y.o. male who presents with   Chief Complaint   Patient presents with    Neck Pain     Pt presents with neck pain, states he's had the same in the past. Worse than usual tonight, states the pain is everywhere         History of Present Illness   HPI:  Delmis Restrepo is a 34 y.o. male who presents for evaluation of:  Exacerbation of chronic recurrent neck pain. Pain is in the posterior and lateral neck. Patient denies any trauma to his neck. Patient notes that movement provokes his neck discomfort while rest makes it somewhat feel better. He denies associated fevers and chills. His neck pain is worse than usual tonight. Review of Systems   Constitutional:  Negative for fatigue and fever. HENT:  Negative for congestion and sore throat. Respiratory:  Negative for cough and shortness of breath. Cardiovascular:  Negative for chest pain and palpitations. Gastrointestinal:  Negative for abdominal pain and nausea. Genitourinary:  Negative for flank pain and frequency. Musculoskeletal:  Positive for neck pain. Negative for back pain. Neurological:  Negative for light-headedness and headaches. Psychiatric/Behavioral:  Negative for dysphoric mood and hallucinations. All other systems reviewed and are negative. Historical Information   Past Medical History:   Diagnosis Date    Asthma     Chronic pain     Hypertension     Thyroglossal duct cyst      Past Surgical History:   Procedure Laterality Date    THYROGLOSSAL DUCT EXCISION      THYROID SURGERY      THYROID SURGERY       Social History   Social History     Substance and Sexual Activity   Alcohol Use Not Currently     Social History     Substance and Sexual Activity   Drug Use Not Currently    Types: Marijuana, Methamphetamines     Social History     Tobacco Use   Smoking Status Every Day    Packs/day: 0.50    Types: Cigarettes   Smokeless Tobacco Never   Tobacco Comments    5 cigerettes a day      Family History:   Family History   Problem Relation Age of Onset    Hypertension Mother     No Known Problems Father        Meds/Allergies   PTA meds:   Prior to Admission Medications   Prescriptions Last Dose Informant Patient Reported? Taking?    DULoxetine (CYMBALTA) 20 mg capsule   No No   Sig: Take 1 capsule (20 mg total) by mouth daily   Diclofenac Sodium (VOLTAREN) 1 %   No No   Sig: Apply 2 g topically 4 (four) times a day for 7 days   QUEtiapine (SEROquel) 100 mg tablet   No No   Sig: Take 1 tablet (100 mg total) by mouth daily at bedtime   albuterol (PROVENTIL HFA,VENTOLIN HFA) 90 mcg/act inhaler   No No   Sig: Inhale 2 puffs every 4 (four) hours as needed for wheezing   gabapentin (Neurontin) 100 mg capsule   No No   Sig: Take 1 capsule (100 mg total) by mouth 3 (three) times a day   lidocaine (Lidoderm) 5 %   No No   Sig: Apply 1 patch topically daily Remove & Discard patch within 12 hours or as directed by MD   methocarbamol (ROBAXIN) 500 mg tablet   No No   Sig: Take 1 tablet (500 mg total) by mouth 2 (two) times a day   methocarbamol (ROBAXIN) 500 mg tablet   No No   Sig: Take 1 tablet (500 mg total) by mouth 2 (two) times a day   naproxen (Naprosyn) 500 mg tablet   No No   Sig: Take 1 tablet (500 mg total) by mouth 2 (two) times a day with meals   polyethylene glycol (MIRALAX) 17 g packet   No No   Sig: Take 17 g by mouth daily as needed (Constipation)      Facility-Administered Medications: None     No Known Allergies    Objective   First Vitals:   Blood Pressure: 163/98 (11/20/23 0103)  Pulse: 100 (11/20/23 0102)  Temperature: 97.7 °F (36.5 °C) (11/20/23 0102)  Temp Source: Temporal (11/20/23 0102)  Respirations: 18 (11/20/23 0102)  SpO2: 99 % (11/20/23 0102)    Current Vitals:   Blood Pressure: 163/98 (11/20/23 0103)  Pulse: 100 (11/20/23 0102)  Temperature: 97.7 °F (36.5 °C) (11/20/23 0102)  Temp Source: Temporal (11/20/23 0102)  Respirations: 18 (11/20/23 0102)  SpO2: 100 % (11/20/23 0103)    No intake or output data in the 24 hours ending 11/20/23 0209    Invasive Devices       None                   Physical Exam  Vitals and nursing note reviewed. Constitutional:       General: He is not in acute distress. Appearance: Normal appearance. He is well-developed. HENT:      Head: Normocephalic and atraumatic. Right Ear: External ear normal.      Left Ear: External ear normal.      Nose: Nose normal.      Mouth/Throat:      Pharynx: No oropharyngeal exudate. Eyes:      Conjunctiva/sclera: Conjunctivae normal.      Pupils: Pupils are equal, round, and reactive to light. Cardiovascular:      Rate and Rhythm: Normal rate and regular rhythm. Pulmonary:      Effort: Pulmonary effort is normal. No respiratory distress. Abdominal:      General: Abdomen is flat. There is no distension. Palpations: Abdomen is soft. Musculoskeletal:         General: No deformity. Normal range of motion. Cervical back: Normal range of motion and neck supple. Skin:     General: Skin is warm and dry. Capillary Refill: Capillary refill takes less than 2 seconds. Neurological:      General: No focal deficit present. Mental Status: He is alert and oriented to person, place, and time. Mental status is at baseline. Coordination: Coordination normal.   Psychiatric:         Mood and Affect: Mood normal.         Behavior: Behavior normal.         Thought Content: Thought content normal.         Judgment: Judgment normal.           Medical Decision Makin. Acute flare of musculoskeletal neck pain: Symptomatic treatment. No results found for this or any previous visit (from the past 36 hour(s)). No orders to display         Portions of the record may have been created with voice recognition software. Occasional wrong word or "sound a like" substitutions may have occurred due to the inherent limitations of voice recognition software. Read the chart carefully and recognize, using context, where substitutions have occurred.         Critical Care Time  Procedures

## 2023-11-20 NOTE — Clinical Note
Lupe Doherty was seen and treated in our emergency department on 11/20/2023. Diagnosis:     Db Thakur  may return to work on return date. He may return on this date: 11/22/2023         If you have any questions or concerns, please don't hesitate to call.       Tamara Dill, DO    ______________________________           _______________          _______________  Hospital Representative                              Date                                Time

## 2023-11-20 NOTE — ED PROVIDER NOTES
History  Chief Complaint   Patient presents with    Neck Pain     Pt presents with neck pain, states he's had the same in the past. Worse than usual tonight, states the pain is everywhere     Patient is a 34year old male presenting to the emergency department for evaluation of chronic neck pain. Patient notes that it feels exactly like the last time he was in the hospital.  Patient has scheduled appointment with pain management which she is scheduled to go the day before Rola. Patient states that he feels like his back spasmed and he wants to come to the hospital for further evaluation. Patient notes that the most severe pain is in bilateral shoulder blades. patient denies any fevers or chills chest pain or shortness of breath he denies any nausea vomiting diarrhea constipation dysuria hematuria fecal or bowel incontinence. Prior to Admission Medications   Prescriptions Last Dose Informant Patient Reported? Taking?    DULoxetine (CYMBALTA) 20 mg capsule   No No   Sig: Take 1 capsule (20 mg total) by mouth daily   Diclofenac Sodium (VOLTAREN) 1 %   No No   Sig: Apply 2 g topically 4 (four) times a day for 7 days   QUEtiapine (SEROquel) 100 mg tablet   No No   Sig: Take 1 tablet (100 mg total) by mouth daily at bedtime   albuterol (PROVENTIL HFA,VENTOLIN HFA) 90 mcg/act inhaler   No No   Sig: Inhale 2 puffs every 4 (four) hours as needed for wheezing   gabapentin (Neurontin) 100 mg capsule   No No   Sig: Take 1 capsule (100 mg total) by mouth 3 (three) times a day   lidocaine (Lidoderm) 5 %   No No   Sig: Apply 1 patch topically daily Remove & Discard patch within 12 hours or as directed by MD   methocarbamol (ROBAXIN) 500 mg tablet   No No   Sig: Take 1 tablet (500 mg total) by mouth 2 (two) times a day   methocarbamol (ROBAXIN) 500 mg tablet   No No   Sig: Take 1 tablet (500 mg total) by mouth 2 (two) times a day   naproxen (Naprosyn) 500 mg tablet   No No   Sig: Take 1 tablet (500 mg total) by mouth 2 (two) times a day with meals   polyethylene glycol (MIRALAX) 17 g packet   No No   Sig: Take 17 g by mouth daily as needed (Constipation)      Facility-Administered Medications: None       Past Medical History:   Diagnosis Date    Asthma     Chronic pain     Hypertension     Thyroglossal duct cyst        Past Surgical History:   Procedure Laterality Date    THYROGLOSSAL DUCT EXCISION      THYROID SURGERY      THYROID SURGERY         Family History   Problem Relation Age of Onset    Hypertension Mother     No Known Problems Father      I have reviewed and agree with the history as documented. E-Cigarette/Vaping    E-Cigarette Use Current Every Day User     Cartridges/Day 1      E-Cigarette/Vaping Substances    Nicotine Yes     THC No     CBD No     Flavoring Yes     Other No     Unknown No      Social History     Tobacco Use    Smoking status: Every Day     Packs/day: 0.50     Types: Cigarettes    Smokeless tobacco: Never    Tobacco comments:     5 cigerettes a day    Vaping Use    Vaping Use: Every day    Substances: Nicotine, Flavoring   Substance Use Topics    Alcohol use: Not Currently    Drug use: Not Currently     Types: Marijuana, Methamphetamines        Review of Systems   Constitutional:  Positive for activity change. Negative for chills and fever. HENT:  Negative for ear pain and sore throat. Eyes:  Negative for pain and visual disturbance. Respiratory:  Negative for cough and shortness of breath. Cardiovascular:  Negative for chest pain and palpitations. Gastrointestinal:  Negative for abdominal pain, constipation, diarrhea, nausea and vomiting. Genitourinary:  Negative for dysuria and hematuria. Musculoskeletal:  Positive for neck pain. Negative for arthralgias, back pain, joint swelling, myalgias and neck stiffness. Skin:  Negative for color change and rash. Neurological:  Negative for dizziness, seizures and syncope.    Psychiatric/Behavioral:  Negative for agitation and behavioral problems. All other systems reviewed and are negative. Physical Exam  ED Triage Vitals   Temperature Pulse Respirations Blood Pressure SpO2   11/20/23 0102 11/20/23 0102 11/20/23 0102 11/20/23 0103 11/20/23 0102   97.7 °F (36.5 °C) 100 18 163/98 99 %      Temp Source Heart Rate Source Patient Position - Orthostatic VS BP Location FiO2 (%)   11/20/23 0102 11/20/23 0102 -- -- --   Temporal Monitor         Pain Score       11/20/23 0102       10 - Worst Possible Pain             Orthostatic Vital Signs  Vitals:    11/20/23 0102 11/20/23 0103   BP:  163/98   Pulse: 100        Physical Exam  Vitals and nursing note reviewed. Constitutional:       General: He is not in acute distress. Appearance: He is well-developed. HENT:      Head: Normocephalic and atraumatic. Comments: Multiple healed facial abrasions      Right Ear: External ear normal.      Left Ear: External ear normal.      Nose: Nose normal.      Mouth/Throat:      Mouth: Mucous membranes are moist.   Eyes:      Conjunctiva/sclera: Conjunctivae normal.   Cardiovascular:      Rate and Rhythm: Normal rate and regular rhythm. Heart sounds: Normal heart sounds. No murmur heard. Pulmonary:      Effort: Pulmonary effort is normal. No respiratory distress. Breath sounds: Normal breath sounds. Abdominal:      General: Abdomen is flat. Palpations: Abdomen is soft. Tenderness: There is no abdominal tenderness. Musculoskeletal:         General: No swelling. Normal range of motion. Cervical back: Normal range of motion and neck supple. No rigidity. Comments: No midline CT or L-spine tenderness palpation. Patient does have muscle hypertonicity located on the right thoracic region. Patient has pain over the bilateral shoulder blades and thoracic paraspinal musculature. Pain made worse with movement of the arms but patient able to do so. Lymphadenopathy:      Cervical: No cervical adenopathy.    Skin: General: Skin is warm and dry. Capillary Refill: Capillary refill takes less than 2 seconds. Neurological:      General: No focal deficit present. Mental Status: He is alert and oriented to person, place, and time. Psychiatric:         Mood and Affect: Mood normal.         ED Medications  Medications   ketorolac (TORADOL) injection 15 mg (15 mg Intramuscular Given 11/20/23 0116)   diazepam (VALIUM) tablet 5 mg (5 mg Oral Given 11/20/23 0116)       Diagnostic Studies  Results Reviewed       None                   No orders to display         Procedures  Procedures      ED Course  ED Course as of 11/20/23 0202   Mon Nov 20, 2023   0115 Blood Pressure: 163/98   0115 Temperature: 97.7 °F (36.5 °C)   0115 Temp Source: Temporal   0115 Pulse: 100   0115 Respirations: 18   0115 SpO2: 99 %   0201 Patient re-evaluated resting comfortably. Patient is advised to follow up with PCP in a few days for re-evaluation. Patient is advised to follow-up with comprehensive spine as already scheduled. Patient states that he still feeling pain but it has gotten this much better. Patient was written a work note. He is advised to come back to the hospital if he develops any new worsening or concerning symptoms. Patient is stable for discharge. Medical Decision Making  Patient is a 34year old male with hx of chronic pain. No new traumatic injury. No evidence of acute spinal cord dysfunction on exam or concern for spinal epidural abscess. Patient states it feels like the last time he came to the hospital.  No acute events that triggered this. On exam no red flag symptoms for back pain. Patient with muscle spasm noted we will treat with Toradol and Valium as a muscle relaxant. Will discharged to follow-up with comprehensive spine program and pain management as an outpatient. Risk  Prescription drug management.           Disposition  Final diagnoses:   Chronic neck pain   Muscle spasm     Time reflects when diagnosis was documented in both MDM as applicable and the Disposition within this note       Time User Action Codes Description Comment    11/20/2023  1:17 AM Leonardo Sheikh Add [M54.2,  G89.29] Chronic neck pain     11/20/2023  1:18 AM Leonardo Jasmine [M09.529] Muscle spasm           ED Disposition       ED Disposition   Discharge    Condition   Stable    Date/Time   Mon Nov 20, 2023  1:17 AM    Comment   Lamar Chin discharge to home/self care. Follow-up Information       Follow up With Specialties Details Why Contact Info Additional 10[address] Bernardino García MD [facility] Medicine Schedule an appointment as soon as possible for a visit  for follow up [address]   [address] [facility] [address]       [address] [place] [address] Emergency Department Emergency Medicine Go to  As needed, If symptoms worsen [address] [id number]  [facility] Emergency Department, [address], [place], [place], [address] [facility] Physical Therapy Schedule an appointment as soon as possible for a visit  for follow up             Patient's Medications   Discharge Prescriptions    No medications on file         PDMP Review         Value Time User    PDMP Reviewed  Yes 3/31/2022  3:57 PM Brea Huggins PA-C             ED Provider  Attending physically available and evaluated Jason Sexton. I managed the patient along with the ED Attending.     Electronically Signed by           Petra Piedra DO  11/20/23 0205

## 2023-11-21 ENCOUNTER — VBI (OUTPATIENT)
Dept: FAMILY MEDICINE CLINIC | Facility: CLINIC | Age: 38
End: 2023-11-21

## 2023-11-21 ENCOUNTER — TELEPHONE (OUTPATIENT)
Dept: PHYSICAL THERAPY | Facility: OTHER | Age: 38
End: 2023-11-21

## 2023-11-21 NOTE — TELEPHONE ENCOUNTER
11/21/23 10:34 AM    Patient contacted post ED visit, first outreach attempt made. Message was left for patient to return a call to the [facility] Department at [facility]: Phone [phone]. Thank you.   Sharee Grewal  [facility]

## 2023-11-22 NOTE — TELEPHONE ENCOUNTER
11/22/23 9:37 AM    Patient contacted post ED visit, VBI department spoke with patient/caregiver and outreach was successful. Thank you.   Sharee Grweal  [facility]

## 2023-11-24 ENCOUNTER — HOSPITAL ENCOUNTER (EMERGENCY)
Facility: HOSPITAL | Age: 38
Discharge: HOME | End: 2023-11-24
Attending: EMERGENCY MEDICINE
Payer: MEDICARE

## 2023-11-24 VITALS
RESPIRATION RATE: 22 BRPM | OXYGEN SATURATION: 98 % | SYSTOLIC BLOOD PRESSURE: 151 MMHG | HEART RATE: 127 BPM | TEMPERATURE: 99.2 F | DIASTOLIC BLOOD PRESSURE: 119 MMHG

## 2023-11-24 DIAGNOSIS — M54.2 ACUTE NECK PAIN: Primary | ICD-10-CM

## 2023-11-24 LAB — ETHANOL EXG-MCNC: 0 MG/DL

## 2023-11-24 PROCEDURE — 93005 ELECTROCARDIOGRAM TRACING: CPT

## 2023-11-24 PROCEDURE — 99283 EMERGENCY DEPT VISIT LOW MDM: CPT

## 2023-11-24 PROCEDURE — 99284 EMERGENCY DEPT VISIT MOD MDM: CPT | Performed by: EMERGENCY MEDICINE

## 2023-11-24 PROCEDURE — 82075 ASSAY OF BREATH ETHANOL: CPT

## 2023-11-24 PROCEDURE — 96372 THER/PROPH/DIAG INJ SC/IM: CPT

## 2023-11-24 RX ORDER — OXYCODONE HYDROCHLORIDE 5 MG/1
5 TABLET ORAL ONCE
Status: COMPLETED | OUTPATIENT
Start: 2023-11-24 | End: 2023-11-24

## 2023-11-24 RX ORDER — DIAZEPAM 5 MG/1
5 TABLET ORAL ONCE
Status: COMPLETED | OUTPATIENT
Start: 2023-11-24 | End: 2023-11-24

## 2023-11-24 RX ORDER — KETOROLAC TROMETHAMINE 30 MG/ML
15 INJECTION, SOLUTION INTRAMUSCULAR; INTRAVENOUS ONCE
Status: COMPLETED | OUTPATIENT
Start: 2023-11-24 | End: 2023-11-24

## 2023-11-24 RX ORDER — KETOROLAC TROMETHAMINE 30 MG/ML
15 INJECTION, SOLUTION INTRAMUSCULAR; INTRAVENOUS ONCE
Status: DISCONTINUED | OUTPATIENT
Start: 2023-11-24 | End: 2023-11-24

## 2023-11-24 RX ADMIN — DIAZEPAM 5 MG: 5 TABLET ORAL at 16:44

## 2023-11-24 RX ADMIN — OXYCODONE HYDROCHLORIDE 5 MG: 5 TABLET ORAL at 17:55

## 2023-11-24 RX ADMIN — Medication 2 G: at 16:44

## 2023-11-24 RX ADMIN — KETOROLAC TROMETHAMINE 15 MG: 30 INJECTION, SOLUTION INTRAMUSCULAR at 16:50

## 2023-11-24 NOTE — ED PROVIDER NOTES
History  Chief Complaint   Patient presents with    Medical Problem     Patient reports ~3 years ago he herniated 3 discs in his back and has had ongoing full body pain ever since. Reports it is getting progressively worse and his arm and his neck are especially painful the past month. HPI    35 y/o M presenting with neck pain, gradually worsening. Wants "blood test to show he's not on any drugs so people will believe me." States previously on percocet in [place] no prescribing MD here. Was referred to comprehensive spine has not seen. Pt also c/o homelessness, currently lives in shelter. Prior to Admission Medications   Prescriptions Last Dose Informant Patient Reported? Taking?    DULoxetine (CYMBALTA) 20 mg capsule   No No   Sig: Take 1 capsule (20 mg total) by mouth daily   Diclofenac Sodium (VOLTAREN) 1 %   No No   Sig: Apply 2 g topically 4 (four) times a day for 7 days   QUEtiapine (SEROquel) 100 mg tablet   No No   Sig: Take 1 tablet (100 mg total) by mouth daily at bedtime   albuterol (PROVENTIL HFA,VENTOLIN HFA) 90 mcg/act inhaler   No No   Sig: Inhale 2 puffs every 4 (four) hours as needed for wheezing   gabapentin (Neurontin) 100 mg capsule   No No   Sig: Take 1 capsule (100 mg total) by mouth 3 (three) times a day   lidocaine (Lidoderm) 5 %   No No   Sig: Apply 1 patch topically daily Remove & Discard patch within 12 hours or as directed by MD   methocarbamol (ROBAXIN) 500 mg tablet   No No   Sig: Take 1 tablet (500 mg total) by mouth 2 (two) times a day   methocarbamol (ROBAXIN) 500 mg tablet   No No   Sig: Take 1 tablet (500 mg total) by mouth 2 (two) times a day   naproxen (Naprosyn) 500 mg tablet   No No   Sig: Take 1 tablet (500 mg total) by mouth 2 (two) times a day with meals   polyethylene glycol (MIRALAX) 17 g packet   No No   Sig: Take 17 g by mouth daily as needed (Constipation)      Facility-Administered Medications: None       Past Medical History:   Diagnosis Date    Asthma Chronic pain     Hypertension     Thyroglossal duct cyst        Past Surgical History:   Procedure Laterality Date    THYROGLOSSAL DUCT EXCISION      THYROID SURGERY      THYROID SURGERY         Family History   Problem Relation Age of Onset    Hypertension Mother     No Known Problems Father      I have reviewed and agree with the history as documented. E-Cigarette/Vaping    E-Cigarette Use Current Every Day User     Cartridges/Day 1      E-Cigarette/Vaping Substances    Nicotine Yes     THC No     CBD No     Flavoring Yes     Other No     Unknown No      Social History     Tobacco Use    Smoking status: Every Day     Packs/day: 0.50     Types: Cigarettes    Smokeless tobacco: Never    Tobacco comments:     5 cigerettes a day    Vaping Use    Vaping Use: Every day    Substances: Nicotine, Flavoring   Substance Use Topics    Alcohol use: Not Currently    Drug use: Not Currently     Types: Marijuana, Methamphetamines        Review of Systems   Constitutional:  Negative for chills and fever. HENT:  Negative for ear pain and sore throat. Eyes:  Negative for pain and visual disturbance. Respiratory:  Negative for cough and shortness of breath. Cardiovascular:  Negative for chest pain and palpitations. Gastrointestinal:  Negative for abdominal pain and vomiting. Genitourinary:  Negative for dysuria and hematuria. Musculoskeletal:  Positive for neck pain. Negative for arthralgias and back pain. Skin:  Negative for color change and rash. Neurological:  Negative for seizures and syncope. All other systems reviewed and are negative.       Physical Exam  ED Triage Vitals [11/24/23 1502]   Temperature Pulse Respirations Blood Pressure SpO2   99.2 °F (37.3 °C) (!) 127 22 (!) 151/119 98 %      Temp Source Heart Rate Source Patient Position - Orthostatic VS BP Location FiO2 (%)   Temporal -- Sitting Right arm --      Pain Score       9             Orthostatic Vital Signs  Vitals:    11/24/23 1502   BP: (!) 151/119   Pulse: (!) 127   Patient Position - Orthostatic VS: Sitting       Physical Exam  Vitals and nursing note reviewed. Constitutional:       General: He is not in acute distress. Appearance: He is well-developed. He is not toxic-appearing. HENT:      Head: Normocephalic and atraumatic. Right Ear: External ear normal.      Left Ear: External ear normal.      Nose: Nose normal.      Mouth/Throat:      Pharynx: Oropharynx is clear. No oropharyngeal exudate or posterior oropharyngeal erythema. Eyes:      Extraocular Movements: Extraocular movements intact. Conjunctiva/sclera: Conjunctivae normal.      Pupils: Pupils are equal, round, and reactive to light. Neck:      Comments: Left paraspinal neck TTP, no overlying skin changes  Cardiovascular:      Rate and Rhythm: Normal rate and regular rhythm. Pulses: Normal pulses. Heart sounds: Normal heart sounds. No murmur heard. No friction rub. No gallop. Pulmonary:      Effort: Pulmonary effort is normal. No respiratory distress. Breath sounds: Normal breath sounds. No wheezing, rhonchi or rales. Abdominal:      General: Abdomen is flat. Palpations: Abdomen is soft. Tenderness: There is no abdominal tenderness. There is no guarding or rebound. Musculoskeletal:         General: Normal range of motion. Cervical back: Normal range of motion. Tenderness present. No rigidity. Right lower leg: No edema. Left lower leg: No edema. Skin:     General: Skin is warm and dry. Capillary Refill: Capillary refill takes less than 2 seconds. Neurological:      General: No focal deficit present. Mental Status: He is alert.    Psychiatric:      Comments: Agitated, combative         ED Medications  Medications   diazepam (VALIUM) tablet 5 mg (5 mg Oral Given 11/24/23 1644)   ketorolac (TORADOL) injection 15 mg (15 mg Intramuscular Given 11/24/23 1650)   oxyCODONE (ROXICODONE) IR tablet 5 mg (5 mg Oral Given 11/24/23 1755)       Diagnostic Studies  Results Reviewed       Procedure Component Value Units Date/Time    POCT alcohol breath test [[id number]]  (Normal) Resulted: 11/24/23 1639    Lab Status: Final result Updated: 11/24/23 1639     EXTBreath Alcohol 0                   No orders to display         Procedures  Procedures      ED Course  ED Course as of 11/24/23 2306   Fri Nov 24, 2023   1647 Pt combative, requesting stronger pain medication, requesting drug screening. Attempted to reassure, pt has no clinical indication for admission and has been continually instructed to follow up with outpatient care                                       Medical Decision Making  33 y/o M presenting for neck pain. Pt very agitated and combative on arrival, stating he wants stronger pain medication but is not drug seeking. Keeps requesting for blood drug testing to "prove he is clean." Pt c/o acute on chronic neck pain. Has not followed up with comprehensive spine. Pt frequently visits ED. Concern for drug seeking behavior. Treated pain in ED and pt agrees for discharge with outpatient f/u. No acute imaging or lab eval necessary based on hx and exam.     Amount and/or Complexity of Data Reviewed  Labs: ordered. Risk  Prescription drug management. Disposition  Final diagnoses:   Acute neck pain     Time reflects when diagnosis was documented in both MDM as applicable and the Disposition within this note       Time User Action Codes Description Comment    11/24/2023  6:02 PM Sy Ye Add [M54.2] Acute neck pain           ED Disposition       ED Disposition   Discharge    Condition   Stable    Date/Time   Fri Nov 24, 2023  5:31 PM    Comment   Deborah Chin discharge to home/self care.                    Follow-up Information       Follow up With Specialties Details Why Contact Info Additional [facility] Physical Therapy   [phone]            Discharge Medication List as of 11/24/2023  6:02 PM        CONTINUE these medications which have NOT CHANGED    Details   albuterol (PROVENTIL HFA,VENTOLIN HFA) 90 mcg/act inhaler Inhale 2 puffs every 4 (four) hours as needed for wheezing, Starting Fri 9/27/2019, Print      Diclofenac Sodium (VOLTAREN) 1 % Apply 2 g topically 4 (four) times a day for 7 days, Starting Sat 8/6/2022, Until Sat 8/13/2022, Normal      DULoxetine (CYMBALTA) 20 mg capsule Take 1 capsule (20 mg total) by mouth daily, Starting Mon 4/4/2022, Until Wed 5/4/2022, Print      gabapentin (Neurontin) 100 mg capsule Take 1 capsule (100 mg total) by mouth 3 (three) times a day, Starting Sat 8/6/2022, Normal      lidocaine (Lidoderm) 5 % Apply 1 patch topically daily Remove & Discard patch within 12 hours or as directed by MD, Starting Sat 8/6/2022, Normal      !! methocarbamol (ROBAXIN) 500 mg tablet Take 1 tablet (500 mg total) by mouth 2 (two) times a day, Starting Sat 8/6/2022, Normal      !! methocarbamol (ROBAXIN) 500 mg tablet Take 1 tablet (500 mg total) by mouth 2 (two) times a day, Starting Sun 11/12/2023, Normal      naproxen (Naprosyn) 500 mg tablet Take 1 tablet (500 mg total) by mouth 2 (two) times a day with meals, Starting Sun 11/12/2023, Normal      polyethylene glycol (MIRALAX) 17 g packet Take 17 g by mouth daily as needed (Constipation), Starting Wed 2/2/2022, Normal      QUEtiapine (SEROquel) 100 mg tablet Take 1 tablet (100 mg total) by mouth daily at bedtime, Starting Mon 4/4/2022, Until Wed 5/4/2022, Print       !! - Potential duplicate medications found. Please discuss with provider. No discharge procedures on file. PDMP Review         Value Time User    PDMP Reviewed  Yes 3/31/2022  3:57 PM Brook Shearer PA-C             ED Provider  Attending physically available and evaluated Roman Garland. I managed the patient along with the ED Attending.     Electronically Signed by           Efren Kate MD  11/24/23 6428

## 2023-11-24 NOTE — ED ATTENDING ATTESTATION
11/24/2023  I, Libby Polk MD, saw and evaluated the patient. I have discussed the patient with the resident/non-physician practitioner and agree with the resident's/non-physician practitioner's findings, Plan of Care, and MDM as documented in the resident's/non-physician practitioner's note, except where noted. All available labs and Radiology studies were reviewed. I was present for key portions of any procedure(s) performed by the resident/non-physician practitioner and I was immediately available to provide assistance. At this point I agree with the current assessment done in the Emergency Department.   I have conducted an independent evaluation of this patient a history and physical is as follows:  Patient has chronic neck pain he is here quite frequently for chronic neck pain  No complains of fever no numbness no weakness in the arms or legs able to ambulate he has no headache  Exam vital signs are stable his heart rate on my exam is 88 HEENT exam unremarkable neck no JVD    Cervical muscular tenderness no midline tenderness no swelling of the neck noted normal voice no stridor no drooling lungs clear heart regular abdomen soft nontender neurologic exam nonfocal  Impression chronic neck pain  ED Course         Critical Care Time  Procedures

## 2023-11-26 LAB
ATRIAL RATE: 114 BPM
P AXIS: 70 DEGREES
PR INTERVAL: 114 MS
QRS AXIS: 19 DEGREES
QRSD INTERVAL: 90 MS
QT INTERVAL: 348 MS
QTC INTERVAL: 479 MS
T WAVE AXIS: 51 DEGREES
VENTRICULAR RATE: 114 BPM

## 2023-11-27 ENCOUNTER — PATIENT OUTREACH (OUTPATIENT)
Dept: FAMILY MEDICINE CLINIC | Facility: CLINIC | Age: 38
End: 2023-11-27

## 2023-11-27 DIAGNOSIS — Z59.00 HOMELESS: Primary | ICD-10-CM

## 2023-11-27 SDOH — ECONOMIC STABILITY - HOUSING INSECURITY: HOMELESSNESS UNSPECIFIED: Z59.00

## 2023-11-27 NOTE — PROGRESS NOTES
ADT Notification received. Patient presented to ED related to body pain. Patient has not scheduled comprehensive spine appt yet. Patient also homeless and living in shelter at this time. Patient has new patient appt scheduled with PCP on 12/26. [facility] attempted to reach patient on 11/21 to schedule appt; message was left. Call placed to patient to check status. Patient states he does not feel his pain was managed in ED. Encouraged patient to call comprehensive spine & pain office to schedule appt. Patient states he has phone number and will call once off phone with OP SWCM. Patient states he is needing housing options. He receives $940/month through [employer]. Feels he is eligible for SSDI benefits as well; applied years ago but is not receiving these benefits. Encouraged patient to apply if he feels he is eligible; informed of different ways to apply. Will have [address] outreach to assist with housing applications for his age/disability status. Also informed of housing program through [employer] and calling [employer] to be placed on housing waitlists. Patient requested information be texted to him in order to call [employer] and [address] [address]; patient does not have email. OP SW is unable to text. Will request [address] text patient phone number for [employer] [phone] x [phone] and [address], if able. Will follow.

## 2023-11-29 ENCOUNTER — PATIENT OUTREACH (OUTPATIENT)
Dept: FAMILY MEDICINE CLINIC | Facility: CLINIC | Age: 38
End: 2023-11-29

## 2023-11-29 NOTE — PROGRESS NOTES
received referral from [facility] to outreach patient for assistance in housing. [facility] called patient left a message introducing myself and role with contact information for a return call.      Next outreach 12/1

## 2023-11-29 NOTE — TELEPHONE ENCOUNTER
Call placed to the patient per Comprehensive Spine Program referral.    Voice message left for patient to call back. Phone number and hours of business provided. This the 2nd attempt to reach the patient. Will defer per protocol.       Declined prior triage  Per chart notes patient states he will call us back

## 2023-11-30 ENCOUNTER — PATIENT OUTREACH (OUTPATIENT)
Dept: FAMILY MEDICINE CLINIC | Facility: CLINIC | Age: 38
End: 2023-11-30

## 2023-11-30 NOTE — PROGRESS NOTES
Second Attempt by [employer] to reach patient. [facility] called and left a message introducing myself, role, and phone number for a return call back.      Next Outreach 12/6

## 2023-12-04 ENCOUNTER — PATIENT OUTREACH (OUTPATIENT)
Dept: FAMILY MEDICINE CLINIC | Facility: CLINIC | Age: 38
End: 2023-12-04

## 2023-12-04 NOTE — PROGRESS NOTES
Third attempt to outreach patient. Left message giving information for [employer] contact Vi Brock at [phone] ext [phone] report homelessness call [facility] 211    Patient returned [facility]'s phone call and [facility] gave information verbally over the phone. Patient declined any other needs.      No further outreach needed from [facility]

## 2023-12-04 NOTE — LETTER
12/04/23    Dear Olga Troy,    I am a Community Health Worker with [employer] [address]  [address] [address]. I have made several attempts to call you by phone. Below are some resources that can help you with housing. [facility], [place], contact Daisha Jose J at [phone] ext [phone]. [address] contact [phone] ext [phone]    Self report homelessness with [facility] at 211.      If you need my assistance I can be reached at [phone]    Sincerely,     Daryle Begun

## 2023-12-14 ENCOUNTER — PATIENT OUTREACH (OUTPATIENT)
Dept: FAMILY MEDICINE CLINIC | Facility: CLINIC | Age: 38
End: 2023-12-14

## 2023-12-14 NOTE — PROGRESS NOTES
Robert Harris provided patient with information for [employer] at [phone] ext [phone], [employer] of [address] [phone] ext [phone], and Self report homelessness call [facility] 211. Call placed to patient to ask if he outreached these housing resources. Patient states he called these resources but did not hear back from their programs. Encouraged him to complete further outreach to the housing resources if no response was received. Patient states he is working on another plan for housing. Encouraged him to inform OP SWCM if additional assistance is needed. Reviewed upcoming appt with Dr. Yessi Griffin on 12/26 at [facility].  Patient requested information be texted to him. OP SW does not have texting capabilities. Return call made with intention of leaving voicemail for patient with appt information. Provided appt date, time, location, and office phone number. Will remain available to assist as needed.

## 2023-12-15 ENCOUNTER — HOSPITAL ENCOUNTER (EMERGENCY)
Facility: HOSPITAL | Age: 38
Discharge: HOME | End: 2023-12-15
Attending: EMERGENCY MEDICINE
Payer: MEDICARE

## 2023-12-15 ENCOUNTER — APPOINTMENT (EMERGENCY)
Dept: RADIOLOGY | Facility: HOSPITAL | Age: 38
End: 2023-12-15
Payer: MEDICARE

## 2023-12-15 VITALS
SYSTOLIC BLOOD PRESSURE: 152 MMHG | DIASTOLIC BLOOD PRESSURE: 90 MMHG | RESPIRATION RATE: 24 BRPM | HEART RATE: 95 BPM | OXYGEN SATURATION: 99 % | TEMPERATURE: 98.3 F

## 2023-12-15 DIAGNOSIS — M54.10 RADICULOPATHY: ICD-10-CM

## 2023-12-15 DIAGNOSIS — M54.2 NECK PAIN: Primary | ICD-10-CM

## 2023-12-15 LAB
ALBUMIN SERPL BCP-MCNC: 4.2 G/DL (ref 3.5–5)
ALP SERPL-CCNC: 53 U/L (ref 34–104)
ALT SERPL W P-5'-P-CCNC: 26 U/L (ref 7–52)
ANION GAP SERPL CALCULATED.3IONS-SCNC: 4 MMOL/L
AST SERPL W P-5'-P-CCNC: 18 U/L (ref 13–39)
BASOPHILS # BLD AUTO: 0.09 THOUSANDS/ÂΜL (ref 0–0.1)
BASOPHILS NFR BLD AUTO: 2 % (ref 0–1)
BILIRUB SERPL-MCNC: 0.34 MG/DL (ref 0.2–1)
BUN SERPL-MCNC: 24 MG/DL (ref 5–25)
CALCIUM SERPL-MCNC: 8.8 MG/DL (ref 8.4–10.2)
CARDIAC TROPONIN I PNL SERPL HS: <2 NG/L
CHLORIDE SERPL-SCNC: 109 MMOL/L (ref 96–108)
CO2 SERPL-SCNC: 26 MMOL/L (ref 21–32)
CREAT SERPL-MCNC: 0.92 MG/DL (ref 0.6–1.3)
EOSINOPHIL # BLD AUTO: 0.34 THOUSAND/ÂΜL (ref 0–0.61)
EOSINOPHIL NFR BLD AUTO: 6 % (ref 0–6)
ERYTHROCYTE [DISTWIDTH] IN BLOOD BY AUTOMATED COUNT: 14.1 % (ref 11.6–15.1)
GFR SERPL CREATININE-BSD FRML MDRD: 103 ML/MIN/1.73SQ M
GLUCOSE SERPL-MCNC: 102 MG/DL (ref 65–140)
HCT VFR BLD AUTO: 39.1 % (ref 36.5–49.3)
HGB BLD-MCNC: 13.6 G/DL (ref 12–17)
IMM GRANULOCYTES # BLD AUTO: 0.02 THOUSAND/UL (ref 0–0.2)
IMM GRANULOCYTES NFR BLD AUTO: 0 % (ref 0–2)
LYMPHOCYTES # BLD AUTO: 1.53 THOUSANDS/ÂΜL (ref 0.6–4.47)
LYMPHOCYTES NFR BLD AUTO: 26 % (ref 14–44)
MCH RBC QN AUTO: 32.1 PG (ref 26.8–34.3)
MCHC RBC AUTO-ENTMCNC: 34.8 G/DL (ref 31.4–37.4)
MCV RBC AUTO: 92 FL (ref 82–98)
MONOCYTES # BLD AUTO: 0.96 THOUSAND/ÂΜL (ref 0.17–1.22)
MONOCYTES NFR BLD AUTO: 16 % (ref 4–12)
NEUTROPHILS # BLD AUTO: 3.02 THOUSANDS/ÂΜL (ref 1.85–7.62)
NEUTS SEG NFR BLD AUTO: 50 % (ref 43–75)
NRBC BLD AUTO-RTO: 0 /100 WBCS
PLATELET # BLD AUTO: 306 THOUSANDS/UL (ref 149–390)
PMV BLD AUTO: 9.4 FL (ref 8.9–12.7)
POTASSIUM SERPL-SCNC: 4.2 MMOL/L (ref 3.5–5.3)
PROT SERPL-MCNC: 6.4 G/DL (ref 6.4–8.4)
RBC # BLD AUTO: 4.24 MILLION/UL (ref 3.88–5.62)
SODIUM SERPL-SCNC: 139 MMOL/L (ref 135–147)
WBC # BLD AUTO: 5.96 THOUSAND/UL (ref 4.31–10.16)

## 2023-12-15 PROCEDURE — 99284 EMERGENCY DEPT VISIT MOD MDM: CPT

## 2023-12-15 PROCEDURE — 71045 X-RAY EXAM CHEST 1 VIEW: CPT

## 2023-12-15 PROCEDURE — 96365 THER/PROPH/DIAG IV INF INIT: CPT

## 2023-12-15 PROCEDURE — 84484 ASSAY OF TROPONIN QUANT: CPT

## 2023-12-15 PROCEDURE — 93005 ELECTROCARDIOGRAM TRACING: CPT

## 2023-12-15 PROCEDURE — 99284 EMERGENCY DEPT VISIT MOD MDM: CPT | Performed by: EMERGENCY MEDICINE

## 2023-12-15 PROCEDURE — 80053 COMPREHEN METABOLIC PANEL: CPT

## 2023-12-15 PROCEDURE — 36415 COLL VENOUS BLD VENIPUNCTURE: CPT

## 2023-12-15 PROCEDURE — 85025 COMPLETE CBC W/AUTO DIFF WBC: CPT

## 2023-12-15 RX ORDER — METHOCARBAMOL 500 MG/1
500 TABLET, FILM COATED ORAL ONCE
Status: DISCONTINUED | OUTPATIENT
Start: 2023-12-15 | End: 2023-12-15

## 2023-12-15 RX ORDER — MAGNESIUM SULFATE HEPTAHYDRATE 40 MG/ML
2 INJECTION, SOLUTION INTRAVENOUS ONCE
Status: COMPLETED | OUTPATIENT
Start: 2023-12-15 | End: 2023-12-15

## 2023-12-15 RX ORDER — KETOROLAC TROMETHAMINE 30 MG/ML
15 INJECTION, SOLUTION INTRAMUSCULAR; INTRAVENOUS ONCE
Status: DISCONTINUED | OUTPATIENT
Start: 2023-12-15 | End: 2023-12-15 | Stop reason: HOSPADM

## 2023-12-15 RX ORDER — OXYCODONE HYDROCHLORIDE 5 MG/1
5 TABLET ORAL ONCE
Status: COMPLETED | OUTPATIENT
Start: 2023-12-15 | End: 2023-12-15

## 2023-12-15 RX ORDER — GABAPENTIN 300 MG/1
600 CAPSULE ORAL ONCE
Status: COMPLETED | OUTPATIENT
Start: 2023-12-15 | End: 2023-12-15

## 2023-12-15 RX ADMIN — OXYCODONE HYDROCHLORIDE 5 MG: 5 TABLET ORAL at 16:53

## 2023-12-15 RX ADMIN — SODIUM CHLORIDE 500 ML: 0.9 INJECTION, SOLUTION INTRAVENOUS at 16:54

## 2023-12-15 RX ADMIN — MAGNESIUM SULFATE HEPTAHYDRATE 2 G: 40 INJECTION, SOLUTION INTRAVENOUS at 16:52

## 2023-12-15 RX ADMIN — GABAPENTIN 600 MG: 300 CAPSULE ORAL at 16:53

## 2023-12-15 NOTE — DISCHARGE INSTRUCTIONS
You have been evaluated in the emergency department for neck pain and radiculopathy. This was treated symptomatically. You also had concerns about chest pain you are having, your chest pain workup is showing no acute cause for your pain, is likely related to your radiculopathy and chronic pain syndrome. Please follow-up with your primary care provider and call the [facility] comprehensive spine program for further management of your chronic pain issues.

## 2023-12-16 ENCOUNTER — APPOINTMENT (EMERGENCY)
Dept: RADIOLOGY | Facility: HOSPITAL | Age: 38
End: 2023-12-16
Payer: MEDICARE

## 2023-12-16 ENCOUNTER — HOSPITAL ENCOUNTER (EMERGENCY)
Facility: HOSPITAL | Age: 38
Discharge: HOME | End: 2023-12-16
Attending: EMERGENCY MEDICINE | Admitting: EMERGENCY MEDICINE
Payer: MEDICARE

## 2023-12-16 VITALS
SYSTOLIC BLOOD PRESSURE: 168 MMHG | BODY MASS INDEX: 26.38 KG/M2 | OXYGEN SATURATION: 97 % | TEMPERATURE: 98.7 F | WEIGHT: 158.51 LBS | DIASTOLIC BLOOD PRESSURE: 100 MMHG | RESPIRATION RATE: 16 BRPM | HEART RATE: 94 BPM

## 2023-12-16 DIAGNOSIS — G89.29 CHRONIC PAIN: Primary | ICD-10-CM

## 2023-12-16 LAB
ATRIAL RATE: 86 BPM
P AXIS: 71 DEGREES
PR INTERVAL: 118 MS
QRS AXIS: 57 DEGREES
QRSD INTERVAL: 82 MS
QT INTERVAL: 356 MS
QTC INTERVAL: 426 MS
T WAVE AXIS: 51 DEGREES
VENTRICULAR RATE: 86 BPM

## 2023-12-16 PROCEDURE — 96361 HYDRATE IV INFUSION ADD-ON: CPT

## 2023-12-16 PROCEDURE — 99284 EMERGENCY DEPT VISIT MOD MDM: CPT

## 2023-12-16 PROCEDURE — 96374 THER/PROPH/DIAG INJ IV PUSH: CPT

## 2023-12-16 PROCEDURE — 73660 X-RAY EXAM OF TOE(S): CPT

## 2023-12-16 PROCEDURE — 99284 EMERGENCY DEPT VISIT MOD MDM: CPT | Performed by: EMERGENCY MEDICINE

## 2023-12-16 RX ORDER — DIAZEPAM 5 MG/1
5 TABLET ORAL ONCE
Status: COMPLETED | OUTPATIENT
Start: 2023-12-16 | End: 2023-12-16

## 2023-12-16 RX ORDER — KETAMINE HCL IN NACL, ISO-OSM 100MG/10ML
0.3 SYRINGE (ML) INJECTION ONCE
Status: DISCONTINUED | OUTPATIENT
Start: 2023-12-16 | End: 2023-12-16

## 2023-12-16 RX ORDER — KETAMINE HCL IN NACL, ISO-OSM 100MG/10ML
0.3 SYRINGE (ML) INJECTION ONCE
Status: COMPLETED | OUTPATIENT
Start: 2023-12-16 | End: 2023-12-16

## 2023-12-16 RX ADMIN — Medication 22 MG: at 17:02

## 2023-12-16 RX ADMIN — SODIUM CHLORIDE 500 ML: 0.9 INJECTION, SOLUTION INTRAVENOUS at 16:52

## 2023-12-16 RX ADMIN — DIAZEPAM 5 MG: 5 TABLET ORAL at 16:51

## 2023-12-16 NOTE — DISCHARGE INSTRUCTIONS
You need to see a pain specialist regarding further management of your pain as well as a neurosurgeon.    Return to the ER if any inability to walk or difficulties going to the bathroom

## 2023-12-16 NOTE — ED PROVIDER NOTES
"Chief Complaint   Patient presents with    Back Pain     Pt was here yesterday back/neck pain. Reports hx of DDD. Today reports ongoing sharp stabbing pain in lower back area that radiates down to b/l feet, left side of neck, and b/l hands. Pt states his pain is \"not as bad as yesterday\" but still painful.     Neck Pain     History of Present Illness and Review of Systems   This is a 38 y.o. male with PMH significant for asthma, chronic pain, DDD of his spine coming in today with complaint of back pain.  The patient reports he has chronic back pain that is been ongoing for the last several years.  Reports persistence despite being seen yesterday and came here for reevaluation.  Denies any new symptoms since being evaluated yesterday.  No fevers, incontinence, difficulties ambulating.  He reports he walks up to 9 miles per day.  Reports some toe pain on the right foot.  He reports he has a scheduled follow-up with a neurosurgeon after the holidays.  Also reports he is never seen a pain specialist.  No other symptoms currently.    - No language barrier.     No other complaints for this encounter.    Remainder of ROS Reviewed and Non-Pertinent    Past Medical, Past Surgical History:    has a past medical history of Asthma, Chronic pain, Hypertension, and Thyroglossal duct cyst.   has a past surgical history that includes Thyroid surgery; Thyroglossal duct excision; and Thyroid surgery.     Allergies:   No Known Allergies    Social and Family History:     Social History     Substance and Sexual Activity   Alcohol Use Not Currently     Social History     Tobacco Use   Smoking Status Every Day    Current packs/day: 0.50    Types: Cigarettes   Smokeless Tobacco Never   Tobacco Comments    5 cigerettes a day      Social History     Substance and Sexual Activity   Drug Use Not Currently    Types: Marijuana, Methamphetamines       Physical Examination     Vitals:    12/16/23 1714 12/16/23 1731 12/16/23 1744 12/16/23 1814 "   BP: 149/83 145/93 (!) 149/103 168/100   BP Location:   Right arm    Pulse: 86 92 100 94   Resp: 16 15 16    Temp:       TempSrc:       SpO2: 98% 97% 98% 97%   Weight:           Physical Exam  Vitals and nursing note reviewed.   Constitutional:       General: He is not in acute distress.     Appearance: He is well-developed.   HENT:      Head: Normocephalic and atraumatic.   Eyes:      Conjunctiva/sclera: Conjunctivae normal.   Cardiovascular:      Rate and Rhythm: Normal rate and regular rhythm.      Heart sounds: No murmur heard.  Pulmonary:      Effort: Pulmonary effort is normal. No respiratory distress.      Breath sounds: Normal breath sounds.   Abdominal:      Palpations: Abdomen is soft.      Tenderness: There is no abdominal tenderness.   Musculoskeletal:         General: No swelling.      Cervical back: Normal range of motion and neck supple. No rigidity. Muscular tenderness present.      Thoracic back: Tenderness present.      Lumbar back: Tenderness present.   Skin:     General: Skin is warm and dry.      Capillary Refill: Capillary refill takes less than 2 seconds.   Neurological:      General: No focal deficit present.      Mental Status: He is alert.      Motor: No weakness.      Gait: Gait normal.   Psychiatric:         Mood and Affect: Mood normal.           Procedures   Procedures      MDM:   Medical Decision Making  Amado Chin is a 38 y.o. who presents with complaints of chronic pain    Vital signs are stable, physical exam shows the patient has diffuse muscular tenderness in the spinal region however no evidence of bony tenderness and additionally moving all extremities without difficulty and ambulating at his baseline with no neurologic deficits    Dx: Clinically consistent with chronic musculoskeletal back pain, doubtful to have any cord compromise.  Also doubt full to have a infectious etiology.  No indication for further workup or imaging at this time however we will treat the patient  symptomatically and recommend outpatient follow-up.    Plan: Low-dose ketamine, Valium, fluids, pain medicine referral      Amount and/or Complexity of Data Reviewed  Radiology: ordered and independent interpretation performed.    Risk  Prescription drug management.        - Reviewed relevant past office visits/hospitalizations/procedures  -Obtained pertinent history that influenced decision making from the pts SO    ED Course as of 12/17/23 0031   Sat Dec 16, 2023   1819 Pain improved, pt eating at bedside, comfortable. Will send home with pain referral      Final Dispo   Final Diagnosis:  1. Chronic pain      Time reflects when diagnosis was documented in both MDM as applicable and the Disposition within this note       Time User Action Codes Description Comment    12/16/2023  4:38 PM Juan Ramon Lorenzo Add [G89.29] Chronic pain           ED Disposition       ED Disposition   Discharge    Condition   Stable    Date/Time   Sat Dec 16, 2023  6:18 PM    Comment   Amado Chin discharge to home/self care.                   Follow-up Information       Follow up With Specialties Details Why Contact Info Additional Information    [facility] [place] [facility] Call   [address] [address]  [phone] [facility] [place], [address] [place], [place], [address]   [phone]          Medications   diazepam (VALIUM) tablet 5 mg (5 mg Oral Given 12/16/23 1651)   sodium chloride 0.9 % bolus 500 mL (0 mL Intravenous Stopped 12/16/23 1825)   Ketamine HCl 22 mg in sodium chloride 0.9 % 50 mL (22 mg Intravenous Given 12/16/23 1702)       Risk Stratification Tools                Orders Placed This Encounter   Procedures    XR toe fifth min 2 views RIGHT    Ambulatory referral to Spine & Pain Management       Labs:   Labs Reviewed - No data to display    Imaging:     XR toe fifth min 2 views RIGHT   ED Interpretation by Juan Ramon Lorenzo MD (12/16 0270)  "  No acute osseus abnormality         All details of the evaluation and treatment plan were made clear and additionally all questions and concerns were addressed while under my care.    Portions of the record may have been created with voice recognition software. Occasional wrong word or \"sound a like\" substitutions may have occurred due to the inherent limitations of voice recognition software. Read the chart carefully and recognize, using context, where substitutions have occurred.    The attending physician physically available and evaluated the above patient alongside myself.      Juan Ramon Lorenzo MD  12/17/23 0031    "

## 2023-12-16 NOTE — ED PROVIDER NOTES
History  Chief Complaint   Patient presents with    Neck Pain     Reports hx of degenerative disc disease and is experiencing a lot of pain in neck and legs. States that he is feeling dizzy right now. Patient is a 47-year-old male with a past medical history of cervical radiculopathy secondary to degenerative disc disease coming in for evaluation of his neck pain. Patient is agitated and belligerent in the room, but able to be redirected. Patient has a long list of complaints, including this, tingling, neck pain, arm weakness, fatigue. Patient additionally is endorsing sharp chest pain that is new, began yesterday and now resolved. Patient is requesting Percocet for his neck pain, and a chest pain workup. Prior to Admission Medications   Prescriptions Last Dose Informant Patient Reported? Taking?    DULoxetine (CYMBALTA) 20 mg capsule   No No   Sig: Take 1 capsule (20 mg total) by mouth daily   Diclofenac Sodium (VOLTAREN) 1 %   No No   Sig: Apply 2 g topically 4 (four) times a day for 7 days   QUEtiapine (SEROquel) 100 mg tablet   No No   Sig: Take 1 tablet (100 mg total) by mouth daily at bedtime   albuterol (PROVENTIL HFA,VENTOLIN HFA) 90 mcg/act inhaler   No No   Sig: Inhale 2 puffs every 4 (four) hours as needed for wheezing   gabapentin (Neurontin) 100 mg capsule   No No   Sig: Take 1 capsule (100 mg total) by mouth 3 (three) times a day   lidocaine (Lidoderm) 5 %   No No   Sig: Apply 1 patch topically daily Remove & Discard patch within 12 hours or as directed by MD   methocarbamol (ROBAXIN) 500 mg tablet   No No   Sig: Take 1 tablet (500 mg total) by mouth 2 (two) times a day   methocarbamol (ROBAXIN) 500 mg tablet   No No   Sig: Take 1 tablet (500 mg total) by mouth 2 (two) times a day   naproxen (Naprosyn) 500 mg tablet   No No   Sig: Take 1 tablet (500 mg total) by mouth 2 (two) times a day with meals   polyethylene glycol (MIRALAX) 17 g packet   No No   Sig: Take 17 g by mouth daily as needed (Constipation)      Facility-Administered Medications: None       Past Medical History:   Diagnosis Date    Asthma     Chronic pain     Hypertension     Thyroglossal duct cyst        Past Surgical History:   Procedure Laterality Date    THYROGLOSSAL DUCT EXCISION      THYROID SURGERY      THYROID SURGERY         Family History   Problem Relation Age of Onset    Hypertension Mother     No Known Problems Father      I have reviewed and agree with the history as documented. E-Cigarette/Vaping    E-Cigarette Use Current Every Day User     Cartridges/Day 1      E-Cigarette/Vaping Substances    Nicotine Yes     THC No     CBD No     Flavoring Yes     Other No     Unknown No      Social History     Tobacco Use    Smoking status: Every Day     Current packs/day: 0.50     Types: Cigarettes    Smokeless tobacco: Never    Tobacco comments:     5 cigerettes a day    Vaping Use    Vaping status: Every Day    Substances: Nicotine, Flavoring   Substance Use Topics    Alcohol use: Not Currently    Drug use: Not Currently     Types: Marijuana, Methamphetamines        Review of Systems   Constitutional:  Negative for chills and fever. HENT:  Negative for ear pain and sore throat. Eyes:  Negative for pain and visual disturbance. Respiratory:  Negative for cough and shortness of breath. Cardiovascular:  Negative for chest pain and palpitations. Gastrointestinal:  Negative for abdominal pain and vomiting. Genitourinary:  Negative for dysuria and hematuria. Musculoskeletal:  Positive for back pain and neck pain. Negative for arthralgias. Skin:  Negative for color change and rash. Neurological:  Negative for seizures and syncope. All other systems reviewed and are negative.       Physical Exam  ED Triage Vitals [12/15/23 1506]   Temperature Pulse Respirations Blood Pressure SpO2   98.3 °F (36.8 °C) 95 (!) 24 (!) 150/102 99 %      Temp Source Heart Rate Source Patient Position - Orthostatic VS BP Location FiO2 (%)   Tympanic Monitor Lying Right arm --      Pain Score       10 - Worst Possible Pain             Orthostatic Vital Signs  Vitals:    12/15/23 1506 12/15/23 1616   BP: (!) 150/102 152/90   Pulse: 95    Patient Position - Orthostatic VS: Lying        Physical Exam  Vitals and nursing note reviewed. Constitutional:       General: He is not in acute distress. Appearance: He is not ill-appearing. HENT:      Head: Normocephalic and atraumatic. Nose: Nose normal.      Mouth/Throat:      Mouth: Mucous membranes are moist.      Pharynx: Oropharynx is clear. Cardiovascular:      Rate and Rhythm: Normal rate and regular rhythm. Pulmonary:      Effort: Pulmonary effort is normal.      Breath sounds: Normal breath sounds. Abdominal:      General: Abdomen is flat. Bowel sounds are normal.      Palpations: Abdomen is soft. Musculoskeletal:         General: Normal range of motion. Cervical back: Normal range of motion. Skin:     General: Skin is warm and dry. Neurological:      General: No focal deficit present. Mental Status: He is alert and oriented to person, place, and time.    Psychiatric:         Mood and Affect: Mood normal.         ED Medications  Medications   oxyCODONE (ROXICODONE) IR tablet 5 mg (5 mg Oral Given 12/15/23 1653)   magnesium sulfate 2 g/50 mL IVPB (premix) 2 g (0 g Intravenous Stopped 12/15/23 1750)   sodium chloride 0.9 % bolus 500 mL (0 mL Intravenous Stopped 12/15/23 1750)   gabapentin (NEURONTIN) capsule 600 mg (600 mg Oral Given 12/15/23 1653)       Diagnostic Studies  Results Reviewed       Procedure Component Value Units Date/Time    HS Troponin 0hr (reflex protocol) [[id number]]  (Normal) Collected: 12/15/23 1651    Lab Status: Final result Specimen: Blood from Arm, Right Updated: 12/15/23 1728     hs TnI 0hr <2 ng/L     Comprehensive metabolic panel [[id number]]  (Abnormal) Collected: 12/15/23 1651    Lab Status: Final result Specimen: Blood from Arm, Right Updated: 12/15/23 1724     Sodium 139 mmol/L      Potassium 4.2 mmol/L      Chloride 109 mmol/L      CO2 26 mmol/L      ANION GAP 4 mmol/L      BUN 24 mg/dL      Creatinine 0.92 mg/dL      Glucose 102 mg/dL      Calcium 8.8 mg/dL      AST 18 U/L      ALT 26 U/L      Alkaline Phosphatase 53 U/L      Total Protein 6.4 g/dL      Albumin 4.2 g/dL      Total Bilirubin 0.34 mg/dL      eGFR 103 ml/min/1.73sq m     Narrative:      National Kid[employer] guidelines for Chronic Kidney Disease (CKD):     Stage 1 with normal or high GFR (GFR > 90 mL/min/1.73 square meters)    Stage 2 Mild CKD (GFR = 60-89 mL/min/1.73 square meters)    Stage 3A Moderate CKD (GFR = 45-59 mL/min/1.73 square meters)    Stage 3B Moderate CKD (GFR = 30-44 mL/min/1.73 square meters)    Stage 4 Severe CKD (GFR = 15-29 mL/min/1.73 square meters)    Stage 5 End Stage CKD (GFR <15 mL/min/1.73 square meters)  Note: GFR calculation is accurate only with a steady state creatinine    CBC and differential [[id number]]  (Abnormal) Collected: 12/15/23 1651    Lab Status: Final result Specimen: Blood from Arm, Right Updated: 12/15/23 1700     WBC 5.96 Thousand/uL      RBC 4.24 Million/uL      Hemoglobin 13.6 g/dL      Hematocrit 39.1 %      MCV 92 fL      MCH 32.1 pg      MCHC 34.8 g/dL      RDW 14.1 %      MPV 9.4 fL      Platelets 541 Thousands/uL      nRBC 0 /100 WBCs      Neutrophils Relative 50 %      Immat GRANS % 0 %      Lymphocytes Relative 26 %      Monocytes Relative 16 %      Eosinophils Relative 6 %      Basophils Relative 2 %      Neutrophils Absolute 3.02 Thousands/µL      Immature Grans Absolute 0.02 Thousand/uL      Lymphocytes Absolute 1.53 Thousands/µL      Monocytes Absolute 0.96 Thousand/µL      Eosinophils Absolute 0.34 Thousand/µL      Basophils Absolute 0.09 Thousands/µL                    XR chest 1 view portable    (Results Pending)         Procedures  Procedures      ED Course                                       Medical Decision Making  Patient treated with multimodal analgesia for his neck pain with improvement of his symptoms. Patient recommended to follow-up with comprehensive spine program as well as his primary care provider for his symptoms. Chest pain workup unremarkable. Amount and/or Complexity of Data Reviewed  Labs: ordered. Radiology: ordered. Risk  Prescription drug management. Disposition  Final diagnoses:   Neck pain   Radiculopathy     Time reflects when diagnosis was documented in both MDM as applicable and the Disposition within this note       Time User Action Codes Description Comment    12/15/2023  5:37 PM Joyice Lipoma Add [M54.2] Neck pain     12/15/2023  5:37 PM Joyice Lipoma Add [X10.72] Radiculopathy           ED Disposition       ED Disposition   Discharge    Condition   Stable    Date/Time   Fri Dec 15, 2023  5:36 PM    Comment   Lisbeth Chin discharge to home/self care.                    Follow-up Information       Follow up With Specialties Details Why Contact Info Additional Information    [facility] Physical Therapy Call   [phone]            Discharge Medication List as of 12/15/2023  5:38 PM        CONTINUE these medications which have NOT CHANGED    Details   albuterol (PROVENTIL HFA,VENTOLIN HFA) 90 mcg/act inhaler Inhale 2 puffs every 4 (four) hours as needed for wheezing, Starting Fri 9/27/2019, Print      Diclofenac Sodium (VOLTAREN) 1 % Apply 2 g topically 4 (four) times a day for 7 days, Starting Sat 8/6/2022, Until Sat 8/13/2022, Normal      DULoxetine (CYMBALTA) 20 mg capsule Take 1 capsule (20 mg total) by mouth daily, Starting Mon 4/4/2022, Until Wed 5/4/2022, Print      gabapentin (Neurontin) 100 mg capsule Take 1 capsule (100 mg total) by mouth 3 (three) times a day, Starting Sat 8/6/2022, Normal      lidocaine (Lidoderm) 5 % Apply 1 patch topically daily Remove & Discard patch within 12 hours or as directed by MD, Starting Sat 8/6/2022, Normal      !! methocarbamol (ROBAXIN) 500 mg tablet Take 1 tablet (500 mg total) by mouth 2 (two) times a day, Starting Sat 8/6/2022, Normal      !! methocarbamol (ROBAXIN) 500 mg tablet Take 1 tablet (500 mg total) by mouth 2 (two) times a day, Starting Sun 11/12/2023, Normal      naproxen (Naprosyn) 500 mg tablet Take 1 tablet (500 mg total) by mouth 2 (two) times a day with meals, Starting Sun 11/12/2023, Normal      polyethylene glycol (MIRALAX) 17 g packet Take 17 g by mouth daily as needed (Constipation), Starting Wed 2/2/2022, Normal      QUEtiapine (SEROquel) 100 mg tablet Take 1 tablet (100 mg total) by mouth daily at bedtime, Starting Mon 4/4/2022, Until Wed 5/4/2022, Print       !! - Potential duplicate medications found. Please discuss with provider. No discharge procedures on file. PDMP Review         Value Time User    PDMP Reviewed  Yes 3/31/2022  3:57 PM Paige Quesada PA-C             ED Provider  Attending physically available and evaluated Vicente Kasper. I managed the patient along with the ED Attending.     Electronically Signed by           Kingsley Linares MD  12/16/23 0030

## 2023-12-18 ENCOUNTER — PATIENT OUTREACH (OUTPATIENT)
Dept: FAMILY MEDICINE CLINIC | Facility: CLINIC | Age: 38
End: 2023-12-18

## 2023-12-18 ENCOUNTER — VBI (OUTPATIENT)
Dept: FAMILY MEDICINE CLINIC | Facility: CLINIC | Age: 38
End: 2023-12-18

## 2023-12-18 DIAGNOSIS — Z71.89 COMPLEX CARE COORDINATION: Primary | ICD-10-CM

## 2023-12-18 NOTE — TELEPHONE ENCOUNTER
12/18/23 10:58 AM    Patient contacted post ED visit, [facility] department spoke with patient/caregiver and outreach was successful.    Thank you.  Anastacia Townsend  [facility]

## 2023-12-18 NOTE — ED ATTENDING ATTESTATION
12/16/2023  I, Arnav Morris MD, saw and evaluated the patient. I have discussed the patient with the resident/non-physician practitioner and agree with the resident's/non-physician practitioner's findings, Plan of Care, and MDM as documented in the resident's/non-physician practitioner's note, except where noted. All available labs and Radiology studies were reviewed.  I was present for key portions of any procedure(s) performed by the resident/non-physician practitioner and I was immediately available to provide assistance.       At this point I agree with the current assessment done in the Emergency Department.  I have conducted an independent evaluation of this patient a history and physical is as follows:    ED Course     38-year-old male presenting with back pain.  History of DJD.  Wears brace chronically at baseline.  Pain is severe in nature.  Denies any focal neurologic deficits numbness bowel or bladder incontinence.  Walks up to 9 miles per day.  Patient has a follow-up appoint with his spine surgeon after the holidays.  Is requesting rescue analgesia at this time.    Vitals reviewed.  Patient noted to have thoracic and lumbar back tenderness present no CT or L-spine tenderness to palpation.  Neuroexam is nonfocal.  Normal gait.    Impression: Thoracic and lumbar back pain  Differential diagnosis: Musculoskeletal sprain strain, degenerative disc disease, I do not suspect  acute cord compression, cauda equina syndrome, epidural abscess, discitis    Will give patient trial multimodal analgesia including low-dose ketamine, Valium, fluids    Patient will refer to pain management.    Patient reassessed reports improvement of symptoms stable for discharge.  Return precautions given      Critical Care Time  Procedures

## 2023-12-18 NOTE — PROGRESS NOTES
Referral received from . Chart reviewed patient does not meet complex care management criteria. Will not outreach at this time. Patient scheduled with PCP on 12/26/23

## 2023-12-26 ENCOUNTER — OFFICE VISIT (OUTPATIENT)
Dept: FAMILY MEDICINE CLINIC | Facility: CLINIC | Age: 38
End: 2023-12-26
Payer: MEDICARE

## 2023-12-26 VITALS
TEMPERATURE: 98.7 F | HEART RATE: 80 BPM | RESPIRATION RATE: 16 BRPM | WEIGHT: 163.2 LBS | OXYGEN SATURATION: 100 % | HEIGHT: 65 IN | DIASTOLIC BLOOD PRESSURE: 85 MMHG | BODY MASS INDEX: 27.19 KG/M2 | SYSTOLIC BLOOD PRESSURE: 141 MMHG

## 2023-12-26 DIAGNOSIS — G89.29 CHRONIC BILATERAL LOW BACK PAIN, UNSPECIFIED WHETHER SCIATICA PRESENT: ICD-10-CM

## 2023-12-26 DIAGNOSIS — R03.0 ELEVATED BP WITHOUT DIAGNOSIS OF HYPERTENSION: ICD-10-CM

## 2023-12-26 DIAGNOSIS — M54.50 CHRONIC BILATERAL LOW BACK PAIN, UNSPECIFIED WHETHER SCIATICA PRESENT: ICD-10-CM

## 2023-12-26 DIAGNOSIS — Z72.0 TOBACCO ABUSE: ICD-10-CM

## 2023-12-26 DIAGNOSIS — G89.29 CHRONIC NECK PAIN: Primary | ICD-10-CM

## 2023-12-26 DIAGNOSIS — M54.2 CHRONIC NECK PAIN: Primary | ICD-10-CM

## 2023-12-26 PROCEDURE — 99204 OFFICE O/P NEW MOD 45 MIN: CPT | Performed by: FAMILY MEDICINE

## 2023-12-26 RX ORDER — IBUPROFEN 600 MG/1
600 TABLET ORAL EVERY 8 HOURS PRN
Qty: 60 TABLET | Refills: 0 | Status: SHIPPED | OUTPATIENT
Start: 2023-12-26 | End: 2024-12-25

## 2023-12-26 RX ORDER — GABAPENTIN 100 MG/1
100 CAPSULE ORAL 3 TIMES DAILY
Qty: 90 CAPSULE | Refills: 2 | Status: SHIPPED | OUTPATIENT
Start: 2023-12-26

## 2023-12-26 RX ORDER — IBUPROFEN 600 MG/1
600 TABLET ORAL EVERY 8 HOURS PRN
COMMUNITY
Start: 2023-04-14 | End: 2023-12-26 | Stop reason: SDUPTHER

## 2023-12-26 RX ORDER — METHOCARBAMOL 500 MG/1
500 TABLET, FILM COATED ORAL 2 TIMES DAILY
Qty: 20 TABLET | Refills: 0 | Status: SHIPPED | OUTPATIENT
Start: 2023-12-26

## 2023-12-26 RX ORDER — METHYLPREDNISOLONE 4 MG/1
TABLET ORAL
Qty: 21 EACH | Refills: 0 | Status: SHIPPED | OUTPATIENT
Start: 2023-12-26

## 2023-12-26 RX ORDER — AMLODIPINE BESYLATE 5 MG/1
5 TABLET ORAL DAILY
COMMUNITY
Start: 2023-04-14 | End: 2023-12-26 | Stop reason: ALTCHOICE

## 2023-12-26 NOTE — PROGRESS NOTES
Name: Amado Chin      : 1985      MRN: [id number]  Encounter Provider: Iva Fisher MD  Encounter Date: 2023   Encounter department: [facility]    Assessment & Plan     1. Chronic neck pain  -     methocarbamol (ROBAXIN) 500 mg tablet; Take 1 tablet (500 mg total) by mouth 2 (two) times a day  -     ibuprofen (MOTRIN) 600 mg tablet; Take 1 tablet (600 mg total) by mouth every 8 (eight) hours as needed for moderate pain  -     methylPREDNISolone 4 MG tablet therapy pack; Use as directed on package  -     Ambulatory referral to Spine & Pain Management; Future  -     Ambulatory Referral to Neurology; Future  -     Ambulatory Referral to Orthopedic Surgery; Future  -     gabapentin (Neurontin) 100 mg capsule; Take 1 capsule (100 mg total) by mouth 3 (three) times a day    2. Chronic bilateral low back pain, unspecified whether sciatica present  -     ibuprofen (MOTRIN) 600 mg tablet; Take 1 tablet (600 mg total) by mouth every 8 (eight) hours as needed for moderate pain  -     methylPREDNISolone 4 MG tablet therapy pack; Use as directed on package  -     Ambulatory referral to Spine & Pain Management; Future  -     Ambulatory Referral to Neurology; Future  -     Ambulatory Referral to Orthopedic Surgery; Future  -     gabapentin (Neurontin) 100 mg capsule; Take 1 capsule (100 mg total) by mouth 3 (three) times a day    3. Elevated BP without diagnosis of hypertension    4. Tobacco abuse      Regarding his chronic pain which she describes as moderate to severe from the origins of his cervical thoracic and lumbar spine, the previous emergency room visits were reviewed and his medications from [facility] were reconciled.  Patient was advised that he needs chronic pain management and treatment and hence is referred to pain management which he has been referred to before but never went, to urology and to orthopedic as well.  Patient is prescribed the above medications.  Patient is  "advised strongly not to get on medications that may be of habit-forming for him and also not to take any other medications from anybody else including the streets.  Patient also advised that if his symptoms change and or worsen and he does not feel comfortable with them to go to the emergency room.  Patient also is advised to do supportive care such as stretching exercises like he says he does yoga and martial arts, massage etc.  Patient also says that he would like to get transportation for his specialist secondary to him not driving.  Regarding his elevated blood pressure patient has no acute cardiovascular or neuro symptoms.  Patient is no longer on his amlodipine 5 mg daily.  Discussed with patient.  Patient asserts that it secondary to his pain.  Discussed with patient again and to have less salt condiments etc.  And keep an eye on his blood pressure.  To consider restarting medications if patient is willing.  Regarding his tobacco use patient is advised to cut down his smoking to completely quitting.  Will readvise next time as well.  RTO 2 months for his annual physical.         Subjective      38-year-old male here to reestablish care.  Of note patient was recently in the emergency room for acute on his chronic neck and back pain.  The most recent visit he was treated with ketamine and Valium.  Patient no longer takes his amlodipine and says that his blood pressure was high because he was in pain.  Patient denies any cardiovascular or neuro symptoms.  Patient also is no longer on the medications that I reconciliate in his medication list.  Patient does suffer from \"\" moderate to severe neck and back pain.  Patient denies any neuro red flags.  Patient does smoke and have it and is precontemplative.  And patient denies any illicit drug use including methamphetamines, any alcohol marijuana or any other drugs as per patient.  Patient did receive the flu vaccine as per patient.  And pain as per " patient.      Review of Systems   Constitutional:  Negative for chills, fatigue, fever and unexpected weight change.   Eyes:  Negative for visual disturbance.   Respiratory:  Negative for cough and shortness of breath.    Cardiovascular:  Negative for chest pain and palpitations.   Gastrointestinal:  Negative for abdominal pain, nausea and vomiting.   Genitourinary:  Negative for dysuria.   Musculoskeletal:  Positive for arthralgias, back pain and neck pain.   Skin:  Negative for rash.   Neurological:  Negative for dizziness and headaches.   Psychiatric/Behavioral:  Negative for dysphoric mood, self-injury and suicidal ideas. The patient is nervous/anxious.        Current Outpatient Medications on File Prior to Visit   Medication Sig   • albuterol (PROVENTIL HFA,VENTOLIN HFA) 90 mcg/act inhaler Inhale 2 puffs every 4 (four) hours as needed for wheezing   • lidocaine (Lidoderm) 5 % Apply 1 patch topically daily Remove & Discard patch within 12 hours or as directed by MD   • naproxen (Naprosyn) 500 mg tablet Take 1 tablet (500 mg total) by mouth 2 (two) times a day with meals   • [DISCONTINUED] polyethylene glycol (MIRALAX) 17 g packet Take 17 g by mouth daily as needed (Constipation)   • Diclofenac Sodium (VOLTAREN) 1 % Apply 2 g topically 4 (four) times a day for 7 days   • methocarbamol (ROBAXIN) 500 mg tablet Take 1 tablet (500 mg total) by mouth 2 (two) times a day (Patient not taking: Reported on 12/26/2023)   • [DISCONTINUED] amLODIPine (NORVASC) 5 mg tablet Take 5 mg by mouth daily (Patient not taking: Reported on 12/26/2023)   • [DISCONTINUED] DULoxetine (CYMBALTA) 20 mg capsule Take 1 capsule (20 mg total) by mouth daily   • [DISCONTINUED] gabapentin (Neurontin) 100 mg capsule Take 1 capsule (100 mg total) by mouth 3 (three) times a day (Patient not taking: Reported on 12/26/2023)   • [DISCONTINUED] ibuprofen (MOTRIN) 600 mg tablet Take 600 mg by mouth every 8 (eight) hours as needed (Patient not taking:  "Reported on 12/26/2023)   • [DISCONTINUED] methocarbamol (ROBAXIN) 500 mg tablet Take 1 tablet (500 mg total) by mouth 2 (two) times a day   • [DISCONTINUED] QUEtiapine (SEROquel) 100 mg tablet Take 1 tablet (100 mg total) by mouth daily at bedtime       Objective     /85 (BP Location: Left arm, Patient Position: Sitting, Cuff Size: Adult)   Pulse 80   Temp 98.7 °F (37.1 °C) (Temporal)   Resp 16   Ht 5' 5\" (1.651 m)   Wt 74 kg (163 lb 3.2 oz)   SpO2 100%   BMI 27.16 kg/m²     Physical Exam  Vitals reviewed.   Constitutional:       General: He is not in acute distress.     Appearance: Normal appearance. He is not ill-appearing.   HENT:      Head: Normocephalic and atraumatic.      Mouth/Throat:      Mouth: Mucous membranes are moist.      Pharynx: Oropharynx is clear.   Eyes:      Extraocular Movements: Extraocular movements intact.      Conjunctiva/sclera: Conjunctivae normal.   Neck:      Vascular: No carotid bruit.   Cardiovascular:      Rate and Rhythm: Normal rate and regular rhythm.   Pulmonary:      Effort: Pulmonary effort is normal.      Breath sounds: Normal breath sounds.   Musculoskeletal:         General: Tenderness present.      Right lower leg: No edema.      Left lower leg: No edema.      Comments: No calf tenderness bilateral.  Patient does have tenderness along his spine as per patient.  Positive elicitation by me as well.   Lymphadenopathy:      Cervical: No cervical adenopathy.   Skin:     General: Skin is warm.      Findings: No rash.   Neurological:      General: No focal deficit present.      Mental Status: He is alert and oriented to person, place, and time.      Cranial Nerves: No cranial nerve deficit.   Psychiatric:         Mood and Affect: Mood normal.         Behavior: Behavior normal.         Thought Content: Thought content normal.      Comments: Patient seems a little anxious and is in pain as per patient.  No SI HI.       Iva Fisher MD    "

## 2024-01-11 ENCOUNTER — HOSPITAL ENCOUNTER (EMERGENCY)
Facility: HOSPITAL | Age: 39
Discharge: HOME | End: 2024-01-12
Attending: EMERGENCY MEDICINE
Payer: MEDICARE

## 2024-01-11 VITALS
SYSTOLIC BLOOD PRESSURE: 145 MMHG | DIASTOLIC BLOOD PRESSURE: 95 MMHG | RESPIRATION RATE: 18 BRPM | HEART RATE: 78 BPM | OXYGEN SATURATION: 99 % | TEMPERATURE: 97.9 F

## 2024-01-11 DIAGNOSIS — G89.29 CHRONIC NECK AND BACK PAIN: Primary | ICD-10-CM

## 2024-01-11 DIAGNOSIS — M54.9 CHRONIC NECK AND BACK PAIN: Primary | ICD-10-CM

## 2024-01-11 DIAGNOSIS — M54.2 CHRONIC NECK AND BACK PAIN: Primary | ICD-10-CM

## 2024-01-11 DIAGNOSIS — K13.70 ORAL MUCOSAL LESION: ICD-10-CM

## 2024-01-11 DIAGNOSIS — R52 GENERALIZED PAIN: ICD-10-CM

## 2024-01-11 PROCEDURE — 99284 EMERGENCY DEPT VISIT MOD MDM: CPT | Performed by: EMERGENCY MEDICINE

## 2024-01-11 PROCEDURE — 96372 THER/PROPH/DIAG INJ SC/IM: CPT

## 2024-01-11 PROCEDURE — 99283 EMERGENCY DEPT VISIT LOW MDM: CPT

## 2024-01-11 RX ORDER — OXYCODONE HYDROCHLORIDE 5 MG/1
5 TABLET ORAL ONCE
Status: COMPLETED | OUTPATIENT
Start: 2024-01-11 | End: 2024-01-11

## 2024-01-11 RX ORDER — GABAPENTIN 300 MG/1
600 CAPSULE ORAL ONCE
Status: COMPLETED | OUTPATIENT
Start: 2024-01-11 | End: 2024-01-11

## 2024-01-11 RX ORDER — DIAZEPAM 5 MG/1
5 TABLET ORAL ONCE
Status: COMPLETED | OUTPATIENT
Start: 2024-01-11 | End: 2024-01-11

## 2024-01-11 RX ORDER — KETOROLAC TROMETHAMINE 30 MG/ML
15 INJECTION, SOLUTION INTRAMUSCULAR; INTRAVENOUS ONCE
Status: COMPLETED | OUTPATIENT
Start: 2024-01-11 | End: 2024-01-11

## 2024-01-11 RX ORDER — ACETAMINOPHEN 325 MG/1
650 TABLET ORAL ONCE
Status: COMPLETED | OUTPATIENT
Start: 2024-01-11 | End: 2024-01-11

## 2024-01-11 RX ADMIN — GABAPENTIN 600 MG: 300 CAPSULE ORAL at 23:04

## 2024-01-11 RX ADMIN — OXYCODONE HYDROCHLORIDE 5 MG: 5 TABLET ORAL at 23:04

## 2024-01-11 RX ADMIN — ACETAMINOPHEN 650 MG: 325 TABLET, FILM COATED ORAL at 23:04

## 2024-01-11 RX ADMIN — DIAZEPAM 5 MG: 5 TABLET ORAL at 21:32

## 2024-01-11 RX ADMIN — KETOROLAC TROMETHAMINE 15 MG: 30 INJECTION, SOLUTION INTRAMUSCULAR at 21:32

## 2024-01-12 RX ORDER — DIPHENHYDRAMINE HYDROCHLORIDE AND LIDOCAINE HYDROCHLORIDE AND ALUMINUM HYDROXIDE AND MAGNESIUM HYDRO
10 KIT EVERY 4 HOURS PRN
Qty: 119 ML | Refills: 0 | Status: SHIPPED | OUTPATIENT
Start: 2024-01-12

## 2024-01-12 NOTE — DISCHARGE INSTRUCTIONS
Please follow up with Ortho and Pain Management as scheduled.  Continue to monitor symptoms at home. Consider beginning a symptom tracker.  Please return to the Emergency Department if you experience worsening of your current symptoms or any new/other concerning symptoms.

## 2024-01-12 NOTE — ED PROVIDER NOTES
History  Chief Complaint   Patient presents with    Pain     Pt has nerve pain, has 3 displaced vertebra (c3, c4, c5) . Pain is in hands and neck.      HPI  Patient is a 38 y.o. male with PMHx chronic pain 2/2 C3-C6 disc herniations who presents to the ED for evaluation of worsening of his chronic pain. Patient reports taking gabapentin, robaxin, and ibuprofen without significant relief. Has an appointment with Ortho on 1/17 and with pain management on 1/19. He reports migratory pain that is chronic and has been worsening this week, currently reports neck to low back pain and bilateral arm pain. He denies any fevers, chills, falls, trauma, headache, changes in vision or hearing, focal deficits, bladder or bowel incontinence, or any other complaints or concerns at this time.    Prior to Admission Medications   Prescriptions Last Dose Informant Patient Reported? Taking?   Diclofenac Sodium (VOLTAREN) 1 %   No No   Sig: Apply 2 g topically 4 (four) times a day for 7 days   albuterol (PROVENTIL HFA,VENTOLIN HFA) 90 mcg/act inhaler   No No   Sig: Inhale 2 puffs every 4 (four) hours as needed for wheezing   gabapentin (Neurontin) 100 mg capsule   No No   Sig: Take 1 capsule (100 mg total) by mouth 3 (three) times a day   ibuprofen (MOTRIN) 600 mg tablet   No No   Sig: Take 1 tablet (600 mg total) by mouth every 8 (eight) hours as needed for moderate pain   lidocaine (Lidoderm) 5 %   No No   Sig: Apply 1 patch topically daily Remove & Discard patch within 12 hours or as directed by MD   methocarbamol (ROBAXIN) 500 mg tablet   No No   Sig: Take 1 tablet (500 mg total) by mouth 2 (two) times a day   Patient not taking: Reported on 12/26/2023   methocarbamol (ROBAXIN) 500 mg tablet   No No   Sig: Take 1 tablet (500 mg total) by mouth 2 (two) times a day   methylPREDNISolone 4 MG tablet therapy pack   No No   Sig: Use as directed on package   naproxen (Naprosyn) 500 mg tablet   No No   Sig: Take 1 tablet (500 mg total) by  mouth 2 (two) times a day with meals      Facility-Administered Medications: None       Past Medical History:   Diagnosis Date    Asthma     Chronic pain     Hypertension     Thyroglossal duct cyst        Past Surgical History:   Procedure Laterality Date    THYROGLOSSAL DUCT EXCISION      THYROID SURGERY      THYROID SURGERY         Family History   Problem Relation Age of Onset    Hypertension Mother     No Known Problems Father      I have reviewed and agree with the history as documented.    E-Cigarette/Vaping    E-Cigarette Use Current Every Day User     Cartridges/Day 1      E-Cigarette/Vaping Substances    Nicotine Yes     THC No     CBD No     Flavoring Yes     Other No     Unknown No      Social History     Tobacco Use    Smoking status: Every Day     Current packs/day: 0.50     Types: Cigarettes    Smokeless tobacco: Never    Tobacco comments:     5 cigerettes a day    Vaping Use    Vaping status: Every Day    Substances: Nicotine, Flavoring   Substance Use Topics    Alcohol use: Not Currently    Drug use: Not Currently     Types: Marijuana, Methamphetamines        Review of Systems   Musculoskeletal:  Positive for back pain, myalgias and neck pain.       Physical Exam  ED Triage Vitals [01/11/24 1941]   Temperature Pulse Respirations Blood Pressure SpO2   97.9 °F (36.6 °C) 103 20 141/96 100 %      Temp Source Heart Rate Source Patient Position - Orthostatic VS BP Location FiO2 (%)   Temporal Monitor -- Left arm --      Pain Score       8             Orthostatic Vital Signs  Vitals:    01/11/24 1941   BP: 141/96   Pulse: 103       Physical Exam  Vitals and nursing note reviewed.   Constitutional:       General: He is not in acute distress.  HENT:      Head: Normocephalic and atraumatic.      Mouth/Throat:      Mouth: Mucous membranes are moist.      Pharynx: Oropharynx is clear.   Eyes:      General: No scleral icterus.     Extraocular Movements: Extraocular movements intact.      Conjunctiva/sclera:  Conjunctivae normal.      Pupils: Pupils are equal, round, and reactive to light.   Cardiovascular:      Rate and Rhythm: Normal rate and regular rhythm.      Pulses: Normal pulses.      Heart sounds: Normal heart sounds.   Pulmonary:      Effort: Pulmonary effort is normal. No respiratory distress.      Breath sounds: Normal breath sounds.   Abdominal:      Palpations: Abdomen is soft.      Tenderness: There is no abdominal tenderness. There is no guarding or rebound.   Musculoskeletal:         General: No deformity. Normal range of motion.      Cervical back: Normal range of motion and neck supple. Tenderness (bilateral paracervical tenderness) present. No bony tenderness.      Thoracic back: Tenderness (bilateral parathoracic tenderness) present. No bony tenderness.      Lumbar back: Tenderness (bilateral paralumbar tenderness) present. No bony tenderness.   Skin:     General: Skin is warm.      Capillary Refill: Capillary refill takes less than 2 seconds.      Findings: No rash.   Neurological:      General: No focal deficit present.      Mental Status: He is alert and oriented to person, place, and time. Mental status is at baseline.      Cranial Nerves: No cranial nerve deficit.      Sensory: No sensory deficit.      Motor: No weakness.      Coordination: Coordination normal.      Gait: Gait normal.   Psychiatric:         Mood and Affect: Mood normal.         Behavior: Behavior normal.         ED Medications  Medications   ketorolac (TORADOL) injection 15 mg (15 mg Intramuscular Given 1/11/24 2132)   diazepam (VALIUM) tablet 5 mg (5 mg Oral Given 1/11/24 2132)       Diagnostic Studies  Results Reviewed       None                   No orders to display         Procedures  Procedures      ED Course                                       Medical Decision Making  Amount and/or Complexity of Data Reviewed  External Data Reviewed: radiology and notes.     Details: Prior MRI from 04/2023 reviewed and findings  noted    Risk  Prescription drug management.        ASSESSMENT: Patient is a 38 y.o. male who presents with exacerbation of chronic pain.   DDX includes but not limited to: chronic neck/back pain, chronic disc herniation, paresthesias, myalgias.   PLAN: Symptomatic management, no indication for labs or imagining.    ***    Disposition  Final diagnoses:   None     ED Disposition       None          Follow-up Information    None         Patient's Medications   Discharge Prescriptions    No medications on file     No discharge procedures on file.    PDMP Review         Value Time User    PDMP Reviewed  Yes 3/31/2022  3:57 PM Chance Franco PA-C             ED Provider  Attending physically available and evaluated Amado Chin. I managed the patient along with the ED Attending.    Electronically Signed by         [id number]  [phone]       [facility] Emergency Department Emergency Medicine Go to  If symptoms worsen [address] [id number] [phone] [facility] Emergency Department, [address], [place], [address]   [phone]            Discharge Medication List as of 1/12/2024 12:05 AM        CONTINUE these medications which have NOT CHANGED    Details   albuterol (PROVENTIL HFA,VENTOLIN HFA) 90 mcg/act inhaler Inhale 2 puffs every 4 (four) hours as needed for wheezing, Starting Fri 9/27/2019, Print      Diclofenac Sodium (VOLTAREN) 1 % Apply 2 g topically 4 (four) times a day for 7 days, Starting Sat 8/6/2022, Until Sat 8/13/2022, Normal      gabapentin (Neurontin) 100 mg capsule Take 1 capsule (100 mg total) by mouth 3 (three) times a day, Starting Tue 12/26/2023, Normal      ibuprofen (MOTRIN) 600 mg tablet Take 1 tablet (600 mg total) by mouth every 8 (eight) hours as needed for moderate pain, Starting Tue 12/26/2023, Until Wed 12/25/2024 at 2359, Normal      lidocaine (Lidoderm) 5 % Apply 1 patch topically daily Remove & Discard patch within 12 hours or as directed by MD, Starting Sat 8/6/2022, Normal      !! methocarbamol (ROBAXIN) 500 mg tablet Take 1 tablet (500 mg total) by mouth 2 (two) times a day, Starting Sat 8/6/2022, Normal      !! methocarbamol (ROBAXIN) 500 mg tablet Take 1 tablet (500 mg total) by mouth 2 (two) times a day, Starting Tue 12/26/2023, Normal      methylPREDNISolone 4 MG tablet therapy pack Use as directed on package, Normal      naproxen (Naprosyn) 500 mg tablet Take 1 tablet (500 mg total) by mouth 2 (two) times a day with meals, Starting Sun 11/12/2023, Normal       !! - Potential duplicate medications found. Please discuss with provider.        No discharge procedures on file.    PDMP Review         Value Time User    PDMP Reviewed  Yes 3/31/2022  3:57 PM Chance Franco PA-C             ED  Provider  Attending physically available and evaluated Amadosiri Chin. I managed the patient along with the ED Attending.    Electronically Signed by           Analisa Her DO  01/16/24 0142

## 2024-01-15 ENCOUNTER — VBI (OUTPATIENT)
Dept: FAMILY MEDICINE CLINIC | Facility: CLINIC | Age: 39
End: 2024-01-15

## 2024-01-15 NOTE — TELEPHONE ENCOUNTER
01/15/24 9:33 AM     [employer] SmartForm used to document caregap status.  CM letter sent  Anastacia Townsend

## 2024-01-16 DIAGNOSIS — G89.29 CHRONIC NECK PAIN: ICD-10-CM

## 2024-01-16 DIAGNOSIS — G89.29 CHRONIC BILATERAL LOW BACK PAIN, UNSPECIFIED WHETHER SCIATICA PRESENT: ICD-10-CM

## 2024-01-16 DIAGNOSIS — M54.2 CHRONIC NECK PAIN: ICD-10-CM

## 2024-01-16 DIAGNOSIS — M54.50 CHRONIC BILATERAL LOW BACK PAIN, UNSPECIFIED WHETHER SCIATICA PRESENT: ICD-10-CM

## 2024-01-16 RX ORDER — IBUPROFEN 600 MG/1
TABLET ORAL
Qty: 60 TABLET | Refills: 0 | Status: SHIPPED | OUTPATIENT
Start: 2024-01-16

## 2024-01-17 ENCOUNTER — HOSPITAL ENCOUNTER (OUTPATIENT)
Dept: RADIOLOGY | Facility: HOSPITAL | Age: 39
Discharge: HOME | End: 2024-01-17
Attending: ORTHOPAEDIC SURGERY
Payer: MEDICARE

## 2024-01-17 ENCOUNTER — OFFICE VISIT (OUTPATIENT)
Dept: OBGYN CLINIC | Facility: HOSPITAL | Age: 39
End: 2024-01-17
Attending: FAMILY MEDICINE
Payer: MEDICARE

## 2024-01-17 VITALS
BODY MASS INDEX: 27.18 KG/M2 | SYSTOLIC BLOOD PRESSURE: 139 MMHG | HEIGHT: 65 IN | DIASTOLIC BLOOD PRESSURE: 11 MMHG | HEART RATE: 84 BPM | WEIGHT: 163.14 LBS

## 2024-01-17 DIAGNOSIS — R20.2 PARESTHESIA AND PAIN OF EXTREMITY: Primary | ICD-10-CM

## 2024-01-17 DIAGNOSIS — G89.29 CHRONIC NECK PAIN: ICD-10-CM

## 2024-01-17 DIAGNOSIS — M54.2 CHRONIC NECK PAIN: ICD-10-CM

## 2024-01-17 DIAGNOSIS — G89.29 CHRONIC BILATERAL LOW BACK PAIN, UNSPECIFIED WHETHER SCIATICA PRESENT: ICD-10-CM

## 2024-01-17 DIAGNOSIS — M79.609 PARESTHESIA AND PAIN OF EXTREMITY: Primary | ICD-10-CM

## 2024-01-17 DIAGNOSIS — M54.50 CHRONIC BILATERAL LOW BACK PAIN, UNSPECIFIED WHETHER SCIATICA PRESENT: ICD-10-CM

## 2024-01-17 PROCEDURE — 72050 X-RAY EXAM NECK SPINE 4/5VWS: CPT

## 2024-01-17 PROCEDURE — 99214 OFFICE O/P EST MOD 30 MIN: CPT | Performed by: ORTHOPAEDIC SURGERY

## 2024-01-17 NOTE — PROGRESS NOTES
Assessment & Plan/Medical Decision Makin y.o. male with Neck Pain and imaging findings most notable for Cervical Spondylosis    Fortunately patient remains at what appears to be his neurological baseline and functional, however patient unable to participate in detailed exam.  We discussed the treatment options including physical therapy, at home exercises, activity modifications, chiropractic medicine, oral medications, interventional spine procedures.  At this time recommend continued conservative treatments and will pursue updated advanced imaging for further evaluation given his ongoing symptoms.    MRI cervical spine to further evaluate patient's symptoms. Will review MRI in detail with patient at follow-up visit and discuss further treatment options at that time.    Patient instructed to return to office/ER sooner if symptoms are not improving, getting worse, or new worrisome/neurologic symptoms arise.  Patient will follow up after his cervical MRI.     Subjective:      Chief Complaint: Back Pain    HPI:  Amado Chin is a 38 y.o. male presenting for initial visit with chief complaint of neck pain for several months/years.      Describes pain as burning in nature.  Located in the neck and radiating to bilateral arms and hands.  positive numbness . Positive burning.  Neck pain 50%, Arm pain 50%.  Also with whole body pain.  Symptoms equal bilaterally. Pain is worse with activity and improves with rest.    Denies any roberto trauma. Denies fever or chills, no night sweats. Denies any bladder or bowel changes.      Patient was evaluated by Neurosurgery at [facility] in  for similar complaints.  Patient has been evaluated multiple times in the ER for pain.      Conservative therapy includes the following:   Medications: percocet, Tylenol    Injections: has undergone previously in [place] with previous pain mgmt physician  Physical Therapy: home therapy, completed PT previously (per patient)   These therapeutic  modalities were ineffective at providing sustained pain relief/functional improvement.       Objective:     Family History   Problem Relation Age of Onset    Hypertension Mother     No Known Problems Father        Past Medical History:   Diagnosis Date    Asthma     Chronic pain     Hypertension     Thyroglossal duct cyst        Current Outpatient Medications   Medication Sig Dispense Refill    albuterol (PROVENTIL HFA,VENTOLIN HFA) 90 mcg/act inhaler Inhale 2 puffs every 4 (four) hours as needed for wheezing 1 Inhaler 0    diphenhydramine, lidocaine, Al/Mg hydroxide, simethicone (Magic Mouthwash) SUSP Swish and spit 10 mL every 4 (four) hours as needed for mouth pain or discomfort 119 mL 0    gabapentin (Neurontin) 100 mg capsule Take 1 capsule (100 mg total) by mouth 3 (three) times a day 90 capsule 2    ibuprofen (MOTRIN) 600 mg tablet TAKE 1 TABLET (600 MG TOTAL) BY MOUTH EVERY 8 HOURS AS NEEDED FOR MODERATE PAIN 60 tablet 0    lidocaine (Lidoderm) 5 % Apply 1 patch topically daily Remove & Discard patch within 12 hours or as directed by MD 30 patch 0    methocarbamol (ROBAXIN) 500 mg tablet Take 1 tablet (500 mg total) by mouth 2 (two) times a day 20 tablet 0    naproxen (Naprosyn) 500 mg tablet Take 1 tablet (500 mg total) by mouth 2 (two) times a day with meals 30 tablet 0    Diclofenac Sodium (VOLTAREN) 1 % Apply 2 g topically 4 (four) times a day for 7 days 56 g 0    methocarbamol (ROBAXIN) 500 mg tablet Take 1 tablet (500 mg total) by mouth 2 (two) times a day (Patient not taking: Reported on 12/26/2023) 20 tablet 0    methylPREDNISolone 4 MG tablet therapy pack Use as directed on package (Patient not taking: Reported on 1/17/2024) 21 each 0     No current facility-administered medications for this visit.       Past Surgical History:   Procedure Laterality Date    THYROGLOSSAL DUCT EXCISION      THYROID SURGERY      THYROID SURGERY         Social History     Socioeconomic History    Marital status: Single      Spouse name: Not on file    Number of children: Not on file    Years of education: Not on file    Highest education level: Not on file   Occupational History    Not on file   Tobacco Use    Smoking status: Every Day     Current packs/day: 0.50     Types: Cigarettes    Smokeless tobacco: Never    Tobacco comments:     5 cigerettes a day    Vaping Use    Vaping status: Every Day    Substances: Nicotine, Flavoring   Substance and Sexual Activity    Alcohol use: Not Currently    Drug use: Not Currently     Types: Marijuana, Methamphetamines    Sexual activity: Yes     Partners: Female     Birth control/protection: Condom Male   Other Topics Concern    Not on file   Social History Narrative    Not on file     Social Determinants of Health     Financial Resource Strain: Not on file   Food Insecurity: Food Insecurity Present (2/11/2022)    Hunger Vital Sign     Worried About Running Out of Food in the Last Year: Sometimes true     Ran Out of Food in the Last Year: Sometimes true   Transportation Needs: No Transportation Needs (2/11/2022)    PRAPARE - Transportation     Lack of Transportation (Medical): No     Lack of Transportation (Non-Medical): No   Physical Activity: Not on file   Stress: Not on file   Social Connections: Not on file   Intimate Partner Violence: Not on file   Housing Stability: Unknown (2/11/2022)    Housing Stability Vital Sign     Unable to Pay for Housing in the Last Year: No     Number of Places Lived in the Last Year: Not on file     Unstable Housing in the Last Year: Not on file       No Known Allergies    Review of Systems  General- denies fever/chills  HEENT- denies hearing loss or sore throat  Eyes- denies eye pain or visual disturbances, denies red eyes  Respiratory- denies cough or SOB  Cardio- denies chest pain or palpitations  GI- denies abdominal pain  Endocrine- denies urinary frequency  Urinary- denies pain with urination  Musculoskeletal- Negative except noted above  Skin- denies  "rashes or wounds  Neurological- denies dizziness or headache  Psychiatric- denies anxiety or difficulty concentrating    Physical Exam  BP (!) 139/11   Pulse 84   Ht 5' 5\" (1.651 m)   Wt 74 kg (163 lb 2.3 oz)   BMI 27.15 kg/m²     General/Constitutional: No apparent distress: well-nourished and well developed.  Lymphatic: No appreciable lymphadenopathy  Respiratory: Non-labored breathing  Vascular: No edema, swelling or tenderness, except as noted in detailed exam.  Integumentary: No impressive skin lesions present, except as noted in detailed exam.  Psych: Normal mood and affect, oriented to person, place and time.  MSK: normal other than stated in HPI and exam  Gait & balance: no evidence of myelopathic gait, ambulates Independently     Cervical  spine range of motion:  -Forward flexion chin to chest  -Extension to 40  -Lateral bend 20 right, 20 left  -Rotation 15 right, 15 left.    There is no point tenderness with palpation along the posterior cervical, thoracic, lumbar spine.     Neurologic:  detailed exam unable to be performed; patient notes pain with any resistance testing in C5-T1, L2-S1  Sensory: light touch is intact to bilateral upper and lower extremities     Reflexes:    Right Left   Biceps 2+ 2+   Triceps 2+ 2+   Brachioradialis 2+ 2+   Patellar 1+ 1+   Achilles 1+ 1+   Babinski neg neg     Other tests:  Spurling's: negative  Alexandre's: negative  Clonus: negative  Tandem gait: negative  Carpal Tinel/Phalen: negative bilateral   Cubital Tinel: negative bilateral   Shoulder: painless active & passive ROM bilateral     Diagnostic Tests   IMAGING: I have personally reviewed the images and these are my findings:  Cervical Spine X-rays from 1/17/24: mild cervical spondylosis, disc space height overall maintained, no obvious facet arthropathy, no apparent spondylolisthesis, no appreciated lytic/blastic lesions, no obvious instability    Electronic Medical Records were reviewed including ED note from " "1/11/24, 12/16/2023, 12/15/2023, 11/24/2023, VN nsx notes, MRI reports     Procedures, if performed today     None performed       Portions of the record may have been created with voice recognition software.  Occasional wrong word or \"sound a like\" substitutions may have occurred due to the inherent limitations of voice recognition software.  Read the chart carefully and recognize, using context, where substitutions have occurred.    "

## 2024-01-21 NOTE — ED ATTENDING ATTESTATION
1/11/2024  I, Amado Berg DO, saw and evaluated the patient. I have discussed the patient with the resident/non-physician practitioner and agree with the resident's/non-physician practitioner's findings, Plan of Care, and MDM as documented in the resident's/non-physician practitioner's note, except where noted. All available labs and Radiology studies were reviewed.  I was present for key portions of any procedure(s) performed by the resident/non-physician practitioner and I was immediately available to provide assistance.       At this point I agree with the current assessment done in the Emergency Department.  I have conducted an independent evaluation of this patient a history and physical is as follows:    38 yom acute on chronic neck pain. Known cervical disc disease, f/w neurosurg/pain medicine. No new symptoms or red flags. Normal exam compares to baseline from previous charts. Plan pain control, reassess    ED Course         Critical Care Time  Procedures

## 2024-01-23 NOTE — DISCHARGE INSTRUCTIONS
Neck Pain   WHAT YOU NEED TO KNOW:   You may have sudden neck pain that increases quickly  You may instead feel pain build slowly over time  Neck pain may go away in a few days or weeks, or it may continue for months  The pain may come and go, or be worse with certain movements  The pain may be only in your neck, or it may move to your arms, back, or shoulders  You may also have pain that starts in another body area and moves to your neck  Some types of neck pain are permanent  DISCHARGE INSTRUCTIONS:   Return to the emergency department if:   · You have an injury that causes neck pain and shooting pain down your arms or legs  · Your neck pain suddenly becomes severe  · You have neck pain along with numbness, tingling, or weakness in your arms or legs  · You have a stiff neck, a headache, and a fever  Contact your healthcare provider if:   · You have new or worsening symptoms  · Your symptoms continue even after treatment  · You have questions or concerns about your condition or care  Medicines: You may need any of the following:  · NSAIDs  , such as ibuprofen, help decrease swelling, pain, and fever  This medicine is available without a doctor's order  Ask your healthcare provider which medicine to take and how often to take it  Follow directions  NSAIDs can cause stomach bleeding or kidney problems if not taken correctly  If you take blood thinner medicine, always ask if NSAIDs are safe for you  · Acetaminophen  helps decrease pain and fever  Ask your healthcare provider how much to take and how often to take it  Follow directions  Acetaminophen can cause liver damage if not taken correctly  · Steroid medicine  may be used to reduce inflammation  This can help relieve pain caused by swelling  · Take your medicine as directed  Contact your healthcare provider if you think your medicine is not helping or if you have side effects  Tell him or her if you are allergic to any medicine  Keep a list of the medicines, vitamins, and herbs you take  Include the amounts, and when and why you take them  Bring the list or the pill bottles to follow-up visits  Carry your medicine list with you in case of an emergency  Manage or prevent neck pain:   · Rest your neck as directed  Do not make sudden movements, such as turning your head quickly  Your healthcare provider may recommend you wear a cervical collar for a short time  The collar will prevent you from moving your head  This will give your neck time to heal if an injury is causing your neck pain  Ask your healthcare provider when you can return to sports or other normal daily activities  · Apply heat as directed  Heat helps relieve pain and swelling  Use a heat wrap, or soak a small towel in warm water  Wring out the extra water  Apply the heat wrap or towel for 20 minutes every hour, or as directed  · Apply ice as directed  Ice helps relieve pain and swelling, and can help prevent tissue damage  Use an ice pack, or put ice in a bag  Cover the ice pack or back with a towel before you apply it to your neck  Apply the ice pack or ice for 15 minutes every hour, or as directed  Your healthcare provider can tell you how often to apply ice  · Do neck exercises as directed  Neck exercises help strengthen the muscles and increase range of motion  Your healthcare provider will tell you which exercises are right for you  He may give you instructions, or he may recommend that you work with a physical therapist  Your healthcare provider or therapist can make sure you are doing the exercises correctly  · Maintain good posture  Try to keep your head and shoulders lifted when you sit  If you work in front of a computer, make sure the monitor is at the right level  You should not need to look up down to see the screen  You should also not have to lean forward to be able to read what is on the screen   Make sure your keyboard, mouse, and other [de-identified] : DATE OF EXAM: 08/01/2023 ERIKA Crowell Name: Sanjana Rocha ERIKA Crowell Address: [address] [place] [address], [address], [place], [facility] Phone: [phone] MRI-LUMBAR SPINE PRE AND POST IV CONTRAST  History: Back pain. History of prior surgery.  MRI of the lumbar spine was performed on a 1.5 Zuleyma high field wide bore magnet before and after the intravenous administration of of 18 cc of Clariscan.  Comparison: MRI lumbar spine dated August 3, 2017.  There is no fracture or suspicious marrow signal abnormality.  T12-L1: There is no disc herniation, significant central canal or neural foraminal stenosis.  L1-2: There is no disc herniation, significant central canal or neural foraminal stenosis.  L2-3: There is slight retrolisthesis, disc desiccation bulge encroaching upon the neural foramina bilaterally. Disc bulge appears either asymmetric to the left versus superimposed inferior left foraminal disc herniation, close to if not contacting the left L2 nerve root. There is mild bilateral facet arthropathy and moderate spinal stenosis. Findings demonstrate progression compared to the prior exam.  L3-4: There is mild disc bulge as well as bilateral facet arthropathy and a small amount of fluid in the facet joints. There is mild bilateral foraminal encroachment and mild spinal stenosis, not significantly changed.  L4-5: The patient is status post discectomy and instrumented fusion, with bilateral fusion hardware present at L5. There is intervertebral disc spacer in place. There is endplate osteophyte with disc bulge encroaching upon the neural foramina bilaterally with encroachment if not impingement upon the L4 nerve roots bilaterally. There is facet arthropathy and mild spinal stenosis. Findings are not appreciably changed.  L5-S1: There is a moderate to large disc bulge with superimposed central disc herniation impinging upon the thecal sac and encroaching upon the neural foramina bilaterally. There is bilateral facet arthropathy also contributing to bilateral foraminal stenosis, encroachment if not impingement upon the L5 nerve roots and moderate to marked spinal stenosis as well as impingement upon the S1 nerve roots the lateral recesses bilaterally. There is a new right-sided synovial cyst contributing to thecal sac compression. This measures approximately 0.6 x 0.3 cm in AP and transverse dimension.  The conus is normal in position, configuration and signal characteristics. There is no abnormal enhancement seen related to the conus.  IMPRESSION:  1. Slight retrolisthesis L2-3 with asymmetric to left disc bulge versus superimposed inferior left foraminal disc herniation, close to if not contacting the left L2 nerve root. There is moderate spinal stenosis. Findings demonstrate progression compared to the prior exam. 2. Mild disc bulge and facet arthropathy L3-4 with mild bilateral foraminal encroachment and mild spinal stenosis. 3. Status post discectomy and instrumented fusion L4-5 with bilateral foraminal encroachment and encroachment if not impingement upon the L4 nerve roots bilaterally. There is mild spinal stenosis. Findings are not appreciably changed. 4. Moderate to large disc bulge with superimposed central disc herniation L5/S1. There is bilateral facet arthropathy, bilateral foraminal stenosis and encroachment if not impingement upon the L5 nerve roots bilaterally as well as impingement upon the S1 nerve roots in the lateral recesses bilaterally. There is a new right-sided synovial cyst contributing to thecal sac compression. computer items are placed where you do not have to extend your shoulder to reach them  Get up often if you work in front of a computer or sit for long periods of time  Stretch or walk around to keep your neck muscles loose  Follow up with your healthcare provider as directed: Your healthcare provider may refer you to a specialist if your pain does not get better with treatment  Write down your questions so you remember to ask them during your visits  © 2017 [employer] Information is for End User's use only and may not be sold, redistributed or otherwise used for commercial purposes  All illustrations and images included in CareNotes® are the copyrighted property of A D A M , Inc  or Rashaun Gunn  The above information is an  only  It is not intended as medical advice for individual conditions or treatments  Talk to your doctor, nurse or pharmacist before following any medical regimen to see if it is safe and effective for you

## 2024-01-25 DIAGNOSIS — M54.2 CERVICAL PAIN (NECK): Primary | ICD-10-CM

## 2024-01-25 DIAGNOSIS — G89.29 CHRONIC NECK PAIN: ICD-10-CM

## 2024-01-25 DIAGNOSIS — M54.2 CHRONIC NECK PAIN: ICD-10-CM

## 2024-02-08 ENCOUNTER — HOSPITAL ENCOUNTER (EMERGENCY)
Facility: HOSPITAL | Age: 39
Discharge: HOME | End: 2024-02-08
Attending: INTERNAL MEDICINE | Admitting: INTERNAL MEDICINE
Payer: MEDICARE

## 2024-02-08 VITALS
WEIGHT: 163.14 LBS | HEART RATE: 72 BPM | SYSTOLIC BLOOD PRESSURE: 140 MMHG | RESPIRATION RATE: 16 BRPM | BODY MASS INDEX: 27.15 KG/M2 | TEMPERATURE: 97 F | DIASTOLIC BLOOD PRESSURE: 99 MMHG | OXYGEN SATURATION: 99 %

## 2024-02-08 DIAGNOSIS — M54.12 CERVICAL RADICULOPATHY: ICD-10-CM

## 2024-02-08 DIAGNOSIS — M54.2 NECK PAIN: Primary | ICD-10-CM

## 2024-02-08 DIAGNOSIS — G89.29 CHRONIC NECK PAIN: ICD-10-CM

## 2024-02-08 DIAGNOSIS — M54.2 CHRONIC NECK PAIN: ICD-10-CM

## 2024-02-08 LAB
B BURGDOR IGG+IGM SER QL IA: NEGATIVE
TREPONEMA PALLIDUM IGG+IGM AB [PRESENCE] IN SERUM OR PLASMA BY IMMUNOASSAY: NORMAL

## 2024-02-08 PROCEDURE — 36415 COLL VENOUS BLD VENIPUNCTURE: CPT | Performed by: PHYSICIAN ASSISTANT

## 2024-02-08 PROCEDURE — 96375 TX/PRO/DX INJ NEW DRUG ADDON: CPT

## 2024-02-08 PROCEDURE — 99284 EMERGENCY DEPT VISIT MOD MDM: CPT

## 2024-02-08 PROCEDURE — 86780 TREPONEMA PALLIDUM: CPT | Performed by: PHYSICIAN ASSISTANT

## 2024-02-08 PROCEDURE — 99284 EMERGENCY DEPT VISIT MOD MDM: CPT | Performed by: PHYSICIAN ASSISTANT

## 2024-02-08 PROCEDURE — 96374 THER/PROPH/DIAG INJ IV PUSH: CPT

## 2024-02-08 PROCEDURE — 86618 LYME DISEASE ANTIBODY: CPT | Performed by: PHYSICIAN ASSISTANT

## 2024-02-08 RX ORDER — KETOROLAC TROMETHAMINE 30 MG/ML
15 INJECTION, SOLUTION INTRAMUSCULAR; INTRAVENOUS ONCE
Status: COMPLETED | OUTPATIENT
Start: 2024-02-08 | End: 2024-02-08

## 2024-02-08 RX ORDER — KETOROLAC TROMETHAMINE 30 MG/ML
15 INJECTION, SOLUTION INTRAMUSCULAR; INTRAVENOUS ONCE
Status: DISCONTINUED | OUTPATIENT
Start: 2024-02-08 | End: 2024-02-08

## 2024-02-08 RX ORDER — DIAZEPAM 5 MG/1
5 TABLET ORAL 2 TIMES DAILY
Qty: 2 TABLET | Refills: 0 | Status: SHIPPED | OUTPATIENT
Start: 2024-02-08 | End: 2024-02-10

## 2024-02-08 RX ORDER — ACETAMINOPHEN 500 MG
500 TABLET ORAL EVERY 6 HOURS PRN
Qty: 30 TABLET | Refills: 0 | Status: SHIPPED | OUTPATIENT
Start: 2024-02-08

## 2024-02-08 RX ORDER — HYDROMORPHONE HCL/PF 1 MG/ML
0.5 SYRINGE (ML) INJECTION ONCE
Status: COMPLETED | OUTPATIENT
Start: 2024-02-08 | End: 2024-02-08

## 2024-02-08 RX ORDER — HYDROMORPHONE HCL/PF 1 MG/ML
0.5 SYRINGE (ML) INJECTION ONCE
Status: DISCONTINUED | OUTPATIENT
Start: 2024-02-08 | End: 2024-02-08

## 2024-02-08 RX ORDER — ACETAMINOPHEN 325 MG/1
975 TABLET ORAL ONCE
Status: COMPLETED | OUTPATIENT
Start: 2024-02-08 | End: 2024-02-08

## 2024-02-08 RX ORDER — IBUPROFEN 400 MG/1
400 TABLET ORAL EVERY 6 HOURS PRN
Qty: 30 TABLET | Refills: 0 | Status: SHIPPED | OUTPATIENT
Start: 2024-02-08

## 2024-02-08 RX ORDER — DIAZEPAM 5 MG/1
5 TABLET ORAL ONCE
Status: COMPLETED | OUTPATIENT
Start: 2024-02-08 | End: 2024-02-08

## 2024-02-08 RX ADMIN — DIAZEPAM 5 MG: 5 TABLET ORAL at 12:04

## 2024-02-08 RX ADMIN — HYDROMORPHONE HYDROCHLORIDE 0.5 MG: 1 INJECTION, SOLUTION INTRAMUSCULAR; INTRAVENOUS; SUBCUTANEOUS at 12:08

## 2024-02-08 RX ADMIN — KETOROLAC TROMETHAMINE 15 MG: 30 INJECTION, SOLUTION INTRAMUSCULAR; INTRAVENOUS at 12:07

## 2024-02-08 RX ADMIN — ACETAMINOPHEN 975 MG: 325 TABLET, FILM COATED ORAL at 12:04

## 2024-02-08 NOTE — ED PROVIDER NOTES
History  Chief Complaint   Patient presents with    Neck Pain     Pt reports chronic neck pain x months, progressively getting worse, now complaining of all over body pain.         Neck Pain  Pain location:  Generalized neck  Quality:  Aching, burning, cramping, shooting and stiffness  Pain radiates to:  L shoulder  Pain severity:  Severe  Pain is:  Same all the time  Onset quality:  Unable to specify  Duration: Years.  Progression:  Worsening  Chronicity:  Chronic  Context: fall (Remote history of fall with injury to the neck)    Relieved by:  Analgesics  Worsened by:  Stress, twisting and position  Ineffective treatments:  NSAIDs, muscle relaxants and analgesics  Associated symptoms: headaches, leg pain and tingling    Associated symptoms: no bladder incontinence, no bowel incontinence, no chest pain, no fever, no numbness, no photophobia, no syncope, no visual change, no weakness and no weight loss    Risk factors: hx of spinal trauma (Remote history of trauma to the cervical spine.)    Risk factors: no hx of head and neck radiation, no recent head injury and no recurrent falls        Prior to Admission Medications   Prescriptions Last Dose Informant Patient Reported? Taking?   Diclofenac Sodium (VOLTAREN) 1 %   No No   Sig: Apply 2 g topically 4 (four) times a day for 7 days   albuterol (PROVENTIL HFA,VENTOLIN HFA) 90 mcg/act inhaler   No No   Sig: Inhale 2 puffs every 4 (four) hours as needed for wheezing   diphenhydramine, lidocaine, Al/Mg hydroxide, simethicone (Magic Mouthwash) SUSP   No No   Sig: Swish and spit 10 mL every 4 (four) hours as needed for mouth pain or discomfort   gabapentin (Neurontin) 100 mg capsule 2/8/2024  No Yes   Sig: Take 1 capsule (100 mg total) by mouth 3 (three) times a day   ibuprofen (MOTRIN) 600 mg tablet 2/7/2024  No Yes   Sig: TAKE 1 TABLET (600 MG TOTAL) BY MOUTH EVERY 8 HOURS AS NEEDED FOR MODERATE PAIN   lidocaine (Lidoderm) 5 %   No Yes   Sig: Apply 1 patch topically daily  Remove & Discard patch within 12 hours or as directed by MD   methocarbamol (ROBAXIN) 500 mg tablet 2/8/2024  No Yes   Sig: Take 1 tablet (500 mg total) by mouth 2 (two) times a day   methylPREDNISolone 4 MG tablet therapy pack   No No   Sig: Use as directed on package   Patient not taking: Reported on 1/17/2024   naproxen (Naprosyn) 500 mg tablet   No No   Sig: Take 1 tablet (500 mg total) by mouth 2 (two) times a day with meals      Facility-Administered Medications: None       Past Medical History:   Diagnosis Date    Asthma     Chronic pain     Hypertension     Thyroglossal duct cyst        Past Surgical History:   Procedure Laterality Date    THYROGLOSSAL DUCT EXCISION      THYROID SURGERY      THYROID SURGERY         Family History   Problem Relation Age of Onset    Hypertension Mother     No Known Problems Father      I have reviewed and agree with the history as documented.    E-Cigarette/Vaping    E-Cigarette Use Former User     Cartridges/Day 1      E-Cigarette/Vaping Substances    Nicotine Yes     THC No     CBD No     Flavoring Yes     Other No     Unknown No      Social History     Tobacco Use    Smoking status: Every Day     Current packs/day: 0.50     Types: Cigarettes    Smokeless tobacco: Never    Tobacco comments:     5 cigerettes a day    Vaping Use    Vaping status: Former    Substances: Nicotine, Flavoring   Substance Use Topics    Alcohol use: Not Currently    Drug use: Not Currently     Types: Marijuana, Methamphetamines       Review of Systems   Constitutional:  Negative for fever and weight loss.   Eyes:  Negative for photophobia.   Cardiovascular:  Negative for chest pain and syncope.   Gastrointestinal:  Negative for bowel incontinence.   Genitourinary:  Negative for bladder incontinence.   Musculoskeletal:  Positive for neck pain.   Neurological:  Positive for tingling and headaches. Negative for weakness and numbness.   All other systems reviewed and are negative.      Physical  Exam  Physical Exam  Constitutional:       General: He is in acute distress (Secondary to neck pain).      Appearance: Normal appearance. He is not ill-appearing.   HENT:      Head: Normocephalic and atraumatic.      Right Ear: Tympanic membrane and external ear normal.      Left Ear: Tympanic membrane and external ear normal.      Nose: Nose normal.      Mouth/Throat:      Mouth: Mucous membranes are moist.      Pharynx: Oropharynx is clear.   Eyes:      Conjunctiva/sclera: Conjunctivae normal.   Cardiovascular:      Rate and Rhythm: Normal rate.   Pulmonary:      Effort: Pulmonary effort is normal.   Abdominal:      General: There is no distension.      Tenderness: There is no abdominal tenderness.   Musculoskeletal:         General: No swelling, deformity or signs of injury. Normal range of motion.      Cervical back: Normal range of motion.      Right lower leg: No edema.      Left lower leg: No edema.   Skin:     General: Skin is warm and dry.      Capillary Refill: Capillary refill takes less than 2 seconds.      Findings: No rash.   Neurological:      General: No focal deficit present.      Mental Status: He is alert and oriented to person, place, and time.      Cranial Nerves: No cranial nerve deficit.      Sensory: No sensory deficit.      Motor: No weakness.      Coordination: Coordination normal.      Gait: Gait normal.      Deep Tendon Reflexes: Reflexes normal.      Comments: No evidence of overt myoclonus on exam.  Reflexes are +2 at the biceps triceps patellar and calcaneal areas.  Downgoing toes.  No evidence of foot drop.   Psychiatric:         Mood and Affect: Mood is anxious. Affect is labile.         Speech: Speech normal.         Behavior: Behavior is agitated. Behavior is cooperative.         Thought Content: Thought content is not paranoid. Thought content does not include homicidal or suicidal ideation. Thought content does not include homicidal or suicidal plan.         Vital Signs  ED  Triage Vitals [02/08/24 1113]   Temperature Pulse Respirations Blood Pressure SpO2   (!) 97 °F (36.1 °C) 72 16 140/99 99 %      Temp Source Heart Rate Source Patient Position - Orthostatic VS BP Location FiO2 (%)   Tympanic -- -- -- --      Pain Score       8           Vitals:    02/08/24 1113   BP: 140/99   Pulse: 72         Visual Acuity      ED Medications  Medications   diazepam (VALIUM) tablet 5 mg (5 mg Oral Given 2/8/24 1204)   acetaminophen (TYLENOL) tablet 975 mg (975 mg Oral Given 2/8/24 1204)   ketorolac (TORADOL) injection 15 mg (15 mg Intravenous Given 2/8/24 1207)   HYDROmorphone (DILAUDID) injection 0.5 mg (0.5 mg Intravenous Given 2/8/24 1208)       Diagnostic Studies  Results Reviewed       Procedure Component Value Units Date/Time    RPR-Syphilis Screening (Total Syphilis IGG/IGM) [[id number]]  (Normal) Collected: 02/08/24 1203    Lab Status: Final result Specimen: Blood from Arm, Left Updated: 02/08/24 1521     Syphilis Total Antibody Non-reactive    Narrative:      Test performed on the Scutum0 system using multiplex flow immunoassay methodology.    Lyme Total AB W Reflex to IGM/IGG [[id number]]  (Normal) Collected: 02/08/24 1203    Lab Status: Final result Specimen: Blood from Arm, Left Updated: 02/08/24 1518    Narrative:      The following orders were created for panel order Lyme Total AB W Reflex to IGM/IGG.  Procedure                               Abnormality         Status                     ---------                               -----------         ------                     Lyme Total AB W Reflex t...[[id number]]  Normal              Final result                 Please view results for these tests on the individual orders.    Lyme Total AB W Reflex to IGM/IGG [[id number]]  (Normal) Collected: 02/08/24 1203    Lab Status: Final result Specimen: Blood from Arm, Left Updated: 02/08/24 1518     Lyme Total Antibodies Negative                   No orders to display               Procedures  Procedures         ED Course                               SBIRT 22yo+      Flowsheet Row Most Recent Value   Initial Alcohol Screen: US AUDIT-C     1. How often do you have a drink containing alcohol? 1 Filed at: 02/08/2024 1111   2. How many drinks containing alcohol do you have on a typical day you are drinking?  3 Filed at: 02/08/2024 1111   3a. Male UNDER 65: How often do you have five or more drinks on one occasion? 1 Filed at: 02/08/2024 1111   Audit-C Score 5 Filed at: 02/08/2024 1111   YANELI: How many times in the past year have you...    Used an illegal drug or used a prescription medication for non-medical reasons? Never Filed at: 02/08/2024 1111                      Medical Decision Making  38-year-old male presents emergency department for chronic neck pain patient denies bowel or bladder dysfunction.  Patient also complains of bilateral hand pain.  Patient also complains of left shoulder pain.  Patient admits that these symptoms are ongoing and chronic in nature.  Patient states that he does take medications as prescribed but they are not effective in nature.  Patient has been seen and evaluated by orthopedics for his neck pain with known spondylosis.  Patient has appointment tomorrow with pain management services.  Patient admits to improvement of his overall condition with management in the department.  At this time do not feel that radiographic data would aid in diagnosis or management.  Overall neurological exam is nonfocal.  Due to multiple areas of discomfort with multiple joints involved will obtain Lyme testing as well as testing for syphilis.  Had a long and thoughtful discussion with patient in regards to his spinal disease as well as neurological exam.  Encourage patient to follow-up with pain management tomorrow.  Encourage patient to follow-up with his known order for MRI of the cervical spine.  Educated patient to take medications as prescribed by his prior providers.   Educated patient on persistent worsening signs symptoms or any concern to either follow-up with pain management primary care to extend or return to the emergency department.  Patient was understanding and agreement and was discharged in amatory improved condition.    Amount and/or Complexity of Data Reviewed  Labs: ordered.    Risk  OTC drugs.  Prescription drug management.             Disposition  Final diagnoses:   Neck pain   Cervical radiculopathy   Chronic neck pain     Time reflects when diagnosis was documented in both MDM as applicable and the Disposition within this note       Time User Action Codes Description Comment    2/8/2024  1:16 PM Chance Carson [M54.2] Neck pain     2/8/2024  1:16 PM Chance Carson [M54.12] Cervical radiculopathy     2/8/2024  1:18 PM Chance Carson [M54.2,  G89.29] Chronic neck pain           ED Disposition       ED Disposition   Discharge    Condition   Stable    Date/Time   Thu Feb 8, 2024 1316    Comment   Amado Chin discharge to home/self care.                   Follow-up Information       Follow up With Specialties Details Why Contact Info Additional Information    Iva Fisher MD Family Medicine   [address] [place] [address]  [facility] [address]  [phone]       [facility] Physical Therapy   [phone]            Discharge Medication List as of 2/8/2024  1:22 PM        START taking these medications    Details   acetaminophen (TYLENOL) 500 mg tablet Take 1 tablet (500 mg total) by mouth every 6 (six) hours as needed for mild pain, moderate pain, headaches or fever, Starting Thu 2/8/2024, Normal      diazepam (VALIUM) 5 mg tablet Take 1 tablet (5 mg total) by mouth 2 (two) times a day for 2 days, Starting Thu 2/8/2024, Until Sat 2/10/2024, Normal           CONTINUE these medications which have CHANGED    Details   ibuprofen (MOTRIN) 400 mg tablet Take 1 tablet (400 mg total) by mouth every 6 (six)  hours as needed for mild pain, Starting Thu 2/8/2024, Normal           CONTINUE these medications which have NOT CHANGED    Details   gabapentin (Neurontin) 100 mg capsule Take 1 capsule (100 mg total) by mouth 3 (three) times a day, Starting Tue 12/26/2023, Normal      lidocaine (Lidoderm) 5 % Apply 1 patch topically daily Remove & Discard patch within 12 hours or as directed by MD, Starting Sat 8/6/2022, Normal      methocarbamol (ROBAXIN) 500 mg tablet Take 1 tablet (500 mg total) by mouth 2 (two) times a day, Starting Tue 12/26/2023, Normal      albuterol (PROVENTIL HFA,VENTOLIN HFA) 90 mcg/act inhaler Inhale 2 puffs every 4 (four) hours as needed for wheezing, Starting Fri 9/27/2019, Print      Diclofenac Sodium (VOLTAREN) 1 % Apply 2 g topically 4 (four) times a day for 7 days, Starting Sat 8/6/2022, Until Sat 8/13/2022, Normal      diphenhydramine, lidocaine, Al/Mg hydroxide, simethicone (Magic Mouthwash) SUSP Swish and spit 10 mL every 4 (four) hours as needed for mouth pain or discomfort, Starting Fri 1/12/2024, Normal      methylPREDNISolone 4 MG tablet therapy pack Use as directed on package, Normal      naproxen (Naprosyn) 500 mg tablet Take 1 tablet (500 mg total) by mouth 2 (two) times a day with meals, Starting Sun 11/12/2023, Normal             No discharge procedures on file.    PDMP Review         Value Time User    PDMP Reviewed  Yes 3/31/2022  3:57 PM Chance Franco PA-C            ED Provider  Electronically Signed by             Chance Carson PA-C  02/08/24 7826

## 2024-02-09 ENCOUNTER — TELEPHONE (OUTPATIENT)
Age: 39
End: 2024-02-09

## 2024-02-09 ENCOUNTER — CONSULT (OUTPATIENT)
Dept: PAIN MEDICINE | Facility: CLINIC | Age: 39
End: 2024-02-09
Payer: MEDICARE

## 2024-02-09 VITALS
DIASTOLIC BLOOD PRESSURE: 93 MMHG | BODY MASS INDEX: 28.82 KG/M2 | HEIGHT: 65 IN | SYSTOLIC BLOOD PRESSURE: 157 MMHG | HEART RATE: 90 BPM | WEIGHT: 173 LBS

## 2024-02-09 DIAGNOSIS — G89.29 CHRONIC BILATERAL LOW BACK PAIN, UNSPECIFIED WHETHER SCIATICA PRESENT: ICD-10-CM

## 2024-02-09 DIAGNOSIS — M54.50 CHRONIC BILATERAL LOW BACK PAIN, UNSPECIFIED WHETHER SCIATICA PRESENT: ICD-10-CM

## 2024-02-09 DIAGNOSIS — G89.29 CHRONIC NECK PAIN: Primary | ICD-10-CM

## 2024-02-09 DIAGNOSIS — M54.2 CHRONIC NECK PAIN: Primary | ICD-10-CM

## 2024-02-09 DIAGNOSIS — M54.12 CERVICAL RADICULOPATHY: ICD-10-CM

## 2024-02-09 DIAGNOSIS — M48.02 CERVICAL SPINAL STENOSIS: ICD-10-CM

## 2024-02-09 PROCEDURE — 99244 OFF/OP CNSLTJ NEW/EST MOD 40: CPT | Performed by: ANESTHESIOLOGY

## 2024-02-09 RX ORDER — GABAPENTIN 300 MG/1
300 CAPSULE ORAL 3 TIMES DAILY
Qty: 90 CAPSULE | Refills: 1 | Status: SHIPPED | OUTPATIENT
Start: 2024-02-09

## 2024-02-09 NOTE — TELEPHONE ENCOUNTER
Caller: Amado JAY    Doctor: Dr Miller    Reason for call: Pt is running behind ETA 5-10 minutes .     Call back#: N/A

## 2024-02-09 NOTE — PROGRESS NOTES
Assessment  1. Chronic neck pain    2. Chronic bilateral low back pain, unspecified whether sciatica present    3. Cervical radiculopathy    4. Cervical spinal stenosis        Plan  38-year-old male with a significant psychiatric history and substance abuse, referred by the ER, presenting for initial consultation regarding neck pain that radiates into bilateral scapular regions and upper extremities in no specific dermatomal fashion with associated numbness, paresthesias, and subjective weakness.  The patient states that the pain also migrates all the way down to his lower extremities and will occasionally have weakness of his legs causing falls.  He has been dealing with these symptoms for the past 8 years which have progressed significantly over the past year.  He does have bladder and bowel incontinence. He denies any interval trauma.  Last MRI of the cervical spine from April 2023 demonstrates multiple disc bulges with central stenosis from C4-5 through C6-7.  Foraminal stenosis most pronounced on the right at C6-7.  Updated MRI of the cervical spine was ordered by orthopedics, however this was denied.  Physical therapy was ordered, however the patient was unable to complete physical therapy secondary to degree of pain he is having.  He was treated in [place] with oxycodone which she felt was very effective.  He has had injections in his neck before which were ineffective.  He is not interested in any interventional therapy because of his past experiences.  Patient has tried Tylenol, NSAIDs, muscle relaxants, and low-dose gabapentin without relief.  He is currently taking gabapentin 100 mg 3 times daily with minimal relief.  Oral steroids did not provide any relief.  Patient endorses bladder and bowel incontinence and worsening dexterity in his hands since last MRI.  As such cervical myelopathy is a concern and I feel an updated MRI of the cervical spine without contrast is warranted.    1 MRI of the cervical  spine without contrast has been ordered  2.  Physical therapy has been ordered as a feel he may benefit from [place]-based exercises  3.  I will increase the patient's gabapentin to 300 mg 3 times daily  4.  The patient is not a candidate for opioid medication secondary to history of substance abuse.  He was given a list of other pain specialist in the area that may be willing to take on high risk cases  5.  I will follow-up with the patient on an as-needed basis      My impressions and treatment recommendations were discussed in detail with the patient who verbalized understanding and had no further questions.  Discharge instructions were provided. I personally saw and examined the patient and I agree with the above discussed plan of care.    Orders Placed This Encounter   Procedures    MRI cervical spine wo contrast     Standing Status:   Future     Standing Expiration Date:   2/9/2028     Scheduling Instructions:      There is no preparation for this test. Please leave your jewelry and valuables at home, wedding rings are the exception. All patients will be required to change into a hospital gown and pants.  Street clothes are not permitted in the MRI.  Magnetic nail polish must be removed prior to arrival for your test. Please bring your insurance cards, a form of photo ID and a list of your medications with you. Arrive 15 minutes prior to your appointment time in order to register. Please bring any prior CT or MRI studies of this area that were not performed at a [facility].            To schedule this appointment, please contact Central Scheduling at [phone].            Prior to your appointment, please make sure you complete the MRI Screening Form when you e-Check in for your appointment. This will be available starting 7 days before your appointment in [facility]. You may receive an e-mail with an activation code if you do not have a [facility] account. If you do not have access to a device, we  will complete your screening at your appointment.     Order Specific Question:   What is the patient's sedation requirement? If Medication for Claustrophobia is selected, order medication at this point.     Answer:   No Sedation     Order Specific Question:   Does this procedure require the 3T MRI at [place] or [place]?     Answer:   No     Order Specific Question:   Release to patient through [facility]     Answer:   Immediate     Order Specific Question:   Is order priority selected as STAT?     Answer:   No     Order Specific Question:   Reason for Exam (FREE TEXT)     Answer:   cervical radiculopathy     Order Specific Question:   When should the test be performed?     Answer:   Elective- non urgent    Ambulatory referral to Physical Therapy     Standing Status:   Future     Standing Expiration Date:   2/9/2025     Referral Priority:   Routine     Referral Type:   Physical Therapy     Referral Reason:   Specialty Services Required     Requested Specialty:   Physical Therapy     Number of Visits Requested:   1     Expiration Date:   2/9/2025     New Medications Ordered This Visit   Medications    gabapentin (Neurontin) 300 mg capsule     Sig: Take 1 capsule (300 mg total) by mouth 3 (three) times a day     Dispense:  90 capsule     Refill:  1       History of Present Illness    Amado Chin is a 38 y.o. male with a significant psychiatric history and substance abuse, referred by the ER, presenting for initial consultation regarding neck pain that radiates into bilateral scapular regions and upper extremities in no specific dermatomal fashion with associated numbness, paresthesias, and subjective weakness.  The patient states that the pain also migrates all the way down to his lower extremities and will occasionally have weakness of his legs causing falls.  He has been dealing with these symptoms for the past 8 years which have progressed significantly over the past year.  He does have bladder and bowel incontinence.  He  denies any saddle anesthesia.  He does have balance issues and gets dizzy when he extends his neck.  He denies any interval trauma.  Last MRI of the cervical spine from April 2023 demonstrates multiple disc bulges with central stenosis from C4-5 through C6-7.  Foraminal stenosis most pronounced on the right at C6-7.  Updated MRI of the cervical spine was ordered by orthopedics, however this was denied.  Physical therapy was ordered, however the patient was unable to complete physical therapy secondary to degree of pain he is having.  He was treated in [place] with oxycodone which she felt was very effective.  He has had injections in his neck before which were ineffective.  He is not interested in any interventional therapy because of his past experiences.  Patient has tried Tylenol, NSAIDs, muscle relaxants, and low-dose gabapentin without relief.  He is currently taking gabapentin 100 mg 3 times daily with minimal relief.  Oral steroids did not provide any relief.  The patient rates his pain severe and constant.  The pain does not follow any particular pattern throughout the day.  The pain is described as sharp, shooting, numbness, pins-and-needles, aching, and throbbing.  The pain is increased with standing, walking, bending, coughing, sneezing, and exercise.  He denies any specific alleviating factors other than oxycodone.    Other than as stated above, the patient denies any interval changes in medications, medical condition, mental condition, symptoms, or allergies since the last office visit.    I have personally reviewed and/or updated the patient's past medical history, past surgical history, family history, social history, current medications, allergies, and vital signs today.     Review of Systems    Patient Active Problem List   Diagnosis    Cervical disc herniation    Paresthesia and pain of extremity    Fall    Concussion without loss of consciousness    Acute pain of right shoulder    Alcohol abuse     Burn    Furuncle of axilla    Neck pain    Elevated blood pressure reading    Weakness of both lower extremities    Asthma    Tobacco abuse    Dizziness    Vitamin B12 deficiency    Intractable pain    Fecal smearing    Urinary incontinence    Drug-induced constipation    Opioid abuse (HCC)    Recurrent major depressive disorder (HCC)    Anxiety    Substance induced mood disorder (HCC)    Mood disorder due to medical condition    Chronic bilateral low back pain    Chronic neck pain    Cervical spinal stenosis    Cervical radiculopathy       Past Medical History:   Diagnosis Date    Asthma     Chronic pain     Hypertension     Thyroglossal duct cyst        Past Surgical History:   Procedure Laterality Date    THYROGLOSSAL DUCT EXCISION      THYROID SURGERY      THYROID SURGERY         Family History   Problem Relation Age of Onset    Hypertension Mother     No Known Problems Father        Social History     Occupational History    Not on file   Tobacco Use    Smoking status: Every Day     Current packs/day: 0.50     Types: Cigarettes    Smokeless tobacco: Never    Tobacco comments:     5 cigerettes a day    Vaping Use    Vaping status: Former    Substances: Nicotine, Flavoring   Substance and Sexual Activity    Alcohol use: Not Currently    Drug use: Not Currently     Types: Marijuana, Methamphetamines    Sexual activity: Yes     Partners: Female     Birth control/protection: Condom Male       Current Outpatient Medications on File Prior to Visit   Medication Sig    acetaminophen (TYLENOL) 500 mg tablet Take 1 tablet (500 mg total) by mouth every 6 (six) hours as needed for mild pain, moderate pain, headaches or fever    albuterol (PROVENTIL HFA,VENTOLIN HFA) 90 mcg/act inhaler Inhale 2 puffs every 4 (four) hours as needed for wheezing    diazepam (VALIUM) 5 mg tablet Take 1 tablet (5 mg total) by mouth 2 (two) times a day for 2 days    diphenhydramine, lidocaine, Al/Mg hydroxide, simethicone (Magic Mouthwash)  "SUSP Swish and spit 10 mL every 4 (four) hours as needed for mouth pain or discomfort    ibuprofen (MOTRIN) 400 mg tablet Take 1 tablet (400 mg total) by mouth every 6 (six) hours as needed for mild pain    lidocaine (Lidoderm) 5 % Apply 1 patch topically daily Remove & Discard patch within 12 hours or as directed by MD    methocarbamol (ROBAXIN) 500 mg tablet Take 1 tablet (500 mg total) by mouth 2 (two) times a day    naproxen (Naprosyn) 500 mg tablet Take 1 tablet (500 mg total) by mouth 2 (two) times a day with meals    [DISCONTINUED] gabapentin (Neurontin) 100 mg capsule Take 1 capsule (100 mg total) by mouth 3 (three) times a day    Diclofenac Sodium (VOLTAREN) 1 % Apply 2 g topically 4 (four) times a day for 7 days    methylPREDNISolone 4 MG tablet therapy pack Use as directed on package (Patient not taking: Reported on 1/17/2024)     Current Facility-Administered Medications on File Prior to Visit   Medication    [COMPLETED] acetaminophen (TYLENOL) tablet 975 mg    [COMPLETED] diazepam (VALIUM) tablet 5 mg    [COMPLETED] HYDROmorphone (DILAUDID) injection 0.5 mg    [COMPLETED] ketorolac (TORADOL) injection 15 mg    [DISCONTINUED] HYDROmorphone (DILAUDID) injection 0.5 mg    [DISCONTINUED] ketorolac (TORADOL) injection 15 mg       No Known Allergies    Physical Exam    /93   Pulse 90   Ht 5' 5\" (1.651 m)   Wt 78.5 kg (173 lb)   BMI 28.79 kg/m²     Constitutional: normal, well developed, well nourished, alert, in no distress and non-toxic and no overt pain behavior.  Eyes: anicteric  HEENT: grossly intact  Neck: supple, symmetric, trachea midline and no masses   Pulmonary:even and unlabored  Cardiovascular:No edema or pitting edema present  Skin:Normal without rashes or lesions and well hydrated  Psychiatric:Mood and affect appropriate  Neurologic:Cranial Nerves II-XII grossly intact  Musculoskeletal:normal gait.  Bilateral cervical paraspinals and trapezii tender to palpation and ropey in texture.  " Bilateral biceps, triceps, brachioradialis, patellar, and Achilles reflexes were 2 out of 4 and symmetrical.  No clonus is noted bilaterally.  Negative Alexnadre's reflex bilaterally.  Bilateral upper extremity strength 5 out of 5 in all muscle groups with the exception of  strength which the patient declined to participate in secondary to pain.  Bilateral upper extremity sensation intact to light touch from C5-T1 dermatomes.  Positive Spurling's bilaterally.  Bilateral lumbar paraspinals mildly tender to palpation and ropey in texture.  Bilateral SI joints nontender to palpation.  Bilateral lower extremity strength 5 out of 5 in all muscle groups.  Sensation intact to light touch in L3-S1 dermatomes bilaterally.  Negative seated straight leg raise bilaterally.    Imaging  MRI cervical spine w wo contrast  Order: [id number]  Impression    IMPRESSION:  1. Diffusely diminished AP diameter of spinal canal.  2. Spondylosis.  3. Disc bulging, disc protrusions at multiple levels. See above.  4. Bilateral foraminal stenosis at multiple levels. See above.  5. Central or right paracentral stenosis at C4-C5, C5-C6, C6-C7.              Workstation:[id number]  Narrative      MRI of cervical spine.    History: Pain.    Technique: Using a surface coil sagittal and axial T1-weighted and T2-weighted  scans were obtained of the cervical spine. Post IV contrast scans were obtained.    14 cc gadolinium.    Findings:    No pathologic contrast enhancement.    Visualized intracranial structures:  Grossly normal    Visualized soft tissue structures of the anterior neck:  Grossly normal    Alignment:  Vertebral bodies are in anatomic alignment.    There are no destructive bone lesions.    Spinal cord:  The cervical spinal cord is normal in size, configuration, MRI signal intensity.  There are no intrinsic spinal cord lesions.    Visualized upper thoracic spine:  Grossly normal    Facet joints: Facet joints are anatomic bilaterally.    The  spinal canal is diminished in AP diameter from the level of C2-C3 to the  level of C6-C7.      C2-C3 through C7-T1:    C2-C3: Minimal degenerative changes in endplates    C3 C4: Mild degenerative changes in endplates.    C4-C5: Mild degenerative changes in endplates. Mild disc bulging and broad-based  central disc protrusion.    C5-C6: Mild degenerative changes in endplates. Mild disc bulging. Right  posterolateral disc protrusion with mass effect on the right lateral aspect of  the spinal cord.    C6-C7: Moderate degenerative changes in endplates and posterior uncovertebral  joints. Diffuse disc bulging and broad-based right posterolateral disc  protrusion.    C7-T1: Minimal degenerative changes in endplates. Minimal disc bulging.    There is central spinal stenosis with flattening of the spinal cord in AP  diameter at C4-C5 and at C5-C6 and the midline into the right of midline. There  is also right paracentral stenosis at C6-C7.    There is minimal right foramina narrowing at C3-C4, C4-C5.    There is marked right foraminal stenosis at C6-C7, minimal at C7-T1.    There is left foraminal stenosis at C3-C4, minimal at C4-C5, moderate at C5-C6.    There is also mild to moderate left foraminal stenosis at C6-C7 and C7-T1.

## 2024-02-28 ENCOUNTER — HOSPITAL ENCOUNTER (EMERGENCY)
Facility: HOSPITAL | Age: 39
Discharge: HOME | End: 2024-02-28
Attending: EMERGENCY MEDICINE | Admitting: EMERGENCY MEDICINE
Payer: MEDICARE

## 2024-02-28 VITALS
DIASTOLIC BLOOD PRESSURE: 72 MMHG | HEART RATE: 77 BPM | OXYGEN SATURATION: 99 % | TEMPERATURE: 98 F | SYSTOLIC BLOOD PRESSURE: 134 MMHG | RESPIRATION RATE: 16 BRPM

## 2024-02-28 DIAGNOSIS — M47.812 SPONDYLOSIS OF CERVICAL SPINE: ICD-10-CM

## 2024-02-28 DIAGNOSIS — M54.2 NECK PAIN: Primary | ICD-10-CM

## 2024-02-28 DIAGNOSIS — M54.12 CERVICAL RADICULOPATHY: ICD-10-CM

## 2024-02-28 LAB
ATRIAL RATE: 102 BPM
P AXIS: 80 DEGREES
PR INTERVAL: 116 MS
QRS AXIS: 46 DEGREES
QRSD INTERVAL: 82 MS
QT INTERVAL: 340 MS
QTC INTERVAL: 443 MS
T WAVE AXIS: 32 DEGREES
VENTRICULAR RATE: 102 BPM

## 2024-02-28 PROCEDURE — 99284 EMERGENCY DEPT VISIT MOD MDM: CPT

## 2024-02-28 PROCEDURE — 99284 EMERGENCY DEPT VISIT MOD MDM: CPT | Performed by: EMERGENCY MEDICINE

## 2024-02-28 PROCEDURE — 93010 ELECTROCARDIOGRAM REPORT: CPT | Performed by: INTERNAL MEDICINE

## 2024-02-28 PROCEDURE — 96375 TX/PRO/DX INJ NEW DRUG ADDON: CPT

## 2024-02-28 PROCEDURE — 93005 ELECTROCARDIOGRAM TRACING: CPT

## 2024-02-28 PROCEDURE — 96374 THER/PROPH/DIAG INJ IV PUSH: CPT

## 2024-02-28 RX ORDER — IBUPROFEN 400 MG/1
400 TABLET ORAL EVERY 6 HOURS PRN
Qty: 20 TABLET | Refills: 0 | Status: SHIPPED | OUTPATIENT
Start: 2024-02-28

## 2024-02-28 RX ORDER — KETOROLAC TROMETHAMINE 30 MG/ML
15 INJECTION, SOLUTION INTRAMUSCULAR; INTRAVENOUS ONCE
Status: COMPLETED | OUTPATIENT
Start: 2024-02-28 | End: 2024-02-28

## 2024-02-28 RX ORDER — DIAZEPAM 5 MG/1
5 TABLET ORAL ONCE
Status: COMPLETED | OUTPATIENT
Start: 2024-02-28 | End: 2024-02-28

## 2024-02-28 RX ORDER — ACETAMINOPHEN 325 MG/1
650 TABLET ORAL ONCE
Status: COMPLETED | OUTPATIENT
Start: 2024-02-28 | End: 2024-02-28

## 2024-02-28 RX ORDER — KETAMINE HCL IN NACL, ISO-OSM 100MG/10ML
0.25 SYRINGE (ML) INJECTION ONCE
Status: COMPLETED | OUTPATIENT
Start: 2024-02-28 | End: 2024-02-28

## 2024-02-28 RX ORDER — DIAZEPAM 5 MG/1
5 TABLET ORAL EVERY 12 HOURS PRN
Qty: 11 TABLET | Refills: 0 | Status: SHIPPED | OUTPATIENT
Start: 2024-02-28

## 2024-02-28 RX ADMIN — KETOROLAC TROMETHAMINE 15 MG: 30 INJECTION, SOLUTION INTRAMUSCULAR; INTRAVENOUS at 18:06

## 2024-02-28 RX ADMIN — Medication 20 MG: at 18:07

## 2024-02-28 RX ADMIN — DIAZEPAM 5 MG: 5 TABLET ORAL at 19:41

## 2024-02-28 RX ADMIN — DIAZEPAM 5 MG: 5 TABLET ORAL at 18:07

## 2024-02-28 RX ADMIN — ACETAMINOPHEN 650 MG: 325 TABLET, FILM COATED ORAL at 18:07

## 2024-02-28 NOTE — Clinical Note
Amado Chin was seen and treated in our emergency department on 2/28/2024.    No restrictions            Diagnosis:     Amado  may return to work on return date.    He may return on this date: 03/02/2024         If you have any questions or concerns, please don't hesitate to call.      Shine Cruz, DO    ______________________________           _______________          _______________  Hospital Representative                              Date                                Time

## 2024-02-29 NOTE — ED PROVIDER NOTES
Final Diagnosis:  1. Neck pain    2. Spondylosis of cervical spine    3. Cervical radiculopathy      Chief Complaint   Patient presents with    Neck Pain     Pt has neck pain has c3,c4,c5 pain and needs surgery for it. Pt also has pain in hands and dizziness       38-year-old male with history of spondylosis of his cervical spine at multiple levels mostly C3, 4, 5, 6 states that he is being worked up for possible spine surgery I see he has a visit with orthopedic spine surgery recently and an MRI scheduled.  He is complaining of pain in his hands as well as dizziness.  He states the pain is worse today after he was walking around a lot currently no focal weakness.  Normal gait  strength out of 5 bilaterally.  His goal for coming to the ER today is relief of pain.    ROS: Neck pain, bilateral arm pain.  No weakness.      PMH:  Past Medical History:   Diagnosis Date    Asthma     Chronic pain     Hypertension     Thyroglossal duct cyst      PSH:  Past Surgical History:   Procedure Laterality Date    THYROGLOSSAL DUCT EXCISION      THYROID SURGERY      THYROID SURGERY         PE:   Vitals:    02/28/24 1602 02/28/24 1822   BP: 152/71 140/72   BP Location: Left arm Left arm   Pulse: 105 102   Resp: 20 18   Temp: 98 °F (36.7 °C)    TempSrc: Oral    SpO2: 100% 98%       General: VS reviewed  Appears in NAD  awake, alert.   Well-nourished, well-developed. Appears stated age.   Speaking normally in full sentences.   Head: Normocephalic, atraumatic  Eyes: EOM-I. No diplopia.   No hyphema.   No subconjunctival hemorrhages.  Symmetrical lids.   ENT: Atraumatic external nose and ears.    MMM  No malocclusion. No stridor. Normal phonation. No drooling. Normal swallowing.   Neck: No JVD.  CV: No pallor noted  Lungs:   No tachypnea  No respiratory distress  MSK:   FROM spontaneously  Skin: Dry, intact.   Neuro: Awake, alert, GCS15, CN II-XII grossly intact.   Motor grossly intact.  Psychiatric/Behavioral: Appropriate mood and  affect   Exam: deferred          A:  - cervical spondylosis with multilevel radiculopathy  P:  - MMA  - 13 point ROS was performed and all are normal unless stated in the history above.   - Nursing note reviewed. Vitals reviewed.   - Orders placed by myself and/or advanced practitioner / resident.    - Previous chart was reviewed  - No language barrier.   - History obtained from patient.   - There are no limitations to the history obtained.       ED Course as of 02/28/24 2002 Wed Feb 28, 2024 2001 Pt feeling better after MMA. Asking for oxycodone - explained I would not as pain is chronic. He asked for ibuprofen then, which I gave an rx for. Pointed out his appt for MRI on 4/18/24. Encouraged follow up with spine surgery afterward     Medications   Ketamine HCl 20 mg in sodium chloride 0.9 % 50 mL (20 mg Intravenous Given 2/28/24 1807)   diazepam (VALIUM) tablet 5 mg (5 mg Oral Given 2/28/24 1807)   ketorolac (TORADOL) injection 15 mg (15 mg Intravenous Given 2/28/24 1806)   acetaminophen (TYLENOL) tablet 650 mg (650 mg Oral Given 2/28/24 1807)   diazepam (VALIUM) tablet 5 mg (5 mg Oral Given 2/28/24 1941)     No orders to display     Orders Placed This Encounter   Procedures    Notify prescriber instructions    Nursing communication Patient monitoring - baseline vitals including HR, RR, Pulse ox, LOC and pain score.    Nursing communication Repeat vital signs, oxygen saturation, LOC, and pain score every 15 minutes x 4 and then every 30 minutes x 2. After this period, vital signs should be rechecked per unit routine.    Insert peripheral IV    ECG 12 lead     Labs Reviewed - No data to display  Time reflects when diagnosis was documented in both MDM as applicable and the Disposition within this note       Time User Action Codes Description Comment    2/28/2024  7:40 PM Shine Cruz [M54.2] Neck pain     2/28/2024  7:40 PM Shine Cruz [M47.812] Spondylosis of cervical spine     2/28/2024   7:41 PM Shine Cruz [M54.12] Cervical radiculopathy           ED Disposition       ED Disposition   Discharge    Condition   Stable    Date/Time   Wed Feb 28, 2024  7:58 PM    Comment   Amado Chin discharge to home/self care.                   Follow-up Information       Follow up With Specialties Details Why Contact Info    Iva Fisher MD Family Medicine Call   [address] [facility] [facility] [address] [facility]   [address]  [place] [address] [phone]            Patient's Medications   Discharge Prescriptions    DIAZEPAM (VALIUM) 5 MG TABLET    Take 1 tablet (5 mg total) by mouth every 12 (twelve) hours as needed for muscle spasms       Start Date: 2/28/2024 End Date: --       Order Dose: 5 mg       Quantity: 11 tablet    Refills: 0    IBUPROFEN (MOTRIN) 400 MG TABLET    Take 1 tablet (400 mg total) by mouth every 6 (six) hours as needed for moderate pain       Start Date: 2/28/2024 End Date: --       Order Dose: 400 mg       Quantity: 20 tablet    Refills: 0     No discharge procedures on file.  Prior to Admission Medications   Prescriptions Last Dose Informant Patient Reported? Taking?   Diclofenac Sodium (VOLTAREN) 1 %   No No   Sig: Apply 2 g topically 4 (four) times a day for 7 days   acetaminophen (TYLENOL) 500 mg tablet   No No   Sig: Take 1 tablet (500 mg total) by mouth every 6 (six) hours as needed for mild pain, moderate pain, headaches or fever   albuterol (PROVENTIL HFA,VENTOLIN HFA) 90 mcg/act inhaler   No No   Sig: Inhale 2 puffs every 4 (four) hours as needed for wheezing   diazepam (VALIUM) 5 mg tablet   No No   Sig: Take 1 tablet (5 mg total) by mouth 2 (two) times a day for 2 days   diphenhydramine, lidocaine, Al/Mg hydroxide, simethicone (Magic Mouthwash) SUSP   No No   Sig: Swish and spit 10 mL every 4 (four) hours as needed for mouth pain or discomfort   gabapentin (Neurontin) 300 mg capsule   No No   Sig: Take 1 capsule (300 mg total) by mouth 3 (three) times a day   ibuprofen (MOTRIN) 400  "mg tablet   No No   Sig: Take 1 tablet (400 mg total) by mouth every 6 (six) hours as needed for mild pain   lidocaine (Lidoderm) 5 %   No No   Sig: Apply 1 patch topically daily Remove & Discard patch within 12 hours or as directed by MD   methocarbamol (ROBAXIN) 500 mg tablet   No No   Sig: Take 1 tablet (500 mg total) by mouth 2 (two) times a day   methylPREDNISolone 4 MG tablet therapy pack   No No   Sig: Use as directed on package   Patient not taking: Reported on 1/17/2024   naproxen (Naprosyn) 500 mg tablet   No No   Sig: Take 1 tablet (500 mg total) by mouth 2 (two) times a day with meals      Facility-Administered Medications: None       Portions of the record may have been created with voice recognition software. Occasional wrong word or \"sound a like\" substitutions may have occurred due to the inherent limitations of voice recognition software. Read the chart carefully and recognize, using context, where substitutions have occurred.    Electronically signed by:  DO Shine Meneses DO  02/28/24 2002    "

## 2024-02-29 NOTE — ED NOTES
Patient requesting 2 sandwiches and to speak to his DrAltagracia At this time. Attending made aware.      Davina Kitchen RN  02/28/24 1953
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
## 2024-07-03 NOTE — PROGRESS NOTES
Progress Note - [address] 43 y o  male MRN: [id number]  Unit/Bed#: [facility] 261-01 Encounter: [id number]    Assessment/Plan   Principal Problem:    Recurrent major depressive disorder ([facility] )  Active Problems:    Paresthesia and pain of extremity    Asthma    Medical clearance for psychiatric admission    Mood disorder ([place] [address] )    Anxiety    Substance induced mood disorder ([place] [address] )    Mood disorder due to medical condition  Patient appears mildly agitated but is able to tolerate interview with this provider  It is very fixated and perseverates on his pain control due to cervical issues and states that he will require surgery eventually following discharge  Patient may need to talk to internal medicine again about increasing his pain medication on Monday but was explained to patient that his significant drug use recently does make prescription of this medication difficult to which he voiced understanding and agreement with  Did not offer complaints of withdrawal to this provider during interview  Endorse a lot of forward thinking and that events surrounding his admission were a misunderstanding as he made statements out of frustration  Nevertheless, patient's mood does continue to be somewhat unstable and has mild to moderate level of anxiety  Will increase patient's Seroquel for help with anxiety and further mood stabilization and to additionally help his sleep which is reduced in the evenings but improved overall  Will also increase patient's gabapentin for anxiety and for possible benefits for pain as well  Will continue patient's Cymbalta without change at this time for mood and anxiety  Patient denies any safety concerning symptoms or hallucinogenic material and does not appear to be responding to internal stimuli  States that lidocaine patches are somewhat helpful but does have a lot of residual pain  Denies having nicotine cravings  Will continue to monitor      Recommended Treatment:   1) Continue Cymbalta DR 60 mg PO QD for mood and anxiety  2) Increase Gabapentin to 400 mg PO TID for daytime anxiety and pain  3) Increase Seroquel to 100 mg PO QHS for agitation and sleep support  4) Continue use of Lidoderm patches, heat packs, and motrin for pain  5) Have IM see patient for consideration on further pain treatment    Continue with group therapy, milieu therapy and occupational therapy  Continue frequent safety checks and vitals per unit protocol  Case discussed with treatment team   Risks, benefits and possible side effects of Medications: Risks, benefits, and possible side effects of medications have been explained to the patient, who verbalizes understanding    ------------------------------------------------------------    Subjective: Per nursing report, Ajit Miller has been cooperative on the unit and compliant with medications  Today, Ajit Miller is consenting for safety on the unit  He reports that his current mood is mentally I am doing well, physically not so much    Patient reports that his pain continues to be his major complaint and is affecting both his mood and anxiety which overall have improved since his arrival   Denies SI/HI/AVH  Denies significant depression at this time  Does report decreased sleep due to his pain but overall has been somewhat approved  Inquires about having medication help for his pain  Denies any side effects to his medications and physical symptoms outside of his pain  Reports that he has been eating without difficulty  Per nursing, patient has been perseverative on pain and enquiring about additional medication help for this  Was screaming in pain last evening but did sleep after utilizing heat pack and Motrin      Progress Toward Goals: slow improvement    Psychiatric Review of Systems:  Behavior over the last 24 hours: mild improvement  Sleep: improving  Appetite: adequate  Medication side effects: none verbalized  ROS: pain diffuse    Vital signs in last 24 hours:  Temp:  [97 2 °F (36 2 °C)-99 1 °F (37 3 °C)] 99 1 °F (37 3 °C)  HR:  [56-87] 60  Resp:  [16-18] 16  BP: (120-189)/(73-99) 120/73    Mental Status Exam:  Appearance:  drowsy, good eye contact, appears stated age, marginal grooming/hygiene and facial scabs   Behavior:  calm, cooperative, guarded and sitting comfortably   Motor: no abnormal movements, psychomotor agitation and normal gait and balance   Speech:  spontaneous, slow, normal volume and coherent   Mood:  "mentally well, but physically not so much"   Affect:  mood-incongruent, constricted, depressed, anxious and irritable   Thought Process:  perseverative   Thought Content: no verbalized delusions or overt paranoia, somatic preoccupation   Perceptual disturbances: no reported hallucinations and does not appear to be responding to internal stimuli at this time   Risk Potential: No active or passive suicidal or homicidal ideation was verbalized during interview, Low potential for aggression based on previous behavior   Cognition: oriented to self and situation, memory grossly intact, appears to be of average intelligence, normal abstract reasoning, attention span appeared shorter than expected for age and cognition not formally tested   Insight:  Improving   Judgment: Fair     Current Medications:  Current Facility-Administered Medications   Medication Dose Route Frequency Provider Last Rate    acetaminophen  975 mg Oral [facility] Judy Armstrong PA-C      albuterol  2 puff Inhalation Q4H PRN Bea Sales MD      aluminum-magnesium hydroxide-simethicone  30 mL Oral Q4H PRN Osie Sin, PA-C      artificial tear  1 application Both Eyes A4O PRN Osie Sin, PA-C      benztropine  1 mg Intramuscular Q4H PRN Max 6/day Osie Sin, PA-C      benztropine  1 mg Oral Q4H PRN Max 6/day Osie Sin, PA-C      bisacodyl  10 mg Rectal Daily PRN Osie Sin, PA-C      cloNIDine  0 2 mg Oral Q6H PRN [place] Suresh Chisholm MD      dicyclomine  10 mg Oral 4x Daily PRN Stacey Aparicio PA-C      hydrOXYzine HCL  50 mg Oral Q6H PRN Max 4/day Stacey Aparicio PA-C      Or    diphenhydrAMINE  50 mg Intramuscular Q6H PRN Stacey Aparicio PA-C      DULoxetine  20 mg Oral Daily Fay Gallardo MD      gabapentin  400 mg Oral TID Dilip Downy, DO      hydrOXYzine HCL  100 mg Oral Q6H PRN Max 4/day Stacey Aparicio PA-C      Or    LORazepam  2 mg Intramuscular Q6H PRN Stacey Aparicio PA-C      hydrOXYzine HCL  25 mg Oral Q6H PRN Max 4/day Stacey Aparicio PA-C      ibuprofen  400 mg Oral Q4H PRN Stacey Aparicio PA-C      ibuprofen  600 mg Oral Q6H PRN Stacey Aparicio PA-C      lidocaine  3 patch Topical Daily [address]      loperamide  2 mg Oral 4x Daily PRN Stacey Aparicio PA-C      methocarbamol  500 mg Oral Q6H PRN Stacey Aparicio PA-C      neomycin-bacitracin-polymyxin b  1 small application Topical BID PRN Marti Peters MD      nicotine  1 patch Transdermal Daily Stacey Aparicio PA-C      nicotine polacrilex  4 mg Oral Q2H PRN Stacey Aparicio PA-C      OLANZapine  10 mg Oral Q3H PRN Max 3/day Stacey Aparicio PA-C      Or    OLANZapine  10 mg Intramuscular Q3H PRN Max 3/day Stacey Aparicio PA-C      OLANZapine  5 mg Oral Q3H PRN Max 6/day Stacey Aparicio PA-C      Or    OLANZapine  5 mg Intramuscular Q3H PRN Max 6/day Stacey Aparicio PA-C      OLANZapine  2 5 mg Oral Q3H PRN Max 8/day Stacey Aparicio PA-C      ondansetron  4 mg Oral Q6H PRN Stacey Aparicio PA-C      oxyCODONE  5 mg Oral Q4H  [address]      polyethylene glycol  17 g Oral Daily PRN Stacey Aparicio PA-C      QUEtiapine  100 mg Oral HS Dilip Downy, DO      senna-docusate sodium  1 tablet Oral Daily PRN Stacey Aparicio PA-C      traZODone  50 mg Oral HS PRN Stacey Aparicio PA-C         Behavioral Health Medications: all current active meds have been reviewed  Changes as in plan section above  Laboratory results:  I have personally reviewed all pertinent laboratory/tests results    Recent Results (from the past 48 hour(s))   ECG 12 lead    Collection Time: 04/01/22  6:31 AM   Result Value Ref Range    Ventricular Rate 56 BPM    Atrial Rate 56 BPM    RI Interval 124 ms    QRSD Interval 92 ms    QT Interval 448 ms    QTC Interval 432 ms    P [place] 39 degrees    QRS Axis 36 degrees    T Wave Axis 20 degrees   CBC and differential    Collection Time: 04/01/22  6:35 AM   Result Value Ref Range    WBC 7 10 4 31 - 10 16 Thousand/uL    RBC 4 97 3 88 - 5 62 Million/uL    Hemoglobin 15 9 12 0 - 17 0 g/dL    Hematocrit 46 5 36 5 - 49 3 %    MCV 94 82 - 98 fL    MCH 32 0 26 8 - 34 3 pg    MCHC 34 2 31 4 - 37 4 g/dL    RDW 14 0 11 6 - 15 1 %    MPV 9 8 8 9 - 12 7 fL    Platelets 759 104 - 064 Thousands/uL    nRBC 0 /100 WBCs    Neutrophils Relative 57 43 - 75 %    Immat GRANS % 0 0 - 2 %    Lymphocytes Relative 24 14 - 44 %    Monocytes Relative 12 4 - 12 %    Eosinophils Relative 6 0 - 6 %    Basophils Relative 1 0 - 1 %    Neutrophils Absolute 4 09 1 85 - 7 62 Thousands/µL    Immature Grans Absolute 0 01 0 00 - 0 20 Thousand/uL    Lymphocytes Absolute 1 70 0 60 - 4 47 Thousands/µL    Monocytes Absolute 0 85 0 17 - 1 22 Thousand/µL    Eosinophils Absolute 0 40 0 00 - 0 61 Thousand/µL    Basophils Absolute 0 05 0 00 - 0 10 Thousands/µL   Comprehensive metabolic panel    Collection Time: 04/01/22  6:35 AM   Result Value Ref Range    Sodium 140 136 - 145 mmol/L    Potassium 3 7 3 5 - 5 3 mmol/L    Chloride 104 100 - 108 mmol/L    CO2 26 21 - 32 mmol/L    ANION GAP 10 4 - 13 mmol/L    BUN 23 5 - 25 mg/dL    Creatinine 1 10 0 60 - 1 30 mg/dL    Glucose 95 65 - 140 mg/dL    Glucose, Fasting 95 65 - 99 mg/dL    Calcium 9 1 8 3 - 10 1 mg/dL    AST 32 5 - 45 U/L    ALT 22 12 - 78 U/L    Alkaline Phosphatase 70 46 - 116 U/L    Total Protein 7 8 6 4 - 8 2 g/dL    Albumin 4 1 3 5 - 5 0 g/dL Total Bilirubin 0 60 0 20 - 1 00 mg/dL    eGFR 84 ml/min/1 73sq m   Lipid panel    Collection Time: 04/01/22  6:35 AM   Result Value Ref Range    Cholesterol 158 See Comment mg/dL    Triglycerides 84 See Comment mg/dL    HDL, Direct 35 (L) >=40 mg/dL    LDL Calculated 106 (H) 0 - 100 mg/dL    Non-HDL-Chol (CHOL-HDL) 123 mg/dl   Hemoglobin A1C    Collection Time: 04/01/22  6:35 AM   Result Value Ref Range    Hemoglobin A1C 5 1 Normal 3 8-5 6%; PreDiabetic 5 7-6 4%;  Diabetic >=6 5%; Glycemic control for adults with diabetes <7 0% %     mg/dl   TSH, 3rd generation with Free T4 reflex    Collection Time: 04/01/22  6:35 AM   Result Value Ref Range    TSH 3RD GENERATON 0 698 0 358 - 3 740 uIU/mL        Foreign Angel DO Pressure injury stage 2 or greater

## 2024-07-28 ENCOUNTER — HOSPITAL ENCOUNTER (EMERGENCY)
Facility: HOSPITAL | Age: 39
Discharge: HOME | DRG: 720 | End: 2024-07-29
Attending: EMERGENCY MEDICINE
Payer: MEDICARE

## 2024-07-28 VITALS
WEIGHT: 177.25 LBS | HEART RATE: 57 BPM | DIASTOLIC BLOOD PRESSURE: 77 MMHG | BODY MASS INDEX: 29.53 KG/M2 | SYSTOLIC BLOOD PRESSURE: 133 MMHG | OXYGEN SATURATION: 96 % | HEIGHT: 65 IN | TEMPERATURE: 100.4 F | RESPIRATION RATE: 18 BRPM

## 2024-07-28 DIAGNOSIS — Z00.8 ENCOUNTER FOR GENERAL PSYCHIATRIC EXAMINATION WITHOUT NEED FOR CARE: Primary | ICD-10-CM

## 2024-07-28 LAB
AMPHETAMINES SERPL QL SCN: POSITIVE
BARBITURATES UR QL: NEGATIVE
BENZODIAZ UR QL: NEGATIVE
COCAINE UR QL: NEGATIVE
ETHANOL EXG-MCNC: 0 MG/DL
FENTANYL UR QL SCN: NEGATIVE
HYDROCODONE UR QL SCN: NEGATIVE
METHADONE UR QL: NEGATIVE
OPIATES UR QL SCN: NEGATIVE
OXYCODONE+OXYMORPHONE UR QL SCN: NEGATIVE
PCP UR QL: NEGATIVE
THC UR QL: POSITIVE

## 2024-07-28 PROCEDURE — 80307 DRUG TEST PRSMV CHEM ANLYZR: CPT

## 2024-07-28 PROCEDURE — 99285 EMERGENCY DEPT VISIT HI MDM: CPT

## 2024-07-28 PROCEDURE — 99284 EMERGENCY DEPT VISIT MOD MDM: CPT | Performed by: EMERGENCY MEDICINE

## 2024-07-28 PROCEDURE — 82075 ASSAY OF BREATH ETHANOL: CPT

## 2024-07-28 RX ORDER — CYCLOBENZAPRINE HCL 10 MG
TABLET ORAL
COMMUNITY
Start: 2024-07-18

## 2024-07-28 RX ORDER — QUETIAPINE FUMARATE 300 MG/1
TABLET, FILM COATED ORAL
COMMUNITY
Start: 2024-07-18

## 2024-07-28 RX ORDER — DROPERIDOL 2.5 MG/ML
1 INJECTION, SOLUTION INTRAMUSCULAR; INTRAVENOUS ONCE
Status: COMPLETED | OUTPATIENT
Start: 2024-07-28 | End: 2024-07-28

## 2024-07-28 RX ORDER — MIDAZOLAM HYDROCHLORIDE 1 MG/ML
2 INJECTION INTRAMUSCULAR; INTRAVENOUS ONCE
Status: COMPLETED | OUTPATIENT
Start: 2024-07-28 | End: 2024-07-28

## 2024-07-28 RX ORDER — AMLODIPINE BESYLATE 5 MG/1
5 TABLET ORAL DAILY
COMMUNITY
Start: 2024-03-07 | End: 2025-03-07

## 2024-07-28 RX ORDER — LIDOCAINE HYDROCHLORIDE 10 MG/ML
5 INJECTION, SOLUTION EPIDURAL; INFILTRATION; INTRACAUDAL; PERINEURAL ONCE
Status: COMPLETED | OUTPATIENT
Start: 2024-07-28 | End: 2024-07-28

## 2024-07-28 RX ADMIN — LIDOCAINE HYDROCHLORIDE 5 ML: 10 INJECTION, SOLUTION EPIDURAL; INFILTRATION; INTRACAUDAL at 19:59

## 2024-07-29 ENCOUNTER — HOSPITAL ENCOUNTER (INPATIENT)
Facility: HOSPITAL | Age: 39
LOS: 5 days | Discharge: HOME | DRG: 720 | End: 2024-08-03
Attending: EMERGENCY MEDICINE | Admitting: INTERNAL MEDICINE
Payer: MEDICARE

## 2024-07-29 ENCOUNTER — APPOINTMENT (EMERGENCY)
Dept: RADIOLOGY | Facility: HOSPITAL | Age: 39
DRG: 720 | End: 2024-07-29
Payer: MEDICARE

## 2024-07-29 DIAGNOSIS — F19.90 DRUG USE: ICD-10-CM

## 2024-07-29 DIAGNOSIS — Z59.9 FINANCIAL DIFFICULTIES: ICD-10-CM

## 2024-07-29 DIAGNOSIS — D72.829 LEUKOCYTOSIS: ICD-10-CM

## 2024-07-29 DIAGNOSIS — F22 DELUSION (HCC): ICD-10-CM

## 2024-07-29 DIAGNOSIS — Z59.00 HOMELESSNESS: ICD-10-CM

## 2024-07-29 DIAGNOSIS — L03.116 CELLULITIS OF LEFT LOWER EXTREMITY: Primary | ICD-10-CM

## 2024-07-29 PROBLEM — A41.9 SEPSIS (HCC): Status: ACTIVE | Noted: 2024-07-29

## 2024-07-29 LAB
ANION GAP SERPL CALCULATED.3IONS-SCNC: 7 MMOL/L (ref 4–13)
ATRIAL RATE: 92 BPM
BASOPHILS # BLD AUTO: 0.05 THOUSANDS/ÂΜL (ref 0–0.1)
BASOPHILS NFR BLD AUTO: 0 % (ref 0–1)
BUN SERPL-MCNC: 20 MG/DL (ref 5–25)
CALCIUM SERPL-MCNC: 8.7 MG/DL (ref 8.4–10.2)
CHLORIDE SERPL-SCNC: 107 MMOL/L (ref 96–108)
CO2 SERPL-SCNC: 24 MMOL/L (ref 21–32)
CREAT SERPL-MCNC: 0.93 MG/DL (ref 0.6–1.3)
EOSINOPHIL # BLD AUTO: 0.35 THOUSAND/ÂΜL (ref 0–0.61)
EOSINOPHIL NFR BLD AUTO: 3 % (ref 0–6)
ERYTHROCYTE [DISTWIDTH] IN BLOOD BY AUTOMATED COUNT: 13.6 % (ref 11.6–15.1)
GFR SERPL CREATININE-BSD FRML MDRD: 102 ML/MIN/1.73SQ M
GLUCOSE SERPL-MCNC: 93 MG/DL (ref 65–140)
HCT VFR BLD AUTO: 37.4 % (ref 36.5–49.3)
HGB BLD-MCNC: 12.8 G/DL (ref 12–17)
IMM GRANULOCYTES # BLD AUTO: 0.04 THOUSAND/UL (ref 0–0.2)
IMM GRANULOCYTES NFR BLD AUTO: 0 % (ref 0–2)
LACTATE SERPL-SCNC: 1.3 MMOL/L (ref 0.5–2)
LYMPHOCYTES # BLD AUTO: 1.16 THOUSANDS/ÂΜL (ref 0.6–4.47)
LYMPHOCYTES NFR BLD AUTO: 9 % (ref 14–44)
MCH RBC QN AUTO: 31.6 PG (ref 26.8–34.3)
MCHC RBC AUTO-ENTMCNC: 34.2 G/DL (ref 31.4–37.4)
MCV RBC AUTO: 92 FL (ref 82–98)
MONOCYTES # BLD AUTO: 1.36 THOUSAND/ÂΜL (ref 0.17–1.22)
MONOCYTES NFR BLD AUTO: 11 % (ref 4–12)
NEUTROPHILS # BLD AUTO: 9.86 THOUSANDS/ÂΜL (ref 1.85–7.62)
NEUTS SEG NFR BLD AUTO: 77 % (ref 43–75)
NRBC BLD AUTO-RTO: 0 /100 WBCS
P AXIS: 77 DEGREES
PLATELET # BLD AUTO: 271 THOUSANDS/UL (ref 149–390)
PMV BLD AUTO: 9.1 FL (ref 8.9–12.7)
POTASSIUM SERPL-SCNC: 3.8 MMOL/L (ref 3.5–5.3)
PR INTERVAL: 132 MS
QRS AXIS: 42 DEGREES
QRSD INTERVAL: 96 MS
QT INTERVAL: 370 MS
QTC INTERVAL: 457 MS
RBC # BLD AUTO: 4.05 MILLION/UL (ref 3.88–5.62)
SODIUM SERPL-SCNC: 138 MMOL/L (ref 135–147)
T WAVE AXIS: 38 DEGREES
VENTRICULAR RATE: 92 BPM
WBC # BLD AUTO: 12.82 THOUSAND/UL (ref 4.31–10.16)

## 2024-07-29 PROCEDURE — 96375 TX/PRO/DX INJ NEW DRUG ADDON: CPT

## 2024-07-29 PROCEDURE — 73610 X-RAY EXAM OF ANKLE: CPT

## 2024-07-29 PROCEDURE — 93010 ELECTROCARDIOGRAM REPORT: CPT | Performed by: INTERNAL MEDICINE

## 2024-07-29 PROCEDURE — 83605 ASSAY OF LACTIC ACID: CPT

## 2024-07-29 PROCEDURE — 96366 THER/PROPH/DIAG IV INF ADDON: CPT

## 2024-07-29 PROCEDURE — 99223 1ST HOSP IP/OBS HIGH 75: CPT | Performed by: INTERNAL MEDICINE

## 2024-07-29 PROCEDURE — 96365 THER/PROPH/DIAG IV INF INIT: CPT

## 2024-07-29 PROCEDURE — 85025 COMPLETE CBC W/AUTO DIFF WBC: CPT

## 2024-07-29 PROCEDURE — 99284 EMERGENCY DEPT VISIT MOD MDM: CPT

## 2024-07-29 PROCEDURE — 80048 BASIC METABOLIC PNL TOTAL CA: CPT

## 2024-07-29 PROCEDURE — 93005 ELECTROCARDIOGRAM TRACING: CPT

## 2024-07-29 PROCEDURE — 36415 COLL VENOUS BLD VENIPUNCTURE: CPT

## 2024-07-29 PROCEDURE — 73590 X-RAY EXAM OF LOWER LEG: CPT

## 2024-07-29 PROCEDURE — 99284 EMERGENCY DEPT VISIT MOD MDM: CPT | Performed by: EMERGENCY MEDICINE

## 2024-07-29 RX ORDER — QUETIAPINE FUMARATE 100 MG/1
300 TABLET, FILM COATED ORAL
Status: DISCONTINUED | OUTPATIENT
Start: 2024-07-29 | End: 2024-08-03 | Stop reason: HOSPADM

## 2024-07-29 RX ORDER — KETOROLAC TROMETHAMINE 30 MG/ML
15 INJECTION, SOLUTION INTRAMUSCULAR; INTRAVENOUS ONCE
Status: DISCONTINUED | OUTPATIENT
Start: 2024-07-29 | End: 2024-07-29

## 2024-07-29 RX ORDER — KETOROLAC TROMETHAMINE 30 MG/ML
15 INJECTION, SOLUTION INTRAMUSCULAR; INTRAVENOUS ONCE
Status: COMPLETED | OUTPATIENT
Start: 2024-07-29 | End: 2024-07-29

## 2024-07-29 RX ORDER — ACETAMINOPHEN 325 MG/1
975 TABLET ORAL EVERY 6 HOURS PRN
Status: DISCONTINUED | OUTPATIENT
Start: 2024-07-29 | End: 2024-07-30

## 2024-07-29 RX ORDER — ENOXAPARIN SODIUM 100 MG/ML
40 INJECTION SUBCUTANEOUS DAILY
Status: DISCONTINUED | OUTPATIENT
Start: 2024-07-30 | End: 2024-08-03 | Stop reason: HOSPADM

## 2024-07-29 RX ORDER — GABAPENTIN 300 MG/1
300 CAPSULE ORAL 3 TIMES DAILY
Status: DISCONTINUED | OUTPATIENT
Start: 2024-07-29 | End: 2024-08-03 | Stop reason: HOSPADM

## 2024-07-29 RX ORDER — CYCLOBENZAPRINE HCL 10 MG
10 TABLET ORAL 3 TIMES DAILY PRN
Status: DISCONTINUED | OUTPATIENT
Start: 2024-07-29 | End: 2024-08-03 | Stop reason: HOSPADM

## 2024-07-29 RX ORDER — ACETAMINOPHEN 325 MG/1
975 TABLET ORAL ONCE
Status: COMPLETED | OUTPATIENT
Start: 2024-07-29 | End: 2024-07-29

## 2024-07-29 RX ORDER — CEFAZOLIN SODIUM 2 G/50ML
2000 SOLUTION INTRAVENOUS EVERY 8 HOURS
Status: DISCONTINUED | OUTPATIENT
Start: 2024-07-29 | End: 2024-07-30

## 2024-07-29 RX ORDER — ALBUTEROL SULFATE 90 UG/1
2 AEROSOL, METERED RESPIRATORY (INHALATION) EVERY 4 HOURS PRN
Status: DISCONTINUED | OUTPATIENT
Start: 2024-07-29 | End: 2024-08-03 | Stop reason: HOSPADM

## 2024-07-29 RX ADMIN — KETOROLAC TROMETHAMINE 15 MG: 30 INJECTION, SOLUTION INTRAMUSCULAR at 14:22

## 2024-07-29 RX ADMIN — CEFAZOLIN SODIUM 2000 MG: 2 SOLUTION INTRAVENOUS at 17:23

## 2024-07-29 RX ADMIN — VANCOMYCIN HYDROCHLORIDE 1250 MG: 5 INJECTION, POWDER, LYOPHILIZED, FOR SOLUTION INTRAVENOUS at 14:31

## 2024-07-29 RX ADMIN — QUETIAPINE FUMARATE 300 MG: 100 TABLET ORAL at 21:49

## 2024-07-29 RX ADMIN — ACETAMINOPHEN 975 MG: 325 TABLET, FILM COATED ORAL at 14:19

## 2024-07-29 RX ADMIN — GABAPENTIN 300 MG: 300 CAPSULE ORAL at 17:23

## 2024-07-29 RX ADMIN — SODIUM CHLORIDE, SODIUM LACTATE, POTASSIUM CHLORIDE, AND CALCIUM CHLORIDE 1000 ML: .6; .31; .03; .02 INJECTION, SOLUTION INTRAVENOUS at 17:23

## 2024-07-29 RX ADMIN — GABAPENTIN 300 MG: 300 CAPSULE ORAL at 21:49

## 2024-07-29 SDOH — ECONOMIC STABILITY - INCOME SECURITY: PROBLEM RELATED TO HOUSING AND ECONOMIC CIRCUMSTANCES, UNSPECIFIED: Z59.9

## 2024-07-29 SDOH — ECONOMIC STABILITY - HOUSING INSECURITY: HOMELESSNESS UNSPECIFIED: Z59.00

## 2024-07-29 NOTE — ED ATTENDING ATTESTATION
7/29/2024  I, Yemi Matias MD, saw and evaluated the patient. I have discussed the patient with the resident/non-physician practitioner and agree with the resident's/non-physician practitioner's findings, Plan of Care, and MDM as documented in the resident's/non-physician practitioner's note, except where noted. All available labs and Radiology studies were reviewed.  I was present for key portions of any procedure(s) performed by the resident/non-physician practitioner and I was immediately available to provide assistance.       At this point I agree with the current assessment done in the Emergency Department.  I have conducted an independent evaluation of this patient a history and physical is as follows:    ED Course     38-year-old male presenting to the emergency department for evaluation of left lower extremity pain, erythema, and swelling.  Patient states that over the past 3 days he has noticed increasing pain on the posterior Achilles area where he has had abrasions from ill fitting footwear.  Patient states that today he developed increasing swelling and pain of the left lower extremity.  Patient was noted to be in the emergency department last night for psychiatric reasons.  Patient states that he currently feels that his psychiatric disease is well-controlled.  He denies any fever.  He states that he does not have the means to get oral antibiotics filled as an outpatient and does not have a primary care physician currently.    On examination patient is initially ambulating around the room without an antalgic gait.  Head is unremarkable.  Mucous membranes are moist.  Neck is supple.  Lungs are clear bilaterally.  Heart is tachycardic and regular.  Lungs are clear bilaterally.  Abdomen is nondistended, soft and nontender.  Extremities are significant for left lower extremity edema.  The patient has erythema extending proximally from a abraded area over his left Achilles.  There is petechial type  hemorrhages occurring within the erythema.  This is very tender to palpation.  There is warmth.        MEDICAL DECISION MAKING    Number and Complexity of Problems  Differential diagnosis: Cellulitis, impaired social situation secondary to underlying psychiatric disease.    Medical Decision Making Data  External documents reviewed: Reviewed ED note from last night.  My EKG interpretation: Normal sinus rhythm at 92 bpm.  No acute ST or T wave changes.  My X-ray interpretation: No significant findings on x-ray.    XR ankle 3+ views LEFT   ED Interpretation   No soft tissue abnormality.      Final Result      No acute osseous abnormality.         Computerized Assisted Algorithm (CAA) may have been used to analyze all applicable images.               Workstation performed: [id number]         XR tibia fibula 2 views LEFT   ED Interpretation   No soft tissue abnormality.      Final Result      No acute osseous abnormality.         Computerized Assisted Algorithm (CAA) may have been used to analyze all applicable images.               Workstation performed: [id number]             Labs Reviewed   CBC AND DIFFERENTIAL - Abnormal       Result Value Ref Range Status    WBC 12.82 (*) 4.31 - 10.16 Thousand/uL Final    RBC 4.05  3.88 - 5.62 Million/uL Final    Hemoglobin 12.8  12.0 - 17.0 g/dL Final    Hematocrit 37.4  36.5 - 49.3 % Final    MCV 92  82 - 98 fL Final    MCH 31.6  26.8 - 34.3 pg Final    MCHC 34.2  31.4 - 37.4 g/dL Final    RDW 13.6  11.6 - 15.1 % Final    MPV 9.1  8.9 - 12.7 fL Final    Platelets 271  149 - 390 Thousands/uL Final    nRBC 0  /100 WBCs Final    Segmented % 77 (*) 43 - 75 % Final    Immature Grans % 0  0 - 2 % Final    Lymphocytes % 9 (*) 14 - 44 % Final    Monocytes % 11  4 - 12 % Final    Eosinophils Relative 3  0 - 6 % Final    Basophils Relative 0  0 - 1 % Final    Absolute Neutrophils 9.86 (*) 1.85 - 7.62 Thousands/µL Final    Absolute Immature Grans 0.04  0.00 - 0.20 Thousand/uL Final     Absolute Lymphocytes 1.16  0.60 - 4.47 Thousands/µL Final    Absolute Monocytes 1.36 (*) 0.17 - 1.22 Thousand/µL Final    Eosinophils Absolute 0.35  0.00 - 0.61 Thousand/µL Final    Basophils Absolute 0.05  0.00 - 0.10 Thousands/µL Final   BASIC METABOLIC PANEL    Sodium 138  135 - 147 mmol/L Final    Potassium 3.8  3.5 - 5.3 mmol/L Final    Chloride 107  96 - 108 mmol/L Final    CO2 24  21 - 32 mmol/L Final    ANION GAP 7  4 - 13 mmol/L Final    BUN 20  5 - 25 mg/dL Final    Creatinine 0.93  0.60 - 1.30 mg/dL Final    Comment: Standardized to IDMS reference method    Glucose 93  65 - 140 mg/dL Final    Comment: If the patient is fasting, the ADA then defines impaired fasting glucose as > 100 mg/dL and diabetes as > or equal to 123 mg/dL.    Calcium 8.7  8.4 - 10.2 mg/dL Final    eGFR 102  ml/min/1.73sq m Final    Narrative:     National Kidney Disease Foundation guidelines for Chronic Kidney Disease (CKD):     Stage 1 with normal or high GFR (GFR > 90 mL/min/1.73 square meters)    Stage 2 Mild CKD (GFR = 60-89 mL/min/1.73 square meters)    Stage 3A Moderate CKD (GFR = 45-59 mL/min/1.73 square meters)    Stage 3B Moderate CKD (GFR = 30-44 mL/min/1.73 square meters)    Stage 4 Severe CKD (GFR = 15-29 mL/min/1.73 square meters)    Stage 5 End Stage CKD (GFR <15 mL/min/1.73 square meters)  Note: GFR calculation is accurate only with a steady state creatinine       Labs reviewed by me are significant for:  Elevated white blood cell count of 12.82.    Discussed case with: SOD medicine.  Considered admission for: Cellulitis lower extremity.    Treatment and Disposition  ED course: Patient remained stable.  Because of patient's difficulty with obtaining outpatient follow-up and outpatient antibiotics, patient will be admitted for IV antibiotics and observation.  Shared decision making: Patient agreeable with plan.  Code status: Full code.              Critical Care Time  Procedures

## 2024-07-29 NOTE — DISCHARGE INSTRUCTIONS
You have been seen in the emergency department for psychiatric evaluation.  There is no indication for involuntary treatment.  We will discharge you home.  We have provided a list of resources for psychiatrists in the area as well as substance abuse counselors.  Stairs we recommend following up with these resources.  Please continue to take medications as prescribed.  Return to the emergency department immediately for concern for hallucinations, or suicidal ideations or homicidal ideations.

## 2024-07-29 NOTE — ASSESSMENT & PLAN NOTE
Endorses 1/2 ppd smoking history for many years  Declined icotine patch  - smoking cessation education provided

## 2024-07-29 NOTE — Clinical Note
Case was discussed with SOD and the patient's admission status was agreed to be Admission Status: inpatient status to the service of Dr. Frias
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
## 2024-07-29 NOTE — ED NOTES
Barbs x2 removed from pt's back by Dr. Berg at this time.      Stacie Addison RN  07/28/24 2002       Stacie Addison RN  07/28/24 2003

## 2024-07-29 NOTE — ASSESSMENT & PLAN NOTE
Patient meeting SIRS criteria on admission with WBC elevation (12.82), tachycardia (), and suspected LLE cellulitis  - secondary to LLE cellulitis, sepsis physiology resolved

## 2024-07-29 NOTE — ED PROVIDER NOTES
History  Chief Complaint   Patient presents with    Ankle Pain     Pt complains of L ankle pain, is red, and swollen. Pt states he has cut behind ankle.     HPI    Patient is a 38 y.o. male with PMHx asthma and schizophrenia who presents to the ED via EMS for evaluation of left lower extremity pain, erythema, swelling x 3 days.  Patient noticed abrasion in the posterior Achilles area that he attributes to ill fitting footwear.  Patient denies any associated trauma.  Denies any fever, chills, chest pain, shortness of breath, abdominal pain, nausea, vomiting.  Per chart review, patient was evaluated last night for psychiatric reasons.  Patient was cleared for discharge after evaluation by ED crisis worker.  Patient states he has never had a skin infection in the past.  He is currently experiencing unstable housing situation, does not have a job and does not have the means to get oral antibiotics outpatient.     ED Course as of 07/30/24 0646   Mon Jul 29, 2024   1345 Blood Pressure: 132/82   1345 Temperature: 98.5 °F (36.9 °C)   1345 Temp Source: Temporal   1345 Pulse(!): 114   1345 Respirations: 20   1345 SpO2: 99 %   1409 ASSESSMENT: Patient is a 38 y.o. male who presents with LLE pain, swelling, redness noticed 3 days ago.  DDX includes but not limited to: cellulitis, fracture. Doubt DVT given clinical exam.   PLAN: CBC, BMP, L ankle xray, L tib/fib xray. Treated with Tylenol, Toradol.     1459 XR ankle 3+ views LEFT  No acute osseous abnormality.   1459 XR tibia fibula 2 views LEFT  No acute osseous abnormality.   1459 WBC(!): 12.82   1459 Segmented %(!): 77   1508 Reached out to SOD for admission    1646 Admitted to SOD for LLE cellulitis           Prior to Admission Medications   Prescriptions Last Dose Informant Patient Reported? Taking?   Diclofenac Sodium (VOLTAREN) 1 %   No No   Sig: Apply 2 g topically 4 (four) times a day for 7 days   QUEtiapine (SEROquel) 300 mg tablet   Yes No   Sig: TAKE 1 TABLET BY  MOUTH DAILY AT BEDTIME X 14 DAYS   acetaminophen (TYLENOL) 500 mg tablet   No No   Sig: Take 1 tablet (500 mg total) by mouth every 6 (six) hours as needed for mild pain, moderate pain, headaches or fever   albuterol (PROVENTIL HFA,VENTOLIN HFA) 90 mcg/act inhaler   No No   Sig: Inhale 2 puffs every 4 (four) hours as needed for wheezing   amLODIPine (NORVASC) 5 mg tablet   Yes No   Sig: Take 5 mg by mouth daily   cyclobenzaprine (FLEXERIL) 10 mg tablet   Yes No   Sig: TAKE 1 TABLET BY MOUTH THREE TIMES A DAY AS NEEDED FOR MUSCLE SPASM FOR 14 DAYS.   diazepam (VALIUM) 5 mg tablet   No No   Sig: Take 1 tablet (5 mg total) by mouth 2 (two) times a day for 2 days   diazepam (VALIUM) 5 mg tablet   No No   Sig: Take 1 tablet (5 mg total) by mouth every 12 (twelve) hours as needed for muscle spasms   diphenhydramine, lidocaine, Al/Mg hydroxide, simethicone (Magic Mouthwash) SUSP   No No   Sig: Swish and spit 10 mL every 4 (four) hours as needed for mouth pain or discomfort   gabapentin (Neurontin) 300 mg capsule   No No   Sig: Take 1 capsule (300 mg total) by mouth 3 (three) times a day   ibuprofen (MOTRIN) 400 mg tablet   No No   Sig: Take 1 tablet (400 mg total) by mouth every 6 (six) hours as needed for mild pain   ibuprofen (MOTRIN) 400 mg tablet   No No   Sig: Take 1 tablet (400 mg total) by mouth every 6 (six) hours as needed for moderate pain   lidocaine (Lidoderm) 5 %   No No   Sig: Apply 1 patch topically daily Remove & Discard patch within 12 hours or as directed by MD   methocarbamol (ROBAXIN) 500 mg tablet   No No   Sig: Take 1 tablet (500 mg total) by mouth 2 (two) times a day   methylPREDNISolone 4 MG tablet therapy pack   No No   Sig: Use as directed on package   Patient not taking: Reported on 1/17/2024   naproxen (Naprosyn) 500 mg tablet   No No   Sig: Take 1 tablet (500 mg total) by mouth 2 (two) times a day with meals      Facility-Administered Medications: None       Past Medical History:   Diagnosis  Date    Asthma     Chronic pain     Hypertension     Thyroglossal duct cyst        Past Surgical History:   Procedure Laterality Date    THYROGLOSSAL DUCT EXCISION      THYROID SURGERY      THYROID SURGERY         Family History   Problem Relation Age of Onset    Hypertension Mother     No Known Problems Father      I have reviewed and agree with the history as documented.    E-Cigarette/Vaping    E-Cigarette Use Former User     Cartridges/Day 1      E-Cigarette/Vaping Substances    Nicotine Yes     THC No     CBD No     Flavoring Yes     Other No     Unknown No      Social History     Tobacco Use    Smoking status: Every Day     Current packs/day: 0.50     Types: Cigarettes    Smokeless tobacco: Never    Tobacco comments:     5 cigerettes a day    Vaping Use    Vaping status: Former    Substances: Nicotine, Flavoring   Substance Use Topics    Alcohol use: Not Currently    Drug use: Not Currently     Types: Marijuana        Review of Systems   Musculoskeletal:         Left lower extremity pain, redness, swelling        Physical Exam  ED Triage Vitals [07/29/24 1238]   Temperature Pulse Respirations Blood Pressure SpO2   98.5 °F (36.9 °C) (!) 114 20 132/82 99 %      Temp Source Heart Rate Source Patient Position - Orthostatic VS BP Location FiO2 (%)   Temporal Monitor Sitting Left arm --      Pain Score       9             Orthostatic Vital Signs  Vitals:    07/29/24 1238 07/29/24 1845 07/30/24 0302   BP: 132/82 148/89 140/88   Pulse: (!) 114 93 90   Patient Position - Orthostatic VS: Sitting Sitting        Physical Exam  Vitals and nursing note reviewed.   Constitutional:       General: He is not in acute distress.     Appearance: Normal appearance. He is well-developed. He is not ill-appearing, toxic-appearing or diaphoretic.   HENT:      Head: Normocephalic and atraumatic.      Mouth/Throat:      Mouth: Mucous membranes are moist.      Pharynx: Oropharynx is clear.   Eyes:      Conjunctiva/sclera: Conjunctivae  normal.   Cardiovascular:      Rate and Rhythm: Normal rate and regular rhythm.      Heart sounds: No murmur heard.  Pulmonary:      Effort: Pulmonary effort is normal. No respiratory distress.      Breath sounds: Normal breath sounds. No stridor. No wheezing, rhonchi or rales.   Abdominal:      Palpations: Abdomen is soft.      Tenderness: There is no abdominal tenderness.   Musculoskeletal:         General: Swelling and tenderness present. No deformity. Normal range of motion.      Cervical back: Normal range of motion and neck supple. No rigidity.      Left lower leg: Edema present.      Comments: Small abrasion ~1cm to left posterior achilles area. No drainage noted, no active bleeding noted. There is erythema from left calcaneus extending up to mid calf with TTP. Mild swelling noted.    Skin:     General: Skin is warm and dry.      Capillary Refill: Capillary refill takes less than 2 seconds.   Neurological:      General: No focal deficit present.      Mental Status: He is alert and oriented to person, place, and time.   Psychiatric:         Mood and Affect: Mood normal.             ED Medications  Medications   enoxaparin (LOVENOX) subcutaneous injection 40 mg (has no administration in time range)   ceFAZolin (ANCEF) IVPB (premix in dextrose) 2,000 mg 50 mL (0 mg Intravenous Stopped 7/30/24 0229)   acetaminophen (TYLENOL) tablet 975 mg (has no administration in time range)   albuterol (PROVENTIL HFA,VENTOLIN HFA) inhaler 2 puff (has no administration in time range)   cyclobenzaprine (FLEXERIL) tablet 10 mg (has no administration in time range)   gabapentin (NEURONTIN) capsule 300 mg (300 mg Oral Given 7/29/24 2149)   QUEtiapine (SEROquel) tablet 300 mg (300 mg Oral Given 7/29/24 2149)   acetaminophen (TYLENOL) tablet 975 mg (975 mg Oral Given 7/29/24 1419)   vancomycin (VANCOCIN) 1,250 mg in sodium chloride 0.9 % 250 mL IVPB (0 mg Intravenous Stopped 7/29/24 1636)   ketorolac (TORADOL) injection 15 mg (15 mg  Intravenous Given 7/29/24 1422)   lactated ringers bolus 1,000 mL (0 mL Intravenous Stopped 7/29/24 1923)       Diagnostic Studies  Results Reviewed       Procedure Component Value Units Date/Time    Basic metabolic panel [[id number]]  (Abnormal) Collected: 07/30/24 0409    Lab Status: Final result Specimen: Blood from Arm, Left Updated: 07/30/24 0452     Sodium 138 mmol/L      Potassium 3.6 mmol/L      Chloride 108 mmol/L      CO2 23 mmol/L      ANION GAP 7 mmol/L      BUN 19 mg/dL      Creatinine 1.07 mg/dL      Glucose 99 mg/dL      Calcium 8.1 mg/dL      eGFR 86 ml/min/1.73sq m     Narrative:      National Kidney Disease Foundation guidelines for Chronic Kidney Disease (CKD):     Stage 1 with normal or high GFR (GFR > 90 mL/min/1.73 square meters)    Stage 2 Mild CKD (GFR = 60-89 mL/min/1.73 square meters)    Stage 3A Moderate CKD (GFR = 45-59 mL/min/1.73 square meters)    Stage 3B Moderate CKD (GFR = 30-44 mL/min/1.73 square meters)    Stage 4 Severe CKD (GFR = 15-29 mL/min/1.73 square meters)    Stage 5 End Stage CKD (GFR <15 mL/min/1.73 square meters)  Note: GFR calculation is accurate only with a steady state creatinine    CBC and differential [[id number]]  (Abnormal) Collected: 07/30/24 0409    Lab Status: Final result Specimen: Blood from Arm, Left Updated: 07/30/24 0423     WBC 8.00 Thousand/uL      RBC 3.93 Million/uL      Hemoglobin 12.3 g/dL      Hematocrit 36.9 %      MCV 94 fL      MCH 31.3 pg      MCHC 33.3 g/dL      RDW 13.7 %      MPV 9.1 fL      Platelets 269 Thousands/uL      nRBC 0 /100 WBCs      Segmented % 62 %      Immature Grans % 0 %      Lymphocytes % 17 %      Monocytes % 12 %      Eosinophils Relative 8 %      Basophils Relative 1 %      Absolute Neutrophils 4.98 Thousands/µL      Absolute Immature Grans 0.03 Thousand/uL      Absolute Lymphocytes 1.37 Thousands/µL      Absolute Monocytes 0.93 Thousand/µL      Eosinophils Absolute 0.64 Thousand/µL      Basophils Absolute 0.05 Thousands/µL      Lactic acid, plasma (w/reflex if result > 2.0) [[id number]]  (Normal) Collected: 07/29/24 1743    Lab Status: Final result Specimen: Blood from Arm, Left Updated: 07/29/24 1815     LACTIC ACID 1.3 mmol/L     Narrative:      Result may be elevated if tourniquet was used during collection.    Basic metabolic panel [[id number]] Collected: 07/29/24 1418    Lab Status: Final result Specimen: Blood from Arm, Left Updated: 07/29/24 1454     Sodium 138 mmol/L      Potassium 3.8 mmol/L      Chloride 107 mmol/L      CO2 24 mmol/L      ANION GAP 7 mmol/L      BUN 20 mg/dL      Creatinine 0.93 mg/dL      Glucose 93 mg/dL      Calcium 8.7 mg/dL      eGFR 102 ml/min/1.73sq m     Narrative:      National Kidney Disease Foundation guidelines for Chronic Kidney Disease (CKD):     Stage 1 with normal or high GFR (GFR > 90 mL/min/1.73 square meters)    Stage 2 Mild CKD (GFR = 60-89 mL/min/1.73 square meters)    Stage 3A Moderate CKD (GFR = 45-59 mL/min/1.73 square meters)    Stage 3B Moderate CKD (GFR = 30-44 mL/min/1.73 square meters)    Stage 4 Severe CKD (GFR = 15-29 mL/min/1.73 square meters)    Stage 5 End Stage CKD (GFR <15 mL/min/1.73 square meters)  Note: GFR calculation is accurate only with a steady state creatinine    CBC and differential [[id number]]  (Abnormal) Collected: 07/29/24 1418    Lab Status: Final result Specimen: Blood from Arm, Left Updated: 07/29/24 1429     WBC 12.82 Thousand/uL      RBC 4.05 Million/uL      Hemoglobin 12.8 g/dL      Hematocrit 37.4 %      MCV 92 fL      MCH 31.6 pg      MCHC 34.2 g/dL      RDW 13.6 %      MPV 9.1 fL      Platelets 271 Thousands/uL      nRBC 0 /100 WBCs      Segmented % 77 %      Immature Grans % 0 %      Lymphocytes % 9 %      Monocytes % 11 %      Eosinophils Relative 3 %      Basophils Relative 0 %      Absolute Neutrophils 9.86 Thousands/µL      Absolute Immature Grans 0.04 Thousand/uL      Absolute Lymphocytes 1.16 Thousands/µL      Absolute Monocytes 1.36 Thousand/µL       Eosinophils Absolute 0.35 Thousand/µL      Basophils Absolute 0.05 Thousands/µL                    XR ankle 3+ views LEFT   ED Interpretation by Yemi Matias MD (07/29 1531)   No soft tissue abnormality.      Final Result by Farzana Vela MD (07/29 1456)      No acute osseous abnormality.         Computerized Assisted Algorithm (CAA) may have been used to analyze all applicable images.               Workstation performed: [id number]         XR tibia fibula 2 views LEFT   ED Interpretation by Yemi Matias MD (07/29 1531)   No soft tissue abnormality.      Final Result by Farzana Vela MD (07/29 1456)      No acute osseous abnormality.         Computerized Assisted Algorithm (CAA) may have been used to analyze all applicable images.               Workstation performed: [id number]               Procedures  Procedures                      Medical Decision Making  Amount and/or Complexity of Data Reviewed  Labs: ordered. Decision-making details documented in ED Course.  Radiology: ordered. Decision-making details documented in ED Course.    Risk  OTC drugs.  Prescription drug management.  Decision regarding hospitalization.      See ED course above for additional details including HPI, MDM including PLAN, and disposition.      Disposition  Final diagnoses:   Cellulitis of left lower extremity   Leukocytosis   Financial difficulties     Time reflects when diagnosis was documented in both MDM as applicable and the Disposition within this note       Time User Action Codes Description Comment    7/29/2024  3:43 PM Vega, Thu T Add [L03.116] Cellulitis of left lower extremity     7/29/2024  3:43 PM Vega, Thu T Add [D72.829] Leukocytosis     7/29/2024  3:43 PM Vega, Thu T Add [Z59.9] Financial difficulties     7/29/2024  5:06 PM Augustus Delgadillo Add [Z59.00] Homelessness           ED Disposition       ED Disposition   Admit    Condition   Stable    Date/Time   Tue Jul 30, 2024  6:45 AM    Comment    Case was discussed with SOD and the patient's admission status was agreed to be Admission Status: inpatient status to the service of Dr. Reed.               Follow-up Information    None         Patient's Medications   Discharge Prescriptions    No medications on file     No discharge procedures on file.    PDMP Review         Value Time User    PDMP Reviewed  Yes 2/9/2024  8:03 AM Doron Miller DO             ED Provider  Attending physically available and evaluated Amado Chin. I managed the patient along with the ED Attending.    Electronically Signed by           Miroslava Vega MD  07/30/24 0689

## 2024-07-29 NOTE — ED NOTES
All belongings given back to patient at this time. Round trip placed for patient. Pt shown to waiting room. Pt calm and cooperative, steady gait noted.      Stacie Addison RN  07/29/24 0026

## 2024-07-29 NOTE — ED NOTES
This writer discussed the patients current presentation and recommended discharge plan with Dr Berg  They agree with the patient being discharged at this time with referrals and/or information about outpt MH resources      The patient was Instructed to follow up with their pcp   The patient was provided with referral information for:   Outpt mh treatment      The patient declined to complete a safety plan however a blank plan was provided for future use.  In addition, the patient was instructed to call local [facility] crisis, other crisis services, 911 or to go to the nearest ER immediately if their situation changes at any time.     This writer discussed discharge plans with the patient who agrees with and understands the discharge plans.         SAFETY PLAN  Warning Signs (thoughts, images, mood, behavior, situations) of a potential crisis:        Coping Skills (what can I do to take my mind off the problem, or to keep myself safe):        Outside Support (who can I reach out to for support and help):          National Suicide Prevention Hotline:  988      [facility] [phone] - [facility] [phone] - LVF Crisis/Mobile Crisis   [phone] - SLPF Crisis   [facility]: [phone]  [facility]: [phone]   [facility] [phone] - Crisis   [facility] [phone] - Crisis     [facility] [phone] - Crisis   [facility] [phone] - Crisis   [phone] - Peer Support Talk Line (1-9pm daily)  [phone] - Teen Support Talk Line (1-9pm daily)  [phone] - [facility] [phone]- Crisis    [facility] [phone] - Crisis   [facility] [phone] - Crisis    [facility]) [phone] - [facility] [place] Crisis

## 2024-07-29 NOTE — ED ATTENDING ATTESTATION
7/28/2024  I, Amado Berg DO, saw and evaluated the patient. I have discussed the patient with the resident/non-physician practitioner and agree with the resident's/non-physician practitioner's findings, Plan of Care, and MDM as documented in the resident's/non-physician practitioner's note, except where noted. All available labs and Radiology studies were reviewed.  I was present for key portions of any procedure(s) performed by the resident/non-physician practitioner and I was immediately available to provide assistance.       At this point I agree with the current assessment done in the Emergency Department.  I have conducted an independent evaluation of this patient a history and physical is as follows:    38-year-old male presents with police for psychiatric evaluation.  The patient had reportedly been walking around with a large broad [employer] practicing his martial arts and is neighborhood.  Made some verbal threats.  Police arrived he refused to comply patient was tasered.  The patient is cooperative right now has some tangential thoughts and somewhat withdrawn affect.  He is somewhat diaphoretic.  He reportedly has not been taking his medications for multiple psychiatric illnesses.  He has normal vital signs other than slight hypothermia likely secondary to being outside practicing his [employer] skills.  Patient will require psychiatric admission due to risk of harm to himself and others, will perform medical clearance    ED Course         Critical Care Time  Procedures

## 2024-07-29 NOTE — H&P
INTERNAL MEDICINE RESIDENCY ADMISSION H&P     Name: Amado Chin   Age & Sex: 38 y.o. male   MRN: [id number]  Unit/Bed#: ED 07   Encounter: [id number]  Primary Care Provider: Iva Fisher MD    Code Status: Level 1 - Full Code  Admission Status: INPATIENT   Disposition: Patient requires Med/Surg    Admit to team: SOD Team A    ASSESSMENT/PLAN     Principal Problem:    Cellulitis of left lower extremity  Active Problems:    Sepsis (HCC)    Tobacco abuse      * Cellulitis of left lower extremity  Assessment & Plan  Patient presented with 3 days of redness, pain, and swelling of the left lower extremity.  WBC elevated at 12.82. Exam significant for tender, warm, erythematous LLE; erythema extending proximally from a abraded area over his left Achilles to approximately mid-calf.     Plan  - IV ancef 2 gm q8h.   - trend WBC count and fever curve  - pain control  - Continue oral intake.  -s/p LR 1 L in the ED.    Homelessness  Assessment & Plan  -Patient is currently homeless.  -CM on board: will try to provide him resources for shelters.     Sepsis (HCC)  Assessment & Plan  Patient meeting SIRS criteria on admission with WBC elevation (12.82), tachycardia (), and suspected LLE cellulitis    Plan  - see LLE cellulitis    Tobacco abuse  Assessment & Plan  Endorses 1/2 ppd smoking history for many years    Plan  - patient denied nicotine patch  - smoking cessation education provided        VTE Pharmacologic Prophylaxis: Enoxaparin (Lovenox)  VTE Mechanical Prophylaxis: sequential compression device    CHIEF COMPLAINT     Chief Complaint   Patient presents with    Ankle Pain     Pt complains of L ankle pain, is red, and swollen. Pt states he has cut behind ankle.      HISTORY OF PRESENT ILLNESS     Amado Chin is a 38 y.o. male with a past medical history of tobacco use, asthma, schizophrenia who presented to the [facility] ED on 07/29/2024 due to pain and redness of the left lower extremity.  The patient  reported that he noticed redness on his LLE in the region of the achilles tendon 3 days ago when it began to hurt.  The pain and redness progressively worsened and began to interfere with ability to walk which prompted evaluation at the ED.  The patient reported a cut on the posterior aspect of his left ankle which he thinks may be due to his boots being too tight.  The patient denied numbness or tingling in the lower extremities.      Upon arrival to the the ED, the patient was afebrile (98.5F), , /82, RR 20, O2 99% on room air.  Labs were significant for leukocytosis (WBC 12.82), lactic acid within normal limits at 1.3.  EKG showed normal sinus rhythm at a rate of 92 bpm.  Ankle XR and tibia/fibula XR were negative for acute osseous abnormality.     To note patient seen was seen in ED the evening prior to admission for psychiatric reasons.  The patient has an unstable social situation and reported that he is currently experiencing homelessness.      The patient was admitted as an inpatient to [facility] for further management including IV antibiotics.     REVIEW OF SYSTEMS     Review of Systems   Constitutional:  Negative for chills and fever.   Respiratory:  Negative for shortness of breath.    Cardiovascular:  Negative for chest pain.   Gastrointestinal:  Negative for abdominal pain, diarrhea and vomiting.   Skin:  Positive for color change (erythematous LLE).   Neurological:  Negative for dizziness and headaches.     OBJECTIVE     Vitals:    24 1238 24 1845   BP: 132/82 148/89   BP Location: Left arm Right arm   Pulse: (!) 114 93   Resp: 20 19   Temp: 98.5 °F (36.9 °C)    TempSrc: Temporal    SpO2: 99% 98%      Temperature:   Temp (24hrs), Av.5 °F (36.9 °C), Min:98.5 °F (36.9 °C), Max:98.5 °F (36.9 °C)    Temperature: 98.5 °F (36.9 °C)  Intake & Output:  I/O       None          Weights:        There is no height or weight on file to calculate BMI.  Weight (last 2 days)       None       "    Physical Exam  PAST MEDICAL HISTORY     Past Medical History:   Diagnosis Date    Asthma     Chronic pain     Hypertension     Thyroglossal duct cyst      PAST SURGICAL HISTORY     Past Surgical History:   Procedure Laterality Date    THYROGLOSSAL DUCT EXCISION      THYROID SURGERY      THYROID SURGERY       SOCIAL & FAMILY HISTORY     Social History     Substance and Sexual Activity   Alcohol Use Not Currently       Social History     Substance and Sexual Activity   Drug Use Not Currently    Types: Marijuana     Social History     Tobacco Use   Smoking Status Every Day    Current packs/day: 0.50    Types: Cigarettes   Smokeless Tobacco Never   Tobacco Comments    5 cigerettes a day      Family History   Problem Relation Age of Onset    Hypertension Mother     No Known Problems Father      LABORATORY DATA     Labs: I have personally reviewed pertinent reports.    Results from last 7 days   Lab Units 07/29/24  1418   WBC Thousand/uL 12.82*   HEMOGLOBIN g/dL 12.8   HEMATOCRIT % 37.4   PLATELETS Thousands/uL 271   SEGS PCT % 77*   MONO PCT % 11   EOS PCT % 3      Results from last 7 days   Lab Units 07/29/24  1418   POTASSIUM mmol/L 3.8   CHLORIDE mmol/L 107   CO2 mmol/L 24   BUN mg/dL 20   CREATININE mg/dL 0.93   CALCIUM mg/dL 8.7                  Results from last 7 days   Lab Units 07/29/24  1743   LACTIC ACID mmol/L 1.3         Micro:  No results found for: \"BLOODCX\", \"URINECX\", \"WOUNDCULT\", \"SPUTUMCULTUR\"  IMAGING & DIAGNOSTIC TESTS     Imaging: I have personally reviewed pertinent reports.    XR ankle 3+ views LEFT    Result Date: 7/29/2024  Impression: No acute osseous abnormality. Computerized Assisted Algorithm (CAA) may have been used to analyze all applicable images. Workstation performed: [id number]     XR tibia fibula 2 views LEFT    Result Date: 7/29/2024  Impression: No acute osseous abnormality. Computerized Assisted Algorithm (CAA) may have been used to analyze all applicable images. Workstation " performed: [id number]     EKG, Pathology, and Other Studies: I have personally reviewed pertinent reports.     ALLERGIES   No Known Allergies  MEDICATIONS PRIOR TO ARRIVAL     Prior to Admission medications    Medication Sig Start Date End Date Taking? Authorizing Provider   acetaminophen (TYLENOL) 500 mg tablet Take 1 tablet (500 mg total) by mouth every 6 (six) hours as needed for mild pain, moderate pain, headaches or fever 2/8/24   Chance Carson PA-C   albuterol (PROVENTIL HFA,VENTOLIN HFA) 90 mcg/act inhaler Inhale 2 puffs every 4 (four) hours as needed for wheezing 9/27/19   Jo Barakat MD   amLODIPine (NORVASC) 5 mg tablet Take 5 mg by mouth daily 3/7/24 3/7/25  Historical Provider, MD   cyclobenzaprine (FLEXERIL) 10 mg tablet TAKE 1 TABLET BY MOUTH THREE TIMES A DAY AS NEEDED FOR MUSCLE SPASM FOR 14 DAYS. 7/18/24   Historical Provider, MD   diazepam (VALIUM) 5 mg tablet Take 1 tablet (5 mg total) by mouth 2 (two) times a day for 2 days 2/8/24 2/10/24  Chance Carson PA-C   diazepam (VALIUM) 5 mg tablet Take 1 tablet (5 mg total) by mouth every 12 (twelve) hours as needed for muscle spasms 2/28/24   Shine Cruz, DO   Diclofenac Sodium (VOLTAREN) 1 % Apply 2 g topically 4 (four) times a day for 7 days 8/6/22 8/13/22  Jammie Ram, DO   diphenhydramine, lidocaine, Al/Mg hydroxide, simethicone (Magic Mouthwash) SUSP Swish and spit 10 mL every 4 (four) hours as needed for mouth pain or discomfort 1/12/24   Devin Marlow MD   gabapentin (Neurontin) 300 mg capsule Take 1 capsule (300 mg total) by mouth 3 (three) times a day 2/9/24   Doron Miller,    ibuprofen (MOTRIN) 400 mg tablet Take 1 tablet (400 mg total) by mouth every 6 (six) hours as needed for mild pain 2/8/24   Chance Carson PA-C   ibuprofen (MOTRIN) 400 mg tablet Take 1 tablet (400 mg total) by mouth every 6 (six) hours as needed for moderate pain 2/28/24   Shine Cruz, DO   lidocaine (Lidoderm) 5 % Apply 1  patch topically daily Remove & Discard patch within 12 hours or as directed by MD 8/6/22   Dick Clark MD   methocarbamol (ROBAXIN) 500 mg tablet Take 1 tablet (500 mg total) by mouth 2 (two) times a day 12/26/23   Iva Fisher MD   methylPREDNISolone 4 MG tablet therapy pack Use as directed on package  Patient not taking: Reported on 1/17/2024 12/26/23   Iva Fisher MD   naproxen (Naprosyn) 500 mg tablet Take 1 tablet (500 mg total) by mouth 2 (two) times a day with meals 11/12/23   Ranjeet Anderson DO   QUEtiapine (SEROquel) 300 mg tablet TAKE 1 TABLET BY MOUTH DAILY AT BEDTIME X 14 DAYS 7/18/24   Historical Provider, MD     MEDICATIONS ADMINISTERED IN LAST 24 HOURS     Medication Administration - last 24 hours from 07/28/2024 2132 to 07/29/2024 2132         Date/Time Order Dose Route Action Action by     07/29/2024 1419 EDT acetaminophen (TYLENOL) tablet 975 mg 975 mg Oral Given Juliette Michaud RN     07/29/2024 1420 EDT ketorolac (TORADOL) injection 15 mg 15 mg Intramuscular Not Given Juliette Michaud RN     07/29/2024 1636 EDT vancomycin (VANCOCIN) 1,250 mg in sodium chloride 0.9 % 250 mL IVPB 0 mg Intravenous Stopped Juliette Michaud RN     07/29/2024 1431 EDT vancomycin (VANCOCIN) 1,250 mg in sodium chloride 0.9 % 250 mL IVPB 1,250 mg Intravenous New Bag Juliette Michaud RN     07/29/2024 1422 EDT ketorolac (TORADOL) injection 15 mg 15 mg Intravenous Given Juliette Michaud RN     07/29/2024 1753 EDT ceFAZolin (ANCEF) IVPB (premix in dextrose) 2,000 mg 50 mL 0 mg Intravenous Stopped Juliette Michaud RN     07/29/2024 1723 EDT ceFAZolin (ANCEF) IVPB (premix in dextrose) 2,000 mg 50 mL 2,000 mg Intravenous New Bag Juliette Michaud RN     07/29/2024 1723 EDT lactated ringers bolus 1,000 mL 1,000 mL Intravenous New Bag Juliette Michaud RN     07/29/2024 4019 EDT gabapentin (NEURONTIN) capsule 300 mg 300 mg Oral Given Juliette Michaud RN          CURRENT MEDICATIONS     Current Facility-Administered  "Medications   Medication Dose Route Frequency Provider Last Rate    acetaminophen  975 mg Oral Q6H PRN Augustus Delgadillo MD      albuterol  2 puff Inhalation Q4H PRN Augustus Delgadillo MD      cefazolin  2,000 mg Intravenous Q8H Augustus Delgadillo MD Stopped (07/29/24 1753)    cyclobenzaprine  10 mg Oral TID PRN Augustus Delgadillo MD      [START ON 7/30/2024] enoxaparin  40 mg Subcutaneous Daily Augustus Delgadillo MD      gabapentin  300 mg Oral TID Augustus Delgadillo MD      QUEtiapine  300 mg Oral HS Augustus Delgadillo MD            acetaminophen, 975 mg, Q6H PRN  albuterol, 2 puff, Q4H PRN  cyclobenzaprine, 10 mg, TID PRN        Admission Time  I spent 45 minutes admitting the patient.  This involved direct patient contact where I performed a full history and physical, reviewing previous records, and reviewing laboratory and other diagnostic studies.    Portions of the record may have been created with voice recognition software.  Occasional wrong word or \"sound a like\" substitutions may have occurred due to the inherent limitations of voice recognition software.  Read the chart carefully and recognize, using context, where substitutions have occurred.    ==  Augustus Delgadillo M.D.  [facility]  Internal Medicine Residency, PGY-2   "

## 2024-07-29 NOTE — ED PROVIDER NOTES
History  Chief Complaint   Patient presents with    Psychiatric Evaluation     Pt BIBA from home by ems and police. Pt was being served a 302 and became aggressive. Pt was tazzed on scene and cuffed. Pt calm and cooperative upon arrival to the ED. 302 present upon arrival. Pt supposed to be taking psych meds but has stopped them and has begun seeing shadow people.     Patient is a 38 yoM PMH significant for schizophrenia, asthma, hypertension presenting today for psychiatric evaluation. Patient is brought in by police who have petitioned a 302 warrant which states that patient has not been taking his medications and has been having visual hallucinations. Patient was reportedly seen outside with a sword several times over the past two days.  Patient was found by police to be using a sword outside his house when they arrived, he became aggressive with police and needed to be tased to be brought into the emergency department.  Patient lives with roommate, who reportedly called police today.  On arrival to the ED, patient calm, cooperative, answering questions appropriately, and denies any current complaints.  Patient specifically denies any shortness of breath, or otherwise medical complaints.  He denies SI/HI, AH/VH, and reports that he has been compliant with his medications.  Patient recently inpatient at [facility] and was discharged on several psychiatric medications for schizophrenia.  Prior to that, patient denies any history of psychiatric diagnoses or treatment.          Prior to Admission Medications   Prescriptions Last Dose Informant Patient Reported? Taking?   Diclofenac Sodium (VOLTAREN) 1 %   No No   Sig: Apply 2 g topically 4 (four) times a day for 7 days   QUEtiapine (SEROquel) 300 mg tablet 7/27/2024  Yes Yes   Sig: TAKE 1 TABLET BY MOUTH DAILY AT BEDTIME X 14 DAYS   acetaminophen (TYLENOL) 500 mg tablet Unknown  No No   Sig: Take 1 tablet (500 mg total) by mouth every 6 (six) hours as needed for mild  pain, moderate pain, headaches or fever   albuterol (PROVENTIL HFA,VENTOLIN HFA) 90 mcg/act inhaler Unknown  No No   Sig: Inhale 2 puffs every 4 (four) hours as needed for wheezing   amLODIPine (NORVASC) 5 mg tablet 7/28/2024  Yes Yes   Sig: Take 5 mg by mouth daily   cyclobenzaprine (FLEXERIL) 10 mg tablet 7/28/2024  Yes Yes   Sig: TAKE 1 TABLET BY MOUTH THREE TIMES A DAY AS NEEDED FOR MUSCLE SPASM FOR 14 DAYS.   diazepam (VALIUM) 5 mg tablet   No No   Sig: Take 1 tablet (5 mg total) by mouth 2 (two) times a day for 2 days   diazepam (VALIUM) 5 mg tablet Unknown  No No   Sig: Take 1 tablet (5 mg total) by mouth every 12 (twelve) hours as needed for muscle spasms   diphenhydramine, lidocaine, Al/Mg hydroxide, simethicone (Magic Mouthwash) SUSP Unknown  No No   Sig: Swish and spit 10 mL every 4 (four) hours as needed for mouth pain or discomfort   gabapentin (Neurontin) 300 mg capsule 7/28/2024  No Yes   Sig: Take 1 capsule (300 mg total) by mouth 3 (three) times a day   ibuprofen (MOTRIN) 400 mg tablet Unknown  No No   Sig: Take 1 tablet (400 mg total) by mouth every 6 (six) hours as needed for mild pain   ibuprofen (MOTRIN) 400 mg tablet Unknown  No No   Sig: Take 1 tablet (400 mg total) by mouth every 6 (six) hours as needed for moderate pain   lidocaine (Lidoderm) 5 % Unknown  No No   Sig: Apply 1 patch topically daily Remove & Discard patch within 12 hours or as directed by MD   methocarbamol (ROBAXIN) 500 mg tablet Unknown  No No   Sig: Take 1 tablet (500 mg total) by mouth 2 (two) times a day   methylPREDNISolone 4 MG tablet therapy pack   No No   Sig: Use as directed on package   Patient not taking: Reported on 1/17/2024   naproxen (Naprosyn) 500 mg tablet 7/27/2024  No Yes   Sig: Take 1 tablet (500 mg total) by mouth 2 (two) times a day with meals      Facility-Administered Medications: None       Past Medical History:   Diagnosis Date    Asthma     Chronic pain     Hypertension     Thyroglossal duct cyst         Past Surgical History:   Procedure Laterality Date    THYROGLOSSAL DUCT EXCISION      THYROID SURGERY      THYROID SURGERY         Family History   Problem Relation Age of Onset    Hypertension Mother     No Known Problems Father      I have reviewed and agree with the history as documented.    E-Cigarette/Vaping    E-Cigarette Use Former User     Cartridges/Day 1      E-Cigarette/Vaping Substances    Nicotine Yes     THC No     CBD No     Flavoring Yes     Other No     Unknown No      Social History     Tobacco Use    Smoking status: Every Day     Current packs/day: 0.50     Types: Cigarettes    Smokeless tobacco: Never    Tobacco comments:     5 cigerettes a day    Vaping Use    Vaping status: Former    Substances: Nicotine, Flavoring   Substance Use Topics    Alcohol use: Not Currently    Drug use: Not Currently     Types: Marijuana        Review of Systems    Physical Exam  ED Triage Vitals [07/28/24 1956]   Temperature Pulse Respirations Blood Pressure SpO2   100.4 °F (38 °C) 57 18 133/77 96 %      Temp Source Heart Rate Source Patient Position - Orthostatic VS BP Location FiO2 (%)   Tympanic Monitor Lying Right arm --      Pain Score       7             Orthostatic Vital Signs  Vitals:    07/28/24 1956   BP: 133/77   Pulse: 57   Patient Position - Orthostatic VS: Lying       Physical Exam  Vitals and nursing note reviewed.   Constitutional:       General: He is not in acute distress.     Appearance: Normal appearance. He is not ill-appearing or toxic-appearing.   HENT:      Head: Normocephalic and atraumatic.   Eyes:      General: No scleral icterus.     Extraocular Movements: Extraocular movements intact.   Cardiovascular:      Rate and Rhythm: Normal rate and regular rhythm.      Pulses: Normal pulses.      Heart sounds: Normal heart sounds. No murmur heard.  Pulmonary:      Effort: Pulmonary effort is normal. No respiratory distress.      Breath sounds: Normal breath sounds. No wheezing or rhonchi.    Abdominal:      General: Abdomen is flat. There is no distension.      Palpations: Abdomen is soft.      Tenderness: There is no abdominal tenderness.   Musculoskeletal:         General: Normal range of motion.      Cervical back: Normal range of motion.   Skin:     Capillary Refill: Capillary refill takes less than 2 seconds.      Comments: Taser diana in back, removed.   Neurological:      General: No focal deficit present.      Mental Status: He is alert.      Cranial Nerves: No cranial nerve deficit.      Sensory: No sensory deficit.      Motor: No weakness.      Gait: Gait normal.   Psychiatric:         Attention and Perception: Attention and perception normal.         Mood and Affect: Mood normal.         Speech: Speech normal.         Behavior: Behavior normal. Behavior is cooperative.         Thought Content: Thought content normal.         Judgment: Judgment normal.         ED Medications  Medications   midazolam (FOR EMS ONLY) (VERSED) 2 mg/2 mL injection 4 mg (0 mg Does not apply Given to EMS 7/28/24 1957)   droperidol (FOR EMS ONLY) (INAPSINE) 2.5 mg/mL injection 2.5 mg (0 mg Does not apply Given to EMS 7/28/24 1957)   lidocaine (PF) (XYLOCAINE-MPF) 1 % injection 5 mL (5 mL Infiltration Given by Other 7/28/24 1959)       Diagnostic Studies  Results Reviewed       Procedure Component Value Units Date/Time    Rapid drug screen, urine [[id number]]  (Abnormal) Collected: 07/28/24 2158    Lab Status: Final result Specimen: Urine, Clean Catch Updated: 07/28/24 2228     Amph/Meth UR Positive     Barbiturate Ur Negative     Benzodiazepine Urine Negative     Cocaine Urine Negative     Methadone Urine Negative     Opiate Urine Negative     PCP Ur Negative     THC Urine Positive     Oxycodone Urine Negative     Fentanyl Urine Negative     HYDROCODONE URINE Negative    Narrative:      Presumptive report. If requested, specimen will be sent to reference lab for confirmation.  FOR MEDICAL PURPOSES ONLY.   IF  "CONFIRMATION NEEDED PLEASE CONTACT THE LAB WITHIN 5 DAYS.    Drug Screen Cutoff Levels:  AMPHETAMINE/METHAMPHETAMINES  1000 ng/mL  BARBITURATES     200 ng/mL  BENZODIAZEPINES     200 ng/mL  COCAINE      300 ng/mL  METHADONE      300 ng/mL  OPIATES      300 ng/mL  PHENCYCLIDINE     25 ng/mL  THC       50 ng/mL  OXYCODONE      100 ng/mL  FENTANYL      5 ng/mL  HYDROCODONE     300 ng/mL    POCT alcohol breath test [[id number]]  (Normal) Resulted: 07/28/24 2057    Lab Status: Final result Updated: 07/28/24 2057     EXTBreath Alcohol 0.000                   No orders to display         Procedures  Procedures      ED Course  ED Course as of 07/29/24 0813   Sun Jul 28, 2024 2012 Patient seen and evaluated by me  Patient here on a 302 petition for hallucinations and medication non-compliance as well as concern for weapon use. Will obtain UDS, BAT, and crisis consult   2132 Patient medically cleared.   Mon Jul 29, 2024   0000 Patient seen and evaluated by crisis, we are unable to uphold the 302 at this time as it is not accurately filed since the petitioner did not directly witness the behavior of the patient and the 302 uses terminology such as \"allegedly\". Patient denies any SI/HI or symptoms of psychosis and there is no indication for involuntary psychiatric admission. Patient given outpatient psychiatry and substance abuse resources.                             SBIRT 20yo+      Flowsheet Row Most Recent Value   Initial Alcohol Screen: US AUDIT-C     1. How often do you have a drink containing alcohol? 0 Filed at: 07/28/2024 1955   2. How many drinks containing alcohol do you have on a typical day you are drinking?  0 Filed at: 07/28/2024 1955   3a. Male UNDER 65: How often do you have five or more drinks on one occasion? 0 Filed at: 07/28/2024 1955   3b. FEMALE Any Age, or MALE 65+: How often do you have 4 or more drinks on one occassion? 0 Filed at: 07/28/2024 1955   Audit-C Score 0 Filed at: 07/28/2024 1955   YANELI: " How many times in the past year have you...    Used an illegal drug or used a prescription medication for non-medical reasons? Never Filed at: 07/28/2024 1955                  Medical Decision Making  Please see ED course above regarding details of the MDM    Amount and/or Complexity of Data Reviewed  Labs: ordered.    Risk  Prescription drug management.          Disposition  Final diagnoses:   Encounter for general psychiatric examination without need for care     Time reflects when diagnosis was documented in both MDM as applicable and the Disposition within this note       Time User Action Codes Description Comment    7/29/2024 12:14 AM Juliette Streeter Add [Z00.8] Encounter for general psychiatric examination without need for care           ED Disposition       ED Disposition   Discharge    Condition   Stable    Date/Time   Mon Jul 29, 2024 0013    Comment                  Follow-up Information    None         Discharge Medication List as of 7/29/2024 12:16 AM        CONTINUE these medications which have NOT CHANGED    Details   amLODIPine (NORVASC) 5 mg tablet Take 5 mg by mouth daily, Starting Thu 3/7/2024, Until Fri 3/7/2025, Historical Med      cyclobenzaprine (FLEXERIL) 10 mg tablet TAKE 1 TABLET BY MOUTH THREE TIMES A DAY AS NEEDED FOR MUSCLE SPASM FOR 14 DAYS., Historical Med      gabapentin (Neurontin) 300 mg capsule Take 1 capsule (300 mg total) by mouth 3 (three) times a day, Starting Fri 2/9/2024, Normal      naproxen (Naprosyn) 500 mg tablet Take 1 tablet (500 mg total) by mouth 2 (two) times a day with meals, Starting Sun 11/12/2023, Normal      QUEtiapine (SEROquel) 300 mg tablet TAKE 1 TABLET BY MOUTH DAILY AT BEDTIME X 14 DAYS, Historical Med      acetaminophen (TYLENOL) 500 mg tablet Take 1 tablet (500 mg total) by mouth every 6 (six) hours as needed for mild pain, moderate pain, headaches or fever, Starting Thu 2/8/2024, Normal      albuterol (PROVENTIL HFA,VENTOLIN HFA) 90 mcg/act inhaler  Inhale 2 puffs every 4 (four) hours as needed for wheezing, Starting Fri 9/27/2019, Print      diazepam (VALIUM) 5 mg tablet Take 1 tablet (5 mg total) by mouth every 12 (twelve) hours as needed for muscle spasms, Starting Wed 2/28/2024, Normal      Diclofenac Sodium (VOLTAREN) 1 % Apply 2 g topically 4 (four) times a day for 7 days, Starting Sat 8/6/2022, Until Sat 8/13/2022, Normal      diphenhydramine, lidocaine, Al/Mg hydroxide, simethicone (Magic Mouthwash) SUSP Swish and spit 10 mL every 4 (four) hours as needed for mouth pain or discomfort, Starting Fri 1/12/2024, Normal      !! ibuprofen (MOTRIN) 400 mg tablet Take 1 tablet (400 mg total) by mouth every 6 (six) hours as needed for mild pain, Starting Thu 2/8/2024, Normal      !! ibuprofen (MOTRIN) 400 mg tablet Take 1 tablet (400 mg total) by mouth every 6 (six) hours as needed for moderate pain, Starting Wed 2/28/2024, Normal      lidocaine (Lidoderm) 5 % Apply 1 patch topically daily Remove & Discard patch within 12 hours or as directed by MD, Starting Sat 8/6/2022, Normal      methocarbamol (ROBAXIN) 500 mg tablet Take 1 tablet (500 mg total) by mouth 2 (two) times a day, Starting Tue 12/26/2023, Normal      methylPREDNISolone 4 MG tablet therapy pack Use as directed on package, Normal       !! - Potential duplicate medications found. Please discuss with provider.        No discharge procedures on file.    PDMP Review         Value Time User    PDMP Reviewed  Yes 2/9/2024  8:03 AM Doron Miller DO             ED Provider  Attending physically available and evaluated Amado Chin. I managed the patient along with the ED Attending.    Electronically Signed by           Juliette Streeter MD  07/29/24 5171

## 2024-07-30 ENCOUNTER — VBI (OUTPATIENT)
Dept: ADMINISTRATIVE | Facility: OTHER | Age: 39
End: 2024-07-30

## 2024-07-30 ENCOUNTER — APPOINTMENT (INPATIENT)
Dept: NON INVASIVE DIAGNOSTICS | Facility: HOSPITAL | Age: 39
DRG: 720 | End: 2024-07-30
Payer: MEDICARE

## 2024-07-30 PROBLEM — F19.90 DRUG USE: Status: ACTIVE | Noted: 2024-07-30

## 2024-07-30 LAB
ANION GAP SERPL CALCULATED.3IONS-SCNC: 7 MMOL/L (ref 4–13)
BASOPHILS # BLD AUTO: 0.05 THOUSANDS/ÂΜL (ref 0–0.1)
BASOPHILS NFR BLD AUTO: 1 % (ref 0–1)
BUN SERPL-MCNC: 19 MG/DL (ref 5–25)
CALCIUM SERPL-MCNC: 8.1 MG/DL (ref 8.4–10.2)
CHLORIDE SERPL-SCNC: 108 MMOL/L (ref 96–108)
CO2 SERPL-SCNC: 23 MMOL/L (ref 21–32)
CREAT SERPL-MCNC: 1.07 MG/DL (ref 0.6–1.3)
EOSINOPHIL # BLD AUTO: 0.64 THOUSAND/ÂΜL (ref 0–0.61)
EOSINOPHIL NFR BLD AUTO: 8 % (ref 0–6)
ERYTHROCYTE [DISTWIDTH] IN BLOOD BY AUTOMATED COUNT: 13.7 % (ref 11.6–15.1)
GFR SERPL CREATININE-BSD FRML MDRD: 86 ML/MIN/1.73SQ M
GLUCOSE SERPL-MCNC: 99 MG/DL (ref 65–140)
HCT VFR BLD AUTO: 36.9 % (ref 36.5–49.3)
HGB BLD-MCNC: 12.3 G/DL (ref 12–17)
IMM GRANULOCYTES # BLD AUTO: 0.03 THOUSAND/UL (ref 0–0.2)
IMM GRANULOCYTES NFR BLD AUTO: 0 % (ref 0–2)
LYMPHOCYTES # BLD AUTO: 1.37 THOUSANDS/ÂΜL (ref 0.6–4.47)
LYMPHOCYTES NFR BLD AUTO: 17 % (ref 14–44)
MCH RBC QN AUTO: 31.3 PG (ref 26.8–34.3)
MCHC RBC AUTO-ENTMCNC: 33.3 G/DL (ref 31.4–37.4)
MCV RBC AUTO: 94 FL (ref 82–98)
MONOCYTES # BLD AUTO: 0.93 THOUSAND/ÂΜL (ref 0.17–1.22)
MONOCYTES NFR BLD AUTO: 12 % (ref 4–12)
NEUTROPHILS # BLD AUTO: 4.98 THOUSANDS/ÂΜL (ref 1.85–7.62)
NEUTS SEG NFR BLD AUTO: 62 % (ref 43–75)
NRBC BLD AUTO-RTO: 0 /100 WBCS
PLATELET # BLD AUTO: 269 THOUSANDS/UL (ref 149–390)
PMV BLD AUTO: 9.1 FL (ref 8.9–12.7)
POTASSIUM SERPL-SCNC: 3.6 MMOL/L (ref 3.5–5.3)
RBC # BLD AUTO: 3.93 MILLION/UL (ref 3.88–5.62)
SODIUM SERPL-SCNC: 138 MMOL/L (ref 135–147)
WBC # BLD AUTO: 8 THOUSAND/UL (ref 4.31–10.16)

## 2024-07-30 PROCEDURE — 80048 BASIC METABOLIC PNL TOTAL CA: CPT

## 2024-07-30 PROCEDURE — 93970 EXTREMITY STUDY: CPT | Performed by: SURGERY

## 2024-07-30 PROCEDURE — 99232 SBSQ HOSP IP/OBS MODERATE 35: CPT | Performed by: PHYSICIAN ASSISTANT

## 2024-07-30 PROCEDURE — 36415 COLL VENOUS BLD VENIPUNCTURE: CPT

## 2024-07-30 PROCEDURE — 99255 IP/OBS CONSLTJ NEW/EST HI 80: CPT | Performed by: PSYCHIATRY & NEUROLOGY

## 2024-07-30 PROCEDURE — 85025 COMPLETE CBC W/AUTO DIFF WBC: CPT

## 2024-07-30 PROCEDURE — 87040 BLOOD CULTURE FOR BACTERIA: CPT | Performed by: PHYSICIAN ASSISTANT

## 2024-07-30 PROCEDURE — 93970 EXTREMITY STUDY: CPT

## 2024-07-30 RX ORDER — ACETAMINOPHEN 325 MG/1
975 TABLET ORAL EVERY 8 HOURS SCHEDULED
Status: DISCONTINUED | OUTPATIENT
Start: 2024-07-30 | End: 2024-08-03 | Stop reason: HOSPADM

## 2024-07-30 RX ORDER — OXYCODONE HYDROCHLORIDE 5 MG/1
5 TABLET ORAL EVERY 4 HOURS PRN
Status: DISCONTINUED | OUTPATIENT
Start: 2024-07-30 | End: 2024-08-03 | Stop reason: HOSPADM

## 2024-07-30 RX ORDER — SULFAMETHOXAZOLE AND TRIMETHOPRIM 400; 80 MG/1; MG/1
1 TABLET ORAL EVERY 12 HOURS SCHEDULED
Status: DISCONTINUED | OUTPATIENT
Start: 2024-07-30 | End: 2024-07-31

## 2024-07-30 RX ORDER — OXYCODONE HYDROCHLORIDE 10 MG/1
10 TABLET ORAL ONCE
Status: COMPLETED | OUTPATIENT
Start: 2024-07-30 | End: 2024-07-30

## 2024-07-30 RX ORDER — KETOROLAC TROMETHAMINE 30 MG/ML
15 INJECTION, SOLUTION INTRAMUSCULAR; INTRAVENOUS EVERY 6 HOURS PRN
Status: DISCONTINUED | OUTPATIENT
Start: 2024-07-30 | End: 2024-08-01

## 2024-07-30 RX ORDER — CEPHALEXIN 500 MG/1
500 CAPSULE ORAL EVERY 6 HOURS SCHEDULED
Status: DISCONTINUED | OUTPATIENT
Start: 2024-07-30 | End: 2024-07-31

## 2024-07-30 RX ORDER — OLANZAPINE 5 MG/1
5 TABLET, ORALLY DISINTEGRATING ORAL
Status: DISCONTINUED | OUTPATIENT
Start: 2024-07-30 | End: 2024-08-03 | Stop reason: HOSPADM

## 2024-07-30 RX ORDER — OLANZAPINE 5 MG/1
5 TABLET, ORALLY DISINTEGRATING ORAL ONCE
Status: COMPLETED | OUTPATIENT
Start: 2024-07-30 | End: 2024-07-30

## 2024-07-30 RX ORDER — ACETAMINOPHEN 325 MG/1
650 TABLET ORAL EVERY 4 HOURS PRN
Status: DISCONTINUED | OUTPATIENT
Start: 2024-07-30 | End: 2024-07-30

## 2024-07-30 RX ADMIN — Medication 2.5 MG: at 15:51

## 2024-07-30 RX ADMIN — ACETAMINOPHEN 975 MG: 325 TABLET, FILM COATED ORAL at 10:24

## 2024-07-30 RX ADMIN — OXYCODONE HYDROCHLORIDE 5 MG: 5 TABLET ORAL at 20:30

## 2024-07-30 RX ADMIN — OXYCODONE HYDROCHLORIDE 10 MG: 10 TABLET ORAL at 17:57

## 2024-07-30 RX ADMIN — GABAPENTIN 300 MG: 300 CAPSULE ORAL at 09:19

## 2024-07-30 RX ADMIN — ACETAMINOPHEN 975 MG: 325 TABLET, FILM COATED ORAL at 23:55

## 2024-07-30 RX ADMIN — ACETAMINOPHEN 975 MG: 325 TABLET, FILM COATED ORAL at 17:00

## 2024-07-30 RX ADMIN — CEPHALEXIN 500 MG: 500 CAPSULE ORAL at 17:57

## 2024-07-30 RX ADMIN — CEFAZOLIN SODIUM 2000 MG: 2 SOLUTION INTRAVENOUS at 01:59

## 2024-07-30 RX ADMIN — CEFAZOLIN SODIUM 2000 MG: 2 SOLUTION INTRAVENOUS at 09:19

## 2024-07-30 RX ADMIN — GABAPENTIN 300 MG: 300 CAPSULE ORAL at 15:51

## 2024-07-30 RX ADMIN — OLANZAPINE 5 MG: 5 TABLET, ORALLY DISINTEGRATING ORAL at 13:51

## 2024-07-30 RX ADMIN — CEPHALEXIN 500 MG: 500 CAPSULE ORAL at 23:56

## 2024-07-30 RX ADMIN — SULFAMETHOXAZOLE AND TRIMETHOPRIM 1 TABLET: 400; 80 TABLET ORAL at 23:55

## 2024-07-30 RX ADMIN — OXYCODONE HYDROCHLORIDE 10 MG: 10 TABLET ORAL at 10:22

## 2024-07-30 RX ADMIN — GABAPENTIN 300 MG: 300 CAPSULE ORAL at 20:30

## 2024-07-30 RX ADMIN — QUETIAPINE FUMARATE 300 MG: 100 TABLET ORAL at 23:56

## 2024-07-30 NOTE — NURSING NOTE
"When entering patient's room to complete admission assessment patient appears paranoid pacing in room and going to the door and peering out. Pt reports \"there are people here who are after me.\" Expressed to patient he is in a safe environment and per his request we can block visitation and contact from all of his listed contacts. After assessment patient continues to come out to the nurses station stating \"I don't feel safe.\" Per patient's request security was called to the floor. Security offered their support to patient with his safety needs. Patient unable to to express what his needs are other than feeling unsafe.     Shortly after security left the unit patient asking this RN to remove his IV. Educated patient it is for his IV antibiotic. Patient still requesting it be removed. This RN told patient that I would be into the room shortly to remove it. After exiting another patient's room, he is standing in the hallway with the IV in his hand. Patient reports \"I wasn't going to wait for you.\" Patient self-removed IV, RN assisted with placing gauze and placing IV in sharps container. Patient continues to  door way and come to the nurses station. Patient offered prn medication for agitation, but he is refusing to take it. Etta MARTÍNEZ contacted about behaviors. Provider came to bedside to evaluate patient. Will continue to encourage patient follow treatment regimen laid out by providers.   "

## 2024-07-30 NOTE — UTILIZATION REVIEW
Initial Clinical Review    Admission: Date/Time/Statement:   Admission Orders (From admission, onward)       Ordered        07/29/24 1646  INPATIENT ADMISSION  Once                          Orders Placed This Encounter   Procedures    INPATIENT ADMISSION     Standing Status:   Standing     Number of Occurrences:   1     Order Specific Question:   Level of Care     Answer:   Med Surg [16]     Order Specific Question:   Estimated length of stay     Answer:   More than 2 Midnights     Order Specific Question:   Certification     Answer:   I certify that inpatient services are medically necessary for this patient for a duration of greater than two midnights. See H&P and MD Progress Notes for additional information about the patient's course of treatment.     ED Arrival Information       Expected   -    Arrival   7/29/2024 12:32    Acuity   Urgent              Means of arrival   Ambulance    Escorted by   [facility]    Service   Hospitalist    Admission type   Emergency              Arrival complaint   left ankle pain             Chief Complaint   Patient presents with    Ankle Pain     Pt complains of L ankle pain, is red, and swollen. Pt states he has cut behind ankle.       Initial Presentation: 38 y.o. male to ED from home Admitted Inpatient to [facility] unit with Cellulitis of LLE   PMHx: asthma, tobacco use, schizophrenia  Pt to ED by EMS with redness and pain in LLE in the region of achilles tendon noted 3 days ago. States pain progressively worsened and began to interfere with walking.    On exam cut on posterior aspect of ankle, erythema noted. Tachycardic. WBCs 12.82. XR L ankle and L tib/fib with no acute abnl noted. Blood cxs drawn. IVFs, pain meds and IV abx given in ED.  Plan: IV ancef 2 gm q8h. F/u blood cxs. Trend WBC, fever curve. Analgesics prn. Reg diet. Declined nicotine patch. Lovenox sq and SCDs for DVT ppx. CM consult for DC assist as pt is homeless.        Date: 7/30   Day 2: pt tolerating po  intake. Admits to pain in LLE, 8/10 on exam this AM. Continue IV Ancef. LE duplex to r/o DVT ordered. Analgesia with scheduled Tylenol and PRN 2.5 Oxy. WBCs wnl.       ED Triage Vitals [07/29/24 1238]   Temperature Pulse Respirations Blood Pressure SpO2 Pain Score   98.5 °F (36.9 °C) (!) 114 20 132/82 99 % 9     Weight (last 2 days)       None            Vital Signs (last 3 days)       Date/Time Temp Pulse Resp BP MAP (mmHg) SpO2 O2 Device Patient Position - Orthostatic VS Pain    07/30/24 1022 -- -- -- -- -- -- -- -- 10 - Worst Possible Pain    07/30/24 0924 -- 84 18 127/82 -- 97 % None (Room air) Sitting 9    07/30/24 0302 -- 90 18 140/88 -- 98 % None (Room air) -- --    07/29/24 1845 -- 93 19 148/89 -- 98 % None (Room air) Sitting --    07/29/24 1238 98.5 °F (36.9 °C) 114 20 132/82 92 99 % None (Room air) Sitting 9            Pertinent Labs/Diagnostic Test Results:   Radiology:  XR ankle 3+ views LEFT   ED Interpretation by Yemi Matias MD (07/29 1531)   No soft tissue abnormality.      Final Interpretation by Farzana Vela MD (07/29 4076)      No acute osseous abnormality.         XR tibia fibula 2 views LEFT   ED Interpretation by Yemi Matias MD (07/29 1531)   No soft tissue abnormality.      Final Interpretation by Farzana eVla MD (07/29 5496)      No acute osseous abnormality.          VAS VENOUS DUPLEX - LOWER LIMB BILATERAL    (Results Pending)     Cardiology:  ECG 12 lead   Final Result by Del Miller MD (07/29 6524)   Normal sinus rhythm   Normal ECG   When compared with ECG of 28-FEB-2024 16:05,   No significant change was found   Confirmed by Del Miller ([id number]) on 7/29/2024 4:04:36 PM            Results from last 7 days   Lab Units 07/30/24  0409 07/29/24  1418   WBC Thousand/uL 8.00 12.82*   HEMOGLOBIN g/dL 12.3 12.8   HEMATOCRIT % 36.9 37.4   PLATELETS Thousands/uL 269 271   TOTAL NEUT ABS Thousands/µL 4.98 9.86*     Results from last 7 days   Lab Units 07/30/24  4999  07/29/24  1418   SODIUM mmol/L 138 138   POTASSIUM mmol/L 3.6 3.8   CHLORIDE mmol/L 108 107   CO2 mmol/L 23 24   ANION GAP mmol/L 7 7   BUN mg/dL 19 20   CREATININE mg/dL 1.07 0.93   EGFR ml/min/1.73sq m 86 102   CALCIUM mg/dL 8.1* 8.7     Results from last 7 days   Lab Units 07/30/24  0409 07/29/24  1418   GLUCOSE RANDOM mg/dL 99 93       Results from last 7 days   Lab Units 07/29/24  1743   LACTIC ACID mmol/L 1.3       Results from last 7 days   Lab Units 07/28/24  2158   AMPH/METH  Positive*   BARBITURATE UR  Negative   BENZODIAZEPINE UR  Negative   COCAINE UR  Negative   METHADONE URINE  Negative   OPIATE UR  Negative   PCP UR  Negative   THC UR  Positive*       ED Treatment-Medication Administration from 07/29/2024 1232 to 07/30/2024 1124         Date/Time Order Dose Route Action     07/29/2024 1419 acetaminophen (TYLENOL) tablet 975 mg 975 mg Oral Given     07/29/2024 1431 vancomycin (VANCOCIN) 1,250 mg in sodium chloride 0.9 % 250 mL IVPB 1,250 mg Intravenous New Bag     07/29/2024 1422 ketorolac (TORADOL) injection 15 mg 15 mg Intravenous Given     07/29/2024 1723 ceFAZolin (ANCEF) IVPB (premix in dextrose) 2,000 mg 50 mL 2,000 mg Intravenous New Bag     07/30/2024 0159 ceFAZolin (ANCEF) IVPB (premix in dextrose) 2,000 mg 50 mL 2,000 mg Intravenous New Bag     07/30/2024 0919 ceFAZolin (ANCEF) IVPB (premix in dextrose) 2,000 mg 50 mL 2,000 mg Intravenous New Bag     07/29/2024 1723 lactated ringers bolus 1,000 mL 1,000 mL Intravenous New Bag     07/29/2024 1723 gabapentin (NEURONTIN) capsule 300 mg 300 mg Oral Given     07/29/2024 2149 gabapentin (NEURONTIN) capsule 300 mg 300 mg Oral Given     07/30/2024 0919 gabapentin (NEURONTIN) capsule 300 mg 300 mg Oral Given     07/29/2024 2149 QUEtiapine (SEROquel) tablet 300 mg 300 mg Oral Given     07/30/2024 1024 acetaminophen (TYLENOL) tablet 975 mg 975 mg Oral Given     07/30/2024 1022 oxyCODONE (ROXICODONE) immediate release tablet 10 mg 10 mg Oral Given          Past Medical History:   Diagnosis Date    Asthma     Chronic pain     Hypertension     Thyroglossal duct cyst      Present on Admission:   Tobacco abuse   Cellulitis of left lower extremity   Sepsis (HCC)      Admitting Diagnosis: Left ankle pain [M25.572]  Age/Sex: 38 y.o. male  Admission Orders:  Scheduled Medications:  acetaminophen, 975 mg, Oral, Q8H PETER  cefazolin, 2,000 mg, Intravenous, Q8H  enoxaparin, 40 mg, Subcutaneous, Daily  gabapentin, 300 mg, Oral, TID  QUEtiapine, 300 mg, Oral, HS    PRN Meds:  albuterol, 2 puff, Inhalation, Q4H PRN  cyclobenzaprine, 10 mg, Oral, TID PRN  oxyCODONE, 2.5 mg, Oral, Q4H PRN        IP CONSULT TO CASE MANAGEMENT    Network Utilization Review Department  ATTENTION: Please call with any questions or concerns to [phone] and carefully listen to the prompts so that you are directed to the right person. All voicemails are confidential.   For Discharge needs, contact Care Management DC Support Team at [phone] opt. 2  Send all requests for admission clinical reviews, approved or denied determinations and any other requests to dedicated fax number below belonging to the campus where the patient is receiving treatment. List of dedicated fax numbers for the Facilities:  FACILITY NAME UR FAX NUMBER   ADMISSION DENIALS (Administrative/Medical Necessity) [phone]   DISCHARGE SUPPORT TEAM (NETWORK) [phone]   PARENT CHILD HEALTH (Maternity/NICU/Pediatrics) [phone]   [facility] [phone]   [facility] [phone]   [facility] [phone]   [facility] [phone]   [facility] [phone]   [facility] [phone]   [facility] [phone]   [facility] [phone]   [facility] [phone]   [facility] [phone]   [facility] [phone]   [facility] [phone]

## 2024-07-30 NOTE — ED NOTES
"Pt states that his \"life is in danger\" and needs protective order from police. Also states that he \"hears a woman's voice\" begging the person he is running from not to hurt him (patient). Provider & charge nurse notified. Pt currently in room with no outward signs of distress noted  Update: reported claim to [facility] police who will be sending officer.    Update: Officers arrived talking with Pt     Minna Voss RN  07/30/24 1215       Minna Voss RN  07/30/24 1234       Minna Voss RN  07/30/24 1248    "

## 2024-07-30 NOTE — TELEPHONE ENCOUNTER
1:43 PM    Patient is currently admitted at [facility].  Thank you.  Daksha Cai  [facility]

## 2024-07-30 NOTE — ASSESSMENT & PLAN NOTE
Patient presented with 3 days of redness, pain, and swelling of the left lower extremity.  WBC elevated at 12.82. Exam significant for tender, warm, erythematous LLE; erythema extending proximally from a abraded area over his left Achilles to approximately mid-calf.   - Given ongoing pain affecting ambulation will continue IV ancef 2 gm q8h, plan to transition to oral in next 24 to 48 hours depending on patient's symptoms  - trend WBC count and fever curve  - pain control

## 2024-07-30 NOTE — CONSULTS
Consultation - Behavioral Health   Amado Chin 38 y.o. male MRN: [id number]  Unit/Bed#: CW2 212-02 Encounter: [id number]      Chief Complaint: People outside and they want to kill me, the police did not believe me    History of Present Illness   Physician Requesting Consult: Iraida Reed MD  Reason for Consult / Principal Problem: Delusions    Amado Chin is a 38 y.o. male with a history of depression, substance-induced psychosis, chronic pain, cervical disc disease, asthma presents with pain and redness in the left lower extremity he has been evaluated for delusions.  Patient states that he has been dealing with this family and they are out to kill him, he stated that he contacted the police multiple times but they do not believe him.  He is that is a female and she is in the hospital and she has guns.  He does have a history of paranoia unfortunately complicated with methamphetamine use and lack of compliance when he is released from the psychiatry unit.  Was admitted to [facility] on July 13 appear that he was discharged on Seroquel but he is not taking it believe that his family is making holes in the house and putting people inside and may be doing something wrong , but he is not specific, he is scared for his life and he wants some kind of protection.  He is stated that he does not hear voices but he feels that the people are following him.  When I was with him in the nurse offered Zyprexa and he refused it, but after talking to him he decided to take it to calm himself down. He states that he is not depressed but he is very anxious. At the same time he is complaining of severe pain in the left ankle. He was cooperative with me but very paranoid. The police officers came to talk to him at the patient's request and after that he spoke to me and explained that they received multiple calls from him with the same complaint that someone was outside to kill him. He states that he has no family  support.        Psychiatric Review Of Systems:  sleep: yes  appetite changes: no  weight changes: no  energy/anergy: no  interest/pleasure/anhedonia: no  somatic symptoms: no  anxiety/panic: yes  heber: no  guilty/hopeless: no  self injurious behavior/risky behavior: no    Historical Information   Past Psychiatric History:   In Patient he had a prior psych admission at [facility], he also was admitted to a psychiatric unit in July's 13 in [facility]  Currently in treatment with none.  Past Suicide attempts: None  Past Violent behavior: None  Past Psychiatric medication trial: Haloperidol, Seroquel    Substance Abuse History:  He has a longstanding history of methamphetamine use and his toxicology from 7/28 was positive for amphetamines and marijuana. He denies any other drugs. He also states that he does not like alcohol.      I have assessed this patient for substance use within the past 12 months     History of IP/OP rehabilitation program: None  Smoking history: Smoker 1/2 pack per day    Family Psychiatric History:   He denies any family history of mental illness, substance abuse or suicide attempt    Social History  Education: no high school  Learning Disabilities:  None  Marital history: single  Living arrangement, social support:  Homeless.  Occupational History: on permanent disability  Functioning Relationships: poor support system.  Other Pertinent History:  He denies any legal  history    Traumatic History:   Abuse:  He denies any  Other Traumatic Events:  None    Past Medical History:   Diagnosis Date    Asthma     Chronic pain     Hypertension     Thyroglossal duct cyst        Medical Review Of Systems:  Review of Systems - Negative except pain in the left ankle, delusions, anxiety, all the systems reviewed and are negative    Meds/Allergies   current meds:   Current Facility-Administered Medications   Medication Dose Route Frequency    acetaminophen (TYLENOL) tablet 975 mg  975  mg Oral Q8H PETER    albuterol (PROVENTIL HFA,VENTOLIN HFA) inhaler 2 puff  2 puff Inhalation Q4H PRN    ceFAZolin (ANCEF) IVPB (premix in dextrose) 2,000 mg 50 mL  2,000 mg Intravenous Q8H    cyclobenzaprine (FLEXERIL) tablet 10 mg  10 mg Oral TID PRN    enoxaparin (LOVENOX) subcutaneous injection 40 mg  40 mg Subcutaneous Daily    gabapentin (NEURONTIN) capsule 300 mg  300 mg Oral TID    ketorolac (TORADOL) injection 15 mg  15 mg Intravenous Q6H PRN    oxyCODONE (ROXICODONE) split tablet 2.5 mg  2.5 mg Oral Q4H PRN    QUEtiapine (SEROquel) tablet 300 mg  300 mg Oral HS     No Known Allergies    Objective   Vital signs in last 24 hours:  Temp:  [98.8 °F (37.1 °C)] 98.8 °F (37.1 °C)  HR:  [] 107  Resp:  [18-19] 18  BP: (127-148)/(82-92) 147/92      Intake/Output Summary (Last 24 hours) at 7/30/2024 1453  Last data filed at 7/30/2024 0229  Gross per 24 hour   Intake 1100 ml   Output --   Net 1100 ml       Mental Status Evaluation:  Appearance:  age appropriate and casually dressed   Behavior:  Cooperative   Speech:  normal pitch and normal volume   Mood:  anxious   Affect:  mood-congruent   Language: naming objects and repeating phrases   Thought Process:  goal directed   Associations: intact associations   Thought Content:  Paranoid persecutory delusions   Perceptual Disturbances: None   Risk Potential: Suicidal Ideations none, Homicidal Ideations none, and Potential for Aggression No   Sensorium:  person, place, and time/date   Memory:  recent and remote memory grossly intact   Cognition:  recent and remote memory grossly intact   Consciousness:  alert and awake    Attention: attention span and concentration were age appropriate   Intellect: within normal limits   Fund of Knowledge: awareness of current events: Fair, past history: Fair, and vocabulary: Fair   Insight:  limited   Judgment: limited   Muscle Strength and Tone: Within normal limits   Gait/Station: normal gait/station and normal balance   Motor  Activity: no abnormal movements     Lab Results:  I have personally reviewed all pertinent laboratory/tests results.  Labs in last 72 hours:   Recent Labs     07/30/24  0409   WBC 8.00   RBC 3.93   HGB 12.3   HCT 36.9      RDW 13.7   NEUTROABS 4.98   SODIUM 138   K 3.6      CO2 23   BUN 19   CREATININE 1.07   GLUC 99   CALCIUM 8.1*       Code Status: )Level 1 - Full Code    Assessment & Plan     Assessment:  Amado Chin is a 38 y.o. male with a history of depression, substance-induced psychosis, chronic pain, cervical disc disease, asthma presents with pain and redness in the left lower extremity he has been evaluated for delusions.  Patient states that he feels that somebody is following him to kill him, he believes that these people are in the hospital, he had contacted the police multiple times for the same reason he was just released from the psychiatry unit but unfortunately patient does not take psychotropic medication when he leaves the unit.  Also have amphetamine abuse that made him more paranoid.  He denies any suicidal or homicidal thoughts plan or intent.  Diagnosis:  Substance-induced mood disorder with psychosis  Plan:   Continue medical management  Patient got a dose of Zyprexa 5 mg p.o. in the ER  Continue Seroquel 300 mg p.o. at bedtime  Zyprexa 5 mg p.o. or IM for severe agitation every 3 hours maximum 3 dose in 24 hours  I will follow-up  Discussed with the primary team  Risks, benefits and possible side effects of Medications:   Risks, benefits, and possible side effects of medications explained to patient and patient verbalizes understanding.           Lucita Navarrete MD

## 2024-07-30 NOTE — QUICK NOTE
Evaluated patient.     He is standing in the hallway. Alert and oriented x 4. Patient reports of left posterior ankle pain at the location of cellulitis, reports he had a long run few days ago and injured the ankle.   On my exam, he is tearful stating falling out with two close friends recently. He appears anxious. But not agitated. He followed all my instructions. He took oxycodone and keflex PO while I was present.   I discussed risk vs. Benefit of staying in the hospital vs. Leaving AMA. He is aware that if cellulitis is not treated properly, may worsen to septicemia, bacteremia and shock.   After long discussion, patient agreed to stay to complete treatment.     On my exam, patient appears to have capacity to make medical decision.

## 2024-07-30 NOTE — QUICK NOTE
Pt re-evaluated in the ED. Please refer to nursing note for full report. Pt reports to be that someone is attempting to kill him but will no provide additional detail. Pt w/ h/o paranoid schizophrenia. Consulted psych. ER called the police so he can file a report. AAO x 3 but bizarre and somewhat manic. Similar c/o at [facility] on 07/13/24 at their ER. No current psych medications per pt except Seroquel qhs & Gabapentin TID.  Briefly reviewed with attending; Zyprexa x 1 now.   Camron Zarate)

## 2024-07-30 NOTE — ASSESSMENT & PLAN NOTE
C/o 3 days of posterior, distal, calf/over Achilles erythema/pain/warmth w/ LE edema  Continue Ancef  Monitor leukocytosis  X-ray benign  Request LE duplex, r/o DVT   Denies puncture wounds, bites, or IV drug use  Analgesia with scheduled Tylenol and PRN 2.5 Oxy

## 2024-07-30 NOTE — PLAN OF CARE
Problem: PAIN - ADULT  Goal: Verbalizes/displays adequate comfort level or baseline comfort level  Description: Interventions:  - Encourage patient to monitor pain and request assistance  - Assess pain using appropriate pain scale  - Administer analgesics based on type and severity of pain and evaluate response  - Implement non-pharmacological measures as appropriate and evaluate response  - Consider cultural and social influences on pain and pain management  - Notify physician/advanced practitioner if interventions unsuccessful or patient reports new pain  Outcome: Progressing     Problem: INFECTION - ADULT  Goal: Absence or prevention of progression during hospitalization  Description: INTERVENTIONS:  - Assess and monitor for signs and symptoms of infection  - Monitor lab/diagnostic results  - Monitor all insertion sites, i.e. indwelling lines, tubes, and drains  - Monitor endotracheal if appropriate and nasal secretions for changes in amount and color  - [place] appropriate cooling/warming therapies per order  - Administer medications as ordered  - Instruct and encourage patient and family to use good hand hygiene technique  - Identify and instruct in appropriate isolation precautions for identified infection/condition  Outcome: Progressing  Goal: Absence of fever/infection during neutropenic period  Description: INTERVENTIONS:  - Monitor WBC    Outcome: Progressing     Problem: SAFETY ADULT  Goal: Patient will remain free of falls  Description: INTERVENTIONS:  - Educate patient/family on patient safety including physical limitations  - Instruct patient to call for assistance with activity   - Consult OT/PT to assist with strengthening/mobility   - Keep Call bell within reach  - Keep bed low and locked with side rails adjusted as appropriate  - Keep care items and personal belongings within reach  - Initiate and maintain comfort rounds  - Make Fall Risk Sign visible to staff  - Offer Toileting every 2 Hours,  in advance of need  - Initiate/Maintain bed alarm  - Apply yellow socks and bracelet for high fall risk patients  - Consider moving patient to room near nurses station  Outcome: Progressing  Goal: Maintain or return to baseline ADL function  Description: INTERVENTIONS:  -  Assess patient's ability to carry out ADLs; assess patient's baseline for ADL function and identify physical deficits which impact ability to perform ADLs (bathing, care of mouth/teeth, toileting, grooming, dressing, etc.)  - Assess/evaluate cause of self-care deficits   - Assess range of motion  - Assess patient's mobility; develop plan if impaired  - Assess patient's need for assistive devices and provide as appropriate  - Encourage maximum independence but intervene and supervise when necessary  - Involve family in performance of ADLs  - Assess for home care needs following discharge   - Consider OT consult to assist with ADL evaluation and planning for discharge  - Provide patient education as appropriate  Outcome: Progressing  Goal: Maintains/Returns to pre admission functional level  Description: INTERVENTIONS:  - Perform AM-PAC 6 Click Basic Mobility/ Daily Activity assessment daily.  - Set and communicate daily mobility goal to care team and patient/family/caregiver.   - Collaborate with rehabilitation services on mobility goals if consulted  - Perform Range of Motion 4 times a day.  - Reposition patient every 2 hours.  - Dangle patient 4 times a day  - Stand patient 4 times a day  - Ambulate patient 4 times a day  - Out of bed to chair 4 times a day   - Out of bed for meals 4 times a day  - Out of bed for toileting  - Record patient progress and toleration of activity level   Outcome: Progressing     Problem: DISCHARGE PLANNING  Goal: Discharge to home or other facility with appropriate resources  Description: INTERVENTIONS:  - Identify barriers to discharge w/patient and caregiver  - Arrange for needed discharge resources and  transportation as appropriate  - Identify discharge learning needs (meds, wound care, etc.)  - Arrange for interpretive services to assist at discharge as needed  - Refer to Case Management Department for coordinating discharge planning if the patient needs post-hospital services based on physician/advanced practitioner order or complex needs related to functional status, cognitive ability, or social support system  Outcome: Progressing     Problem: Knowledge Deficit  Goal: Patient/family/caregiver demonstrates understanding of disease process, treatment plan, medications, and discharge instructions  Description: Complete learning assessment and assess knowledge base.  Interventions:  - Provide teaching at level of understanding  - Provide teaching via preferred learning methods  Outcome: Progressing

## 2024-07-30 NOTE — ASSESSMENT & PLAN NOTE
Arabella Zimmerman is a 20 year old  woman with an MARIANA of 2024, by Last Menstrual Period    She will be seeing Dr.Ashley Gupta for her prenatal care.     Patient is completing visit in person.     Arabella's medical, surgical, and social histories were updated in [facility]. The patient's current medications were verified and entered into Epic. Outside information was reconciled into the medical record.      LMP: Patient's last menstrual period was 2023. . Reports cycles are  regular occurring every 28 days, lasting 6-7 days.    OB History:  OB History    Para Term  AB Living   1             SAB IAB Ectopic Molar Multiple Live Births                    # Outcome Date GA Lbr Alessio/2nd Weight Sex Delivery Anes PTL Lv   1 Current                    GYN History:  Patient Denies abnormal pap smear history. Last pap smear:   Interpretation (no units)   Date Value   2022 Negative for intraepithelial lesion or malignancy.    No results found for: \"HPVHR\"    Patient Denies history of cervical biopsies, colposcopy, or LEEP procedure     Patient reports nausea and vomiting this pregnancy. Patient was encouraged to eat small frequent meals. Vitamin 6 and gilson products were reviewed.      Patient Denies vaginal bleeding and pelvic pain at this time.      Medical History:  Patient Endorses family history of genetic disorders. FOB sister has autism and patient nephew does as well.     Arabella Denies prior genetic testing.     Patient educated on first trimester screening and genetic counseling. Patient is will consider genetic screening and discuss with her provider at the OB annual appointment.     Hip surgery done on 3/2023 by Dr Giron. Patient states she will be finishing PT in the near future.     Bipolar.- Patient is not on any medication. Patient is seeing a new psych provider today Dr Blandon at unknown facility.     Patient Denies MRSA history.       Depression (PHQ) screening completed.  POA, E/B w/ leukocytosis (12.8), tachycardia (114 bpm) & LLE cellulitis   Afebrile; hemodynamically stable; leukocytosis resolved; LA normal  S/p LR of 1 L in ED  Blood cultures not obtained at time of admission; will obtain now however antibiotics have been administered   Initial depression screening score:: 1          ARLYN screening completed.   ARLYN 7 Score ARLYN 7 Score   11/2/2023   9:15 AM 4        Please see end of note for further medical history.    Social History:    Alcohol Use: Not Current*       Comment: socially      Drug Use:    Yes                Special: Marijuana       Comment: last intake 10/31/23      Sexually Active: Yes             Partners with: Male       Birth Control/Protection: None, Pill       Comment: Hx of OCP    Social History     Tobacco Use   Smoking Status Never   Smokeless Tobacco Former   • Quit date: 4/19/2019         Patient works part time as a  and lives with the FELICITY Mortensen who is a . This will be the couples first child.     Patient feels supported in this pregnancy.    Patient Denies concerns with housing, food security, transportation, safety.     Patient educated on prenatal labs, ultrasounds, and OB visits. Patient was given For Your Well Being sheets, Great Expectations Book, and What to Expect When You're Expecting Book. Provided patient with handout regarding how to sign up for prenatal classes if she is interested.      Patient has not selected a pediatrician at this time. Patient notified pediatrician should be selected by 28 weeks. Pediatrician handout provided to patient.    [employer] mobile mell information provided to patient with FAQ handout. Patient encouraged to download [employer] mell. Order placed.    Patient has been provided information on the benefits breastfeeding and availability of breastfeeding class through [employer].    Patient states she will complete prenatal labs today. Patient advised office will contact her with her test results once available. Patient has an active [employer] account.    Patient has upcoming appointment for OB Annual with Dr.Ashley Gupta  scheduled on 12/15. Dating US ordered.     Patient encouraged to call office if she has any questions or issues. Patient states understanding and  is agreeable to the plan of care.    Please see detailed patient history below.       ALLERGIES:    Allergies as of 11/02/2023 - Reviewed 11/02/2023   Allergen Reaction Noted   • Aripiprazole RASH 01/16/2020       MEDICATIONS:   Current Outpatient Medications   Medication Sig Dispense Refill   • Prenatal Vit-Fe Fumarate-FA (PRENATAL VITAMIN PO)      • albuterol 108 (90 Base) MCG/ACT inhaler Inhale 2 puffs into the lungs every 4 hours as needed 18 g 0   • Spacer/Aero-Holding Chambers (AEROCHAMBER WITH MASK, LARGE) Use as directed 1 each 0     No current facility-administered medications for this visit.       PAST MEDICAL HISTORY:    Past Medical History:   Diagnosis Date   • Anxiety 10/23/2015   • Bipolar 1 disorder (CMD)    • COVID 2020   • Depressive disorder    • Dizziness 12/04/2015   • Epistaxis 12/04/2015   • GERD (gastroesophageal reflux disease)    • Marijuana abuse 10/31/2023   • Migraine without aura and without status migrainosus, not intractable 10/23/2015   • Mild intermittent asthma without complication 05/06/2016   • UTI (urinary tract infection)    • Vitamin D insufficiency 12/04/2015   • Wears glasses        PAST SURGICAL HISTORY:   Past Surgical History:   Procedure Laterality Date   • Hand surgery Left 03/17/2022    Repair partial laceration FPL tendon L thumb   • Pelvis/hip joint surgery unlisted Left 03/2023   • [place] tooth extraction  2021       SOCIAL HISTORY:       Alcohol Use: Not Current*       Comment: socially      Drug Use:    Yes                Special: Marijuana       Comment: last intake 10/31/23      Sexually Active: Yes             Partners with: Male       Birth Control/Protection: None, Pill       Comment: Hx of OCP      FAMILY HISTORY:  family history includes Alcohol Abuse in her father; Allergic Rhinitis in her mother; Arrhythmia in her maternal grandfather; Asthma in her brother, father, maternal grandfather, and sister; Bilateral breast cancer in her maternal grandmother;  Cancer, Breast in her maternal grandmother; Cirrhosis in her father; Diabetes in her maternal uncle; Heart in her maternal grandfather, maternal grandmother, paternal grandfather, and paternal grandmother; Heart disease in an other family member; Hyperlipidemia in her maternal grandfather, maternal grandmother, paternal grandfather, and paternal grandmother; Hypertension in her maternal grandfather, maternal grandmother, mother, paternal grandfather, and paternal grandmother; Migraine in her father and mother; Miscarriages / Stillbirths in her sister; Other in an other family member;  labor in her sister.

## 2024-07-30 NOTE — PROGRESS NOTES
[facility]  Progress Note  Name: Amado Chin I  MRN: [id number]  Unit/Bed#: ED 07 I Date of Admission: 7/29/2024   Date of Service: 7/30/2024 I Hospital Day: 1    Assessment & Plan   * Cellulitis of left lower extremity  Assessment & Plan  C/o 3 days of posterior, distal, calf/over Achilles erythema/pain/warmth w/ LE edema  Continue Ancef  Monitor leukocytosis  X-ray benign  Request LE duplex, r/o DVT   Denies puncture wounds, bites, or IV drug use  Analgesia with scheduled Tylenol and PRN 2.5 Oxy    Sepsis (HCC)  Assessment & Plan  POA, E/B w/ leukocytosis (12.8), tachycardia (114 bpm) & LLE cellulitis   Afebrile; hemodynamically stable; leukocytosis resolved; LA normal  S/p LR of 1 L in ED  Blood cultures not obtained at time of admission; will obtain now however antibiotics have been administered    Drug use  Assessment & Plan  Rapid drug screen positive for THC and methamphetamine    Homelessness  Assessment & Plan  Case management following; supportive care/resource    Tobacco abuse  Assessment & Plan  1/2 PPD; nicotine patch; cessation encouraged           VTE Pharmacologic Prophylaxis: VTE Score: 3 Moderate Risk (Score 3-4) - Pharmacological DVT Prophylaxis Ordered: enoxaparin (Lovenox).    Mobility:      [facility] Goal achieved. Continue to encourage appropriate mobility.    Patient Centered Rounds: I performed bedside rounds with nursing staff today.   Discussions with Specialists or Other Care Team Provider: Plan of care reviewed with nursing    Education and Discussions with Family / Patient: Patient declined call to .     Total Time Spent on Date of Encounter in care of patient: 40 mins. This time was spent on one or more of the following: performing physical exam; counseling and coordination of care; obtaining or reviewing history; documenting in the medical record; reviewing/ordering tests, medications or procedures; communicating with other healthcare  professionals and discussing with patient's family/caregivers.    Current Length of Stay: 1 day(s)  Current Patient Status: Inpatient   Certification Statement: The patient will continue to require additional inpatient hospital stay due to cellulitis  Discharge Plan: Anticipate discharge in 24-48 hrs to shelter    Code Status: Level 1 - Full Code    Subjective:   Patient is doing okay.  He seems somewhat frustrated by my questioning.  He is tolerating p.o. intake/eating during exam.  Notes that both of his lower extremities are bothersome.  He admits that his pain in left lower extremity is 8/10.  He denies fever.    Objective:     Vitals:   Temp (24hrs), Av.5 °F (36.9 °C), Min:98.5 °F (36.9 °C), Max:98.5 °F (36.9 °C)    Temp:  [98.5 °F (36.9 °C)] 98.5 °F (36.9 °C)  HR:  [] 84  Resp:  [18-20] 18  BP: (127-148)/(82-89) 127/82  SpO2:  [97 %-99 %] 97 %  There is no height or weight on file to calculate BMI.     Input and Output Summary (last 24 hours):     Intake/Output Summary (Last 24 hours) at 2024 0940  Last data filed at 2024 0229  Gross per 24 hour   Intake 1100 ml   Output --   Net 1100 ml       Physical Exam:   Physical Exam  Constitutional:       General: He is not in acute distress.     Appearance: He is normal weight. He is not toxic-appearing.   HENT:      Head: Normocephalic.      Right Ear: External ear normal.      Left Ear: External ear normal.      Nose: Nose normal.      Mouth/Throat:      Mouth: Mucous membranes are moist.      Pharynx: Oropharynx is clear.   Eyes:      Extraocular Movements: Extraocular movements intact.      Conjunctiva/sclera: Conjunctivae normal.   Cardiovascular:      Rate and Rhythm: Normal rate and regular rhythm.      Pulses: Normal pulses.      Heart sounds: Normal heart sounds.   Pulmonary:      Effort: No respiratory distress.      Breath sounds: Normal breath sounds. No wheezing or rales.   Abdominal:      General: Abdomen is flat. Bowel sounds are  normal. There is no distension.      Palpations: Abdomen is soft.   Musculoskeletal:         General: Normal range of motion.      Cervical back: Normal range of motion.   Skin:     General: Skin is warm.      Capillary Refill: Capillary refill takes less than 2 seconds.   Neurological:      General: No focal deficit present.      Mental Status: He is alert. Mental status is at baseline.   Psychiatric:         Mood and Affect: Mood normal.         Behavior: Behavior normal.        Additional Data:     Labs:  Results from last 7 days   Lab Units 07/30/24  0409   WBC Thousand/uL 8.00   HEMOGLOBIN g/dL 12.3   HEMATOCRIT % 36.9   PLATELETS Thousands/uL 269   SEGS PCT % 62   LYMPHO PCT % 17   MONO PCT % 12   EOS PCT % 8*     Results from last 7 days   Lab Units 07/30/24  0409   SODIUM mmol/L 138   POTASSIUM mmol/L 3.6   CHLORIDE mmol/L 108   CO2 mmol/L 23   BUN mg/dL 19   CREATININE mg/dL 1.07   ANION GAP mmol/L 7   CALCIUM mg/dL 8.1*   GLUCOSE RANDOM mg/dL 99                 Results from last 7 days   Lab Units 07/29/24  1743   LACTIC ACID mmol/L 1.3       Lines/Drains:  Invasive Devices       Peripheral Intravenous Line  Duration             Peripheral IV 07/29/24 Left Antecubital <1 day                          Imaging: Reviewed radiology reports from this admission including: xray(s)    Recent Cultures (last 7 days):         Last 24 Hours Medication List:   Current Facility-Administered Medications   Medication Dose Route Frequency Provider Last Rate    acetaminophen  975 mg Oral Q8H [facility] Etta Abernathy PA-C      albuterol  2 puff Inhalation Q4H PRN Augustus Delgadillo MD      cefazolin  2,000 mg Intravenous Q8H Augustus Delgadillo MD 2,000 mg (07/30/24 0919)    cyclobenzaprine  10 mg Oral TID PRN Augustus Delgadillo MD      enoxaparin  40 mg Subcutaneous Daily Augustus Delgadillo MD      gabapentin  300 mg Oral TID Augustus Delgadillo MD      oxyCODONE  2.5 mg Oral Q4H PRN Etta Abernathy PA-C      QUEtiapine  300 mg Oral HS Augustus Delgadillo MD           Today, Patient Was Seen By: Etta Abernathy PA-C    **Please Note: This note may have been constructed using a voice recognition system.**

## 2024-07-30 NOTE — QUICK NOTE
Psychiatry updated regarding patient's continued paranoia.  Patient is refusing ODT Zyprexa as well as antibiotic therapy.

## 2024-07-30 NOTE — UTILIZATION REVIEW
"NOTIFICATION OF INPATIENT ADMISSION   AUTHORIZATION REQUEST   SERVICING FACILITY:   [facility]  Address: [address] [place] [address]  Tax ID: [id number]  NPI: [id number] ATTENDING PROVIDER:  Attending Name and NPI#: Iraida Reed Md [[id number]]  Address: [address] [place] [address]  Phone: [phone]   ADMISSION INFORMATION:  Place of Service: Inpatient [facility]  Place of Service Code: 21  Inpatient Admission Date/Time: 7/29/24  4:46 PM  Discharge Date/Time: No discharge date for patient encounter.  Admitting Diagnosis Code/Description:  Delusion (HCC) [F22]  Leukocytosis [D72.829]  Homelessness [Z59.00]  Left ankle pain [M25.572]  Financial difficulties [Z59.9]  Cellulitis of left lower extremity [L03.116]     UTILIZATION REVIEW CONTACT:  Goldie Mays"Heather\"Thierno Utilization   Network Utilization Review Department  Phone: [phone]  Fax: [phone]  Email: [email or web address]  Contact for approvals/pending authorizations, clinical reviews, and discharge.     PHYSICIAN ADVISORY SERVICES:  Medical Necessity Denial & Esnf-gu-Mkvy Review  Phone: [phone]  Fax: [phone]  Email: [email or web address]     DISCHARGE SUPPORT TEAM:  For Patients Discharge Needs & Updates  Phone: [phone] opt. 2 Fax: [phone]  Email: [email or web address]     "

## 2024-07-30 NOTE — QUICK NOTE
Patient seen and evaluated in hallway of unit.  Patient is paranoid.  He is suspicious of a hospital bed in the hallway.  He is pacing the hallways.  It does seem that patient's paranoia is baseline for this patient.  He is waxing and waning whether he would like to stay.  He did remove his IV.  I changed his antibiotics to p.o.  I offered him analgesia.  I did review this case with my attending who did personally evaluate the patient.  I did express the patient's risk of leaving including potential worsening cellulitis and we did review potential of systemic infection with sepsis as admitting diagnosis.  Patient did verbalize that he is aware of the risk of leaving.  On exam patient was alert and oriented x 3.

## 2024-07-31 PROBLEM — F19.959 PSYCHOACTIVE SUBSTANCE-INDUCED PSYCHOSIS (HCC): Status: ACTIVE | Noted: 2024-07-31

## 2024-07-31 LAB
ANION GAP SERPL CALCULATED.3IONS-SCNC: 6 MMOL/L (ref 4–13)
BUN SERPL-MCNC: 21 MG/DL (ref 5–25)
CALCIUM SERPL-MCNC: 8.2 MG/DL (ref 8.4–10.2)
CHLORIDE SERPL-SCNC: 105 MMOL/L (ref 96–108)
CO2 SERPL-SCNC: 27 MMOL/L (ref 21–32)
CREAT SERPL-MCNC: 0.85 MG/DL (ref 0.6–1.3)
ERYTHROCYTE [DISTWIDTH] IN BLOOD BY AUTOMATED COUNT: 13.8 % (ref 11.6–15.1)
GFR SERPL CREATININE-BSD FRML MDRD: 108 ML/MIN/1.73SQ M
GLUCOSE SERPL-MCNC: 101 MG/DL (ref 65–140)
HCT VFR BLD AUTO: 37.5 % (ref 36.5–49.3)
HGB BLD-MCNC: 12.6 G/DL (ref 12–17)
MCH RBC QN AUTO: 31.9 PG (ref 26.8–34.3)
MCHC RBC AUTO-ENTMCNC: 33.6 G/DL (ref 31.4–37.4)
MCV RBC AUTO: 95 FL (ref 82–98)
PLATELET # BLD AUTO: 268 THOUSANDS/UL (ref 149–390)
PMV BLD AUTO: 9.4 FL (ref 8.9–12.7)
POTASSIUM SERPL-SCNC: 4 MMOL/L (ref 3.5–5.3)
RBC # BLD AUTO: 3.95 MILLION/UL (ref 3.88–5.62)
SODIUM SERPL-SCNC: 138 MMOL/L (ref 135–147)
WBC # BLD AUTO: 6.48 THOUSAND/UL (ref 4.31–10.16)

## 2024-07-31 PROCEDURE — 99232 SBSQ HOSP IP/OBS MODERATE 35: CPT | Performed by: PHYSICIAN ASSISTANT

## 2024-07-31 PROCEDURE — 97163 PT EVAL HIGH COMPLEX 45 MIN: CPT

## 2024-07-31 PROCEDURE — 80048 BASIC METABOLIC PNL TOTAL CA: CPT | Performed by: PHYSICIAN ASSISTANT

## 2024-07-31 PROCEDURE — 99232 SBSQ HOSP IP/OBS MODERATE 35: CPT | Performed by: PSYCHIATRY & NEUROLOGY

## 2024-07-31 PROCEDURE — 97167 OT EVAL HIGH COMPLEX 60 MIN: CPT

## 2024-07-31 PROCEDURE — 85027 COMPLETE CBC AUTOMATED: CPT | Performed by: PHYSICIAN ASSISTANT

## 2024-07-31 RX ORDER — CEFAZOLIN SODIUM 2 G/50ML
2000 SOLUTION INTRAVENOUS EVERY 8 HOURS
Status: DISCONTINUED | OUTPATIENT
Start: 2024-07-31 | End: 2024-08-03 | Stop reason: HOSPADM

## 2024-07-31 RX ORDER — MORPHINE SULFATE 4 MG/ML
4 INJECTION, SOLUTION INTRAMUSCULAR; INTRAVENOUS ONCE
Status: COMPLETED | OUTPATIENT
Start: 2024-07-31 | End: 2024-07-31

## 2024-07-31 RX ADMIN — CEFAZOLIN SODIUM 2000 MG: 2 SOLUTION INTRAVENOUS at 12:02

## 2024-07-31 RX ADMIN — OXYCODONE HYDROCHLORIDE 5 MG: 5 TABLET ORAL at 00:38

## 2024-07-31 RX ADMIN — OXYCODONE HYDROCHLORIDE 5 MG: 5 TABLET ORAL at 06:19

## 2024-07-31 RX ADMIN — KETOROLAC TROMETHAMINE 15 MG: 30 INJECTION, SOLUTION INTRAMUSCULAR; INTRAVENOUS at 00:48

## 2024-07-31 RX ADMIN — GABAPENTIN 300 MG: 300 CAPSULE ORAL at 20:10

## 2024-07-31 RX ADMIN — CYCLOBENZAPRINE 10 MG: 10 TABLET, FILM COATED ORAL at 09:54

## 2024-07-31 RX ADMIN — SULFAMETHOXAZOLE AND TRIMETHOPRIM 1 TABLET: 400; 80 TABLET ORAL at 09:54

## 2024-07-31 RX ADMIN — CEPHALEXIN 500 MG: 500 CAPSULE ORAL at 06:19

## 2024-07-31 RX ADMIN — KETOROLAC TROMETHAMINE 15 MG: 30 INJECTION, SOLUTION INTRAMUSCULAR; INTRAVENOUS at 22:19

## 2024-07-31 RX ADMIN — KETOROLAC TROMETHAMINE 15 MG: 30 INJECTION, SOLUTION INTRAMUSCULAR; INTRAVENOUS at 07:50

## 2024-07-31 RX ADMIN — GABAPENTIN 300 MG: 300 CAPSULE ORAL at 16:06

## 2024-07-31 RX ADMIN — QUETIAPINE FUMARATE 300 MG: 100 TABLET ORAL at 20:10

## 2024-07-31 RX ADMIN — KETOROLAC TROMETHAMINE 15 MG: 30 INJECTION, SOLUTION INTRAMUSCULAR; INTRAVENOUS at 16:06

## 2024-07-31 RX ADMIN — CEFAZOLIN SODIUM 2000 MG: 2 SOLUTION INTRAVENOUS at 20:10

## 2024-07-31 RX ADMIN — CYCLOBENZAPRINE 10 MG: 10 TABLET, FILM COATED ORAL at 20:10

## 2024-07-31 RX ADMIN — CYCLOBENZAPRINE 10 MG: 10 TABLET, FILM COATED ORAL at 00:02

## 2024-07-31 RX ADMIN — GABAPENTIN 300 MG: 300 CAPSULE ORAL at 09:54

## 2024-07-31 RX ADMIN — MORPHINE SULFATE 4 MG: 4 INJECTION INTRAVENOUS at 12:02

## 2024-07-31 NOTE — ASSESSMENT & PLAN NOTE
POA, E/B w/ leukocytosis (12.8), tachycardia (114 bpm) & LLE cellulitis   Afebrile; hemodynamically stable; leukocytosis resolved; LA normal  S/p LR of 1 L in ED  Blood cultures not obtained at time of admission; will obtain now however antibiotics have been administered    Recent Labs     07/29/24  1418 07/30/24  0409 07/31/24  0622   WBC 12.82* 8.00 6.48

## 2024-07-31 NOTE — PLAN OF CARE
Problem: OCCUPATIONAL THERAPY ADULT  Goal: Performs self-care activities at highest level of function for planned discharge setting.  See evaluation for individualized goals.  Description: Treatment Interventions: ADL retraining, Functional transfer training, Endurance training, Cognitive reorientation, Patient/family training, Equipment evaluation/education, Compensatory technique education, Continued evaluation, Energy conservation, Activityengagement          See flowsheet documentation for full assessment, interventions and recommendations.   Outcome: Progressing  Note: Limitation: Decreased ADL status, Decreased cognition, Decreased endurance, Decreased high-level ADLs, Decreased self-care trans  Prognosis: Good  Assessment: Pt is a 38 y.o. male seen for OT evaluation s/p admission to [facility] on 7/29/2024 due to pain and redness of LLE. Pt diagnosed with Cellulitis of left lower extremity. Pt  has a past medical history of Asthma, Chronic pain, Hypertension, and Thyroglossal duct cyst.  Pt with active OT evaluation/treatment and activity orders. PTA, Pt was independent w/ ADL/IADL and functional mobility, was not using DME at baseline. Pt reports he does not currently have a stable living situation. Pt agreeable and willing to participate in OT evaluation. Pt was greeted left supine in bed w/ alarm activated and all needs within reach. During evaluation, pt is Mod I for UB ADLs, Supervision for bed mobility, STS transfer; Min A for LB ADLs. Pt required  increased encouragement to participate in session due to anxiety and pain level . Pt currently presents with impairments in the following categories -difficulty performing ADLS, difficulty performing IADLS , and homeless endurance, standing balance/tolerance, and impaired judgement. These impairments, as well as pt's pain, risk for falls, increased anxiety and hx of paranoia  limit pt's ability to safely engage in all baseline areas of occupation,  includingbathing, dressing, toileting, and functional mobility/transfers. The patient's raw score on the AM-PAC Daily Activity Inpatient Short Form is 22. A raw score of greater than or equal to 19 suggests the patient may benefit from discharge to home. Please refer to the recommendation of the Occupational Therapist for safe discharge planning. Pt would benefit from continued acute OT services throughout hospital course in order to maximize Pt's independence and overall occupational performance. Plan for OT interventions 2-3x per week. From OT standpoint, pending progress and pain management anticipate no post-acute rehab needs upon d/c when pt medically stable to d/c from acute care. Will continue to follow.     Rehab Resource Intensity Level, OT: No post-acute rehabilitation needs

## 2024-07-31 NOTE — PROGRESS NOTES
[facility]  Progress Note  Name: Amado Chin I  MRN: [id number]  Unit/Bed#: CW2 212-02 I Date of Admission: 7/29/2024   Date of Service: 7/31/2024 I Hospital Day: 2    Assessment & Plan   * Cellulitis of left lower extremity  Assessment & Plan  C/o 3 days PTA of posterior, distal, calf/over Achilles erythema/pain/warmth w/ LE edema  Initially placed on Ancef; medications changed to p.o. with patient's refusal on 07/30; patient now agreeable to IV ABX - c/w Ancef   ABX day #2   Monitor leukocytosis  X-ray benign  LE duplex w/o acute DVT  Denies puncture wounds, bites, or IV drug use; healed scabbed lesion at distal posterior calf  No clinical micromotion tenderness  Patient has a low pain threshold w/ h/o chronic pain and substance abuse; continue analgesia PRN     Sepsis (HCC)  Assessment & Plan  POA, E/B w/ leukocytosis (12.8), tachycardia (114 bpm) & LLE cellulitis   Afebrile; hemodynamically stable; leukocytosis resolved; LA normal  S/p LR of 1 L in ED  Blood cultures not obtained at time of admission; will obtain now however antibiotics have been administered    Recent Labs     07/29/24  1418 07/30/24  0409 07/31/24  0622   WBC 12.82* 8.00 6.48       Psychoactive 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HCC)  Assessment & Plan  Patient noted to be very paranoid this hospital admission (please see psych notes for greater detail); pt believes someone is attempting to harm him   Continue gabapentin 300 mg TID & Seroquel 300 qhs; continue recently added Zyprexa ODT   Case reviewed at length with attending physician; patient does have capacity to leave AMA if he chooses  Patient's paranoia seems baseline; recent voluntary inpatient psych hospitalization in mid 07/2023; multiple recent ED visits for similar complaints    Drug use  Assessment & Plan  Rapid drug screen positive for THC and methamphetamine  Contributing to psychosis    Homelessness  Assessment & Plan  Case management  following; supportive care/resource    Tobacco abuse  Assessment & Plan  1/2 PPD; nicotine patch; cessation encouraged         VTE Pharmacologic Prophylaxis: VTE Score: 3 Moderate Risk (Score 3-4) - Pharmacological DVT Prophylaxis Ordered: enoxaparin (Lovenox).    Mobility:   Basic Mobility Inpatient Raw Score: 24  JH-HLM Goal: 8: Walk 250 feet or more  JH-HLM Achieved: 7: Walk 25 feet or more  JH-HLM Goal achieved. Continue to encourage appropriate mobility.    Patient Centered Rounds: I performed bedside rounds with nursing staff today.   Discussions with Specialists or Other Care Team Provider: Plan of care reviewed with attending yesterday; plan of care reviewed with nursing today    Education and Discussions with Family / Patient: Patient declined call to .     Total Time Spent on Date of Encounter in care of patient: 40 mins. This time was spent on one or more of the following: performing physical exam; counseling and coordination of care; obtaining or reviewing history; documenting in the medical record; reviewing/ordering tests, medications or procedures; communicating with other healthcare professionals and discussing with patient's family/caregivers.    Current Length of Stay: 2 day(s)  Current Patient Status: Inpatient   Certification Statement: The patient will continue to require additional inpatient hospital stay due to cellulitis with sepsis present on admission  Discharge Plan: Anticipate discharge tomorrow to case management consulted; will likely need shelter    Code Status: Level 1 - Full Code    Subjective:   Patient is sleeping when I enter his room.  He does admit to moderate to severe pain in left lower extremity he is tolerating p.o. intake.  He denies fever.      Objective:     Vitals:   Temp (24hrs), Av.2 °F (36.8 °C), Min:97.6 °F (36.4 °C), Max:98.8 °F (37.1 °C)    Temp:  [97.6 °F (36.4 °C)-98.8 °F (37.1 °C)] 97.6 °F (36.4 °C)  HR:  [] 84  Resp:  [16-18] 16  BP:  ()/(67-92) 99/67  SpO2:  [95 %-96 %] 95 %  Body mass index is 27.46 kg/m².     Input and Output Summary (last 24 hours):   No intake or output data in the 24 hours ending 07/31/24 1237    Physical Exam:   Physical Exam  Constitutional:       General: He is not in acute distress.     Appearance: He is normal weight. He is not toxic-appearing.   HENT:      Head: Normocephalic.      Right Ear: External ear normal.      Left Ear: External ear normal.      Nose: Nose normal.      Mouth/Throat:      Mouth: Mucous membranes are moist.      Pharynx: Oropharynx is clear.   Eyes:      Extraocular Movements: Extraocular movements intact.      Conjunctiva/sclera: Conjunctivae normal.   Cardiovascular:      Rate and Rhythm: Normal rate and regular rhythm.      Heart sounds: Normal heart sounds.   Pulmonary:      Effort: Pulmonary effort is normal. No respiratory distress.      Breath sounds: Normal breath sounds.   Abdominal:      General: Abdomen is flat. Bowel sounds are normal. There is no distension.      Palpations: Abdomen is soft.      Tenderness: There is no abdominal tenderness.   Musculoskeletal:         General: Swelling (1+ bilateral lower extremity edema, left greater than) present. Normal range of motion.      Cervical back: Normal range of motion.      Comments: Tenderness to palpation of any aspect of left lower extremity; no micromotion tenderness   Skin:     General: Skin is warm and dry.      Findings: Erythema (Erythematous skin consistent with cellulitis with surrounding warmth on distal posterior to mid calf) present.      Comments: Scab distal calf wound without discharge   Neurological:      Mental Status: He is alert. Mental status is at baseline.   Psychiatric:      Comments: He is not actively paranoid at this time; patient's eyes are mainly closed when answering my questions; he is alert and oriented x 3; overall odd personality/affect          Additional Data:     Labs:  Results from last 7  days   Lab Units 07/31/24  0622 07/30/24  0409   WBC Thousand/uL 6.48 8.00   HEMOGLOBIN g/dL 12.6 12.3   HEMATOCRIT % 37.5 36.9   PLATELETS Thousands/uL 268 269   SEGS PCT %  --  62   LYMPHO PCT %  --  17   MONO PCT %  --  12   EOS PCT %  --  8*     Results from last 7 days   Lab Units 07/31/24  0622   SODIUM mmol/L 138   POTASSIUM mmol/L 4.0   CHLORIDE mmol/L 105   CO2 mmol/L 27   BUN mg/dL 21   CREATININE mg/dL 0.85   ANION GAP mmol/L 6   CALCIUM mg/dL 8.2*   GLUCOSE RANDOM mg/dL 101                 Results from last 7 days   Lab Units 07/29/24  1743   LACTIC ACID mmol/L 1.3       Lines/Drains:  Invasive Devices       Peripheral Intravenous Line  Duration             Peripheral IV 07/31/24 Left;Ventral (anterior) Forearm <1 day                          Imaging: Reviewed radiology reports from this admission including: xray(s) and ultrasound(s)    Recent Cultures (last 7 days):   Results from last 7 days   Lab Units 07/30/24  1030   BLOOD CULTURE  Received in Microbiology Lab. Culture in Progress.  Received in Microbiology Lab. Culture in Progress.       Last 24 Hours Medication List:   Current Facility-Administered Medications   Medication Dose Route Frequency Provider Last Rate    acetaminophen  975 mg Oral Q8H [facility] Etta Abernathy PA-C      albuterol  2 puff Inhalation Q4H PRN Augustus Delgadillo MD      cefazolin  2,000 mg Intravenous Q8H Etta Abernathy PA-C 2,000 mg (07/31/24 1202)    cyclobenzaprine  10 mg Oral TID PRN Augustus Delgadillo MD      enoxaparin  40 mg Subcutaneous Daily Augustus Delgadillo MD      gabapentin  300 mg Oral TID Augustus Delgadillo MD      ketorolac  15 mg Intravenous Q6H PRN Etta Abernathy PA-C      OLANZapine  5 mg Oral Q3H PRN Lucita Navarrete MD      oxyCODONE  5 mg Oral Q4H PRN Etta Abernathy PA-C      QUEtiapine  300 mg Oral HS Augustus Delgadillo MD          Today, Patient Was Seen By: Etta Abernathy PA-C    **Please Note: This note may have been constructed using a voice recognition system.**

## 2024-07-31 NOTE — ASSESSMENT & PLAN NOTE
C/o 3 days PTA of posterior, distal, calf/over Achilles erythema/pain/warmth w/ LE edema  Initially placed on Ancef; medications changed to p.o. with patient's refusal on 07/30; patient now agreeable to IV ABX - c/w Ancef   ABX day #2   Monitor leukocytosis  X-ray benign  LE duplex w/o acute DVT  Denies puncture wounds, bites, or IV drug use; healed scabbed lesion at distal posterior calf  No clinical micromotion tenderness  Patient has a low pain threshold w/ h/o chronic pain and substance abuse; continue analgesia PRN

## 2024-07-31 NOTE — PROGRESS NOTES
Upon starting shift patient was severely paranoid standing at door of room looking like he was waiting for someone to come. Pt expressed concerns of a person trying to pose as a pt to hurt him. Pt was able to be redirected by staff to sif in chair and ice leg. Staff let patient know after giving medications to other pt he could shower and I would watch the hallway for him. He was very concerned of anyone getting to him to hurt him. Assured patient that he was safe here, security had been making mult rounds, and that I wouled be here throughout the night to make sure he was protected. After patient was able to get shower he was a little more relaxed and did agree to getting into the bed to elevate his R leg d/t cellulitis with pillows and place ice. Pt did let me place another IV line and was given toradol to help with pain after talk with patient about how it might help with pain better. Pt has been respectful entire night but paranoia has settled down some. Pt did take HS medications as well.

## 2024-07-31 NOTE — OCCUPATIONAL THERAPY NOTE
Occupational Therapy Evaluation     Patient Name: Amado Chin  Today's Date: 7/31/2024  Problem List  Principal Problem:    Cellulitis of left lower extremity  Active Problems:    Tobacco abuse    Sepsis (HCC)    Homelessness    Drug use    Psychoactive 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HCC)    Past Medical History  Past Medical History:   Diagnosis Date    Asthma     Chronic pain     Hypertension     Thyroglossal duct cyst      Past Surgical History  Past Surgical History:   Procedure Laterality Date    THYROGLOSSAL DUCT EXCISION      THYROID SURGERY      THYROID SURGERY          07/31/24 1219   OT Last Visit   OT Visit Date 07/31/24   Note Type   Note type Evaluation   Pain Assessment   Pain Assessment Tool 0-10   Pain Score 10 - Worst Possible Pain   Pain Location/Orientation Location: Leg;Orientation: Left   Patient's Stated Pain Goal No pain   Hospital Pain Intervention(s) Cold applied;Repositioned;Elevated;Emotional support   Restrictions/Precautions   Weight Bearing Precautions Per Order No   Other Precautions Cognitive;Chair Alarm;Bed Alarm;Fall Risk;Multiple lines;Pain  (psych, severe paranoia)   Home Living   Type of Home Homeless   Additional Comments Pt reports he is currently homeless. Previously staying w/ a friend but Pt unsure if able to return. CM aware.   Prior Function   Level of [place] Independent with ADLs;Independent with functional mobility;Independent with IADLS   Receives Help From Friend(s);Other (Comment)  (previously staying w/ a friend. Pt unsure if able to return)   IADLs Independent with meal prep;Independent with medication management   Falls in the last 6 months 0   Vocational Unemployed   Lifestyle   Autonomy PTA, Pt reports I w/ ADL, IADL and functional mobility   Reciprocal Relationships Pt reports no family, limited friend support   Service to Others will continue to assess   Intrinsic Gratification will continue to assess   General   Family/Caregiver Present No  "  Subjective   Subjective \"I don't want to aggravate the swelling and pain by walking on it right now\"   ADL   Where Assessed Edge of bed   Eating Assistance 6  Modified independent   Grooming Assistance 6  Modified Independent   UB Bathing Assistance 6  Modified Independent   LB Bathing Assistance 4  Minimal Assistance   UB Dressing Assistance 6  Modified independent   LB Dressing Assistance 4  Minimal Assistance   Toileting Assistance  4  Minimal Assistance   Functional Assistance 4  Minimal Assistance   Bed Mobility   Supine to Sit 5  Supervision   Additional items Increased time required;Verbal cues;HOB elevated   Sit to Supine 5  Supervision   Additional items Increased time required;Verbal cues;HOB elevated   Additional Comments Pt greeted and left supine in bed w/ alarm on and all needs within reach   Transfers   Sit to Stand 5  Supervision   Additional items Increased time required;Verbal cues   Stand to Sit 5  Supervision   Additional items Increased time required;Verbal cues   Additional Comments w/ RW   Functional Mobility   Additional Comments Unable to assess at this time due to increased anxiety and pain w/ standing.   Balance   Static Sitting Good   Dynamic Sitting Fair +   Static Standing Fair +   Dynamic Standing Fair   Ambulatory Fair -   Activity Tolerance   Activity Tolerance Patient limited by pain   Medical Staff Made Aware PT [place]   Nurse Made Aware RN cleared and updated   RUE Assessment   RUE Assessment WFL   LUE Assessment   LUE Assessment WFL   Hand Function   Gross Motor Coordination Functional   Fine Motor Coordination Functional   Sensation   Light Touch No apparent deficits   Cognition   Overall Cognitive Status Impaired   Arousal/Participation Responsive;Cooperative   Attention Attends with cues to redirect   Orientation Level Oriented X4   Memory Within functional limits   Following Commands Follows one step commands with increased time or repetition   Comments Pt cooperative t/o " eval. Requires increased encouragement to participate due to pain and high level of anxiety. Pt w/ hx of severe paranoia.   Assessment   Limitation Decreased ADL status;Decreased cognition;Decreased endurance;Decreased high-level ADLs;Decreased self-care trans   Prognosis Good   Assessment Pt is a 38 y.o. male seen for OT evaluation s/p admission to [facility] on 7/29/2024 due to pain and redness of LLE. Pt diagnosed with Cellulitis of left lower extremity. Pt  has a past medical history of Asthma, Chronic pain, Hypertension, and Thyroglossal duct cyst.  Pt with active OT evaluation/treatment and activity orders. PTA, Pt was independent w/ ADL/IADL and functional mobility, was not using DME at baseline. Pt reports he does not currently have a stable living situation. Pt agreeable and willing to participate in OT evaluation. Pt was greeted left supine in bed w/ alarm activated and all needs within reach. During evaluation, pt is Mod I for UB ADLs, Supervision for bed mobility, STS transfer; Min A for LB ADLs. Pt required  increased encouragement to participate in session due to anxiety and pain level . Pt currently presents with impairments in the following categories -difficulty performing ADLS, difficulty performing IADLS , and homeless endurance, standing balance/tolerance, and impaired judgement. These impairments, as well as pt's pain, risk for falls, increased anxiety and hx of paranoia  limit pt's ability to safely engage in all baseline areas of occupation, includingbathing, dressing, toileting, and functional mobility/transfers. The patient's raw score on the AM-PAC Daily Activity Inpatient Short Form is 22. A raw score of greater than or equal to 19 suggests the patient may benefit from discharge to home. Please refer to the recommendation of the Occupational Therapist for safe discharge planning. Pt would benefit from continued acute OT services throughout hospital course in order to maximize  Pt's independence and overall occupational performance. Plan for OT interventions 2-3x per week. From OT standpoint, pending progress and pain management anticipate no post-acute rehab needs upon d/c when pt medically stable to d/c from acute care. Will continue to follow.   Goals   Patient Goals To have less pain   LTG Time Frame 10-14   Long Term Goal See goals below   Plan   Treatment Interventions ADL retraining;Functional transfer training;Endurance training;Cognitive reorientation;Patient/family training;Equipment evaluation/education;Compensatory technique education;Continued evaluation;Energy conservation;Activityengagement   Goal Expiration Date 08/14/24   OT Frequency 2-3x/wk   Discharge Recommendation   Rehab Resource Intensity Level, OT No post-acute rehabilitation needs   Additional Comments  anticipate no post-acute rehab needs pending progress and pain management   AM-PAC Daily Activity Inpatient   Lower Body Dressing 3   Bathing 3   Toileting 4   Upper Body Dressing 4   Grooming 4   Eating 4   Daily Activity Raw Score 22   Daily Activity Standardized Score (Calc for Raw Score >=11) 47.1   AM-PAC Applied Cognition Inpatient   Following a Speech/Presentation 3   Understanding Ordinary Conversation 4   Taking Medications 3   Remembering Where Things Are Placed or Put Away 4   Remembering List of 4-5 Errands 4   Taking Care of Complicated Tasks 3   Applied Cognition Raw Score 21   Applied Cognition Standardized Score 44.3   End of Consult   Education Provided Yes   Patient Position at End of Consult Supine;Bed/Chair alarm activated;All needs within reach     OT Goals:     - Pt will increase standing tolerance to 15 minutes to maximize independence w/ grooming tasks standing at the sink.     - Pt will tolerate therapeutic activities for greater than 30 minutes in order to increase tolerance for functional activities.     - Pt will attend to functional tasks for 10 minutes with min to no vc for  attention/redirection.    - Pt will participate in ongoing OT evaluation of cognitive skills to assist with safe d/c planning/recommendations.     - Pt will be Mod I with LB ADL by end of hospital course.    - Pt will be Mod I with all functional transfers required for patient safety by end of hospital course.    - Pt will be Mod I with functional mobility to/from bathroom for increased independence with toileting tasks.      Rosi Brantley, OTR/L

## 2024-07-31 NOTE — PHYSICAL THERAPY NOTE
Physical Therapy Evaluation    Patient Name: Amado Chin    Today's Date: 7/31/2024     Problem List  Principal Problem:    Cellulitis of left lower extremity  Active Problems:    Tobacco abuse    Sepsis (HCC)    Homelessness    Drug use    Psychoactive 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HCC)       Past Medical History  Past Medical History:   Diagnosis Date    Asthma     Chronic pain     Hypertension     Thyroglossal duct cyst         Past Surgical History  Past Surgical History:   Procedure Laterality Date    THYROGLOSSAL DUCT EXCISION      THYROID SURGERY      THYROID SURGERY             07/31/24 1158   PT Last Visit   PT Visit Date 07/31/24   Note Type   Note type Evaluation   Pain Assessment   Pain Assessment Tool 0-10   Pain Score 10 - Worst Possible Pain   Pain Location/Orientation Orientation: Right;Location: Leg   Effect of Pain on Daily Activities Impacts ability to bear weight and ambulate   Patient's Stated Pain Goal No pain   Hospital Pain Intervention(s) Cold applied;Repositioned;Elevated;Emotional support   Restrictions/Precautions   Weight Bearing Precautions Per Order No   Other Precautions Cognitive;Chair Alarm;Bed Alarm;Fall Risk;Pain   Home Living   Type of Home Homeless   Additional Comments Pt reports homelessness. Was previously staying w/ friend however reports he cannot DC to friend's house   Prior Function   Level of [place] Independent with ADLs;Independent with functional mobility;Independent with IADLS   Lives With Other (Comment)  (Homelessness)   Receives Help From Friend(s)   IADLs Independent with meal prep;Independent with medication management   Falls in the last 6 months 0   Vocational Unemployed   General   Family/Caregiver Present No   Cognition   Overall Cognitive Status Impaired   Arousal/Participation Alert   Orientation Level Oriented X4   Memory Within functional limits   Following Commands Follows one step commands  with increased time or repetition   Comments Pt cooperative w/ max encouragement, highly distracted by pain. Pt self limiting mobility at this time 2/2 pain and anxiety over increasing pain   RLE Assessment   RLE Assessment WFL   LLE Assessment   LLE Assessment WFL   Light Touch   RLE Light Touch Grossly intact   LLE Light Touch Grossly intact  (pt reporting pins and needles sensation in toes)   Bed Mobility   Supine to Sit 5  Supervision   Additional items HOB elevated;Increased time required;Verbal cues   Sit to Supine 5  Supervision   Additional items HOB elevated;Increased time required;Verbal cues   Additional Comments Pr supine in bed upon arrival. Returned to supine following PT session   Transfers   Sit to Stand 5  Supervision   Additional items Increased time required;Verbal cues   Stand to Sit 5  Supervision   Additional items Increased time required;Verbal cues   Additional Comments Transfers w/ RW to minimize WBing on LLE   Ambulation/Elevation   Ambulation/Elevation Additional Comments Pt able to take 1-2 hops using RW on RLE however reporting significant pain and declines further mobility   Balance   Static Sitting Good   Dynamic Sitting Fair +   Static Standing Fair +   Dynamic Standing Fair   Ambulatory Fair -   Endurance Deficit   Endurance Deficit Yes   Endurance Deficit Description Pain, fatigue   Activity Tolerance   Activity Tolerance Patient limited by pain   Medical Staff Made Aware OT Rosi   Nurse Made Aware RN cleared   Assessment   Prognosis Good   Problem List Decreased range of motion;Impaired balance;Decreased mobility;Decreased cognition;Impaired judgement;Decreased safety awareness;Pain;Decreased skin integrity   Assessment Pt is a 38 y.o. male seen for PT evaluation s/p admit to [facility] on 7/29/2024. Pt was admitted with a primary dx of: cellulitis of LLE.  PT now consulted for assessment of mobility and d/c needs.  Pts current comorbidities effecting treatment include:  Sepsis, substance abuse induced psychosis, homelessness. Pts current clinical presentation is Unstable/ Unpredictable (high complexity) due to Ongoing medical management for primary dx, Increased reliance on more restrictive AD compared to baseline, Decreased activity tolerance compared to baseline, Fall risk, Cog status, Continuous pulse oximetry monitoring . Prior to admission, pt was living w/ friend however is currently reporting homelessness. Upon evaluation, pt currently is requiring SUP for bed mobility; SUP for transfers; SUP for ambulation 1 ft w/ RW;. Pt presents at PT eval functioning below baseline and currently w/ overall mobility deficits 2* to: decreased ROM, impaired balance, decreased endurance, gait deviations, pain, decreased activity tolerance compared to baseline, decreased functional mobility tolerance compared to baseline, altered sensation, decreased safety awareness, impaired judgement, fall risk, decreased skin integrity, decreased cognition. Pt currently at a fall risk 2* to impairments listed above.  Pt will continue to benefit from skilled acute PT interventions to address stated impairments; to maximize functional mobility; for ongoing pt/ family training; and DME needs. At conclusion of PT session pt returned BTB, bed alarm engaged, and all needs in reach with phone and call bell within reach. Pt denies any further questions at this time. The patient's AM-PAC Basic Mobility Inpatient Short Form Raw Score is 19. A Raw score of greater than 16 suggests the patient may benefit from discharge to home. Please also refer to the recommendation of the Physical Therapist for safe discharge planning. Recommend DC to safe home environment w/ no needs anticipated pending progress and pain management upon hospital D/C.   Barriers to Discharge Other (Comment)  (Homelessness)   Goals   Patient Goals to have less pain   STG Expiration Date 08/14/24   Short Term Goal #1 STG 1. Pt will be able to  perform bed mobility tasks with mod I in order to improve overall functional mobility and assist in safe d/c. STG 3. Pt will be able to perform functional transfer with mod I in order to improve overall functional mobility and assist in safe d/c. STG 4. Pt will be able to ambulate at least 200 feet with least restrictive device with mod I A in order to improve overall functional mobility and assist in safe d/c. STG 5. Pt will improve sitting/standing static/dynamic balance 1/2 grade in order to improve functional mobility and assist in safe d/c. STG 6. Pt will improve LE strength by 1/2 grade in order to improve functional mobility and assist in safe d/c. STG 7. Pt will be able to negotiate at least 10 stairs with least restrictive device with mod I A in order to improve overall functional mobility and assist in safe d/c.   PT Treatment Day 0   Plan   Treatment/Interventions Functional transfer training;LE strengthening/ROM;Elevations;Therapeutic exercise;Endurance training;Cognitive reorientation;Patient/family training;Equipment eval/education;Bed mobility;Gait training;Spoke to nursing;Spoke to case management;OT   PT Frequency 1-2x/wk   Discharge Recommendation   Rehab Resource Intensity Level, PT No post-acute rehabilitation needs  (anticipate no rehab needs pending progress and pain management)   Equipment Recommended Walker   Walker Package Recommended Wheeled walker   AM-PAC Basic Mobility Inpatient   Turning in Flat Bed Without Bedrails 4   Lying on Back to Sitting on Edge of Flat Bed Without Bedrails 3   Moving Bed to Chair 3   Standing Up From Chair Using Arms 3   Walk in Room 3   Climb 3-5 Stairs With Railing 3   Basic Mobility Inpatient Raw Score 19   Basic Mobility Standardized Score 42.48   [facility] Highest Level Of Mobility   -HLM Goal 6: Walk 10 steps or more   -HLM Achieved 5: Stand (1 or more minutes)   Modified [place] Scale   Modified [place] Scale 3   End of Consult   Patient Position at  End of Consult Supine;Bed/Chair alarm activated;All needs within reach     Zenobia John PT, DPT

## 2024-07-31 NOTE — PROGRESS NOTES
Progress Note - Behavioral Health   Amado Chin 38 y.o. male MRN: [id number]  Unit/Bed#: CW2 212-02 Encounter: [id number]        I came to see the patient for continuation of care, he is drowsy secondary to medications, he stated that he feels better today, also states that he has been taking the medication that they are given to him.  He ate his lunch.  During our conversation he did not have any reference to the people that are following him.  He states that he will take his medications because he wants to get better.  According to the nurse he was standing in the door other room earlier but he told her that he only was checking and went back to his room.  He still denies any hallucinations denies any suicidal homicidal thoughts plan or intent       Behavior over the last 24 hours:  improved  Sleep: normal  Appetite: normal  Medication side effects: No  ROS:  Pain in the left lower extremity, edema in the left lower extremity, paranoid thinking and all other systems are negative    Mental Status Evaluation:  Appearance:  age appropriate and disheveled   Behavior:  Cooperative   Speech:  soft   Mood:  euthymic   Affect:  mood-congruent   Language: naming objects and repeating phrases   Thought Process:  goal directed   Associations: intact associations   Thought Content:  Some paranoia   Perceptual Disturbances: None   Risk Potential: Suicidal Ideations none, Homicidal Ideations none, and Potential for Aggression No   Sensorium:  person, place, time/date, and situation   Memory:  recent and remote memory grossly intact   Cognition:  recent and remote memory grossly intact   Consciousness:  alert and awake    Attention: attention span appeared shorter than expected for age   Intellect: within normal limits   Fund of Knowledge: awareness of current events: Fair, past history: Fair, and vocabulary: Fair   Insight:  fair   Judgment: fair   Muscle Strength and Tone: Within normal limits   Gait/Station: normal  gait/station and normal balance   Motor Activity: no abnormal movements         Assessment/Plan  Amado Chin is a 38 y.o. male with a history of depression, substance-induced psychosis, chronic pain, cervical disc disease, asthma who presented to the hospital with pain and redness in the left lower extremity and delusional thinking.  He was very delusional yesterday, after talking to the primary team physician and he calmed down and he was able to take medication.  He also took the Seroquel last night.  Today he has been more cooperative, he has been taking the medication as prescribed, he feels drowsy secondary to pain medication.  He still has some paranoid thinking but is less intense than yesterday.  He denies any hallucinations, denies any suicidal or homicidal thoughts, plan or intent  Diagnosis:  Substance-induced mood disorder with psychosis    Recommended Treatment:   Continue medical management  Continue Seroquel 300 mg p.o. at bedtime  Continue Zyprexa 5 mg p.o. or IM for severe agitation every 3 hours maximum 3 dose in 24 hours  I discussed this case with the primary team  I will follow-up    Medications: all current active meds have been reviewed      Risks, benefits and possible side effects of Medications:     Risks, benefits, and possible side effects of medications explained to patient and patient verbalizes understanding.      Labs: I have personally reviewed all pertinent laboratory results.     I have personally reviewed all pertinent laboratory/tests results.  Labs in last 72 hours:   Recent Labs     07/30/24  0409 07/31/24  0622   WBC 8.00 6.48   RBC 3.93 3.95   HGB 12.3 12.6   HCT 36.9 37.5    268   RDW 13.7 13.8   NEUTROABS 4.98  --    SODIUM 138 138   K 3.6 4.0    105   CO2 23 27   BUN 19 21   CREATININE 1.07 0.85   GLUC 99 101   CALCIUM 8.1* 8.2*       Counseling / Coordination of Care  Total floor / unit time spent today 25 minutes. Greater than 50% of total time was spent  with the patient and / or family counseling and / or coordination of care. A description of the counseling / coordination of care: Talking to the patient and discussing with the primary team    Lucita Navarrete MD

## 2024-07-31 NOTE — CASE MANAGEMENT
Case Management Assessment & Discharge Planning Note    Patient name Amado Chin  Location 2 212/CW2 212-02 MRN [id number]  : 1985 Date 2024       Current Admission Date: 2024  Current Admission Diagnosis:Cellulitis of left lower extremity   Patient Active Problem List    Diagnosis Date Noted Date Diagnosed    Psychoactive 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HCC) 2024     Drug use 2024     Cellulitis of left lower extremity 2024     Sepsis (HCC) 2024     Homelessness 2024     Cervical spinal stenosis 2024     Cervical radiculopathy 2024     Chronic bilateral low back pain 2023     Chronic neck pain 2023     Recurrent major depressive disorder (HCC) 2022     Anxiety 2022     Substance induced mood disorder (HCC) 2022     Mood disorder due to medical condition 2022     Opioid abuse (HCC) 02/10/2022     Fecal smearing 2022     Urinary incontinence 2022     Drug-induced constipation 2022     Intractable pain 2021     Vitamin B12 deficiency 2021     Asthma 2021     Tobacco abuse 2021     Dizziness 2021     Neck pain 2021     Elevated blood pressure reading 2021     Weakness of both lower extremities 2021     Cervical disc herniation 2020     Paresthesia and pain of extremity 2020     Fall 2020     Concussion without loss of consciousness 2020     Acute pain of right shoulder 2020     Alcohol abuse 2020     Burn 2020     Furuncle of axilla 2020       LOS (days): 2  Geometric Mean LOS (GMLOS) (days):   Days to GMLOS:     OBJECTIVE:    Risk of Unplanned Readmission Score: 18.55         Current admission status: Inpatient       Preferred Pharmacy:   [id number] - [place] - [address] [place] [address]  [place] [address]  Phone: [phone] Fax: [phone]    [facility]  [facility] - [place] [facility]  [address] [place] [address]  [place] [address]  Phone: [phone] Fax: [phone]    Primary Care Provider: Iva Fisher MD    Primary Insurance: [facility]  Secondary Insurance:     ASSESSMENT:  Active Health Care Proxies    There are no active Health Care Proxies on file.       Advance Directives  Does patient have a Health Care POA?: No  Was patient offered paperwork?: Yes (declined)  Does patient currently have a Health Care decision maker?: No  Does patient have Advance Directives?: No  Was patient offered paperwork?: Yes (declined)  Primary Contact: refused         Readmission Root Cause  30 Day Readmission: No    Patient Information  Admitted from:: Other (comment) (brought in by police)  Mental Status: Other (Comment) (lethargic, answers questions)  During Assessment patient was accompanied by: Not accompanied during assessment  Assessment information provided by:: Patient  Primary Caregiver: Self  Support Systems: Self  What city do you live in?: homeless  Home entry access options. Select all that apply.:  (NA)  Living Arrangements: Other (Comment) (homeless)  Is patient a ?: No    Activities of Daily Living Prior to Admission  Functional Status: Independent  Completes ADLs independently?: Yes  Ambulates independently?: Yes  Does patient use assisted devices?: No  Does patient currently own DME?: No  Does patient have a history of Outpatient Therapy (PT/OT)?:  (unknown)  Does the patient have a history of Short-Term Rehab?:  (unknown)  Does patient have a history of HHC?: No  Does patient currently have HHC?: No         Patient Information Continued  Income Source: Unemployed  Does patient have prescription coverage?: Yes  Does patient receive dialysis treatments?: No  Does patient have a history of substance abuse?: Yes, Currently using  Current substance use preference: Methamphetamines, Marijuana  Historical  substance use preference: Marijuana, Methamphetamines (unknown)  Is patient currently in treatment for substance abuse?: No. Patient declined treatment information.  Does patient have a history of Mental Health Diagnosis?: Yes (schizophrenia)  Is patient receiving treatment for mental health?: No. Patient declined treatment information.  Has patient received inpatient treatment related to mental health in the last 2 years?: Yes ([address], hx [facility], no date given)         Means of Transportation  Means of Transport to Appts:: Other (Comment) (unknown)      Social Determinants of Health (SDOH)      Flowsheet Row Most Recent Value   Housing Stability    In the last 12 months, was there a time when you were not able to pay the mortgage or rent on time? Pt Declined   At any time in the past 12 months, were you homeless or living in a shelter (including now)? Pt Declined   Transportation Needs    In the past 12 months, has lack of transportation kept you from medical appointments or from getting medications? Pt Declined   In the past 12 months, has lack of transportation kept you from meetings, work, or from getting things needed for daily living? Pt Declined   Food Insecurity    Within the past 12 months, you worried that your food would run out before you got the money to buy more. Pt Declined   Within the past 12 months, the food you bought just didn't last and you didn't have money to get more. Pt Declined   Utilities    In the past 12 months has the electric, gas, oil, or water company threatened to shut off services in your home? Pt Declined            DISCHARGE DETAILS:    Discharge planning discussed with:: see note.                      Contacts  Patient Contacts: refused to provide any info    Requested Home Health Care         Is the patient interested in HHC at discharge?: No    DME Referral Provided  Referral made for DME?: No    Other Referral/Resources/Interventions Provided:  Interventions:  Recovery Housing  Referral Comments: Pt is very lethargic and answering some questions. States he has been homeless a few days. He was provided with local shelter information and several options for help locating a home. Explained to him he must be proactive for this, he must make phone calls to find a facility. CM unable to do this. Demonstrates understanding.         Treatment Team Recommendation: Substance Abuse Treatment                                            Additional Comments: 39yo male brought to ER by police after wielding a sword. Has LL leg cellulitis, is on IV Ancef q8h. Initially was to be petitioned for a 302 but was cleared by crisis. Tests + THC and methamphetamines. Pt. lethargic, gives one word answers, does not want to add any contacts to his chart. Provided with shelter list and several facilities that provide assistance with housing. Pt states he is unemployed.

## 2024-07-31 NOTE — ASSESSMENT & PLAN NOTE
Patient noted to be very paranoid this hospital admission (please see psych notes for greater detail); pt believes someone is attempting to harm him   Continue gabapentin 300 mg TID & Seroquel 300 qhs; continue recently added Zyprexa ODT   Case reviewed at length with attending physician; patient does have capacity to leave AMA if he chooses  Patient's paranoia seems baseline; recent voluntary inpatient psych hospitalization in mid 07/2023; multiple recent ED visits for similar complaints

## 2024-07-31 NOTE — PLAN OF CARE
Problem: PHYSICAL THERAPY ADULT  Goal: Performs mobility at highest level of function for planned discharge setting.  See evaluation for individualized goals.  Description: Treatment/Interventions: Functional transfer training, LE strengthening/ROM, Elevations, Therapeutic exercise, Endurance training, Cognitive reorientation, Patient/family training, Equipment eval/education, Bed mobility, Gait training, Spoke to nursing, Spoke to case management, OT  Equipment Recommended: Walker       See flowsheet documentation for full assessment, interventions and recommendations.  Note: Prognosis: Good  Problem List: Decreased range of motion, Impaired balance, Decreased mobility, Decreased cognition, Impaired judgement, Decreased safety awareness, Pain, Decreased skin integrity  Assessment: Pt is a 38 y.o. male seen for PT evaluation s/p admit to [facility] on 7/29/2024. Pt was admitted with a primary dx of: cellulitis of LLE.  PT now consulted for assessment of mobility and d/c needs.  Pts current comorbidities effecting treatment include: Sepsis, substance abuse induced psychosis, homelessness. Pts current clinical presentation is Unstable/ Unpredictable (high complexity) due to Ongoing medical management for primary dx, Increased reliance on more restrictive AD compared to baseline, Decreased activity tolerance compared to baseline, Fall risk, Cog status, Continuous pulse oximetry monitoring . Prior to admission, pt was living w/ friend however is currently reporting homelessness. Upon evaluation, pt currently is requiring SUP for bed mobility; SUP for transfers; SUP for ambulation 1 ft w/ RW;. Pt presents at PT eval functioning below baseline and currently w/ overall mobility deficits 2* to: decreased ROM, impaired balance, decreased endurance, gait deviations, pain, decreased activity tolerance compared to baseline, decreased functional mobility tolerance compared to baseline, altered sensation, decreased  safety awareness, impaired judgement, fall risk, decreased skin integrity, decreased cognition. Pt currently at a fall risk 2* to impairments listed above.  Pt will continue to benefit from skilled acute PT interventions to address stated impairments; to maximize functional mobility; for ongoing pt/ family training; and DME needs. At conclusion of PT session pt returned BTB, bed alarm engaged, and all needs in reach with phone and call bell within reach. Pt denies any further questions at this time. The patient's AM-PAC Basic Mobility Inpatient Short Form Raw Score is 19. A Raw score of greater than 16 suggests the patient may benefit from discharge to home. Please also refer to the recommendation of the Physical Therapist for safe discharge planning. Recommend DC to safe home environment w/ no needs anticipated pending progress and pain management upon hospital D/C.  Barriers to Discharge: Other (Comment) (Homelessness)     Rehab Resource Intensity Level, PT: No post-acute rehabilitation needs (anticipate no rehab needs pending progress and pain management)    See flowsheet documentation for full assessment.

## 2024-08-01 LAB
ANION GAP SERPL CALCULATED.3IONS-SCNC: 9 MMOL/L (ref 4–13)
BUN SERPL-MCNC: 21 MG/DL (ref 5–25)
CALCIUM SERPL-MCNC: 8 MG/DL (ref 8.4–10.2)
CHLORIDE SERPL-SCNC: 106 MMOL/L (ref 96–108)
CO2 SERPL-SCNC: 23 MMOL/L (ref 21–32)
CREAT SERPL-MCNC: 1.02 MG/DL (ref 0.6–1.3)
ERYTHROCYTE [DISTWIDTH] IN BLOOD BY AUTOMATED COUNT: 13.8 % (ref 11.6–15.1)
GFR SERPL CREATININE-BSD FRML MDRD: 91 ML/MIN/1.73SQ M
GLUCOSE SERPL-MCNC: 120 MG/DL (ref 65–140)
HCT VFR BLD AUTO: 35.8 % (ref 36.5–49.3)
HGB BLD-MCNC: 12 G/DL (ref 12–17)
MCH RBC QN AUTO: 32 PG (ref 26.8–34.3)
MCHC RBC AUTO-ENTMCNC: 33.5 G/DL (ref 31.4–37.4)
MCV RBC AUTO: 96 FL (ref 82–98)
PLATELET # BLD AUTO: 295 THOUSANDS/UL (ref 149–390)
PMV BLD AUTO: 9.3 FL (ref 8.9–12.7)
POTASSIUM SERPL-SCNC: 3.5 MMOL/L (ref 3.5–5.3)
RBC # BLD AUTO: 3.75 MILLION/UL (ref 3.88–5.62)
SODIUM SERPL-SCNC: 138 MMOL/L (ref 135–147)
WBC # BLD AUTO: 5.95 THOUSAND/UL (ref 4.31–10.16)

## 2024-08-01 PROCEDURE — 99232 SBSQ HOSP IP/OBS MODERATE 35: CPT | Performed by: PSYCHIATRY & NEUROLOGY

## 2024-08-01 PROCEDURE — 80048 BASIC METABOLIC PNL TOTAL CA: CPT | Performed by: PHYSICIAN ASSISTANT

## 2024-08-01 PROCEDURE — 85027 COMPLETE CBC AUTOMATED: CPT | Performed by: PHYSICIAN ASSISTANT

## 2024-08-01 PROCEDURE — 99232 SBSQ HOSP IP/OBS MODERATE 35: CPT | Performed by: INTERNAL MEDICINE

## 2024-08-01 RX ADMIN — GABAPENTIN 300 MG: 300 CAPSULE ORAL at 15:12

## 2024-08-01 RX ADMIN — GABAPENTIN 300 MG: 300 CAPSULE ORAL at 09:54

## 2024-08-01 RX ADMIN — ACETAMINOPHEN 975 MG: 325 TABLET, FILM COATED ORAL at 23:31

## 2024-08-01 RX ADMIN — ACETAMINOPHEN 975 MG: 325 TABLET, FILM COATED ORAL at 03:33

## 2024-08-01 RX ADMIN — MORPHINE SULFATE 2 MG: 2 INJECTION, SOLUTION INTRAMUSCULAR; INTRAVENOUS at 15:12

## 2024-08-01 RX ADMIN — CEFAZOLIN SODIUM 2000 MG: 2 SOLUTION INTRAVENOUS at 03:25

## 2024-08-01 RX ADMIN — ENOXAPARIN SODIUM 40 MG: 40 INJECTION SUBCUTANEOUS at 09:54

## 2024-08-01 RX ADMIN — ACETAMINOPHEN 975 MG: 325 TABLET, FILM COATED ORAL at 09:54

## 2024-08-01 RX ADMIN — CYCLOBENZAPRINE 10 MG: 10 TABLET, FILM COATED ORAL at 09:54

## 2024-08-01 RX ADMIN — CEFAZOLIN SODIUM 2000 MG: 2 SOLUTION INTRAVENOUS at 11:16

## 2024-08-01 RX ADMIN — KETOROLAC TROMETHAMINE 15 MG: 30 INJECTION, SOLUTION INTRAMUSCULAR; INTRAVENOUS at 11:16

## 2024-08-01 RX ADMIN — MORPHINE SULFATE 2 MG: 2 INJECTION, SOLUTION INTRAMUSCULAR; INTRAVENOUS at 20:24

## 2024-08-01 RX ADMIN — KETOROLAC TROMETHAMINE 15 MG: 30 INJECTION, SOLUTION INTRAMUSCULAR; INTRAVENOUS at 03:24

## 2024-08-01 RX ADMIN — QUETIAPINE FUMARATE 300 MG: 100 TABLET ORAL at 23:31

## 2024-08-01 RX ADMIN — GABAPENTIN 300 MG: 300 CAPSULE ORAL at 20:25

## 2024-08-01 RX ADMIN — CEFAZOLIN SODIUM 2000 MG: 2 SOLUTION INTRAVENOUS at 20:25

## 2024-08-01 RX ADMIN — OXYCODONE HYDROCHLORIDE 5 MG: 5 TABLET ORAL at 17:48

## 2024-08-01 NOTE — PLAN OF CARE
Patient continues to complain about left lower extremity pain, and refused to take Seroquel because he believes it makes him drowsy during the day.  Patient got irritable when RN attempted to offer education about Seroquel. Patient has a flat affect, does not exhibit paranoid, and hallucinations. Will continue to assess/monitor.          Problem: PAIN - ADULT  Goal: Verbalizes/displays adequate comfort level or baseline comfort level  Description: Interventions:  - Encourage patient to monitor pain and request assistance  - Assess pain using appropriate pain scale  - Administer analgesics based on type and severity of pain and evaluate response  - Implement non-pharmacological measures as appropriate and evaluate response  - Consider cultural and social influences on pain and pain management  - Notify physician/advanced practitioner if interventions unsuccessful or patient reports new pain  Outcome: Progressing

## 2024-08-01 NOTE — PROGRESS NOTES
Progress Note - Behavioral Health   Amado Chin 38 y.o. male MRN: [id number]  Unit/Bed#: CW2 212-02 Encounter: [id number]      I came to see the patient for continuation of care, patient stated that he is feeling better, he stated that he sleep well.  Today patient is not delusional, he stated that he worries because he is homeless.  He stated that he used to live with a roommate but he was asking to leave.  He does not see the amphetamine use as a problem, he stated that he stopped on his own.  He was feeling tired,.  Stated that he does not need the Seroquel because of being sleeping well.  Good appetite.  Still have pain in the left lower extremity.          Behavior over the last 24 hours:  improved  Sleep: normal  Appetite: normal  Medication side effects: No  ROS:  left lower extremity pain and all other systems are negative    Mental Status Evaluation:  Appearance:  age appropriate   Behavior:  normal   Speech:  normal pitch and normal volume   Mood:  euthymic   Affect:  constricted   Language: naming objects and repeating phrases   Thought Process:  goal directed   Associations: intact associations   Thought Content:  normal   Perceptual Disturbances: None   Risk Potential: Suicidal Ideations none, Homicidal Ideations none, and Potential for Aggression No   Sensorium:  person, place, time/date, and situation   Memory:  recent and remote memory grossly intact   Cognition:  recent and remote memory grossly intact   Consciousness:  alert and awake    Attention: attention span and concentration were age appropriate   Intellect: within normal limits   Fund of Knowledge: awareness of current events: fair, past history: fair, and vocabulary: fair   Insight:  fair   Judgment: fair   Muscle Strength and Tone: Within normal limits   Gait/Station: normal gait/station and normal balance   Motor Activity: no abnormal movements         Assessment/Plan  Amado Chin is a 38 y.o. male with a history of depression,  substance-induced psychosis, chronic pain, asthma, presented to the hospital with pain and redness in the left lower extremity and delusional thinking at this moment patient delusions have been cleared up, he continued to have pain in the lower extremity he is sleeping better, he is cooperative, he is accepting treatment at this moment he only worries because he is homeless  Diagnosis:  Substance-induced mood disorder with psychotic    Recommended Treatment:   Continue medical management  Continue Seroquel 300 mg p.o. at bedtime  Patient does not want any intervention about his substance abuse  I will sign off but call me back if necessary  Discussed with the primary team    Medications: all current active meds have been reviewed      Risks, benefits and possible side effects of Medications:     Risks, benefits, and possible side effects of medications explained to patient and patient verbalizes understanding.      Labs: I have personally reviewed all pertinent laboratory results.     I have personally reviewed all pertinent laboratory/tests results.  Labs in last 72 hours:   Recent Labs     07/30/24  0409 07/31/24  0622 08/01/24  0604   WBC 8.00   < > 5.95   RBC 3.93   < > 3.75*   HGB 12.3   < > 12.0   HCT 36.9   < > 35.8*      < > 295   RDW 13.7   < > 13.8   NEUTROABS 4.98  --   --    SODIUM 138   < > 138   K 3.6   < > 3.5      < > 106   CO2 23   < > 23   BUN 19   < > 21   CREATININE 1.07   < > 1.02   GLUC 99   < > 120   CALCIUM 8.1*   < > 8.0*    < > = values in this interval not displayed.       Counseling / Coordination of Care  Total floor / unit time spent today 25 minutes. Greater than 50% of total time was spent with the patient and / or family counseling and / or coordination of care. A description of the counseling / coordination of care: Talking to the patient and discussing with primary team    Lucita Navarrete MD

## 2024-08-01 NOTE — PROGRESS NOTES
[facility]  Progress Note  Name: Amado Chin I  MRN: [id number]  Unit/Bed#: CW2 212-02 I Date of Admission: 7/29/2024   Date of Service: 8/1/2024 I Hospital Day: 3    Assessment & Plan   * Cellulitis of left lower extremity  Assessment & Plan  Patient presented with 3 days of redness, pain, and swelling of the left lower extremity.  WBC elevated at 12.82. Exam significant for tender, warm, erythematous LLE; erythema extending proximally from a abraded area over his left Achilles to approximately mid-calf.   - Given IV ancef 2 gm q8h.   - trend WBC count and fever curve  - pain control  - Continue oral intake.  -s/p LR 1 L in the ED.    Psychoactive 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HCC)  Assessment & Plan  Continue gabapentin 300 mg TID & Seroquel 300 qhs; continue recently added Zyprexa ODT   Seen by psychiatry deemed stable from their POV, appreciate follow up      Drug use  Assessment & Plan  +for THC, methamphetamine    Homelessness  Assessment & Plan  -Patient is currently homeless.  -CM on board: will try to provide him resources for shelters.     Sepsis (HCC)  Assessment & Plan  Patient meeting SIRS criteria on admission with WBC elevation (12.82), tachycardia (), and suspected LLE cellulitis  - secondary to LLE cellulitis, sepsis physiology resolved    Tobacco abuse  Assessment & Plan  Endorses 1/2 ppd smoking history for many years  Declined icotine patch  - smoking cessation education provided             VTE Pharmacologic Prophylaxis: VTE Score: 3 Moderate Risk (Score 3-4) - Pharmacological DVT Prophylaxis Ordered: enoxaparin (Lovenox).    Mobility:   Basic Mobility Inpatient Raw Score: 19  JH-HLM Goal: 6: Walk 10 steps or more  JH-HLM Achieved: 5: Stand (1 or more minutes)  JH-HLM Goal achieved. Continue to encourage appropriate mobility.    Patient Centered Rounds: I performed bedside rounds with nursing staff today.   Discussions with Specialists or Other Care  Team Provider: n/a    Education and Discussions with Family / Patient: Patient declined call to .     Total Time Spent on Date of Encounter in care of patient: 25 mins. This time was spent on one or more of the following: performing physical exam; counseling and coordination of care; obtaining or reviewing history; documenting in the medical record; reviewing/ordering tests, medications or procedures; communicating with other healthcare professionals and discussing with patient's family/caregivers.    Current Length of Stay: 3 day(s)  Current Patient Status: Inpatient   Certification Statement: The patient will continue to require additional inpatient hospital stay due to IV antibiotics  Discharge Plan: Anticipate discharge in 48-72 hrs to home.    Code Status: Level 1 - Full Code    Subjective:   Seen and examined at bedside.  No acute vents or night.  Reports ongoing severe left lower extremity pain mainly in posterior calf area such that he is unable to walk due to extreme pain.  Reports improvement in redness of marked area.    Objective:     Vitals:   Temp (24hrs), Av.5 °F (36.9 °C), Min:97.6 °F (36.4 °C), Max:99.4 °F (37.4 °C)    Temp:  [97.6 °F (36.4 °C)-99.4 °F (37.4 °C)] 98.4 °F (36.9 °C)  Resp:  [18] 18  BP: (122-127)/(71-82) 126/71  Body mass index is 27.48 kg/m².     Input and Output Summary (last 24 hours):     Intake/Output Summary (Last 24 hours) at 2024 1457  Last data filed at 2024 1100  Gross per 24 hour   Intake 240 ml   Output --   Net 240 ml       Physical Exam:   Physical Exam  Constitutional:       Comments: Middle-age male, well-appearing, mild distress   HENT:      Mouth/Throat:      Mouth: Mucous membranes are moist.   Eyes:      Extraocular Movements: Extraocular movements intact.   Cardiovascular:      Rate and Rhythm: Normal rate and regular rhythm.      Pulses: Normal pulses.   Pulmonary:      Effort: Pulmonary effort is normal.      Breath sounds: Normal  breath sounds. No wheezing.   Abdominal:      General: Abdomen is flat.      Palpations: Abdomen is soft.   Musculoskeletal:      Comments: Decreased left ankle range of motion due to swelling/pain   Skin:     Comments: Left posterior calf area marked with mild redness with significant tenderness and swelling   Neurological:      General: No focal deficit present.      Mental Status: He is alert and oriented to person, place, and time.          Additional Data:     Labs:  Results from last 7 days   Lab Units 08/01/24  0604 07/31/24  0622 07/30/24  0409   WBC Thousand/uL 5.95   < > 8.00   HEMOGLOBIN g/dL 12.0   < > 12.3   HEMATOCRIT % 35.8*   < > 36.9   PLATELETS Thousands/uL 295   < > 269   SEGS PCT %  --   --  62   LYMPHO PCT %  --   --  17   MONO PCT %  --   --  12   EOS PCT %  --   --  8*    < > = values in this interval not displayed.     Results from last 7 days   Lab Units 08/01/24  0604   SODIUM mmol/L 138   POTASSIUM mmol/L 3.5   CHLORIDE mmol/L 106   CO2 mmol/L 23   BUN mg/dL 21   CREATININE mg/dL 1.02   ANION GAP mmol/L 9   CALCIUM mg/dL 8.0*   GLUCOSE RANDOM mg/dL 120                 Results from last 7 days   Lab Units 07/29/24  1743   LACTIC ACID mmol/L 1.3       Lines/Drains:  Invasive Devices       Peripheral Intravenous Line  Duration             Peripheral IV 07/31/24 Left;Ventral (anterior) Forearm 1 day                          Imaging: No pertinent imaging reviewed.    Recent Cultures (last 7 days):   Results from last 7 days   Lab Units 07/30/24  1030   BLOOD CULTURE  No Growth at 48 hrs.  No Growth at 48 hrs.       Last 24 Hours Medication List:   Current Facility-Administered Medications   Medication Dose Route Frequency Provider Last Rate    acetaminophen  975 mg Oral Q8H [facility] Etta Abernathy PA-C      albuterol  2 puff Inhalation Q4H PRN Augustus Delgadillo MD      cefazolin  2,000 mg Intravenous Q8H Etta Abernathy PA-C 2,000 mg (08/01/24 1116)    cyclobenzaprine  10 mg Oral TID PRN Augustus Delgadillo  MD      enoxaparin  40 mg Subcutaneous Daily Augustus Delgadillo MD      gabapentin  300 mg Oral TID Augustus Delgadillo MD      morphine injection  2 mg Intravenous Q3H PRN Panchito Ellington MD      OLANZapine  5 mg Oral Q3H PRN Lucita Navarrete MD      oxyCODONE  5 mg Oral Q4H PRN Etta Abernathy PA-C      QUEtiapine  300 mg Oral HS Augustus Delgadillo MD          Today, Patient Was Seen By: Panchito Ellington MD    **Please Note: This note may have been constructed using a voice recognition system.**

## 2024-08-01 NOTE — ASSESSMENT & PLAN NOTE
Continue gabapentin 300 mg TID & Seroquel 300 qhs; continue recently added Zyprexa ODT   Seen by psychiatry deemed stable from their POV, appreciate follow up

## 2024-08-01 NOTE — PLAN OF CARE
Problem: PAIN - ADULT  Goal: Verbalizes/displays adequate comfort level or baseline comfort level  Description: Interventions:  - Encourage patient to monitor pain and request assistance  - Assess pain using appropriate pain scale  - Administer analgesics based on type and severity of pain and evaluate response  - Implement non-pharmacological measures as appropriate and evaluate response  - Consider cultural and social influences on pain and pain management  - Notify physician/advanced practitioner if interventions unsuccessful or patient reports new pain  Outcome: Progressing     Problem: SAFETY ADULT  Goal: Patient will remain free of falls  Description: INTERVENTIONS:  - Educate patient/family on patient safety including physical limitations  - Instruct patient to call for assistance with activity   - Consult OT/PT to assist with strengthening/mobility   - Keep Call bell within reach  - Keep bed low and locked with side rails adjusted as appropriate  - Keep care items and personal belongings within reach  - Initiate and maintain comfort rounds  - Apply yellow socks and bracelet for high fall risk patients  - Consider moving patient to room near nurses station  Outcome: Progressing  Goal: Maintain or return to baseline ADL function  Description: INTERVENTIONS:  -  Assess patient's ability to carry out ADLs; assess patient's baseline for ADL function and identify physical deficits which impact ability to perform ADLs (bathing, care of mouth/teeth, toileting, grooming, dressing, etc.)  - Assess/evaluate cause of self-care deficits   - Assess range of motion  - Assess patient's mobility; develop plan if impaired  - Assess patient's need for assistive devices and provide as appropriate  - Encourage maximum independence but intervene and supervise when necessary  - Involve family in performance of ADLs  - Assess for home care needs following discharge   - Consider OT consult to assist with ADL evaluation and  planning for discharge  - Provide patient education as appropriate  Outcome: Progressing

## 2024-08-02 PROCEDURE — 97535 SELF CARE MNGMENT TRAINING: CPT

## 2024-08-02 PROCEDURE — 99232 SBSQ HOSP IP/OBS MODERATE 35: CPT | Performed by: INTERNAL MEDICINE

## 2024-08-02 PROCEDURE — 97530 THERAPEUTIC ACTIVITIES: CPT

## 2024-08-02 RX ADMIN — ACETAMINOPHEN 975 MG: 325 TABLET, FILM COATED ORAL at 09:00

## 2024-08-02 RX ADMIN — OXYCODONE HYDROCHLORIDE 5 MG: 5 TABLET ORAL at 19:47

## 2024-08-02 RX ADMIN — ENOXAPARIN SODIUM 40 MG: 40 INJECTION SUBCUTANEOUS at 08:59

## 2024-08-02 RX ADMIN — GABAPENTIN 300 MG: 300 CAPSULE ORAL at 17:10

## 2024-08-02 RX ADMIN — GABAPENTIN 300 MG: 300 CAPSULE ORAL at 21:14

## 2024-08-02 RX ADMIN — ACETAMINOPHEN 975 MG: 325 TABLET, FILM COATED ORAL at 17:09

## 2024-08-02 RX ADMIN — CEFAZOLIN SODIUM 2000 MG: 2 SOLUTION INTRAVENOUS at 19:40

## 2024-08-02 RX ADMIN — CYCLOBENZAPRINE 10 MG: 10 TABLET, FILM COATED ORAL at 08:59

## 2024-08-02 RX ADMIN — ACETAMINOPHEN 975 MG: 325 TABLET, FILM COATED ORAL at 23:16

## 2024-08-02 RX ADMIN — CEFAZOLIN SODIUM 2000 MG: 2 SOLUTION INTRAVENOUS at 04:33

## 2024-08-02 RX ADMIN — GABAPENTIN 300 MG: 300 CAPSULE ORAL at 09:00

## 2024-08-02 RX ADMIN — CEFAZOLIN SODIUM 2000 MG: 2 SOLUTION INTRAVENOUS at 13:00

## 2024-08-02 RX ADMIN — CYCLOBENZAPRINE 10 MG: 10 TABLET, FILM COATED ORAL at 23:17

## 2024-08-02 RX ADMIN — OXYCODONE HYDROCHLORIDE 5 MG: 5 TABLET ORAL at 09:01

## 2024-08-02 NOTE — OCCUPATIONAL THERAPY NOTE
Occupational Therapy Progress Note     Patient Name: Amado Chin  Today's Date: 8/2/2024  Problem List  Principal Problem:    Cellulitis of left lower extremity  Active Problems:    Tobacco abuse    Sepsis (HCC)    Homelessness    Drug use    Psychoactive 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HCC)       08/02/24 1448   OT Last Visit   OT Visit Date 08/02/24   Note Type   Note Type Treatment   Pain Assessment   Pain Assessment Tool 0-10   Pain Score 5   Pain Location/Orientation Orientation: Left;Location: Leg   Patient's Stated Pain Goal No pain   Hospital Pain Intervention(s) Repositioned;Ambulation/increased activity   Restrictions/Precautions   Weight Bearing Precautions Per Order No   Other Precautions Cognitive;Multiple lines;Pain   Lifestyle   Autonomy PTA, Pt reports I w/ ADL, IADL and functional mobility   Reciprocal Relationships Pt reports no family, limited friend support   Service to Others will continue to assess   Intrinsic Gratification will continue to assess   ADL   Grooming Assistance 6  Modified Independent   Grooming Deficit Increased time to complete;Standing with assistive device   Grooming Comments Pt washes/dries face and completes oral care standing at sink w/ RW for support. Pt washes/brushes hair seated EOB   UB Bathing Assistance 6  Modified Independent   UB Bathing Deficit Increased time to complete   UB Bathing Comments Pt washes UB seated at sink   LB Bathing Assistance 5  Supervision/Setup   LB Bathing Deficit Supervision/safety;Increased time to complete   LB Bathing Comments Pt washes BLE including feet seated at sink. Pt washes perineal area standing at sink w/ RW for support.   UB Dressing Assistance 6  Modified independent   UB Dressing Deficit Increased time to complete   UB Dressing Comments Pt don/doff tshirt seated at sink   LB Dressing Assistance 5  Supervision/Setup   LB Dressing Deficit Setup;Requires assistive device for steadying;Supervision/safety;Increased time to complete  "  LB Dressing Comments Pt doffs/dons underwear, pants, and socks seated at sink. Pt pulls underwear and pants over hips standing w/ RW for support   Functional Standing Tolerance   Time 10 minutes   Activity grooming at sink +bathing/dressing   Comments Pt tolerates standing for 10 consecutive minutes at sink w/ RW for support to minimize WB on LLE due to pain. Pt stands briefly for 30 seconds-1 minute at a time during bathing/dressing tasks.   Bed Mobility   Supine to Sit 6  Modified independent   Additional items Increased time required   Sit to Supine 6  Modified independent   Additional items Increased time required   Transfers   Sit to Stand 5  Supervision   Additional items Increased time required   Stand to Sit 5  Supervision   Additional items Increased time required   Additional Comments w/ RW to minimize WB on LLE   Functional Mobility   Functional Mobility 5  Supervision   Additional Comments Pt performs short household distance functional mobility w/ RW for support to minimize WB on LLE.   Additional items Rolling walker   Subjective   Subjective \"I just want someone in my corner\"   Cognition   Overall Cognitive Status Impaired   Arousal/Participation Responsive;Cooperative   Attention Attends with cues to redirect   Orientation Level Oriented X4   Memory Within functional limits   Following Commands Follows one step commands with increased time or repetition   Comments Pt cooperative w/ increased encouragement and emotional support. Pt w/ concerns and increased anxiety about housing situation and d/c plan. Educated Pt on OT vs CM role and informed him that CM can provide additional resources.   Activity Tolerance   Activity Tolerance Patient limited by pain   Medical Staff Made Aware RN cleared and updated   Assessment   Assessment Pt seen for skilled OT treatment session on this date w/ interventions focusing on  ADL participation, activity tolerance, sitting tolerance, sitting balance, standing " tolerance, standing balance, bed mobility , transfer skills, and fxnl mobility. Pt was agreeable and willing to participate in session. Pt engaged in the following tasks: Supervision LB dressing/bathing, STS transfers, functional mobility; Mod I UB bathing/dressing, grooming, bed mobility. In comparison to previous session, pt demonstrated improvements in functional transfers and mobility as he was able to complete short household distance using RW to minimize WB on LLE due to pain level. Pt required increased emotional support and encouragement to participate in session as he is concerned about d/c plan. Educated him on OT role vs CM role in d/c plan and informed him that CM can provide additional resources. Pt continues to be functioning below baseline level as occupational performance remains limited by decreased ADL status, decreased activity tolerance, decreased endurance, decreased standing tolerance, decreased standing balance, decreased transfer skills, decreased fxnl mobility, and pain. The patient's raw score on the -PAC Daily Activity Inpatient Short Form is 22. A raw score of greater than or equal to 19 suggests the patient may benefit from discharge to home. Please refer to the recommendation of the Occupational Therapist for safe discharge planning.   From OT standpoint, recommend no post-acute rehab needs (pending progress and pain management) at time of d/c. Pt will benefit from continued OT treatment while in acute care to address deficits as defined above and maximize level of functional independence with ADLs and functional mobility. Pt left supine in bed w/ all needs met at end of session.   Plan   Treatment Interventions ADL retraining;Functional transfer training;Endurance training;Patient/family training;Equipment evaluation/education;Compensatory technique education;Continued evaluation;Activityengagement;Energy conservation   Goal Expiration Date 08/14/24   OT Treatment Day 1   OT  Frequency 2-3x/wk   Discharge Recommendation   Rehab Resource Intensity Level, OT No post-acute rehabilitation needs   AM-PAC Daily Activity Inpatient   Lower Body Dressing 3   Bathing 3   Toileting 4   Upper Body Dressing 4   Grooming 4   Eating 4   Daily Activity Raw Score 22   Daily Activity Standardized Score (Calc for Raw Score >=11) 47.1   AM-PAC Applied Cognition Inpatient   Following a Speech/Presentation 3   Understanding Ordinary Conversation 4   Taking Medications 3   Remembering Where Things Are Placed or Put Away 4   Remembering List of 4-5 Errands 4   Taking Care of Complicated Tasks 3   Applied Cognition Raw Score 21   Applied Cognition Standardized Score 44.3   End of Consult   Education Provided Yes   Patient Position at End of Consult Supine;All needs within reach   Nurse Communication Nurse aware of consult       Rosi Brantley OTR/L

## 2024-08-02 NOTE — ASSESSMENT & PLAN NOTE
Patient presented with 3 days of redness, pain, and swelling of the left lower extremity.  WBC elevated at 12.82. Exam significant for tender, warm, erythematous LLE; erythema extending proximally from a abraded area over his left Achilles to approximately mid-calf.   - Given ongoing pain affecting ambulation will continue IV ancef 2 gm q8h, plan to transition to oral in next 24 to 48 hours depending on patient's symptoms  - trend WBC count and fever curve  - pain control, no further IV  8/2: Noted significant improvement in erythema and swelling of left calf however patient still reports severe pain, but now able to ambulate

## 2024-08-02 NOTE — PLAN OF CARE
Patient's behavior is more appropriate, but can be manipulative. Continues to complain about left lower extremity pain.          Problem: PAIN - ADULT  Goal: Verbalizes/displays adequate comfort level or baseline comfort level  Description: Interventions:  - Encourage patient to monitor pain and request assistance  - Assess pain using appropriate pain scale  - Administer analgesics based on type and severity of pain and evaluate response  - Implement non-pharmacological measures as appropriate and evaluate response  - Consider cultural and social influences on pain and pain management  - Notify physician/advanced practitioner if interventions unsuccessful or patient reports new pain  Outcome: Progressing

## 2024-08-02 NOTE — PROGRESS NOTES
[facility]  Progress Note  Name: Amado Chin I  MRN: [id number]  Unit/Bed#: CW2 212-02 I Date of Admission: 7/29/2024   Date of Service: 8/2/2024 I Hospital Day: 4    Assessment & Plan   * Cellulitis of left lower extremity  Assessment & Plan  Patient presented with 3 days of redness, pain, and swelling of the left lower extremity.  WBC elevated at 12.82. Exam significant for tender, warm, erythematous LLE; erythema extending proximally from a abraded area over his left Achilles to approximately mid-calf.   - Given ongoing pain affecting ambulation will continue IV ancef 2 gm q8h, plan to transition to oral in next 24 to 48 hours depending on patient's symptoms  - trend WBC count and fever curve  - pain control, no further IV  8/2: Noted significant improvement in erythema and swelling of left calf however patient still reports severe pain, but now able to ambulate    Psychoactive 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HCC)  Assessment & Plan  Continue gabapentin 300 mg TID & Seroquel 300 qhs; continue recently added Zyprexa ODT   Seen by psychiatry deemed stable from their POV, appreciate follow up      Drug use  Assessment & Plan  +for THC, methamphetamine    Homelessness  Assessment & Plan  -Patient is currently homeless.  -CM on board: will try to provide him resources for shelters.     Sepsis (HCC)  Assessment & Plan  Patient meeting SIRS criteria on admission with WBC elevation (12.82), tachycardia (), and suspected LLE cellulitis  - secondary to LLE cellulitis, sepsis physiology resolved    Tobacco abuse  Assessment & Plan  Endorses 1/2 ppd smoking history for many years  Declined icotine patch  - smoking cessation education provided             VTE Pharmacologic Prophylaxis: VTE Score: 3 Moderate Risk (Score 3-4) - Pharmacological DVT Prophylaxis Ordered: enoxaparin (Lovenox).    Mobility:   Basic Mobility Inpatient Raw Score: 24  -[facility] Goal: 8: Walk 250 feet or  more  JH-HLM Achieved: 6: Walk 10 steps or more  JH-HLM Goal achieved. Continue to encourage appropriate mobility.    Patient Centered Rounds: I performed bedside rounds with nursing staff today.   Discussions with Specialists or Other Care Team Provider: n/a    Education and Discussions with Family / Patient: Patient declined call to .     Total Time Spent on Date of Encounter in care of patient: 25 mins. This time was spent on one or more of the following: performing physical exam; counseling and coordination of care; obtaining or reviewing history; documenting in the medical record; reviewing/ordering tests, medications or procedures; communicating with other healthcare professionals and discussing with patient's family/caregivers.    Current Length of Stay: 4 day(s)  Current Patient Status: Inpatient   Certification Statement: The patient will continue to require additional inpatient hospital stay due to IV antibiotics  Discharge Plan: Anticipate discharge in 48-72 hrs to home.    Code Status: Level 1 - Full Code    Subjective:   Seen and examined at bedside.  No acute vents or night.  Reports ongoing severe left lower extremity pain mainly in posterior calf area such that he is unable to walk due to extreme pain.  Reports improvement in redness of marked area.    Objective:     Vitals:   Temp (24hrs), Av.8 °F (37.1 °C), Min:97.8 °F (36.6 °C), Max:99.3 °F (37.4 °C)    Temp:  [97.8 °F (36.6 °C)-99.3 °F (37.4 °C)] 99.2 °F (37.3 °C)  BP: (144-150)/(84-97) 144/85  SpO2:  [98 %] 98 %  Body mass index is 30.29 kg/m².     Input and Output Summary (last 24 hours):     Intake/Output Summary (Last 24 hours) at 2024 1635  Last data filed at 2024 2201  Gross per 24 hour   Intake 480 ml   Output --   Net 480 ml       Physical Exam:   Physical Exam  Constitutional:       General: He is not in acute distress.     Comments: Middle-age male, well-appearing   HENT:      Mouth/Throat:      Mouth: Mucous  membranes are moist.   Eyes:      Extraocular Movements: Extraocular movements intact.   Cardiovascular:      Rate and Rhythm: Normal rate and regular rhythm.      Pulses: Normal pulses.   Pulmonary:      Effort: Pulmonary effort is normal.      Breath sounds: Normal breath sounds. No wheezing.   Abdominal:      General: Abdomen is flat.      Palpations: Abdomen is soft.   Musculoskeletal:      Comments: Significant improvement in posterior calf erythema and swelling, still with exquisite tenderness   Skin:     Comments: Left posterior calf area marked with mild redness with significant tenderness and swelling   Neurological:      General: No focal deficit present.      Mental Status: He is alert and oriented to person, place, and time.          Additional Data:     Labs:  Results from last 7 days   Lab Units 08/01/24  0604 07/31/24  0622 07/30/24  0409   WBC Thousand/uL 5.95   < > 8.00   HEMOGLOBIN g/dL 12.0   < > 12.3   HEMATOCRIT % 35.8*   < > 36.9   PLATELETS Thousands/uL 295   < > 269   SEGS PCT %  --   --  62   LYMPHO PCT %  --   --  17   MONO PCT %  --   --  12   EOS PCT %  --   --  8*    < > = values in this interval not displayed.     Results from last 7 days   Lab Units 08/01/24  0604   SODIUM mmol/L 138   POTASSIUM mmol/L 3.5   CHLORIDE mmol/L 106   CO2 mmol/L 23   BUN mg/dL 21   CREATININE mg/dL 1.02   ANION GAP mmol/L 9   CALCIUM mg/dL 8.0*   GLUCOSE RANDOM mg/dL 120                 Results from last 7 days   Lab Units 07/29/24  1743   LACTIC ACID mmol/L 1.3       Lines/Drains:  Invasive Devices       Peripheral Intravenous Line  Duration             Peripheral IV 07/31/24 Left;Ventral (anterior) Forearm 2 days                          Imaging: No pertinent imaging reviewed.    Recent Cultures (last 7 days):   Results from last 7 days   Lab Units 07/30/24  1030   BLOOD CULTURE  No Growth at 72 hrs.  No Growth at 72 hrs.       Last 24 Hours Medication List:   Current Facility-Administered Medications    Medication Dose Route Frequency Provider Last Rate    acetaminophen  975 mg Oral Q8H [facility] Etta Abernathy PA-C      albuterol  2 puff Inhalation Q4H PRN Augustus Delgadillo MD      cefazolin  2,000 mg Intravenous Q8H Etta Abernathy PA-C 2,000 mg (08/02/24 1345)    cyclobenzaprine  10 mg Oral TID PRN Augustus Delgadillo MD      enoxaparin  40 mg Subcutaneous Daily Augustus Delgadillo MD      gabapentin  300 mg Oral TID Augustus Delgadillo MD      OLANZapine  5 mg Oral Q3H PRN Lucita Navarrete MD      oxyCODONE  5 mg Oral Q4H PRN Etta Abernathy PA-C      QUEtiapine  300 mg Oral HS Augustus Delgadillo MD          Today, Patient Was Seen By: Panchito Ellington MD    **Please Note: This note may have been constructed using a voice recognition system.**

## 2024-08-02 NOTE — PLAN OF CARE
Problem: PAIN - ADULT  Goal: Verbalizes/displays adequate comfort level or baseline comfort level  Description: Interventions:  - Encourage patient to monitor pain and request assistance  - Assess pain using appropriate pain scale  - Administer analgesics based on type and severity of pain and evaluate response  - Implement non-pharmacological measures as appropriate and evaluate response  - Consider cultural and social influences on pain and pain management  - Notify physician/advanced practitioner if interventions unsuccessful or patient reports new pain  Outcome: Progressing     Problem: INFECTION - ADULT  Goal: Absence or prevention of progression during hospitalization  Description: INTERVENTIONS:  - Assess and monitor for signs and symptoms of infection  - Monitor lab/diagnostic results  - Monitor all insertion sites, i.e. indwelling lines, tubes, and drains  - Monitor endotracheal if appropriate and nasal secretions for changes in amount and color  - [place] appropriate cooling/warming therapies per order  - Administer medications as ordered  - Instruct and encourage patient and family to use good hand hygiene technique  - Identify and instruct in appropriate isolation precautions for identified infection/condition  Outcome: Progressing  Goal: Absence of fever/infection during neutropenic period  Description: INTERVENTIONS:  - Monitor WBC    Outcome: Progressing     Problem: SAFETY ADULT  Goal: Patient will remain free of falls  Description: INTERVENTIONS:  - Educate patient/family on patient safety including physical limitations  - Instruct patient to call for assistance with activity   - Consult OT/PT to assist with strengthening/mobility   - Keep Call bell within reach  - Keep bed low and locked with side rails adjusted as appropriate  - Keep care items and personal belongings within reach  - Initiate and maintain comfort rounds  - Make Fall Risk Sign visible to staff  - Offer Toileting every 2 Hours,  in advance of need  - Initiate/Maintain bed alarm  - Obtain necessary fall risk management equipment: yes   - Apply yellow socks and bracelet for high fall risk patients  - Consider moving patient to room near nurses station  Outcome: Progressing  Goal: Maintain or return to baseline ADL function  Description: INTERVENTIONS:  -  Assess patient's ability to carry out ADLs; assess patient's baseline for ADL function and identify physical deficits which impact ability to perform ADLs (bathing, care of mouth/teeth, toileting, grooming, dressing, etc.)  - Assess/evaluate cause of self-care deficits   - Assess range of motion  - Assess patient's mobility; develop plan if impaired  - Assess patient's need for assistive devices and provide as appropriate  - Encourage maximum independence but intervene and supervise when necessary  - Involve family in performance of ADLs  - Assess for home care needs following discharge   - Consider OT consult to assist with ADL evaluation and planning for discharge  - Provide patient education as appropriate  Outcome: Progressing  Goal: Maintains/Returns to pre admission functional level  Description: INTERVENTIONS:  - Perform AM-PAC 6 Click Basic Mobility/ Daily Activity assessment daily.  - Set and communicate daily mobility goal to care team and patient/family/caregiver.   - Collaborate with rehabilitation services on mobility goals if consulted  - Perform Range of Motion 3 times a day.  - Reposition patient every 2 hours.  - Dangle patient 3 times a day  - Stand patient 3 times a day  - Ambulate patient 3 times a day  - Out of bed to chair 3 times a day   - Out of bed for meals 3 times a day  - Out of bed for toileting  - Record patient progress and toleration of activity level   Outcome: Progressing     Problem: DISCHARGE PLANNING  Goal: Discharge to home or other facility with appropriate resources  Description: INTERVENTIONS:  - Identify barriers to discharge w/patient and  caregiver  - Arrange for needed discharge resources and transportation as appropriate  - Identify discharge learning needs (meds, wound care, etc.)  - Arrange for interpretive services to assist at discharge as needed  - Refer to Case Management Department for coordinating discharge planning if the patient needs post-hospital services based on physician/advanced practitioner order or complex needs related to functional status, cognitive ability, or social support system  Outcome: Progressing     Problem: Knowledge Deficit  Goal: Patient/family/caregiver demonstrates understanding of disease process, treatment plan, medications, and discharge instructions  Description: Complete learning assessment and assess knowledge base.  Interventions:  - Provide teaching at level of understanding  - Provide teaching via preferred learning methods  Outcome: Progressing

## 2024-08-02 NOTE — PLAN OF CARE
Problem: OCCUPATIONAL THERAPY ADULT  Goal: Performs self-care activities at highest level of function for planned discharge setting.  See evaluation for individualized goals.  Description: Treatment Interventions: ADL retraining, Functional transfer training, Endurance training, Cognitive reorientation, Patient/family training, Equipment evaluation/education, Compensatory technique education, Continued evaluation, Energy conservation, Activityengagement          See flowsheet documentation for full assessment, interventions and recommendations.   Outcome: Progressing  Note: Limitation: Decreased ADL status, Decreased cognition, Decreased endurance, Decreased high-level ADLs, Decreased self-care trans  Prognosis: Good  Assessment: Pt seen for skilled OT treatment session on this date w/ interventions focusing on  ADL participation, activity tolerance, sitting tolerance, sitting balance, standing tolerance, standing balance, bed mobility , transfer skills, and fxnl mobility. Pt was agreeable and willing to participate in session. Pt engaged in the following tasks: Supervision LB dressing/bathing, STS transfers, functional mobility; Mod I UB bathing/dressing, grooming, bed mobility. In comparison to previous session, pt demonstrated improvements in functional transfers and mobility as he was able to complete short household distance using RW to minimize WB on LLE due to pain level. Pt required increased emotional support and encouragement to participate in session as he is concerned about d/c plan. Educated him on OT role vs CM role in d/c plan and informed him that CM can provide additional resources. Pt continues to be functioning below baseline level as occupational performance remains limited by decreased ADL status, decreased activity tolerance, decreased endurance, decreased standing tolerance, decreased standing balance, decreased transfer skills, decreased fxnl mobility, and pain. The patient's raw score on  the AM-PAC Daily Activity Inpatient Short Form is 22. A raw score of greater than or equal to 19 suggests the patient may benefit from discharge to home. Please refer to the recommendation of the Occupational Therapist for safe discharge planning.   From OT standpoint, recommend no post-acute rehab needs (pending progress and pain management) at time of d/c. Pt will benefit from continued OT treatment while in acute care to address deficits as defined above and maximize level of functional independence with ADLs and functional mobility. Pt left supine in bed w/ all needs met at end of session.     Rehab Resource Intensity Level, OT: No post-acute rehabilitation needs

## 2024-08-03 VITALS
SYSTOLIC BLOOD PRESSURE: 122 MMHG | DIASTOLIC BLOOD PRESSURE: 75 MMHG | RESPIRATION RATE: 18 BRPM | HEIGHT: 65 IN | WEIGHT: 177.25 LBS | TEMPERATURE: 98.4 F | BODY MASS INDEX: 29.53 KG/M2 | HEART RATE: 78 BPM | OXYGEN SATURATION: 98 %

## 2024-08-03 PROCEDURE — 99239 HOSP IP/OBS DSCHRG MGMT >30: CPT | Performed by: INTERNAL MEDICINE

## 2024-08-03 RX ORDER — TRAMADOL HYDROCHLORIDE 50 MG/1
50 TABLET ORAL EVERY 6 HOURS PRN
Qty: 8 TABLET | Refills: 0 | Status: SHIPPED | OUTPATIENT
Start: 2024-08-03 | End: 2024-08-13

## 2024-08-03 RX ORDER — CEPHALEXIN 500 MG/1
500 CAPSULE ORAL EVERY 6 HOURS SCHEDULED
Qty: 12 CAPSULE | Refills: 0 | Status: SHIPPED | OUTPATIENT
Start: 2024-08-03 | End: 2024-08-06

## 2024-08-03 RX ADMIN — ENOXAPARIN SODIUM 40 MG: 40 INJECTION SUBCUTANEOUS at 08:37

## 2024-08-03 RX ADMIN — ACETAMINOPHEN 975 MG: 325 TABLET, FILM COATED ORAL at 08:37

## 2024-08-03 RX ADMIN — OXYCODONE HYDROCHLORIDE 5 MG: 5 TABLET ORAL at 00:00

## 2024-08-03 RX ADMIN — QUETIAPINE FUMARATE 300 MG: 100 TABLET ORAL at 00:00

## 2024-08-03 RX ADMIN — CEFAZOLIN SODIUM 2000 MG: 2 SOLUTION INTRAVENOUS at 12:22

## 2024-08-03 RX ADMIN — GABAPENTIN 300 MG: 300 CAPSULE ORAL at 08:37

## 2024-08-03 RX ADMIN — CEFAZOLIN SODIUM 2000 MG: 2 SOLUTION INTRAVENOUS at 04:52

## 2024-08-03 NOTE — ASSESSMENT & PLAN NOTE
Patient meeting SIRS criteria on admission with WBC elevation (12.82), tachycardia (), and suspected LLE cellulitis, resolved

## 2024-08-03 NOTE — DISCHARGE INSTR - AVS FIRST PAGE
Take all medication as prescribed finish antibiotic course even if you are feeling better and you are leg pain/swelling has resolved.

## 2024-08-03 NOTE — PLAN OF CARE
Problem: PAIN - ADULT  Goal: Verbalizes/displays adequate comfort level or baseline comfort level  Description: Interventions:  - Encourage patient to monitor pain and request assistance  - Assess pain using appropriate pain scale  - Administer analgesics based on type and severity of pain and evaluate response  - Implement non-pharmacological measures as appropriate and evaluate response  - Consider cultural and social influences on pain and pain management  - Notify physician/advanced practitioner if interventions unsuccessful or patient reports new pain  Outcome: Progressing     Problem: INFECTION - ADULT  Goal: Absence or prevention of progression during hospitalization  Description: INTERVENTIONS:  - Assess and monitor for signs and symptoms of infection  - Monitor lab/diagnostic results  - Monitor all insertion sites, i.e. indwelling lines, tubes, and drains  - Monitor endotracheal if appropriate and nasal secretions for changes in amount and color  - [place] appropriate cooling/warming therapies per order  - Administer medications as ordered  - Instruct and encourage patient and family to use good hand hygiene technique  - Identify and instruct in appropriate isolation precautions for identified infection/condition  Outcome: Progressing  Goal: Absence of fever/infection during neutropenic period  Description: INTERVENTIONS:  - Monitor WBC    Outcome: Progressing     Problem: SAFETY ADULT  Goal: Patient will remain free of falls  Description: INTERVENTIONS:  - Educate patient/family on patient safety including physical limitations  - Instruct patient to call for assistance with activity   - Consult OT/PT to assist with strengthening/mobility   - Keep Call bell within reach  - Keep bed low and locked with side rails adjusted as appropriate  - Keep care items and personal belongings within reach  - Initiate and maintain comfort rounds  - Make Fall Risk Sign visible to staff  - Offer Toileting every 2 Hours,  in advance of need  - Initiate/Maintain bed alarm  - Obtain necessary fall risk management equipment: yes   - Apply yellow socks and bracelet for high fall risk patients  - Consider moving patient to room near nurses station  Outcome: Progressing  Goal: Maintain or return to baseline ADL function  Description: INTERVENTIONS:  -  Assess patient's ability to carry out ADLs; assess patient's baseline for ADL function and identify physical deficits which impact ability to perform ADLs (bathing, care of mouth/teeth, toileting, grooming, dressing, etc.)  - Assess/evaluate cause of self-care deficits   - Assess range of motion  - Assess patient's mobility; develop plan if impaired  - Assess patient's need for assistive devices and provide as appropriate  - Encourage maximum independence but intervene and supervise when necessary  - Involve family in performance of ADLs  - Assess for home care needs following discharge   - Consider OT consult to assist with ADL evaluation and planning for discharge  - Provide patient education as appropriate  Outcome: Progressing  Goal: Maintains/Returns to pre admission functional level  Description: INTERVENTIONS:  - Perform AM-PAC 6 Click Basic Mobility/ Daily Activity assessment daily.  - Set and communicate daily mobility goal to care team and patient/family/caregiver.   - Collaborate with rehabilitation services on mobility goals if consulted  - Perform Range of Motion 3 times a day.  - Reposition patient every 2 hours.  - Dangle patient 3 times a day  - Stand patient 3 times a day  - Ambulate patient 3 times a day  - Out of bed to chair 3 times a day   - Out of bed for meals 3 times a day  - Out of bed for toileting  - Record patient progress and toleration of activity level   Outcome: Progressing     Problem: DISCHARGE PLANNING  Goal: Discharge to home or other facility with appropriate resources  Description: INTERVENTIONS:  - Identify barriers to discharge w/patient and  caregiver  - Arrange for needed discharge resources and transportation as appropriate  - Identify discharge learning needs (meds, wound care, etc.)  - Arrange for interpretive services to assist at discharge as needed  - Refer to Case Management Department for coordinating discharge planning if the patient needs post-hospital services based on physician/advanced practitioner order or complex needs related to functional status, cognitive ability, or social support system  Outcome: Progressing     Problem: Knowledge Deficit  Goal: Patient/family/caregiver demonstrates understanding of disease process, treatment plan, medications, and discharge instructions  Description: Complete learning assessment and assess knowledge base.  Interventions:  - Provide teaching at level of understanding  - Provide teaching via preferred learning methods  Outcome: Progressing

## 2024-08-03 NOTE — DISCHARGE SUMMARY
[facility]  Discharge summary  Name: Amado Chin I  MRN: [id number]  Unit/Bed#: CW2 212-02 I Date of Admission: 7/29/2024   Date of Service: 8/3/2024 I Hospital Day: 5    Assessment & Plan   * Cellulitis of left lower extremity  Assessment & Plan  Patient presented with 3 days of redness, pain, and swelling of the left lower extremity.  WBC elevated at 12.82. Exam significant for tender, warm, erythematous LLE; erythema extending proximally from a abraded area over his left Achilles to approximately mid-calf.   - Given ongoing pain affecting ambulation will continue IV ancef 2 gm q8h, plan to transition to oral in next 24 to 48 hours depending on patient's symptoms  - trend WBC count and fever curve  - pain control, no further IV  8/2-3: Noted significant improvement in erythema and swelling of left calf now able to ambulate with mild to moderate pain stable for discharge    Psychoactive 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HCC)  Assessment & Plan  Continue gabapentin 300 mg TID & Seroquel 300 qhs; continue recently added Zyprexa ODT   Seen by psychiatry deemed stable from their POV, appreciate follow up      Drug use  Assessment & Plan  +for THC, methamphetamine    Homelessness  Assessment & Plan  -Patient is currently homeless.  -CM on board: will try to provide him resources for shelters.     Sepsis (HCC)  Assessment & Plan  Patient meeting SIRS criteria on admission with WBC elevation (12.82), tachycardia (), and suspected LLE cellulitis, resolved    Tobacco abuse  Assessment & Plan  Endorses 1/2 ppd smoking history for many years  Declined icotine patch  - smoking cessation education provided         Medical Problems       Resolved Problems  Date Reviewed: 2/9/2024   None       Discharging Physician / Practitioner: Panchito Ellington MD  PCP: Iva Fisher MD  Admission Date:   Admission Orders (From admission, onward)       Ordered        07/29/24 1646  INPATIENT  "ADMISSION  Once                          Discharge Date: 08/03/24    Consultations During Hospital Stay:  None    Procedures Performed:   None    Significant Findings / Test Results:   None    Incidental Findings:   None     Test Results Pending at Discharge (will require follow up):   None     Outpatient Tests Requested:  None    Complications:  None    Reason for Admission: Cellulitis    Hospital Course:   Amado Chin is a 38 y.o. male patient who originally presented to the hospital on 7/29/2024 due to left lower extremity pain and swelling diagnosed with cellulitis initiated on IV cefazolin and received 4 total days with improvement in symptoms.  Will be discharged on 3 further days of Keflex and short course of tramadol for pain control.  Advised to follow-up with primary care in 1 week if symptoms do not resolve or worsen.        Please see above list of diagnoses and related plan for additional information.     Condition at Discharge: stable    Discharge Day Visit / Exam:   Subjective: Seen and examined reports significant improvement in left pain now only with mild aching of heel area.  Vitals: Blood Pressure: 122/75 (08/03/24 0727)  Pulse: 78 (08/03/24 0727)  Temperature: 98.4 °F (36.9 °C) (08/03/24 0727)  Temp Source: Oral (08/03/24 0727)  Respirations: 18 (08/03/24 0727)  Height: 5' 5\" (165.1 cm) (07/30/24 1500)  Weight - Scale: 80.4 kg (177 lb 4 oz) (08/03/24 0600)  SpO2: 98 % (08/02/24 1950)  Exam:   Constitutional:       General: He is not in acute distress.     Comments: Middle-age male, well-appearing   HENT:      Mouth/Throat:      Mouth: Mucous membranes are moist.   Eyes:      Extraocular Movements: Extraocular movements intact.   Cardiovascular:      Rate and Rhythm: Normal rate and regular rhythm.      Pulses: Normal pulses.   Pulmonary:      Effort: Pulmonary effort is normal.      Breath sounds: Normal breath sounds. No wheezing.   Abdominal:      General: Abdomen is flat.      Palpations: " Abdomen is soft.   Musculoskeletal:      Comments: Significant improvement in posterior calf erythema and swelling, no significant tenderness   Neurological:      General: No focal deficit present.      Mental Status: He is alert and oriented to person, place, and time.     Discussion with Family: Patient declined call to .     Discharge instructions/Information to patient and family:   See after visit summary for information provided to patient and family.      Provisions for Follow-Up Care:  See after visit summary for information related to follow-up care and any pertinent home health orders.      Mobility at time of Discharge:   Basic Mobility Inpatient Raw Score: 24  JH-HLM Goal: 8: Walk 250 feet or more  JH-HLM Achieved: 6: Walk 10 steps or more  HLM Goal achieved. Continue to encourage appropriate mobility.     Disposition:   Home    Planned Readmission: no     Discharge Statement:  I spent 35 minutes discharging the patient. This time was spent on the day of discharge. I had direct contact with the patient on the day of discharge. Greater than 50% of the total time was spent examining patient, answering all patient questions, arranging and discussing plan of care with patient as well as directly providing post-discharge instructions.  Additional time then spent on discharge activities.    Discharge Medications:  See after visit summary for reconciled discharge medications provided to patient and/or family.      **Please Note: This note may have been constructed using a voice recognition system**

## 2024-08-03 NOTE — ASSESSMENT & PLAN NOTE
Patient presented with 3 days of redness, pain, and swelling of the left lower extremity.  WBC elevated at 12.82. Exam significant for tender, warm, erythematous LLE; erythema extending proximally from a abraded area over his left Achilles to approximately mid-calf.   - Given ongoing pain affecting ambulation will continue IV ancef 2 gm q8h, plan to transition to oral in next 24 to 48 hours depending on patient's symptoms  - trend WBC count and fever curve  - pain control, no further IV  8/2-3: Noted significant improvement in erythema and swelling of left calf now able to ambulate with mild to moderate pain stable for discharge

## 2024-08-03 NOTE — PLAN OF CARE
Patient has no change in assessment.        Problem: PAIN - ADULT  Goal: Verbalizes/displays adequate comfort level or baseline comfort level  Description: Interventions:  - Encourage patient to monitor pain and request assistance  - Assess pain using appropriate pain scale  - Administer analgesics based on type and severity of pain and evaluate response  - Implement non-pharmacological measures as appropriate and evaluate response  - Consider cultural and social influences on pain and pain management  - Notify physician/advanced practitioner if interventions unsuccessful or patient reports new pain  Outcome: Progressing
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
## 2024-08-04 LAB
BACTERIA BLD CULT: NORMAL
BACTERIA BLD CULT: NORMAL

## 2024-08-05 ENCOUNTER — TRANSITIONAL CARE MANAGEMENT (OUTPATIENT)
Dept: FAMILY MEDICINE CLINIC | Facility: CLINIC | Age: 39
End: 2024-08-05

## 2024-08-13 ENCOUNTER — TELEPHONE (OUTPATIENT)
Dept: FAMILY MEDICINE CLINIC | Facility: CLINIC | Age: 39
End: 2024-08-13

## 2024-08-25 ENCOUNTER — APPOINTMENT (EMERGENCY)
Dept: RADIOLOGY | Facility: HOSPITAL | Age: 39
End: 2024-08-25
Payer: MEDICARE

## 2024-08-25 ENCOUNTER — HOSPITAL ENCOUNTER (EMERGENCY)
Facility: HOSPITAL | Age: 39
Discharge: HOME | End: 2024-08-25
Attending: EMERGENCY MEDICINE | Admitting: EMERGENCY MEDICINE
Payer: MEDICARE

## 2024-08-25 ENCOUNTER — HOSPITAL ENCOUNTER (EMERGENCY)
Facility: HOSPITAL | Age: 39
Discharge: HOME | End: 2024-08-26
Attending: EMERGENCY MEDICINE
Payer: MEDICARE

## 2024-08-25 VITALS
BODY MASS INDEX: 27.44 KG/M2 | SYSTOLIC BLOOD PRESSURE: 121 MMHG | RESPIRATION RATE: 22 BRPM | WEIGHT: 164.9 LBS | DIASTOLIC BLOOD PRESSURE: 96 MMHG | HEART RATE: 92 BPM | OXYGEN SATURATION: 100 % | TEMPERATURE: 97.8 F

## 2024-08-25 DIAGNOSIS — G89.29 CHRONIC NECK PAIN: Primary | ICD-10-CM

## 2024-08-25 DIAGNOSIS — M79.609 PARESTHESIA AND PAIN OF EXTREMITY: ICD-10-CM

## 2024-08-25 DIAGNOSIS — M25.512 LEFT SHOULDER PAIN: ICD-10-CM

## 2024-08-25 DIAGNOSIS — Z59.00 HOMELESSNESS: ICD-10-CM

## 2024-08-25 DIAGNOSIS — R20.2 PARESTHESIA AND PAIN OF EXTREMITY: ICD-10-CM

## 2024-08-25 DIAGNOSIS — M25.552 LEFT HIP PAIN: ICD-10-CM

## 2024-08-25 DIAGNOSIS — M54.2 CHRONIC NECK PAIN: Primary | ICD-10-CM

## 2024-08-25 DIAGNOSIS — S09.90XA CLOSED HEAD INJURY, INITIAL ENCOUNTER: ICD-10-CM
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
## 2024-08-25 DIAGNOSIS — M50.20 CERVICAL DISC HERNIATION: ICD-10-CM

## 2024-08-25 LAB
ALBUMIN SERPL BCG-MCNC: 4.4 G/DL (ref 3.5–5)
ALP SERPL-CCNC: 92 U/L (ref 34–104)
ALT SERPL W P-5'-P-CCNC: 15 U/L (ref 7–52)
AMPHETAMINES SERPL QL SCN: POSITIVE
ANION GAP SERPL CALCULATED.3IONS-SCNC: 6 MMOL/L (ref 4–13)
AST SERPL W P-5'-P-CCNC: 21 U/L (ref 13–39)
BARBITURATES UR QL: NEGATIVE
BASOPHILS # BLD AUTO: 0.08 THOUSANDS/ÂΜL (ref 0–0.1)
BASOPHILS NFR BLD AUTO: 1 % (ref 0–1)
BENZODIAZ UR QL: NEGATIVE
BILIRUB SERPL-MCNC: 0.86 MG/DL (ref 0.2–1)
BUN SERPL-MCNC: 32 MG/DL (ref 5–25)
CALCIUM SERPL-MCNC: 9.2 MG/DL (ref 8.4–10.2)
CARDIAC TROPONIN I PNL SERPL HS: 3 NG/L
CHLORIDE SERPL-SCNC: 103 MMOL/L (ref 96–108)
CO2 SERPL-SCNC: 28 MMOL/L (ref 21–32)
COCAINE UR QL: POSITIVE
CREAT SERPL-MCNC: 1.14 MG/DL (ref 0.6–1.3)
EOSINOPHIL # BLD AUTO: 0.5 THOUSAND/ÂΜL (ref 0–0.61)
EOSINOPHIL NFR BLD AUTO: 9 % (ref 0–6)
ERYTHROCYTE [DISTWIDTH] IN BLOOD BY AUTOMATED COUNT: 13.2 % (ref 11.6–15.1)
FENTANYL UR QL SCN: NEGATIVE
GFR SERPL CREATININE-BSD FRML MDRD: 79 ML/MIN/1.73SQ M
GLUCOSE SERPL-MCNC: 119 MG/DL (ref 65–140)
HCT VFR BLD AUTO: 43.9 % (ref 36.5–49.3)
HGB BLD-MCNC: 14.9 G/DL (ref 12–17)
HYDROCODONE UR QL SCN: NEGATIVE
IMM GRANULOCYTES # BLD AUTO: 0.01 THOUSAND/UL (ref 0–0.2)
IMM GRANULOCYTES NFR BLD AUTO: 0 % (ref 0–2)
LYMPHOCYTES # BLD AUTO: 1.54 THOUSANDS/ÂΜL (ref 0.6–4.47)
LYMPHOCYTES NFR BLD AUTO: 27 % (ref 14–44)
MCH RBC QN AUTO: 30.9 PG (ref 26.8–34.3)
MCHC RBC AUTO-ENTMCNC: 33.9 G/DL (ref 31.4–37.4)
MCV RBC AUTO: 91 FL (ref 82–98)
METHADONE UR QL: NEGATIVE
MONOCYTES # BLD AUTO: 0.87 THOUSAND/ÂΜL (ref 0.17–1.22)
MONOCYTES NFR BLD AUTO: 15 % (ref 4–12)
NEUTROPHILS # BLD AUTO: 2.78 THOUSANDS/ÂΜL (ref 1.85–7.62)
NEUTS SEG NFR BLD AUTO: 48 % (ref 43–75)
NRBC BLD AUTO-RTO: 0 /100 WBCS
OPIATES UR QL SCN: POSITIVE
OXYCODONE+OXYMORPHONE UR QL SCN: NEGATIVE
PCP UR QL: NEGATIVE
PLATELET # BLD AUTO: 274 THOUSANDS/UL (ref 149–390)
PMV BLD AUTO: 9.4 FL (ref 8.9–12.7)
POTASSIUM SERPL-SCNC: 3.9 MMOL/L (ref 3.5–5.3)
PROT SERPL-MCNC: 7.3 G/DL (ref 6.4–8.4)
RBC # BLD AUTO: 4.82 MILLION/UL (ref 3.88–5.62)
SODIUM SERPL-SCNC: 137 MMOL/L (ref 135–147)
THC UR QL: POSITIVE
WBC # BLD AUTO: 5.78 THOUSAND/UL (ref 4.31–10.16)

## 2024-08-25 PROCEDURE — 80307 DRUG TEST PRSMV CHEM ANLYZR: CPT

## 2024-08-25 PROCEDURE — 72125 CT NECK SPINE W/O DYE: CPT

## 2024-08-25 PROCEDURE — 80053 COMPREHEN METABOLIC PANEL: CPT

## 2024-08-25 PROCEDURE — 96360 HYDRATION IV INFUSION INIT: CPT

## 2024-08-25 PROCEDURE — 96372 THER/PROPH/DIAG INJ SC/IM: CPT

## 2024-08-25 PROCEDURE — 70450 CT HEAD/BRAIN W/O DYE: CPT

## 2024-08-25 PROCEDURE — 99283 EMERGENCY DEPT VISIT LOW MDM: CPT

## 2024-08-25 PROCEDURE — 99285 EMERGENCY DEPT VISIT HI MDM: CPT

## 2024-08-25 PROCEDURE — 36415 COLL VENOUS BLD VENIPUNCTURE: CPT

## 2024-08-25 PROCEDURE — 73030 X-RAY EXAM OF SHOULDER: CPT

## 2024-08-25 PROCEDURE — 99284 EMERGENCY DEPT VISIT MOD MDM: CPT | Performed by: EMERGENCY MEDICINE

## 2024-08-25 PROCEDURE — 93005 ELECTROCARDIOGRAM TRACING: CPT

## 2024-08-25 PROCEDURE — 85025 COMPLETE CBC W/AUTO DIFF WBC: CPT

## 2024-08-25 PROCEDURE — 99285 EMERGENCY DEPT VISIT HI MDM: CPT | Performed by: EMERGENCY MEDICINE

## 2024-08-25 PROCEDURE — 73502 X-RAY EXAM HIP UNI 2-3 VIEWS: CPT

## 2024-08-25 PROCEDURE — 71046 X-RAY EXAM CHEST 2 VIEWS: CPT

## 2024-08-25 PROCEDURE — 96361 HYDRATE IV INFUSION ADD-ON: CPT

## 2024-08-25 PROCEDURE — 84484 ASSAY OF TROPONIN QUANT: CPT

## 2024-08-25 RX ORDER — DULOXETIN HYDROCHLORIDE 30 MG/1
30 CAPSULE, DELAYED RELEASE ORAL DAILY
COMMUNITY
Start: 2024-03-07

## 2024-08-25 RX ORDER — NICOTINE 21 MG/24HR
1 PATCH, TRANSDERMAL 24 HOURS TRANSDERMAL EVERY 24 HOURS
COMMUNITY
Start: 2024-07-18

## 2024-08-25 RX ORDER — ACETAMINOPHEN 325 MG/1
975 TABLET ORAL ONCE
Status: COMPLETED | OUTPATIENT
Start: 2024-08-25 | End: 2024-08-25

## 2024-08-25 RX ORDER — MORPHINE SULFATE 4 MG/ML
4 INJECTION, SOLUTION INTRAMUSCULAR; INTRAVENOUS ONCE
Status: COMPLETED | OUTPATIENT
Start: 2024-08-25 | End: 2024-08-25

## 2024-08-25 RX ADMIN — MORPHINE SULFATE 4 MG: 4 INJECTION, SOLUTION INTRAMUSCULAR; INTRAVENOUS at 11:33

## 2024-08-25 RX ADMIN — SODIUM CHLORIDE 1000 ML: 0.9 INJECTION, SOLUTION INTRAVENOUS at 14:35

## 2024-08-25 RX ADMIN — ACETAMINOPHEN 975 MG: 325 TABLET ORAL at 14:23

## 2024-08-25 SDOH — ECONOMIC STABILITY - HOUSING INSECURITY: HOMELESSNESS UNSPECIFIED: Z59.00

## 2024-08-25 NOTE — ED NOTES
Physician stated that he could eat and gave him a box lunch  Pt pulling at this collar so he can eat.  Pt reminded that he needs to not pull at his collar due to his neck pain     Peggy Harden RN  08/25/24 1048

## 2024-08-25 NOTE — ED NOTES
YT ride set up for this patient, [address] [place].     Rosa Elena Le, VELMA  08/25/24 3796

## 2024-08-25 NOTE — ED PROVIDER NOTES
"History  Chief Complaint   Patient presents with    Neck Pain     Pt has chronic neck pain and needs surgery on his neck. Pt was at [facility] and called EMS due to the pain in his neck.      HPI      This is a nontoxic-appearing 39-year-old male, H hx of psychosis and chronic pain, right-hand-dominant, presents emergency department with a history of recent admission to the hospital for a left lower extremity cellulitis in the beginning of this month, history of methamphetamine use, Bacot abuse, psychoactive substance induced psychosis, presents to the emergency department with chronic neck pain with acute exacerbation.  Patient has had neck pain is chronic in nature for 6 years secondary to a \"wrestling accident\".  No recent hx of trauma.    The pain radiates down the right side of his body down into the right arm to the triceps and also down the right thoracic area and down right upper thigh. He experiences numbness in the ring and pinky fingers bilaterally. These are all chronic issues. No new symptoms. Patient with acute on chronic neck pain and associated paresthesias. He has known history of DJD of cervical spine. Reports to taking Ibuprofen at home for the pain. No new urinary retention, bowel incontinence or saddle anesthesia.     Patient is tearful secondary to current living situation because he is homeless and he needs \"a break from walking around the city.\"  Patient appears to be following up with pain management at [facility]: Dr. Miller, had an MRI done May 14, 2024 which showed a right subarticular zone disc protrusion at C5-C6 with mass effect with disc bulging at C6-C7 results are showing moderate to severe right foramen canal narrowing similar to prior MRI.  Review of the note from Dr. Miller on 2/9/24: his presentation during consultation is similar.  Appears he has not dissipated in any physical therapy secondary to pain, he is not interested in interventional therapy due to prior past experiences.  Of note " of pain management consultation he stated that the prior managed with oxycodone when he lived in [place] was very effective.     Patient admission to the hospital for left lower extremity cellulitis, blood cultures at that time were negative.  Prior to Admission Medications   Prescriptions Last Dose Informant Patient Reported? Taking?   DULoxetine (Cymbalta) 30 mg delayed release capsule   Yes Yes   Sig: Take 30 mg by mouth daily   QUEtiapine (SEROquel) 300 mg tablet   Yes No   Sig: TAKE 1 TABLET BY MOUTH DAILY AT BEDTIME X 14 DAYS   amLODIPine (NORVASC) 5 mg tablet   Yes No   Sig: Take 5 mg by mouth daily   cyclobenzaprine (FLEXERIL) 10 mg tablet   Yes No   Sig: TAKE 1 TABLET BY MOUTH THREE TIMES A DAY AS NEEDED FOR MUSCLE SPASM FOR 14 DAYS.   diazepam (VALIUM) 5 mg tablet   No No   Sig: Take 1 tablet (5 mg total) by mouth every 12 (twelve) hours as needed for muscle spasms   gabapentin (Neurontin) 300 mg capsule   No No   Sig: Take 1 capsule (300 mg total) by mouth 3 (three) times a day   ibuprofen (MOTRIN) 400 mg tablet   No No   Sig: Take 1 tablet (400 mg total) by mouth every 6 (six) hours as needed for moderate pain   nicotine (NICODERM CQ) 21 mg/24 hr TD 24 hr patch   Yes Yes   Sig: Place 1 patch on the skin every 24 hours      Facility-Administered Medications: None       Past Medical History:   Diagnosis Date    Asthma     Chronic pain     Hypertension     Neck pain     Psychoactive 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HCC)     Thyroglossal duct cyst        Past Surgical History:   Procedure Laterality Date    THYROGLOSSAL DUCT EXCISION      THYROID SURGERY      THYROID SURGERY         Family History   Problem Relation Age of Onset    Hypertension Mother     No Known Problems Father      I have reviewed and agree with the history as documented.    E-Cigarette/Vaping    E-Cigarette Use Former User     Cartridges/Day 1      E-Cigarette/Vaping Substances    Nicotine Yes     THC No     CBD No     Flavoring Yes      Other No     Unknown No      Social History     Tobacco Use    Smoking status: Every Day     Current packs/day: 0.50     Types: Cigarettes    Smokeless tobacco: Never    Tobacco comments:     5 cigerettes a day    Vaping Use    Vaping status: Former    Substances: Nicotine, Flavoring   Substance Use Topics    Alcohol use: Yes     Comment: occas    Drug use: Not Currently     Types: Marijuana       Review of Systems   Constitutional: Negative.  Negative for chills and fever.   HENT: Negative.     Eyes: Negative.    Respiratory: Negative.  Negative for chest tightness and shortness of breath.    Cardiovascular: Negative.    Gastrointestinal: Negative.  Negative for abdominal pain and nausea.   Endocrine: Negative.    Genitourinary: Negative.    Musculoskeletal:  Positive for neck pain. Negative for arthralgias, back pain, gait problem, joint swelling, myalgias and neck stiffness.   Skin: Negative.  Negative for color change, pallor, rash and wound.   Allergic/Immunologic: Negative.    Neurological:  Positive for numbness. Negative for dizziness, tremors, seizures, syncope, facial asymmetry, speech difficulty, weakness, light-headedness and headaches.   Hematological: Negative.    Psychiatric/Behavioral: Negative.         Physical Exam  Physical Exam  Vitals and nursing note reviewed.   Constitutional:       General: He is not in acute distress.     Appearance: Normal appearance. He is normal weight. He is not ill-appearing, toxic-appearing or diaphoretic.   HENT:      Head: Normocephalic and atraumatic.      Right Ear: External ear normal.      Left Ear: External ear normal.      Nose: Nose normal.      Mouth/Throat:      Mouth: Mucous membranes are moist.      Pharynx: Oropharynx is clear.   Eyes:      Extraocular Movements: Extraocular movements intact.      Conjunctiva/sclera: Conjunctivae normal.      Pupils: Pupils are equal, round, and reactive to light.   Cardiovascular:      Rate and Rhythm: Normal rate and  regular rhythm.      Pulses: Normal pulses.      Heart sounds: Normal heart sounds.   Pulmonary:      Effort: Pulmonary effort is normal.      Breath sounds: Normal breath sounds.   Abdominal:      General: Abdomen is flat. Bowel sounds are normal.   Musculoskeletal:         General: Normal range of motion.      Cervical back: Normal range of motion.   Skin:     General: Skin is warm.      Capillary Refill: Capillary refill takes less than 2 seconds.   Neurological:      General: No focal deficit present.      Mental Status: He is alert and oriented to person, place, and time. Mental status is at baseline.      GCS: GCS eye subscore is 4. GCS verbal subscore is 5. GCS motor subscore is 6.      Cranial Nerves: No cranial nerve deficit or dysarthria.      Sensory: No sensory deficit.      Motor: No weakness, tremor, atrophy, abnormal muscle tone or seizure activity.      Deep Tendon Reflexes:      Reflex Scores:       Tricep reflexes are 2+ on the right side and 2+ on the left side.       Bicep reflexes are 2+ on the right side and 2+ on the left side.       Brachioradialis reflexes are 2+ on the right side and 2+ on the left side.  Psychiatric:         Mood and Affect: Mood normal.         Behavior: Behavior normal.         Thought Content: Thought content normal.         Judgment: Judgment normal.         Vital Signs  ED Triage Vitals   Temperature Pulse Respirations Blood Pressure SpO2   08/25/24 1112 08/25/24 1112 08/25/24 1112 08/25/24 1112 08/25/24 1112   97.8 °F (36.6 °C) 92 22 121/96 100 %      Temp Source Heart Rate Source Patient Position - Orthostatic VS BP Location FiO2 (%)   08/25/24 1112 08/25/24 1112 08/25/24 1112 08/25/24 1112 --   Oral Monitor Lying Left arm       Pain Score       08/25/24 1133       10 - Worst Possible Pain           Vitals:    08/25/24 1112   BP: 121/96   Pulse: 92   Patient Position - Orthostatic VS: Lying         Visual Acuity      ED Medications  Medications   morphine injection  "4 mg (4 mg Intramuscular Given 8/25/24 1133)       Diagnostic Studies  Results Reviewed       None                   No orders to display              Procedures  Procedures         ED Course  ED Course as of 08/25/24 1211   Sun Aug 25, 2024   1121 Patient has been seen evaluated and examined, 39-year-old right-hand-dominant male presents the emergency department with chronic neck pain after a \"wrestling accident\" proximately 6 years ago having chronic severe pain for over a year.  Oupt has had a abnormal MRI of cervical area, follows with neurosurgery.  Symptoms are exacerbated by his homelessness.  No recent change in terms of his chronic pain and chronic intermittent paresthesias, patient is currently afebrile.    Brief focused differential dx in this patient is as follows:  DDD, history of degenerative disc disease versus exacerbation of chronic pain of the cervical area.  Patient stating that he needs a \"break\" from the chronic pain, patient was walking around the city of [place] today hanging out at the [employer] since he is homeless.  Patient does have a history of having violence towards staff members based on multiple ER visits in the past, assurance given to the patient that this is unfortunately a chronic problem and that needs to follow-up with neurosurgery since he has no new symptoms, no recent trauma, will medicate pt, referral made to neurosugery w/in our network. Unclear if pt has f/up with neurosurgery at [facility]. No SI, no HI.                                 SBIRT 22yo+      Flowsheet Row Most Recent Value   Initial Alcohol Screen: US AUDIT-C     1. How often do you have a drink containing alcohol? 0 Filed at: 08/25/2024 1105   2. How many drinks containing alcohol do you have on a typical day you are drinking?  0 Filed at: 08/25/2024 1105   3a. Male UNDER 65: How often do you have five or more drinks on one occasion? 0 Filed at: 08/25/2024 1105   3b. FEMALE Any Age, or MALE 65+: How often do " "you have 4 or more drinks on one occassion? 0 Filed at: 08/25/2024 1107   Audit-C Score 0 Filed at: 08/25/2024 1103   YANELI: How many times in the past year have you...    Used an illegal drug or used a prescription medication for non-medical reasons? Never Filed at: 08/25/2024 110                      Medical Decision Making  History of verbal confrontation with ED staff prior ER visits, in this patient who is 39 years old having chronic neck pain going on for approximately 6 years with worsening over the last 1 to 2 years, does have an abnormal MRI in which he will need to have follow-up with neurosurgery.  It is unclear who he is following up with but the MRI was ordered through [facility] later for we will proceed with a referral to neurosurgery within our network.  Patient today was at the [facility] with no history of trauma fever chills or change in terms of his chronic paresthesias that are intermittent in nature, stated that is the reason why he is here today to control his chronic pain.  IM morphine administered, stable for d/c.  Upon discharge patient was able to get off the stretcher without difficulty and able to get out of the wheelchair in the waiting room and get into a parked car he forgot bag therefore he was able to turn around without difficulty and ambulate back and picked up the bag without trouble.    Portions of the record may have been created with voice recognition software. Occasional wrong word or \"sound a like\" substitutions may have occurred due to the inherent limitations of voice recognition software. Read the chart carefully and recognize, using context, where substitutions have occurred.       Risk  Prescription drug management.                 Disposition  Final diagnoses:   Chronic neck pain   Cervical disc herniation   Paresthesia and pain of extremity   Homelessness     Time reflects when diagnosis was documented in both MDM as applicable and the Disposition within this " note       Time User Action Codes Description Comment    8/25/2024 11:34 AM LottXavier wills Mitali [M54.2,  G89.29] Chronic neck pain     8/25/2024 11:35 AM Xavier Lott [M50.20] Cervical disc herniation     8/25/2024 11:35 AM Xavier Lott [R20.2,  M79.609] Paresthesia and pain of extremity     8/25/2024 11:41 AM Xavier Lott [Z59.00] Homelessness           ED Disposition       ED Disposition   Discharge    Condition   Stable    Date/Time   Sun Aug 25, 2024 1134    Comment   Amado KELLIE Chin discharge to home/self care.                   Follow-up Information       Follow up With Specialties Details Why Contact Info    Iva Fisher MD Family Medicine   [address] [place] [address]   [address]  [facility] [address]  [phone]      Frederick Salmeron MD Neurosurgery   [address] [place] [address]  [phone]              Discharge Medication List as of 8/25/2024 11:36 AM        CONTINUE these medications which have NOT CHANGED    Details   DULoxetine (Cymbalta) 30 mg delayed release capsule Take 30 mg by mouth daily, Starting Thu 3/7/2024, Historical Med      nicotine (NICODERM CQ) 21 mg/24 hr TD 24 hr patch Place 1 patch on the skin every 24 hours, Starting Thu 7/18/2024, Historical Med      amLODIPine (NORVASC) 5 mg tablet Take 5 mg by mouth daily, Starting Thu 3/7/2024, Until Fri 3/7/2025, Historical Med      cyclobenzaprine (FLEXERIL) 10 mg tablet TAKE 1 TABLET BY MOUTH THREE TIMES A DAY AS NEEDED FOR MUSCLE SPASM FOR 14 DAYS., Historical Med      diazepam (VALIUM) 5 mg tablet Take 1 tablet (5 mg total) by mouth every 12 (twelve) hours as needed for muscle spasms, Starting Wed 2/28/2024, Normal      gabapentin (Neurontin) 300 mg capsule Take 1 capsule (300 mg total) by mouth 3 (three) times a day, Starting Fri 2/9/2024, Normal      ibuprofen (MOTRIN) 400 mg tablet Take 1 tablet (400 mg total) by mouth every 6 (six) hours as needed for moderate pain, Starting Wed 2/28/2024, Normal       QUEtiapine (SEROquel) 300 mg tablet TAKE 1 TABLET BY MOUTH DAILY AT BEDTIME X 14 DAYS, Historical Med                 PDMP Review         Value Time User    PDMP Reviewed  Yes 2/9/2024  8:03 AM Doron Miller DO            ED Provider  Electronically Signed by             Xavier Lott III, DO  08/25/24 2053
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
## 2024-08-25 NOTE — ED ATTENDING ATTESTATION
Final Diagnosis:  1. Fall, initial encounter    2. Closed head injury, initial encounter    3. Left shoulder pain    4. Left hip pain           I, Carlos Gomez MD, saw and evaluated the patient. All available labs and X-rays were ordered by me or the resident and have been reviewed by myself. I discussed the patient with the resident / non-physician and agree with the resident's / non-physician practitioner's findings and plan as documented in the resident's / non-physician practicitioner's note, except where noted.   At this point, I agree with the current assessment done in the ED.   I was present during key portions of all procedures performed unless otherwise stated.     Chief Complaint   Patient presents with    Fall     Patient brought by EMS for fall with pain in neck, head, and bilateral legs. + head strike - thinners. Negative LOC and patient has previous history of herniation at c3-c5.      This is a 39 y.o. male presenting for evaluation of fall.  Apparently patient states that he fell onto his left side.  Now complaining of head, neck, shoulder and hip pain.  Patient states that he believes that he fell over because he felt lightheaded.  He states that he feels lightheaded about every 15 minutes.  Apparently this been going on for some time.  He can identify no other triggering factors.  No palpitations.  No chest pain.    PMH:   has a past medical history of Asthma, Chronic pain, Hypertension, Neck pain, Psychoactive 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HCC), and Thyroglossal duct cyst.    PSH:   has a past surgical history that includes Thyroid surgery; Thyroglossal duct excision; and Thyroid surgery.    Procedures     Social:  Social History     Substance and Sexual Activity   Alcohol Use Yes    Comment: occas     Social History     Tobacco Use   Smoking Status Every Day    Current packs/day: 0.50    Types: Cigarettes   Smokeless Tobacco Never   Tobacco Comments    5 cigerettes a day      Social History      Substance and Sexual Activity   Drug Use Not Currently    Types: Marijuana     PE:  Vitals:    08/26/24 0205 08/26/24 0600 08/26/24 0730 08/26/24 1139   BP:    140/70   BP Location:       Pulse: 60 60 64 80   Resp: 15 15  16   Temp:       TempSrc:       SpO2: 98% 98% 98% 99%       A:    Unless otherwise specified above:     General: VS reviewed  Appears in NAD     Head: Normocephalic, atraumatic     CV: No pallor noted  Lungs:   No respiratory distress     Abdomen:  Soft, non-tender, non-distended     MSK:   No obvious deformity     Skin: No obvious rash.     Neuro: Awake, alert, GCS15, CN II-XII grossly intact. Speaking in full sentences.   Motor grossly intact.     Psychiatric/Behavioral: Appropriate mood and affect   Exam: deferred    P:  -Patient ambulates without difficulty.  He has some generalized abrasions over his body and various states of healing.  No obvious signs of infection.  No focal neurological deficits.  - Will evaluate for significant head and neck trauma with CT imaging.  Provide x-rays on affected extremities investigate for fractures.  Provide analgesia.  Given history of syncopal-like episodes will evaluate for ACS, arrhythmia, electrolyte disturbances though these are less likely.  -No acute injuries. Patient requested rehab services.   - 13 point ROS was performed and all are normal unless stated in the history above.   - Nursing note reviewed. Vitals reviewed.   - Orders placed by myself and/or advanced practitioner / resident.    - Previous chart was reviewed  - No language barrier.   - History obtained from patient.   - There are no limitations to the history obtained.     Unless otherwise specified:  CC is exclusive from any separately billable procedures  CC is exclusive of treating other patients  CC is exclusive of teaching time     Code Status: Prior  Advance Directive and Living Will:      Power of :    POLST:      Medications   acetaminophen (TYLENOL) tablet 975 mg  (975 mg Oral Given 8/25/24 1423)   sodium chloride 0.9 % bolus 1,000 mL (0 mL Intravenous Stopped 8/25/24 2000)     XR chest 2 views   Final Result      No acute cardiopulmonary disease.      No acute displaced fractures.      Workstation performed: [id number]         XR hip/pelv 2-3 vws left if performed   Final Result      No acute osseous abnormality.         Computerized Assisted Algorithm (CAA) may have been used to analyze all applicable images.            Workstation performed: [id number]         XR shoulder 2+ views LEFT   Final Result      No acute osseous abnormality.         Computerized Assisted Algorithm (CAA) may have been used to analyze all applicable images.         Workstation performed: [id number]         CT spine cervical without contrast   Final Result      No cervical spine fracture or traumatic malalignment.                  Workstation performed: [id number]         CT head without contrast   Final Result      No acute intracranial abnormality.                  Workstation performed: [id number]           Orders Placed This Encounter   Procedures    XR chest 2 views    XR hip/pelv 2-3 vws left if performed    CT head without contrast    CT spine cervical without contrast    XR shoulder 2+ views LEFT    CBC and differential    Comprehensive metabolic panel    HS Troponin 0hr (reflex protocol)    Rapid drug screen, urine    ECG 12 lead    ECG 12 lead     Labs Reviewed   CBC AND DIFFERENTIAL - Abnormal       Result Value Ref Range Status    WBC 5.78  4.31 - 10.16 Thousand/uL Final    RBC 4.82  3.88 - 5.62 Million/uL Final    Hemoglobin 14.9  12.0 - 17.0 g/dL Final    Hematocrit 43.9  36.5 - 49.3 % Final    MCV 91  82 - 98 fL Final    MCH 30.9  26.8 - 34.3 pg Final    MCHC 33.9  31.4 - 37.4 g/dL Final    RDW 13.2  11.6 - 15.1 % Final    MPV 9.4  8.9 - 12.7 fL Final    Platelets 274  149 - 390 Thousands/uL Final    nRBC 0  /100 WBCs Final    Segmented % 48  43 - 75 % Final    Immature Grans % 0   0 - 2 % Final    Lymphocytes % 27  14 - 44 % Final    Monocytes % 15 (*) 4 - 12 % Final    Eosinophils Relative 9 (*) 0 - 6 % Final    Basophils Relative 1  0 - 1 % Final    Absolute Neutrophils 2.78  1.85 - 7.62 Thousands/µL Final    Absolute Immature Grans 0.01  0.00 - 0.20 Thousand/uL Final    Absolute Lymphocytes 1.54  0.60 - 4.47 Thousands/µL Final    Absolute Monocytes 0.87  0.17 - 1.22 Thousand/µL Final    Eosinophils Absolute 0.50  0.00 - 0.61 Thousand/µL Final    Basophils Absolute 0.08  0.00 - 0.10 Thousands/µL Final   COMPREHENSIVE METABOLIC PANEL - Abnormal    Sodium 137  135 - 147 mmol/L Final    Potassium 3.9  3.5 - 5.3 mmol/L Final    Chloride 103  96 - 108 mmol/L Final    CO2 28  21 - 32 mmol/L Final    ANION GAP 6  4 - 13 mmol/L Final    BUN 32 (*) 5 - 25 mg/dL Final    Creatinine 1.14  0.60 - 1.30 mg/dL Final    Comment: Standardized to IDMS reference method    Glucose 119  65 - 140 mg/dL Final    Comment: If the patient is fasting, the ADA then defines impaired fasting glucose as > 100 mg/dL and diabetes as > or equal to 123 mg/dL.    Calcium 9.2  8.4 - 10.2 mg/dL Final    AST 21  13 - 39 U/L Final    ALT 15  7 - 52 U/L Final    Comment: Specimen collection should occur prior to Sulfasalazine administration due to the potential for falsely depressed results.     Alkaline Phosphatase 92  34 - 104 U/L Final    Total Protein 7.3  6.4 - 8.4 g/dL Final    Albumin 4.4  3.5 - 5.0 g/dL Final    Total Bilirubin 0.86  0.20 - 1.00 mg/dL Final    Comment: Use of this assay is not recommended for patients undergoing treatment with eltrombopag due to the potential for falsely elevated results.  N-acetyl-p-benzoquinone imine (metabolite of Acetaminophen) will generate erroneously low results in samples for patients that have taken an overdose of Acetaminophen.    eGFR 79  ml/min/1.73sq m Final    Narrative:     National Kid[employer] Foundation guidelines for Chronic Kidney Disease (CKD):     Stage 1 with  "normal or high GFR (GFR > 90 mL/min/1.73 square meters)    Stage 2 Mild CKD (GFR = 60-89 mL/min/1.73 square meters)    Stage 3A Moderate CKD (GFR = 45-59 mL/min/1.73 square meters)    Stage 3B Moderate CKD (GFR = 30-44 mL/min/1.73 square meters)    Stage 4 Severe CKD (GFR = 15-29 mL/min/1.73 square meters)    Stage 5 End Stage CKD (GFR <15 mL/min/1.73 square meters)  Note: GFR calculation is accurate only with a steady state creatinine   RAPID DRUG SCREEN, URINE - Abnormal    Amph/Meth UR Positive (*) Negative Final    Barbiturate Ur Negative  Negative Final    Benzodiazepine Urine Negative  Negative Final    Cocaine Urine Positive (*) Negative Final    Methadone Urine Negative  Negative Final    Opiate Urine Positive (*) Negative Final    PCP Ur Negative  Negative Final    THC Urine Positive (*) Negative Final    Oxycodone Urine Negative  Negative Final    Fentanyl Urine Negative  Negative Final    HYDROCODONE URINE Negative  Negative Final    Narrative:     Presumptive report. If requested, specimen will be sent to reference lab for confirmation.  FOR MEDICAL PURPOSES ONLY.   IF CONFIRMATION NEEDED PLEASE CONTACT THE LAB WITHIN 5 DAYS.    Drug Screen Cutoff Levels:  AMPHETAMINE/METHAMPHETAMINES  1000 ng/mL  BARBITURATES     200 ng/mL  BENZODIAZEPINES     200 ng/mL  COCAINE      300 ng/mL  METHADONE      300 ng/mL  OPIATES      300 ng/mL  PHENCYCLIDINE     25 ng/mL  THC       50 ng/mL  OXYCODONE      100 ng/mL  FENTANYL      5 ng/mL  HYDROCODONE     300 ng/mL   HS TROPONIN I 0HR - Normal    hs TnI 0hr 3  \"Refer to ACS Flowchart\"- see link ng/L Final    Comment:                                              Initial (time 0) result  If >=50 ng/L, Myocardial injury suggested ;  Type of myocardial injury and treatment strategy  to be determined.  If 5-49 ng/L, a delta result at 2 hours and or 4 hours will be needed to further evaluate.  If <4 ng/L, and chest pain has been >3 hours since onset, patient may qualify for " discharge based on the HEART score in the ED.  If <5 ng/L and <3hours since onset of chest pain, a delta result at 2 hours will be needed to further evaluate.    HS Troponin 99th Percentile URL of a Health Population=12 ng/L with a 95% Confidence Interval of 8-18 ng/L.    Second Troponin (time 2 hours)  If calculated delta >= 20 ng/L,  Myocardial injury suggested ; Type of myocardial injury and treatment strategy to be determined.  If 5-49 ng/L and the calculated delta is 5-19 ng/L, consult medical service for evaluation.  Continue evaluation for ischemia on ecg and other possible etiology and repeat hs troponin at 4 hours.  If delta is <5 ng/L at 2 hours, consider discharge based on risk stratification via the HEART score (if in ED), or BERENICE risk score in IP/Observation.    HS Troponin 99th Percentile URL of a Health Population=12 ng/L with a 95% Confidence Interval of 8-18 ng/L.     Time reflects when diagnosis was documented in both MDM as applicable and the Disposition within this note       Time User Action Codes Description Comment    8/26/2024  5:44 PM Ruddy Lamar [W19.XXXA] Fall, initial encounter     8/26/2024  5:44 PM Ruddy Lamar [S09.90XA] Closed head injury, initial encounter     8/26/2024  5:44 PM Ruddy Lamar [M25.512] Left shoulder pain     8/26/2024  5:45 PM Ruddy Lamar [M25.552] Left hip pain           ED Disposition       ED Disposition   Discharge    Condition   Stable    Date/Time   Mon Aug 26, 2024  5:42 PM    Comment   Amado Chin discharge to home/self care.                   Follow-up Information    None       Discharge Medication List as of 8/26/2024  5:46 PM        CONTINUE these medications which have NOT CHANGED    Details   amLODIPine (NORVASC) 5 mg tablet Take 5 mg by mouth daily, Starting Thu 3/7/2024, Until Fri 3/7/2025, Historical Med      cyclobenzaprine (FLEXERIL) 10 mg tablet TAKE 1 TABLET BY MOUTH THREE TIMES A DAY AS NEEDED FOR MUSCLE SPASM  FOR 14 DAYS., Historical Med      diazepam (VALIUM) 5 mg tablet Take 1 tablet (5 mg total) by mouth every 12 (twelve) hours as needed for muscle spasms, Starting Wed 2/28/2024, Normal      DULoxetine (Cymbalta) 30 mg delayed release capsule Take 30 mg by mouth daily, Starting Thu 3/7/2024, Historical Med      gabapentin (Neurontin) 300 mg capsule Take 1 capsule (300 mg total) by mouth 3 (three) times a day, Starting Fri 2/9/2024, Normal      ibuprofen (MOTRIN) 400 mg tablet Take 1 tablet (400 mg total) by mouth every 6 (six) hours as needed for moderate pain, Starting Wed 2/28/2024, Normal      nicotine (NICODERM CQ) 21 mg/24 hr TD 24 hr patch Place 1 patch on the skin every 24 hours, Starting Thu 7/18/2024, Historical Med      QUEtiapine (SEROquel) 300 mg tablet TAKE 1 TABLET BY MOUTH DAILY AT BEDTIME X 14 DAYS, Historical Med           No discharge procedures on file.  Prior to Admission Medications   Prescriptions Last Dose Informant Patient Reported? Taking?   DULoxetine (Cymbalta) 30 mg delayed release capsule   Yes No   Sig: Take 30 mg by mouth daily   QUEtiapine (SEROquel) 300 mg tablet   Yes No   Sig: TAKE 1 TABLET BY MOUTH DAILY AT BEDTIME X 14 DAYS   amLODIPine (NORVASC) 5 mg tablet   Yes No   Sig: Take 5 mg by mouth daily   cyclobenzaprine (FLEXERIL) 10 mg tablet   Yes No   Sig: TAKE 1 TABLET BY MOUTH THREE TIMES A DAY AS NEEDED FOR MUSCLE SPASM FOR 14 DAYS.   diazepam (VALIUM) 5 mg tablet   No No   Sig: Take 1 tablet (5 mg total) by mouth every 12 (twelve) hours as needed for muscle spasms   gabapentin (Neurontin) 300 mg capsule   No No   Sig: Take 1 capsule (300 mg total) by mouth 3 (three) times a day   ibuprofen (MOTRIN) 400 mg tablet   No No   Sig: Take 1 tablet (400 mg total) by mouth every 6 (six) hours as needed for moderate pain   nicotine (NICODERM CQ) 21 mg/24 hr TD 24 hr patch   Yes No   Sig: Place 1 patch on the skin every 24 hours      Facility-Administered Medications: None       Portions  "of the record may have been created with voice recognition software. Occasional wrong word or \"sound a like\" substitutions may have occurred due to the inherent limitations of voice recognition software. Read the chart carefully and recognize, using context, where substitutions have occurred.    Electronically signed by:  Carlos Gomez    "

## 2024-08-25 NOTE — ED NOTES
2:33 PM    Reason for Call: OVERBOOK    Patient is having the following symptoms: pneumonia    The patient is requesting an appointment for n/a with Dr Lopez.    Was an appointment offered for this call? No  If yes : Appointment type              Date    Preferred method for responding to this message: Telephone Call  What is your phone number ? [phone]    If we cannot reach you directly, may we leave a detailed response at the number you provided? Yes    Can this message wait until your PCP/provider returns, if unavailable today? Not applicable    Kylee Jean     Pt refusing to leave - stating he is in too much pain to move.  made aware and spoke with pt. Pt placed in WC and moved to waiting room. Bag of food sent with pt.      Alysa Grajeda RN  08/25/24 [id number]

## 2024-08-25 NOTE — ED NOTES
Patient speaking with Michael from [facility].      Deepa Cruz RN  08/25/24 [id number]       Deepa Cruz RN  08/25/24 2577

## 2024-08-26 VITALS
HEART RATE: 80 BPM | OXYGEN SATURATION: 99 % | DIASTOLIC BLOOD PRESSURE: 70 MMHG | SYSTOLIC BLOOD PRESSURE: 140 MMHG | RESPIRATION RATE: 16 BRPM | TEMPERATURE: 98.2 F

## 2024-08-26 LAB
ATRIAL RATE: 71 BPM
P AXIS: 79 DEGREES
PR INTERVAL: 124 MS
QRS AXIS: 73 DEGREES
QRSD INTERVAL: 92 MS
QT INTERVAL: 404 MS
QTC INTERVAL: 439 MS
T WAVE AXIS: 59 DEGREES
VENTRICULAR RATE: 71 BPM

## 2024-08-26 PROCEDURE — 93010 ELECTROCARDIOGRAM REPORT: CPT | Performed by: INTERNAL MEDICINE

## 2024-08-26 NOTE — ED CARE HANDOFF
[facility] Warm Handoff Outcome Note    Patient name Amado Chin  Location [id number] MRN [id number]  Age: 39 y.o.          Plan Type:  Warm Handoff                                                                                    Plan Date: 8/26/2024  Service:  ED Warm Handoff      Substance Use History:  Polysubstance    Warm Handoff Update:  HOST looking for placement; will reach out to pt in community after d/c    Warm Handoff Outcome: Treatment Related Resources

## 2024-08-26 NOTE — DISCHARGE INSTRUCTIONS
Please follow-up with primary care provider.  Please keep your phone nearby so host can contact you for rehab placement.  Please return to the ED with new or worsening symptoms-see attached.

## 2024-08-27 ENCOUNTER — HOSPITAL ENCOUNTER (EMERGENCY)
Facility: HOSPITAL | Age: 39
Discharge: HOME | End: 2024-08-27
Attending: EMERGENCY MEDICINE
Payer: MEDICARE

## 2024-08-27 VITALS
SYSTOLIC BLOOD PRESSURE: 128 MMHG | TEMPERATURE: 98.4 F | OXYGEN SATURATION: 96 % | DIASTOLIC BLOOD PRESSURE: 83 MMHG | RESPIRATION RATE: 14 BRPM | HEART RATE: 88 BPM

## 2024-08-27 DIAGNOSIS — G89.29 CHRONIC PAIN: Primary | ICD-10-CM

## 2024-08-27 PROCEDURE — 99284 EMERGENCY DEPT VISIT MOD MDM: CPT

## 2024-08-27 PROCEDURE — 96372 THER/PROPH/DIAG INJ SC/IM: CPT

## 2024-08-27 PROCEDURE — 99284 EMERGENCY DEPT VISIT MOD MDM: CPT | Performed by: EMERGENCY MEDICINE

## 2024-08-27 RX ORDER — DIAZEPAM 5 MG
5 TABLET ORAL ONCE
Status: COMPLETED | OUTPATIENT
Start: 2024-08-27 | End: 2024-08-27

## 2024-08-27 RX ORDER — HALOPERIDOL 5 MG/ML
5 INJECTION INTRAMUSCULAR ONCE
Status: COMPLETED | OUTPATIENT
Start: 2024-08-27 | End: 2024-08-27

## 2024-08-27 RX ORDER — KETOROLAC TROMETHAMINE 30 MG/ML
15 INJECTION, SOLUTION INTRAMUSCULAR; INTRAVENOUS ONCE
Status: DISCONTINUED | OUTPATIENT
Start: 2024-08-27 | End: 2024-08-27 | Stop reason: HOSPADM

## 2024-08-27 RX ADMIN — DIAZEPAM 5 MG: 5 TABLET ORAL at 02:26

## 2024-08-27 RX ADMIN — HALOPERIDOL LACTATE 5 MG: 5 INJECTION, SOLUTION INTRAMUSCULAR at 01:46

## 2024-08-27 NOTE — ED ATTENDING ATTESTATION
8/27/2024  I, Maurisio Elizabeth MD, saw and evaluated the patient. I have discussed the patient with the resident/non-physician practitioner and agree with the resident's/non-physician practitioner's findings, Plan of Care, and MDM as documented in the resident's/non-physician practitioner's note, except where noted. All available labs and Radiology studies were reviewed.  I was present for key portions of any procedure(s) performed by the resident/non-physician practitioner and I was immediately available to provide assistance.       At this point I agree with the current assessment done in the Emergency Department.  I have conducted an independent evaluation of this patient a history and physical is as follows:      Final Diagnosis:  1. Chronic pain      Chief Complaint   Patient presents with    Pain     Pt reports nerve pain all over his body, trouble walking and doing basic tasks, reports recent falls and dizziness, +HS -LOC -thinners, c3-c5 displacement, pt also reports trouble starting and stopping when using the bathroom         39-year-old male who presents with diffuse pain.  Relates ongoing bilateral upper and lower extremity neuropathic pain.  Has been evaluated for the same in the past.      PMH:  Past Medical History:   Diagnosis Date    Asthma     Chronic pain     Hypertension     Neck pain     Psychoactive 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HCC)     Thyroglossal duct cyst        PSH:  Past Surgical History:   Procedure Laterality Date    THYROGLOSSAL DUCT EXCISION      THYROID SURGERY      THYROID SURGERY           PE:   Vitals:    08/27/24 0104 08/27/24 0237   BP: 128/83    Pulse: (!) 124 88   Resp: 18 14   Temp: 98.4 °F (36.9 °C)    TempSrc: Temporal    SpO2: 98% 96%         Constitutional: Vital signs are normal. He appears well-developed. He is cooperative. No distress.   Neck: Trachea normal. No thyroid mass and no thyromegaly present.   Cardiovascular: tachycardic, regular rhythm, normal heart sounds.    No murmur heard.  Pulmonary/Chest: Effort normal and breath sounds normal.   Abdominal: Soft. Normal appearance and bowel sounds are normal. There is no tenderness. There is no rebound, no guarding.   Neurological: He is alert.  Normal gait.  Strength 5/5 throughout.  Skin: Skin is warm, dry and intact.  MSK: Patient has tenderness all over her body even to light touch.  Psychiatric: He has a normal mood and affect. His speech is normal and behavior is normal. Thought content normal.        A:  -39-year-old male who presents with generalized pain.      P:  -Possible neuropathic pain.  He has received multiple workups for this.  Plan to treat him symptomatically with IM Toradol, p.o. Valium, IM Haldol.      - 13 point ROS was performed and all are normal unless stated in the history above.   - Nursing note reviewed. Vitals reviewed.   - Orders placed by myself and/or advanced practitioner / resident.    - Previous chart was reviewed  - No language barrier.   - History obtained from patient.   - There are no limitations to the history obtained. Reasons ROS could not be obtained:  N/A         Medications   ketorolac (TORADOL) injection 15 mg (15 mg Intramuscular Not Given 8/27/24 0303)   haloperidol lactate (HALDOL) injection 5 mg (5 mg Intramuscular Given 8/27/24 0146)   diazepam (VALIUM) tablet 5 mg (5 mg Oral Given 8/27/24 0226)     No orders to display     No orders of the defined types were placed in this encounter.    Labs Reviewed - No data to display  Time reflects when diagnosis was documented in both MDM as applicable and the Disposition within this note       Time User Action Codes Description Comment    8/27/2024  3:08 AM Akil Cabrera Add [G89.29] Chronic pain           ED Disposition       ED Disposition   Discharge    Condition   Stable    Date/Time   Tue Aug 27, 2024  3:08 AM    Comment   Amado Chin discharge to home/self care.                   Follow-up Information    None       Patient's  "Medications   Discharge Prescriptions    No medications on file     No discharge procedures on file.  Prior to Admission Medications   Prescriptions Last Dose Informant Patient Reported? Taking?   DULoxetine (Cymbalta) 30 mg delayed release capsule   Yes No   Sig: Take 30 mg by mouth daily   QUEtiapine (SEROquel) 300 mg tablet   Yes No   Sig: TAKE 1 TABLET BY MOUTH DAILY AT BEDTIME X 14 DAYS   amLODIPine (NORVASC) 5 mg tablet   Yes No   Sig: Take 5 mg by mouth daily   cyclobenzaprine (FLEXERIL) 10 mg tablet   Yes No   Sig: TAKE 1 TABLET BY MOUTH THREE TIMES A DAY AS NEEDED FOR MUSCLE SPASM FOR 14 DAYS.   diazepam (VALIUM) 5 mg tablet   No No   Sig: Take 1 tablet (5 mg total) by mouth every 12 (twelve) hours as needed for muscle spasms   gabapentin (Neurontin) 300 mg capsule   No No   Sig: Take 1 capsule (300 mg total) by mouth 3 (three) times a day   ibuprofen (MOTRIN) 400 mg tablet   No No   Sig: Take 1 tablet (400 mg total) by mouth every 6 (six) hours as needed for moderate pain   nicotine (NICODERM CQ) 21 mg/24 hr TD 24 hr patch   Yes No   Sig: Place 1 patch on the skin every 24 hours      Facility-Administered Medications: None       Portions of the record may have been created with voice recognition software. Occasional wrong word or \"sound a like\" substitutions may have occurred due to the inherent limitations of voice recognition software. Read the chart carefully and recognize, using context, where substitutions have occurred.         ED Course         Critical Care Time  Procedures      "

## 2024-08-27 NOTE — ED PROVIDER NOTES
History  Chief Complaint   Patient presents with    Pain     Pt reports nerve pain all over his body, trouble walking and doing basic tasks, reports recent falls and dizziness, +HS -LOC -thinners, c3-c5 displacement, pt also reports trouble starting and stopping when using the bathroom     Patient is a 39-year-old male past medical history of psychosis, hypertension, chronic pain presenting to the emergency department for bilateral extremity nerve pain, neck pain, difficulty with urination.  Patient states this pain is chronic in nature, however it has gotten worse following his fall 2 days ago.  He describes the pain as a burning and paresthesia like sensation in his bilateral upper and lower extremities.  He denies back and/or neck pain.  Per chart review, patient was recently seen and evaluated in the emergency department for this fall, had extensive workup and imaging including CT head and neck without evidence of acute osseous abnormality and no evidence of intracranial abnormality.  Patient denies taking any other medications, substances for this pain.  He denies vision changes, headaches, fevers, chills, chest pain, shortness of breath, nausea, vomiting, diarrhea, changes in bowels.  He does endorse ongoing issues for approximately 1 month of difficulty with urination, suggestive of increased frequency.  He denies dysuria, hematuria, discharge.  Patient endorses alcohol and marijuana usage at approximately 8 PM today, denies any other substance use at this time.          Prior to Admission Medications   Prescriptions Last Dose Informant Patient Reported? Taking?   DULoxetine (Cymbalta) 30 mg delayed release capsule   Yes No   Sig: Take 30 mg by mouth daily   QUEtiapine (SEROquel) 300 mg tablet   Yes No   Sig: TAKE 1 TABLET BY MOUTH DAILY AT BEDTIME X 14 DAYS   amLODIPine (NORVASC) 5 mg tablet   Yes No   Sig: Take 5 mg by mouth daily   cyclobenzaprine (FLEXERIL) 10 mg tablet   Yes No   Sig: TAKE 1 TABLET BY  MOUTH THREE TIMES A DAY AS NEEDED FOR MUSCLE SPASM FOR 14 DAYS.   diazepam (VALIUM) 5 mg tablet   No No   Sig: Take 1 tablet (5 mg total) by mouth every 12 (twelve) hours as needed for muscle spasms   gabapentin (Neurontin) 300 mg capsule   No No   Sig: Take 1 capsule (300 mg total) by mouth 3 (three) times a day   ibuprofen (MOTRIN) 400 mg tablet   No No   Sig: Take 1 tablet (400 mg total) by mouth every 6 (six) hours as needed for moderate pain   nicotine (NICODERM CQ) 21 mg/24 hr TD 24 hr patch   Yes No   Sig: Place 1 patch on the skin every 24 hours      Facility-Administered Medications: None       Past Medical History:   Diagnosis Date    Asthma     Chronic pain     Hypertension     Neck pain     Psychoactive 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HCC)     Thyroglossal duct cyst        Past Surgical History:   Procedure Laterality Date    THYROGLOSSAL DUCT EXCISION      THYROID SURGERY      THYROID SURGERY         Family History   Problem Relation Age of Onset    Hypertension Mother     No Known Problems Father      I have reviewed and agree with the history as documented.    E-Cigarette/Vaping    E-Cigarette Use Former User     Cartridges/Day 1      E-Cigarette/Vaping Substances    Nicotine Yes     THC No     CBD No     Flavoring Yes     Other No     Unknown No      Social History     Tobacco Use    Smoking status: Every Day     Current packs/day: 0.50     Types: Cigarettes    Smokeless tobacco: Never    Tobacco comments:     5 cigerettes a day    Vaping Use    Vaping status: Former    Substances: Nicotine, Flavoring   Substance Use Topics    Alcohol use: Yes     Comment: occas    Drug use: Not Currently     Types: Marijuana        Review of Systems    Physical Exam  ED Triage Vitals [08/27/24 0104]   Temperature Pulse Respirations Blood Pressure SpO2   98.4 °F (36.9 °C) (!) 124 18 128/83 98 %      Temp Source Heart Rate Source Patient Position - Orthostatic VS BP Location FiO2 (%)   Temporal Monitor -- -- --      Pain  Score       10 - Worst Possible Pain             Orthostatic Vital Signs  Vitals:    08/27/24 0104 08/27/24 0237   BP: 128/83    Pulse: (!) 124 88       Physical Exam  Vitals reviewed.   Constitutional:       General: He is not in acute distress.     Appearance: He is not ill-appearing or diaphoretic.   HENT:      Head: Normocephalic and atraumatic.      Nose: Nose normal. No rhinorrhea.      Mouth/Throat:      Mouth: Mucous membranes are moist.      Pharynx: Oropharynx is clear.   Eyes:      Extraocular Movements: Extraocular movements intact.      Conjunctiva/sclera: Conjunctivae normal.      Pupils: Pupils are equal, round, and reactive to light.   Cardiovascular:      Rate and Rhythm: Regular rhythm. Tachycardia present.      Pulses: Normal pulses.      Heart sounds: Normal heart sounds.   Pulmonary:      Effort: Pulmonary effort is normal. No respiratory distress.      Breath sounds: No wheezing.   Abdominal:      General: Abdomen is flat. Bowel sounds are normal. There is no distension.      Palpations: Abdomen is soft.      Tenderness: There is no abdominal tenderness.   Musculoskeletal:         General: Tenderness (Patient endorses tenderness and burning sensation in all of his extremities upon palpation) present. No swelling. Normal range of motion.      Right shoulder: No tenderness.      Left shoulder: No tenderness.      Cervical back: Normal range of motion. No deformity, signs of trauma, rigidity or tenderness. Normal range of motion.      Thoracic back: No swelling, signs of trauma, tenderness or bony tenderness.      Lumbar back: No signs of trauma, spasms, tenderness or bony tenderness. Negative right straight leg raise test and negative left straight leg raise test.      Right lower leg: No edema.      Left lower leg: No edema.   Skin:     General: Skin is warm and dry.      Capillary Refill: Capillary refill takes less than 2 seconds.   Neurological:      General: No focal deficit present.       Mental Status: He is alert and oriented to person, place, and time.      Cranial Nerves: No cranial nerve deficit.      Sensory: No sensory deficit.      Motor: No weakness.         ED Medications  Medications   ketorolac (TORADOL) injection 15 mg (15 mg Intramuscular Not Given 8/27/24 0303)   haloperidol lactate (HALDOL) injection 5 mg (5 mg Intramuscular Given 8/27/24 0146)   diazepam (VALIUM) tablet 5 mg (5 mg Oral Given 8/27/24 0226)       Diagnostic Studies  Results Reviewed       None                   No orders to display         Procedures  Procedures      ED Course  ED Course as of 08/27/24 0424   Tue Aug 27, 2024   0309 Patient visibly sleeping and appears to have improvement in pain following Haldol, Valium.  Patient encouraged to follow-up with PCP regarding this chronic pain.                                       Medical Decision Making  ASSESSMENT: Patient is a 39 y.o. male who presents with chronic pain in his bilateral upper and lower extremities.  Patient's neuroexam was completely benign, suggestive of a nonacute process. Patient has been seen and evaluated for similar pain in the past, appears to be chronic in nature.  Patient was treated with IM Haldol, p.o. Valium, offered IM Toradol.  Following the medication, patient appeared to be sleeping comfortably suggestive that his pain had subsided.  Patient was counseled on following up with his PCP for management of his chronic pain.  Patient appears to understand and agree to the plan.  Patient is stable for discharge.    Risk  Prescription drug management.          Disposition  Final diagnoses:   Chronic pain     Time reflects when diagnosis was documented in both MDM as applicable and the Disposition within this note       Time User Action Codes Description Comment    8/27/2024  3:08 AM Akil Cabrera [G89.29] Chronic pain           ED Disposition       ED Disposition   Discharge    Condition   Stable    Date/Time   Tue Aug 27, 2024  3:08 AM     Comment   Amado Chin discharge to home/self care.                   Follow-up Information    None         Discharge Medication List as of 8/27/2024  3:09 AM        CONTINUE these medications which have NOT CHANGED    Details   amLODIPine (NORVASC) 5 mg tablet Take 5 mg by mouth daily, Starting Thu 3/7/2024, Until Fri 3/7/2025, Historical Med      cyclobenzaprine (FLEXERIL) 10 mg tablet TAKE 1 TABLET BY MOUTH THREE TIMES A DAY AS NEEDED FOR MUSCLE SPASM FOR 14 DAYS., Historical Med      diazepam (VALIUM) 5 mg tablet Take 1 tablet (5 mg total) by mouth every 12 (twelve) hours as needed for muscle spasms, Starting Wed 2/28/2024, Normal      DULoxetine (Cymbalta) 30 mg delayed release capsule Take 30 mg by mouth daily, Starting Thu 3/7/2024, Historical Med      gabapentin (Neurontin) 300 mg capsule Take 1 capsule (300 mg total) by mouth 3 (three) times a day, Starting Fri 2/9/2024, Normal      ibuprofen (MOTRIN) 400 mg tablet Take 1 tablet (400 mg total) by mouth every 6 (six) hours as needed for moderate pain, Starting Wed 2/28/2024, Normal      nicotine (NICODERM CQ) 21 mg/24 hr TD 24 hr patch Place 1 patch on the skin every 24 hours, Starting Thu 7/18/2024, Historical Med      QUEtiapine (SEROquel) 300 mg tablet TAKE 1 TABLET BY MOUTH DAILY AT BEDTIME X 14 DAYS, Historical Med           No discharge procedures on file.    PDMP Review         Value Time User    PDMP Reviewed  Yes 2/9/2024  8:03 AM Doron Miller DO             ED Provider  Attending physically available and evaluated Amado Chin. I managed the patient along with the ED Attending.    Electronically Signed by           Akil Cabrera DO  08/27/24 0424

## 2024-08-28 PROBLEM — A41.9 SEPSIS (HCC): Status: RESOLVED | Noted: 2024-07-29 | Resolved: 2024-08-28

## 2024-09-01 ENCOUNTER — HOSPITAL ENCOUNTER (EMERGENCY)
Facility: HOSPITAL | Age: 39
Discharge: HOME | End: 2024-09-02
Attending: EMERGENCY MEDICINE
Payer: MEDICARE

## 2024-09-01 VITALS
HEART RATE: 110 BPM | OXYGEN SATURATION: 100 % | DIASTOLIC BLOOD PRESSURE: 109 MMHG | TEMPERATURE: 98 F | SYSTOLIC BLOOD PRESSURE: 158 MMHG | RESPIRATION RATE: 15 BRPM

## 2024-09-01 DIAGNOSIS — M54.2 CHRONIC NECK PAIN: Primary | ICD-10-CM

## 2024-09-01 DIAGNOSIS — G89.29 CHRONIC NECK PAIN: Primary | ICD-10-CM

## 2024-09-01 PROCEDURE — 99283 EMERGENCY DEPT VISIT LOW MDM: CPT

## 2024-09-02 PROCEDURE — 96372 THER/PROPH/DIAG INJ SC/IM: CPT

## 2024-09-02 PROCEDURE — 99284 EMERGENCY DEPT VISIT MOD MDM: CPT | Performed by: EMERGENCY MEDICINE

## 2024-09-02 RX ORDER — TOPIRAMATE 25 MG/1
25 TABLET, FILM COATED ORAL 2 TIMES DAILY
Qty: 14 TABLET | Refills: 0 | Status: SHIPPED | OUTPATIENT
Start: 2024-09-02 | End: 2024-09-09

## 2024-09-02 RX ORDER — DIAZEPAM 5 MG
5 TABLET ORAL ONCE
Status: COMPLETED | OUTPATIENT
Start: 2024-09-02 | End: 2024-09-02

## 2024-09-02 RX ORDER — KETOROLAC TROMETHAMINE 30 MG/ML
15 INJECTION, SOLUTION INTRAMUSCULAR; INTRAVENOUS ONCE
Status: COMPLETED | OUTPATIENT
Start: 2024-09-02 | End: 2024-09-02

## 2024-09-02 RX ORDER — GABAPENTIN 300 MG/1
300 CAPSULE ORAL ONCE
Status: COMPLETED | OUTPATIENT
Start: 2024-09-02 | End: 2024-09-02

## 2024-09-02 RX ADMIN — KETOROLAC TROMETHAMINE 15 MG: 30 INJECTION, SOLUTION INTRAMUSCULAR at 00:45

## 2024-09-02 RX ADMIN — GABAPENTIN 300 MG: 300 CAPSULE ORAL at 00:45

## 2024-09-02 RX ADMIN — DIAZEPAM 5 MG: 5 TABLET ORAL at 00:45

## 2024-09-02 NOTE — ED PROVIDER NOTES
"History  Chief Complaint   Patient presents with    Medical Problem    Arm Pain     Pt has herniated discs in neck started having \"nerve pain\" down both arms and legs tonight. 10/10 pain.     39-year-old male who presents with diffuse body pain.  He has a history of cervical disc disease and states that he has pain from his arms into his legs.  There is an ongoing issue for the patient.  States that he usually comes to the hospital when the pain is too bad.  Has not taken anything as an outpatient.  However, states that he usually takes gabapentin.        Prior to Admission Medications   Prescriptions Last Dose Informant Patient Reported? Taking?   DULoxetine (Cymbalta) 30 mg delayed release capsule   Yes No   Sig: Take 30 mg by mouth daily   QUEtiapine (SEROquel) 300 mg tablet   Yes No   Sig: TAKE 1 TABLET BY MOUTH DAILY AT BEDTIME X 14 DAYS   amLODIPine (NORVASC) 5 mg tablet   Yes No   Sig: Take 5 mg by mouth daily   cyclobenzaprine (FLEXERIL) 10 mg tablet   Yes No   Sig: TAKE 1 TABLET BY MOUTH THREE TIMES A DAY AS NEEDED FOR MUSCLE SPASM FOR 14 DAYS.   diazepam (VALIUM) 5 mg tablet   No No   Sig: Take 1 tablet (5 mg total) by mouth every 12 (twelve) hours as needed for muscle spasms   gabapentin (Neurontin) 300 mg capsule   No No   Sig: Take 1 capsule (300 mg total) by mouth 3 (three) times a day   ibuprofen (MOTRIN) 400 mg tablet   No No   Sig: Take 1 tablet (400 mg total) by mouth every 6 (six) hours as needed for moderate pain   nicotine (NICODERM CQ) 21 mg/24 hr TD 24 hr patch   Yes No   Sig: Place 1 patch on the skin every 24 hours      Facility-Administered Medications: None       Past Medical History:   Diagnosis Date    Asthma     Chronic pain     Hypertension     Neck pain     Psychoactive 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HCC)     Thyroglossal duct cyst        Past Surgical History:   Procedure Laterality Date    THYROGLOSSAL DUCT EXCISION      THYROID SURGERY      THYROID SURGERY         Family History "   Problem Relation Age of Onset    Hypertension Mother     No Known Problems Father      I have reviewed and agree with the history as documented.    E-Cigarette/Vaping    E-Cigarette Use Former User     Cartridges/Day 1      E-Cigarette/Vaping Substances    Nicotine Yes     THC No     CBD No     Flavoring Yes     Other No     Unknown No      Social History     Tobacco Use    Smoking status: Every Day     Current packs/day: 0.50     Types: Cigarettes    Smokeless tobacco: Never    Tobacco comments:     5 cigerettes a day    Vaping Use    Vaping status: Former    Substances: Nicotine, Flavoring   Substance Use Topics    Alcohol use: Yes     Comment: occas    Drug use: Not Currently     Types: Marijuana       Review of Systems   Constitutional:  Negative for chills and fever.   HENT:  Negative for congestion, rhinorrhea, sore throat and trouble swallowing.    Respiratory:  Negative for cough, chest tightness, shortness of breath and wheezing.    Cardiovascular:  Negative for chest pain and palpitations.   Gastrointestinal:  Negative for abdominal pain, blood in stool, diarrhea, nausea and vomiting.   Musculoskeletal:  Positive for arthralgias and myalgias. Negative for back pain and neck pain.   All other systems reviewed and are negative.      Physical Exam  Physical Exam  Vitals and nursing note reviewed.   Constitutional:       General: He is not in acute distress.     Appearance: He is well-developed.   HENT:      Head: Normocephalic and atraumatic.      Mouth/Throat:      Lips: Pink.      Mouth: Mucous membranes are moist.   Eyes:      General: Lids are normal.      Extraocular Movements: Extraocular movements intact.      Conjunctiva/sclera: Conjunctivae normal.      Pupils: Pupils are equal, round, and reactive to light.   Cardiovascular:      Rate and Rhythm: Regular rhythm. Tachycardia present.   Pulmonary:      Effort: Pulmonary effort is normal.   Abdominal:      General: There is no distension.       Palpations: Abdomen is soft.      Tenderness: There is no abdominal tenderness. There is no guarding or rebound.   Musculoskeletal:         General: No swelling.      Cervical back: Full passive range of motion without pain, normal range of motion and neck supple.   Skin:     General: Skin is warm.      Capillary Refill: Capillary refill takes less than 2 seconds.      Findings: No rash.   Neurological:      General: No focal deficit present.      Mental Status: He is alert.   Psychiatric:         Mood and Affect: Mood normal.         Speech: Speech normal.         Behavior: Behavior normal.         Vital Signs  ED Triage Vitals [09/01/24 2358]   Temperature Pulse Respirations Blood Pressure SpO2   98 °F (36.7 °C) (!) 110 15 (!) 158/109 100 %      Temp Source Heart Rate Source Patient Position - Orthostatic VS BP Location FiO2 (%)   Oral Monitor -- -- --      Pain Score       --           Vitals:    09/01/24 2358   BP: (!) 158/109   Pulse: (!) 110         Visual Acuity      ED Medications  Medications   ketorolac (TORADOL) injection 15 mg (15 mg Intramuscular Given by Other 9/2/24 0045)   gabapentin (NEURONTIN) capsule 300 mg (300 mg Oral Given by Other 9/2/24 0045)   diazepam (VALIUM) tablet 5 mg (5 mg Oral Given by Other 9/2/24 0045)       Diagnostic Studies  Results Reviewed       None                   No orders to display              Procedures  Procedures         ED Course  ED Course as of 09/02/24 0158   Mon Sep 02, 2024   0156 Patient asking for something different as an outpatient for his pain.  Explained that I am going to avoid opiates and benzos.  Will trial topamax.                                               Medical Decision Making  Patient presents with acute on chronic neck pain.  No acute trauma.  Plan to treat him symptomatically with IM Toradol, p.o. gabapentin, p.o. Valium.  Will attempt to ambulate.    Problems Addressed:  Chronic neck pain: chronic illness or injury with exacerbation,  progression, or side effects of treatment    Amount and/or Complexity of Data Reviewed  External Data Reviewed: notes.     Details: Reviewed previous ER visits.    Risk  Prescription drug management.                 Disposition  Final diagnoses:   Chronic neck pain     Time reflects when diagnosis was documented in both MDM as applicable and the Disposition within this note       Time User Action Codes Description Comment    9/2/2024  1:53 AM Maurisio Elizabeth Add [M54.2,  G89.29] Chronic neck pain           ED Disposition       ED Disposition   Discharge    Condition   Stable    Date/Time   Mon Sep 2, 2024  1:53 AM    Comment   Amado Chin discharge to home/self care.                   Follow-up Information       Follow up With Specialties Details Why Contact Info Additional Information    Iva Fisher MD Family Medicine Schedule an appointment as soon as possible for a visit   [address] [place] [id number] [phone]       [facility] Emergency Department Emergency Medicine Go to  If symptoms worsen [address] [place] [id number]  [phone] [facility] Emergency Department, [address] [place] [address], [phone]   [phone]            Patient's Medications   Discharge Prescriptions    TOPIRAMATE (TOPAMAX) 25 MG TABLET    Take 1 tablet (25 mg total) by mouth 2 (two) times a day for 7 days       Start Date: 9/2/2024  End Date: 9/9/2024       Order Dose: 25 mg       Quantity: 14 tablet    Refills: 0       No discharge procedures on file.    PDMP Review         Value Time User    PDMP Reviewed  Yes 2/9/2024  8:03 AM Doron Miller DO            ED Provider  Electronically Signed by             Maurisio Elizabeth MD  09/02/24 0158

## 2024-09-03 ENCOUNTER — HOSPITAL ENCOUNTER (OUTPATIENT)
Facility: HOSPITAL | Age: 39
Setting detail: OBSERVATION
Discharge: LEFT BEFORE DISCHARGE | End: 2024-09-04
Attending: EMERGENCY MEDICINE | Admitting: STUDENT IN AN ORGANIZED HEALTH CARE EDUCATION/TRAINING PROGRAM
Payer: MEDICARE

## 2024-09-03 DIAGNOSIS — M54.12 CERVICAL RADICULOPATHY: ICD-10-CM

## 2024-09-03 DIAGNOSIS — F19.10 POLYSUBSTANCE ABUSE (HCC): ICD-10-CM

## 2024-09-03 DIAGNOSIS — M54.2 NECK PAIN: Primary | ICD-10-CM

## 2024-09-03 LAB
ANION GAP SERPL CALCULATED.3IONS-SCNC: 8 MMOL/L (ref 4–13)
BUN SERPL-MCNC: 21 MG/DL (ref 5–25)
CALCIUM SERPL-MCNC: 9.4 MG/DL (ref 8.4–10.2)
CHLORIDE SERPL-SCNC: 102 MMOL/L (ref 96–108)
CO2 SERPL-SCNC: 29 MMOL/L (ref 21–32)
CREAT SERPL-MCNC: 1.08 MG/DL (ref 0.6–1.3)
ERYTHROCYTE [DISTWIDTH] IN BLOOD BY AUTOMATED COUNT: 13.4 % (ref 11.6–15.1)
GFR SERPL CREATININE-BSD FRML MDRD: 84 ML/MIN/1.73SQ M
GLUCOSE SERPL-MCNC: 109 MG/DL (ref 65–140)
HCT VFR BLD AUTO: 43.2 % (ref 36.5–49.3)
HGB BLD-MCNC: 14.6 G/DL (ref 12–17)
MCH RBC QN AUTO: 30.6 PG (ref 26.8–34.3)
MCHC RBC AUTO-ENTMCNC: 33.8 G/DL (ref 31.4–37.4)
MCV RBC AUTO: 91 FL (ref 82–98)
PLATELET # BLD AUTO: 326 THOUSANDS/UL (ref 149–390)
PMV BLD AUTO: 9.1 FL (ref 8.9–12.7)
POTASSIUM SERPL-SCNC: 3.7 MMOL/L (ref 3.5–5.3)
RBC # BLD AUTO: 4.77 MILLION/UL (ref 3.88–5.62)
SODIUM SERPL-SCNC: 139 MMOL/L (ref 135–147)
WBC # BLD AUTO: 9.73 THOUSAND/UL (ref 4.31–10.16)

## 2024-09-03 PROCEDURE — 80048 BASIC METABOLIC PNL TOTAL CA: CPT | Performed by: STUDENT IN AN ORGANIZED HEALTH CARE EDUCATION/TRAINING PROGRAM

## 2024-09-03 PROCEDURE — 99223 1ST HOSP IP/OBS HIGH 75: CPT | Performed by: STUDENT IN AN ORGANIZED HEALTH CARE EDUCATION/TRAINING PROGRAM

## 2024-09-03 PROCEDURE — 96372 THER/PROPH/DIAG INJ SC/IM: CPT

## 2024-09-03 PROCEDURE — 36415 COLL VENOUS BLD VENIPUNCTURE: CPT | Performed by: STUDENT IN AN ORGANIZED HEALTH CARE EDUCATION/TRAINING PROGRAM

## 2024-09-03 PROCEDURE — 99282 EMERGENCY DEPT VISIT SF MDM: CPT

## 2024-09-03 PROCEDURE — 85027 COMPLETE CBC AUTOMATED: CPT | Performed by: STUDENT IN AN ORGANIZED HEALTH CARE EDUCATION/TRAINING PROGRAM

## 2024-09-03 PROCEDURE — 99285 EMERGENCY DEPT VISIT HI MDM: CPT | Performed by: EMERGENCY MEDICINE

## 2024-09-03 RX ORDER — LIDOCAINE 50 MG/G
1 PATCH TOPICAL ONCE
Status: COMPLETED | OUTPATIENT
Start: 2024-09-03 | End: 2024-09-03

## 2024-09-03 RX ORDER — ACETAMINOPHEN 325 MG/1
975 TABLET ORAL ONCE
Status: COMPLETED | OUTPATIENT
Start: 2024-09-03 | End: 2024-09-03

## 2024-09-03 RX ORDER — IBUPROFEN 400 MG/1
400 TABLET, FILM COATED ORAL EVERY 6 HOURS PRN
Status: DISCONTINUED | OUTPATIENT
Start: 2024-09-03 | End: 2024-09-04

## 2024-09-03 RX ORDER — GABAPENTIN 300 MG/1
300 CAPSULE ORAL 3 TIMES DAILY
Status: DISCONTINUED | OUTPATIENT
Start: 2024-09-03 | End: 2024-09-04

## 2024-09-03 RX ORDER — CYCLOBENZAPRINE HCL 10 MG
10 TABLET ORAL 3 TIMES DAILY PRN
Status: DISCONTINUED | OUTPATIENT
Start: 2024-09-03 | End: 2024-09-04

## 2024-09-03 RX ORDER — CYCLOBENZAPRINE HCL 10 MG
10 TABLET ORAL 2 TIMES DAILY PRN
Qty: 20 TABLET | Refills: 0 | Status: SHIPPED | OUTPATIENT
Start: 2024-09-03

## 2024-09-03 RX ORDER — QUETIAPINE FUMARATE 100 MG/1
300 TABLET, FILM COATED ORAL
Status: DISCONTINUED | OUTPATIENT
Start: 2024-09-03 | End: 2024-09-04 | Stop reason: HOSPADM

## 2024-09-03 RX ORDER — DIAZEPAM 2 MG
2 TABLET ORAL EVERY 12 HOURS PRN
Status: DISCONTINUED | OUTPATIENT
Start: 2024-09-03 | End: 2024-09-04 | Stop reason: HOSPADM

## 2024-09-03 RX ORDER — KETOROLAC TROMETHAMINE 30 MG/ML
15 INJECTION, SOLUTION INTRAMUSCULAR; INTRAVENOUS ONCE
Status: COMPLETED | OUTPATIENT
Start: 2024-09-03 | End: 2024-09-03

## 2024-09-03 RX ORDER — ENOXAPARIN SODIUM 100 MG/ML
40 INJECTION SUBCUTANEOUS DAILY
Status: DISCONTINUED | OUTPATIENT
Start: 2024-09-03 | End: 2024-09-04 | Stop reason: HOSPADM

## 2024-09-03 RX ORDER — AMLODIPINE BESYLATE 5 MG/1
5 TABLET ORAL DAILY
Status: DISCONTINUED | OUTPATIENT
Start: 2024-09-03 | End: 2024-09-04 | Stop reason: HOSPADM

## 2024-09-03 RX ORDER — HALOPERIDOL 1 MG/1
1 TABLET ORAL ONCE
Status: COMPLETED | OUTPATIENT
Start: 2024-09-03 | End: 2024-09-03

## 2024-09-03 RX ORDER — TOPIRAMATE 25 MG/1
25 TABLET, FILM COATED ORAL 2 TIMES DAILY
Status: DISCONTINUED | OUTPATIENT
Start: 2024-09-03 | End: 2024-09-04 | Stop reason: HOSPADM

## 2024-09-03 RX ORDER — NICOTINE 21 MG/24HR
1 PATCH, TRANSDERMAL 24 HOURS TRANSDERMAL EVERY 24 HOURS
Status: DISCONTINUED | OUTPATIENT
Start: 2024-09-03 | End: 2024-09-04 | Stop reason: HOSPADM

## 2024-09-03 RX ORDER — DULOXETIN HYDROCHLORIDE 30 MG/1
30 CAPSULE, DELAYED RELEASE ORAL DAILY
Status: DISCONTINUED | OUTPATIENT
Start: 2024-09-03 | End: 2024-09-04 | Stop reason: HOSPADM

## 2024-09-03 RX ORDER — DIAZEPAM 5 MG
5 TABLET ORAL EVERY 12 HOURS PRN
Status: DISCONTINUED | OUTPATIENT
Start: 2024-09-03 | End: 2024-09-03

## 2024-09-03 RX ORDER — CYCLOBENZAPRINE HCL 10 MG
10 TABLET ORAL ONCE
Status: COMPLETED | OUTPATIENT
Start: 2024-09-03 | End: 2024-09-03

## 2024-09-03 RX ADMIN — AMLODIPINE BESYLATE 5 MG: 5 TABLET ORAL at 10:09

## 2024-09-03 RX ADMIN — GABAPENTIN 300 MG: 300 CAPSULE ORAL at 12:48

## 2024-09-03 RX ADMIN — IBUPROFEN 400 MG: 400 TABLET, FILM COATED ORAL at 12:48

## 2024-09-03 RX ADMIN — CYCLOBENZAPRINE HYDROCHLORIDE 10 MG: 10 TABLET, FILM COATED ORAL at 07:55

## 2024-09-03 RX ADMIN — ACETAMINOPHEN 975 MG: 325 TABLET ORAL at 06:42

## 2024-09-03 RX ADMIN — LIDOCAINE 1 PATCH: 50 PATCH CUTANEOUS at 06:42

## 2024-09-03 RX ADMIN — MORPHINE SULFATE 2 MG: 2 INJECTION, SOLUTION INTRAMUSCULAR; INTRAVENOUS at 10:09

## 2024-09-03 RX ADMIN — GABAPENTIN 300 MG: 300 CAPSULE ORAL at 22:40

## 2024-09-03 RX ADMIN — KETOROLAC TROMETHAMINE 15 MG: 30 INJECTION, SOLUTION INTRAMUSCULAR; INTRAVENOUS at 06:41

## 2024-09-03 RX ADMIN — DULOXETINE HYDROCHLORIDE 30 MG: 30 CAPSULE, DELAYED RELEASE ORAL at 12:48

## 2024-09-03 RX ADMIN — HALOPERIDOL 1 MG: 1 TABLET ORAL at 07:55

## 2024-09-03 RX ADMIN — QUETIAPINE FUMARATE 300 MG: 100 TABLET ORAL at 22:40

## 2024-09-03 NOTE — ASSESSMENT & PLAN NOTE
Chronic medical issue that patient states he has been dealing with for around 3 years  Resume home duloxetine 30 mg daily, gabapentin 300 3 times daily, Seroquel nightly, and Topamax  Resume Flexeril and diazepam for muscle spasm as well as ibuprofen 400 every 6 hours  Lidoderm patch and warm compress for topical pain control

## 2024-09-03 NOTE — H&P
[facility]  H&P  Name: Amado Chin 39 y.o. male I MRN: [id number]  Unit/Bed#: CW2 210-01 I Date of Admission: 9/3/2024   Date of Service: 9/3/2024 I Hospital Day: 0      Assessment & Plan   Polysubstance abuse (HCC)  Assessment & Plan  History of polysubstance abuse with most recent UA showing evidence of methamphetamine, THC, cocaine, opiate use  Patient reports abstinence from illicit substances since last UDS, repeat ordered    Cervical radiculopathy  Assessment & Plan  Chronic medical issue that patient states he has been dealing with for around 3 years  Resume home duloxetine 30 mg daily, gabapentin 300 3 times daily, Seroquel nightly, and Topamax  Resume Flexeril and diazepam for muscle spasm as well as ibuprofen 400 every 6 hours  Lidoderm patch and warm compress for topical pain control    Cervical spinal stenosis  Assessment & Plan  Patient with known history of chronic cervicalgia following a fall.  Recent MRI and May of this year demonstrates C5-C6 disc protrusion contributing to mild spinal canal narrowing. He also had evidence of moderate/severe right foraminal narrowing at the level of C6-C7.  Evidence of additional multilevel disease  C5-C6 disc protrusion appears progressed from previous MRI imaging  Patient with frequent admissions for neck pain and radiculopathy type symptoms, previous neurosurgery consult seems to suggest that patient's symptoms are not exactly congruent with the degree/spinal level of injury  Patient presenting to the ED with neck pain that he states started when he woke up yesterday.  Denies recent trauma.  He explains that he has burning type sensation around his neck that radiates into his arms, hands, and legs.  He states that if he shakes my hand or squeezes my hand on neurologic exam, he would be in persistent pain all day, so he defers that part of the exam  States he has been adherent to his home medication regimen.  Will continue  this for now and provide supportive care, if symptoms unable to be controlled with conservative measures, will reach out to acute pain service and possibly neurosurgery    Mood disorder due to medical condition  Assessment & Plan  Resume Seroquel 300 mg nightly         VTE Pharmacologic Prophylaxis:   Moderate Risk (Score 3-4) - Pharmacological DVT Prophylaxis Ordered: enoxaparin (Lovenox).  Code Status: Level 1 - Full Code   Discussion with family: Patient declined call to .     Anticipated Length of Stay: Patient will be admitted on an observation basis with an anticipated length of stay of less than 2 midnights secondary to Neck and hand pain.    Total Time Spent on Date of Encounter in care of patient: 45 mins. This time was spent on one or more of the following: performing physical exam; counseling and coordination of care; obtaining or reviewing history; documenting in the medical record; reviewing/ordering tests, medications or procedures; communicating with other healthcare professionals and discussing with patient's family/caregivers.    Chief Complaint: Neck and hand pain    History of Present Illness:  Amado Chin is a 39 y.o. male with a PMH of neck pain after a fall, polysubstance abuse, hypertension among others who presents with neck pain.  Amado tells me that in the past few weeks he had a fall and had multiple visits to the [facility] ED for pain.  He was managed conservatively and discharged multiple times.  He states he woke up yesterday and noticed increasing pain and burning sensation of the neck.  This is associated with pain radiating down to his fingers and hands which she states is relatively chronic but worse today.  He also endorsed that there is some pain radiating down to his low back and legs.  He returned to the ED and did not have improvement in his pain with multiple pain medications.  Subsequent referred for admission.  He is currently resting comfortably but states  that his pain is not much improved since admission.  He has no other complaints at present.    Review of Systems:  Review of Systems   Constitutional:  Negative for chills and fever.   HENT:  Negative for ear pain and sore throat.    Eyes:  Negative for visual disturbance.   Respiratory:  Negative for cough and shortness of breath.    Cardiovascular:  Negative for chest pain and palpitations.   Gastrointestinal:  Negative for abdominal pain and vomiting.   Genitourinary:  Negative for dysuria and hematuria.   Musculoskeletal:  Negative for arthralgias and back pain.   Skin:  Negative for color change and rash.   Neurological:  Positive for weakness and numbness. Negative for syncope.   All other systems reviewed and are negative.      Past Medical and Surgical History:   Past Medical History:   Diagnosis Date    Asthma     Chronic pain     Hypertension     Neck pain     Psychoactive 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HCC)     Thyroglossal duct cyst        Past Surgical History:   Procedure Laterality Date    THYROGLOSSAL DUCT EXCISION      THYROID SURGERY      THYROID SURGERY         Meds/Allergies:  Prior to Admission medications    Medication Sig Start Date End Date Taking? Authorizing Provider   cyclobenzaprine (FLEXERIL) 10 mg tablet Take 1 tablet (10 mg total) by mouth 2 (two) times a day as needed for muscle spasms 9/3/24  Yes Devin Marlow MD   amLODIPine (NORVASC) 5 mg tablet Take 5 mg by mouth daily 3/7/24 3/7/25  Historical Provider, MD   cyclobenzaprine (FLEXERIL) 10 mg tablet TAKE 1 TABLET BY MOUTH THREE TIMES A DAY AS NEEDED FOR MUSCLE SPASM FOR 14 DAYS. 7/18/24   Historical Provider, MD   diazepam (VALIUM) 5 mg tablet Take 1 tablet (5 mg total) by mouth every 12 (twelve) hours as needed for muscle spasms 2/28/24   Shine Cruz DO   DULoxetine (Cymbalta) 30 mg delayed release capsule Take 30 mg by mouth daily 3/7/24   Historical Provider, MD   gabapentin (Neurontin) 300 mg capsule Take 1  capsule (300 mg total) by mouth 3 (three) times a day 2/9/24   Doron Miller, DO   ibuprofen (MOTRIN) 400 mg tablet Take 1 tablet (400 mg total) by mouth every 6 (six) hours as needed for moderate pain 2/28/24   Shine Cruz, DO   nicotine (NICODERM CQ) 21 mg/24 hr TD 24 hr patch Place 1 patch on the skin every 24 hours 7/18/24   Historical Provider, MD   QUEtiapine (SEROquel) 300 mg tablet TAKE 1 TABLET BY MOUTH DAILY AT BEDTIME X 14 DAYS 7/18/24   Historical Provider, MD   topiramate (Topamax) 25 mg tablet Take 1 tablet (25 mg total) by mouth 2 (two) times a day for 7 days 9/2/24 9/9/24  Maurisio Elizabeth MD     I have reviewed home medications with patient personally.    Allergies: No Known Allergies    Social History:  Marital Status: Single   Occupation: Not on file  Patient Pre-hospital Living Situation: Home  Patient Pre-hospital Level of Mobility: walks  Patient Pre-hospital Diet Restrictions: N/A  Substance Use History:   Social History     Substance and Sexual Activity   Alcohol Use Yes    Comment: occas     Social History     Tobacco Use   Smoking Status Every Day    Current packs/day: 0.50    Types: Cigarettes   Smokeless Tobacco Never   Tobacco Comments    5 cigerettes a day      Social History     Substance and Sexual Activity   Drug Use Not Currently    Types: Marijuana       Family History:  Family History   Problem Relation Age of Onset    Hypertension Mother     No Known Problems Father        Physical Exam:     Vitals:   Blood Pressure: 133/88 (09/03/24 1233)  Pulse: 100 (09/03/24 1054)  Temperature: 98.1 °F (36.7 °C) (09/03/24 1054)  Temp Source: Tympanic (09/03/24 0546)  Respirations: 16 (09/03/24 0831)  SpO2: 95 % (09/03/24 1054)    Physical Exam  Vitals and nursing note reviewed.   Constitutional:       General: He is not in acute distress.     Appearance: He is well-developed. He is not toxic-appearing or diaphoretic.   HENT:      Head: Normocephalic and atraumatic.       Mouth/Throat:      Mouth: Mucous membranes are moist.   Eyes:      General: No scleral icterus.     Conjunctiva/sclera: Conjunctivae normal.   Cardiovascular:      Rate and Rhythm: Normal rate and regular rhythm.      Pulses: Normal pulses.      Heart sounds: No murmur heard.     No friction rub.   Pulmonary:      Effort: Pulmonary effort is normal. No respiratory distress.      Breath sounds: Normal breath sounds. No wheezing or rhonchi.   Abdominal:      General: Abdomen is flat. Bowel sounds are normal. There is no distension.      Palpations: Abdomen is soft.      Tenderness: There is no abdominal tenderness. There is no guarding or rebound.   Musculoskeletal:         General: No swelling.      Right lower leg: No edema.      Left lower leg: No edema.   Skin:     General: Skin is warm and dry.      Capillary Refill: Capillary refill takes less than 2 seconds.      Coloration: Skin is not jaundiced.      Findings: No rash.   Neurological:      General: No focal deficit present.      Mental Status: He is alert and oriented to person, place, and time.      Sensory: No sensory deficit.      Motor: No weakness.      Comments: Declines ROM testing of neck and UE strength testing    Psychiatric:         Mood and Affect: Mood normal.          Additional Data:     Lab Results:  Results from last 7 days   Lab Units 09/03/24  1003   WBC Thousand/uL 9.73   HEMOGLOBIN g/dL 14.6   HEMATOCRIT % 43.2   PLATELETS Thousands/uL 326     Results from last 7 days   Lab Units 09/03/24  1003   SODIUM mmol/L 139   POTASSIUM mmol/L 3.7   CHLORIDE mmol/L 102   CO2 mmol/L 29   BUN mg/dL 21   CREATININE mg/dL 1.08   ANION GAP mmol/L 8   CALCIUM mg/dL 9.4   GLUCOSE RANDOM mg/dL 109             Lab Results   Component Value Date    HGBA1C 5.1 04/01/2022           Lines/Drains:  Invasive Devices       Peripheral Intravenous Line  Duration             Peripheral IV 09/03/24 Left Antecubital <1 day                        Imaging: No pertinent  imaging reviewed.  No orders to display       EKG and Other Studies Reviewed on Admission:   EKG: No EKG obtained.    ** Please Note: This note has been constructed using a voice recognition system. **

## 2024-09-03 NOTE — ASSESSMENT & PLAN NOTE
History of polysubstance abuse with most recent UA showing evidence of methamphetamine, THC, cocaine, opiate use  Patient reports abstinence from illicit substances since last UDS, repeat ordered

## 2024-09-03 NOTE — CASE MANAGEMENT
Case Management Discharge Planning Note    Patient name Amado Chin  Location CW2 218/CW2 218-02 MRN [id number]  : 1985 Date 9/3/2024       Current Admission Date: 9/3/2024  Current Admission Diagnosis:Polysubstance abuse (HCC)   Patient Active Problem List    Diagnosis Date Noted Date Diagnosed    Polysubstance abuse (HCC) 2024     Psychoactive 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HCC) 2024     Drug use 2024     Cellulitis of left lower extremity 2024     Homelessness 2024     Cervical spinal stenosis 2024     Cervical radiculopathy 2024     Chronic bilateral low back pain 2023     Chronic neck pain 2023     Recurrent major depressive disorder (HCC) 2022     Anxiety 2022     Substance induced mood disorder (HCC) 2022     Mood disorder due to medical condition 2022     Opioid abuse (HCC) 02/10/2022     Fecal smearing 2022     Urinary incontinence 2022     Drug-induced constipation 2022     Intractable pain 2021     Vitamin B12 deficiency 2021     Asthma 2021     Tobacco abuse 2021     Dizziness 2021     Neck pain 2021     Elevated blood pressure reading 2021     Weakness of both lower extremities 2021     Cervical disc herniation 2020     Paresthesia and pain of extremity 2020     Fall 2020     Concussion without loss of consciousness 2020     Acute pain of right shoulder 2020     Alcohol abuse 2020     Burn 2020     Furuncle of axilla 2020       LOS (days): 0  Geometric Mean LOS (GMLOS) (days):   Days to GMLOS:     OBJECTIVE:            Current admission status: Observation   Preferred Pharmacy:   [id number] - [place], [place] - [address] [place] [address]  [place] [address]  Phone: [phone] Fax: [phone]    [facility] - [place] - [facility]  [address]  [address] [facility] [address]  [place] [address]  Phone: [phone] Fax: [phone]    [facility] [place] - [address]  [address] [facility] [address]  [place] [address]  Phone: [phone] Fax: [phone]    Primary Care Provider: Iva Fisher MD    Primary Insurance: [employer] [facility]  Secondary Insurance:     DISCHARGE DETAILS:    Discharge planning discussed with:: Pt  Freedom of Choice: Yes  Comments - Freedom of Choice: Discussed FOC  CM contacted family/caregiver?: No- see comments (Pt alert and oriented)  Were Treatment Team discharge recommendations reviewed with patient/caregiver?: Yes                      Additional Comments: Met pt bedside to introduce self and role. Pt stated that he had applied for SSD but has not been approved yet. He would like to speak with neurosurgery about possible surgical intervention for his neck pain. CM relayed information to NAKUL Steel.

## 2024-09-03 NOTE — ED PROVIDER NOTES
History  Chief Complaint   Patient presents with    Neck Pain     Pt presents with neck pain starting yesterday. Was previously seen.     HPI    Patient is a 39-year-old male who presents with neck pain.  Patient reports that his pain began yesterday when he woke up. He states that it is worsening his chronic cervical spine nerve pain. He did have a fall recently but was evaluated in the ED and had an extensive workup done that was negative. He has not tried OTC medications. He denies new falls. He denies weakness, numbness, tingling.     Prior to Admission Medications   Prescriptions Last Dose Informant Patient Reported? Taking?   DULoxetine (Cymbalta) 30 mg delayed release capsule   Yes No   Sig: Take 30 mg by mouth daily   QUEtiapine (SEROquel) 300 mg tablet   Yes No   Sig: TAKE 1 TABLET BY MOUTH DAILY AT BEDTIME X 14 DAYS   amLODIPine (NORVASC) 5 mg tablet   Yes No   Sig: Take 5 mg by mouth daily   cyclobenzaprine (FLEXERIL) 10 mg tablet   Yes No   Sig: TAKE 1 TABLET BY MOUTH THREE TIMES A DAY AS NEEDED FOR MUSCLE SPASM FOR 14 DAYS.   diazepam (VALIUM) 5 mg tablet   No No   Sig: Take 1 tablet (5 mg total) by mouth every 12 (twelve) hours as needed for muscle spasms   gabapentin (Neurontin) 300 mg capsule   No No   Sig: Take 1 capsule (300 mg total) by mouth 3 (three) times a day   ibuprofen (MOTRIN) 400 mg tablet   No No   Sig: Take 1 tablet (400 mg total) by mouth every 6 (six) hours as needed for moderate pain   nicotine (NICODERM CQ) 21 mg/24 hr TD 24 hr patch   Yes No   Sig: Place 1 patch on the skin every 24 hours   topiramate (Topamax) 25 mg tablet   No No   Sig: Take 1 tablet (25 mg total) by mouth 2 (two) times a day for 7 days      Facility-Administered Medications: None       Past Medical History:   Diagnosis Date    Asthma     Chronic pain     Hypertension     Neck pain     Psychoactive 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HCC)     Thyroglossal duct cyst        Past Surgical History:   Procedure Laterality  Date    THYROGLOSSAL DUCT EXCISION      THYROID SURGERY      THYROID SURGERY         Family History   Problem Relation Age of Onset    Hypertension Mother     No Known Problems Father      I have reviewed and agree with the history as documented.    E-Cigarette/Vaping    E-Cigarette Use Former User     Cartridges/Day 1      E-Cigarette/Vaping Substances    Nicotine Yes     THC No     CBD No     Flavoring Yes     Other No     Unknown No      Social History     Tobacco Use    Smoking status: Every Day     Current packs/day: 0.50     Types: Cigarettes    Smokeless tobacco: Never    Tobacco comments:     5 cigerettes a day    Vaping Use    Vaping status: Former    Substances: Nicotine, Flavoring   Substance Use Topics    Alcohol use: Yes     Comment: occas    Drug use: Not Currently     Types: Marijuana        Review of Systems   Constitutional:  Negative for chills and fever.   HENT:  Negative for congestion and sore throat.    Respiratory:  Negative for cough and shortness of breath.    Cardiovascular:  Negative for chest pain and palpitations.   Gastrointestinal:  Negative for abdominal pain and vomiting.   Genitourinary:  Negative for dysuria and hematuria.   Musculoskeletal:  Positive for neck pain. Negative for back pain.   Neurological:  Negative for syncope and headaches.       Physical Exam  ED Triage Vitals   Temperature Pulse Respirations Blood Pressure SpO2   09/03/24 0546 09/03/24 0546 09/03/24 0831 09/03/24 0546 09/03/24 0546   98.4 °F (36.9 °C) (!) 118 16 138/93 100 %      Temp Source Heart Rate Source Patient Position - Orthostatic VS BP Location FiO2 (%)   09/03/24 0546 -- 09/03/24 0546 09/03/24 0546 --   Tympanic  Sitting Left arm       Pain Score       09/03/24 0641       10 - Worst Possible Pain             Orthostatic Vital Signs  Vitals:    09/03/24 1612 09/03/24 2216 09/04/24 0850 09/04/24 1536   BP: 131/69 149/99 128/72 142/99   Pulse:    97   Patient Position - Orthostatic VS:           Physical  Exam  Vitals and nursing note reviewed.   HENT:      Head: Normocephalic and atraumatic.      Nose: No congestion or rhinorrhea.      Mouth/Throat:      Mouth: Mucous membranes are moist.   Eyes:      General:         Right eye: No discharge.         Left eye: No discharge.      Extraocular Movements: Extraocular movements intact.   Neck:      Comments: TTP bilateral cervical paraspinal muscles. No erythema or ecchymosis  Cardiovascular:      Rate and Rhythm: Normal rate and regular rhythm.   Pulmonary:      Effort: Pulmonary effort is normal. No respiratory distress.      Breath sounds: Normal breath sounds. No wheezing or rhonchi.   Abdominal:      Palpations: Abdomen is soft.      Tenderness: There is no abdominal tenderness.   Musculoskeletal:      Cervical back: Normal range of motion.      Right lower leg: No edema.      Left lower leg: No edema.   Skin:     General: Skin is warm and dry.      Capillary Refill: Capillary refill takes less than 2 seconds.   Neurological:      Mental Status: He is alert.      Sensory: No sensory deficit.      Motor: No weakness.   Psychiatric:         Mood and Affect: Mood normal.         ED Medications  Medications   acetaminophen (TYLENOL) tablet 975 mg (975 mg Oral Given 9/3/24 0642)   ketorolac (TORADOL) injection 15 mg (15 mg Intramuscular Given 9/3/24 0641)   lidocaine (LIDODERM) 5 % patch 1 patch (1 patch Topical Medication Applied 9/3/24 0642)   cyclobenzaprine (FLEXERIL) tablet 10 mg (10 mg Oral Given 9/3/24 0755)   haloperidol (HALDOL) tablet 1 mg (1 mg Oral Given 9/3/24 0755)   morphine injection 2 mg (2 mg Intravenous Given 9/3/24 1009)   sodium chloride 0.9 % bolus 500 mL (500 mL Intravenous New Bag 9/4/24 4446)       Diagnostic Studies  Results Reviewed       Procedure Component Value Units Date/Time    Basic metabolic panel [[id number]] Collected: 09/03/24 1003    Lab Status: Final result Specimen: Blood from Arm, Left Updated: 09/03/24 1034     Sodium 139 mmol/L       Potassium 3.7 mmol/L      Chloride 102 mmol/L      CO2 29 mmol/L      ANION GAP 8 mmol/L      BUN 21 mg/dL      Creatinine 1.08 mg/dL      Glucose 109 mg/dL      Calcium 9.4 mg/dL      eGFR 84 ml/min/1.73sq m     Narrative:      National Kidney Disease Foundation guidelines for Chronic Kidney Disease (CKD):     Stage 1 with normal or high GFR (GFR > 90 mL/min/1.73 square meters)    Stage 2 Mild CKD (GFR = 60-89 mL/min/1.73 square meters)    Stage 3A Moderate CKD (GFR = 45-59 mL/min/1.73 square meters)    Stage 3B Moderate CKD (GFR = 30-44 mL/min/1.73 square meters)    Stage 4 Severe CKD (GFR = 15-29 mL/min/1.73 square meters)    Stage 5 End Stage CKD (GFR <15 mL/min/1.73 square meters)  Note: GFR calculation is accurate only with a steady state creatinine    CBC and Platelet [[id number]]  (Normal) Collected: 09/03/24 1003    Lab Status: Final result Specimen: Blood from Arm, Left Updated: 09/03/24 1029     WBC 9.73 Thousand/uL      RBC 4.77 Million/uL      Hemoglobin 14.6 g/dL      Hematocrit 43.2 %      MCV 91 fL      MCH 30.6 pg      MCHC 33.8 g/dL      RDW 13.4 %      Platelets 326 Thousands/uL      MPV 9.1 fL                    No orders to display         Procedures  Procedures      ED Course                             SBIRT 22yo+      Flowsheet Row Most Recent Value   Initial Alcohol Screen: US AUDIT-C     1. How often do you have a drink containing alcohol? 0 Filed at: 09/03/2024 0545   2. How many drinks containing alcohol do you have on a typical day you are drinking?  0 Filed at: 09/03/2024 0545   3a. Male UNDER 65: How often do you have five or more drinks on one occasion? 0 Filed at: 09/03/2024 0545   Audit-C Score 0 Filed at: 09/03/2024 0545   YANELI: How many times in the past year have you...    Used an illegal drug or used a prescription medication for non-medical reasons? Never Filed at: 09/03/2024 0545                  Medical Decision Making  Differential includes musculoskeletal strain versus  cervical radiculopathy.  Will treat symptomatically with Tylenol, Toradol, and Lidoderm patch. On re-evaluation, patient's pain is not improved. He would like to speak to another Doctor regarding his treatment. Patient's care was signed out to incoming resident.     Risk  OTC drugs.  Prescription drug management.  Decision regarding hospitalization.          Disposition  Final diagnoses:   Neck pain     Time reflects when diagnosis was documented in both MDM as applicable and the Disposition within this note       Time User Action Codes Description Comment    9/3/2024  7:01 AM Morena Jerome Add [M54.2] Neck pain     9/3/2024  3:43 PM Chance Steel Add [M54.12] Cervical radiculopathy     9/4/2024 10:28 AM Luis Manuel Cunha Add [F19.10] Polysubstance abuse (HCC)           ED Disposition       ED Disposition   Admit    Condition   Stable    Date/Time   Tue Sep 3, 2024 0818    Comment   Case was discussed with NAKUL and the patient's admission status was agreed to be Admission Status: observation status to the service of Dr. Campbell .               Follow-up Information       Follow up With Specialties Details Why Contact Info Additional Information    [facility] Physical Therapy Call today To make an appointment for further evaluation [phone]            Discharge Medication List as of 9/3/2024  7:01 AM        CONTINUE these medications which have NOT CHANGED    Details   amLODIPine (NORVASC) 5 mg tablet Take 5 mg by mouth daily, Starting Thu 3/7/2024, Until Fri 3/7/2025, Historical Med      DULoxetine (Cymbalta) 30 mg delayed release capsule Take 30 mg by mouth daily, Starting Thu 3/7/2024, Historical Med      gabapentin (Neurontin) 300 mg capsule Take 1 capsule (300 mg total) by mouth 3 (three) times a day, Starting Fri 2/9/2024, Normal      ibuprofen (MOTRIN) 400 mg tablet Take 1 tablet (400 mg total) by mouth every 6 (six) hours as needed for moderate pain, Starting Wed 2/28/2024,  Normal      nicotine (NICODERM CQ) 21 mg/24 hr TD 24 hr patch Place 1 patch on the skin every 24 hours, Starting Thu 7/18/2024, Historical Med      QUEtiapine (SEROquel) 300 mg tablet TAKE 1 TABLET BY MOUTH DAILY AT BEDTIME X 14 DAYS, Historical Med      topiramate (Topamax) 25 mg tablet Take 1 tablet (25 mg total) by mouth 2 (two) times a day for 7 days, Starting Mon 9/2/2024, Until Mon 9/9/2024, Normal      cyclobenzaprine (FLEXERIL) 10 mg tablet TAKE 1 TABLET BY MOUTH THREE TIMES A DAY AS NEEDED FOR MUSCLE SPASM FOR 14 DAYS., Historical Med      diazepam (VALIUM) 5 mg tablet Take 1 tablet (5 mg total) by mouth every 12 (twelve) hours as needed for muscle spasms, Starting Wed 2/28/2024, Normal           No discharge procedures on file.    PDMP Review         Value Time User    PDMP Reviewed  Yes 2/9/2024  8:03 AM Doron Miller DO             ED Provider  Attending physically available and evaluated Amado KELLIE Reganon. I managed the patient along with the ED Attending.    Electronically Signed by           Morena Jerome MD  09/06/24 2039

## 2024-09-03 NOTE — ASSESSMENT & PLAN NOTE
Patient with known history of chronic cervicalgia following a fall.  Recent MRI and May of this year demonstrates C5-C6 disc protrusion contributing to mild spinal canal narrowing. He also had evidence of moderate/severe right foraminal narrowing at the level of C6-C7.  Evidence of additional multilevel disease  C5-C6 disc protrusion appears progressed from previous MRI imaging  Patient with frequent admissions for neck pain and radiculopathy type symptoms, previous neurosurgery consult seems to suggest that patient's symptoms are not exactly congruent with the degree/spinal level of injury  Patient presenting to the ED with neck pain that he states started when he woke up yesterday.  Denies recent trauma.  He explains that he has burning type sensation around his neck that radiates into his arms, hands, and legs.  He states that if he shakes my hand or squeezes my hand on neurologic exam, he would be in persistent pain all day, so he defers that part of the exam  States he has been adherent to his home medication regimen.  Will continue this for now and provide supportive care, if symptoms unable to be controlled with conservative measures, will reach out to acute pain service and possibly neurosurgery

## 2024-09-03 NOTE — DISCHARGE INSTRUCTIONS
You were seen in the Emergency Department today for neck pain.    Please follow up with [facility].  Please return to the Emergency Department if you experience worsening of your current symptoms or any other concerning symptoms.

## 2024-09-03 NOTE — ED CARE HANDOFF
Emergency Department Sign Out Note        Sign out and transfer of care from Dr Morena Jerome. See Separate Emergency Department note.     The patient, Amado Chin, was evaluated by the previous provider for neck pain.    ED Course / Workup Pending (followup):  Signed out and transferred care from Dr. Morena Jerome.  Patient has a history of chronic cervical pain, causing diffuse pain throughout his body.  He has been seen in the emergency department many times for similar complaints.  Presented overnight for acute worsening of his chronic neck pain.  After symptomatic management, patient was offered discharge, but refused to leave, stating that his pain was too severe for him to go home.  He was stating that his pain would impair his activities of daily living, and his pain was so severe that he could not walk out of the emergency department.  Offered admission for pain control to the patient, which she accepted.                                  ED Course as of 09/03/24 1004   Tue Sep 03, 2024   0659 Chronic neck pain, will dc     Procedures  Medical Decision Making  Risk  OTC drugs.  Prescription drug management.  Decision regarding hospitalization.            Disposition  Final diagnoses:   Neck pain     Time reflects when diagnosis was documented in both MDM as applicable and the Disposition within this note       Time User Action Codes Description Comment    9/3/2024  7:01 AM Morena Jerome Add [M54.2] Neck pain           ED Disposition       ED Disposition   Admit    Condition   Stable    Date/Time   Tue Sep 3, 2024  8:43 AM    Comment   Case was discussed with NAKUL and the patient's admission status was agreed to be Admission Status: observation status to the service of Dr. Campbell .               Follow-up Information       Follow up With Specialties Details Why Contact Info Additional Information    [facility] Physical Therapy Call today To make an appointment for further evaluation  [phone]          Patient's Medications   Discharge Prescriptions    CYCLOBENZAPRINE (FLEXERIL) 10 MG TABLET    Take 1 tablet (10 mg total) by mouth 2 (two) times a day as needed for muscle spasms       Start Date: 9/3/2024  End Date: --       Order Dose: 10 mg       Quantity: 20 tablet    Refills: 0     No discharge procedures on file.       ED Provider  Electronically Signed by     Devin Marlow MD  09/03/24 8409

## 2024-09-04 VITALS
TEMPERATURE: 98.6 F | OXYGEN SATURATION: 97 % | SYSTOLIC BLOOD PRESSURE: 142 MMHG | DIASTOLIC BLOOD PRESSURE: 99 MMHG | HEART RATE: 97 BPM | RESPIRATION RATE: 16 BRPM

## 2024-09-04 PROCEDURE — 99232 SBSQ HOSP IP/OBS MODERATE 35: CPT | Performed by: STUDENT IN AN ORGANIZED HEALTH CARE EDUCATION/TRAINING PROGRAM

## 2024-09-04 PROCEDURE — 99214 OFFICE O/P EST MOD 30 MIN: CPT | Performed by: ANESTHESIOLOGY

## 2024-09-04 PROCEDURE — 99204 OFFICE O/P NEW MOD 45 MIN: CPT | Performed by: ANESTHESIOLOGY

## 2024-09-04 RX ORDER — OLANZAPINE 5 MG/1
5 TABLET ORAL
Status: DISCONTINUED | OUTPATIENT
Start: 2024-09-04 | End: 2024-09-04 | Stop reason: HOSPADM

## 2024-09-04 RX ORDER — GABAPENTIN 400 MG/1
400 CAPSULE ORAL 3 TIMES DAILY
Status: DISCONTINUED | OUTPATIENT
Start: 2024-09-04 | End: 2024-09-04 | Stop reason: HOSPADM

## 2024-09-04 RX ORDER — ACETAMINOPHEN 325 MG/1
975 TABLET ORAL EVERY 8 HOURS SCHEDULED
Status: DISCONTINUED | OUTPATIENT
Start: 2024-09-04 | End: 2024-09-04 | Stop reason: HOSPADM

## 2024-09-04 RX ORDER — KETOROLAC TROMETHAMINE 30 MG/ML
15 INJECTION, SOLUTION INTRAMUSCULAR; INTRAVENOUS EVERY 6 HOURS SCHEDULED
Status: DISCONTINUED | OUTPATIENT
Start: 2024-09-04 | End: 2024-09-04

## 2024-09-04 RX ORDER — KETOROLAC TROMETHAMINE 30 MG/ML
15 INJECTION, SOLUTION INTRAMUSCULAR; INTRAVENOUS EVERY 6 HOURS SCHEDULED
Status: DISCONTINUED | OUTPATIENT
Start: 2024-09-04 | End: 2024-09-04 | Stop reason: HOSPADM

## 2024-09-04 RX ORDER — LIDOCAINE 50 MG/G
2 PATCH TOPICAL DAILY
Status: DISCONTINUED | OUTPATIENT
Start: 2024-09-04 | End: 2024-09-04 | Stop reason: HOSPADM

## 2024-09-04 RX ORDER — CYCLOBENZAPRINE HCL 5 MG
5 TABLET ORAL 3 TIMES DAILY
Status: DISCONTINUED | OUTPATIENT
Start: 2024-09-04 | End: 2024-09-04 | Stop reason: HOSPADM

## 2024-09-04 RX ADMIN — CYCLOBENZAPRINE 5 MG: 5 TABLET, FILM COATED ORAL at 17:21

## 2024-09-04 RX ADMIN — GABAPENTIN 300 MG: 300 CAPSULE ORAL at 09:45

## 2024-09-04 RX ADMIN — TOPIRAMATE 25 MG: 25 TABLET, FILM COATED ORAL at 09:45

## 2024-09-04 RX ADMIN — AMLODIPINE BESYLATE 5 MG: 5 TABLET ORAL at 09:45

## 2024-09-04 RX ADMIN — DULOXETINE HYDROCHLORIDE 30 MG: 30 CAPSULE, DELAYED RELEASE ORAL at 09:45

## 2024-09-04 RX ADMIN — TOPIRAMATE 25 MG: 25 TABLET, FILM COATED ORAL at 17:21

## 2024-09-04 RX ADMIN — SODIUM CHLORIDE 500 ML: 0.9 INJECTION, SOLUTION INTRAVENOUS at 03:36

## 2024-09-04 RX ADMIN — KETOROLAC TROMETHAMINE 15 MG: 30 INJECTION, SOLUTION INTRAMUSCULAR; INTRAVENOUS at 14:16

## 2024-09-04 RX ADMIN — LIDOCAINE 2 PATCH: 50 PATCH CUTANEOUS at 14:16

## 2024-09-04 RX ADMIN — GABAPENTIN 400 MG: 400 CAPSULE ORAL at 17:24

## 2024-09-04 RX ADMIN — DIAZEPAM 2 MG: 2 TABLET ORAL at 09:50

## 2024-09-04 RX ADMIN — ACETAMINOPHEN 975 MG: 325 TABLET ORAL at 14:16

## 2024-09-04 RX ADMIN — IBUPROFEN 400 MG: 400 TABLET, FILM COATED ORAL at 09:47

## 2024-09-04 NOTE — ED ATTENDING ATTESTATION
9/3/2024  I, Maurisio Elizabeth MD, saw and evaluated the patient. I have discussed the patient with the resident/non-physician practitioner and agree with the resident's/non-physician practitioner's findings, Plan of Care, and MDM as documented in the resident's/non-physician practitioner's note, except where noted. All available labs and Radiology studies were reviewed.  I was present for key portions of any procedure(s) performed by the resident/non-physician practitioner and I was immediately available to provide assistance.       At this point I agree with the current assessment done in the Emergency Department.  I have conducted an independent evaluation of this patient a history and physical is as follows:    Final Diagnosis:  1. Neck pain    2. Cervical radiculopathy      Chief Complaint   Patient presents with    Neck Pain     Pt presents with neck pain starting yesterday. Was previously seen.         39-year-old male who presents for chronic neck pain.  Patient evaluated multiple times in the emergency department for the same.  Has not tried any over-the-counter medications.  Patient able to ambulate to emergency department.      PMH:  Past Medical History:   Diagnosis Date    Asthma     Chronic pain     Hypertension     Neck pain     Psychoactive 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HCC)     Thyroglossal duct cyst        PSH:  Past Surgical History:   Procedure Laterality Date    THYROGLOSSAL DUCT EXCISION      THYROID SURGERY      THYROID SURGERY           PE:   Vitals:    09/03/24 1009 09/03/24 1054 09/03/24 1233 09/03/24 1612   BP: (!) 143/107 (!) 147/107 133/88 131/69   BP Location:       Pulse:  100     Resp:    17   Temp:  98.1 °F (36.7 °C)  97.8 °F (36.6 °C)   TempSrc:       SpO2:  95%           Constitutional: Vital signs are normal. He appears well-developed. He is cooperative. No distress.   Pulmonary/Chest: Effort normal.   Abdominal: Normal appearance.   Neurological: He is alert.  Strength 5 out of 5  bilateral upper and lower extremities.  Sensation fully intact throughout.    Skin: Skin is warm, dry and intact.   Psychiatric: He has a normal mood and affect. His speech is normal and behavior is normal. Thought content normal.        A:  -39-year-old male who presents with chronic neck pain.      P:  -Again, will manage the pain conservatively with IM Toradol and Tylenol.  Plan for discharge.      - 13 point ROS was performed and all are normal unless stated in the history above.   - Nursing note reviewed. Vitals reviewed.   - Orders placed by myself and/or advanced practitioner / resident.    - Previous chart was reviewed  - No language barrier.   - History obtained from patient.   - There are no limitations to the history obtained. Reasons ROS could not be obtained:  N/A         Medications   enoxaparin (LOVENOX) subcutaneous injection 40 mg (40 mg Subcutaneous Not Given 9/3/24 1248)   amLODIPine (NORVASC) tablet 5 mg (5 mg Oral Given 9/3/24 1009)   cyclobenzaprine (FLEXERIL) tablet 10 mg (has no administration in time range)   DULoxetine (CYMBALTA) delayed release capsule 30 mg (30 mg Oral Given 9/3/24 1248)   gabapentin (NEURONTIN) capsule 300 mg (300 mg Oral Not Given 9/3/24 1904)   ibuprofen (MOTRIN) tablet 400 mg (400 mg Oral Given 9/3/24 1248)   nicotine (NICODERM CQ) 21 mg/24 hr TD 24 hr patch 1 patch (1 patch Transdermal Not Given 9/3/24 1009)   topiramate (TOPAMAX) tablet 25 mg (25 mg Oral Not Given 9/3/24 1905)   diazepam (VALIUM) tablet 2 mg (has no administration in time range)   QUEtiapine (SEROquel) tablet 300 mg (has no administration in time range)   acetaminophen (TYLENOL) tablet 975 mg (975 mg Oral Given 9/3/24 0642)   ketorolac (TORADOL) injection 15 mg (15 mg Intramuscular Given 9/3/24 0641)   lidocaine (LIDODERM) 5 % patch 1 patch (1 patch Topical Medication Applied 9/3/24 0642)   cyclobenzaprine (FLEXERIL) tablet 10 mg (10 mg Oral Given 9/3/24 0755)   haloperidol (HALDOL) tablet 1 mg (1  mg Oral Given 9/3/24 8455)   morphine injection 2 mg (2 mg Intravenous Given 9/3/24 100)     No orders to display     Orders Placed This Encounter   Procedures    Basic metabolic panel    Rapid drug screen, urine    CBC and Platelet    Diet Regular; Regular House    Nursing communication Continue IV as ordered.    Notify admitting physician    Notify admitting physician on arrival    Vital Signs per unit routine    Up and OOB as tolerated    I/O    Apply SCD or Foot pumps    Apply heat to affected area    Level 1-Full Code: all life saving measures are indicated    OT eval and treat    PT eval and treat    Place in Observation     Labs Reviewed   CBC AND PLATELET - Normal       Result Value Ref Range Status    WBC 9.73  4.31 - 10.16 Thousand/uL Final    RBC 4.77  3.88 - 5.62 Million/uL Final    Hemoglobin 14.6  12.0 - 17.0 g/dL Final    Hematocrit 43.2  36.5 - 49.3 % Final    MCV 91  82 - 98 fL Final    MCH 30.6  26.8 - 34.3 pg Final    MCHC 33.8  31.4 - 37.4 g/dL Final    RDW 13.4  11.6 - 15.1 % Final    Platelets 326  149 - 390 Thousands/uL Final    MPV 9.1  8.9 - 12.7 fL Final   BASIC METABOLIC PANEL    Sodium 139  135 - 147 mmol/L Final    Potassium 3.7  3.5 - 5.3 mmol/L Final    Chloride 102  96 - 108 mmol/L Final    CO2 29  21 - 32 mmol/L Final    ANION GAP 8  4 - 13 mmol/L Final    BUN 21  5 - 25 mg/dL Final    Creatinine 1.08  0.60 - 1.30 mg/dL Final    Comment: Standardized to IDMS reference method    Glucose 109  65 - 140 mg/dL Final    Comment: If the patient is fasting, the ADA then defines impaired fasting glucose as > 100 mg/dL and diabetes as > or equal to 123 mg/dL.    Calcium 9.4  8.4 - 10.2 mg/dL Final    eGFR 84  ml/min/1.73sq m Final    Narrative:     National Kidney [employer] Foundation guidelines for Chronic Kidney Disease (CKD):     Stage 1 with normal or high GFR (GFR > 90 mL/min/1.73 square meters)    Stage 2 Mild CKD (GFR = 60-89 mL/min/1.73 square meters)    Stage 3A Moderate CKD (GFR =  45-59 mL/min/1.73 square meters)    Stage 3B Moderate CKD (GFR = 30-44 mL/min/1.73 square meters)    Stage 4 Severe CKD (GFR = 15-29 mL/min/1.73 square meters)    Stage 5 End Stage CKD (GFR <15 mL/min/1.73 square meters)  Note: GFR calculation is accurate only with a steady state creatinine   RAPID DRUG SCREEN, URINE     Time reflects when diagnosis was documented in both MDM as applicable and the Disposition within this note       Time User Action Codes Description Comment    9/3/2024  7:01 AM Morena Jerome Add [M54.2] Neck pain     9/3/2024  3:43 PM Chance Steel Add [M54.12] Cervical radiculopathy           ED Disposition       ED Disposition   Admit    Condition   Stable    Date/Time   Tue Sep 3, 2024  8:43 AM    Comment   Case was discussed with NAKUL and the patient's admission status was agreed to be Admission Status: observation status to the service of Dr. Campbell .               Follow-up Information       Follow up With Specialties Details Why Contact Info Additional Information    [facility] Physical Therapy Call today To make an appointment for further evaluation [phone]          Current Discharge Medication List        START taking these medications    Details   !! cyclobenzaprine (FLEXERIL) 10 mg tablet Take 1 tablet (10 mg total) by mouth 2 (two) times a day as needed for muscle spasms  Qty: 20 tablet, Refills: 0    Associated Diagnoses: Neck pain       !! - Potential duplicate medications found. Please discuss with provider.        CONTINUE these medications which have NOT CHANGED    Details   amLODIPine (NORVASC) 5 mg tablet Take 5 mg by mouth daily      !! cyclobenzaprine (FLEXERIL) 10 mg tablet TAKE 1 TABLET BY MOUTH THREE TIMES A DAY AS NEEDED FOR MUSCLE SPASM FOR 14 DAYS.      diazepam (VALIUM) 5 mg tablet Take 1 tablet (5 mg total) by mouth every 12 (twelve) hours as needed for muscle spasms  Qty: 11 tablet, Refills: 0    Associated Diagnoses: Neck pain       DULoxetine (Cymbalta) 30 mg delayed release capsule Take 30 mg by mouth daily      gabapentin (Neurontin) 300 mg capsule Take 1 capsule (300 mg total) by mouth 3 (three) times a day  Qty: 90 capsule, Refills: 1    Associated Diagnoses: Cervical radiculopathy      ibuprofen (MOTRIN) 400 mg tablet Take 1 tablet (400 mg total) by mouth every 6 (six) hours as needed for moderate pain  Qty: 20 tablet, Refills: 0    Associated Diagnoses: Neck pain      nicotine (NICODERM CQ) 21 mg/24 hr TD 24 hr patch Place 1 patch on the skin every 24 hours      QUEtiapine (SEROquel) 300 mg tablet TAKE 1 TABLET BY MOUTH DAILY AT BEDTIME X 14 DAYS      topiramate (Topamax) 25 mg tablet Take 1 tablet (25 mg total) by mouth 2 (two) times a day for 7 days  Qty: 14 tablet, Refills: 0    Associated Diagnoses: Chronic neck pain       !! - Potential duplicate medications found. Please discuss with provider.        No discharge procedures on file.  Prior to Admission Medications   Prescriptions Last Dose Informant Patient Reported? Taking?   DULoxetine (Cymbalta) 30 mg delayed release capsule   Yes No   Sig: Take 30 mg by mouth daily   QUEtiapine (SEROquel) 300 mg tablet   Yes No   Sig: TAKE 1 TABLET BY MOUTH DAILY AT BEDTIME X 14 DAYS   amLODIPine (NORVASC) 5 mg tablet   Yes No   Sig: Take 5 mg by mouth daily   cyclobenzaprine (FLEXERIL) 10 mg tablet   Yes No   Sig: TAKE 1 TABLET BY MOUTH THREE TIMES A DAY AS NEEDED FOR MUSCLE SPASM FOR 14 DAYS.   diazepam (VALIUM) 5 mg tablet   No No   Sig: Take 1 tablet (5 mg total) by mouth every 12 (twelve) hours as needed for muscle spasms   gabapentin (Neurontin) 300 mg capsule   No No   Sig: Take 1 capsule (300 mg total) by mouth 3 (three) times a day   ibuprofen (MOTRIN) 400 mg tablet   No No   Sig: Take 1 tablet (400 mg total) by mouth every 6 (six) hours as needed for moderate pain   nicotine (NICODERM CQ) 21 mg/24 hr TD 24 hr patch   Yes No   Sig: Place 1 patch on the skin every 24 hours  "  topiramate (Topamax) 25 mg tablet   No No   Sig: Take 1 tablet (25 mg total) by mouth 2 (two) times a day for 7 days      Facility-Administered Medications: None       Portions of the record may have been created with voice recognition software. Occasional wrong word or \"sound a like\" substitutions may have occurred due to the inherent limitations of voice recognition software. Read the chart carefully and recognize, using context, where substitutions have occurred.       ED Course         Critical Care Time  Procedures      "

## 2024-09-04 NOTE — PROGRESS NOTES
[facility]  Progress Note  Name: Amado Chin I  MRN: [id number]  Unit/Bed#: CW2 218-02 I Date of Admission: 9/3/2024   Date of Service: 9/4/2024 I Hospital Day: 0    Assessment & Plan   Polysubstance abuse (HCC)  Assessment & Plan  History of polysubstance abuse with most recent UA showing evidence of methamphetamine, THC, cocaine, opiate use  Patient reports abstinence from illicit substances since last UDS, repeat ordered but patient has not yet supplied a sample    Cervical radiculopathy  Assessment & Plan  Chronic medical issue that patient states he has been dealing with for around 3 years  Resume home duloxetine 30 mg daily, gabapentin 300 3 times daily, Seroquel nightly, and Topamax  Resume Flexeril and diazepam for muscle spasm as well as ibuprofen 400 every 6 hours  Lidoderm patch and warm compress for topical pain control    Cervical spinal stenosis  Assessment & Plan  Patient with known history of chronic cervicalgia following a fall.  Recent MRI and May of this year demonstrates C5-C6 disc protrusion contributing to mild spinal canal narrowing. He also had evidence of moderate/severe right foraminal narrowing at the level of C6-C7.  Evidence of additional multilevel disease  C5-C6 disc protrusion appears progressed from previous MRI imaging  Patient with frequent admissions for neck pain and radiculopathy type symptoms, previous neurosurgery consult seems to suggest that patient's symptoms are not exactly congruent with the degree/spinal level of injury  Patient presenting to the ED with neck pain that he states started when he woke up yesterday.  Denies recent trauma.  He explains that he has burning type sensation around his neck that radiates into his arms, hands, and legs.  He states that if he shakes my hand or squeezes my hand on neurologic exam, he would be in persistent pain all day, so he defers that part of the exam  States he has been adherent to his  home medication regimen.  Will continue this for now and provide supportive care.  Discussed case with neurosurgery.  No indication for operative intervention at this time, will recommend patient follow-up outpatient with neurosurgery  Acute pain service contacted today, we appreciate their recommendations    Mood disorder due to medical condition  Assessment & Plan  Resume Duloxetine and Seroquel 300 mg nightly  Patient was noted to be agitated and making bizarre statements last night.  Patient called police last night and security evaluated patient.  He ultimately returned to his room and rested quietly.  Consider psychiatry evaluation           VTE Pharmacologic Prophylaxis:   Moderate Risk (Score 3-4) - Pharmacological DVT Prophylaxis Ordered: enoxaparin (Lovenox).    Mobility:   Basic Mobility Inpatient Raw Score: 24  JH-HLM Goal: 8: Walk 250 feet or more  JH-HLM Achieved: 8: Walk 250 feet ot more  JH-HLM Goal achieved. Continue to encourage appropriate mobility.    Patient Centered Rounds: I performed bedside rounds with nursing staff today.   Discussions with Specialists or Other Care Team Provider: APS    Education and Discussions with Family / Patient: Patient declined call to .     Total Time Spent on Date of Encounter in care of patient: 45 mins. This time was spent on one or more of the following: performing physical exam; counseling and coordination of care; obtaining or reviewing history; documenting in the medical record; reviewing/ordering tests, medications or procedures; communicating with other healthcare professionals and discussing with patient's family/caregivers.    Current Length of Stay: 0 day(s)  Current Patient Status: Observation   Certification Statement: The patient will continue to require additional inpatient hospital stay due to Neck pain  Discharge Plan: Anticipate discharge tomorrow to home.    Code Status: Level 1 - Full Code    Subjective:   Patient seen and  examined at bedside, Amado is somnolent today and appears comfortable.  He states that he did not get great sleep last night, per chart review it appears as though patient had called 911 stating there was an intruder in his room.  Security was called and ultimately patient felt comfortable to return to his room.  He has no bizarre statements or complaints today.  States neck pain persists    Objective:     Vitals:   Temp (24hrs), Av.1 °F (36.7 °C), Min:97.8 °F (36.6 °C), Max:98.2 °F (36.8 °C)    Temp:  [97.8 °F (36.6 °C)-98.2 °F (36.8 °C)] 98.2 °F (36.8 °C)  Resp:  [16-18] 16  BP: (128-149)/(69-99) 128/72  SpO2:  [95 %] 95 %  There is no height or weight on file to calculate BMI.     Input and Output Summary (last 24 hours):     Intake/Output Summary (Last 24 hours) at 2024 1057  Last data filed at 2024 0930  Gross per 24 hour   Intake 1210 ml   Output --   Net 1210 ml       Physical Exam:   Physical Exam  Vitals and nursing note reviewed.   Constitutional:       General: He is not in acute distress.     Appearance: He is well-developed. He is not toxic-appearing or diaphoretic.   HENT:      Head: Normocephalic and atraumatic.      Mouth/Throat:      Mouth: Mucous membranes are moist.   Eyes:      General: No scleral icterus.     Conjunctiva/sclera: Conjunctivae normal.   Cardiovascular:      Rate and Rhythm: Normal rate and regular rhythm.      Pulses: Normal pulses.      Heart sounds: No murmur heard.     No friction rub.   Pulmonary:      Effort: Pulmonary effort is normal. No respiratory distress.      Breath sounds: Normal breath sounds. No wheezing or rhonchi.   Abdominal:      General: Abdomen is flat. Bowel sounds are normal. There is no distension.      Palpations: Abdomen is soft.      Tenderness: There is no abdominal tenderness. There is no guarding or rebound.   Musculoskeletal:         General: No swelling.      Right lower leg: No edema.      Left lower leg: No edema.   Skin:     General:  Skin is warm and dry.      Capillary Refill: Capillary refill takes less than 2 seconds.      Coloration: Skin is not jaundiced.      Findings: No rash.   Neurological:      General: No focal deficit present.      Mental Status: He is alert and oriented to person, place, and time.      Sensory: No sensory deficit.      Motor: No weakness.      Comments: Declines ROM testing of neck and UE strength testing    Psychiatric:         Mood and Affect: Mood normal.          Additional Data:     Labs:  Results from last 7 days   Lab Units 09/03/24  1003   WBC Thousand/uL 9.73   HEMOGLOBIN g/dL 14.6   HEMATOCRIT % 43.2   PLATELETS Thousands/uL 326     Results from last 7 days   Lab Units 09/03/24  1003   SODIUM mmol/L 139   POTASSIUM mmol/L 3.7   CHLORIDE mmol/L 102   CO2 mmol/L 29   BUN mg/dL 21   CREATININE mg/dL 1.08   ANION GAP mmol/L 8   CALCIUM mg/dL 9.4   GLUCOSE RANDOM mg/dL 109                       Lines/Drains:  Invasive Devices       Peripheral Intravenous Line  Duration             Peripheral IV 09/03/24 Left Antecubital 1 day                          Imaging: No pertinent imaging reviewed.    Recent Cultures (last 7 days):         Last 24 Hours Medication List:   Current Facility-Administered Medications   Medication Dose Route Frequency Provider Last Rate    amLODIPine  5 mg Oral Daily Chance Steel      cyclobenzaprine  10 mg Oral TID PRN Chance Steel      diazepam  2 mg Oral Q12H PRN Chance Steel      DULoxetine  30 mg Oral Daily Chance Steel      enoxaparin  40 mg Subcutaneous Daily Chance Steel      gabapentin  300 mg Oral TID Chance Steel      ibuprofen  400 mg Oral Q6H PRN Chance Steel      nicotine  1 patch Transdermal Q24H Chance Steel      QUEtiapine  300 mg Oral HS Chance Steel      topiramate  25 mg Oral BID Chance Steel          Today, Patient Was Seen By: Chance Steel    **Please Note: This note may have been constructed using a voice  recognition system.**

## 2024-09-04 NOTE — DISCHARGE INSTR - OTHER ORDERS
Janis Keen  Certified     [facility]  [place] [address] [facility]  [phone]    [facility]  [address] [facility]  [place]  [address]  [phone]      [facility] Assistance Office  [address]  [place] [address]  [phone]    [facility] Assistance Office  [address] [place] [address] [phone] housing list assistance

## 2024-09-04 NOTE — PLAN OF CARE
Problem: PAIN - ADULT  Goal: Verbalizes/displays adequate comfort level or baseline comfort level  Description: Interventions:  - Encourage patient to monitor pain and request assistance  - Assess pain using appropriate pain scale  - Administer analgesics based on type and severity of pain and evaluate response  - Implement non-pharmacological measures as appropriate and evaluate response  - Consider cultural and social influences on pain and pain management  - Notify physician/advanced practitioner if interventions unsuccessful or patient reports new pain  Outcome: Progressing     Problem: DISCHARGE PLANNING  Goal: Discharge to home or other facility with appropriate resources  Description: INTERVENTIONS:  - Identify barriers to discharge w/patient and caregiver  - Arrange for needed discharge resources and transportation as appropriate  - Identify discharge learning needs (meds, wound care, etc.)  - Arrange for interpretive services to assist at discharge as needed  - Refer to Case Management Department for coordinating discharge planning if the patient needs post-hospital services based on physician/advanced practitioner order or complex needs related to functional status, cognitive ability, or social support system  Outcome: Progressing     Problem: Knowledge Deficit  Goal: Patient/family/caregiver demonstrates understanding of disease process, treatment plan, medications, and discharge instructions  Description: Complete learning assessment and assess knowledge base.  Interventions:  - Provide teaching at level of understanding  - Provide teaching via preferred learning methods  Outcome: Progressing     Problem: SAFETY ADULT  Goal: Patient will remain free of falls  Description: INTERVENTIONS:  - Educate patient/family on patient safety including physical limitations  - Instructed patient to call for assistance with activity   - Consult OT/PT to assist with strengthening/mobility   - Keep Call bell within  reach  - Keep bed low and locked with side rails adjusted as appropriate  - Keep care items and personal belongings within reach  - Initiate and maintain comfort rounds  - Make Fall Risk Sign visible to staff  - Apply yellow socks and bracelet for high fall risk patients  - Consider moving patient to room near nurses station  Outcome: Progressing

## 2024-09-04 NOTE — NURSING NOTE
Patient pulled out IV and came to nursing station asking for assistance to stop the bleeding. Rn put pressure and applied gauze. Pt had all belongings was not able to be persuaded to stay by multiple RNs and left AMA. Staff walked with pt to [facility] exit where patient thanked staff and left. SLIM provider Wilver Calhoun made aware and placed order for DC AMA.

## 2024-09-04 NOTE — ASSESSMENT & PLAN NOTE
"Chronic cervicalgia following a fall in 2020. Admitted for acute exacerbation following another fall a few weeks ago. Reports burning sensation and \"nerve flares\" notably radiating down the back, arms, hands, legs. Home regimen has not been effective and the only thing that improved his pain here in the hospital was the IV Morphine from the ED.   Previously evaluated by neurosurgery in 2022, recommended conservative therapy options. Also felt that the symptoms were not congruent with the degree/spinal level of injury   Frequent ED visits/admissions for intractable cervicalgia. Most recent admission was back in May 2024 at [facility]  MRI cervical spine (5/14/2024) showed disc protrusion at C5-C6 contributing to mild spinal canal narrowing as well as a disc bulge at C6-C7 resulting in severe right foraminal narrowing    Plan:  Given significant substance use history (methamphetamines, cocaine, THC, opioids), would strongly avoid opioid therapy and try to optimize other multi-modal analgesia  Start Tylenol 975mg Q8H  Start IV Toradol 15mg Q6H for 3 days  Increase gabapentin to 400mg TID  Schedule Flexeril 5mg TID  Lidocaine patches   If his pain remains uncontrolled, can consider increasing his Flexeril, Cymbalta or starting a prednisone taper.   "

## 2024-09-04 NOTE — OCCUPATIONAL THERAPY NOTE
Occupational Therapy Screen        Patient Name: Amado Chin  Today's Date: 9/4/2024 09/04/24 1153   OT Last Visit   OT Visit Date 09/04/24   Note Type   Note type Screen   Additional Comments Pt seen ambulating independently in hallway. Pt appears to be functioning at/close to baseline. Will d/c OT services at this time. Please re-consult if OT needs arise.     LUIS Valdez, OTR/L

## 2024-09-04 NOTE — PROGRESS NOTES
"Pt came out after falling asleep very anxious stating that he woke up to the person \"that is trying to kill him staring him down in his bedroom.\" Pt stated that he knows that it wasn't his roommate and that he saw the other person. Pt has had mult episodes of paranoia on previous admission.Pt called 911 while telling staff about this after he walked up to nurses station.   Security team was called up to floor by 911, supervisor was called and made aware of situation as well as provider notified and asked to come to the floor. After calming patient down he decided that he wanted to be moved off the floor to another room for safety. However upon taking the patient up to the new room he was not happy with the area or the staff and was more paranoid stating that he wanted to stay in the previous room. Pt was brought down to CW2 and sat in chair at nurses station until room was ready for patient.   Pt was taken into room and given snacks and drinks. He has been pacing the hallway but has settled down and is able to be redirected by staff members.   "

## 2024-09-04 NOTE — ASSESSMENT & PLAN NOTE
Patient with known history of chronic cervicalgia following a fall.  Recent MRI and May of this year demonstrates C5-C6 disc protrusion contributing to mild spinal canal narrowing. He also had evidence of moderate/severe right foraminal narrowing at the level of C6-C7.  Evidence of additional multilevel disease  C5-C6 disc protrusion appears progressed from previous MRI imaging  Patient with frequent admissions for neck pain and radiculopathy type symptoms, previous neurosurgery consult seems to suggest that patient's symptoms are not exactly congruent with the degree/spinal level of injury  Patient presenting to the ED with neck pain that he states started when he woke up yesterday.  Denies recent trauma.  He explains that he has burning type sensation around his neck that radiates into his arms, hands, and legs.  He states that if he shakes my hand or squeezes my hand on neurologic exam, he would be in persistent pain all day, so he defers that part of the exam  States he has been adherent to his home medication regimen.  Will continue this for now and provide supportive care.  Discussed case with neurosurgery.  No indication for operative intervention at this time, will recommend patient follow-up outpatient with neurosurgery  Acute pain service contacted today, we appreciate their recommendations

## 2024-09-04 NOTE — UTILIZATION REVIEW
Initial Clinical Review    Admission: Date/Time/Statement:   Admission Orders (From admission, onward)       Ordered        09/03/24 0844  Place in Observation  Once                          Orders Placed This Encounter   Procedures    Place in Observation     Standing Status:   Standing     Number of Occurrences:   1     Order Specific Question:   Level of Care     Answer:   Med Surg [16]     ED Arrival Information       Expected   -    Arrival   9/3/2024 05:43    Acuity   Less Urgent              Means of arrival   Walk-In    Escorted by   Self    Service   Hospitalist    Admission type   Emergency              Arrival complaint   Neck Pain             Chief Complaint   Patient presents with    Neck Pain     Pt presents with neck pain starting yesterday. Was previously seen.       Initial Presentation: 39 y.o. male  with a PMH of neck pain after a fall, polysubstance abuse, hypertension among others who presents with neck pain.  Amado tells me that in the past few weeks he had a fall and had multiple visits to the [facility] ED for pain.  He was managed conservatively and discharged multiple times.  He states he woke up yesterday and noticed increasing pain and burning sensation of the neck.  This is associated with pain radiating down to his fingers and hands which she states is relatively chronic but worse today.  He also endorsed that there is some pain radiating down to his low back and legs.  He returned to the ED and did not have improvement in his pain with multiple pain medications.  Subsequent referred for admission.  He is currently resting comfortably but states that his pain is not much improved since admission.  He has no other complaints at present.     ADMIT OBSERVATION STATUS    Anticipated Length of Stay/Certification Statement:      Date:     Day 2:      ED Triage Vitals   Temperature Pulse Respirations Blood Pressure SpO2 Pain Score   09/03/24 0546 09/03/24 0546 09/03/24 0831 09/03/24 0546  "09/03/24 0546 09/03/24 0641   98.4 °F (36.9 °C) (!) 118 16 138/93 100 % 10 - Worst Possible Pain     Weight (last 2 days)       None            Vital Signs (last 3 days)       Date/Time Temp Pulse Resp BP MAP (mmHg) SpO2 O2 Device Patient Position - Orthostatic VS [place] Coma Scale Score Pain    09/03/24 22:16:22 98.2 °F (36.8 °C) -- 18 149/99 116 -- -- -- -- --    09/03/24 2200 -- -- -- -- -- 95 % None (Room air) -- 15 No Pain    09/03/24 16:12:19 97.8 °F (36.6 °C) -- 17 131/69 90 -- -- -- -- --    09/03/24 1248 -- -- -- -- -- -- -- -- -- 10 - Worst Possible Pain    09/03/24 12:33:07 -- -- -- 133/88 103 -- -- -- -- --    09/03/24 1100 -- -- -- -- -- -- -- -- 15 --    09/03/24 10:54:39 98.1 °F (36.7 °C) 100 -- 147/107 120 95 % -- -- -- --    09/03/24 1009 -- -- -- 143/107 -- -- -- -- -- 9    09/03/24 0831 -- -- 16 -- -- -- -- -- -- --    09/03/24 0641 -- -- -- -- -- -- -- -- 15 10 - Worst Possible Pain    09/03/24 0546 98.4 °F (36.9 °C) 118 -- 138/93 -- 100 % None (Room air) Sitting -- --              Pertinent Labs/Diagnostic Test Results:   Radiology:  No orders to display     Cardiology:  No orders to display     GI:  No orders to display           Results from last 7 days   Lab Units 09/03/24  1003   WBC Thousand/uL 9.73   HEMOGLOBIN g/dL 14.6   HEMATOCRIT % 43.2   PLATELETS Thousands/uL 326         Results from last 7 days   Lab Units 09/03/24  1003   SODIUM mmol/L 139   POTASSIUM mmol/L 3.7   CHLORIDE mmol/L 102   CO2 mmol/L 29   ANION GAP mmol/L 8   BUN mg/dL 21   CREATININE mg/dL 1.08   EGFR ml/min/1.73sq m 84   CALCIUM mg/dL 9.4             Results from last 7 days   Lab Units 09/03/24  1003   GLUCOSE RANDOM mg/dL 109             No results found for: \"BETA-HYDROXYBUTYRATE\"                                                                                                                                         ED Treatment-Medication Administration from 09/03/2024 0543 to 09/03/2024 1042         Date/Time " Order Dose Route Action     09/03/2024 0642 acetaminophen (TYLENOL) tablet 975 mg 975 mg Oral Given     09/03/2024 0641 ketorolac (TORADOL) injection 15 mg 15 mg Intramuscular Given     09/03/2024 0642 lidocaine (LIDODERM) 5 % patch 1 patch 1 patch Topical Medication Applied     09/03/2024 0755 cyclobenzaprine (FLEXERIL) tablet 10 mg 10 mg Oral Given     09/03/2024 0755 haloperidol (HALDOL) tablet 1 mg 1 mg Oral Given     09/03/2024 1009 morphine injection 2 mg 2 mg Intravenous Given     09/03/2024 1009 amLODIPine (NORVASC) tablet 5 mg 5 mg Oral Given            Past Medical History:   Diagnosis Date    Asthma     Chronic pain     Hypertension     Neck pain     Psychoactive 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HCC)     Thyroglossal duct cyst      Present on Admission:   Cervical spinal stenosis   Cervical radiculopathy   Mood disorder due to medical condition      Admitting Diagnosis: Neck pain [M54.2]  Age/Sex: 39 y.o. male  Admission Orders:  Scheduled Medications:  amLODIPine, 5 mg, Oral, Daily  DULoxetine, 30 mg, Oral, Daily  enoxaparin, 40 mg, Subcutaneous, Daily  gabapentin, 300 mg, Oral, TID  nicotine, 1 patch, Transdermal, Q24H  QUEtiapine, 300 mg, Oral, HS  topiramate, 25 mg, Oral, BID      Continuous IV Infusions:     PRN Meds:  cyclobenzaprine, 10 mg, Oral, TID PRN  diazepam, 2 mg, Oral, Q12H PRN  ibuprofen, 400 mg, Oral, Q6H PRN        None    Network Utilization Review Department  ATTENTION: Please call with any questions or concerns to [phone] and carefully listen to the prompts so that you are directed to the right person. All voicemails are confidential.   For Discharge needs, contact Care Management DC Support Team at [phone] opt. [phone]  Send all requests for admission clinical reviews, approved or denied determinations and any other requests to dedicated fax number below belonging to the campus where the patient is receiving treatment. List of dedicated fax numbers for the Facilities:  FACILITY NAME  UR FAX NUMBER   ADMISSION DENIALS (Administrative/Medical Necessity) [phone]   DISCHARGE SUPPORT TEAM (NETWORK) [phone]   PARENT CHILD HEALTH (Maternity/NICU/Pediatrics) [phone]   [facility] [phone]   General acute hospital [phone]   [facility] [phone]   [facility] [phone]   [facility] [phone]   [facility] [phone]   [facility] [phone]   [facility] [phone]   [facility] [phone]   On license of [facility] [phone]   [facility] [phone]   [facility] [phone]

## 2024-09-04 NOTE — ASSESSMENT & PLAN NOTE
Resume Duloxetine and Seroquel 300 mg nightly  Patient was noted to be agitated and making bizarre statements last night.  Patient called police last night and security evaluated patient.  He ultimately returned to his room and rested quietly.  Consider psychiatry evaluation

## 2024-09-04 NOTE — DISCHARGE SUMMARY
Notified by RN that patient ripped out his IV and walk off the floor and said he is leaving. Per RN, he proceeded to walk to the elevator and leave the hospital. I couldn't see or evaluate the patient as he already left the hospital when I was notified.     Per review of hospitalist note from today, patient was planned for discharge home tomorrow.   Orders for discharge AGAINST MEDICAL ADVICE PLACED.

## 2024-09-04 NOTE — CERTIFIED RECOVERY SPECIALIST
"   Certified  Note    Patient name: Amado Chin  Location: CW2 218/CW2 218-02  Brea: [facility]  Attending:  Chance Steel MRN [id number]  : 1985  Age: 39 y.o.    Sex: male Date 2024         Substance Use History:     Social History     Substance and Sexual Activity   Alcohol Use Yes    Comment: occas        Social History     Substance and Sexual Activity   Drug Use Not Currently    Types: Marijuana     Time spent: 13 minutes    CRS met with patient to explain services and offer support. Upon entry, patient had room recliner up against door, displaying signs of paranoia. Patient was somewhat curious about who sent CRS to see him. It was explained that I was consulted by doctor bc of his hx with substances. CRS offered some experience and understanding of his questions. Patient said he is \"in pain you wouldn't even understand\" and \"I'm always in pain\" Patient declined [facility] treatment, but was interested in help with housing, an [facility] and home healthcare. CRS provided some community & recovery resources and also spoke with patients nurse, as well as ms  assigned to this unit.     CRS had conversation and shared concerns with nurse about patient's behaviors, and nurse was in contact with provider regarding same.    CRS provided business card and recovery resources.      CRS will continue to provide support during patient's admission.          '          Janis Keen       "

## 2024-09-04 NOTE — PHYSICAL THERAPY NOTE
Physical Therapy Screen    Patient Name: Amado Chin    Today's Date: 9/4/2024     Problem List  Active Problems:    Mood disorder due to medical condition    Cervical spinal stenosis    Cervical radiculopathy    Polysubstance abuse (HCC)       Past Medical History  Past Medical History:   Diagnosis Date    Asthma     Chronic pain     Hypertension     Neck pain     Psychoactive 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HCC)     Thyroglossal duct cyst         Past Surgical History  Past Surgical History:   Procedure Laterality Date    THYROGLOSSAL DUCT EXCISION      THYROID SURGERY      THYROID SURGERY           09/04/24 1151   PT Last Visit   PT Visit Date 09/04/24   Note Type   Note type Screen          Pt seen ambulating independently in hallways w/o a device and reports he has been moving around w/o issue during hospital stay. No acute PT needs, PT will sign off. Please re-consult if necessary.    Zenobia John PT, DPT

## 2024-09-04 NOTE — ASSESSMENT & PLAN NOTE
History of polysubstance abuse with most recent UA showing evidence of methamphetamine, THC, cocaine, opiate use  Patient reports abstinence from illicit substances since last UDS, repeat ordered but patient has not yet supplied a sample

## 2024-09-04 NOTE — CONSULTS
"Consult Note- Acute Pain Service   Amado Chin 39 y.o. male MRN: [id number]  Unit/Bed#: CW2 218-02 Encounter: [id number]               Amado Chin is a 39 y.o. male who presents with acute exacerbation of chronic cervicalgia. PMH is significant for chronic cervicalgia following a fall back in 2020, polysubstance use (methamphetamines, cocaine, cannabis, opioids), and hypertension.  Patient had a fall a few weeks ago and has had multiple ED visits since then for pain. APS was consulted for acute on chronic pain management in the setting of substance use.     Cervical radiculopathy  Assessment & Plan  Chronic cervicalgia following a fall in 2020. Admitted for acute exacerbation following another fall a few weeks ago. Reports burning sensation and \"nerve flares\" notably radiating down the back, arms, hands, legs. Home regimen has not been effective and the only thing that improved his pain here in the hospital was the IV Morphine from the ED.   Previously evaluated by neurosurgery in 2022, recommended conservative therapy options. Also felt that the symptoms were not congruent with the degree/spinal level of injury   Frequent ED visits/admissions for intractable cervicalgia. Most recent admission was back in May 2024 at [facility]  MRI cervical spine (5/14/2024) showed disc protrusion at C5-C6 contributing to mild spinal canal narrowing as well as a disc bulge at C6-C7 resulting in severe right foraminal narrowing    Plan:  Given significant substance use history (methamphetamines, cocaine, THC, opioids), would strongly avoid opioid therapy and try to optimize other multi-modal analgesia  Start Tylenol 975mg Q8H  Start IV Toradol 15mg Q6H for 3 days  Increase gabapentin to 400mg TID  Schedule Flexeril 5mg TID  Lidocaine patches   If his pain remains uncontrolled, can consider increasing his Flexeril, Cymbalta or starting a prednisone taper.         APS will continue to follow. Please contact Acute Pain Service - " "[facility] via [employer] from [phone] with additional questions or concerns. See [employer] or [employer] for additional contacts and after hours information.    Assessment & Plan     Assessment:   Patient Active Problem List   Diagnosis    Cervical disc herniation    Paresthesia and pain of extremity    Fall    Concussion without loss of consciousness    Acute pain of right shoulder    Alcohol abuse    Burn    Furuncle of axilla    Neck pain    Elevated blood pressure reading    Weakness of both lower extremities    Asthma    Tobacco abuse    Dizziness    Vitamin B12 deficiency    Intractable pain    Fecal smearing    Urinary incontinence    Drug-induced constipation    Opioid abuse (HCC)    Recurrent major depressive disorder (HCC)    Anxiety    Substance induced mood disorder (HCC)    Mood disorder due to medical condition    Chronic bilateral low back pain    Chronic neck pain    Cervical spinal stenosis    Cervical radiculopathy    Cellulitis of left lower extremity    Homelessness    Drug use    Psychoactive 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HCC)    Polysubstance abuse (HCC)          History of Present Illness    Admit Date:  9/3/2024  Hospital Day:  0 days  Primary Service:  Hospitalist  Attending Provider:  Chance Steel  Reason for Consult / Principal Problem: intractable cervicalgia   HPI:     Amado Chin is a 39 y.o. male who presents with acute exacerbation of chronic cervicalgia. PMH is significant for chronic cervicalgia following a fall back in 2020, polysubstance use (methamphetamines, cannabis, opioids), and hypertension.  Patient had a fall a few weeks ago and has had multiple ED visits since then for pain.  He woke up yesterday and noticed increasing pain and burning sensation of the neck with radiation down to his fingers, lower back, legs. He went to the ED again and was admitted for pain control. He described it as a nerve \"flare\" and reports he has no quality of life. He stated \"the only thing I got " "free in this life is pain.\" He reports being adherent to his home regimen but it is not effective. He said the main thing that helped with his pain so far this admission was the IV Morphine they gave him in the ED. Overnight, he also woke up with anxiety, paranoia and called 911 as he saw someone in his room \"trying to kill him staring him down in his bedroom.\" Security was called to the floor and the patient was moved off to another room for safety, but ultimately wanted to be brought back down to .     Per chart review, the patient has been evaluated by neurosurgery and pain medicine in the outpatient setting.  In his last visit with neurosurgery back in 2022, the team had discussed conservative treatment options but the patient was combative, threatening, and insistent on surgery to fix what he believed to be cord compression.  He was dismissed from their office and referred to pain medicine for further management and was seen earlier this year in February.  MRI of the cervical spine and PT were ordered, in addition to increasing the patient's gabapentin to 300 mg 3 times daily.     He was later admitted to [facility] for intractable cervicalgia on May 2024. MRI cervical spine (5/14/2024) showed disc protrusion at C5-C6 contributing to mild spinal canal narrowing as well as a disc bulge at C6-C7 resulting in severe right foraminal narrowing. He was seen by neurosurgery who discussed surgical fusion (likely ACDF); however, there was no indication for acute surgical intervention and the patient needed to quit smoking prior to surgery.  They recommended outpatient follow-up.  The pain management service was also consulted and they advised against opioid pain medications. Instead, they suggested increasing his Robaxin dose and starting a prednisone taper.  Psychiatry was also consulted due to acute psychosis after the patient developed hallucinations, paranoia, disorganized speech and behavior.  He attempted to " "leave AMA but the situation resolved with verbal de-escalation.  He was cleared for discharge by psychiatry and a consult was placed to [facility] to provide substance use resources.  He was discharged on Cymbalta 30 mg daily, gabapentin 600 mg 3 times daily, Robaxin-750 4 times daily as needed, as well as a prednisone taper. Recent UDS on 8/25/24 showed +amph/meth, cocaine, opiate, THC. On chart review, it appears that the patient turned to street drugs to manage his pain.       Current pain location(s): \"Everywhere\" - pressure in posterior head, neck pain that radiates down to back, arms, lateral thighs and down the leg  Pain Scale:   10  Quality: burning, nerve flare  Current Analgesic regimen:    Cymbalta 30mg daily  Gabapentin 300mg TID  Fle    Pain History: as above  Pain Management Provider:  None    I have reviewed the patient's controlled substance dispensing history in the Prescription Drug Monitoring Program in compliance with the [facility] regulations before prescribing any controlled substances.         Inpatient consult to Acute Pain Service     Date/Time  9/4/2024 1:47 PM     Performed by  Luis Manuel Cunha MD   Authorized by  Chance Steel             Historical Information   Past Medical History:   Diagnosis Date    Asthma     Chronic pain     Hypertension     Neck pain     Psychoactive 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HCC)     Thyroglossal duct cyst      Past Surgical History:   Procedure Laterality Date    THYROGLOSSAL DUCT EXCISION      THYROID SURGERY      THYROID SURGERY       Social History   Social History     Substance and Sexual Activity   Alcohol Use Yes    Comment: occas     Social History     Substance and Sexual Activity   Drug Use Not Currently    Types: Marijuana     Social History     Tobacco Use   Smoking Status Every Day    Current packs/day: 0.50    Types: Cigarettes   Smokeless Tobacco Never   Tobacco Comments    5 cigerettes a day      Family History: non-contributory    Meds/Allergies   all current " active meds have been reviewed    No Known Allergies    Objective   Temp:  [97.8 °F (36.6 °C)-98.2 °F (36.8 °C)] 98.2 °F (36.8 °C)  Resp:  [16-18] 16  BP: (128-149)/(69-99) 128/72    Intake/Output Summary (Last 24 hours) at 9/4/2024 1430  Last data filed at 9/4/2024 0930  Gross per 24 hour   Intake 1210 ml   Output --   Net 1210 ml       Physical Exam  Vitals reviewed.   HENT:      Head: Normocephalic and atraumatic.   Eyes:      Extraocular Movements: Extraocular movements intact.      Conjunctiva/sclera: Conjunctivae normal.   Cardiovascular:      Rate and Rhythm: Normal rate.   Pulmonary:      Effort: No respiratory distress.   Musculoskeletal:      Comments: Appears tense and in pain  Reports burning sensation in his hands when attempting to  things, his back, arms and legs   Neurological:      Mental Status: He is alert and oriented to person, place, and time.   Psychiatric:         Mood and Affect: Mood is depressed. Affect is flat.         Speech: Speech normal.         Behavior: Behavior is slowed. Behavior is cooperative.         Lab Results:     Lab Results   Component Value Date    WBC 9.73 09/03/2024    HGB 14.6 09/03/2024    HCT 43.2 09/03/2024    MCV 91 09/03/2024     09/03/2024     Lab Results   Component Value Date    SODIUM 139 09/03/2024    K 3.7 09/03/2024     09/03/2024    CO2 29 09/03/2024    AGAP 8 09/03/2024    BUN 21 09/03/2024    CREATININE 1.08 09/03/2024    GLUC 109 09/03/2024    GLUF 95 04/01/2022    CALCIUM 9.4 09/03/2024    AST 21 08/25/2024    ALT 15 08/25/2024    ALKPHOS 92 08/25/2024    TP 7.3 08/25/2024    TBILI 0.86 08/25/2024    EGFR 84 09/03/2024           Imaging Studies: I have personally reviewed pertinent reports.    EKG, Pathology, and Other Studies: I have personally reviewed pertinent reports.      Counseling / Coordination of Care  Total floor / unit time spent today Level 1 = 20 minutes. Greater than 50% of total time was spent with the patient and / or  family counseling and / or coordination of care.     Please note that the APS provides consultative services regarding pain management only.  With the exception of ketamine and epidural infusions and except when indicated, final decisions regarding starting or changing doses of analgesic medications are at the discretion of the consulting service.  Off hours consultation and/or medication management is generally not available.    Luis Manuel Cunha MD  Acute Pain Service

## 2024-09-05 ENCOUNTER — TRANSITIONAL CARE MANAGEMENT (OUTPATIENT)
Dept: FAMILY MEDICINE CLINIC | Facility: CLINIC | Age: 39
End: 2024-09-05

## 2024-09-05 ENCOUNTER — HOSPITAL ENCOUNTER (EMERGENCY)
Facility: HOSPITAL | Age: 39
End: 2024-09-06
Attending: EMERGENCY MEDICINE
Payer: MEDICARE

## 2024-09-05 ENCOUNTER — TELEPHONE (OUTPATIENT)
Dept: FAMILY MEDICINE CLINIC | Facility: CLINIC | Age: 39
End: 2024-09-05

## 2024-09-05 ENCOUNTER — HOSPITAL ENCOUNTER (EMERGENCY)
Facility: HOSPITAL | Age: 39
Discharge: HOME | End: 2024-09-05
Attending: EMERGENCY MEDICINE
Payer: MEDICARE

## 2024-09-05 VITALS
OXYGEN SATURATION: 96 % | TEMPERATURE: 98.4 F | RESPIRATION RATE: 18 BRPM | HEART RATE: 98 BPM | DIASTOLIC BLOOD PRESSURE: 91 MMHG | SYSTOLIC BLOOD PRESSURE: 150 MMHG

## 2024-09-05 VITALS
RESPIRATION RATE: 20 BRPM | OXYGEN SATURATION: 95 % | HEART RATE: 85 BPM | DIASTOLIC BLOOD PRESSURE: 87 MMHG | SYSTOLIC BLOOD PRESSURE: 149 MMHG | TEMPERATURE: 98.7 F

## 2024-09-05 DIAGNOSIS — F22 PARANOIA (HCC): Primary | ICD-10-CM

## 2024-09-05 DIAGNOSIS — F41.9 ANXIETY: ICD-10-CM

## 2024-09-05 DIAGNOSIS — G89.29 CHRONIC PAIN: Primary | ICD-10-CM

## 2024-09-05 LAB
AMPHETAMINES SERPL QL SCN: POSITIVE
BARBITURATES UR QL: NEGATIVE
BENZODIAZ UR QL: POSITIVE
COCAINE UR QL: NEGATIVE
ETHANOL EXG-MCNC: 0 MG/DL
FENTANYL UR QL SCN: NEGATIVE
HYDROCODONE UR QL SCN: NEGATIVE
METHADONE UR QL: NEGATIVE
OPIATES UR QL SCN: NEGATIVE
OXYCODONE+OXYMORPHONE UR QL SCN: NEGATIVE
PCP UR QL: NEGATIVE
THC UR QL: POSITIVE

## 2024-09-05 PROCEDURE — 99285 EMERGENCY DEPT VISIT HI MDM: CPT | Performed by: EMERGENCY MEDICINE

## 2024-09-05 PROCEDURE — 82075 ASSAY OF BREATH ETHANOL: CPT

## 2024-09-05 PROCEDURE — 93005 ELECTROCARDIOGRAM TRACING: CPT

## 2024-09-05 PROCEDURE — 99283 EMERGENCY DEPT VISIT LOW MDM: CPT

## 2024-09-05 PROCEDURE — 80307 DRUG TEST PRSMV CHEM ANLYZR: CPT

## 2024-09-05 PROCEDURE — 96372 THER/PROPH/DIAG INJ SC/IM: CPT

## 2024-09-05 PROCEDURE — 99284 EMERGENCY DEPT VISIT MOD MDM: CPT | Performed by: EMERGENCY MEDICINE

## 2024-09-05 PROCEDURE — 99285 EMERGENCY DEPT VISIT HI MDM: CPT

## 2024-09-05 RX ORDER — GABAPENTIN 300 MG/1
300 CAPSULE ORAL 3 TIMES DAILY
Qty: 21 CAPSULE | Refills: 0 | Status: SHIPPED | OUTPATIENT
Start: 2024-09-05 | End: 2024-09-12

## 2024-09-05 RX ORDER — CYCLOBENZAPRINE HCL 10 MG
10 TABLET ORAL ONCE
Status: COMPLETED | OUTPATIENT
Start: 2024-09-05 | End: 2024-09-05

## 2024-09-05 RX ORDER — GABAPENTIN 400 MG/1
400 CAPSULE ORAL ONCE
Status: COMPLETED | OUTPATIENT
Start: 2024-09-05 | End: 2024-09-05

## 2024-09-05 RX ORDER — ACETAMINOPHEN 325 MG/1
650 TABLET ORAL ONCE
Status: COMPLETED | OUTPATIENT
Start: 2024-09-05 | End: 2024-09-05

## 2024-09-05 RX ORDER — METOCLOPRAMIDE 10 MG/1
10 TABLET ORAL ONCE
Status: COMPLETED | OUTPATIENT
Start: 2024-09-05 | End: 2024-09-05

## 2024-09-05 RX ORDER — KETOROLAC TROMETHAMINE 30 MG/ML
15 INJECTION, SOLUTION INTRAMUSCULAR; INTRAVENOUS ONCE
Status: COMPLETED | OUTPATIENT
Start: 2024-09-05 | End: 2024-09-05

## 2024-09-05 RX ORDER — METHOCARBAMOL 500 MG/1
500 TABLET, FILM COATED ORAL 2 TIMES DAILY
Qty: 20 TABLET | Refills: 0 | Status: SHIPPED | OUTPATIENT
Start: 2024-09-05

## 2024-09-05 RX ORDER — METHOCARBAMOL 500 MG/1
500 TABLET, FILM COATED ORAL ONCE
Status: COMPLETED | OUTPATIENT
Start: 2024-09-05 | End: 2024-09-05

## 2024-09-05 RX ADMIN — GABAPENTIN 400 MG: 400 CAPSULE ORAL at 04:17

## 2024-09-05 RX ADMIN — METOCLOPRAMIDE 10 MG: 10 TABLET ORAL at 04:21

## 2024-09-05 RX ADMIN — CYCLOBENZAPRINE HYDROCHLORIDE 10 MG: 10 TABLET, FILM COATED ORAL at 04:17

## 2024-09-05 RX ADMIN — ACETAMINOPHEN 650 MG: 325 TABLET ORAL at 04:17

## 2024-09-05 RX ADMIN — KETOROLAC TROMETHAMINE 15 MG: 30 INJECTION, SOLUTION INTRAMUSCULAR at 04:18

## 2024-09-05 RX ADMIN — METHOCARBAMOL 500 MG: 500 TABLET ORAL at 04:17

## 2024-09-05 NOTE — ED ATTENDING ATTESTATION
9/5/2024  I, Victoriano Lebron DO, saw and evaluated the patient. I have discussed the patient with the resident/non-physician practitioner and agree with the resident's/non-physician practitioner's findings, Plan of Care, and MDM as documented in the resident's/non-physician practitioner's note, except where noted. All available labs and Radiology studies were reviewed.  I was present for key portions of any procedure(s) performed by the resident/non-physician practitioner and I was immediately available to provide assistance.       At this point I agree with the current assessment done in the Emergency Department.  I have conducted an independent evaluation of this patient a history and physical is as follows:    Patient is a 39-year-old male with a history of polysubstance abuse, cervical radiculopathy, cervical spinal stenosis, mood disorder, who presents for worsening neck pain.  He said this pain feels similar to his chronic neck pain.  Per review of records the patient was hospitalized September 3 through September 4, for intractable neck pain, pain is rating down his fingertips into his hands, as it typically does.  He was treated symptomatically, then pulled out his IV last evening September 4, leave AGAINST MEDICAL ADVICE.  He then went directly to an outside emergency department, where he was per review of records again hospitalized after unremarkable medical workup.  Troponin, CMP, ethanol, CBC, urinalysis.  He then again left AGAINST MEDICAL ADVICE early this morning, taking out his IV and coming directly to the ED here.  He says he is presenting for exacerbation of his chronic neck pain.  Will not really explain to me why he left AGAINST MEDICAL ADVICE from both visits.  He denies numbness or tingling, denies chest pain, no shortness of breath, no fever, denies chills, denies any trauma.    Review of records shows last MRI in November 2021 showed disc protrusion at C6-C7, causing right severe foraminal  narrowing.    General:  Patient is well-appearing  Head:  Atraumatic  Eyes:  Conjunctiva pink  ENT:  Mucous membranes are moist  Neck:  Supple no warmth or redness  Cardiac:  S1-S2, without murmurs  Lungs:  Clear to auscultation bilaterally  Abdomen:  Soft, nontender, normal bowel sounds, no CVA tenderness, no tympany, no rigidity, no guarding  Extremities:  Normal range of motion, no warmth or redness to his joints  Neurologic:  Awake, fluent speech, normal comprehension. AAOx3.  Patient able to ambulate without difficulty, strength is 5 out of 5 at the bilateral shoulders, elbows, wrists,  strength equal and symmetric bilaterally, normal sensation throughout the whole body.  Skin:  Pink warm and dry  Psychiatric:  Alert, pleasant, cooperative          ED Course     Given patient's recent unremarkable medical workup I do not believe he requires additional testing.  He has no focal neurologic deficits, do not believe repeat MRI indicated.  Patient says that he was out of his home medications, was given his home medications, then was walking around the ED repeatedly.  Redirected back to his room.  Patient said he was feeling better and was comfortable being discharged.    Critical Care Time  Procedures

## 2024-09-06 ENCOUNTER — TELEPHONE (OUTPATIENT)
Dept: FAMILY MEDICINE CLINIC | Facility: CLINIC | Age: 39
End: 2024-09-06

## 2024-09-06 PROCEDURE — NC001 PR NO CHARGE: Performed by: EMERGENCY MEDICINE

## 2024-09-06 NOTE — ED ATTENDING ATTESTATION
9/5/2024  I, Godwin Buenrostro MD, saw and evaluated the patient. I have discussed the patient with the resident/non-physician practitioner and agree with the resident's/non-physician practitioner's findings, Plan of Care, and MDM as documented in the resident's/non-physician practitioner's note, except where noted. All available labs and Radiology studies were reviewed.  I was present for key portions of any procedure(s) performed by the resident/non-physician practitioner and I was immediately available to provide assistance.       At this point I agree with the current assessment done in the Emergency Department.  I have conducted an independent evaluation of this patient a history and physical is as follows:    ED Course         Critical Care Time  Procedures    38 yo male seen multiple times in the ed for various reasons and was admitted as well here now for paranoia with delusional thoughts and cp and headache.  Pt with generalized pain and nausea.  Vss, afebrile, lungs cta, rrr, abdomen soft nontender, no neuro deficits. No si, no hi.  EKG, uds, bat, crisis.

## 2024-09-06 NOTE — ED PROVIDER NOTES
"History  Chief Complaint   Patient presents with    Personal Problem     Pt brought in via EMS. Per EMS pt is having delusions and stated he was trying to \"run away from people trying to kill me\". Pt c/o head, chest and r hand pain. He also endorses that he feels like he has had hallucinations.      39-year-old male with past medical history of bipolar disorder, chronic back pain, hypertension presents for chest pain and paranoia.  He states last week he was \"toxifying\" his body and was walking across the street when he potentially syncopized.  He woke up and was on the street.  He refuses to state why he was toxifying his body with, but is no longer using this.  Today, he felt like someone was out to get him and kill him.  He hid behind a car when he started to have chest pain.  He describes it as \"all over\" his chest.  He also endorsed nausea.  He would like to talk to somebody about resources for his paranoia.  He feels like he has been hallucinating but cannot give me a specific example.  Denies the use of any psychiatric medication.  Denies shortness of breath, fever, abdominal pain.  Denies suicidal or homicidal ideation.          Prior to Admission Medications   Prescriptions Last Dose Informant Patient Reported? Taking?   DULoxetine (Cymbalta) 30 mg delayed release capsule   Yes No   Sig: Take 30 mg by mouth daily   QUEtiapine (SEROquel) 300 mg tablet   Yes No   Sig: TAKE 1 TABLET BY MOUTH DAILY AT BEDTIME X 14 DAYS   amLODIPine (NORVASC) 5 mg tablet   Yes No   Sig: Take 5 mg by mouth daily   cyclobenzaprine (FLEXERIL) 10 mg tablet   No No   Sig: Take 1 tablet (10 mg total) by mouth 2 (two) times a day as needed for muscle spasms   gabapentin (Neurontin) 300 mg capsule   No No   Sig: Take 1 capsule (300 mg total) by mouth 3 (three) times a day   gabapentin (Neurontin) 300 mg capsule   No No   Sig: Take 1 capsule (300 mg total) by mouth 3 (three) times a day for 7 days For post-herpetic neuralgia: Take 1 " tablet on day 1,  Then take 2 tablets on day 2, Then take 3 tablets on day 3 and every day after that as instructed by your doctor.   ibuprofen (MOTRIN) 400 mg tablet   No No   Sig: Take 1 tablet (400 mg total) by mouth every 6 (six) hours as needed for moderate pain   methocarbamol (ROBAXIN) 500 mg tablet   No No   Sig: Take 1 tablet (500 mg total) by mouth 2 (two) times a day   nicotine (NICODERM CQ) 21 mg/24 hr TD 24 hr patch   Yes No   Sig: Place 1 patch on the skin every 24 hours   topiramate (Topamax) 25 mg tablet   No No   Sig: Take 1 tablet (25 mg total) by mouth 2 (two) times a day for 7 days      Facility-Administered Medications: None       Past Medical History:   Diagnosis Date    Asthma     Chronic pain     Hypertension     Neck pain     Psychoactive 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HCC)     Thyroglossal duct cyst        Past Surgical History:   Procedure Laterality Date    THYROGLOSSAL DUCT EXCISION      THYROID SURGERY      THYROID SURGERY         Family History   Problem Relation Age of Onset    Hypertension Mother     No Known Problems Father      I have reviewed and agree with the history as documented.    E-Cigarette/Vaping    E-Cigarette Use Former User     Cartridges/Day 1      E-Cigarette/Vaping Substances    Nicotine Yes     THC No     CBD No     Flavoring Yes     Other No     Unknown No      Social History     Tobacco Use    Smoking status: Every Day     Current packs/day: 0.50     Types: Cigarettes    Smokeless tobacco: Never    Tobacco comments:     5 cigerettes a day    Vaping Use    Vaping status: Former    Substances: Nicotine, Flavoring   Substance Use Topics    Alcohol use: Yes     Comment: occas    Drug use: Not Currently     Types: Marijuana        Review of Systems   Constitutional:  Negative for chills and fever.   HENT:  Negative for ear pain and sore throat.    Eyes:  Negative for pain and visual disturbance.   Respiratory:  Negative for cough, shortness of breath, wheezing and  stridor.    Cardiovascular:  Negative for chest pain, palpitations and leg swelling.   Gastrointestinal:  Negative for abdominal pain and vomiting.   Genitourinary:  Negative for dysuria and hematuria.   Musculoskeletal:  Negative for arthralgias and back pain.   Skin:  Negative for color change and rash.   Neurological:  Negative for seizures and syncope.   Psychiatric/Behavioral:  Positive for hallucinations. Negative for dysphoric mood, sleep disturbance and suicidal ideas. The patient is nervous/anxious. The patient is not hyperactive.    All other systems reviewed and are negative.      Physical Exam  ED Triage Vitals   Temperature Pulse Respirations Blood Pressure SpO2   09/05/24 2216 09/05/24 1943 09/05/24 1943 09/05/24 1943 09/05/24 1943   98.7 °F (37.1 °C) 100 18 166/97 100 %      Temp Source Heart Rate Source Patient Position - Orthostatic VS BP Location FiO2 (%)   09/05/24 2216 09/05/24 1943 09/05/24 1943 09/05/24 1943 --   Oral Monitor Lying Left arm       Pain Score       --                    Orthostatic Vital Signs  Vitals:    09/05/24 1943 09/05/24 2218   BP: 166/97 149/87   Pulse: 100 85   Patient Position - Orthostatic VS: Lying Lying       Physical Exam  Vitals and nursing note reviewed.   Constitutional:       General: He is not in acute distress.     Appearance: He is well-developed.   HENT:      Head: Normocephalic and atraumatic.      Comments: No evidence of trauma  Eyes:      Conjunctiva/sclera: Conjunctivae normal.   Cardiovascular:      Rate and Rhythm: Normal rate and regular rhythm.      Heart sounds: Normal heart sounds. No murmur heard.  Pulmonary:      Effort: Pulmonary effort is normal. No respiratory distress.      Breath sounds: Normal breath sounds. No stridor, decreased air movement or transmitted upper airway sounds.   Abdominal:      Palpations: Abdomen is soft.      Tenderness: There is no abdominal tenderness.   Musculoskeletal:         General: No swelling.      Cervical  back: Neck supple.   Skin:     General: Skin is warm and dry.      Capillary Refill: Capillary refill takes less than 2 seconds.   Neurological:      Mental Status: He is alert.   Psychiatric:         Mood and Affect: Mood is anxious.         Thought Content: Thought content is paranoid. Thought content does not include homicidal or suicidal ideation.         ED Medications  Medications - No data to display    Diagnostic Studies  Results Reviewed       Procedure Component Value Units Date/Time    Rapid drug screen, urine [[id number]]  (Abnormal) Collected: 09/05/24 2033    Lab Status: Final result Specimen: Urine, Clean Catch Updated: 09/05/24 2105     Amph/Meth UR Positive     Barbiturate Ur Negative     Benzodiazepine Urine Positive     Cocaine Urine Negative     Methadone Urine Negative     Opiate Urine Negative     PCP Ur Negative     THC Urine Positive     Oxycodone Urine Negative     Fentanyl Urine Negative     HYDROCODONE URINE Negative    Narrative:      Presumptive report. If requested, specimen will be sent to reference lab for confirmation.  FOR MEDICAL PURPOSES ONLY.   IF CONFIRMATION NEEDED PLEASE CONTACT THE LAB WITHIN 5 DAYS.    Drug Screen Cutoff Levels:  AMPHETAMINE/METHAMPHETAMINES  1000 ng/mL  BARBITURATES     200 ng/mL  BENZODIAZEPINES     200 ng/mL  COCAINE      300 ng/mL  METHADONE      300 ng/mL  OPIATES      300 ng/mL  PHENCYCLIDINE     25 ng/mL  THC       50 ng/mL  OXYCODONE      100 ng/mL  FENTANYL      5 ng/mL  HYDROCODONE     300 ng/mL    POCT alcohol breath test [[id number]]  (Normal) Resulted: 09/05/24 2027    Lab Status: Final result Updated: 09/05/24 2027     EXTBreath Alcohol 0                   No orders to display         Procedures  ECG 12 Lead Documentation Only    Date/Time: 9/5/2024 9:21 PM    Performed by: Sophia Brumfield DO  Authorized by: Sophia Brumfield DO    Indications / Diagnosis:  100  ECG reviewed by me, the ED Provider: yes    Previous ECG:     Previous ECG:  Compared  to current    Comparison ECG info:  8/25/24    Similarity:  No change  Interpretation:     Interpretation: normal    Rate:     ECG rate:  100    ECG rate assessment: tachycardic    Rhythm:     Rhythm: sinus rhythm    Ectopy:     Ectopy: none    QRS:     QRS axis:  Normal    QRS intervals:  Normal  Conduction:     Conduction: normal    ST segments:     ST segments:  Normal  T waves:     T waves: normal          ED Course                                       Medical Decision Making  19-year-old male with past medical history of bipolar disorder presents for paranoia and chest pain.    EKG shows normal sinus rhythm at a rate of 100.  Unremarkable physical exam.    Chest pain began while he was having an episode of paranoia, most likely related to anxiety.  Patient would like to be admitted for psychiatric help.  UDS and BAT are ordered.    Plan: UDS and PAT and EKG    Consulted ED crisis worker.  Patient would like to sign 201.  Patient is medically cleared for inpatient psychiatric care.    Pending placement. Signed out to Dr Karely Patton    Amount and/or Complexity of Data Reviewed  Labs: ordered.          Disposition  Final diagnoses:   None     ED Disposition       ED Disposition   Transfer to Another Facility    Condition   --    Date/Time   Fri Sep 6, 2024  7:52 AM    Comment   Amado HOPKINS Giuseppe should be transferred out to [place].               MD Documentation      Flowsheet Row Most Recent Value   Accepting Physician Dr. Hill   Accepting Facility Name, [facility]    (Name & Tel number) [facility] [phone]   Transported by (Company and Unit #) [facility]   Sending MD Godwin Buenrostro MD          RN Documentation      Flowsheet Row Most Recent Value   Accepting Facility Name, [facility]    (Name & Tel number) [facility] [phone]   Transported by (Company and Unit #) [facility]          Follow-up Information    None         Discharge  Medication List as of 9/6/2024  7:52 AM        CONTINUE these medications which have NOT CHANGED    Details   amLODIPine (NORVASC) 5 mg tablet Take 5 mg by mouth daily, Starting Thu 3/7/2024, Until Fri 3/7/2025, Historical Med      cyclobenzaprine (FLEXERIL) 10 mg tablet Take 1 tablet (10 mg total) by mouth 2 (two) times a day as needed for muscle spasms, Starting Tue 9/3/2024, Normal      DULoxetine (Cymbalta) 30 mg delayed release capsule Take 30 mg by mouth daily, Starting Thu 3/7/2024, Historical Med      !! gabapentin (Neurontin) 300 mg capsule Take 1 capsule (300 mg total) by mouth 3 (three) times a day, Starting Fri 2/9/2024, Normal      !! gabapentin (Neurontin) 300 mg capsule Take 1 capsule (300 mg total) by mouth 3 (three) times a day for 7 days For post-herpetic neuralgia: Take 1 tablet on day 1,  Then take 2 tablets on day 2, Then take 3 tablets on day 3 and every day after that as instructed by your doctor., Starting Thu 9/5/2024, Until Thu 9/12/2024, Normal      ibuprofen (MOTRIN) 400 mg tablet Take 1 tablet (400 mg total) by mouth every 6 (six) hours as needed for moderate pain, Starting Wed 2/28/2024, Normal      methocarbamol (ROBAXIN) 500 mg tablet Take 1 tablet (500 mg total) by mouth 2 (two) times a day, Starting Thu 9/5/2024, Normal      nicotine (NICODERM CQ) 21 mg/24 hr TD 24 hr patch Place 1 patch on the skin every 24 hours, Starting Thu 7/18/2024, Historical Med      QUEtiapine (SEROquel) 300 mg tablet TAKE 1 TABLET BY MOUTH DAILY AT BEDTIME X 14 DAYS, Historical Med      topiramate (Topamax) 25 mg tablet Take 1 tablet (25 mg total) by mouth 2 (two) times a day for 7 days, Starting Mon 9/2/2024, Until Mon 9/9/2024, Normal       !! - Potential duplicate medications found. Please discuss with provider.        No discharge procedures on file.    PDMP Review         Value Time User    PDMP Reviewed  Yes 2/9/2024  8:03 AM Doron Miller, DO             ED Provider  Attending physically available  and evaluated Amado Chin. I managed the patient along with the ED Attending.    Electronically Signed by           Sophia Brumfield DO  09/06/24 [id number]

## 2024-09-06 NOTE — ED NOTES
"Pt is a 39 y.o. male who was brought to the ED due to paranoia and hallucinations. Patient states that he had an hallucination for the first time ever and it really scared him. He described the environment around him was zooming in and out and he was hearing different sounds and everything exaggerated. Patient also felt like people were chasing after him. This episode lasted about 10 minutes, but felt like much longer to patient. Patient reports that he is homeless because of a falling out with his family several years ago and now they won't let him back in the house. However, he states this episode today happened around the area of the house. Patient states that about a week ago he finished 'toxifying' his body, but is not comfortable enough to say what that consisted of. Although he reports that nothing was used via IV. Per chart review, patient does have a history of paranoia and delusions. Patient denies homicidal/suicidal ideations.     Patient denies any previous history of mental health treatment. However, chart review indicates previous inpatient at [facility] in July 2024. Patient is not currently active with any outpatient treatment. He reports being on gabapentin and seroquel, but it is unclear if these are recently prescribed or what he has from previous admission. Patient indicates a history of substance use, but does not provide additional details. He denies any current use and feels good that he hasn't used anything. Patient denies issues with appetite. He states difficulty sleeping, although he feels he could go to sleep if he were able to stay still long enough. Patient denies any legal issues. Patient initially requested outpatient resources but immediately during encounter states that \"he would try inpatient\". Patient agreeable and signed 201.     Chief Complaint   Patient presents with    Personal Problem     Pt brought in via EMS. Per EMS pt is having delusions and stated he was trying to \"run away " "from people trying to kill me\". Pt c/o head, chest and r hand pain. He also endorses that he feels like he has had hallucinations.      Intake Assessment completed, Safety risk Assessment completed    Hortencia Conway LCSW  09/05/24    [id number]  "

## 2024-09-06 NOTE — ED NOTES
Patient is accepted at [facility].  Patient is accepted by Dr. Liz Bach in Admissions.     Transportation is TBD. [facility] will be sending their own 201 for patient to sign, and will inform us of when he can arrive once that's received.     [facility] will complete precert with Suman.      Nurse report is not needed.     Hortencia Conway, KORY  09/05/24     [id number]

## 2024-09-06 NOTE — ED NOTES
"Vitals faxed to [facility] with 201        Vitals    Recent Blood Pressure Pulse Respirations Temperature SpO2   09/05 2218 149/87 85 20 -- 95 %   09/05 2216 -- -- -- 98.7 °F (37.1 °C) --            Initial        09/05 1943 166/97 100 18 -- 100 %       Height and Weight    Height Last Wt BMI   5' 5\" (165.1 cm) 74.8 kg (164 lb 14.5 oz) 27.44 kg/m² Important    1 month ago 11 days ago 11 days ago     "

## 2024-09-06 NOTE — ED NOTES
No in-network beds available; bed search initiated to following facilities:    [facility]: Spoke with [facility], beds available; chart faxed for review.   [facility]: Spoke with [facility], possible beds available; chart faxed for review.   [facility]: Beds available; chart faxed for review.   [facility]: Spoke with [facility], possible bed available; chart faxed for review.     Hortencia Conway, [facility]  09/05/24    [phone]

## 2024-09-06 NOTE — ED PROVIDER NOTES
Psychiatry Reassessment Note 09/06/24    Subjective  Patient appeared slightly agitated, but was redirectable by the nursing staff and did not require intervention.     Objective  Vitals:    09/05/24 1943 09/05/24 2216 09/05/24 2218   BP: 166/97  149/87   BP Location: Left arm  Left arm   Pulse: 100  85   Resp: 18  20   Temp:  98.7 °F (37.1 °C)    TempSrc:  Oral    SpO2: 100%  95%         GENERAL APPEARANCE: NAD  NEURO: GCS 15, neuro grossly intact  HEENT: MMM  CV: RRR  LUNGS: CTAB  GI: soft, NT, ND  MSK: moves all extremities  SKIN: intact      Assessment/Plan  -Patient remains medically cleared for inpatient psychiatry or inpatient behavioral health  -Crisis following the patient  -Bed search pending  -Will make sure psychiatry evaluates patient  -Planning for discharge to inpatient facility at approximately 0730 this AM.         Akil Cabrera DO  09/06/24 [id number]

## 2024-09-06 NOTE — ED NOTES
Transport packet complete on pt chart. Stacey aparicio [facility] said patient can arrive after 0800. Round trip submitted and waiting on  time

## 2024-09-09 LAB
ATRIAL RATE: 100 BPM
P AXIS: 71 DEGREES
PR INTERVAL: 116 MS
QRS AXIS: 22 DEGREES
QRSD INTERVAL: 88 MS
QT INTERVAL: 322 MS
QTC INTERVAL: 415 MS
T WAVE AXIS: 42 DEGREES
VENTRICULAR RATE: 100 BPM

## 2024-09-09 PROCEDURE — 93010 ELECTROCARDIOGRAM REPORT: CPT | Performed by: INTERNAL MEDICINE
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
## 2024-09-21 ENCOUNTER — APPOINTMENT (EMERGENCY)
Dept: RADIOLOGY | Facility: HOSPITAL | Age: 39
End: 2024-09-21
Payer: MEDICARE

## 2024-09-21 ENCOUNTER — HOSPITAL ENCOUNTER (EMERGENCY)
Facility: HOSPITAL | Age: 39
Discharge: HOME | End: 2024-09-21
Attending: EMERGENCY MEDICINE | Admitting: EMERGENCY MEDICINE
Payer: MEDICARE

## 2024-09-21 VITALS
HEART RATE: 81 BPM | OXYGEN SATURATION: 96 % | RESPIRATION RATE: 12 BRPM | SYSTOLIC BLOOD PRESSURE: 118 MMHG | DIASTOLIC BLOOD PRESSURE: 74 MMHG | TEMPERATURE: 98.2 F

## 2024-09-21 DIAGNOSIS — R07.9 CHEST PAIN, UNSPECIFIED TYPE: Primary | ICD-10-CM

## 2024-09-21 LAB
2HR DELTA HS TROPONIN: -1 NG/L
ALBUMIN SERPL BCG-MCNC: 4.4 G/DL (ref 3.5–5)
ALP SERPL-CCNC: 82 U/L (ref 34–104)
ALT SERPL W P-5'-P-CCNC: 54 U/L (ref 7–52)
ANION GAP SERPL CALCULATED.3IONS-SCNC: 9 MMOL/L (ref 4–13)
AST SERPL W P-5'-P-CCNC: 22 U/L (ref 13–39)
ATRIAL RATE: 65 BPM
ATRIAL RATE: 85 BPM
BASOPHILS # BLD AUTO: 0.07 THOUSANDS/ΜL (ref 0–0.1)
BASOPHILS NFR BLD AUTO: 1 % (ref 0–1)
BILIRUB SERPL-MCNC: 0.38 MG/DL (ref 0.2–1)
BUN SERPL-MCNC: 23 MG/DL (ref 5–25)
CALCIUM SERPL-MCNC: 9.3 MG/DL (ref 8.4–10.2)
CARDIAC TROPONIN I PNL SERPL HS: 8 NG/L
CARDIAC TROPONIN I PNL SERPL HS: 9 NG/L
CHLORIDE SERPL-SCNC: 105 MMOL/L (ref 96–108)
CO2 SERPL-SCNC: 22 MMOL/L (ref 21–32)
CREAT SERPL-MCNC: 1.14 MG/DL (ref 0.6–1.3)
EOSINOPHIL # BLD AUTO: 0.31 THOUSAND/ΜL (ref 0–0.61)
EOSINOPHIL NFR BLD AUTO: 4 % (ref 0–6)
ERYTHROCYTE [DISTWIDTH] IN BLOOD BY AUTOMATED COUNT: 14.3 % (ref 11.6–15.1)
GFR SERPL CREATININE-BSD FRML MDRD: 79 ML/MIN/1.73SQ M
GLUCOSE SERPL-MCNC: 113 MG/DL (ref 65–140)
HCT VFR BLD AUTO: 43.4 % (ref 36.5–49.3)
HGB BLD-MCNC: 15.2 G/DL (ref 12–17)
IMM GRANULOCYTES # BLD AUTO: 0.06 THOUSAND/UL (ref 0–0.2)
IMM GRANULOCYTES NFR BLD AUTO: 1 % (ref 0–2)
LYMPHOCYTES # BLD AUTO: 1.78 THOUSANDS/ΜL (ref 0.6–4.47)
LYMPHOCYTES NFR BLD AUTO: 22 % (ref 14–44)
MCH RBC QN AUTO: 31.9 PG (ref 26.8–34.3)
MCHC RBC AUTO-ENTMCNC: 35 G/DL (ref 31.4–37.4)
MCV RBC AUTO: 91 FL (ref 82–98)
MONOCYTES # BLD AUTO: 1.11 THOUSAND/ΜL (ref 0.17–1.22)
MONOCYTES NFR BLD AUTO: 14 % (ref 4–12)
NEUTROPHILS # BLD AUTO: 4.7 THOUSANDS/ΜL (ref 1.85–7.62)
NEUTS SEG NFR BLD AUTO: 58 % (ref 43–75)
NRBC BLD AUTO-RTO: 0 /100 WBCS
P AXIS: 52 DEGREES
P AXIS: 70 DEGREES
PLATELET # BLD AUTO: 338 THOUSANDS/UL (ref 149–390)
PMV BLD AUTO: 9.5 FL (ref 8.9–12.7)
POTASSIUM SERPL-SCNC: 4.1 MMOL/L (ref 3.5–5.3)
PR INTERVAL: 126 MS
PR INTERVAL: 130 MS
PROT SERPL-MCNC: 7.2 G/DL (ref 6.4–8.4)
QRS AXIS: 27 DEGREES
QRS AXIS: 32 DEGREES
QRSD INTERVAL: 100 MS
QRSD INTERVAL: 84 MS
QT INTERVAL: 362 MS
QT INTERVAL: 398 MS
QTC INTERVAL: 413 MS
QTC INTERVAL: 430 MS
RBC # BLD AUTO: 4.77 MILLION/UL (ref 3.88–5.62)
SODIUM SERPL-SCNC: 136 MMOL/L (ref 135–147)
T WAVE AXIS: 41 DEGREES
T WAVE AXIS: 46 DEGREES
VENTRICULAR RATE: 65 BPM
VENTRICULAR RATE: 85 BPM
WBC # BLD AUTO: 8.03 THOUSAND/UL (ref 4.31–10.16)

## 2024-09-21 PROCEDURE — 84484 ASSAY OF TROPONIN QUANT: CPT | Performed by: EMERGENCY MEDICINE

## 2024-09-21 PROCEDURE — 93010 ELECTROCARDIOGRAM REPORT: CPT | Performed by: INTERNAL MEDICINE

## 2024-09-21 PROCEDURE — 85025 COMPLETE CBC W/AUTO DIFF WBC: CPT | Performed by: EMERGENCY MEDICINE

## 2024-09-21 PROCEDURE — 99285 EMERGENCY DEPT VISIT HI MDM: CPT

## 2024-09-21 PROCEDURE — 96374 THER/PROPH/DIAG INJ IV PUSH: CPT

## 2024-09-21 PROCEDURE — 80053 COMPREHEN METABOLIC PANEL: CPT | Performed by: EMERGENCY MEDICINE

## 2024-09-21 PROCEDURE — 93005 ELECTROCARDIOGRAM TRACING: CPT

## 2024-09-21 PROCEDURE — 36415 COLL VENOUS BLD VENIPUNCTURE: CPT | Performed by: EMERGENCY MEDICINE

## 2024-09-21 PROCEDURE — 99285 EMERGENCY DEPT VISIT HI MDM: CPT | Performed by: EMERGENCY MEDICINE

## 2024-09-21 PROCEDURE — 71045 X-RAY EXAM CHEST 1 VIEW: CPT

## 2024-09-21 RX ORDER — KETOROLAC TROMETHAMINE 30 MG/ML
15 INJECTION, SOLUTION INTRAMUSCULAR; INTRAVENOUS ONCE
Status: COMPLETED | OUTPATIENT
Start: 2024-09-21 | End: 2024-09-21

## 2024-09-21 RX ADMIN — KETOROLAC TROMETHAMINE 15 MG: 30 INJECTION, SOLUTION INTRAMUSCULAR; INTRAVENOUS at 00:43

## 2024-09-21 NOTE — ED PROVIDER NOTES
1. Chest pain, unspecified type      ED Disposition       ED Disposition   Discharge    Condition   Stable    Date/Time   Sat Sep 21, 2024  2:59 AM    Comment   Amado Chin discharge to home/self care.                   Assessment & Plan       Medical Decision Making    39 y.o. male presenting for chest pain.  Will order CBC, CMP, troponin, EKG, CXR to evaluate for anemia, electrolyte abnormality, ANETA, biliary disease, arrhythmia, ACS, PTX, pulmonary disease, widened mediastinum.  No DVT symptoms and no tachycardia.  Do not suspect PE.  No vomiting or epigastric tenderness on exam, do not suspect pancreatitis.  Differential would include costochondritis or pleurisy.  Will treat symptomatically.    Reassessment: Vital stable during ED course.  Resting comfortably in the room.  Reviewed lab results with the patient.  No evidence of ACS or biliary disease.  Discussed possible etiology to include costochondritis or pleurisy.    Disposition: I have discussed with the patient our plan to discharge them from the ED and the patient is in agreement with this plan.     Discharge Plan: Encourage parent Tylenol/Motrin for symptoms. RTED precautions emphasized. The patient was provided a written after visit summary with strict RTED precautions.     Followup: I have discussed with the patient plan to follow up with their PCP. Contact information provided in AVS.    Amount and/or Complexity of Data Reviewed  Labs: ordered. Decision-making details documented in ED Course.  Radiology: ordered and independent interpretation performed.    Risk  Prescription drug management.        HEART Risk Score      Flowsheet Row Most Recent Value   Heart Score Risk Calculator    History 0 Filed at: 09/21/2024 0258   ECG 0 Filed at: 09/21/2024 0258   Age 0 Filed at: 09/21/2024 0258   Risk Factors 1 Filed at: 09/21/2024 0258   Troponin 0 Filed at: 09/21/2024 0258   HEART Score 1 Filed at: 09/21/2024 0258               ED Course as of 09/21/24 5007    Sat Sep 21, 2024   0025 Procedure Note: EKG  Date/Time: 09/21/24 12:25 AM   Interpreted by: Panchito Aparicio DO  Indications / Diagnosis: Chest pain  ECG reviewed by me, the ED Provider: yes   The EKG demonstrates:  Rhythm: normal sinus rhythm 85 BPM  Intervals: Normal OR and QT intervals  Axis: Normal axis  QRS/Blocks: Normal QRS  ST Changes: No acute ST/T waves changes. No BACILIO. No TWI.   0109 hs TnI 0hr: 9   0158 Procedure Note: EKG  Date/Time: 09/21/24 1:58 AM   Interpreted by: Panchito Aparicio DO  Indications / Diagnosis: Chest pain  ECG reviewed by me, the ED Provider: yes   The EKG demonstrates:  Rhythm: normal sinus rhythm 65 BPM  Intervals: Normal OR and QT intervals  Axis: Normal axis  QRS/Blocks: Normal QRS  ST Changes: No acute ST/T waves changes. No BACILIO. No TWI.   0301 Patient sleeping, easily aroused.  Vitals are stable.  Reviewed lab results.  No evidence of ACS. Will discharge at this time.       Medications   ketorolac (TORADOL) injection 15 mg (15 mg Intravenous Given 9/21/24 0043)       History of Present Illness       Amado Chin is a 39 y.o. year old male with PMH of methamphetamine abuse presenting to the ED for chest pain. Patient reporting 20 minutes of substernal chest pain which radiates to the left arm. The pain started while laying down. Patient has experienced similar symptoms. Pain is worsened when pushing on the chest or when taking a deep breath. Patient reports ongoing nonproductive cough for past several weeks. No fevers/chills. No N/V or abdominal pain. No leg pain/swelling. Patient denies prior history of DVT/PE. The patient has not taken/received any medications at home for relief of symptoms.      History provided by:  Medical records and patient   used: No    Chest Pain  Associated symptoms: cough    Associated symptoms: no abdominal pain, no back pain, no fever, no nausea, no shortness of breath and not vomiting        Review of Systems    Constitutional:  Negative for chills and fever.   Respiratory:  Positive for cough. Negative for shortness of breath.    Cardiovascular:  Positive for chest pain. Negative for leg swelling.   Gastrointestinal:  Negative for abdominal pain, nausea and vomiting.   Genitourinary:  Negative for flank pain.   Musculoskeletal:  Negative for back pain.   All other systems reviewed and are negative.          Objective     ED Triage Vitals [09/21/24 0023]   Temperature Pulse Blood Pressure Respirations SpO2 Patient Position - Orthostatic VS   98.2 °F (36.8 °C) 85 128/82 16 99 % Lying      Temp Source Heart Rate Source BP Location FiO2 (%) Pain Score    Oral Monitor Right arm -- 8        Physical Exam  Vitals and nursing note reviewed.   Constitutional:       General: He is not in acute distress.     Appearance: He is well-developed. He is not ill-appearing, toxic-appearing or diaphoretic.   HENT:      Head: Normocephalic and atraumatic.      Nose: No congestion or rhinorrhea.   Eyes:      General:         Right eye: No discharge.         Left eye: No discharge.   Cardiovascular:      Rate and Rhythm: Normal rate and regular rhythm.      Heart sounds: No murmur heard.  Pulmonary:      Effort: Pulmonary effort is normal. No respiratory distress.      Breath sounds: Normal breath sounds. No wheezing or rales.   Chest:      Chest wall: Tenderness present. No swelling, crepitus or edema.       Abdominal:      General: There is no distension.      Palpations: Abdomen is soft.      Tenderness: There is no abdominal tenderness. There is no right CVA tenderness, left CVA tenderness, guarding or rebound.   Musculoskeletal:      Cervical back: Normal range of motion. No rigidity.   Skin:     General: Skin is warm.      Capillary Refill: Capillary refill takes less than 2 seconds.   Neurological:      Mental Status: He is alert and oriented to person, place, and time.   Psychiatric:         Mood and Affect: Mood and affect normal.          Labs Reviewed   CBC AND DIFFERENTIAL - Abnormal       Result Value    WBC 8.03      RBC 4.77      Hemoglobin 15.2      Hematocrit 43.4      MCV 91      MCH 31.9      MCHC 35.0      RDW 14.3      MPV 9.5      Platelets 338      nRBC 0      Segmented % 58      Immature Grans % 1      Lymphocytes % 22      Monocytes % 14 (*)     Eosinophils Relative 4      Basophils Relative 1      Absolute Neutrophils 4.70      Absolute Immature Grans 0.06      Absolute Lymphocytes 1.78      Absolute Monocytes 1.11      Eosinophils Absolute 0.31      Basophils Absolute 0.07     COMPREHENSIVE METABOLIC PANEL - Abnormal    Sodium 136      Potassium 4.1      Chloride 105      CO2 22      ANION GAP 9      BUN 23      Creatinine 1.14      Glucose 113      Calcium 9.3      AST 22      ALT 54 (*)     Alkaline Phosphatase 82      Total Protein 7.2      Albumin 4.4      Total Bilirubin 0.38      eGFR 79      Narrative:     National Kid[employer] Foundation guidelines for Chronic Kidney Disease (CKD):     Stage 1 with normal or high GFR (GFR > 90 mL/min/1.73 square meters)    Stage 2 Mild CKD (GFR = 60-89 mL/min/1.73 square meters)    Stage 3A Moderate CKD (GFR = 45-59 mL/min/1.73 square meters)    Stage 3B Moderate CKD (GFR = 30-44 mL/min/1.73 square meters)    Stage 4 Severe CKD (GFR = 15-29 mL/min/1.73 square meters)    Stage 5 End Stage CKD (GFR <15 mL/min/1.73 square meters)  Note: GFR calculation is accurate only with a steady state creatinine   HS TROPONIN I 0HR - Normal    hs TnI 0hr 9     HS TROPONIN I 2HR - Normal    hs TnI 2hr 8      Delta 2hr hsTnI -1     HS TROPONIN I 4HR     XR chest portable   ED Interpretation by Panchito Aparicio DO (09/21 0057)   No pneumothorax.  No focal infiltrate.  No cardiomegaly.  No acute cardiopulmonary disease.          Procedures    ED Medication and Procedure Management   Prior to Admission Medications   Prescriptions Last Dose Informant Patient Reported? Taking?   DULoxetine (Cymbalta) 30  mg delayed release capsule   Yes No   Sig: Take 30 mg by mouth daily   QUEtiapine (SEROquel) 300 mg tablet   Yes No   Sig: TAKE 1 TABLET BY MOUTH DAILY AT BEDTIME X 14 DAYS   amLODIPine (NORVASC) 5 mg tablet   Yes No   Sig: Take 5 mg by mouth daily   cyclobenzaprine (FLEXERIL) 10 mg tablet   No No   Sig: Take 1 tablet (10 mg total) by mouth 2 (two) times a day as needed for muscle spasms   gabapentin (Neurontin) 300 mg capsule   No No   Sig: Take 1 capsule (300 mg total) by mouth 3 (three) times a day   gabapentin (Neurontin) 300 mg capsule   No No   Sig: Take 1 capsule (300 mg total) by mouth 3 (three) times a day for 7 days For post-herpetic neuralgia: Take 1 tablet on day 1,  Then take 2 tablets on day 2, Then take 3 tablets on day 3 and every day after that as instructed by your doctor.   ibuprofen (MOTRIN) 400 mg tablet   No No   Sig: Take 1 tablet (400 mg total) by mouth every 6 (six) hours as needed for moderate pain   methocarbamol (ROBAXIN) 500 mg tablet   No No   Sig: Take 1 tablet (500 mg total) by mouth 2 (two) times a day   nicotine (NICODERM CQ) 21 mg/24 hr TD 24 hr patch   Yes No   Sig: Place 1 patch on the skin every 24 hours   topiramate (Topamax) 25 mg tablet   No No   Sig: Take 1 tablet (25 mg total) by mouth 2 (two) times a day for 7 days      Facility-Administered Medications: None     Patient's Medications   Discharge Prescriptions    No medications on file     No discharge procedures on file.     Panchito Aparicio, DO  09/21/24 8876

## 2024-09-21 NOTE — DISCHARGE INSTRUCTIONS
You have been seen for chest pain. Please take tylenol and motrin as needed for symptoms. Return to the emergency department if you develop worsening pain, trouble breathing, vomiting, fevers or any other symptoms of concern. Please follow up with your PCP by calling the number provided.

## 2024-09-23 ENCOUNTER — VBI (OUTPATIENT)
Dept: FAMILY MEDICINE CLINIC | Facility: CLINIC | Age: 39
End: 2024-09-23

## 2024-09-23 NOTE — TELEPHONE ENCOUNTER
09/23/24 12:27 PM    Patient contacted post ED visit, first outreach attempt made. Message was left for patient to return a call to the [facility] Department at [facility]: Phone [phone].    Thank you.  Anastacia Townsend MA  [facility]

## 2024-09-24 NOTE — TELEPHONE ENCOUNTER
09/24/24 11:08 AM    Patient contacted post ED visit, phone outreaches were unsuccessful; patient does not have MyChart, a MyChart letter has not been sent.     Thank you.  Anastacia Townsend MA  [facility]

## 2024-09-24 NOTE — TELEPHONE ENCOUNTER
09/24/24 11:07 AM    Patient contacted post ED visit, second outreach attempt made. Message was left for patient to return a call to the [facility] Department at [facility]: Phone [phone].    Thank you.  Anastacia Townsend MA  [facility]

## 2024-09-24 NOTE — TELEPHONE ENCOUNTER
09/24/24 11:07 AM    Patient contacted post ED visit, third outreach attempt made. Message was left for patient to return a call to either the [facility] Department at [facility]: Phone [phone] or the PCP office.     Thank you.  Anastacia Townsend MA  [facility]

## 2024-09-25 ENCOUNTER — HOSPITAL ENCOUNTER (EMERGENCY)
Facility: HOSPITAL | Age: 39
Discharge: HOME | End: 2024-09-25
Attending: EMERGENCY MEDICINE
Payer: MEDICARE

## 2024-09-25 ENCOUNTER — APPOINTMENT (EMERGENCY)
Dept: RADIOLOGY | Facility: HOSPITAL | Age: 39
End: 2024-09-25
Payer: MEDICARE

## 2024-09-25 ENCOUNTER — APPOINTMENT (EMERGENCY)
Dept: CT IMAGING | Facility: HOSPITAL | Age: 39
End: 2024-09-25
Payer: MEDICARE

## 2024-09-25 VITALS
OXYGEN SATURATION: 97 % | HEART RATE: 63 BPM | WEIGHT: 170 LBS | RESPIRATION RATE: 18 BRPM | HEIGHT: 65 IN | TEMPERATURE: 98.8 F | SYSTOLIC BLOOD PRESSURE: 109 MMHG | DIASTOLIC BLOOD PRESSURE: 56 MMHG | BODY MASS INDEX: 28.32 KG/M2

## 2024-09-25 DIAGNOSIS — M54.2 NECK PAIN: Primary | ICD-10-CM

## 2024-09-25 PROCEDURE — 99284 EMERGENCY DEPT VISIT MOD MDM: CPT

## 2024-09-25 PROCEDURE — 99284 EMERGENCY DEPT VISIT MOD MDM: CPT | Performed by: EMERGENCY MEDICINE

## 2024-09-25 PROCEDURE — 72125 CT NECK SPINE W/O DYE: CPT

## 2024-09-25 PROCEDURE — 73030 X-RAY EXAM OF SHOULDER: CPT

## 2024-09-25 PROCEDURE — 96372 THER/PROPH/DIAG INJ SC/IM: CPT

## 2024-09-25 RX ORDER — ACETAMINOPHEN 325 MG/1
975 TABLET ORAL ONCE
Status: COMPLETED | OUTPATIENT
Start: 2024-09-25 | End: 2024-09-25

## 2024-09-25 RX ORDER — LIDOCAINE 50 MG/G
1 PATCH TOPICAL ONCE
Status: COMPLETED | OUTPATIENT
Start: 2024-09-25 | End: 2024-09-25

## 2024-09-25 RX ORDER — METHOCARBAMOL 750 MG/1
750 TABLET, FILM COATED ORAL 3 TIMES DAILY
Qty: 21 TABLET | Refills: 0 | Status: SHIPPED | OUTPATIENT
Start: 2024-09-25

## 2024-09-25 RX ORDER — KETOROLAC TROMETHAMINE 30 MG/ML
30 INJECTION, SOLUTION INTRAMUSCULAR; INTRAVENOUS ONCE
Status: COMPLETED | OUTPATIENT
Start: 2024-09-25 | End: 2024-09-25

## 2024-09-25 RX ORDER — METHOCARBAMOL 500 MG/1
500 TABLET, FILM COATED ORAL ONCE
Status: COMPLETED | OUTPATIENT
Start: 2024-09-25 | End: 2024-09-25

## 2024-09-25 RX ADMIN — METHOCARBAMOL TABLETS 500 MG: 500 TABLET, COATED ORAL at 00:55

## 2024-09-25 RX ADMIN — KETOROLAC TROMETHAMINE 30 MG: 30 INJECTION, SOLUTION INTRAMUSCULAR; INTRAVENOUS at 00:57

## 2024-09-25 RX ADMIN — LIDOCAINE 1 PATCH: 700 PATCH TOPICAL at 00:57

## 2024-09-25 RX ADMIN — ACETAMINOPHEN 975 MG: 325 TABLET ORAL at 00:55

## 2024-09-25 RX ADMIN — NICOTINE POLACRILEX 4 MG: 2 GUM, CHEWING BUCCAL at 03:25

## 2024-09-25 NOTE — ED CARE HANDOFF
Confirmed notification with [facility] (Contact: Kimberly) re: the available pt. contact and clinical information following several calls.  [facility] reported being on the phone with ED simultaneously and noted plan to follow-up.

## 2024-09-25 NOTE — DISCHARGE INSTRUCTIONS
Please follow-up with pain management and neurosurgery for further care, if symptoms worsen please return to the emergency department

## 2024-09-25 NOTE — ED PROVIDER NOTES
1. Neck pain      ED Disposition       ED Disposition   Discharge    Condition   Stable    Date/Time   Wed Sep 25, 2024  2:00 AM    Comment   Amado Chin discharge to home/self care.                   Assessment & Plan       Medical Decision Making  39-year-old male with chronic neck pain, worsened after a fall earlier today, obtain imaging to evaluate for fracture, low suspicion for acute cord compression given unremarkable neurologic exam bilateral upper extremities, patient afebrile with no neck stiffness no midline neck pain, posttraumatic pain low suspicion for epidural abscess     Amount and/or Complexity of Data Reviewed  Radiology: ordered and independent interpretation performed.    Risk  OTC drugs.  Prescription drug management.                ED Course as of 09/25/24 0201   Wed Sep 25, 2024   0059 Medical record reviewed patient admitted to behavioral health 9/5/2024 for paranoid ideation  Chart review patient with multiple previous admission for chronic neck pain, has left AMA multiple times   0108 Patient admitted 9/3/2024 for observation for cervical radiculopathy in setting of spinal stenosis however patient ripped out his IV and left prior to to evaluation by inpatient team.  He then went to [facility] admitted 9/4/2024 for evaluation by neurosurgery and PT OT, again left AGAINST MEDICAL ADVICE when he was informed that he would not get IV pain medications   0130 On further chart review patient was admitted to [facility] in May, at that time had an MRI updated of his cervical spine due to continued neuropathic cervical pain, he was seen by neurosurgery who advised against any immediate neurosurgical intervention however did offer outpatient evaluation of surgical options, he was evaluated by pain management who advised against opioids and recommended gabapentin 600 mg 3 times daily, increasing Robaxin to 750 mg 4 times daily   0200 Patient with no acute traumatic injuries  noted on CT scan, moving extremities neurologically intact currently, no indication for further inpatient evaluation or treatment stable for discharge at this time with close follow-up with pain management and neurosurgery due to to continued chronic neck pain issues       Medications   lidocaine (LIDODERM) 5 % patch 1 patch (1 patch Topical Medication Applied 9/25/24 0057)   methocarbamol (ROBAXIN) tablet 500 mg (500 mg Oral Given 9/25/24 0055)   acetaminophen (TYLENOL) tablet 975 mg (975 mg Oral Given 9/25/24 0055)   ketorolac (TORADOL) injection 30 mg (30 mg Intramuscular Given 9/25/24 0057)       History of Present Illness       39-year-old male with history of polysubstance abuse discharged from [facility] earlier today for evaluation of worsening chronic neck pain, states that he was playing basketball and fell on his right shoulder now has having pain in his left neck radiating to his left shoulder worse whenever he tries to move his neck or his shoulder.  No numbness or tingling of the extremities, no weakness moving bilateral upper extremities without significant difficulty     Of note patient does have known history of C3-C5 disc protrusion, has been evaluated by neurosurgery in the past has chronic neck pain with paresthesias of upper extremities with previous multiple evaluations, last MRI was in May 2024, Findings of A right subarticular zone disc protrusion at C5-C6 results in severe mass effect on the right subarticular zone and contributes to mild spinal canal narrowing.   The disc protrusion is increased in size from prior MRI.   A disc bulge at C6-C7 results in moderate to severe right foraminal narrowing. This appears overall similar as compared to prior MRI.   Additional multilevel degenerative changes in the cervical spine without high-grade spinal canal or foraminal narrowing. No pathologic intraspinal           Review of Systems   Constitutional:  Negative for appetite change, chills and fever.    HENT:  Negative for rhinorrhea and sore throat.    Eyes:  Negative for photophobia and visual disturbance.   Respiratory:  Negative for cough and shortness of breath.    Cardiovascular:  Negative for chest pain and palpitations.   Gastrointestinal:  Negative for abdominal pain and diarrhea.   Genitourinary:  Negative for dysuria, frequency and urgency.   Musculoskeletal:  Positive for neck pain.   Skin:  Negative for rash.   Neurological:  Negative for dizziness and weakness.   All other systems reviewed and are negative.          Objective     ED Triage Vitals   Temperature Pulse Blood Pressure Respirations SpO2 Patient Position - Orthostatic VS   09/25/24 0042 09/25/24 0042 09/25/24 0042 09/25/24 0042 09/25/24 0042 09/25/24 0115   98.8 °F (37.1 °C) 95 159/90 18 96 % Sitting      Temp Source Heart Rate Source BP Location FiO2 (%) Pain Score    09/25/24 0042 09/25/24 0042 09/25/24 0042 -- 09/25/24 0042    Temporal Monitor Right arm  9        Physical Exam  Vitals and nursing note reviewed.   Constitutional:       Appearance: He is well-developed.   HENT:      Head: Normocephalic and atraumatic.      Right Ear: External ear normal.      Left Ear: External ear normal.      Mouth/Throat:      Mouth: Oropharynx is clear and moist.   Eyes:      Extraocular Movements: EOM normal.      Conjunctiva/sclera: Conjunctivae normal.      Pupils: Pupils are equal, round, and reactive to light.   Neck:      Vascular: No JVD.      Trachea: No tracheal deviation.   Cardiovascular:      Rate and Rhythm: Normal rate and regular rhythm.      Heart sounds: Normal heart sounds. No murmur heard.     No friction rub. No gallop.   Pulmonary:      Effort: Pulmonary effort is normal. No respiratory distress.      Breath sounds: No stridor. No wheezing or rales.   Abdominal:      General: There is no distension.      Palpations: Abdomen is soft. There is no mass.      Tenderness: There is no abdominal tenderness. There is no guarding or  rebound.   Musculoskeletal:         General: No edema. Normal range of motion.      Cervical back: Normal range of motion and neck supple.      Comments: Muscle strength 5 out of 5 bilateral upper extremities no midline cervical thoracic tenderness, no step-offs, there is paraspinal left-sided tenderness to palpation over the cervical musculature, left shoulder without any obvious deformity, intact range of motion with no joint effusion no overlying warmth or erythema   Skin:     General: Skin is warm and dry.      Coloration: Skin is not pale.      Findings: No erythema or rash.   Neurological:      Mental Status: He is alert and oriented to person, place, and time.      Cranial Nerves: No cranial nerve deficit.   Psychiatric:         Mood and Affect: Mood and affect normal.         Labs Reviewed - No data to display  XR shoulder 2+ views LEFT   ED Interpretation by Edwige Rowell DO (09/25 0120)   This study was ordered and independently reviewed by me    No acute findings noted         CT spine cervical without contrast   Final Interpretation by Adali Stephens MD (09/25 4337)      No cervical spine fracture or traumatic malalignment.                  Workstation performed: [id number]             Procedures    ED Medication and Procedure Management   Prior to Admission Medications   Prescriptions Last Dose Informant Patient Reported? Taking?   DULoxetine (Cymbalta) 30 mg delayed release capsule   Yes No   Sig: Take 30 mg by mouth daily   QUEtiapine (SEROquel) 300 mg tablet   Yes No   Sig: TAKE 1 TABLET BY MOUTH DAILY AT BEDTIME X 14 DAYS   amLODIPine (NORVASC) 5 mg tablet   Yes No   Sig: Take 5 mg by mouth daily   cyclobenzaprine (FLEXERIL) 10 mg tablet   No No   Sig: Take 1 tablet (10 mg total) by mouth 2 (two) times a day as needed for muscle spasms   gabapentin (Neurontin) 300 mg capsule   No No   Sig: Take 1 capsule (300 mg total) by mouth 3 (three) times a day   gabapentin (Neurontin) 300 mg capsule   No No    Sig: Take 1 capsule (300 mg total) by mouth 3 (three) times a day for 7 days For post-herpetic neuralgia: Take 1 tablet on day 1,  Then take 2 tablets on day 2, Then take 3 tablets on day 3 and every day after that as instructed by your doctor.   ibuprofen (MOTRIN) 400 mg tablet   No No   Sig: Take 1 tablet (400 mg total) by mouth every 6 (six) hours as needed for moderate pain   methocarbamol (ROBAXIN) 500 mg tablet   No No   Sig: Take 1 tablet (500 mg total) by mouth 2 (two) times a day   nicotine (NICODERM CQ) 21 mg/24 hr TD 24 hr patch   Yes No   Sig: Place 1 patch on the skin every 24 hours   topiramate (Topamax) 25 mg tablet   No No   Sig: Take 1 tablet (25 mg total) by mouth 2 (two) times a day for 7 days      Facility-Administered Medications: None     Patient's Medications   Discharge Prescriptions    DICLOFENAC SODIUM (VOLTAREN) 1 %    Apply 2 g topically 4 (four) times a day       Start Date: 9/25/2024 End Date: --       Order Dose: 2 g       Quantity: 100 g    Refills: 0    METHOCARBAMOL (ROBAXIN) 750 MG TABLET    Take 1 tablet (750 mg total) by mouth 3 (three) times a day       Start Date: 9/25/2024 End Date: --       Order Dose: 750 mg       Quantity: 21 tablet    Refills: 0     No discharge procedures on file.     Edwige Rowell DO  09/25/24 0201

## 2024-09-25 NOTE — ED NOTES
Patient came out of room, advised he called the police. Stated he got serious news from a family member. Caspian arrived at this time. Caspian at bedside with patient     Bonnie Villafuerte RN  09/25/24 4784     Sepsis Criteria were met:

## 2024-09-25 NOTE — ED NOTES
[facility] was called, Patient requesting to go to another rehab facility. Kay will call back     Bonnie Villafuerte RN  09/25/24 9118

## 2024-09-25 NOTE — ED NOTES
Patient cleared by the , [facility] was called and stated they will try to get patient back to katelynn Villafuerte RN  09/25/24 5547

## 2024-09-26 ENCOUNTER — VBI (OUTPATIENT)
Dept: FAMILY MEDICINE CLINIC | Facility: CLINIC | Age: 39
End: 2024-09-26

## 2024-09-26 NOTE — TELEPHONE ENCOUNTER
09/26/24 10:05 AM    Patient contacted post ED visit, first outreach attempt made. Message was left for patient to return a call to the [facility] Department at [facility]: Phone [phone].    Thank you.  Law Chavez MA  [facility]

## 2024-09-27 NOTE — TELEPHONE ENCOUNTER
09/27/24 3:56 PM    Patient contacted post ED visit, phone outreaches were unsuccessful; patient does not have MyChart, a MyChart letter has not been sent.     Thank you.  Law Chavez MA  [facility]

## 2024-09-27 NOTE — TELEPHONE ENCOUNTER
09/27/24 3:56 PM    Patient contacted post ED visit, outreach attempt made but message could not be left. Additional outreach attempt will be made.   3rd attempt  Thank you.  Law Chavez MA  [facility]

## 2024-09-27 NOTE — TELEPHONE ENCOUNTER
09/27/24 9:33 AM    Patient contacted post ED visit, second outreach attempt made. Message was left for patient to return a call to the [facility] Department [facility]: Phone [phone].    Thank you.  Law Chavez MA  [facility]

## 2024-10-04 ENCOUNTER — APPOINTMENT (OUTPATIENT)
Dept: RADIOLOGY | Facility: HOSPITAL | Age: 39
End: 2024-10-04
Payer: MEDICARE

## 2024-10-04 ENCOUNTER — HOSPITAL ENCOUNTER (EMERGENCY)
Facility: HOSPITAL | Age: 39
Discharge: HOME | End: 2024-10-04
Attending: EMERGENCY MEDICINE
Payer: MEDICARE

## 2024-10-04 VITALS
HEART RATE: 96 BPM | WEIGHT: 186 LBS | SYSTOLIC BLOOD PRESSURE: 140 MMHG | BODY MASS INDEX: 30.95 KG/M2 | RESPIRATION RATE: 18 BRPM | TEMPERATURE: 99.2 F | OXYGEN SATURATION: 100 % | DIASTOLIC BLOOD PRESSURE: 97 MMHG

## 2024-10-04 DIAGNOSIS — J06.9 VIRAL URI WITH COUGH: Primary | ICD-10-CM

## 2024-10-04 DIAGNOSIS — F19.959 PSYCHOACTIVE SUBSTANCE-INDUCED PSYCHOSIS (HCC): ICD-10-CM

## 2024-10-04 LAB
ALBUMIN SERPL BCG-MCNC: 4.5 G/DL (ref 3.5–5)
ALP SERPL-CCNC: 79 U/L (ref 34–104)
ALT SERPL W P-5'-P-CCNC: 22 U/L (ref 7–52)
ANION GAP SERPL CALCULATED.3IONS-SCNC: 6 MMOL/L (ref 4–13)
AST SERPL W P-5'-P-CCNC: 16 U/L (ref 13–39)
BASOPHILS # BLD AUTO: 0.07 THOUSANDS/ΜL (ref 0–0.1)
BASOPHILS NFR BLD AUTO: 1 % (ref 0–1)
BILIRUB SERPL-MCNC: 0.35 MG/DL (ref 0.2–1)
BUN SERPL-MCNC: 15 MG/DL (ref 5–25)
CALCIUM SERPL-MCNC: 9.5 MG/DL (ref 8.4–10.2)
CARDIAC TROPONIN I PNL SERPL HS: <2 NG/L
CHLORIDE SERPL-SCNC: 106 MMOL/L (ref 96–108)
CO2 SERPL-SCNC: 28 MMOL/L (ref 21–32)
CREAT SERPL-MCNC: 1.02 MG/DL (ref 0.6–1.3)
EOSINOPHIL # BLD AUTO: 0.26 THOUSAND/ΜL (ref 0–0.61)
EOSINOPHIL NFR BLD AUTO: 4 % (ref 0–6)
ERYTHROCYTE [DISTWIDTH] IN BLOOD BY AUTOMATED COUNT: 14.2 % (ref 11.6–15.1)
FLUAV AG UPPER RESP QL IA.RAPID: NEGATIVE
FLUBV AG UPPER RESP QL IA.RAPID: NEGATIVE
GFR SERPL CREATININE-BSD FRML MDRD: 90 ML/MIN/1.73SQ M
GLUCOSE SERPL-MCNC: 97 MG/DL (ref 65–140)
HCT VFR BLD AUTO: 42.3 % (ref 36.5–49.3)
HGB BLD-MCNC: 14.4 G/DL (ref 12–17)
IMM GRANULOCYTES # BLD AUTO: 0.04 THOUSAND/UL (ref 0–0.2)
IMM GRANULOCYTES NFR BLD AUTO: 1 % (ref 0–2)
LYMPHOCYTES # BLD AUTO: 0.66 THOUSANDS/ΜL (ref 0.6–4.47)
LYMPHOCYTES NFR BLD AUTO: 10 % (ref 14–44)
MCH RBC QN AUTO: 31.6 PG (ref 26.8–34.3)
MCHC RBC AUTO-ENTMCNC: 34 G/DL (ref 31.4–37.4)
MCV RBC AUTO: 93 FL (ref 82–98)
MONOCYTES # BLD AUTO: 0.9 THOUSAND/ΜL (ref 0.17–1.22)
MONOCYTES NFR BLD AUTO: 13 % (ref 4–12)
NEUTROPHILS # BLD AUTO: 4.86 THOUSANDS/ΜL (ref 1.85–7.62)
NEUTS SEG NFR BLD AUTO: 71 % (ref 43–75)
NRBC BLD AUTO-RTO: 0 /100 WBCS
PLATELET # BLD AUTO: 294 THOUSANDS/UL (ref 149–390)
PMV BLD AUTO: 9.4 FL (ref 8.9–12.7)
POTASSIUM SERPL-SCNC: 3.9 MMOL/L (ref 3.5–5.3)
PROT SERPL-MCNC: 7.4 G/DL (ref 6.4–8.4)
RBC # BLD AUTO: 4.56 MILLION/UL (ref 3.88–5.62)
SARS-COV+SARS-COV-2 AG RESP QL IA.RAPID: NEGATIVE
SODIUM SERPL-SCNC: 140 MMOL/L (ref 135–147)
WBC # BLD AUTO: 6.79 THOUSAND/UL (ref 4.31–10.16)

## 2024-10-04 PROCEDURE — 99285 EMERGENCY DEPT VISIT HI MDM: CPT | Performed by: EMERGENCY MEDICINE

## 2024-10-04 PROCEDURE — 85025 COMPLETE CBC W/AUTO DIFF WBC: CPT | Performed by: EMERGENCY MEDICINE

## 2024-10-04 PROCEDURE — 99283 EMERGENCY DEPT VISIT LOW MDM: CPT

## 2024-10-04 PROCEDURE — 80053 COMPREHEN METABOLIC PANEL: CPT | Performed by: EMERGENCY MEDICINE

## 2024-10-04 PROCEDURE — 87811 SARS-COV-2 COVID19 W/OPTIC: CPT | Performed by: EMERGENCY MEDICINE

## 2024-10-04 PROCEDURE — 96372 THER/PROPH/DIAG INJ SC/IM: CPT

## 2024-10-04 PROCEDURE — 87804 INFLUENZA ASSAY W/OPTIC: CPT | Performed by: EMERGENCY MEDICINE

## 2024-10-04 PROCEDURE — 71046 X-RAY EXAM CHEST 2 VIEWS: CPT

## 2024-10-04 PROCEDURE — 84484 ASSAY OF TROPONIN QUANT: CPT | Performed by: EMERGENCY MEDICINE

## 2024-10-04 PROCEDURE — 93005 ELECTROCARDIOGRAM TRACING: CPT

## 2024-10-04 PROCEDURE — 36415 COLL VENOUS BLD VENIPUNCTURE: CPT

## 2024-10-04 RX ORDER — ACETAMINOPHEN 325 MG/1
975 TABLET ORAL ONCE
Status: COMPLETED | OUTPATIENT
Start: 2024-10-04 | End: 2024-10-04

## 2024-10-04 RX ORDER — KETOROLAC TROMETHAMINE 30 MG/ML
15 INJECTION, SOLUTION INTRAMUSCULAR; INTRAVENOUS ONCE
Status: COMPLETED | OUTPATIENT
Start: 2024-10-04 | End: 2024-10-04

## 2024-10-04 RX ORDER — QUETIAPINE FUMARATE 300 MG/1
300 TABLET, FILM COATED ORAL
Qty: 14 TABLET | Refills: 0 | Status: SHIPPED | OUTPATIENT
Start: 2024-10-04 | End: 2024-10-18

## 2024-10-04 RX ORDER — QUETIAPINE FUMARATE 100 MG/1
300 TABLET, FILM COATED ORAL ONCE
Status: COMPLETED | OUTPATIENT
Start: 2024-10-04 | End: 2024-10-04

## 2024-10-04 RX ORDER — FLUTICASONE PROPIONATE 50 MCG
1 SPRAY, SUSPENSION (ML) NASAL DAILY
Status: DISCONTINUED | OUTPATIENT
Start: 2024-10-04 | End: 2024-10-05 | Stop reason: HOSPADM

## 2024-10-04 RX ADMIN — ACETAMINOPHEN 975 MG: 325 TABLET ORAL at 23:14

## 2024-10-04 RX ADMIN — QUETIAPINE FUMARATE 300 MG: 100 TABLET ORAL at 23:18

## 2024-10-04 RX ADMIN — KETOROLAC TROMETHAMINE 15 MG: 30 INJECTION, SOLUTION INTRAMUSCULAR; INTRAVENOUS at 23:14

## 2024-10-04 RX ADMIN — FLUTICASONE PROPIONATE 1 SPRAY: 50 SPRAY, METERED NASAL at 23:14

## 2024-10-05 LAB
ATRIAL RATE: 92 BPM
P AXIS: 67 DEGREES
PR INTERVAL: 124 MS
QRS AXIS: 16 DEGREES
QRSD INTERVAL: 90 MS
QT INTERVAL: 350 MS
QTC INTERVAL: 432 MS
T WAVE AXIS: 36 DEGREES
VENTRICULAR RATE: 92 BPM

## 2024-10-05 PROCEDURE — 93010 ELECTROCARDIOGRAM REPORT: CPT | Performed by: INTERNAL MEDICINE

## 2024-10-05 NOTE — ED PROVIDER NOTES
Final diagnoses:   Viral URI with cough   Psychoactive 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HCC)     ED Disposition       ED Disposition   Discharge    Condition   Stable    Date/Time   Fri Oct 4, 2024 11:06 PM    Comment   Amado Chin discharge to home/self care.                   Assessment & Plan       Medical Decision Making  39-year-old male with congestion, cough, chest pain significant drug use, currently afebrile, ventral diagnosis includes ACS, arrhythmia, viral syndrome, pneumonia, myocarditis, pericarditis, endocarditis, currently he is afebrile in no acute distress with no significant tachycardia, low suspicion for endocarditis     Patient on chart review appears to have been taking Seroquel for sleep states that he has not really been able to sleep because the medication ran out and he is currently at [facility] unable to get to his primary care provider, on chart review does appear that he was taking 300 mg of Seroquel daily for sleep, will refill short course of medication and patient encouraged to follow-up with PCP for further refills.      Otherwise on lab work and imaging review labs essentially unremarkable x-ray without pneumonia, he is well-appearing in no acute distress requesting sandwiches that he states that he is very hungry, will discharge with close PCP follow-up no indication for further inpatient evaluation or treatment    Amount and/or Complexity of Data Reviewed  Labs: ordered.    Risk  OTC drugs.  Prescription drug management.        ED Course as of 10/04/24 2317   Fri Oct 04, 2024   2237 Procedure Note: EKG  Date/Time: 10/04/24 10:37 PM   Performed by: ERVIN MORA  Authorized by: ERVIN MORA  Indications / Diagnosis: CP  ECG reviewed by me, the ED Provider: yes   The EKG demonstrates:  Rhythm: normal sinus  Intervals: normal intervals  Axis: normal axis  QRS/Blocks: normal QRS  ST Changes: No acute ST Changes, no STD/BACILIO.       2311 Medical record reviewed patient currently at [facility], has  been evaluated multiple times in the emergency department multiple complaints, does have history of significant psychiatric symptoms and opioid use currently at [facility], per the note during hospital visit 9/4/2024 patient on Seroquel nightly, 300 mg       Medications   fluticasone (FLONASE) 50 mcg/act nasal spray 1 spray (1 spray Each Nare Given 10/4/24 2314)   QUEtiapine (SEROquel) tablet 300 mg (has no administration in time range)   ketorolac (TORADOL) injection 15 mg (15 mg Intramuscular Given 10/4/24 2314)   acetaminophen (TYLENOL) tablet 975 mg (975 mg Oral Given 10/4/24 2314)       ED Risk Strat Scores                           SBIRT 22yo+      Flowsheet Row Most Recent Value   Initial Alcohol Screen: US AUDIT-C     1. How often do you have a drink containing alcohol? 0 Filed at: 10/04/2024 2009   2. How many drinks containing alcohol do you have on a typical day you are drinking?  0 Filed at: 10/04/2024 2009   3a. Male UNDER 65: How often do you have five or more drinks on one occasion? 0 Filed at: 10/04/2024 2009   3b. FEMALE Any Age, or MALE 65+: How often do you have 4 or more drinks on one occassion? 0 Filed at: 10/04/2024 2009   Audit-C Score 0 Filed at: 10/04/2024 2009                            History of Present Illness       Chief Complaint   Patient presents with    Cold Like Symptoms     Started yesterday with cold like symptoms, cough, sore throat, chills, headache, feels like his lungs hurt, did use his inhaler with positive results. Stated his chest hurts from coughing.        Past Medical History:   Diagnosis Date    Asthma     Chronic pain     Hypertension     Neck pain     Psychoactive 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HCC)     Thyroglossal duct cyst       Past Surgical History:   Procedure Laterality Date    THYROGLOSSAL DUCT EXCISION      THYROID SURGERY      THYROID SURGERY        Family History   Problem Relation Age of Onset    Hypertension Mother     No Known Problems Father       Social  History     Tobacco Use    Smoking status: Every Day     Current packs/day: 0.50     Types: Cigarettes    Smokeless tobacco: Never    Tobacco comments:     5 cigerettes a day    Vaping Use    Vaping status: Former    Substances: Nicotine, Flavoring   Substance Use Topics    Alcohol use: Yes     Comment: occas    Drug use: Not Currently     Types: Marijuana, Methamphetamines     Comment: last used 09/2024      E-Cigarette/Vaping    E-Cigarette Use Former User     Cartridges/Day 1       E-Cigarette/Vaping Substances    Nicotine Yes     THC No     CBD No     Flavoring Yes     Other No     Unknown No       I have reviewed and agree with the history as documented.     39-year-old male with history of psychosis, substance abuse currently at [facility] for rehab until 10/11/2024 presents for evaluation of congestion, cough, chest pain .  Elevation patient is in no acute distress, otherwise well-appearing        Review of Systems   Constitutional:  Negative for appetite change, chills and fever.   HENT:  Positive for congestion. Negative for rhinorrhea and sore throat.    Eyes:  Negative for photophobia and visual disturbance.   Respiratory:  Positive for cough and shortness of breath.    Cardiovascular:  Positive for chest pain. Negative for palpitations.   Gastrointestinal:  Negative for abdominal pain and diarrhea.   Genitourinary:  Negative for dysuria, frequency and urgency.   Skin:  Negative for rash.   Neurological:  Negative for dizziness and weakness.   All other systems reviewed and are negative.          Objective       ED Triage Vitals [10/04/24 2007]   Temperature Pulse Blood Pressure Respirations SpO2 Patient Position - Orthostatic VS   99.2 °F (37.3 °C) 96 140/97 18 100 % Sitting      Temp Source Heart Rate Source BP Location FiO2 (%) Pain Score    Oral -- Left arm -- 8      Vitals      Date and Time Temp Pulse SpO2 Resp BP Pain Score FACES Pain Rating User   10/04/24 2007 99.2 °F (37.3 °C) 96 100 % 18 140/97 8  -- SV            Physical Exam  Vitals and nursing note reviewed.   Constitutional:       Appearance: He is well-developed.   HENT:      Head: Normocephalic and atraumatic.      Right Ear: External ear normal.      Left Ear: External ear normal.      Nose: Congestion and rhinorrhea present.      Mouth/Throat:      Mouth: Oropharynx is clear and moist.   Eyes:      Extraocular Movements: EOM normal.      Conjunctiva/sclera: Conjunctivae normal.      Pupils: Pupils are equal, round, and reactive to light.   Neck:      Vascular: No JVD.      Trachea: No tracheal deviation.   Cardiovascular:      Rate and Rhythm: Normal rate and regular rhythm.      Heart sounds: Normal heart sounds. No murmur heard.     No friction rub. No gallop.   Pulmonary:      Effort: Pulmonary effort is normal. No respiratory distress.      Breath sounds: No stridor. No wheezing or rales.   Abdominal:      General: There is no distension.      Palpations: Abdomen is soft. There is no mass.      Tenderness: There is no abdominal tenderness. There is no guarding or rebound.   Musculoskeletal:         General: No edema. Normal range of motion.      Cervical back: Normal range of motion and neck supple.   Skin:     General: Skin is warm and dry.      Coloration: Skin is not pale.      Findings: No erythema or rash.   Neurological:      Mental Status: He is alert and oriented to person, place, and time.      Cranial Nerves: No cranial nerve deficit.   Psychiatric:         Mood and Affect: Mood is anxious.         Speech: Speech is rapid and pressured.         Thought Content: Thought content does not include homicidal or suicidal ideation. Thought content does not include homicidal or suicidal plan.         Results Reviewed       Procedure Component Value Units Date/Time    HS Troponin 0hr (reflex protocol) [[id number]]  (Normal) Collected: 10/04/24 2015    Lab Status: Final result Specimen: Blood from Arm, Right Updated: 10/04/24 2054     hs TnI 0hr  <2 ng/L     Comprehensive metabolic panel [[id number]] Collected: 10/04/24 2015    Lab Status: Final result Specimen: Blood from Arm, Right Updated: 10/04/24 2051     Sodium 140 mmol/L      Potassium 3.9 mmol/L      Chloride 106 mmol/L      CO2 28 mmol/L      ANION GAP 6 mmol/L      BUN 15 mg/dL      Creatinine 1.02 mg/dL      Glucose 97 mg/dL      Calcium 9.5 mg/dL      AST 16 U/L      ALT 22 U/L      Alkaline Phosphatase 79 U/L      Total Protein 7.4 g/dL      Albumin 4.5 g/dL      Total Bilirubin 0.35 mg/dL      eGFR 90 ml/min/1.73sq m     Narrative:      National Kidney Disease Foundation guidelines for Chronic Kidney Disease (CKD):     Stage 1 with normal or high GFR (GFR > 90 mL/min/1.73 square meters)    Stage 2 Mild CKD (GFR = 60-89 mL/min/1.73 square meters)    Stage 3A Moderate CKD (GFR = 45-59 mL/min/1.73 square meters)    Stage 3B Moderate CKD (GFR = 30-44 mL/min/1.73 square meters)    Stage 4 Severe CKD (GFR = 15-29 mL/min/1.73 square meters)    Stage 5 End Stage CKD (GFR <15 mL/min/1.73 square meters)  Note: GFR calculation is accurate only with a steady state creatinine    FLU/COVID Rapid Antigen (30 min. TAT) - Preferred screening test in ED [[id number]]  (Normal) Collected: 10/04/24 2015    Lab Status: Final result Specimen: Nares from Nose Updated: 10/04/24 2046     SARS COV Rapid Antigen Negative     Influenza A Rapid Antigen Negative     Influenza B Rapid Antigen Negative    Narrative:      This test has been performed using the Qu[employer] Jennifer 2 FLU+SARS Antigen test under the Emergency Use Authorization (EUA). This test has been validated by the  and verified by the performing laboratory. The Jennifer uses lateral flow immunofluorescent sandwich assay to detect SARS-COV, Influenza A and Influenza B Antigen.     The Quidel Jennifer 2 SARS Antigen test does not differentiate between SARS-CoV and SARS-CoV-2.     Negative results are presumptive and may be confirmed with a molecular assay, if  necessary, for patient management. Negative results do not rule out SARS-CoV-2 or influenza infection and should not be used as the sole basis for treatment or patient management decisions. A negative test result may occur if the level of antigen in a sample is below the limit of detection of this test.     Positive results are indicative of the presence of viral antigens, but do not rule out bacterial infection or co-infection with other viruses.     All test results should be used as an adjunct to clinical observations and other information available to the provider.    FOR PEDIATRIC PATIENTS - copy/paste COVID Guidelines URL to browser: [email or web address]    CBC and differential [[id number]]  (Abnormal) Collected: 10/04/24 2015    Lab Status: Final result Specimen: Blood from Arm, Right Updated: 10/04/24 2030     WBC 6.79 Thousand/uL      RBC 4.56 Million/uL      Hemoglobin 14.4 g/dL      Hematocrit 42.3 %      MCV 93 fL      MCH 31.6 pg      MCHC 34.0 g/dL      RDW 14.2 %      MPV 9.4 fL      Platelets 294 Thousands/uL      nRBC 0 /100 WBCs      Segmented % 71 %      Immature Grans % 1 %      Lymphocytes % 10 %      Monocytes % 13 %      Eosinophils Relative 4 %      Basophils Relative 1 %      Absolute Neutrophils 4.86 Thousands/µL      Absolute Immature Grans 0.04 Thousand/uL      Absolute Lymphocytes 0.66 Thousands/µL      Absolute Monocytes 0.90 Thousand/µL      Eosinophils Absolute 0.26 Thousand/µL      Basophils Absolute 0.07 Thousands/µL             XR chest pa and lateral   Final Interpretation by Donta Simmons MD (10/04 2108)      No acute cardiopulmonary disease.            Workstation performed: [id number]             Procedures    ED Medication and Procedure Management   Prior to Admission Medications   Prescriptions Last Dose Informant Patient Reported? Taking?   DULoxetine (Cymbalta) 30 mg delayed release capsule   Yes No   Sig: Take 30  mg by mouth daily   Diclofenac Sodium (VOLTAREN) 1 %   No No   Sig: Apply 2 g topically 4 (four) times a day   QUEtiapine (SEROquel) 300 mg tablet   Yes No   Sig: TAKE 1 TABLET BY MOUTH DAILY AT BEDTIME X 14 DAYS   QUEtiapine (SEROquel) 300 mg tablet   No Yes   Sig: Take 1 tablet (300 mg total) by mouth daily at bedtime for 14 days   amLODIPine (NORVASC) 5 mg tablet   Yes No   Sig: Take 5 mg by mouth daily   cyclobenzaprine (FLEXERIL) 10 mg tablet   No No   Sig: Take 1 tablet (10 mg total) by mouth 2 (two) times a day as needed for muscle spasms   gabapentin (Neurontin) 300 mg capsule   No No   Sig: Take 1 capsule (300 mg total) by mouth 3 (three) times a day   gabapentin (Neurontin) 300 mg capsule   No No   Sig: Take 1 capsule (300 mg total) by mouth 3 (three) times a day for 7 days For post-herpetic neuralgia: Take 1 tablet on day 1,  Then take 2 tablets on day 2, Then take 3 tablets on day 3 and every day after that as instructed by your doctor.   ibuprofen (MOTRIN) 400 mg tablet   No No   Sig: Take 1 tablet (400 mg total) by mouth every 6 (six) hours as needed for moderate pain   methocarbamol (ROBAXIN) 500 mg tablet   No No   Sig: Take 1 tablet (500 mg total) by mouth 2 (two) times a day   methocarbamol (ROBAXIN) 750 mg tablet   No No   Sig: Take 1 tablet (750 mg total) by mouth 3 (three) times a day   nicotine (NICODERM CQ) 21 mg/24 hr TD 24 hr patch   Yes No   Sig: Place 1 patch on the skin every 24 hours   topiramate (Topamax) 25 mg tablet   No No   Sig: Take 1 tablet (25 mg total) by mouth 2 (two) times a day for 7 days      Facility-Administered Medications: None     Current Discharge Medication List        CONTINUE these medications which have CHANGED    Details   QUEtiapine (SEROquel) 300 mg tablet Take 1 tablet (300 mg total) by mouth daily at bedtime for 14 days  Qty: 14 tablet, Refills: 0    Associated Diagnoses: Psychoactive 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HCC)           CONTINUE these medications which  have NOT CHANGED    Details   amLODIPine (NORVASC) 5 mg tablet Take 5 mg by mouth daily      cyclobenzaprine (FLEXERIL) 10 mg tablet Take 1 tablet (10 mg total) by mouth 2 (two) times a day as needed for muscle spasms  Qty: 20 tablet, Refills: 0    Associated Diagnoses: Neck pain      Diclofenac Sodium (VOLTAREN) 1 % Apply 2 g topically 4 (four) times a day  Qty: 100 g, Refills: 0    Associated Diagnoses: Neck pain      DULoxetine (Cymbalta) 30 mg delayed release capsule Take 30 mg by mouth daily      gabapentin (Neurontin) 300 mg capsule Take 1 capsule (300 mg total) by mouth 3 (three) times a day  Qty: 90 capsule, Refills: 1    Associated Diagnoses: Cervical radiculopathy      ibuprofen (MOTRIN) 400 mg tablet Take 1 tablet (400 mg total) by mouth every 6 (six) hours as needed for moderate pain  Qty: 20 tablet, Refills: 0    Associated Diagnoses: Neck pain      !! methocarbamol (ROBAXIN) 500 mg tablet Take 1 tablet (500 mg total) by mouth 2 (two) times a day  Qty: 20 tablet, Refills: 0    Associated Diagnoses: Chronic pain      !! methocarbamol (ROBAXIN) 750 mg tablet Take 1 tablet (750 mg total) by mouth 3 (three) times a day  Qty: 21 tablet, Refills: 0    Associated Diagnoses: Neck pain      nicotine (NICODERM CQ) 21 mg/24 hr TD 24 hr patch Place 1 patch on the skin every 24 hours      topiramate (Topamax) 25 mg tablet Take 1 tablet (25 mg total) by mouth 2 (two) times a day for 7 days  Qty: 14 tablet, Refills: 0    Associated Diagnoses: Chronic neck pain       !! - Potential duplicate medications found. Please discuss with provider.        No discharge procedures on file.  ED SEPSIS DOCUMENTATION   Time reflects when diagnosis was documented in both MDM as applicable and the Disposition within this note       Time User Action Codes Description Comment    10/4/2024 11:06 PM Edwige Rowell [J06.9] Viral URI with cough     10/4/2024 11:13 PM Edwige Rowell [F19.959] Psychoactive 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HCC)                   Edwige Rowell DO  10/04/24 2312

## 2024-10-05 NOTE — DISCHARGE INSTRUCTIONS
used Please follow-up with your primary care provider for further care, if symptoms worsen please return to the emergency department    May use Flonase once daily for congestion

## 2024-10-07 ENCOUNTER — VBI (OUTPATIENT)
Dept: FAMILY MEDICINE CLINIC | Facility: CLINIC | Age: 39
End: 2024-10-07

## 2024-10-07 NOTE — TELEPHONE ENCOUNTER
10/07/24 11:26 AM    Patient contacted post ED visit, outreach attempt made but message could not be left. Additional outreach attempt will be made.     Thank you.  Sheila Padron MA  [facility]

## 2024-10-08 NOTE — TELEPHONE ENCOUNTER
10/08/24 9:59 AM    Patient contacted post ED visit, outreach attempt made but message could not be left. Additional outreach attempt will be made.     Thank you.  Sheila Padron MA  [facility]

## 2024-10-09 NOTE — TELEPHONE ENCOUNTER
10/09/24 10:16 AM    Patient contacted post ED visit, phone outreaches were unsuccessful; patient does not have MyChart, a MyChart letter has not been sent.     Thank you.  Sheila Padron MA  [facility]

## 2024-10-18 ENCOUNTER — HOSPITAL ENCOUNTER (EMERGENCY)
Facility: HOSPITAL | Age: 39
Discharge: HOME | End: 2024-10-18
Attending: EMERGENCY MEDICINE
Payer: MEDICARE

## 2024-10-18 ENCOUNTER — HOSPITAL ENCOUNTER (EMERGENCY)
Facility: HOSPITAL | Age: 39
Discharge: HOME | End: 2024-10-19
Attending: EMERGENCY MEDICINE
Payer: MEDICARE

## 2024-10-18 VITALS
HEART RATE: 68 BPM | RESPIRATION RATE: 16 BRPM | SYSTOLIC BLOOD PRESSURE: 105 MMHG | DIASTOLIC BLOOD PRESSURE: 70 MMHG | OXYGEN SATURATION: 99 % | TEMPERATURE: 98 F
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
## 2024-10-18 DIAGNOSIS — M47.22 RADICULOPATHY DUE TO CERVICAL SPONDYLOSIS: ICD-10-CM

## 2024-10-18 DIAGNOSIS — M48.02 CERVICAL STENOSIS OF SPINE: Primary | ICD-10-CM

## 2024-10-18 PROCEDURE — 99284 EMERGENCY DEPT VISIT MOD MDM: CPT | Performed by: EMERGENCY MEDICINE

## 2024-10-18 PROCEDURE — 99283 EMERGENCY DEPT VISIT LOW MDM: CPT

## 2024-10-18 RX ORDER — KETOROLAC TROMETHAMINE 30 MG/ML
15 INJECTION, SOLUTION INTRAMUSCULAR; INTRAVENOUS ONCE
Status: COMPLETED | OUTPATIENT
Start: 2024-10-18 | End: 2024-10-18

## 2024-10-18 RX ORDER — CYCLOBENZAPRINE HCL 10 MG
10 TABLET ORAL ONCE
Status: COMPLETED | OUTPATIENT
Start: 2024-10-18 | End: 2024-10-18

## 2024-10-18 RX ORDER — CYCLOBENZAPRINE HCL 10 MG
10 TABLET ORAL 2 TIMES DAILY PRN
Qty: 20 TABLET | Refills: 0 | Status: SHIPPED | OUTPATIENT
Start: 2024-10-18 | End: 2024-11-01

## 2024-10-18 RX ORDER — GABAPENTIN 300 MG/1
300 CAPSULE ORAL ONCE
Status: COMPLETED | OUTPATIENT
Start: 2024-10-18 | End: 2024-10-18

## 2024-10-18 RX ORDER — GABAPENTIN 300 MG/1
300 CAPSULE ORAL 3 TIMES DAILY
Qty: 42 CAPSULE | Refills: 0 | Status: SHIPPED | OUTPATIENT
Start: 2024-10-18 | End: 2024-11-01

## 2024-10-18 RX ORDER — NALOXONE HYDROCHLORIDE 1 MG/ML
1 INJECTION INTRAMUSCULAR; INTRAVENOUS; SUBCUTANEOUS ONCE
Status: COMPLETED | OUTPATIENT
Start: 2024-10-18 | End: 2024-10-18

## 2024-10-18 RX ADMIN — KETOROLAC TROMETHAMINE 15 MG: 30 INJECTION, SOLUTION INTRAMUSCULAR at 16:45

## 2024-10-18 RX ADMIN — CYCLOBENZAPRINE HYDROCHLORIDE 10 MG: 10 TABLET, FILM COATED ORAL at 16:46

## 2024-10-18 RX ADMIN — GABAPENTIN 300 MG: 300 CAPSULE ORAL at 16:46

## 2024-10-18 RX ADMIN — NALOXONE HYDROCHLORIDE 1 MG: 1 INJECTION PARENTERAL at 06:15

## 2024-10-18 NOTE — ED PROVIDER NOTES
"Time reflects when diagnosis was documented in both MDM as applicable and the Disposition within this note       Time User Action Codes Description Comment    10/18/2024  4:33 AM Hermelindo Rocha Add [W19.XXXA] Fall, initial encounter           ED Disposition       None          Assessment & Plan       Medical Decision Making  Pt somnolent on exam, maintaining airway. Pt in no obvious pain. Will observe    On reassessment, pt remains somnolent. Responded to narcan - awake and \"not happy  he got narcan.\" No complaints at this time. Will continue to monitor airway    See sign out note for final plans.     Risk  Prescription drug management.        ED Course as of 10/18/24 0717   Fri Oct 18, 2024   0700 Pt awake after narcan, no complaints.        Medications   naloxone (NARCAN) intranasal 1 mg (1 mg Nasal Given 10/18/24 0615)       ED Risk Strat Scores                                               History of Present Illness       Chief Complaint   Patient presents with    Fall     Pt brought in via EMS. States \"tripped and fell over an hr ago hurt his back and neck.       Past Medical History:   Diagnosis Date    Asthma     Chronic pain     Hypertension     Neck pain     Psychoactive 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HCC)     Thyroglossal duct cyst       Past Surgical History:   Procedure Laterality Date    THYROGLOSSAL DUCT EXCISION      THYROID SURGERY      THYROID SURGERY        Family History   Problem Relation Age of Onset    Hypertension Mother     No Known Problems Father       Social History     Tobacco Use    Smoking status: Every Day     Current packs/day: 0.50     Types: Cigarettes    Smokeless tobacco: Never    Tobacco comments:     5 cigerettes a day    Vaping Use    Vaping status: Former    Substances: Nicotine, Flavoring   Substance Use Topics    Alcohol use: Yes     Comment: occas    Drug use: Not Currently     Types: Marijuana, Methamphetamines     Comment: last used 09/2024      E-Cigarette/Vaping    " E-Cigarette Use Former User     Cartridges/Day 1       E-Cigarette/Vaping Substances    Nicotine Yes     THC No     CBD No     Flavoring Yes     Other No     Unknown No       I have reviewed and agree with the history as documented.     38 yo M w/PMHx of polysubstance abuse, chronic neck pain, bipolar disorder, brought in by EMS for evaluation of back pain after fall. Pt somnolent on initial exam, unable to provide details on fall. No complaints. Denies recreational drug use         Review of Systems        Objective       ED Triage Vitals [10/18/24 0145]   Temperature Pulse Blood Pressure Respirations SpO2 Patient Position - Orthostatic VS   98 °F (36.7 °C) 100 132/84 20 97 % Lying      Temp Source Heart Rate Source BP Location FiO2 (%) Pain Score    Oral Monitor Left arm -- 9      Vitals      Date and Time Temp Pulse SpO2 Resp BP Pain Score FACES Pain Rating User   10/18/24 0715 -- 64 97 % 18 114/68 -- -- JR   10/18/24 0145 98 °F (36.7 °C) 100 97 % 20 132/84 9 -- JULIA            Physical Exam  Vitals reviewed.   Constitutional:       General: He is sleeping.      Appearance: Normal appearance.   HENT:      Head: Normocephalic and atraumatic.      Right Ear: External ear normal.      Left Ear: External ear normal.      Nose: Nose normal.      Mouth/Throat:      Mouth: Mucous membranes are moist.      Pharynx: Oropharynx is clear.   Eyes:      Extraocular Movements: Extraocular movements intact.      Pupils: Pupils are equal, round, and reactive to light.   Cardiovascular:      Rate and Rhythm: Normal rate and regular rhythm.      Pulses: Normal pulses.      Heart sounds: Normal heart sounds.   Pulmonary:      Effort: Pulmonary effort is normal.      Breath sounds: Normal breath sounds.   Abdominal:      General: Abdomen is flat.      Palpations: Abdomen is soft.      Tenderness: There is no abdominal tenderness.   Musculoskeletal:         General: Normal range of motion.      Cervical back: Normal range of motion.    Skin:     General: Skin is warm and dry.      Capillary Refill: Capillary refill takes less than 2 seconds.   Neurological:      General: No focal deficit present.      Mental Status: He is oriented to person, place, and time and easily aroused.         Results Reviewed       None            No orders to display       Procedures    ED Medication and Procedure Management   Prior to Admission Medications   Prescriptions Last Dose Informant Patient Reported? Taking?   DULoxetine (Cymbalta) 30 mg delayed release capsule   Yes No   Sig: Take 30 mg by mouth daily   Diclofenac Sodium (VOLTAREN) 1 %   No No   Sig: Apply 2 g topically 4 (four) times a day   QUEtiapine (SEROquel) 300 mg tablet   No No   Sig: Take 1 tablet (300 mg total) by mouth daily at bedtime for 14 days   amLODIPine (NORVASC) 5 mg tablet   Yes No   Sig: Take 5 mg by mouth daily   cyclobenzaprine (FLEXERIL) 10 mg tablet   No No   Sig: Take 1 tablet (10 mg total) by mouth 2 (two) times a day as needed for muscle spasms   gabapentin (Neurontin) 300 mg capsule   No No   Sig: Take 1 capsule (300 mg total) by mouth 3 (three) times a day   gabapentin (Neurontin) 300 mg capsule   No No   Sig: Take 1 capsule (300 mg total) by mouth 3 (three) times a day for 7 days For post-herpetic neuralgia: Take 1 tablet on day 1,  Then take 2 tablets on day 2, Then take 3 tablets on day 3 and every day after that as instructed by your doctor.   ibuprofen (MOTRIN) 400 mg tablet   No No   Sig: Take 1 tablet (400 mg total) by mouth every 6 (six) hours as needed for moderate pain   methocarbamol (ROBAXIN) 500 mg tablet   No No   Sig: Take 1 tablet (500 mg total) by mouth 2 (two) times a day   methocarbamol (ROBAXIN) 750 mg tablet   No No   Sig: Take 1 tablet (750 mg total) by mouth 3 (three) times a day   nicotine (NICODERM CQ) 21 mg/24 hr TD 24 hr patch   Yes No   Sig: Place 1 patch on the skin every 24 hours   topiramate (Topamax) 25 mg tablet   No No   Sig: Take 1 tablet (25  mg total) by mouth 2 (two) times a day for 7 days      Facility-Administered Medications: None     Patient's Medications   Discharge Prescriptions    No medications on file     No discharge procedures on file.  ED SEPSIS DOCUMENTATION   Time reflects when diagnosis was documented in both MDM as applicable and the Disposition within this note       Time User Action Codes Description Comment    10/18/2024  4:33 AM Hermelindo Rocha Add [W19.XXXA] Fall, initial encounter                  Hermelindo Rocha MD  10/18/24 0717

## 2024-10-18 NOTE — ED ATTENDING ATTESTATION
10/18/2024  I, Darion Shah MD, saw and evaluated the patient. I have discussed the patient with the resident/non-physician practitioner and agree with the resident's/non-physician practitioner's findings, Plan of Care, and MDM as documented in the resident's/non-physician practitioner's note, except where noted. All available labs and Radiology studies were reviewed.  I was present for key portions of any procedure(s) performed by the resident/non-physician practitioner and I was immediately available to provide assistance.       At this point I agree with the current assessment done in the Emergency Department.  I have conducted an independent evaluation of this patient a history and physical is as follows:    ED Course     Emergency Department Note- Amado Chin 39 y.o. male MRN: [id number]    Unit/Bed#: RW 02 Encounter: [id number]    Amado Chin is a 39 y.o. male who presents with   Chief Complaint   Patient presents with    Pain     Pt states he left here a few hours ago and is still having generalized pain.  Pt states he needs a neck fusion done         History of Present Illness   HPI:  Amado Chin is a 39 y.o. male who presents for evaluation of:  Acute exacerbation of chronic recurrent neck pain.  The patient states that his neck pain radiates from the back of his neck down his mid spine down the back of both of his arms down the back of both of his legs.  He believes that he needs a neck fusion done.  He states that he is scheduled to see the neurosurgeons at [facility] for his neck pain who is seen in the past.  Patient did fall yesterday and was in the ED earlier this morning.  Any sort of movement provokes his discomfort.  He has a history of substance abuse in the past but denies any recent substance abuse.  CT scan cervical spine done on September 25, 2024 no cervical spine fracture or traumatic malalignment.  August 25, 2024 CT scan cervical spine no cervical spine fracture or  traumatic malalignment; May 14, 2024 MRI of spine with and without contrast demonstrates right subarticular zone disc protrusion at C5-C6 resulting in severe mass effect on the right subarticular zone and contributes to mild spinal canal narrowing.  The disc protrusion is noted to be increased in size from prior MRI; a disc bulge at C6-C7 results in moderate to severe right foraminal narrowing this appears similar to prior MRI; additional multilevel degenerative changes in cervical spine without high-grade spinal canal or foraminal narrowing.  April 13, 2024 MRI cervical spine with and without contrast diffusely diminished AP diameter of the spinal canal; spondylolysis, disc bulging disc protrusions at multiple levels, bilateral foraminal stenosis at multiple levels, central or right paracentral stenosis at C4-C5, C5-C6, and C6-C7.    Review of Systems   Constitutional:  Negative for fatigue and fever.   HENT:  Negative for congestion and sore throat.    Respiratory:  Negative for cough and shortness of breath.    Cardiovascular:  Negative for chest pain and palpitations.   Gastrointestinal:  Negative for abdominal pain and nausea.   Genitourinary:  Negative for flank pain and frequency.   Musculoskeletal:  Positive for back pain and neck pain.   Neurological:  Negative for light-headedness and headaches.   Psychiatric/Behavioral:  Negative for dysphoric mood and hallucinations.    All other systems reviewed and are negative.  Review emergency medicine visit this morning: Patient administered naloxone because of somnolence and responded to the naloxone.    Historical Information   Past Medical History:   Diagnosis Date    Asthma     Chronic pain     Hypertension     Neck pain     Psychoactive 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HCC)     Thyroglossal duct cyst      Past Surgical History:   Procedure Laterality Date    THYROGLOSSAL DUCT EXCISION      THYROID SURGERY      THYROID SURGERY       Social History   Social History      Substance and Sexual Activity   Alcohol Use Yes    Comment: occas     Social History     Substance and Sexual Activity   Drug Use Not Currently    Types: Marijuana, Methamphetamines    Comment: last used 09/2024     Social History     Tobacco Use   Smoking Status Every Day    Current packs/day: 0.50    Types: Cigarettes   Smokeless Tobacco Never   Tobacco Comments    5 cigerettes a day      Family History:   Family History   Problem Relation Age of Onset    Hypertension Mother     No Known Problems Father        Meds/Allergies   PTA meds:   Prior to Admission Medications   Prescriptions Last Dose Informant Patient Reported? Taking?   DULoxetine (Cymbalta) 30 mg delayed release capsule   Yes No   Sig: Take 30 mg by mouth daily   Diclofenac Sodium (VOLTAREN) 1 %   No No   Sig: Apply 2 g topically 4 (four) times a day   QUEtiapine (SEROquel) 300 mg tablet   No No   Sig: Take 1 tablet (300 mg total) by mouth daily at bedtime for 14 days   amLODIPine (NORVASC) 5 mg tablet   Yes No   Sig: Take 5 mg by mouth daily   cyclobenzaprine (FLEXERIL) 10 mg tablet   No No   Sig: Take 1 tablet (10 mg total) by mouth 2 (two) times a day as needed for muscle spasms   gabapentin (Neurontin) 300 mg capsule   No No   Sig: Take 1 capsule (300 mg total) by mouth 3 (three) times a day   gabapentin (Neurontin) 300 mg capsule   No No   Sig: Take 1 capsule (300 mg total) by mouth 3 (three) times a day for 7 days For post-herpetic neuralgia: Take 1 tablet on day 1,  Then take 2 tablets on day 2, Then take 3 tablets on day 3 and every day after that as instructed by your doctor.   ibuprofen (MOTRIN) 400 mg tablet   No No   Sig: Take 1 tablet (400 mg total) by mouth every 6 (six) hours as needed for moderate pain   methocarbamol (ROBAXIN) 500 mg tablet   No No   Sig: Take 1 tablet (500 mg total) by mouth 2 (two) times a day   methocarbamol (ROBAXIN) 750 mg tablet   No No   Sig: Take 1 tablet (750 mg total) by mouth 3 (three) times a day    nicotine (NICODERM CQ) 21 mg/24 hr TD 24 hr patch   Yes No   Sig: Place 1 patch on the skin every 24 hours   topiramate (Topamax) 25 mg tablet   No No   Sig: Take 1 tablet (25 mg total) by mouth 2 (two) times a day for 7 days      Facility-Administered Medications: None     No Known Allergies    Objective   First Vitals:   Blood Pressure: 137/79 (10/18/24 1352)  Pulse: 100 (10/18/24 1352)  Temperature: (!) 97.2 °F (36.2 °C) (10/18/24 1352)  Temp Source: Temporal (10/18/24 1352)  Respirations: 16 (10/18/24 1352)  SpO2: 97 % (10/18/24 1352)    Current Vitals:   Blood Pressure: 132/87 (10/18/24 1528)  Pulse: 104 (10/18/24 1528)  Temperature: (!) 97.2 °F (36.2 °C) (10/18/24 1352)  Temp Source: Temporal (10/18/24 1352)  Respirations: 16 (10/18/24 1528)  SpO2: 97 % (10/18/24 1528)    No intake or output data in the 24 hours ending 10/18/24 1653    Invasive Devices       None                   Physical Exam  Vitals and nursing note reviewed.   Constitutional:       General: He is not in acute distress.     Appearance: Normal appearance. He is well-developed.   HENT:      Head: Normocephalic and atraumatic.      Right Ear: External ear normal.      Left Ear: External ear normal.      Nose: Nose normal.      Mouth/Throat:      Pharynx: No oropharyngeal exudate.   Eyes:      Conjunctiva/sclera: Conjunctivae normal.      Pupils: Pupils are equal, round, and reactive to light.     Cardiovascular:      Rate and Rhythm: Normal rate and regular rhythm.   Pulmonary:      Effort: Pulmonary effort is normal. No respiratory distress.   Abdominal:      General: Abdomen is flat. There is no distension.      Palpations: Abdomen is soft.   Musculoskeletal:         General: No deformity. Normal range of motion.      Cervical back: Normal range of motion and neck supple.   Skin:     General: Skin is warm and dry.      Capillary Refill: Capillary refill takes less than 2 seconds.   Neurological:      General: No focal deficit present.       "Mental Status: He is alert and oriented to person, place, and time. Mental status is at baseline.      Coordination: Coordination normal.   Psychiatric:         Mood and Affect: Mood normal.         Behavior: Behavior normal.         Thought Content: Thought content normal.         Judgment: Judgment normal.           Medical Decision Makin.  Acute exacerbation of chronic neck and back pain: Plan administer ketorolac, cyclobenzaprine, and gabapentin for pain control.  Patient states that he is scheduled to see his neurosurgeons at [facility] next week.  Anticipate referral back to the patient's neurosurgeons.  Plan to do a postvoid residual in the ED.    No results found for this or any previous visit (from the past 36 hour(s)).  No orders to display         Portions of the record may have been created with voice recognition software. Occasional wrong word or \"sound a like\" substitutions may have occurred due to the inherent limitations of voice recognition software.  Read the chart carefully and recognize, using context, where substitutions have occurred.        Critical Care Time  Procedures      "

## 2024-10-18 NOTE — DISCHARGE INSTRUCTIONS
Order labs and imaging were reviewed from the past.  Your post residual volume was normal meaning you are not having any retention of urine.  I am prescribing you gabapentin and Flexeril to go home with.  You have adequate follow-up with neurosurgery coming up at [facility].  Please follow-up with that physician at that time.

## 2024-10-18 NOTE — ED PROVIDER NOTES
Chief Complaint   Patient presents with    Pain     Pt states he left here a few hours ago and is still having generalized pain.  Pt states he needs a neck fusion done     History of Present Illness and Review of Systems   This is a 39 y.o. male with PMHx of polysubstance abuse, chronic neck pain, bipolar disorder, brought in by EMS  last night for evaluation of back pain after fall. Patient was somnolent and given naloxone with improvement. He was observed and discharged at that time. He comes back in because he states he is having generalized pain.    No other complaints for this encounter.    Remainder of ROS Reviewed and Non-Pertinent    Past Medical, Past Surgical History:    has a past medical history of Asthma, Chronic pain, Hypertension, Neck pain, Psychoactive 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HCC), and Thyroglossal duct cyst.   has a past surgical history that includes Thyroid surgery; Thyroglossal duct excision; and Thyroid surgery.     Allergies:   No Known Allergies    Social and Family History:     Social History     Substance and Sexual Activity   Alcohol Use Yes    Comment: occas     Social History     Tobacco Use   Smoking Status Every Day    Current packs/day: 0.50    Types: Cigarettes   Smokeless Tobacco Never   Tobacco Comments    5 cigerettes a day      Social History     Substance and Sexual Activity   Drug Use Not Currently    Types: Marijuana, Methamphetamines    Comment: last used 09/2024       Physical Examination     Vitals:    10/18/24 1352 10/18/24 1528 10/19/24 0225   BP: 137/79 132/87 137/84   BP Location: Left arm Left arm    Pulse: 100 104 88   Resp: 16 16 18   Temp: (!) 97.2 °F (36.2 °C)     TempSrc: Temporal     SpO2: 97% 97% 98%       Physical Exam  Vitals and nursing note reviewed.   Constitutional:       General: He is not in acute distress.     Appearance: He is well-developed.   HENT:      Head: Normocephalic and atraumatic.   Eyes:      Conjunctiva/sclera: Conjunctivae normal.    Cardiovascular:      Rate and Rhythm: Normal rate and regular rhythm.      Heart sounds: No murmur heard.  Pulmonary:      Effort: Pulmonary effort is normal. No respiratory distress.      Breath sounds: Normal breath sounds.   Abdominal:      Palpations: Abdomen is soft.      Tenderness: There is no abdominal tenderness.   Musculoskeletal:         General: No swelling.      Cervical back: Neck supple. No erythema, rigidity or crepitus. Pain with movement, spinous process tenderness and muscular tenderness present. Normal range of motion.   Skin:     General: Skin is warm and dry.      Capillary Refill: Capillary refill takes less than 2 seconds.   Neurological:      Mental Status: He is alert.   Psychiatric:         Mood and Affect: Mood normal.           Procedures   Procedures      MDM:   Medical Decision Making  Risk  Prescription drug management.      39-year-old male comes in for evaluation of acute exacerbation of chronic recurrent neck pain.  Patient has had numerous MRIs in the past all significant for C5-6 spinal stenosis.  Patient has followed up with [facility] neurosurgery and has a upcoming scheduled appointment with them.  Patient states that over the past few months the pain has been getting worse.  Patient also has a well-documented history of urinary retention.  He states that he has shooting neuropathic pain that has been making it hard for him to get around.  The patient states that he is living at home but having issues with his sister and housing.  The patient also states that he has been using opioids and is requesting rehab at this time.    Urine drug screen ordered.  Patient's symptoms treated supportively no need to do further scans due to his previous all being normal without any signs of spinal epidural abscess, transverse myelitis, cauda equina, anterior cervical cord syndrome    Post referral placed for [facility]      ED Course as of 10/19/24 1637   Fri Oct 18, 2024    1609 From chart REVIEW:  9/5/2024 for paranoid ideation  Chart review patient with multiple previous admission for chronic neck pain, has left AMA multiple times   0108 Patient admitted 9/3/2024 for observation for cervical radiculopathy in setting of spinal stenosis however patient ripped out his IV and left prior to to evaluation by inpatient team.  He then went to [facility] admitted 9/4/2024 for evaluation by neurosurgery and PT OT, again left AGAINST MEDICAL ADVICE when he was informed that he would not get IV pain medications  0130 On further chart review patient was admitted to [facility] in May, at that time had an MRI updated of his cervical spine due to continued neuropathic cervical pain, he was seen by neurosurgery who advised against any immediate neurosurgical intervention however did offer outpatient evaluation of surgical options, he was evaluated by pain management who advised against opioids and recommended gabapentin 600 mg 3 times daily, increasing Robaxin to 750 mg 4 times daily  0200 Patient with no acute traumatic injuries noted on CT scan, moving extremities neurologically intact currently, no indication for further inpatient evaluation or treatment stable for discharge at this time with close follow-up with pain management and neurosurgery due to to continued chronic neck pain issues       1941 Patient request detox        Final Dispo   Final Diagnosis:  1. Cervical stenosis of spine    2. Radiculopathy due to cervical spondylosis      Time reflects when diagnosis was documented in both MDM as applicable and the Disposition within this note       Time User Action Codes Description Comment    10/18/2024  7:30 PM Taras Roland Add [M48.02] Cervical stenosis of spine     10/18/2024  7:30 PM Taras Roland Add [M54.10] Radiculopathy     10/18/2024  7:30 PM Taras Roland Remove [M54.10] Radiculopathy     10/18/2024  7:30 PM Taras Roland Add [M47.22] Radiculopathy due to  "cervical spondylosis           ED Disposition       ED Disposition   Discharge    Condition   Stable    Date/Time   Fri Oct 18, 2024  7:31 PM    Comment   Amado KELLIE Chin discharge to home/self care.                   Follow-up Information    None       Medications   cyclobenzaprine (FLEXERIL) tablet 10 mg (10 mg Oral Given 10/18/24 1646)   gabapentin (NEURONTIN) capsule 300 mg (300 mg Oral Given 10/18/24 1646)   ketorolac (TORADOL) injection 15 mg (15 mg Intramuscular Given 10/18/24 1645)       Risk Stratification Tools                No orders of the defined types were placed in this encounter.      Labs:   Labs Reviewed - No data to display    Imaging:     No orders to display      All details of the evaluation and treatment plan were made clear and additionally all questions and concerns were addressed while under my care.    Portions of the record may have been created with voice recognition software. Occasional wrong word or \"sound a like\" substitutions may have occurred due to the inherent limitations of voice recognition software. Read the chart carefully and recognize, using context, where substitutions have occurred.    The attending physician physically available and evaluated the above patient alongside myself.      Taras Roland MD  10/19/24 8687    "

## 2024-10-18 NOTE — DISCHARGE INSTRUCTIONS
Please follow up with your PCP     Continue taking your medications as prescribed. Follow dosing instructions on the bottles     Call your doctor or return to the ER with any concerning symptoms.

## 2024-10-19 VITALS
RESPIRATION RATE: 18 BRPM | SYSTOLIC BLOOD PRESSURE: 137 MMHG | HEART RATE: 88 BPM | DIASTOLIC BLOOD PRESSURE: 84 MMHG | TEMPERATURE: 97.2 F | OXYGEN SATURATION: 98 %

## 2024-10-19 RX ORDER — DULOXETIN HYDROCHLORIDE 30 MG/1
30 CAPSULE, DELAYED RELEASE ORAL DAILY
Status: DISCONTINUED | OUTPATIENT
Start: 2024-10-19 | End: 2024-10-19 | Stop reason: HOSPADM

## 2024-10-19 RX ORDER — NICOTINE 21 MG/24HR
1 PATCH, TRANSDERMAL 24 HOURS TRANSDERMAL EVERY 24 HOURS
Status: DISCONTINUED | OUTPATIENT
Start: 2024-10-19 | End: 2024-10-19 | Stop reason: HOSPADM

## 2024-10-19 RX ORDER — AMLODIPINE BESYLATE 5 MG/1
5 TABLET ORAL DAILY
Status: DISCONTINUED | OUTPATIENT
Start: 2024-10-19 | End: 2024-10-19 | Stop reason: HOSPADM

## 2024-10-19 NOTE — ED NOTES
Received report from off going RN  Care assumed at this time  Pt awaiting phone call from [facility] around 8 am for poss placement     Peggy Harden, RN  10/19/24 0615

## 2024-10-19 NOTE — ED NOTES
Pt accepted at [facility]  Awaiting transport info     Peggy Harden, VELMA  10/19/24 0888

## 2024-10-19 NOTE — ED CARE HANDOFF
[facility] Warm Handoff Outcome Note    Patient name Amado Chin  Location ED 29/ED 29 MRN [id number]  Age: 39 y.o.          Plan Type:  Warm Handoff                                                                                    Plan Date: 10/19/2024  Service:  ED Warm Handoff      Substance Use History:  Meth    Warm Handoff Update:  Accepted IP at [facility].    Warm Handoff Outcome: Residential  Inpatient

## 2024-10-19 NOTE — ED NOTES
Pt provided [employer] info and awaiting  for transport to [facility]     Peggy Harden, VELMA  10/19/24 3362

## 2024-10-21 ENCOUNTER — VBI (OUTPATIENT)
Dept: FAMILY MEDICINE CLINIC | Facility: CLINIC | Age: 39
End: 2024-10-21

## 2024-10-21 NOTE — TELEPHONE ENCOUNTER
10/21/24 10:15 AM    Patient contacted post ED visit, first outreach attempt made. Message was left for patient to return a call to the VBI Department at [facility]: Phone [phone].    Thank you.  Anastacia Townsend MA  [facility]

## 2024-10-22 NOTE — TELEPHONE ENCOUNTER
10/22/24 9:17 AM    Patient contacted post ED visit, second outreach attempt made. Message was left for patient to return a call to the VBI Department at [facility] Phone [phone].    Thank you.  Anatsacia Townsend MA  [facility]

## 2024-10-23 NOTE — ED ATTENDING ATTESTATION
10/18/2024  I, Vishnu Soto MD, saw and evaluated the patient. I have discussed the patient with the resident/non-physician practitioner and agree with the resident's/non-physician practitioner's findings, Plan of Care, and MDM as documented in the resident's/non-physician practitioner's note, except where noted. All available labs and Radiology studies were reviewed.  I was present for key portions of any procedure(s) performed by the resident/non-physician practitioner and I was immediately available to provide assistance.       At this point I agree with the current assessment done in the Emergency Department.  I have conducted an independent evaluation of this patient a history and physical is as follows:    39-year-old male presents to the emergency department via EMS for evaluation after a trip and fall.  Patient complaining of back pain.  On arrival, patient is somnolent, does respond to noxious stimuli.  No outward signs of trauma.  No chest pain, shortness of breath, abdominal pain, nausea or vomiting.  Denies any drug use.    On exam, patient resting comfortably in bed in no acute distress, head is normocephalic atraumatic, pupils equal round reactive, heart is regular rate and rhythm with tight distal pulses, no increased work of breathing, respiratory distress, or stridor.  No clonus.    Suspect symptoms of somnolence secondary to possible opioid use versus polypharmacy as patient is prescribed multiple sedating medications.  Doubt intracranial hemorrhage.  In regards to the back pain, this appears to be a chronic problem for the patient, I doubt acute fracture or dislocation.  Do not believe any imaging of this is necessary at this time.  Will hold off on any pain medications given patient's somnolence.    During prolonged period of observation in the emergency department, mental status slowly improved however not as quickly as expected, patient was treated with a dose of Narcan with improvement of  mental status.  Patient signed out to morning team pending repeat evaluation.    ED Course         Critical Care Time  Procedures

## 2024-10-23 NOTE — TELEPHONE ENCOUNTER
10/23/24 10:16 AM    Patient contacted post ED visit, third outreach attempt made. Message was left for patient to return a call to either the VBI Department at [facility]: Phone [phone] or the PCP office.     Thank you.  Anastacia Townsend MA  [facility]

## 2024-10-23 NOTE — TELEPHONE ENCOUNTER
10/23/24 10:16 AM    Patient contacted post ED visit, phone outreaches were unsuccessful; patient does not have MyChart, a MyChart letter has not been sent.     Thank you.  Anastacia Townsend MA  [facility]

## 2025-03-20 ENCOUNTER — HOSPITAL ENCOUNTER (EMERGENCY)
Facility: HOSPITAL | Age: 40
Discharge: HOME | End: 2025-03-20
Attending: EMERGENCY MEDICINE
Payer: MEDICARE

## 2025-03-20 VITALS
DIASTOLIC BLOOD PRESSURE: 110 MMHG | HEART RATE: 72 BPM | WEIGHT: 188 LBS | SYSTOLIC BLOOD PRESSURE: 150 MMHG | OXYGEN SATURATION: 98 % | RESPIRATION RATE: 22 BRPM | BODY MASS INDEX: 31.28 KG/M2 | TEMPERATURE: 97.8 F

## 2025-03-20 DIAGNOSIS — J45.909 ASTHMA: Primary | ICD-10-CM

## 2025-03-20 DIAGNOSIS — Z91.148 NON COMPLIANCE W MEDICATION REGIMEN: ICD-10-CM

## 2025-03-20 LAB
ALBUMIN SERPL BCG-MCNC: 4.3 G/DL (ref 3.5–5)
ALP SERPL-CCNC: 83 U/L (ref 34–104)
ALT SERPL W P-5'-P-CCNC: 13 U/L (ref 7–52)
ANION GAP SERPL CALCULATED.3IONS-SCNC: 9 MMOL/L (ref 4–13)
AST SERPL W P-5'-P-CCNC: 14 U/L (ref 13–39)
ATRIAL RATE: 67 BPM
BASOPHILS # BLD AUTO: 0.07 THOUSANDS/ÂΜL (ref 0–0.1)
BASOPHILS NFR BLD AUTO: 1 % (ref 0–1)
BILIRUB SERPL-MCNC: 0.65 MG/DL (ref 0.2–1)
BUN SERPL-MCNC: 19 MG/DL (ref 5–25)
CALCIUM SERPL-MCNC: 9.5 MG/DL (ref 8.4–10.2)
CARDIAC TROPONIN I PNL SERPL HS: 3 NG/L (ref ?–50)
CHLORIDE SERPL-SCNC: 105 MMOL/L (ref 96–108)
CO2 SERPL-SCNC: 22 MMOL/L (ref 21–32)
CREAT SERPL-MCNC: 1.14 MG/DL (ref 0.6–1.3)
EOSINOPHIL # BLD AUTO: 0.3 THOUSAND/ÂΜL (ref 0–0.61)
EOSINOPHIL NFR BLD AUTO: 5 % (ref 0–6)
ERYTHROCYTE [DISTWIDTH] IN BLOOD BY AUTOMATED COUNT: 14.1 % (ref 11.6–15.1)
GFR SERPL CREATININE-BSD FRML MDRD: 79 ML/MIN/1.73SQ M
GLUCOSE SERPL-MCNC: 90 MG/DL (ref 65–140)
HCT VFR BLD AUTO: 47.7 % (ref 36.5–49.3)
HGB BLD-MCNC: 16.5 G/DL (ref 12–17)
IMM GRANULOCYTES # BLD AUTO: 0.02 THOUSAND/UL (ref 0–0.2)
IMM GRANULOCYTES NFR BLD AUTO: 0 % (ref 0–2)
LYMPHOCYTES # BLD AUTO: 1.48 THOUSANDS/ÂΜL (ref 0.6–4.47)
LYMPHOCYTES NFR BLD AUTO: 23 % (ref 14–44)
MCH RBC QN AUTO: 31.5 PG (ref 26.8–34.3)
MCHC RBC AUTO-ENTMCNC: 34.6 G/DL (ref 31.4–37.4)
MCV RBC AUTO: 91 FL (ref 82–98)
MONOCYTES # BLD AUTO: 0.92 THOUSAND/ÂΜL (ref 0.17–1.22)
MONOCYTES NFR BLD AUTO: 15 % (ref 4–12)
NEUTROPHILS # BLD AUTO: 3.56 THOUSANDS/ÂΜL (ref 1.85–7.62)
NEUTS SEG NFR BLD AUTO: 56 % (ref 43–75)
NRBC BLD AUTO-RTO: 0 /100 WBCS
P AXIS: 49 DEGREES
PLATELET # BLD AUTO: 314 THOUSANDS/UL (ref 149–390)
PMV BLD AUTO: 9.8 FL (ref 8.9–12.7)
POTASSIUM SERPL-SCNC: 4.4 MMOL/L (ref 3.5–5.3)
PR INTERVAL: 128 MS
PROT SERPL-MCNC: 7.7 G/DL (ref 6.4–8.4)
QRS AXIS: 5 DEGREES
QRSD INTERVAL: 94 MS
QT INTERVAL: 388 MS
QTC INTERVAL: 410 MS
RBC # BLD AUTO: 5.24 MILLION/UL (ref 3.88–5.62)
SODIUM SERPL-SCNC: 136 MMOL/L (ref 135–147)
T WAVE AXIS: 27 DEGREES
VENTRICULAR RATE: 67 BPM
WBC # BLD AUTO: 6.35 THOUSAND/UL (ref 4.31–10.16)

## 2025-03-20 PROCEDURE — 36415 COLL VENOUS BLD VENIPUNCTURE: CPT

## 2025-03-20 PROCEDURE — 99285 EMERGENCY DEPT VISIT HI MDM: CPT

## 2025-03-20 PROCEDURE — 80053 COMPREHEN METABOLIC PANEL: CPT

## 2025-03-20 PROCEDURE — 93005 ELECTROCARDIOGRAM TRACING: CPT

## 2025-03-20 PROCEDURE — 84484 ASSAY OF TROPONIN QUANT: CPT

## 2025-03-20 PROCEDURE — 85025 COMPLETE CBC W/AUTO DIFF WBC: CPT

## 2025-03-20 PROCEDURE — 93010 ELECTROCARDIOGRAM REPORT: CPT | Performed by: INTERNAL MEDICINE

## 2025-03-20 PROCEDURE — 99284 EMERGENCY DEPT VISIT MOD MDM: CPT | Performed by: EMERGENCY MEDICINE

## 2025-03-20 RX ORDER — ALBUTEROL SULFATE 90 UG/1
2 INHALANT RESPIRATORY (INHALATION) EVERY 4 HOURS PRN
Qty: 8.5 G | Refills: 0 | Status: SHIPPED | OUTPATIENT
Start: 2025-03-20

## 2025-03-20 RX ORDER — ALBUTEROL SULFATE 0.83 MG/ML
2.5 SOLUTION RESPIRATORY (INHALATION) EVERY 6 HOURS PRN
Qty: 125 ML | Refills: 0 | Status: SHIPPED | OUTPATIENT
Start: 2025-03-20 | End: 2025-03-20

## 2025-03-20 RX ORDER — ALBUTEROL SULFATE 90 UG/1
2 INHALANT RESPIRATORY (INHALATION) ONCE
Status: COMPLETED | OUTPATIENT
Start: 2025-03-20 | End: 2025-03-20

## 2025-03-20 RX ADMIN — ALBUTEROL SULFATE 2 PUFF: 90 AEROSOL, METERED RESPIRATORY (INHALATION) at 20:24

## 2025-03-21 ENCOUNTER — VBI (OUTPATIENT)
Dept: FAMILY MEDICINE CLINIC | Facility: CLINIC | Age: 40
End: 2025-03-21

## 2025-03-21 NOTE — DISCHARGE INSTRUCTIONS
You were evaluated in the Emergency Department today for fever, congestion, and vomiting.     Can take motrin and tylenol together every 6 hours for pain and fever control.    Please schedule an appointment with your primary care physician within the next 2-3 days.    Return to the Emergency Department if you experience worsening or uncontrolled pain, fevers 100.4°F or greater, recurrent vomiting, inability to tolerate food or fluids by mouth, bloody stools or vomit, black or tarry stools, or any other concerning symptoms.    Thank you for choosing us for your care.

## 2025-03-21 NOTE — ED PROVIDER NOTES
Time reflects when diagnosis was documented in both MDM as applicable and the Disposition within this note       Time User Action Codes Description Comment    3/20/2025  8:19 PM Cj Reyes [J45.909] Asthma     3/20/2025  8:54 PM Cj Reyes [Z91.148] Non compliance w medication regimen           ED Disposition       ED Disposition   Discharge    Condition   Stable    Date/Time   Thu Mar 20, 2025  8:47 PM    Comment   Amado KELLIE Chin discharge to home/self care.                   Assessment & Plan       Medical Decision Making  39-year-old male with past medical history of asthma noncompliant with his medication presents to the ED for evaluation of chest tightness and shortness of breath for the past week.  Patient denies fever, chills, nausea, vomiting, cough, congestion. Patient had ran out of his albuterol and has been using his sisters, with improvement in his symptoms.  Patient does not have any other complaints at this time.  Denies abdominal pain, dysuria.     On physical exam vital signs within normal limits.  Patient mild decreased breath sounds.  Abdomen soft nontender.     Differentials include but limited to medication non compliance, mild intermittent asthma, doubt infectious etiology.     Will treat with albuterol. Upon re-evaluation pt reports improvement in his symptoms. PT d/c home with instructions to follow up with pcp. Strict return precaution provided.     Amount and/or Complexity of Data Reviewed  Labs: ordered. Decision-making details documented in ED Course.    Risk  Prescription drug management.        ED Course as of 03/24/25 1800   Thu Mar 20, 2025   2008 hs TnI 0hr: 3   2009 WBC: 6.35   2009 Hemoglobin: 16.5   2009 Platelet Count: 314   2009 Sodium: 136   2009 Creatinine: 1.14   2009 AST: 14   2009 ALT: 13   2009 GFR, Calculated: 79       Medications   albuterol (PROVENTIL HFA,VENTOLIN HFA) inhaler 2 puff (2 puffs Inhalation Given 3/20/25 2024)       ED Risk Strat Scores      HEART Risk  Score      Flowsheet Row Most Recent Value   Heart Score Risk Calculator    History 0 Filed at: 03/20/2025 2036   ECG 0 Filed at: 03/20/2025 2036   Age 0 Filed at: 03/20/2025 2036   Risk Factors 0 Filed at: 03/20/2025 2036   Troponin 0 Filed at: 03/20/2025 2036   HEART Score 0 Filed at: 03/20/2025 2036                                                  History of Present Illness       Chief Complaint   Patient presents with    Chest Pain     Chest pain and SOB intermittently X 1 week, no nausea, no diaphoresis, pt reports it feels like his asthma and is out of his inhaler       Past Medical History:   Diagnosis Date    Asthma     Chronic pain     Hypertension     Neck pain     Psychoactive 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HCC)     Thyroglossal duct cyst       Past Surgical History:   Procedure Laterality Date    THYROGLOSSAL DUCT EXCISION      THYROID SURGERY      THYROID SURGERY        Family History   Problem Relation Age of Onset    Hypertension Mother     No Known Problems Father       Social History     Tobacco Use    Smoking status: Every Day     Current packs/day: 0.50     Types: Cigarettes    Smokeless tobacco: Never    Tobacco comments:     5 cigerettes a day    Vaping Use    Vaping status: Former    Substances: Nicotine, Flavoring   Substance Use Topics    Alcohol use: Yes     Comment: occas    Drug use: Not Currently     Types: Marijuana, Methamphetamines     Comment: last used 09/2024      E-Cigarette/Vaping    E-Cigarette Use Former User     Cartridges/Day 1       E-Cigarette/Vaping Substances    Nicotine Yes     THC No     CBD No     Flavoring Yes     Other No     Unknown No       I have reviewed and agree with the history as documented.     HPI    Review of Systems   Constitutional:  Negative for chills and fever.   HENT:  Negative for ear pain and sore throat.    Eyes:  Negative for pain and visual disturbance.   Respiratory:  Positive for shortness of breath. Negative for cough.    Cardiovascular:   Negative for chest pain and palpitations.   Gastrointestinal:  Negative for abdominal pain and vomiting.   Genitourinary:  Negative for dysuria and hematuria.   Musculoskeletal:  Negative for arthralgias and back pain.   Skin:  Negative for color change and rash.   Neurological:  Negative for seizures and syncope.   All other systems reviewed and are negative.          Objective       ED Triage Vitals   Temperature Pulse Blood Pressure Respirations SpO2 Patient Position - Orthostatic VS   03/20/25 1818 03/20/25 1818 03/20/25 1822 03/20/25 1818 03/20/25 1818 03/20/25 1818   97.8 °F (36.6 °C) 72 (!) 150/110 22 98 % Sitting      Temp Source Heart Rate Source BP Location FiO2 (%) Pain Score    03/20/25 1818 03/20/25 1818 03/20/25 1818 -- --    Temporal Monitor Left arm        Vitals      Date and Time Temp Pulse SpO2 Resp BP Pain Score FACES Pain Rating User   03/20/25 1822 -- -- -- -- 150/110 -- -- CEK   03/20/25 1818 97.8 °F (36.6 °C) 72 98 % 22 -- -- -- CEK            Physical Exam  Vitals and nursing note reviewed.   Constitutional:       General: He is not in acute distress.     Appearance: Normal appearance. He is not ill-appearing.   HENT:      Head: Normocephalic and atraumatic.      Right Ear: External ear normal.      Left Ear: External ear normal.      Nose: Nose normal.      Mouth/Throat:      Mouth: Mucous membranes are moist.   Eyes:      General: No scleral icterus.        Right eye: No discharge.      Extraocular Movements: Extraocular movements intact.      Conjunctiva/sclera: Conjunctivae normal.   Cardiovascular:      Rate and Rhythm: Normal rate and regular rhythm.      Pulses: Normal pulses.      Heart sounds: No murmur heard.  Pulmonary:      Effort: Pulmonary effort is normal.      Breath sounds: Decreased breath sounds (mild) present. No wheezing.   Chest:      Chest wall: No tenderness.   Abdominal:      General: Abdomen is flat. There is no distension.      Palpations: Abdomen is soft.       Tenderness: There is no abdominal tenderness.   Musculoskeletal:         General: No deformity or signs of injury. Normal range of motion.      Cervical back: Normal range of motion and neck supple. No rigidity or tenderness.      Right lower leg: No edema.      Left lower leg: No edema.   Skin:     General: Skin is warm and dry.   Neurological:      General: No focal deficit present.      Mental Status: He is alert and oriented to person, place, and time.      Motor: No weakness.         Results Reviewed       Procedure Component Value Units Date/Time    HS Troponin 0hr (reflex protocol) [[id number]]  (Normal) Collected: 03/20/25 1826    Lab Status: Final result Specimen: Blood from Arm, Left Updated: 03/20/25 1859     hs TnI 0hr 3 ng/L     Comprehensive metabolic panel [[id number]] Collected: 03/20/25 1826    Lab Status: Final result Specimen: Blood from Arm, Left Updated: 03/20/25 1856     Sodium 136 mmol/L      Potassium 4.4 mmol/L      Chloride 105 mmol/L      CO2 22 mmol/L      ANION GAP 9 mmol/L      BUN 19 mg/dL      Creatinine 1.14 mg/dL      Glucose 90 mg/dL      Calcium 9.5 mg/dL      AST 14 U/L      ALT 13 U/L      Alkaline Phosphatase 83 U/L      Total Protein 7.7 g/dL      Albumin 4.3 g/dL      Total Bilirubin 0.65 mg/dL      eGFR 79 ml/min/1.73sq m     Narrative:      [employer] guidelines for Chronic Kidney Disease (CKD):     Stage 1 with normal or high GFR (GFR > 90 mL/min/1.73 square meters)    Stage 2 Mild CKD (GFR = 60-89 mL/min/1.73 square meters)    Stage 3A Moderate CKD (GFR = 45-59 mL/min/1.73 square meters)    Stage 3B Moderate CKD (GFR = 30-44 mL/min/1.73 square meters)    Stage 4 Severe CKD (GFR = 15-29 mL/min/1.73 square meters)    Stage 5 End Stage CKD (GFR <15 mL/min/1.73 square meters)  Note: GFR calculation is accurate only with a steady state creatinine    CBC and differential [[id number]]  (Abnormal) Collected: 03/20/25 1826    Lab Status: Final result Specimen:  Blood from Arm, Left Updated: 03/20/25 1844     WBC 6.35 Thousand/uL      RBC 5.24 Million/uL      Hemoglobin 16.5 g/dL      Hematocrit 47.7 %      MCV 91 fL      MCH 31.5 pg      MCHC 34.6 g/dL      RDW 14.1 %      MPV 9.8 fL      Platelets 314 Thousands/uL      nRBC 0 /100 WBCs      Segmented % 56 %      Immature Grans % 0 %      Lymphocytes % 23 %      Monocytes % 15 %      Eosinophils Relative 5 %      Basophils Relative 1 %      Absolute Neutrophils 3.56 Thousands/µL      Absolute Immature Grans 0.02 Thousand/uL      Absolute Lymphocytes 1.48 Thousands/µL      Absolute Monocytes 0.92 Thousand/µL      Eosinophils Absolute 0.30 Thousand/µL      Basophils Absolute 0.07 Thousands/µL             No orders to display       Procedures    ED Medication and Procedure Management   Prior to Admission Medications   Prescriptions Last Dose Informant Patient Reported? Taking?   DULoxetine (Cymbalta) 30 mg delayed release capsule   Yes No   Sig: Take 30 mg by mouth daily   Diclofenac Sodium (VOLTAREN) 1 %   No No   Sig: Apply 2 g topically 4 (four) times a day   QUEtiapine (SEROquel) 300 mg tablet   No No   Sig: Take 1 tablet (300 mg total) by mouth daily at bedtime for 14 days   cyclobenzaprine (FLEXERIL) 10 mg tablet   No No   Sig: Take 1 tablet (10 mg total) by mouth 2 (two) times a day as needed for muscle spasms   cyclobenzaprine (FLEXERIL) 10 mg tablet   No No   Sig: Take 1 tablet (10 mg total) by mouth 2 (two) times a day as needed for muscle spasms for up to 14 days   gabapentin (Neurontin) 300 mg capsule   No No   Sig: Take 1 capsule (300 mg total) by mouth 3 (three) times a day   gabapentin (Neurontin) 300 mg capsule   No No   Sig: Take 1 capsule (300 mg total) by mouth 3 (three) times a day for 7 days For post-herpetic neuralgia: Take 1 tablet on day 1,  Then take 2 tablets on day 2, Then take 3 tablets on day 3 and every day after that as instructed by your doctor.   gabapentin (Neurontin) 300 mg capsule   No No    Sig: Take 1 capsule (300 mg total) by mouth 3 (three) times a day for 14 days For post-herpetic neuralgia: Take 1 tablet on day 1,  Then take 2 tablets on day 2, Then take 3 tablets on day 3 and every day after that as instructed by your doctor.   ibuprofen (MOTRIN) 400 mg tablet   No No   Sig: Take 1 tablet (400 mg total) by mouth every 6 (six) hours as needed for moderate pain   methocarbamol (ROBAXIN) 500 mg tablet   No No   Sig: Take 1 tablet (500 mg total) by mouth 2 (two) times a day   methocarbamol (ROBAXIN) 750 mg tablet   No No   Sig: Take 1 tablet (750 mg total) by mouth 3 (three) times a day   nicotine (NICODERM CQ) 21 mg/24 hr TD 24 hr patch   Yes No   Sig: Place 1 patch on the skin every 24 hours   topiramate (Topamax) 25 mg tablet   No No   Sig: Take 1 tablet (25 mg total) by mouth 2 (two) times a day for 7 days      Facility-Administered Medications: None     Discharge Medication List as of 3/20/2025  8:48 PM        START taking these medications    Details   albuterol (2.5 mg/3 mL) 0.083 % nebulizer solution Take 3 mL (2.5 mg total) by nebulization every 6 (six) hours as needed for wheezing or shortness of breath, Starting Thu 3/20/2025, Normal           CONTINUE these medications which have NOT CHANGED    Details   cyclobenzaprine (FLEXERIL) 10 mg tablet Take 1 tablet (10 mg total) by mouth 2 (two) times a day as needed for muscle spasms, Starting Tue 9/3/2024, Normal      Diclofenac Sodium (VOLTAREN) 1 % Apply 2 g topically 4 (four) times a day, Starting Wed 9/25/2024, Normal      DULoxetine (Cymbalta) 30 mg delayed release capsule Take 30 mg by mouth daily, Starting Thu 3/7/2024, Historical Med      gabapentin (Neurontin) 300 mg capsule Take 1 capsule (300 mg total) by mouth 3 (three) times a day, Starting Fri 2/9/2024, Normal      ibuprofen (MOTRIN) 400 mg tablet Take 1 tablet (400 mg total) by mouth every 6 (six) hours as needed for moderate pain, Starting Wed 2/28/2024, Normal      !!  methocarbamol (ROBAXIN) 500 mg tablet Take 1 tablet (500 mg total) by mouth 2 (two) times a day, Starting Thu 9/5/2024, Normal      !! methocarbamol (ROBAXIN) 750 mg tablet Take 1 tablet (750 mg total) by mouth 3 (three) times a day, Starting Wed 9/25/2024, Normal      nicotine (NICODERM CQ) 21 mg/24 hr TD 24 hr patch Place 1 patch on the skin every 24 hours, Starting Thu 7/18/2024, Historical Med      QUEtiapine (SEROquel) 300 mg tablet Take 1 tablet (300 mg total) by mouth daily at bedtime for 14 days, Starting Fri 10/4/2024, Until Fri 10/18/2024, Normal      topiramate (Topamax) 25 mg tablet Take 1 tablet (25 mg total) by mouth 2 (two) times a day for 7 days, Starting Mon 9/2/2024, Until Mon 9/9/2024, Normal       !! - Potential duplicate medications found. Please discuss with provider.        No discharge procedures on file.  ED SEPSIS DOCUMENTATION   Time reflects when diagnosis was documented in both MDM as applicable and the Disposition within this note       Time User Action Codes Description Comment    3/20/2025  8:19 PM Cj Reyes [J45.909] Asthma     3/20/2025  8:54 PM Cj Reyes [Z91.148] Non compliance w medication regimen                  Cj Reyes DO  03/24/25 1800

## 2025-03-21 NOTE — ED ATTENDING ATTESTATION
3/20/2025  IDarion MD, saw and evaluated the patient. I have discussed the patient with the resident/non-physician practitioner and agree with the resident's/non-physician practitioner's findings, Plan of Care, and MDM as documented in the resident's/non-physician practitioner's note, except where noted. All available labs and Radiology studies were reviewed.  I was present for key portions of any procedure(s) performed by the resident/non-physician practitioner and I was immediately available to provide assistance.       At this point I agree with the current assessment done in the Emergency Department.  I have conducted an independent evaluation of this patient a history and physical is as follows:    ED Course  ED Course as of 03/20/25 2033   Thu Mar 20, 2025   2024 Per resident h&p 38 YO M presents for dyspnea and chest tightness for 1 week; has run out of his albuterol for asthma O: vitals NL CTA RRR soft NT I/P 1st nurse cardiac w/u albuterol mdi      Emergency Department Note- Amado Chin 39 y.o. male MRN: [id number]    Unit/Bed#: Z1HA Encounter: [id number]    Amado Cihn is a 39 y.o. male who presents with   Chief Complaint   Patient presents with    Chest Pain     Chest pain and SOB intermittently X 1 week, no nausea, no diaphoresis, pt reports it feels like his asthma and is out of his inhaler         History of Present Illness   HPI:  Amado Chin is a 39 y.o. male who presents for evaluation of:  Chest tightness and dyspnea throughout the week.  The patient has a history of asthma and feels like he is having a mild asthma flare.  He has run out of his albuterol and has not been able to self medicate for his wheezing episodes.  Patient denies any fevers and chills.  He denies cough, cough productive of sputum, and hemoptysis.  He denies any sort of pleuritic chest pain.  He has no history of any cardiac disease.    Review of Systems   Constitutional:  Negative for fatigue and fever.   HENT:   Negative for congestion and sore throat.    Respiratory:  Positive for shortness of breath and wheezing. Negative for cough.    Cardiovascular:  Positive for chest pain. Negative for palpitations.   Gastrointestinal:  Negative for abdominal pain and nausea.   Genitourinary:  Negative for flank pain and frequency.   Neurological:  Negative for light-headedness and headaches.   Psychiatric/Behavioral:  Negative for dysphoric mood and hallucinations.    All other systems reviewed and are negative.      Historical Information   Past Medical History:   Diagnosis Date    Asthma     Chronic pain     Hypertension     Neck pain     Psychoactive 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HCC)     Thyroglossal duct cyst      Past Surgical History:   Procedure Laterality Date    THYROGLOSSAL DUCT EXCISION      THYROID SURGERY      THYROID SURGERY       Social History   Social History     Substance and Sexual Activity   Alcohol Use Yes    Comment: occas     Social History     Substance and Sexual Activity   Drug Use Not Currently    Types: Marijuana, Methamphetamines    Comment: last used 09/2024     Social History     Tobacco Use   Smoking Status Every Day    Current packs/day: 0.50    Types: Cigarettes   Smokeless Tobacco Never   Tobacco Comments    5 cigerettes a day      Family History:   Family History   Problem Relation Age of Onset    Hypertension Mother     No Known Problems Father        Meds/Allergies   PTA meds:   Prior to Admission Medications   Prescriptions Last Dose Informant Patient Reported? Taking?   DULoxetine (Cymbalta) 30 mg delayed release capsule   Yes No   Sig: Take 30 mg by mouth daily   Diclofenac Sodium (VOLTAREN) 1 %   No No   Sig: Apply 2 g topically 4 (four) times a day   QUEtiapine (SEROquel) 300 mg tablet   No No   Sig: Take 1 tablet (300 mg total) by mouth daily at bedtime for 14 days   cyclobenzaprine (FLEXERIL) 10 mg tablet   No No   Sig: Take 1 tablet (10 mg total) by mouth 2 (two) times a day as needed  for muscle spasms   cyclobenzaprine (FLEXERIL) 10 mg tablet   No No   Sig: Take 1 tablet (10 mg total) by mouth 2 (two) times a day as needed for muscle spasms for up to 14 days   gabapentin (Neurontin) 300 mg capsule   No No   Sig: Take 1 capsule (300 mg total) by mouth 3 (three) times a day   gabapentin (Neurontin) 300 mg capsule   No No   Sig: Take 1 capsule (300 mg total) by mouth 3 (three) times a day for 7 days For post-herpetic neuralgia: Take 1 tablet on day 1,  Then take 2 tablets on day 2, Then take 3 tablets on day 3 and every day after that as instructed by your doctor.   gabapentin (Neurontin) 300 mg capsule   No No   Sig: Take 1 capsule (300 mg total) by mouth 3 (three) times a day for 14 days For post-herpetic neuralgia: Take 1 tablet on day 1,  Then take 2 tablets on day 2, Then take 3 tablets on day 3 and every day after that as instructed by your doctor.   ibuprofen (MOTRIN) 400 mg tablet   No No   Sig: Take 1 tablet (400 mg total) by mouth every 6 (six) hours as needed for moderate pain   methocarbamol (ROBAXIN) 500 mg tablet   No No   Sig: Take 1 tablet (500 mg total) by mouth 2 (two) times a day   methocarbamol (ROBAXIN) 750 mg tablet   No No   Sig: Take 1 tablet (750 mg total) by mouth 3 (three) times a day   nicotine (NICODERM CQ) 21 mg/24 hr TD 24 hr patch   Yes No   Sig: Place 1 patch on the skin every 24 hours   topiramate (Topamax) 25 mg tablet   No No   Sig: Take 1 tablet (25 mg total) by mouth 2 (two) times a day for 7 days      Facility-Administered Medications: None     No Known Allergies    Objective   First Vitals:   Blood Pressure: (!) 150/110 (03/20/25 1822)  Pulse: 72 (03/20/25 1818)  Temperature: 97.8 °F (36.6 °C) (03/20/25 1818)  Temp Source: Temporal (03/20/25 1818)  Respirations: 22 (03/20/25 1818)  Weight - Scale: 85.3 kg (188 lb) (03/20/25 1818)  SpO2: 98 % (03/20/25 1818)    Current Vitals:   Blood Pressure: (!) 150/110 (03/20/25 1822)  Pulse: 72 (03/20/25  )  Temperature: 97.8 °F (36.6 °C) (25)  Temp Source: Temporal (25)  Respirations: 22 (25)  Weight - Scale: 85.3 kg (188 lb) (25)  SpO2: 98 % (25)    No intake or output data in the 24 hours ending 25    Invasive Devices       None                   Physical Exam  Vitals and nursing note reviewed.   Constitutional:       General: He is not in acute distress.     Appearance: Normal appearance. He is well-developed.   HENT:      Head: Normocephalic and atraumatic.      Right Ear: External ear normal.      Left Ear: External ear normal.      Nose: Nose normal.      Mouth/Throat:      Pharynx: No oropharyngeal exudate.   Eyes:      Conjunctiva/sclera: Conjunctivae normal.      Pupils: Pupils are equal, round, and reactive to light.   Cardiovascular:      Rate and Rhythm: Normal rate and regular rhythm.   Pulmonary:      Effort: Pulmonary effort is normal. No respiratory distress.   Abdominal:      General: Abdomen is flat. There is no distension.      Palpations: Abdomen is soft.   Musculoskeletal:         General: No deformity. Normal range of motion.      Cervical back: Normal range of motion and neck supple.   Skin:     General: Skin is warm and dry.      Capillary Refill: Capillary refill takes less than 2 seconds.   Neurological:      General: No focal deficit present.      Mental Status: He is alert and oriented to person, place, and time. Mental status is at baseline.      Coordination: Coordination normal.   Psychiatric:         Mood and Affect: Mood normal.         Behavior: Behavior normal.         Thought Content: Thought content normal.         Judgment: Judgment normal.           Medical Decision Makin.  Chest tightness and dyspnea through the week: First nurse cardiac workup that includes Complete blood count obtained to assess for leukocytosis and anemia; Basic Metabolic profile obtained to assess for electrolyte disturbance,  uremia, and disorders of glucose metabolism; electrocardiogram obtained to assess for arrhythmia; Troponin obtained to assess for myocardial infarction and myocarditis.      Recent Results (from the past 36 hours)   ECG 12 lead    Collection Time: 03/20/25  6:22 PM   Result Value Ref Range    Ventricular Rate 67 BPM    Atrial Rate 67 BPM    WA Interval 128 ms    QRSD Interval 94 ms    QT Interval 388 ms    QTC Interval 410 ms    P [place] 49 degrees    QRS Axis 5 degrees    T Wave Axis 27 degrees   CBC and differential    Collection Time: 03/20/25  6:26 PM   Result Value Ref Range    WBC 6.35 4.31 - 10.16 Thousand/uL    RBC 5.24 3.88 - 5.62 Million/uL    Hemoglobin 16.5 12.0 - 17.0 g/dL    Hematocrit 47.7 36.5 - 49.3 %    MCV 91 82 - 98 fL    MCH 31.5 26.8 - 34.3 pg    MCHC 34.6 31.4 - 37.4 g/dL    RDW 14.1 11.6 - 15.1 %    MPV 9.8 8.9 - 12.7 fL    Platelets 314 149 - 390 Thousands/uL    nRBC 0 /100 WBCs    Segmented % 56 43 - 75 %    Immature Grans % 0 0 - 2 %    Lymphocytes % 23 14 - 44 %    Monocytes % 15 (H) 4 - 12 %    Eosinophils Relative 5 0 - 6 %    Basophils Relative 1 0 - 1 %    Absolute Neutrophils 3.56 1.85 - 7.62 Thousands/µL    Absolute Immature Grans 0.02 0.00 - 0.20 Thousand/uL    Absolute Lymphocytes 1.48 0.60 - 4.47 Thousands/µL    Absolute Monocytes 0.92 0.17 - 1.22 Thousand/µL    Eosinophils Absolute 0.30 0.00 - 0.61 Thousand/µL    Basophils Absolute 0.07 0.00 - 0.10 Thousands/µL   Comprehensive metabolic panel    Collection Time: 03/20/25  6:26 PM   Result Value Ref Range    Sodium 136 135 - 147 mmol/L    Potassium 4.4 3.5 - 5.3 mmol/L    Chloride 105 96 - 108 mmol/L    CO2 22 21 - 32 mmol/L    ANION GAP 9 4 - 13 mmol/L    BUN 19 5 - 25 mg/dL    Creatinine 1.14 0.60 - 1.30 mg/dL    Glucose 90 65 - 140 mg/dL    Calcium 9.5 8.4 - 10.2 mg/dL    AST 14 13 - 39 U/L    ALT 13 7 - 52 U/L    Alkaline Phosphatase 83 34 - 104 U/L    Total Protein 7.7 6.4 - 8.4 g/dL    Albumin 4.3 3.5 - 5.0 g/dL    Total  "Bilirubin 0.65 0.20 - 1.00 mg/dL    eGFR 79 ml/min/1.73sq m   HS Troponin 0hr (reflex protocol)    Collection Time: 03/20/25  6:26 PM   Result Value Ref Range    hs TnI 0hr 3 \"Refer to ACS Flowchart\"- see link ng/L     No orders to display         Portions of the record may have been created with voice recognition software. Occasional wrong word or \"sound a like\" substitutions may have occurred due to the inherent limitations of voice recognition software.  Read the chart carefully and recognize, using context, where substitutions have occurred.        Critical Care Time  ECG 12 Lead Documentation Only    Date/Time: 3/20/2025 8:36 PM    Performed by: Darion Shah MD  Authorized by: Darion Shah MD    Indications / Diagnosis:  Chest tightness  ECG reviewed by me, the ED Provider: yes    Patient location:  ED  Previous ECG:     Previous ECG:  Compared to current    Comparison ECG info:  October 4, 2024    Similarity:  No change  Interpretation:     Interpretation: normal    Rate:     ECG rate:  67    ECG rate assessment: normal    Rhythm:     Rhythm: sinus rhythm    Ectopy:     Ectopy: none    QRS:     QRS axis:  Normal    QRS intervals:  Normal  Conduction:     Conduction: normal    T waves:     T waves: normal          "

## 2025-03-21 NOTE — TELEPHONE ENCOUNTER
03/21/25 1:06 PM    Patient contacted post ED visit, first outreach attempt made. Message was left for patient to return a call to the VBI Department at [facility]: Phone [phone].  Phone is NIS  Thank you.  Anastacia Townsend MA  [facility]

## 2025-03-24 NOTE — TELEPHONE ENCOUNTER
03/24/25 11:20 AM    Patient contacted post ED visit, phone outreaches were unsuccessful; patient does not have MyChart, a MyChart letter has not been sent.     Thank you.  Anastacia Townsend MA  [facility]

## 2025-03-24 NOTE — TELEPHONE ENCOUNTER
03/24/25 11:19 AM    Patient contacted post ED visit, second outreach attempt made. Message was left for patient to return a call to the VBI Department at [facility]: Phone [phone].  NIS    Thank you.  Anastacia Townsend MA  [facility]

## 2025-04-15 ENCOUNTER — APPOINTMENT (EMERGENCY)
Dept: RADIOLOGY | Facility: HOSPITAL | Age: 40
DRG: 054 | End: 2025-04-15
Payer: MEDICARE

## 2025-04-15 ENCOUNTER — APPOINTMENT (OUTPATIENT)
Dept: RADIOLOGY | Facility: HOSPITAL | Age: 40
DRG: 054 | End: 2025-04-15
Payer: MEDICARE

## 2025-04-15 ENCOUNTER — HOSPITAL ENCOUNTER (INPATIENT)
Facility: HOSPITAL | Age: 40
LOS: 6 days | DRG: 054 | End: 2025-04-22
Attending: EMERGENCY MEDICINE | Admitting: INTERNAL MEDICINE
Payer: MEDICARE

## 2025-04-15 DIAGNOSIS — Z72.0 TOBACCO ABUSE: ICD-10-CM

## 2025-04-15 DIAGNOSIS — K11.9 LESION OF PAROTID GLAND: ICD-10-CM

## 2025-04-15 DIAGNOSIS — I10 HYPERTENSION: ICD-10-CM

## 2025-04-15 DIAGNOSIS — T40.2X5A OPIOID-INDUCED CONSTIPATION: ICD-10-CM

## 2025-04-15 DIAGNOSIS — F19.10 POLYSUBSTANCE ABUSE (HCC): ICD-10-CM

## 2025-04-15 DIAGNOSIS — R51.9 HEADACHE: ICD-10-CM

## 2025-04-15 DIAGNOSIS — G89.29 CHRONIC NECK PAIN: ICD-10-CM

## 2025-04-15 DIAGNOSIS — K59.03 OPIOID-INDUCED CONSTIPATION: ICD-10-CM

## 2025-04-15 DIAGNOSIS — M54.2 CHRONIC NECK PAIN: ICD-10-CM

## 2025-04-15 DIAGNOSIS — M54.2 NECK PAIN: Primary | ICD-10-CM

## 2025-04-15 DIAGNOSIS — R52 INTRACTABLE PAIN: ICD-10-CM

## 2025-04-15 DIAGNOSIS — Z13.9 ENCOUNTER FOR SCREENING INVOLVING SOCIAL DETERMINANTS OF HEALTH (SDOH): ICD-10-CM

## 2025-04-15 LAB
ANION GAP SERPL CALCULATED.3IONS-SCNC: 12 MMOL/L (ref 4–13)
BASOPHILS # BLD AUTO: 0.07 THOUSANDS/ÂΜL (ref 0–0.1)
BASOPHILS NFR BLD AUTO: 1 % (ref 0–1)
BUN SERPL-MCNC: 18 MG/DL (ref 5–25)
CALCIUM SERPL-MCNC: 9.3 MG/DL (ref 8.4–10.2)
CARDIAC TROPONIN I PNL SERPL HS: 4 NG/L (ref ?–50)
CHLORIDE SERPL-SCNC: 106 MMOL/L (ref 96–108)
CO2 SERPL-SCNC: 19 MMOL/L (ref 21–32)
CREAT SERPL-MCNC: 1.14 MG/DL (ref 0.6–1.3)
EOSINOPHIL # BLD AUTO: 0.09 THOUSAND/ÂΜL (ref 0–0.61)
EOSINOPHIL NFR BLD AUTO: 1 % (ref 0–6)
ERYTHROCYTE [DISTWIDTH] IN BLOOD BY AUTOMATED COUNT: 13.9 % (ref 11.6–15.1)
GFR SERPL CREATININE-BSD FRML MDRD: 79 ML/MIN/1.73SQ M
GLUCOSE SERPL-MCNC: 91 MG/DL (ref 65–140)
HCT VFR BLD AUTO: 44 % (ref 36.5–49.3)
HGB BLD-MCNC: 15.6 G/DL (ref 12–17)
IMM GRANULOCYTES # BLD AUTO: 0.03 THOUSAND/UL (ref 0–0.2)
IMM GRANULOCYTES NFR BLD AUTO: 1 % (ref 0–2)
LYMPHOCYTES # BLD AUTO: 1.07 THOUSANDS/ÂΜL (ref 0.6–4.47)
LYMPHOCYTES NFR BLD AUTO: 16 % (ref 14–44)
MCH RBC QN AUTO: 31.3 PG (ref 26.8–34.3)
MCHC RBC AUTO-ENTMCNC: 35.5 G/DL (ref 31.4–37.4)
MCV RBC AUTO: 88 FL (ref 82–98)
MONOCYTES # BLD AUTO: 0.69 THOUSAND/ÂΜL (ref 0.17–1.22)
MONOCYTES NFR BLD AUTO: 11 % (ref 4–12)
NEUTROPHILS # BLD AUTO: 4.56 THOUSANDS/ÂΜL (ref 1.85–7.62)
NEUTS SEG NFR BLD AUTO: 70 % (ref 43–75)
NRBC BLD AUTO-RTO: 0 /100 WBCS
PLATELET # BLD AUTO: 259 THOUSANDS/UL (ref 149–390)
PMV BLD AUTO: 10 FL (ref 8.9–12.7)
POTASSIUM SERPL-SCNC: 3.7 MMOL/L (ref 3.5–5.3)
RBC # BLD AUTO: 4.99 MILLION/UL (ref 3.88–5.62)
SODIUM SERPL-SCNC: 137 MMOL/L (ref 135–147)
WBC # BLD AUTO: 6.51 THOUSAND/UL (ref 4.31–10.16)

## 2025-04-15 PROCEDURE — 99222 1ST HOSP IP/OBS MODERATE 55: CPT

## 2025-04-15 PROCEDURE — 85025 COMPLETE CBC W/AUTO DIFF WBC: CPT

## 2025-04-15 PROCEDURE — 99204 OFFICE O/P NEW MOD 45 MIN: CPT | Performed by: PHYSICIAN ASSISTANT

## 2025-04-15 PROCEDURE — 72148 MRI LUMBAR SPINE W/O DYE: CPT

## 2025-04-15 PROCEDURE — 96365 THER/PROPH/DIAG IV INF INIT: CPT

## 2025-04-15 PROCEDURE — 72141 MRI NECK SPINE W/O DYE: CPT

## 2025-04-15 PROCEDURE — 99214 OFFICE O/P EST MOD 30 MIN: CPT | Performed by: PHYSICIAN ASSISTANT

## 2025-04-15 PROCEDURE — 80048 BASIC METABOLIC PNL TOTAL CA: CPT

## 2025-04-15 PROCEDURE — 99244 OFF/OP CNSLTJ NEW/EST MOD 40: CPT | Performed by: STUDENT IN AN ORGANIZED HEALTH CARE EDUCATION/TRAINING PROGRAM

## 2025-04-15 PROCEDURE — 93005 ELECTROCARDIOGRAM TRACING: CPT

## 2025-04-15 PROCEDURE — 96375 TX/PRO/DX INJ NEW DRUG ADDON: CPT

## 2025-04-15 PROCEDURE — 96366 THER/PROPH/DIAG IV INF ADDON: CPT

## 2025-04-15 PROCEDURE — 72125 CT NECK SPINE W/O DYE: CPT

## 2025-04-15 PROCEDURE — 84484 ASSAY OF TROPONIN QUANT: CPT

## 2025-04-15 PROCEDURE — 99285 EMERGENCY DEPT VISIT HI MDM: CPT

## 2025-04-15 PROCEDURE — 36415 COLL VENOUS BLD VENIPUNCTURE: CPT

## 2025-04-15 PROCEDURE — 71046 X-RAY EXAM CHEST 2 VIEWS: CPT

## 2025-04-15 RX ORDER — OXYCODONE HYDROCHLORIDE 5 MG/1
5 TABLET ORAL ONCE
Refills: 0 | Status: COMPLETED | OUTPATIENT
Start: 2025-04-15 | End: 2025-04-15

## 2025-04-15 RX ORDER — METHOCARBAMOL 500 MG/1
500 TABLET, FILM COATED ORAL ONCE
Status: COMPLETED | OUTPATIENT
Start: 2025-04-15 | End: 2025-04-15

## 2025-04-15 RX ORDER — METHOCARBAMOL 750 MG/1
750 TABLET, FILM COATED ORAL EVERY 6 HOURS SCHEDULED
Status: DISCONTINUED | OUTPATIENT
Start: 2025-04-15 | End: 2025-04-22 | Stop reason: HOSPADM

## 2025-04-15 RX ORDER — SODIUM CHLORIDE, SODIUM GLUCONATE, SODIUM ACETATE, POTASSIUM CHLORIDE, MAGNESIUM CHLORIDE, SODIUM PHOSPHATE, DIBASIC, AND POTASSIUM PHOSPHATE .53; .5; .37; .037; .03; .012; .00082 G/100ML; G/100ML; G/100ML; G/100ML; G/100ML; G/100ML; G/100ML
1000 INJECTION, SOLUTION INTRAVENOUS ONCE
Status: COMPLETED | OUTPATIENT
Start: 2025-04-15 | End: 2025-04-15

## 2025-04-15 RX ORDER — ONDANSETRON 2 MG/ML
4 INJECTION INTRAMUSCULAR; INTRAVENOUS EVERY 4 HOURS PRN
Status: DISCONTINUED | OUTPATIENT
Start: 2025-04-15 | End: 2025-04-22 | Stop reason: HOSPADM

## 2025-04-15 RX ORDER — AMOXICILLIN 250 MG
1 CAPSULE ORAL
Status: DISCONTINUED | OUTPATIENT
Start: 2025-04-15 | End: 2025-04-22 | Stop reason: HOSPADM

## 2025-04-15 RX ORDER — OXYCODONE HYDROCHLORIDE 5 MG/1
5 TABLET ORAL EVERY 4 HOURS PRN
Refills: 0 | Status: DISCONTINUED | OUTPATIENT
Start: 2025-04-15 | End: 2025-04-22 | Stop reason: HOSPADM

## 2025-04-15 RX ORDER — IBUPROFEN 600 MG/1
600 TABLET, FILM COATED ORAL EVERY 6 HOURS SCHEDULED
Status: DISCONTINUED | OUTPATIENT
Start: 2025-04-17 | End: 2025-04-19

## 2025-04-15 RX ORDER — KETOROLAC TROMETHAMINE 30 MG/ML
30 INJECTION, SOLUTION INTRAMUSCULAR; INTRAVENOUS EVERY 8 HOURS
Status: DISCONTINUED | OUTPATIENT
Start: 2025-04-15 | End: 2025-04-15

## 2025-04-15 RX ORDER — ACETAMINOPHEN 325 MG/1
650 TABLET ORAL ONCE
Status: COMPLETED | OUTPATIENT
Start: 2025-04-15 | End: 2025-04-15

## 2025-04-15 RX ORDER — PREDNISONE 20 MG/1
20 TABLET ORAL DAILY
Status: COMPLETED | OUTPATIENT
Start: 2025-04-20 | End: 2025-04-21

## 2025-04-15 RX ORDER — GABAPENTIN 100 MG/1
100 CAPSULE ORAL 3 TIMES DAILY
Status: DISCONTINUED | OUTPATIENT
Start: 2025-04-15 | End: 2025-04-16

## 2025-04-15 RX ORDER — ACETAMINOPHEN 325 MG/1
975 TABLET ORAL EVERY 8 HOURS
Status: DISCONTINUED | OUTPATIENT
Start: 2025-04-15 | End: 2025-04-22 | Stop reason: HOSPADM

## 2025-04-15 RX ORDER — POLYETHYLENE GLYCOL 3350 17 G/17G
17 POWDER, FOR SOLUTION ORAL DAILY
Status: DISCONTINUED | OUTPATIENT
Start: 2025-04-15 | End: 2025-04-22 | Stop reason: HOSPADM

## 2025-04-15 RX ORDER — PREDNISONE 20 MG/1
60 TABLET ORAL DAILY
Status: COMPLETED | OUTPATIENT
Start: 2025-04-16 | End: 2025-04-17

## 2025-04-15 RX ORDER — KETOROLAC TROMETHAMINE 30 MG/ML
15 INJECTION, SOLUTION INTRAMUSCULAR; INTRAVENOUS ONCE
Status: COMPLETED | OUTPATIENT
Start: 2025-04-15 | End: 2025-04-15

## 2025-04-15 RX ORDER — PREDNISONE 20 MG/1
40 TABLET ORAL DAILY
Status: COMPLETED | OUTPATIENT
Start: 2025-04-18 | End: 2025-04-19

## 2025-04-15 RX ORDER — KETOROLAC TROMETHAMINE 30 MG/ML
15 INJECTION, SOLUTION INTRAMUSCULAR; INTRAVENOUS EVERY 6 HOURS SCHEDULED
Status: COMPLETED | OUTPATIENT
Start: 2025-04-15 | End: 2025-04-17

## 2025-04-15 RX ORDER — METHOCARBAMOL 500 MG/1
1000 TABLET, FILM COATED ORAL EVERY 8 HOURS SCHEDULED
Status: DISCONTINUED | OUTPATIENT
Start: 2025-04-15 | End: 2025-04-15

## 2025-04-15 RX ORDER — LIDOCAINE 50 MG/G
1 PATCH TOPICAL ONCE
Status: COMPLETED | OUTPATIENT
Start: 2025-04-15 | End: 2025-04-15

## 2025-04-15 RX ADMIN — ACETAMINOPHEN 650 MG: 325 TABLET, FILM COATED ORAL at 10:44

## 2025-04-15 RX ADMIN — GABAPENTIN 100 MG: 100 CAPSULE ORAL at 15:18

## 2025-04-15 RX ADMIN — SODIUM CHLORIDE, SODIUM GLUCONATE, SODIUM ACETATE, POTASSIUM CHLORIDE, MAGNESIUM CHLORIDE, SODIUM PHOSPHATE, DIBASIC, AND POTASSIUM PHOSPHATE 1000 ML: .53; .5; .37; .037; .03; .012; .00082 INJECTION, SOLUTION INTRAVENOUS at 10:45

## 2025-04-15 RX ADMIN — LIDOCAINE 1 PATCH: 700 PATCH TOPICAL at 10:45

## 2025-04-15 RX ADMIN — SENNOSIDES AND DOCUSATE SODIUM 1 TABLET: 50; 8.6 TABLET ORAL at 21:12

## 2025-04-15 RX ADMIN — KETOROLAC TROMETHAMINE 15 MG: 30 INJECTION, SOLUTION INTRAMUSCULAR; INTRAVENOUS at 17:13

## 2025-04-15 RX ADMIN — ACETAMINOPHEN 975 MG: 325 TABLET, FILM COATED ORAL at 21:12

## 2025-04-15 RX ADMIN — OXYCODONE HYDROCHLORIDE 5 MG: 5 TABLET ORAL at 16:57

## 2025-04-15 RX ADMIN — METHOCARBAMOL 500 MG: 500 TABLET ORAL at 10:44

## 2025-04-15 RX ADMIN — METHOCARBAMOL 750 MG: 750 TABLET ORAL at 17:13

## 2025-04-15 RX ADMIN — OXYCODONE HYDROCHLORIDE 5 MG: 5 TABLET ORAL at 21:47

## 2025-04-15 RX ADMIN — OXYCODONE HYDROCHLORIDE 5 MG: 5 TABLET ORAL at 12:26

## 2025-04-15 RX ADMIN — DICLOFENAC SODIUM 2 G: 10 GEL TOPICAL at 21:12

## 2025-04-15 RX ADMIN — GABAPENTIN 100 MG: 100 CAPSULE ORAL at 21:12

## 2025-04-15 RX ADMIN — KETOROLAC TROMETHAMINE 15 MG: 30 INJECTION, SOLUTION INTRAMUSCULAR; INTRAVENOUS at 10:44

## 2025-04-15 NOTE — ASSESSMENT & PLAN NOTE
Acetaminophen 975 mg p.o. every 8 hours scheduled.  Continue lidocaine patch, on for 12 hours and off for 12 hours.  Gabapentin 300 mg p.o. twice daily (patient reports this is a home medication).  Methocarbamol 750 mg p.o. every 6 hours scheduled.  Oxycodone 2.5 mg p.o. every 4 hours as needed moderate pain or 5 mg p.o. every 4 hours as needed severe pain.  Prednisone taper initiated by neurology.  No indication for IV opioids and, in fact, relatively contraindicated in setting of intractable headache.  Ice or heat for up to 20 minutes every hour as needed.  As needed Narcan in the event that opioid reversal becomes necessary.  Bowel regimen to avoid opioid-induced constipation while on opioid pain medication.  Patient needs to follow through with obtaining a primary care provider as well as potentially a chronic pain management provider.

## 2025-04-15 NOTE — ASSESSMENT & PLAN NOTE
Admits to prior use of methamphetamines but states he has not used this in several years.  Admits to occasional use of marijuana currently.  Denies any other drug use.

## 2025-04-15 NOTE — CONSULTS
Consultation - Neurology   Name: Amado Chin 39 y.o. male I MRN: [id number]  Unit/Bed#: ED 24 I Date of Admission: 4/15/2025   Date of Service: 4/15/2025 I Hospital Day: 0   Inpatient consult to Neurology  Consult performed by: Siddhartha Ribera DO  Consult ordered by: Juan Redman MD        Physician Requesting Evaluation: Juan Redman, *   Reason for Evaluation / Principal Problem: Headache    Assessment & Plan  Neck pain    Headache  Pt presenting for headache that appears to be exacerbated by his history of chronic neck pain  Onset: 4/14  Duration: Constant   Character: Pressure  PTA meds: Tylenol, naproxen, gabapentin, Cymbalta  In the past he states that ketamine has been effective, has tried Percocet and Dilaudid which he states were not effective.  Aggravating/alleviating factors: He denies photophobia, does have some phonophobia no osmophobia.  He notes some nausea without vomiting as well, however this appears to be more associated with the dizziness rather than the headache itself.  Pt denies red flag symptoms of headache such as thunderclap onset, LOC, weight loss, visual changes.  In the ED he received 15 mg of Toradol without any benefit.  The admitting team has also tried to give the patient some Robaxin without much help as well.    Workup:  Lab Results   Component Value Date    GLUC 91 04/15/2025    DRSOBYBC14 208 05/17/2024    FOLATE 6.3 05/17/2024    MG 2.2 05/15/2024    SODIUM 137 04/15/2025    [id number] 0.698 04/01/2022   Imaging: CT C-spine negative for acute spine fracture    Plan:  Meds: Tylenol 975 mg every 8 hours, lidocaine patches  Recommend prednisone taper beginning 4/16  Suspect patient's source of headache is likely from his longstanding chronic neck pain, recommend APS service to evaluate patient and if there are any other interventions or medications that would be beneficial for her and the degenerative changes seen in his neck  Neurology follow up  MRI  C and L-spine without contrast    Asthma    Cervical spinal stenosis    Tobacco abuse    Anxiety    Polysubstance abuse (HCC)    I have discussed with Dr. Vance the above plan to treat pt. He agrees with the plan.    Recommendations for outpatient neurological follow up have yet to be determined.    History of Present Illness   Amado Chin is a 39 y.o. male with a past medical history of tobacco and alcohol abuse, vitamin B12 deficiency, urinary incontinence, depression, anxiety who presents with headache, dizziness, gait instability and neck pain.  Patient has had multiple ED visits and hospitalizations for chronic neck pain over the past several years.  He has restricted range of motion specifically with sidebending though does have pain with movement in any plane of motion.  He also complains of radiating headache that begins from his neck to the bilateral temples and describes this as a pressure-like sensation.  He notes that his headache usually only lasts a few hours and does not generally tend to persist from 1 day to another.  This current headache began yesterday and has this persistence.  He has tried to take Tylenol and naproxen at home without any benefit.  He is also on gabapentin and Cymbalta.  He also complains of bilateral upper extremity paresthesias of both hands, left greater than right.  With this he also notes some subjective weakness of the legs bilaterally.  He also has had dizziness that he describes of feeling like he is on a boat that has lead to some falls without head strike or loss of consciousness.  He notes of the above paresthesias, dizziness and headache persisting are new since yesterday.  He also endorses numbness of the groin as well as some urinary dribbling which appears to be chronic.    Review of Systems   numbness or tingling of hands, paresis, headache  Medical History Review: I have reviewed the patient's PMH, PSH, Social History, Family History, Meds, and Allergies      Objective :  Temp:  [98.3 °F (36.8 °C)] 98.3 °F (36.8 °C)  HR:  [113] 113  BP: (148)/(91) 148/91  Resp:  [18] 18  SpO2:  [98 %] 98 %  O2 Device: None (Room air)    Physical Exam  Constitutional:       Appearance: Normal appearance.   HENT:      Head: Normocephalic.      Mouth/Throat:      Mouth: Mucous membranes are moist.   Eyes:      General: Lids are normal.      Extraocular Movements: Extraocular movements intact.      Conjunctiva/sclera: Conjunctivae normal.      Pupils: Pupils are equal, round, and reactive to light.   Neck:      Comments: Limited range of motion, able to rotate his head side-to-side with some pain, states that sidebending instead would cause more severe pain and also states that his head will get stuck with these motion.  With neck flexion and extension he has pain as well.  Neurological:      Motor: Motor strength is normal.     Deep Tendon Reflexes: Reflexes are normal and symmetric.   Psychiatric:         Speech: Speech normal.     Neurological Exam  Mental Status  Awake, alert and oriented to person, place and time. Speech is normal. Language is fluent with no aphasia.    Cranial Nerves  CN II: Visual fields full to confrontation.  CN III, IV, VI: Extraocular movements intact bilaterally. Normal lids and orbits bilaterally. Pupils equal round and reactive to light bilaterally.  CN V: Facial sensation is normal.  CN VII: Full and symmetric facial movement.  CN VIII: Hearing is normal.  CN IX, X: Palate elevates symmetrically  CN XI: Shoulder shrug strength is normal.  CN XII: Tongue midline without atrophy or fasciculations.    Motor   Strength is 5/5 throughout all four extremities.  Range of motion limited however within these limits strength appears to be 5 out of 5 and equal on both sides..    Sensory  Light touch is normal in upper and lower extremities.     Reflexes  Deep tendon reflexes are 2+ and symmetric in all four extremities.    Coordination  Right: Finger-to-nose normal.  Heel-to-shin normal.Left: Finger-to-nose normal. Heel-to-shin normal.        Lab Results: I have reviewed the following results:I have personally reviewed pertinent reports.    Recent Labs     04/15/25  1041   WBC 6.51   HGB 15.6   HCT 44.0      SODIUM 137   K 3.7      CO2 19*   BUN 18   CREATININE 1.14   GLUC 91     Imaging Results Review: I personally reviewed the following image studies in PACS and associated radiology reports: CT C-spine. My interpretation of the radiology images/reports is: As above.  Other Study Results Review: No additional pertinent studies reviewed.    VTE Prophylaxis: VTE covered by:    None

## 2025-04-15 NOTE — H&P
H&P - Hospitalist   Name: Amado Chin 39 y.o. male I MRN: [id number]  Unit/Bed#: ED 24 I Date of Admission: 4/15/2025   Date of Service: 4/15/2025 I Hospital Day: 0     Assessment & Plan  Neck pain  Patient has not had a headache admission since 9/2024.  Says that it had been well-managed however recently has had increasing pain particularly in the bitemporal area as well as the neck radiating to the back.  Associated with a lot of stiffness  Also has intermittent dizziness  Possibly due to complex migraine versus tension   Noted home meds: Patient takes gabapentin and naproxen at home.  Patient not currently on any opioids per PDMP  In the emergency department: Patient did get Robaxin and oxycodone as well as acetaminophen and ketorolac and unfortunately pain was not resolved.  History of psychosis previous inpatient psychiatric admission on 7/2024.  Workups:  CT scan on/15/2025 shows no cervical spine fractures or traumatic malalignment  Largely unremarkable    Plan:  Neurology consult for billateral headache  Neuro did recommend that we bring spine and pain onboard  Will observe overnight  PTOT  Toradol 30 mg every 8 hours for 5 days  Methocarbamol 500 mg every 8 hours for 5 days  Tylenol 975 mg around-the-clock  Preventative medications for tension headache or migraine per neurology.  Continue home meds  Patient will likely need comprehensive spine outpatient.  Will try to stay away from opioids.  Asthma  Acute shortness of breath at this time    Cervical spinal stenosis  Plan noted above.      VTE Pharmacologic Prophylaxis:   Moderate Risk (Score 3-4) - Pharmacological DVT Prophylaxis Ordered: enoxaparin (Lovenox).  Code Status: Level 1 full code  Discussion with family: Patient declined call to .     Anticipated Length of Stay: Patient will be admitted on an observation basis with an anticipated length of stay of less than 2 midnights secondary to Cervical neck pain.    History of Present  Illness   Chief Complaint: neck pain    Amado Chin is a 39 y.o. male with a cervical stenosis coming in for intractable neck pain as well as bitemporal headache.  With muscle stiffness recurrent falls.  Review of Systems   Constitutional:  Negative for chills and fever.   HENT:  Negative for ear pain and sore throat.    Eyes:  Negative for pain and visual disturbance.   Respiratory:  Negative for cough and shortness of breath.    Cardiovascular:  Negative for chest pain and palpitations.   Gastrointestinal:  Negative for abdominal pain and vomiting.   Genitourinary:  Negative for dysuria and hematuria.   Musculoskeletal:  Negative for arthralgias and back pain.   Skin:  Negative for color change and rash.   Neurological:  Negative for seizures and syncope.   All other systems reviewed and are negative.      Historical Information   Past Medical History:   Diagnosis Date    Asthma     Chronic pain     Hypertension     Neck pain     Psychoactive 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HCC)     Thyroglossal duct cyst      Past Surgical History:   Procedure Laterality Date    THYROGLOSSAL DUCT EXCISION      THYROID SURGERY      THYROID SURGERY       Social History     Tobacco Use    Smoking status: Every Day     Current packs/day: 0.50     Types: Cigarettes    Smokeless tobacco: Never    Tobacco comments:     5 cigerettes a day    Vaping Use    Vaping status: Former    Substances: Nicotine, Flavoring   Substance and Sexual Activity    Alcohol use: Yes     Comment: occas    Drug use: Not Currently     Types: Marijuana     Comment: last used 09/2024    Sexual activity: Yes     Partners: Female     Birth control/protection: Condom Male     E-Cigarette/Vaping    E-Cigarette Use Former User     Cartridges/Day 1      E-Cigarette/Vaping Substances    Nicotine Yes     THC No     CBD No     Flavoring Yes     Other No     Unknown No      Family history non-contributory  Social History:  Marital Status: Single   Occupation: None  Patient  Pre-hospital Living Situation: Home  Patient Pre-hospital Level of Mobility: walks  Patient Pre-hospital Diet Restrictions: None    Meds/Allergies   I have reviewed home medications with patient personally.  Prior to Admission medications    Medication Sig Start Date End Date Taking? Authorizing Provider   albuterol (Proventil HFA) 90 mcg/act inhaler Inhale 2 puffs every 4 (four) hours as needed for wheezing 3/20/25   Cj Reyes DO   cyclobenzaprine (FLEXERIL) 10 mg tablet Take 1 tablet (10 mg total) by mouth 2 (two) times a day as needed for muscle spasms 9/3/24   Devin Marlow MD   cyclobenzaprine (FLEXERIL) 10 mg tablet Take 1 tablet (10 mg total) by mouth 2 (two) times a day as needed for muscle spasms for up to 14 days 10/18/24 11/1/24  Taras Roland MD   Diclofenac Sodium (VOLTAREN) 1 % Apply 2 g topically 4 (four) times a day 9/25/24   Edwige Rowell DO   DULoxetine (Cymbalta) 30 mg delayed release capsule Take 30 mg by mouth daily 3/7/24   Historical Provider, MD   gabapentin (Neurontin) 300 mg capsule Take 1 capsule (300 mg total) by mouth 3 (three) times a day 2/9/24   Doron Miller,    gabapentin (Neurontin) 300 mg capsule Take 1 capsule (300 mg total) by mouth 3 (three) times a day for 7 days For post-herpetic neuralgia: Take 1 tablet on day 1,  Then take 2 tablets on day 2, Then take 3 tablets on day 3 and every day after that as instructed by your doctor. 9/5/24 9/12/24  Analisa Her,    gabapentin (Neurontin) 300 mg capsule Take 1 capsule (300 mg total) by mouth 3 (three) times a day for 14 days For post-herpetic neuralgia: Take 1 tablet on day 1,  Then take 2 tablets on day 2, Then take 3 tablets on day 3 and every day after that as instructed by your doctor. 10/18/24 11/1/24  Taras Roland MD   ibuprofen (MOTRIN) 400 mg tablet Take 1 tablet (400 mg total) by mouth every 6 (six) hours as needed for moderate pain 2/28/24   Shine Cruz, DO   methocarbamol (ROBAXIN) 500 mg tablet  Take 1 tablet (500 mg total) by mouth 2 (two) times a day 9/5/24   Analisa Her,    methocarbamol (ROBAXIN) 750 mg tablet Take 1 tablet (750 mg total) by mouth 3 (three) times a day 9/25/24   Edwige Rowell DO   nicotine (NICODERM CQ) 21 mg/24 hr TD 24 hr patch Place 1 patch on the skin every 24 hours 7/18/24   Historical Provider, MD   QUEtiapine (SEROquel) 300 mg tablet Take 1 tablet (300 mg total) by mouth daily at bedtime for 14 days 10/4/24 10/18/24  Edwige Rowell DO   topiramate (Topamax) 25 mg tablet Take 1 tablet (25 mg total) by mouth 2 (two) times a day for 7 days 9/2/24 9/9/24  Maurisio Elizabeth MD     No Known Allergies    Objective :  Temp:  [98.3 °F (36.8 °C)] 98.3 °F (36.8 °C)  HR:  [113] 113  BP: (148)/(91) 148/91  Resp:  [18] 18  SpO2:  [98 %] 98 %  O2 Device: None (Room air)    Physical Exam  Vitals and nursing note reviewed.   Constitutional:       Appearance: He is well-developed and normal weight.   HENT:      Head: Normocephalic and atraumatic.      Nose: Nose normal.      Mouth/Throat:      Mouth: Mucous membranes are moist.   Eyes:      Conjunctiva/sclera: Conjunctivae normal.   Cardiovascular:      Rate and Rhythm: Normal rate and regular rhythm.      Heart sounds: Normal heart sounds. No murmur heard.  Pulmonary:      Effort: Pulmonary effort is normal. No respiratory distress.      Breath sounds: Normal breath sounds.   Abdominal:      General: Abdomen is flat.      Palpations: Abdomen is soft.      Tenderness: There is no abdominal tenderness.   Musculoskeletal:         General: No swelling.      Cervical back: Neck supple.      Right lower leg: No edema.      Left lower leg: No edema.   Skin:     General: Skin is warm and dry.      Capillary Refill: Capillary refill takes less than 2 seconds.   Neurological:      General: No focal deficit present.      Mental Status: He is alert and oriented to person, place, and time. Mental status is at baseline.   Psychiatric:         Mood and  Affect: Mood normal.          Lines/Drains:            Lab Results: I have reviewed the following results:  Results from last 7 days   Lab Units 04/15/25  1041   WBC Thousand/uL 6.51   HEMOGLOBIN g/dL 15.6   HEMATOCRIT % 44.0   PLATELETS Thousands/uL 259   SEGS PCT % 70   LYMPHO PCT % 16   MONO PCT % 11   EOS PCT % 1     Results from last 7 days   Lab Units 04/15/25  1041   SODIUM mmol/L 137   POTASSIUM mmol/L 3.7   CHLORIDE mmol/L 106   CO2 mmol/L 19*   BUN mg/dL 18   CREATININE mg/dL 1.14   ANION GAP mmol/L 12   CALCIUM mg/dL 9.3   GLUCOSE RANDOM mg/dL 91             Lab Results   Component Value Date    HGBA1C 5.1 04/01/2022           Imaging Results Review: I personally reviewed the following image studies/reports in PACS and discussed pertinent findings with Radiology: CT C-spine. My interpretation of the radiology images/reports is: Unremarkable.  Other Study Results Review: EKG was reviewed.     Administrative Statements   I have spent a total time of 60 minutes in caring for this patient on the day of the visit/encounter including Diagnostic results, Prognosis, Risks and benefits of tx options, Instructions for management, Patient and family education, Importance of tx compliance, Risk factor reductions, Impressions, Counseling / Coordination of care, Documenting in the medical record, Reviewing/placing orders in the medical record (including tests, medications, and/or procedures), Obtaining or reviewing history  , and Communicating with other healthcare professionals .    ** Please Note: This note has been constructed using a voice recognition system. **

## 2025-04-15 NOTE — ASSESSMENT & PLAN NOTE
Smokers have been shown to require higher doses of opioid pain medication and are more likely to develop chronic pain.  Encourage smoking cessation.  Patient admits to smoking half a pack of cigarettes daily.

## 2025-04-15 NOTE — CONSULTS
Consultation - Acute Pain   Name: Amado Chin 39 y.o. male I MRN: [id number]  Unit/Bed#: ED 24 I Date of Admission: 4/15/2025   Date of Service: 4/15/2025 I Hospital Day: 0   Inpatient consult to Acute Pain Service  Consult performed by: Silvio Rodas PA-C  Consult ordered by: Juan Redman MD        Physician Requesting Evaluation: Juan Redman, *   Reason for Evaluation / Principal Problem: Neck pain    Assessment & Plan  Neck pain  Acetaminophen 975 mg p.o. every 8 hours scheduled.  Continue lidocaine patch, on for 12 hours and off for 12 hours.  Gabapentin 300 mg p.o. twice daily (patient reports this is a home medication).  Methocarbamol 750 mg p.o. every 6 hours scheduled.  Oxycodone 2.5 mg p.o. every 4 hours as needed moderate pain or 5 mg p.o. every 4 hours as needed severe pain.  Prednisone taper initiated by neurology.  No indication for IV opioids and, in fact, relatively contraindicated in setting of intractable headache.  Ice or heat for up to 20 minutes every hour as needed.  As needed Narcan in the event that opioid reversal becomes necessary.  Bowel regimen to avoid opioid-induced constipation while on opioid pain medication.  Patient needs to follow through with obtaining a primary care provider as well as potentially a chronic pain management provider.  Tobacco abuse  Smokers have been shown to require higher doses of opioid pain medication and are more likely to develop chronic pain.  Encourage smoking cessation.  Patient admits to smoking half a pack of cigarettes daily.    Anxiety  Patients with depression and/or anxiety have more perceived pain on average and often require higher doses of opioid pain medication.  Treat depression and/or anxiety aggressively.    Cervical spinal stenosis  Admits to many years of chronic neck pain.  Does not follow with a primary care provider or an outpatient pain management provider.  States he has been on gabapentin for 3 years  "prescribed by an ER provider.  States he has not gotten refills from a primary care provider but continues to take the medication.  Patient should be referred to outpatient pain management as well as establish a primary care provider.  Patient would also likely benefit from comprehensive spine program.  Polysubstance abuse (HCC)  Admits to prior use of methamphetamines but states he has not used this in several years.  Admits to occasional use of marijuana currently.  Denies any other drug use.  I have discussed the above management plan in detail with the primary service.   Please contact the [employer] role for the Acute Pain service with any questions/concerns.      [facility] will continue to follow. Please contact Acute Pain Service - via [employer] from [phone] with additional questions or concerns. See [employer] or [employer] for additional contacts and after hours information.     History of Present Illness    HPI: Amado Chin is a 39 y.o. year old male who presents with a several week history of worsening neck pain.  Patient states that he has had constant neck pain for the last 24 hours as well as a constant bitemporal headache which he attributes to his neck pain.  Patient with history of chronic neck pain for \"many years\".  Patient with previous evidence of cervical spinal stenosis.  States that he has neck pain most days but that it waxes and wanes and is not typically constant.  Patient states that the pain has been constant since yesterday and has been severe.  Patient has been on gabapentin 200 mg p.o. twice daily per his report for the past 3 years.  States he got the original prescription while in an emergency department however is unable to relate how he has gotten refills as he continues to take this medication per his report.  Patient states he has also been taking Advil and Tylenol recently.  Patient states that he fell 2 weeks ago and that his pain is worsened since then but states that he also has " "fallen many times before this.  He complains also of bilateral lower extremity weakness although is unclear how long this has been a problem.  Also complains of numbness and tingling along the ulnar distribution of the bilateral arms, left greater than right, with numbness and tingling in the 3rd, 4th and 5th digits of the hands bilaterally.  Again, it is unclear how long this has been occurring.  Patient received ketorolac 15 mg IV this morning followed by oxycodone 5 mg p.o. at 1226.  He states that the oxycodone \"took the edge off\".  Patient admits to smoking half pack of cigarettes per day.  States he drinks alcohol only occasionally.  Admits to occasional marijuana use.  Admits to previous history of methamphetamine use but states he stopped this several years ago.    Of note, patient has been referred to outpatient pain management and has yet to follow-up with them.  He also has many ED visits following which primary care has reached out to the patient for follow-up and patient has not returned those phone calls.    Current pain location(s): Pain Score: 9  Pain Location/Orientation: Location: Neck  Pain Scale:    Current Analgesic regimen:  Acetaminophen 975 mg p.o. every 8 hours scheduled.  Lidocaine patch x 1 once.  Prednisone taper per neurology.    Pain History: Chronic neck pain  Pain Management Physician: None  I have reviewed the patient's controlled substance dispensing history in the Prescription Drug Monitoring Program in compliance with the [facility] regulations before prescribing any controlled substances.     Review of Systems   Musculoskeletal:  Positive for neck pain and neck stiffness.   Neurological:  Positive for dizziness, weakness, light-headedness, numbness and headaches.   All other systems reviewed and are negative.    Historical Information   Past Medical History:   Diagnosis Date    Asthma     Chronic pain     Hypertension     Neck pain     Psychoactive 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HCC)     " Thyroglossal duct cyst      Past Surgical History:   Procedure Laterality Date    THYROGLOSSAL DUCT EXCISION      THYROID SURGERY      THYROID SURGERY       Social History     Tobacco Use    Smoking status: Every Day     Current packs/day: 0.50     Types: Cigarettes    Smokeless tobacco: Never    Tobacco comments:     5 cigerettes a day    Vaping Use    Vaping status: Former    Substances: Nicotine, Flavoring   Substance and Sexual Activity    Alcohol use: Yes     Comment: occas    Drug use: Not Currently     Types: Marijuana     Comment: last used 09/2024    Sexual activity: Yes     Partners: Female     Birth control/protection: Condom Male     E-Cigarette/Vaping    E-Cigarette Use Former User     Cartridges/Day 1      E-Cigarette/Vaping Substances    Nicotine Yes     THC No     CBD No     Flavoring Yes     Other No     Unknown No      Family History   Problem Relation Age of Onset    Hypertension Mother     No Known Problems Father      Social History     Tobacco Use    Smoking status: Every Day     Current packs/day: 0.50     Types: Cigarettes    Smokeless tobacco: Never    Tobacco comments:     5 cigerettes a day    Vaping Use    Vaping status: Former    Substances: Nicotine, Flavoring   Substance and Sexual Activity    Alcohol use: Yes     Comment: occas    Drug use: Not Currently     Types: Marijuana     Comment: last used 09/2024    Sexual activity: Yes     Partners: Female     Birth control/protection: Condom Male       Current Facility-Administered Medications:     acetaminophen (TYLENOL) tablet 975 mg, Q8H    lidocaine (LIDODERM) 5 % patch 1 patch, Once    [START ON 4/16/2025] predniSONE tablet 60 mg, Daily **FOLLOWED BY** [START ON 4/18/2025] predniSONE tablet 40 mg, Daily **FOLLOWED BY** [START ON 4/20/2025] predniSONE tablet 20 mg, Daily  Prior to Admission Medications   Prescriptions Last Dose Informant Patient Reported? Taking?   QUEtiapine (SEROquel) 300 mg tablet   No No   Sig: Take 1 tablet (300 mg  total) by mouth daily at bedtime for 14 days   cyclobenzaprine (FLEXERIL) 10 mg tablet   No No   Sig: Take 1 tablet (10 mg total) by mouth 2 (two) times a day as needed for muscle spasms for up to 14 days   gabapentin (Neurontin) 300 mg capsule   No No   Sig: Take 1 capsule (300 mg total) by mouth 3 (three) times a day   gabapentin (Neurontin) 300 mg capsule   No No   Sig: Take 1 capsule (300 mg total) by mouth 3 (three) times a day for 7 days For post-herpetic neuralgia: Take 1 tablet on day 1,  Then take 2 tablets on day 2, Then take 3 tablets on day 3 and every day after that as instructed by your doctor.   gabapentin (Neurontin) 300 mg capsule   No No   Sig: Take 1 capsule (300 mg total) by mouth 3 (three) times a day for 14 days For post-herpetic neuralgia: Take 1 tablet on day 1,  Then take 2 tablets on day 2, Then take 3 tablets on day 3 and every day after that as instructed by your doctor.   ibuprofen (MOTRIN) 400 mg tablet   No No   Sig: Take 1 tablet (400 mg total) by mouth every 6 (six) hours as needed for moderate pain   methocarbamol (ROBAXIN) 750 mg tablet   No No   Sig: Take 1 tablet (750 mg total) by mouth 3 (three) times a day   topiramate (Topamax) 25 mg tablet   No No   Sig: Take 1 tablet (25 mg total) by mouth 2 (two) times a day for 7 days      Facility-Administered Medications: None     Patient has no known allergies.    Objective :  Temp:  [98.3 °F (36.8 °C)] 98.3 °F (36.8 °C)  HR:  [113] 113  BP: (148)/(91) 148/91  Resp:  [18] 18  SpO2:  [98 %] 98 %  O2 Device: None (Room air)    Physical Exam  Vitals and nursing note reviewed.   Constitutional:       General: He is awake. He is not in acute distress.     Appearance: He is not ill-appearing, toxic-appearing or diaphoretic.      Comments: Lying in bed.  Appears comfortable.  Appears reluctant to move head.   Skin:     General: Skin is warm and dry.   Neurological:      Mental Status: He is alert and oriented to person, place, and time.       GCS: GCS eye subscore is 4. GCS verbal subscore is 5. GCS motor subscore is 6.   Psychiatric:         Attention and Perception: Attention normal.         Speech: Speech normal.         Behavior: Behavior normal. Behavior is cooperative.          Lab Results: I have reviewed the following results:  Estimated Creatinine Clearance: 85.6 mL/min (by C-G formula based on SCr of 1.14 mg/dL).  Lab Results   Component Value Date    WBC 6.51 04/15/2025    HGB 15.6 04/15/2025    HCT 44.0 04/15/2025     04/15/2025         Component Value Date/Time    K 3.7 04/15/2025 1041    K 4.3 09/05/2024 0744     04/15/2025 1041     09/05/2024 0744    CO2 19 (L) 04/15/2025 1041    CO2 27 09/05/2024 0744    BUN 18 04/15/2025 1041    BUN 26 09/05/2024 0744    CREATININE 1.14 04/15/2025 1041    CREATININE 1.17 09/05/2024 0744         Component Value Date/Time    CALCIUM 9.3 04/15/2025 1041    CALCIUM 8.6 09/05/2024 0744    ALKPHOS 83 03/20/2025 1826    ALKPHOS 71 09/05/2024 0744    AST 14 03/20/2025 1826    AST 11 09/05/2024 0744    ALT 13 03/20/2025 1826    ALT 11 09/05/2024 0744    TP 7.7 03/20/2025 1826    TP 7.0 09/05/2024 0744    ALB 4.3 03/20/2025 1826    ALB 4.0 09/05/2024 0744       Imaging Results Review: I reviewed radiology reports from this admission including: CT C-spine.  Other Study Results Review: No additional pertinent studies reviewed.    Administrative Statements   I have spent a total time of 51 minutes in caring for this patient on the day of the visit/encounter including Risks and benefits of tx options, Instructions for management, Patient and family education, Importance of tx compliance, Risk factor reductions, Impressions, Counseling / Coordination of care, Documenting in the medical record, Reviewing/placing orders in the medical record (including tests, medications, and/or procedures), Obtaining or reviewing history  , and Communicating with other healthcare professionals .

## 2025-04-15 NOTE — ASSESSMENT & PLAN NOTE
Patient has not had a headache admission since 9/2024.  Says that it had been well-managed however recently has had increasing pain particularly in the bitemporal area as well as the neck radiating to the back.  Associated with a lot of stiffness  Also has intermittent dizziness  Possibly due to complex migraine versus tension   Noted home meds: Patient takes gabapentin and naproxen at home.  Patient not currently on any opioids per PDMP  In the emergency department: Patient did get Robaxin and oxycodone as well as acetaminophen and ketorolac and unfortunately pain was not resolved.  History of psychosis previous inpatient psychiatric admission on 7/2024.  Workups:  CT scan on/15/2025 shows no cervical spine fractures or traumatic malalignment  Largely unremarkable    Plan:  Neurology consult for billateral headache  Neuro did recommend that we bring spine and pain onboard  Will observe overnight  PTOT  Toradol 30 mg every 8 hours for 5 days  Methocarbamol 500 mg every 8 hours for 5 days  Tylenol 975 mg around-the-clock  Preventative medications for tension headache or migraine per neurology.  Continue home meds  Patient will likely need comprehensive spine outpatient.  Will try to stay away from opioids.   [phone]

## 2025-04-15 NOTE — ASSESSMENT & PLAN NOTE
Admits to many years of chronic neck pain.  Does not follow with a primary care provider or an outpatient pain management provider.  States he has been on gabapentin for 3 years prescribed by an ER provider.  States he has not gotten refills from a primary care provider but continues to take the medication.  Patient should be referred to outpatient pain management as well as establish a primary care provider.  Patient would also likely benefit from comprehensive spine program.

## 2025-04-15 NOTE — ED ATTENDING ATTESTATION
I saw and evaluated the patient. I have discussed the patient with the resident physician and agree with the resident's findings, assessment and plan as documented in the resident physician's note, unless otherwise documented below. All available laboratory and imaging studies were reviewed by myself.  I was present for key portions of any procedure(s) performed by the resident and I was immediately available to provide assistance.     I agree with the current assessment done in the Emergency Department. I have conducted an independent evaluation of this patient    Final Diagnosis:  1. Neck pain    2. Intractable pain    3. Headache    4. Chronic neck pain            Chief Complaint   Patient presents with    Neck Pain     C/o of neck pain since last night     Dizziness     Started last night states room spinning took hit of thc pen before arriving denies other alc. Or drug use.        This is a 39 y.o. male with a history of chronic neck pain, HTN, presenting for evaluation of neck pain and lightheadedness. Says he fell 3 weeks ago, has has worsening neck pain since then. Localized to cervical spine midline radiating to head. Has chronic tingling in bilateral arms that has gradually worsened over the past year, left worse than right. No weakness in his arms. Has lightheadedness and also complains of soreness to left side of his chest with coughing. Denies fever, chills, SOB, n/v/d, abdominal pain, headache, any other complaints. Denies  history of IV drug use.        MRI C-spine from 5/2024:   A right subarticular zone disc protrusion at C5-C6 results in severe mass effect   on the right subarticular zone and contributes to mild spinal canal narrowing.   The disc protrusion is increased in size from prior MRI.     A disc bulge at C6-C7 results in moderate to severe right foraminal narrowing.   This appears overall similar as compared to prior MRI.     Additional multilevel degenerative changes in the cervical spine  without   high-grade spinal canal or foraminal narrowing. No pathologic intraspinal   enhancement. Of note, no contrast is needed for assessment of degenerative or   traumatic processes.     PMH:   has a past medical history of Asthma, Chronic pain, Hypertension, Neck pain, Psychoactive 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HCC), and Thyroglossal duct cyst.    PSH:   has a past surgical history that includes Thyroid surgery; Thyroglossal duct excision; and Thyroid surgery.    Social:  Social History     Substance and Sexual Activity   Alcohol Use Yes    Comment: occas     Social History     Tobacco Use   Smoking Status Every Day    Current packs/day: 0.50    Types: Cigarettes   Smokeless Tobacco Never   Tobacco Comments    5 cigerettes a day      Social History     Substance and Sexual Activity   Drug Use Not Currently    Types: Marijuana    Comment: last used 09/2024     PE:  Vitals:    04/15/25 1002   BP: 148/91   Pulse: (!) 113   Resp: 18   Temp: 98.3 °F (36.8 °C)   SpO2: 98%           Physical exam:  GENERAL APPEARANCE: Appears comfortable, no acute distress, calm and cooperative   NEURO: GCS 15, no gross focal deficits, cranial nerves grossly intact, clear fluent speech, no facial asymmetry. Distal sensation in upper extremities intact. 5/5 strength in bilateral upper extremities.   HEENT: Normocephalic, atraumatic, moist mucous membranes   Neck: Tender to midline and bilateral paraspinous muscles. No stepoffs or deformities. Normal ROM.   CV: RRR, no murmurs, rubs, or gallops  LUNGS: CTAB, no wheezing, rales, or rhonchi  GI: Abdomen soft, non-tender, no rebound or guarding   MSK: Extremities non-tender, no pitting edema  SKIN: Warm and dry      Assessment and plan: This is a 39 y.o. male with a history of chronic neck pain, HTN, presenting for evaluation of neck pain and lightheadedness. Within ddx consider acute spinal injury, acute on chronic musculoskeletal pain, herniated disc, spinal stenosis. Also having chest  pain. Within the differential diagnosis for chest pain consider ACS, pericarditis, GERD, musculoskeletal, pneumonia, pneumothorax, pneumomediastinum, viral illness. Will obtain workup to assess for these etiologies.         Final assessment: Workup reassuring. Patient still having significant pain and requesting to be admitted. Admitting physician agrees to accept patient for further management. Patient remains stable throughout ED course.       Code Status: Prior  Advance Directive and Living Will:      Power of :    POLST:      Medications   lidocaine (LIDODERM) 5 % patch 1 patch (1 patch Topical Medication Applied 4/15/25 1045)   acetaminophen (TYLENOL) tablet 975 mg (has no administration in time range)   predniSONE tablet 60 mg (has no administration in time range)     Followed by   predniSONE tablet 40 mg (has no administration in time range)     Followed by   predniSONE tablet 20 mg (has no administration in time range)   gabapentin (NEURONTIN) capsule 100 mg (has no administration in time range)   ketorolac (TORADOL) injection 15 mg (has no administration in time range)     Followed by   ibuprofen (MOTRIN) tablet 600 mg (has no administration in time range)   oxyCODONE (ROXICODONE) split tablet 2.5 mg (has no administration in time range)     Or   oxyCODONE (ROXICODONE) IR tablet 5 mg (has no administration in time range)   naloxone (NARCAN) 0.04 mg/mL syringe 0.04 mg (has no administration in time range)   methocarbamol (ROBAXIN) tablet 750 mg (has no administration in time range)   senna-docusate sodium (SENOKOT S) 8.6-50 mg per tablet 1 tablet (has no administration in time range)   polyethylene glycol (MIRALAX) packet 17 g (has no administration in time range)   ondansetron (ZOFRAN) injection 4 mg (has no administration in time range)   Diclofenac Sodium (VOLTAREN) 1 % topical gel 2 g (has no administration in time range)   acetaminophen (TYLENOL) tablet 650 mg (650 mg Oral Given 4/15/25 1044)    ketorolac (TORADOL) injection 15 mg (15 mg Intravenous Given 4/15/25 1044)   methocarbamol (ROBAXIN) tablet 500 mg (500 mg Oral Given 4/15/25 1044)   multi-electrolyte (Plasmalyte-A/Isolyte-S PH 7.4/Normosol-R) IV bolus 1,000 mL (0 mL Intravenous Stopped 4/15/25 1321)   oxyCODONE (ROXICODONE) IR tablet 5 mg (5 mg Oral Given 4/15/25 1226)     CT spine cervical without contrast   Final Result      No cervical spine fracture or traumatic malalignment.                  Workstation performed: [id number]         XR chest 2 views    (Results Pending)   MRI inpatient order    (Results Pending)     Orders Placed This Encounter   Procedures    XR chest 2 views    CT spine cervical without contrast    MRI inpatient order    CBC and differential    Basic metabolic panel    HS Troponin 0hr (reflex protocol)    Ambulatory referral to Spine & Pain Management    Ambulatory Referral to Comprehensive Spine Program    Diet Regular; Regular House    Nursing communication Cold application every 10 - 20 minutes.  The duration of cold therapy is generally limited to 20 minutes per application    Monitor Skin integrity, color, and character prior to and after application    Aqua K - apply for 20 minutes    Monitor Skin integrity, color, and character prior to and after application    Inpatient consult to Neurology    Inpatient consult to Acute Pain Service    OT eval and treat    PT eval and treat    ECG 12 lead    Place in Observation     Labs Reviewed   BASIC METABOLIC PANEL - Abnormal       Result Value Ref Range Status    Sodium 137  135 - 147 mmol/L Final    Potassium 3.7  3.5 - 5.3 mmol/L Final    Chloride 106  96 - 108 mmol/L Final    CO2 19 (*) 21 - 32 mmol/L Final    ANION GAP 12  4 - 13 mmol/L Final    BUN 18  5 - 25 mg/dL Final    Creatinine 1.14  0.60 - 1.30 mg/dL Final    Comment: Standardized to IDMS reference method    Glucose 91  65 - 140 mg/dL Final    Comment: If the patient is fasting, the ADA then defines impaired  "fasting glucose as > 100 mg/dL and diabetes as > or equal to 123 mg/dL.    Calcium 9.3  8.4 - 10.2 mg/dL Final    eGFR 79  ml/min/1.73sq m Final    Narrative:     National Kid[employer] guidelines for Chronic Kidney Disease (CKD):     Stage 1 with normal or high GFR (GFR > 90 mL/min/1.73 square meters)    Stage 2 Mild CKD (GFR = 60-89 mL/min/1.73 square meters)    Stage 3A Moderate CKD (GFR = 45-59 mL/min/1.73 square meters)    Stage 3B Moderate CKD (GFR = 30-44 mL/min/1.73 square meters)    Stage 4 Severe CKD (GFR = 15-29 mL/min/1.73 square meters)    Stage 5 End Stage CKD (GFR <15 mL/min/1.73 square meters)  Note: GFR calculation is accurate only with a steady state creatinine   HS TROPONIN I 0HR - Normal    hs TnI 0hr 4  \"Refer to ACS Flowchart\"- see link ng/L Final    Comment:                                              Initial (time 0) result  If >=50 ng/L, Myocardial injury suggested ;  Type of myocardial injury and treatment strategy  to be determined.  If 5-49 ng/L, a delta result at 2 hours and or 4 hours will be needed to further evaluate.  If <4 ng/L, and chest pain has been >3 hours since onset, patient may qualify for discharge based on the HEART score in the ED.  If <5 ng/L and <3hours since onset of chest pain, a delta result at 2 hours will be needed to further evaluate.    HS Troponin 99th Percentile URL of a Health Population=12 ng/L with a 95% Confidence Interval of 8-18 ng/L.    Second Troponin (time 2 hours)  If calculated delta >= 20 ng/L,  Myocardial injury suggested ; Type of myocardial injury and treatment strategy to be determined.  If 5-49 ng/L and the calculated delta is 5-19 ng/L, consult medical service for evaluation.  Continue evaluation for ischemia on ecg and other possible etiology and repeat hs troponin at 4 hours.  If delta is <5 ng/L at 2 hours, consider discharge based on risk stratification via the HEART score (if in ED), or BERENICE risk score in " IP/Observation.    HS Troponin 99th Percentile URL of a Health Population=12 ng/L with a 95% Confidence Interval of 8-18 ng/L.   CBC AND DIFFERENTIAL    WBC 6.51  4.31 - 10.16 Thousand/uL Final    RBC 4.99  3.88 - 5.62 Million/uL Final    Hemoglobin 15.6  12.0 - 17.0 g/dL Final    Hematocrit 44.0  36.5 - 49.3 % Final    MCV 88  82 - 98 fL Final    MCH 31.3  26.8 - 34.3 pg Final    MCHC 35.5  31.4 - 37.4 g/dL Final    RDW 13.9  11.6 - 15.1 % Final    MPV 10.0  8.9 - 12.7 fL Final    Platelets 259  149 - 390 Thousands/uL Final    nRBC 0  /100 WBCs Final    Segmented % 70  43 - 75 % Final    Immature Grans % 1  0 - 2 % Final    Lymphocytes % 16  14 - 44 % Final    Monocytes % 11  4 - 12 % Final    Eosinophils Relative 1  0 - 6 % Final    Basophils Relative 1  0 - 1 % Final    Absolute Neutrophils 4.56  1.85 - 7.62 Thousands/µL Final    Absolute Immature Grans 0.03  0.00 - 0.20 Thousand/uL Final    Absolute Lymphocytes 1.07  0.60 - 4.47 Thousands/µL Final    Absolute Monocytes 0.69  0.17 - 1.22 Thousand/µL Final    Eosinophils Absolute 0.09  0.00 - 0.61 Thousand/µL Final    Basophils Absolute 0.07  0.00 - 0.10 Thousands/µL Final         Time reflects when diagnosis was documented in both MDM as applicable and the Disposition within this note       Time User Action Codes Description Comment    4/15/2025  1:15 PM Doron Macario Add [M54.2] Neck pain     4/15/2025  1:15 PM Doron Macario Add [R52] Intractable pain     4/15/2025  1:33 PM Juan Redman Add [R51.9] Headache     4/15/2025  3:12 PM Silvio Heath Add [M54.2,  G89.29] Chronic neck pain           ED Disposition       ED Disposition   Admit    Condition   Stable    Date/Time   Tue Apr 15, 2025  1:15 PM    Comment                  Follow-up Information    None       Patient's Medications   Discharge Prescriptions    No medications on file       Prior to Admission Medications   Prescriptions Last Dose Informant Patient Reported? Taking?   QUEtiapine  "(SEROquel) 300 mg tablet   No No   Sig: Take 1 tablet (300 mg total) by mouth daily at bedtime for 14 days   cyclobenzaprine (FLEXERIL) 10 mg tablet   No No   Sig: Take 1 tablet (10 mg total) by mouth 2 (two) times a day as needed for muscle spasms for up to 14 days   gabapentin (Neurontin) 300 mg capsule   No No   Sig: Take 1 capsule (300 mg total) by mouth 3 (three) times a day   gabapentin (Neurontin) 300 mg capsule   No No   Sig: Take 1 capsule (300 mg total) by mouth 3 (three) times a day for 7 days For post-herpetic neuralgia: Take 1 tablet on day 1,  Then take 2 tablets on day 2, Then take 3 tablets on day 3 and every day after that as instructed by your doctor.   gabapentin (Neurontin) 300 mg capsule   No No   Sig: Take 1 capsule (300 mg total) by mouth 3 (three) times a day for 14 days For post-herpetic neuralgia: Take 1 tablet on day 1,  Then take 2 tablets on day 2, Then take 3 tablets on day 3 and every day after that as instructed by your doctor.   ibuprofen (MOTRIN) 400 mg tablet   No No   Sig: Take 1 tablet (400 mg total) by mouth every 6 (six) hours as needed for moderate pain   methocarbamol (ROBAXIN) 750 mg tablet   No No   Sig: Take 1 tablet (750 mg total) by mouth 3 (three) times a day   topiramate (Topamax) 25 mg tablet   No No   Sig: Take 1 tablet (25 mg total) by mouth 2 (two) times a day for 7 days      Facility-Administered Medications: None         Portions of the record may have been created with voice recognition software. Occasional wrong word or \"sound a like\" substitutions may have occurred due to the inherent limitations of voice recognition software. Read the chart carefully and recognize, using context, where substitutions have occurred.    Electronically signed by:  Lynette Schuster      "

## 2025-04-15 NOTE — ASSESSMENT & PLAN NOTE
Pt presenting for headache that appears to be exacerbated by his history of chronic neck pain  Onset: 4/14  Duration: Constant   Character: Pressure  PTA meds: Tylenol, naproxen, gabapentin, Cymbalta  In the past he states that ketamine has been effective, has tried Percocet and Dilaudid which he states were not effective.  Aggravating/alleviating factors: He denies photophobia, does have some phonophobia no osmophobia.  He notes some nausea without vomiting as well, however this appears to be more associated with the dizziness rather than the headache itself.  Pt denies red flag symptoms of headache such as thunderclap onset, LOC, weight loss, visual changes.  In the ED he received 15 mg of Toradol without any benefit.  The admitting team has also tried to give the patient some Robaxin without much help as well.    Workup:  Lab Results   Component Value Date    GLUC 91 04/15/2025    [id number] 208 05/17/2024    FOLATE 6.3 05/17/2024    MG 2.2 05/15/2024    SODIUM 137 04/15/2025    [id number] 0.698 04/01/2022   Imaging: CT C-spine negative for acute spine fracture    Plan:  Meds: Tylenol 975 mg every 8 hours, lidocaine patches  Recommend prednisone taper beginning 4/16  Suspect patient's source of headache is likely from his longstanding chronic neck pain, recommend APS service to evaluate patient and if there are any other interventions or medications that would be beneficial for her and the degenerative changes seen in his neck  Neurology follow up  MRI C and L-spine without contrast

## 2025-04-16 ENCOUNTER — TELEPHONE (OUTPATIENT)
Dept: PHYSICAL THERAPY | Facility: OTHER | Age: 40
End: 2025-04-16

## 2025-04-16 LAB
ANION GAP SERPL CALCULATED.3IONS-SCNC: 5 MMOL/L (ref 4–13)
ATRIAL RATE: 114 BPM
BUN SERPL-MCNC: 21 MG/DL (ref 5–25)
CALCIUM SERPL-MCNC: 8.8 MG/DL (ref 8.4–10.2)
CHLORIDE SERPL-SCNC: 106 MMOL/L (ref 96–108)
CO2 SERPL-SCNC: 28 MMOL/L (ref 21–32)
CREAT SERPL-MCNC: 1.17 MG/DL (ref 0.6–1.3)
ERYTHROCYTE [DISTWIDTH] IN BLOOD BY AUTOMATED COUNT: 14.1 % (ref 11.6–15.1)
GFR SERPL CREATININE-BSD FRML MDRD: 76 ML/MIN/1.73SQ M
GLUCOSE SERPL-MCNC: 102 MG/DL (ref 65–140)
HCT VFR BLD AUTO: 43.3 % (ref 36.5–49.3)
HGB BLD-MCNC: 14.8 G/DL (ref 12–17)
MCH RBC QN AUTO: 31 PG (ref 26.8–34.3)
MCHC RBC AUTO-ENTMCNC: 34.2 G/DL (ref 31.4–37.4)
MCV RBC AUTO: 91 FL (ref 82–98)
P AXIS: 77 DEGREES
PLATELET # BLD AUTO: 243 THOUSANDS/UL (ref 149–390)
PMV BLD AUTO: 9.9 FL (ref 8.9–12.7)
POTASSIUM SERPL-SCNC: 4.3 MMOL/L (ref 3.5–5.3)
PR INTERVAL: 128 MS
QRS AXIS: 37 DEGREES
QRSD INTERVAL: 86 MS
QT INTERVAL: 340 MS
QTC INTERVAL: 469 MS
RBC # BLD AUTO: 4.77 MILLION/UL (ref 3.88–5.62)
SODIUM SERPL-SCNC: 139 MMOL/L (ref 135–147)
T WAVE AXIS: -13 DEGREES
VENTRICULAR RATE: 114 BPM
WBC # BLD AUTO: 5.49 THOUSAND/UL (ref 4.31–10.16)

## 2025-04-16 PROCEDURE — 99232 SBSQ HOSP IP/OBS MODERATE 35: CPT | Performed by: INTERNAL MEDICINE

## 2025-04-16 PROCEDURE — 80048 BASIC METABOLIC PNL TOTAL CA: CPT

## 2025-04-16 PROCEDURE — 99232 SBSQ HOSP IP/OBS MODERATE 35: CPT | Performed by: STUDENT IN AN ORGANIZED HEALTH CARE EDUCATION/TRAINING PROGRAM

## 2025-04-16 PROCEDURE — 97163 PT EVAL HIGH COMPLEX 45 MIN: CPT

## 2025-04-16 PROCEDURE — 93010 ELECTROCARDIOGRAM REPORT: CPT | Performed by: INTERNAL MEDICINE

## 2025-04-16 PROCEDURE — 99232 SBSQ HOSP IP/OBS MODERATE 35: CPT

## 2025-04-16 PROCEDURE — 97167 OT EVAL HIGH COMPLEX 60 MIN: CPT

## 2025-04-16 PROCEDURE — 85027 COMPLETE CBC AUTOMATED: CPT

## 2025-04-16 RX ORDER — HYDRALAZINE HYDROCHLORIDE 20 MG/ML
10 INJECTION INTRAMUSCULAR; INTRAVENOUS EVERY 6 HOURS PRN
Status: DISCONTINUED | OUTPATIENT
Start: 2025-04-16 | End: 2025-04-22 | Stop reason: HOSPADM

## 2025-04-16 RX ORDER — NICOTINE 21 MG/24HR
1 PATCH, TRANSDERMAL 24 HOURS TRANSDERMAL DAILY
Status: DISCONTINUED | OUTPATIENT
Start: 2025-04-16 | End: 2025-04-22 | Stop reason: HOSPADM

## 2025-04-16 RX ORDER — ENOXAPARIN SODIUM 100 MG/ML
40 INJECTION SUBCUTANEOUS DAILY
Status: DISCONTINUED | OUTPATIENT
Start: 2025-04-16 | End: 2025-04-22 | Stop reason: HOSPADM

## 2025-04-16 RX ORDER — GABAPENTIN 300 MG/1
300 CAPSULE ORAL 2 TIMES DAILY
Status: DISCONTINUED | OUTPATIENT
Start: 2025-04-17 | End: 2025-04-16

## 2025-04-16 RX ORDER — QUETIAPINE FUMARATE 300 MG/1
300 TABLET, FILM COATED ORAL
Status: DISCONTINUED | OUTPATIENT
Start: 2025-04-16 | End: 2025-04-22 | Stop reason: HOSPADM

## 2025-04-16 RX ORDER — GABAPENTIN 300 MG/1
300 CAPSULE ORAL 2 TIMES DAILY
Status: DISCONTINUED | OUTPATIENT
Start: 2025-04-16 | End: 2025-04-19

## 2025-04-16 RX ADMIN — DICLOFENAC SODIUM 2 G: 10 GEL TOPICAL at 08:09

## 2025-04-16 RX ADMIN — DICLOFENAC SODIUM 2 G: 10 GEL TOPICAL at 17:28

## 2025-04-16 RX ADMIN — GABAPENTIN 100 MG: 100 CAPSULE ORAL at 15:02

## 2025-04-16 RX ADMIN — KETOROLAC TROMETHAMINE 15 MG: 30 INJECTION, SOLUTION INTRAMUSCULAR; INTRAVENOUS at 06:03

## 2025-04-16 RX ADMIN — GABAPENTIN 100 MG: 100 CAPSULE ORAL at 08:09

## 2025-04-16 RX ADMIN — DICLOFENAC SODIUM 2 G: 10 GEL TOPICAL at 21:56

## 2025-04-16 RX ADMIN — ENOXAPARIN SODIUM 40 MG: 40 INJECTION SUBCUTANEOUS at 08:09

## 2025-04-16 RX ADMIN — METHOCARBAMOL 750 MG: 750 TABLET ORAL at 06:03

## 2025-04-16 RX ADMIN — DICLOFENAC SODIUM 2 G: 10 GEL TOPICAL at 11:21

## 2025-04-16 RX ADMIN — KETOROLAC TROMETHAMINE 15 MG: 30 INJECTION, SOLUTION INTRAMUSCULAR; INTRAVENOUS at 11:21

## 2025-04-16 RX ADMIN — ACETAMINOPHEN 975 MG: 325 TABLET, FILM COATED ORAL at 21:53

## 2025-04-16 RX ADMIN — OXYCODONE HYDROCHLORIDE 5 MG: 5 TABLET ORAL at 08:10

## 2025-04-16 RX ADMIN — OXYCODONE HYDROCHLORIDE 5 MG: 5 TABLET ORAL at 18:24

## 2025-04-16 RX ADMIN — KETOROLAC TROMETHAMINE 15 MG: 30 INJECTION, SOLUTION INTRAMUSCULAR; INTRAVENOUS at 00:12

## 2025-04-16 RX ADMIN — METHOCARBAMOL 750 MG: 750 TABLET ORAL at 17:26

## 2025-04-16 RX ADMIN — QUETIAPINE FUMARATE 300 MG: 300 TABLET ORAL at 21:54

## 2025-04-16 RX ADMIN — KETOROLAC TROMETHAMINE 15 MG: 30 INJECTION, SOLUTION INTRAMUSCULAR; INTRAVENOUS at 17:26

## 2025-04-16 RX ADMIN — METHOCARBAMOL 750 MG: 750 TABLET ORAL at 11:21

## 2025-04-16 RX ADMIN — OXYCODONE HYDROCHLORIDE 5 MG: 5 TABLET ORAL at 22:39

## 2025-04-16 RX ADMIN — OXYCODONE HYDROCHLORIDE 5 MG: 5 TABLET ORAL at 13:59

## 2025-04-16 RX ADMIN — METHOCARBAMOL 750 MG: 750 TABLET ORAL at 00:12

## 2025-04-16 RX ADMIN — ACETAMINOPHEN 975 MG: 325 TABLET, FILM COATED ORAL at 06:03

## 2025-04-16 RX ADMIN — PREDNISONE 60 MG: 20 TABLET ORAL at 08:09

## 2025-04-16 RX ADMIN — ACETAMINOPHEN 975 MG: 325 TABLET, FILM COATED ORAL at 14:00

## 2025-04-16 RX ADMIN — GABAPENTIN 300 MG: 300 CAPSULE ORAL at 17:26

## 2025-04-16 NOTE — ASSESSMENT & PLAN NOTE
Patient with multiple ED visit and hospitalization for chronic neck pain /headache  Per patient, it had been well-managed however recently has had increasing pain particularly in the bitemporal area as well as the neck radiating to the back and associated with stiffness and intermittent dizziness  History of psychosis previous inpatient psychiatric admission on 7/2024  Noted home meds: Patient takes gabapentin and naproxen at home.  Patient not currently on any opioids per PDMP  Patient has been referred to outpatient pain management and has yet to follow-up with him  Patient also reports intermittent tingling in his upper extremities as well as saddle anesthesia at times and urinary dribbling/incontinence  Cervical spine CT scan without acute abnormality  Was evaluated by neurology suspect headache is cervicogenic in origin  Cervical and lumbar spine MRI showed progression of degenerative changes but no cord compression, no further inpatient neurology workup  He was recommended to establish an outpatient pain management  Acute pain service consulted  Patient started on oral prednisone  Ambulatory failure to comprehensive spine program as well as [facility] spine and pain have been placed  Continue with PT OT  Continue with pain control

## 2025-04-16 NOTE — OCCUPATIONAL THERAPY NOTE
Occupational Therapy Evaluation     Patient Name: Amado Chin  Today's Date: 4/16/2025  Problem List  Principal Problem:    Neck pain  Active Problems:    Asthma    Tobacco abuse    Anxiety    Cervical spinal stenosis    Polysubstance abuse (HCC)    Headache    Past Medical History  Past Medical History:   Diagnosis Date    Asthma     Chronic pain     Hypertension     Neck pain     Psychoactive 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HCC)     Thyroglossal duct cyst      Past Surgical History  Past Surgical History:   Procedure Laterality Date    THYROGLOSSAL DUCT EXCISION      THYROID SURGERY      THYROID SURGERY        04/16/25 0846   OT Last Visit   OT Visit Date 04/16/25   Note Type   Note type Evaluation   Pain Assessment   Pain Assessment Tool FLACC   Pain Location/Orientation Location: Neck   Pain Rating: FLACC (Rest) - Face 0   Pain Rating: FLACC (Rest) - Legs 0   Pain Rating: FLACC (Rest) - Activity 0   Pain Rating: FLACC (Rest) - Cry 0   Pain Rating: FLACC (Rest) - Consolability 0   Score: FLACC (Rest) 0   Pain Rating: FLACC (Activity) - Face 1   Pain Rating: FLACC (Activity) - Legs 1   Pain Rating: FLACC (Activity) - Activity 0   Pain Rating: FLACC (Activity) - Cry 0   Pain Rating: FLACC (Activity) - Consolability 1   Score: FLACC (Activity) 3   Restrictions/Precautions   Weight Bearing Precautions Per Order No   Other Precautions Fall Risk;Telemetry;Pain;Cognitive;Chair Alarm;Bed Alarm  (Dizziness, L>R UE numbness (ulnar distrubution))   Home Living   Type of Home Apartment   Home Layout Stairs to enter with rails  (pt lives in a 3rd floor apartment with FFOS enter)   Bathroom Shower/Tub Tub/shower unit   Bathroom Toilet Standard   Home Equipment (no AD/DME)   Prior Function   Level of Patrick Independent with ADLs;Needs assistance with IADLS   Lives With Family  (sister)   Receives Help From Family  (sister)   IADLs Family/Friend/Other provides meals;Family/Friend/Other provides medication  management;Independent with driving   Falls in the last 6 months >10   Vocational Full time employment   Lifestyle   Autonomy I with ADL's/assistance from sister for IADL's (laundry -in basement, driving task)   Reciprocal Relationships sister, co-worker (assists with driving to work)   Service to Others full time employement as a    Intrinsic Gratification playing games   General   Family/Caregiver Present No   ADL   Eating Assistance 5  Supervision/Setup   Grooming Assistance 5  Supervision/Setup   UB Bathing Assistance 5  Supervision/Setup   LB Bathing Assistance 3  Moderate Assistance   UB Dressing Assistance 4  Minimal Assistance   LB Dressing Assistance 3  Moderate Assistance   LB Dressing Deficit Don/doff R sock;Don/doff L sock   Toileting Assistance  4  Minimal Assistance   Bed Mobility   Supine to Sit 5  Supervision   Additional items Assist x 1   Sit to Supine 5  Supervision   Transfers   Sit to Stand 4  Minimal assistance   Additional items Assist x 1   Stand to Sit 4  Minimal assistance   Additional items Assist x 1   Additional Comments min a for CG without AD   Functional Mobility   Functional Mobility 4  Minimal assistance   Additional Comments min a without AD short household distance into hallway 2 LOB to right able to self correct with CG, pt reports he has to focus on an oject to decreased his dizziness unable to turn head with mobility tasks >dizziness.   Balance   Static Sitting Fair   Dynamic Sitting Fair -   Static Standing Fair -   Dynamic Standing Poor +   Ambulatory Poor +   Activity Tolerance   Activity Tolerance Patient limited by fatigue;Other (Comment)  (dizziness)   Medical Staff Made Aware PT due to the patient's co-morbidities, clinically unstable presentation, and present impairments which are a regression from the patient's baseline.    Nurse Made Aware RN cleared pt for therapy   RUE Assessment   RUE Assessment WFL  (right hand dominant)   LUE Assessment   LUE Assessment  WFL   Hand Function   Gross Motor Coordination Functional   Fine Motor Coordination Impaired   Sensation   Light Touch Partial deficits in the RUE;Partial deficits in the LUE  (B/l Hand numbness at ulnar distrubution small and ring finger>middle left hand>right) pt reports dropping items   Vision-Basic Assessment   Patient Visual Report Difficulty maintaining concentration with focus;Balance difficulty;Unable to keep objects in focus;Nausea/blurring vision with head movement;Blurring of vision when changing focal distance   Vision - Complex Assessment   Ocular Range of Motion Intact   Tracking Intact   Saccades Impaired  (Slow  >difficulty to right)  Visual acuity -able to state accurate time on analog clock   Perception   Inattention/Neglect Appears intact   Cognition   Overall Cognitive Status Impaired   Arousal/Participation Alert;Responsive;Cooperative   Attention Attends with cues to redirect   Orientation Level Oriented X4   Memory Within functional limits   Following Commands Follows multistep commands with increased time or repetition   Comments pt agreeable to therapy, flat affect, slow processing/delayed response time, able to recall social history/medical events current/past, decreased task initiation. Continue to assess.   Assessment   Limitation Decreased ADL status;Decreased Safe judgement during ADL;Decreased cognition;Decreased endurance;Decreased sensation;Visual deficit;Decreased high-level ADLs;Decreased self-care trans;Decreased fine motor control   Prognosis Fair   Assessment Pt is a 39 y.o. male who was [facility] to [facility] on [facility] with Dizziness,  Neck pain tobacco use, anxiety, cervical spinal stenosis, polysubstance stenosis. Patient  has a past medical history of Asthma, Chronic pain, Hypertension, Neck pain, Psychoactive 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HCC), and Thyroglossal duct cyst.  At baseline pt was completing . Pt lives with sister in a 3rd floor apartment with OS  enter. Currently pt requires min/mod a  for overall ADLS and min a without AD and increased time to complete for functional mobility/transfers. Pt currently presents with impairments in the following categories -steps to enter environment, limited home support, difficulty performing ADLS, difficulty performing IADLS , and flat affect activity tolerance, endurance, standing balance/tolerance, sitting balance/tolerance, UE strength, FMC, GMC, arousal, memory, insight, safety , judgement , attention , and sequencing . These impairments, as well as pt's fatigue, decreased caregiver support, and risk for falls, dizziness  limit pt's ability to safely engage in all baseline areas of occupation, includinggrooming, bathing, dressing, toileting, functional mobility/transfers, community mobility, house maintenance, medication management, meal prep, cleaning, and work/volunteer work  From OT standpoint, recommend max level I pending progress upon D/C. The patient's raw score on the AM-PAC Daily Activity Inpatient Short Form is 16. A raw score of less than 19 suggests the patient may benefit from discharge to post-acute rehabilitation services. Please refer to the recommendation of the Occupational Therapist for safe discharge planning. OT will continue to follow to address the below stated goals.   Goals   Patient Goals get neck surgery   LTG Time Frame 10-14   Long Term Goal #1 see goals below   Plan   Treatment Interventions ADL retraining;Visual perceptual retraining;Functional transfer training;UE strengthening/ROM;Endurance training;Cognitive reorientation;Patient/family training;Equipment evaluation/education;Fine motor coordination activities;Compensatory technique education;Continued evaluation;Energy conservation   Goal Expiration Date 04/30/25   OT Frequency 2-3x/wk   Discharge Recommendation   Rehab Resource Intensity Level, OT I (Maximum Resource Intensity)  (pending progress)   Equipment Recommended (tbd)   AM-PAC  Daily Activity Inpatient   Lower Body Dressing 2   Bathing 2   Toileting 2   Upper Body Dressing 3   Grooming 3   Eating 4   Daily Activity Raw Score 16   Daily Activity Standardized Score (Calc for Raw Score >=11) 35.96   AM-PAC Applied Cognition Inpatient   Following a Speech/Presentation 2   Understanding Ordinary Conversation 4   Taking Medications 2   Remembering Where Things Are Placed or Put Away 3   Remembering List of 4-5 Errands 3   Taking Care of Complicated Tasks 2   Applied Cognition Raw Score 16   Applied Cognition Standardized Score 35.03   End of Consult   Patient Position at End of Consult Supine;Bed/Chair alarm activated;All needs within reach   Nurse Communication Nurse aware of consult    Occupational Therapy Goals:    *Mod I with bed mobility to engage in functional tasks.  *Mod I Adl's after setup with use of AE PRN  *Mod I toileting and clothing management   *Mod I functional mobility and transfers to/from all surfaces with Fair + dynamic balance and safety for participation in dynamic adls and iadl tasks   *Demonstrate good carryover with safe use of RW during functional tasks   *Assess DME needs   *Increase activity tolerance to 25-30 minutes for participation in adls and enjoyable activities  *Pt to participate in further cognitive testing with good attention and participation to assist with safe d/c recommendations  *Mod I with Simulated IADL management task.  *Demonstrate good carryover of pt/family education and training with good tolerance for increased safety and independence with ADL's/ADl's.  *Pt will improve standing balance to 4-5 minutes with functional tasks to increase I with toileting/transfers.  *Pt will follow 100% simple 2-3 step verbal commands and be A/Ox4 consistently t/o use of external environmental cues w/ mobility  Raina Manning OTR/L

## 2025-04-16 NOTE — RESTORATIVE TECHNICIAN NOTE
Restorative Technician Note      Patient Name: Amado Chin     Note Type: Mobility  Patient Position Upon Consult: Seated edge of bed  Activity Performed: Ambulated; Dangled; Stood  Assistive Device: Other (Comment) (A x1)  Education Provided: Yes  Patient Position at End of Consult: Supine; All needs within reach; Bed/Chair alarm activated    Tasia CHEEK, Restorative Technician,

## 2025-04-16 NOTE — ASSESSMENT & PLAN NOTE
Pt presenting for headache that appears to be exacerbated by his history of chronic neck pain  Onset: 4/14  Duration: Constant   Character: Pressure  PTA meds: Tylenol, naproxen, gabapentin, Cymbalta  In the past he states that ketamine has been effective, has tried Percocet and Dilaudid which he states were not effective.  Aggravating/alleviating factors: He denies photophobia, does have some phonophobia no osmophobia.  He notes some nausea without vomiting as well, however this appears to be more associated with the dizziness rather than the headache itself.  Pt denies red flag symptoms of headache such as thunderclap onset, LOC, weight loss, visual changes.  In the ED he received 15 mg of Toradol without any benefit.  The admitting team has also tried to give the patient some Robaxin without much help as well.    Workup:  Lab Results   Component Value Date    GLUC 102 04/16/2025    [id number] 208 05/17/2024    FOLATE 6.3 05/17/2024    MG 2.2 05/15/2024    SODIUM 139 04/16/2025    [id number] 0.698 04/01/2022   Imaging: CT C-spine negative for acute spine fracture  MRI C spine: No acute abnormality of cervical spine.  Multilevel degenerative changes of cervical spine with varying degrees of canal stenosis (multilevel mild, worse at C6-C7) and foraminal narrowing (severe right C6-C7), as detailed above.  1.2 cm lesion in left deep parotid gland just posterior and slightly medial to left retromandibular vein. 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 Recommend ENT consultation for further evaluation.   MRI L spine: No acute abnormality of lumbar spine.  Multilevel degenerative changes of lumbar spine with varying degrees of canal stenosis (mild L5-S1) and foraminal narrowing (mild bilateral L4-L5 and bilateral L5-S1 - worse at left L5-S1), as detailed above.    Plan:  Meds: Tylenol 975 mg every 8 hours, lidocaine patches  Recommend prednisone taper x6 days  Suspect patient's source of  headache is likely from his longstanding chronic neck pain, appreciate APS recs  No further management from Neurology. Please call with questions or concerns

## 2025-04-16 NOTE — PROGRESS NOTES
Progress Note - Hospitalist   Name: Amado Chin 39 y.o. male I MRN: [id number]  Unit/Bed#: [facility] 711-01 I Date of Admission: 4/15/2025   Date of Service: 4/16/2025 I Hospital Day: 0    Assessment & Plan  Neck pain  Patient with multiple ED visit and hospitalization for chronic neck pain /headache  Per patient, it had been well-managed however recently has had increasing pain particularly in the bitemporal area as well as the neck radiating to the back and associated with stiffness and intermittent dizziness  History of psychosis previous inpatient psychiatric admission on 7/2024  Noted home meds: Patient takes gabapentin and naproxen at home.  Patient not currently on any opioids per PDMP  Patient has been referred to outpatient pain management and has yet to follow-up with him  Patient also reports intermittent tingling in his upper extremities as well as saddle anesthesia at times and urinary dribbling/incontinence  Cervical spine CT scan without acute abnormality  Was evaluated by neurology suspect headache is cervicogenic in origin  Cervical and lumbar spine MRI showed progression of degenerative changes but no cord compression, no further inpatient neurology workup  He was recommended to establish an outpatient pain management  Acute pain service consulted  Patient started on oral prednisone  Ambulatory failure to comprehensive spine program as well as [facility] spine and pain have been placed  Continue with PT OT  Continue with pain control  Cervical spinal stenosis  Plan noted above.  Headache  Cervicogenic in origin  Continue with above pain control  Polysubstance abuse (HCC)  Admits to prior use of methamphetamines but states he has not used this in several years.  Admits to occasional use of marijuana currently.  Denies any other drug use.  Tobacco abuse  Patient admits to smoking half a pack of cigarettes daily.   With nicotine patch  Asthma  No acute exacerbation    Anxiety  Continue with Seroquel 300  mg at bedtime    VTE Pharmacologic Prophylaxis: VTE Score: 4 Moderate Risk (Score 3-4) - Pharmacological DVT Prophylaxis Ordered: enoxaparin (Lovenox).    Mobility:   Basic Mobility Inpatient Raw Score: 18  JH-HLM Goal: 6: Walk 10 steps or more  JH-HLM Achieved: 7: Walk 25 feet or more  JH-HLM Goal achieved. Continue to encourage appropriate mobility.    Patient Centered Rounds: I performed bedside rounds with nursing staff today.   Discussions with Specialists or Other Care Team Provider: Nursing, pain management with plan to continue with above treatment      Current Length of Stay: 0 day(s)  Current Patient Status: Inpatient   Certification Statement: The patient will continue to require additional inpatient hospital stay due to management of neck pain  Discharge Plan: Anticipate discharge in 24-48 hrs to home.    Code Status: Level 1 - Full Code    Subjective   Patient seen and examined  Comfortable in bed  Continue to complain of neck pain/headache    Objective :  Temp:  [98.2 °F (36.8 °C)-98.6 °F (37 °C)] 98.2 °F (36.8 °C)  HR:  [] 60  BP: (133-167)/() 165/106  Resp:  [16] 16  SpO2:  [94 %-99 %] 98 %  O2 Device: None (Room air)    There is no height or weight on file to calculate BMI.     Input and Output Summary (last 24 hours):     Intake/Output Summary (Last 24 hours) at 4/16/2025 1527  Last data filed at 4/16/2025 1300  Gross per 24 hour   Intake 340 ml   Output 550 ml   Net -210 ml       Physical Exam  Patient is awake alert no acute distress   Uncomfortable in bed with limited neck range of motion due to pain  Lung clear to auscultation bilateral anteriorly  Heart positive S1-S2 no murmur  Abdomen soft nontender  Lower extremities no edema        Lines/Drains:        Lab Results: I have reviewed the following results:   Results from last 7 days   Lab Units 04/16/25  0759 04/15/25  1041   WBC Thousand/uL 5.49 6.51   HEMOGLOBIN g/dL 14.8 15.6   HEMATOCRIT % 43.3 44.0   PLATELETS Thousands/uL  243 259   SEGS PCT %  --  70   LYMPHO PCT %  --  16   MONO PCT %  --  11   EOS PCT %  --  1     Results from last 7 days   Lab Units 04/16/25  0759   SODIUM mmol/L 139   POTASSIUM mmol/L 4.3   CHLORIDE mmol/L 106   CO2 mmol/L 28   BUN mg/dL 21   CREATININE mg/dL 1.17   ANION GAP mmol/L 5   CALCIUM mg/dL 8.8   GLUCOSE RANDOM mg/dL 102                       Recent Cultures (last 7 days):         MRI result reviewed  Other Study Results Review: No additional pertinent studies reviewed.    Last 24 Hours Medication List:     Current Facility-Administered Medications:     acetaminophen (TYLENOL) tablet 975 mg, Q8H    Diclofenac Sodium (VOLTAREN) 1 % topical gel 2 g, 4x Daily    enoxaparin (LOVENOX) subcutaneous injection 40 mg, Daily    gabapentin (NEURONTIN) capsule 300 mg, BID    hydrALAZINE (APRESOLINE) injection 10 mg, Q6H PRN    ketorolac (TORADOL) injection 15 mg, Q6H PETER **FOLLOWED BY** [START ON 4/17/2025] ibuprofen (MOTRIN) tablet 600 mg, Q6H PETER    methocarbamol (ROBAXIN) tablet 750 mg, Q6H PETER    naloxone (NARCAN) 0.04 mg/mL syringe 0.04 mg, Q1MIN PRN    nicotine (NICODERM CQ) 14 mg/24hr TD 24 hr patch 1 patch, Daily    ondansetron (ZOFRAN) injection 4 mg, Q4H PRN    oxyCODONE (ROXICODONE) split tablet 2.5 mg, Q4H PRN **OR** oxyCODONE (ROXICODONE) IR tablet 5 mg, Q4H PRN    polyethylene glycol (MIRALAX) packet 17 g, Daily    predniSONE tablet 60 mg, Daily **FOLLOWED BY** [START ON 4/18/2025] predniSONE tablet 40 mg, Daily **FOLLOWED BY** [START ON 4/20/2025] predniSONE tablet 20 mg, Daily    QUEtiapine (SEROquel) tablet 300 mg, HS    senna-docusate sodium (SENOKOT S) 8.6-50 mg per tablet 1 tablet, HS    Administrative Statements   Today, Patient Was Seen By: Velasquez Villegas, DO  I have spent a total time of 35 minutes in caring for this patient on the day of the visit/encounter including Counseling / Coordination of care, Documenting in the medical record, Reviewing/placing orders in the medical record (including  tests, medications, and/or procedures), Obtaining or reviewing history  , and Communicating with other healthcare professionals .    **Please Note: This note may have been constructed using a voice recognition system.**

## 2025-04-16 NOTE — PHYSICAL THERAPY NOTE
Physical Therapy Evaluation    Patient Name: Amado Chin    Today's Date: 4/16/2025     Problem List  Principal Problem:    Neck pain  Active Problems:    Asthma    Tobacco abuse    Anxiety    Cervical spinal stenosis    Polysubstance abuse (HCC)    Headache       Past Medical History  Past Medical History:   Diagnosis Date    Asthma     Chronic pain     Hypertension     Neck pain     Psychoactive 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HCC)     Thyroglossal duct cyst         Past Surgical History  Past Surgical History:   Procedure Laterality Date    THYROGLOSSAL DUCT EXCISION      THYROID SURGERY      THYROID SURGERY        04/16/25 0912   PT Last Visit   PT Visit Date 04/16/25   Note Type   Note type Evaluation   Pain Assessment   Pain Assessment Tool 0-10   Pain Score 6   Pain Location/Orientation Location: Neck   Hospital Pain Intervention(s) Heat applied  (RN cleared)   Restrictions/Precautions   Weight Bearing Precautions Per Order No   Other Precautions Chair Alarm;Bed Alarm;Multiple lines;Telemetry;Fall Risk  (+unsteady)   Home Living   Type of Home Apartment   Home Layout Stairs to enter with rails  (full flight to access apartment)   Bathroom Shower/Tub Tub/shower unit   Bathroom Toilet Standard   Additional Comments no AD use PTA   Prior Function   Level of [place] Independent with ADLs;Independent with functional mobility;Independent with IADLS   Lives With Family  (sister)   Receives Help From Friend(s)   IADLs Family/Friend/Other provides transportation;Independent with meal prep;Independent with medication management   Falls in the last 6 months >10   Vocational Full time employment   Comments . friends drive him to work   General   Additional Pertinent History pt reports he has chronic dizziness which resulted in him no longer driving but past few days have been worse   Family/Caregiver Present No   Cognition   Overall Cognitive Status WFL    Arousal/Participation Cooperative   Attention Within functional limits   Orientation Level Oriented X4   Memory Within functional limits   Following Commands Follows all commands and directions without difficulty   RUE Assessment   RUE Assessment WFL  (ulnar nerve numbness)   LUE Assessment   LUE Assessment WFL  (ulnar nerve numbness)   RLE Assessment   RLE Assessment WFL   LLE Assessment   LLE Assessment WFL   Vestibular   Vestibular Comments +dizziness with smooth pursuit. slightly impaired horizontal saccades. reports he feels like he is on a boat. no recent illnesses. +cervical pain with limited ROM especially with R rotation and sidebending   Coordination   Movements are Fluid and Coordinated 0   Coordination and Movement Description R LOB with ambulation. EO romberg with LOB to R/posterior, minimal change with EC romberg.   Light Touch   RLE Light Touch Grossly intact   LLE Light Touch Grossly intact   Bed Mobility   Supine to Sit 5  Supervision   Additional items HOB elevated;Increased time required;Verbal cues   Sit to Supine 5  Supervision   Additional items Increased time required;Verbal cues   Transfers   Sit to Stand 4  Minimal assistance   Additional items Assist x 1;Increased time required;Verbal cues   Stand to Sit 4  Minimal assistance   Additional items Assist x 1;Increased time required;Verbal cues   Ambulation/Elevation   Gait pattern Improper Weight shift;Decreased foot clearance;Short stride;Excessively slow   Gait Assistance 4  Minimal assist   Additional items Assist x 1;Verbal cues   Assistive Device None   Distance 60'x2   Ambulation/Elevation Additional Comments (S)  several LOB towards R   Balance   Static Sitting Fair +   Dynamic Sitting Fair   Static Standing Fair -   Dynamic Standing Poor +   Ambulatory Poor +   Endurance Deficit   Endurance Deficit Yes   Activity Tolerance   Activity Tolerance Treatment limited secondary to medical complications (Comment)  (dizziness, neck pain)    Medical Staff Made Aware OT   Nurse Made Aware yes-cleared   Assessment   Prognosis Good   Problem List Decreased endurance;Decreased range of motion;Impaired balance;Decreased mobility;Impaired sensation;Pain   Assessment Pt is a 39 y.o. male admitted to [facility] on 4/15/2025 with neck pain. Also reporting intermittent saddle paraesthesia and dizziness,unsteady gait, blurry vision per chart review. Pt has the following comorbidities which affect their treatment: active problems listed above,  has a past medical history of Asthma, Chronic pain, Hypertension, Neck pain, Psychoactive 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HCC), and Thyroglossal duct cyst.  , as well as personal factors including stairs to access home and ?Caregiver support. Pt has a high complexity clinical presentation due to Ongoing medical management for primary dx, Increased reliance on more restrictive AD compared to baseline, Decreased activity tolerance compared to baseline, Fall risk, Increased assistance needed from caregiver at current time, Ongoing telemetry monitoring, Trending lab values, Diagnostic imaging pending, Continuous pulse oximetry monitoring  , and PMH. PT was consulted to evaluate pt's functional mobility and discharge needs. Upon evaluation, patient required S for bed mobility, CGA for STS transfers, minAx1 for ambulation. Body system impairments include +dizziness, +pain, impaired balance and endurance, impaired multidirectional cervical ROM, impaired ulnar nerve distribution paraesthesia's b/l. Pt's functional activity impairments include: activity tolerance and mobility. Participation restrictions include inability to safely return to home/community/work. At conclusion of eval, pt remained supine in bed with bed alarm, phone, call bell, and all other personal needs within reach. Pt would benefit from skilled PT to address their functional mobility limitations. The patient's AM-PAC Basic Mobility Inpatient Short Form Raw Score is 18. A  Raw score of greater than 16 suggests the patient may benefit from discharge to home. Please also refer to the recommendation of the Physical Therapist for safe discharge planning.   Barriers to Discharge Decreased caregiver support;Inaccessible home environment   Goals   Patient Goals to get cervical fusion   STG Expiration Date 04/30/25   Short Term Goal #1 In 14 days, pt will: 1) perform bed mobility with mod I to promote functional independence and decrease caregiver burden. 2) perform transfers to<>from all surfaces with mod I to promote safe return to home. 3) ambulate 150' with mod I and least restrictive device to promote safe return to home 4) negotiate 13 stairs with mod I to promote safe return to home. 5) improve balance grades by 1/2 to promote safety with functional mobility.   PT Treatment Day 0   Plan   Treatment/Interventions Functional transfer training;LE strengthening/ROM;Therapeutic exercise;Elevations;Endurance training;Patient/family training;Equipment eval/education;Bed mobility;Gait training;Spoke to nursing;Spoke to case management;OT   PT Frequency 3-5x/wk   Discharge Recommendation   Rehab Resource Intensity Level, PT I (Maximum Resource Intensity)  (vs level 3 pending progress)   Equipment Recommended Walker   Walker Package Recommended Wheeled walker   AM-PAC Basic Mobility Inpatient   Turning in Flat Bed Without Bedrails 3   Lying on Back to Sitting on Edge of Flat Bed Without Bedrails 3   Moving Bed to Chair 3   Standing Up From Chair Using Arms 3   Walk in Room 3   Climb 3-5 Stairs With Railing 3   Basic Mobility Inpatient Raw Score 18   Basic Mobility Standardized Score 41.05   [facility] Highest Level Of Mobility   JH-HLM Goal 6: Walk 10 steps or more   JH-HLM Achieved 7: Walk 25 feet or more   Modified Hocking Scale   Modified Hocking Scale 4   Pato Laws, PT, DPT, NCS

## 2025-04-16 NOTE — CERTIFIED RECOVERY SPECIALIST
"Time spent: 13 minutes    Consult Rec'd: YES  Patient seen in: IP  Stage of change: Pre Contemplation    Substance used: Polysubstance   Frequency/Quantity: in Remission  Date of last use: 2 yrs ago      Purpose: To connect, explain CRS services and offer support if needed.    CRS met with patient, explained role and reason for visit to offer support if interested. Patient at first was offended, but admittedly endorsed he \"was using meth a few years ago when his father \". Patient reported he \"has not used anything in a long time and does not need any help for anything regarding that, I just stopped\"    Patient shared why he is in hospital that he is hoping to have surgery on his neck due to spinal pain. Patient acknowledged his pain is being managed but still has a lot of pain.      Plan: CRS provided contact information, resources were declined by patient and CRS signing off.                              "

## 2025-04-16 NOTE — PROGRESS NOTES
Progress Note - Acute Pain   Name: Amado Chin 39 y.o. male I MRN: [id number]  [facility] Date of Admission: 4/15/2025   Date of Service: 4/16/2025 I Hospital Day: 0    Assessment & Plan  Neck pain  4/16: MRI cervical spine/L spine revealed no acute abnormalities.    Acetaminophen 975 mg p.o. every 8 hours scheduled.  Continue lidocaine patch, on for 12 hours and off for 12 hours.  Gabapentin 300 mg p.o. twice daily (patient reports this is a home medication).  Methocarbamol 750 mg p.o. every 6 hours scheduled.  Oxycodone 2.5 mg p.o. every 4 hours as needed moderate pain or 5 mg p.o. every 4 hours as needed severe pain.  Prednisone taper initiated by neurology.  No indication for IV opioids and, in fact, relatively contraindicated in setting of intractable headache.  Ice or heat for up to 20 minutes every hour as needed.  As needed Narcan in the event that opioid reversal becomes necessary.  Bowel regimen to avoid opioid-induced constipation while on opioid pain medication.  Patient needs to follow through with obtaining a primary care provider as well as potentially a chronic pain management provider.  Ambulatory referrals to comprehensive spine program as well as [facility] have been placed.    At discharge suggest the following:  Acetaminophen 975 mg p.o. every 8 hours scheduled.  Prednisone taper per neurology.  Continue lidocaine patch, on for 12 hours and off for 12 hours.  Gabapentin 300 mg p.o. twice daily (patient reports this is a home medication).  Methocarbamol 750 mg p.o. every 6 hours scheduled.  Oxycodone 2.5 mg p.o. every 4 hours as needed moderate pain or 5 mg p.o. every 4 hours as needed severe pain x 3-5 days maximum  Ice or heat for up to 20 minutes every hour as needed.  Ambulatory referrals to comprehensive spine program as well as [facility] have been placed. -Patient encouraged to establish follow-up.  Tobacco abuse  Smokers have been shown to  "require higher doses of opioid pain medication and are more likely to develop chronic pain.  Encourage smoking cessation.  Patient admits to smoking half a pack of cigarettes daily.    Anxiety  Patients with depression and/or anxiety have more perceived pain on average and often require higher doses of opioid pain medication.  Treat depression and/or anxiety aggressively.    Cervical spinal stenosis  Admits to many years of chronic neck pain.  Does not follow with a primary care provider or an outpatient pain management provider.  States he has been on gabapentin for 3 years prescribed by an ER provider.  States he has not gotten refills from a primary care provider but continues to take the medication.  Patient should be referred to outpatient pain management as well as establish a primary care provider.  Patient would also likely benefit from comprehensive spine program.  Polysubstance abuse (HCC)  Admits to prior use of methamphetamines but states he has not used this in several years.  Admits to occasional use of marijuana currently.  Denies any other drug use.  Headache    Treatment plan and recommendations discussed with primary care service.  APS will sign off at this time. Thank you for the consult. All opioids and other analgesics to be written at discretion of primary team. Please contact Acute Pain Service - via SecureChat from [phone] with additional questions or concerns. See SecurePoachItt or HSystem for additional contacts and after hours information.    Subjective   Amado Chin is a 39 y.o. male  who presents with a several week history of worsening neck pain.  Patient states that he has had constant neck pain for the last 24 hours as well as a constant bitemporal headache which he attributes to his neck pain.  Patient with history of chronic neck pain for \"many years\".  Patient with previous evidence of cervical spinal stenosis.  States that he has neck pain most days but that it waxes and wanes and " is not typically constant.  Patient states that the pain has been constant since yesterday and has been severe.     Pain History  Current pain location(s):  Pain Score: 8  Pain Location/Orientation: Location: Neck  Pain Scale: Pain Assessment Tool: 0-10  24 hour history: Seen this morning at bedside, resting in bed after taking a shower without acute distress. Reports some improvement in neck pain following administration of oxycodone earlier this morning. S/p MRI imaging yesterday which revealed no acute findings. Endorses dizziness while at rest. Encouraged patient to establish care with [facility] following discharge for management of his chronic neck pain.     Opioid requirement previous 24 hours:   5 mg oxycodone x 4 doses    Meds/Allergies   all current active meds have been reviewed, current meds:   Current Facility-Administered Medications:     acetaminophen (TYLENOL) tablet 975 mg, Q8H    Diclofenac Sodium (VOLTAREN) 1 % topical gel 2 g, 4x Daily    enoxaparin (LOVENOX) subcutaneous injection 40 mg, Daily    gabapentin (NEURONTIN) capsule 100 mg, TID    ketorolac (TORADOL) injection 15 mg, Q6H PETER **FOLLOWED BY** [START ON 4/17/2025] ibuprofen (MOTRIN) tablet 600 mg, Q6H PETER    methocarbamol (ROBAXIN) tablet 750 mg, Q6H PETER    naloxone (NARCAN) 0.04 mg/mL syringe 0.04 mg, Q1MIN PRN    nicotine (NICODERM CQ) 14 mg/24hr TD 24 hr patch 1 patch, Daily    ondansetron (ZOFRAN) injection 4 mg, Q4H PRN    oxyCODONE (ROXICODONE) split tablet 2.5 mg, Q4H PRN **OR** oxyCODONE (ROXICODONE) IR tablet 5 mg, Q4H PRN    polyethylene glycol (MIRALAX) packet 17 g, Daily    predniSONE tablet 60 mg, Daily **FOLLOWED BY** [START ON 4/18/2025] predniSONE tablet 40 mg, Daily **FOLLOWED BY** [START ON 4/20/2025] predniSONE tablet 20 mg, Daily    senna-docusate sodium (SENOKOT S) 8.6-50 mg per tablet 1 tablet, HS, and PTA meds:   Prior to Admission Medications   Prescriptions Last Dose Informant Patient Reported?  Taking?   QUEtiapine (SEROquel) 300 mg tablet   No No   Sig: Take 1 tablet (300 mg total) by mouth daily at bedtime for 14 days   cyclobenzaprine (FLEXERIL) 10 mg tablet   No No   Sig: Take 1 tablet (10 mg total) by mouth 2 (two) times a day as needed for muscle spasms for up to 14 days   gabapentin (Neurontin) 300 mg capsule   No No   Sig: Take 1 capsule (300 mg total) by mouth 3 (three) times a day   gabapentin (Neurontin) 300 mg capsule   No No   Sig: Take 1 capsule (300 mg total) by mouth 3 (three) times a day for 7 days For post-herpetic neuralgia: Take 1 tablet on day 1,  Then take 2 tablets on day 2, Then take 3 tablets on day 3 and every day after that as instructed by your doctor.   gabapentin (Neurontin) 300 mg capsule   No No   Sig: Take 1 capsule (300 mg total) by mouth 3 (three) times a day for 14 days For post-herpetic neuralgia: Take 1 tablet on day 1,  Then take 2 tablets on day 2, Then take 3 tablets on day 3 and every day after that as instructed by your doctor.   ibuprofen (MOTRIN) 400 mg tablet   No No   Sig: Take 1 tablet (400 mg total) by mouth every 6 (six) hours as needed for moderate pain   methocarbamol (ROBAXIN) 750 mg tablet   No No   Sig: Take 1 tablet (750 mg total) by mouth 3 (three) times a day   topiramate (Topamax) 25 mg tablet   No No   Sig: Take 1 tablet (25 mg total) by mouth 2 (two) times a day for 7 days      Facility-Administered Medications: None     No Known Allergies  Objective :  Temp:  [98.2 °F (36.8 °C)-98.6 °F (37 °C)] 98.2 °F (36.8 °C)  HR:  [] 60  BP: (133-167)/() 165/106  Resp:  [16-18] 16  SpO2:  [94 %-99 %] 98 %  O2 Device: None (Room air)    Physical Exam  Vitals and nursing note reviewed.   Constitutional:       General: He is awake. He is not in acute distress.     Appearance: He is not ill-appearing, toxic-appearing or diaphoretic.      Comments: Lying in bed.  Appears comfortable.  Appears reluctant to move head.   Skin:     General: Skin is warm  and dry.   Neurological:      Mental Status: He is alert and oriented to person, place, and time.      GCS: GCS eye subscore is 4. GCS verbal subscore is 5. GCS motor subscore is 6.   Psychiatric:         Attention and Perception: Attention normal.         Speech: Speech normal.         Behavior: Behavior normal. Behavior is cooperative.            Lab Results: I have reviewed the following results:  Estimated Creatinine Clearance: 83.4 mL/min (by C-G formula based on SCr of 1.17 mg/dL).  Lab Results   Component Value Date    WBC 5.49 04/16/2025    HGB 14.8 04/16/2025    HCT 43.3 04/16/2025     04/16/2025         Component Value Date/Time    K 4.3 04/16/2025 0759    K 4.3 09/05/2024 0744     04/16/2025 0759     09/05/2024 0744    CO2 28 04/16/2025 0759    CO2 27 09/05/2024 0744    BUN 21 04/16/2025 0759    BUN 26 09/05/2024 0744    CREATININE 1.17 04/16/2025 0759    CREATININE 1.17 09/05/2024 0744         Component Value Date/Time    CALCIUM 8.8 04/16/2025 0759    CALCIUM 8.6 09/05/2024 0744    ALKPHOS 83 03/20/2025 1826    ALKPHOS 71 09/05/2024 0744    AST 14 03/20/2025 1826    AST 11 09/05/2024 0744    ALT 13 03/20/2025 1826    ALT 11 09/05/2024 0744    TP 7.7 03/20/2025 1826    TP 7.0 09/05/2024 0744    ALB 4.3 03/20/2025 1826    ALB 4.0 09/05/2024 0744       Imaging Results Review: I reviewed radiology reports from this admission including: MRI spine.  Other Study Results Review: No additional pertinent studies reviewed.

## 2025-04-16 NOTE — PLAN OF CARE
Problem: PHYSICAL THERAPY ADULT  Goal: Performs mobility at highest level of function for planned discharge setting.  See evaluation for individualized goals.  Description: Treatment/Interventions: Functional transfer training, LE strengthening/ROM, Therapeutic exercise, Elevations, Endurance training, Patient/family training, Equipment eval/education, Bed mobility, Gait training, Spoke to nursing, Spoke to case management, OT  Equipment Recommended: Walker       See flowsheet documentation for full assessment, interventions and recommendations.  Note: Prognosis: Good  Problem List: Decreased endurance, Decreased range of motion, Impaired balance, Decreased mobility, Impaired sensation, Pain  Assessment: Pt is a 39 y.o. male admitted to [facility] on 4/15/2025 with neck pain. Also reporting intermittent saddle paraesthesia and dizziness,unsteady gait, blurry vision per chart review. Pt has the following comorbidities which affect their treatment: active problems listed above,  has a past medical history of Asthma, Chronic pain, Hypertension, Neck pain, Psychoactive 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HCC), and Thyroglossal duct cyst.  , as well as personal factors including stairs to access home and ?Caregiver support. Pt has a high complexity clinical presentation due to Ongoing medical management for primary dx, Increased reliance on more restrictive AD compared to baseline, Decreased activity tolerance compared to baseline, Fall risk, Increased assistance needed from caregiver at current time, Ongoing telemetry monitoring, Trending lab values, Diagnostic imaging pending, Continuous pulse oximetry monitoring  , and PMH. PT was consulted to evaluate pt's functional mobility and discharge needs. Upon evaluation, patient required S for bed mobility, CGA for STS transfers, minAx1 for ambulation. Body system impairments include +dizziness, +pain, impaired balance and endurance, impaired multidirectional cervical ROM, impaired  ulnar nerve distribution paraesthesia's b/l. Pt's functional activity impairments include: activity tolerance and mobility. Participation restrictions include inability to safely return to home/community/work. At conclusion of eval, pt remained supine in bed with bed alarm, phone, call bell, and all other personal needs within reach. Pt would benefit from skilled PT to address their functional mobility limitations. The patient's AM-PAC Basic Mobility Inpatient Short Form Raw Score is 18. A Raw score of greater than 16 suggests the patient may benefit from discharge to home. Please also refer to the recommendation of the Physical Therapist for safe discharge planning.  Barriers to Discharge: Decreased caregiver support, Inaccessible home environment     Rehab Resource Intensity Level, PT: I (Maximum Resource Intensity) (vs level 3 pending progress)    See flowsheet documentation for full assessment.

## 2025-04-16 NOTE — CASE MANAGEMENT
Case Management Assessment & Discharge Planning Note    Patient name Amado Chin  Location [facility] 711/[facility] 711-01 MRN [id number]  : 1985 Date 2025       Current Admission Date: 4/15/2025  Current Admission Diagnosis:Neck pain   Patient Active Problem List    Diagnosis Date Noted Date Diagnosed    Headache 04/15/2025     Polysubstance abuse (HCC) 2024     Psychoactive 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HCC) 2024     Drug use 2024     Cellulitis of left lower extremity 2024     Homelessness 2024     Cervical spinal stenosis 2024     Cervical radiculopathy 2024     Chronic bilateral low back pain 2023     Chronic neck pain 2023     Recurrent major depressive disorder (HCC) 2022     Anxiety 2022     Substance induced mood disorder (HCC) 2022     Mood disorder due to medical condition 2022     Opioid abuse (HCC) 02/10/2022     Fecal smearing 2022     Urinary incontinence 2022     Drug-induced constipation 2022     Intractable pain 2021     Vitamin B12 deficiency 2021     Asthma 2021     Tobacco abuse 2021     Dizziness 2021     Neck pain 2021     Elevated blood pressure reading 2021     Weakness of both lower extremities 2021     Cervical disc herniation 2020     Paresthesia and pain of extremity 2020     Fall 2020     Concussion without loss of consciousness 2020     Acute pain of right shoulder 2020     Alcohol abuse 2020     Burn 2020     Furuncle of axilla 2020       LOS (days): 0  Geometric Mean LOS (GMLOS) (days):   Days to GMLOS:     OBJECTIVE:    Risk of Unplanned Readmission Score: 17.66         Current admission status: Inpatient       Preferred Pharmacy:   [id number] - [place], [place]  [address]  [place] [address]  Phone: [phone] Fax: [phone]    [facility] - [place], [place] - [address]  [address]  [place] [address]  Phone: [phone] Fax: [phone]    [facility] [place] - [place] - [address]  [address]  [place] [address]  Phone: [phone] Fax: [phone]    Primary Care Provider: No primary care provider on file.    Primary Insurance: hybris  Secondary Insurance:     ASSESSMENT:  Active Health Care Proxies    There are no active Health Care Proxies on file.       Advance Directives  Does patient have a Health Care POA?: No  Was patient offered paperwork?: No  Does patient currently have a Health Care decision maker?: No  Does patient have Advance Directives?: No  Was patient offered paperwork?: No  Primary Contact: Cecy (friend)         Readmission Root Cause  30 Day Readmission: No    Patient Information  Admitted from:: Home  Mental Status: Alert  During Assessment patient was accompanied by: Not accompanied during assessment  Assessment information provided by:: Patient  Primary Caregiver: Self  Support Systems: Self  County of Residence: [place]  What city do you live in?: [place]  Home entry access options. Select all that apply.: Stairs  Number of steps to enter home.: One Flight  Type of Current Residence: Apartment  Floor Level: 2  Upon entering residence, is there a bedroom on the main floor (no further steps)?: Yes  Upon entering residence, is there a bathroom on the main floor (no further steps)?: Yes  Living Arrangements: Lives w/ Family members  Is patient a ?: No    Activities of Daily Living Prior to Admission  Functional Status: Independent  Completes ADLs independently?: Yes  Ambulates independently?: Yes  Does patient use assisted devices?: No  Does patient currently own DME?: No  Does patient have a history of Outpatient Therapy (PT/OT)?: No  Does the patient have a history of Short-Term Rehab?: No  Does patient have a history of HHC?:  No  Does patient currently have C?: No         Patient Information Continued  Income Source: Employed  Does patient have prescription coverage?: Yes  Can the patient afford their medications and any related supplies (such as glucometers or test strips)?: No  Does patient receive dialysis treatments?: No  Does patient have a history of substance abuse?: No  Does patient have a history of Mental Health Diagnosis?: No         Means of Transportation  Means of Transport to Appts:: Friends          DISCHARGE DETAILS:    Discharge planning discussed with:: Patient  Freedom of Choice: Yes  Comments - Freedom of Choice: Discussed FOC                Contacts  Patient Contacts: Cecy  Relationship to Patient:: Friend  Contact Method: Phone  Phone Number: [phone]  Reason/Outcome: Continuity of Care, Emergency Contact, Discharge Planning    This CM introduced self and role.  Patient lives in an apartment with his sister- 1 flight of stairs to [facility]. Patient is IADL's, is employed as a , and does not drive.  CM will follow for discharge needs.

## 2025-04-16 NOTE — UTILIZATION REVIEW
Initial Clinical Review    WAS OBSERVATION 4/15 CONVERTED TO INPATIENT ADMISSION 4/14 DUE TO CONTINUED STAY REQUIRED TO CARE FOR PATIENT WITH Neck pain, Intermitted dizziness, Numbness and tingling, scheduled Iv pain med. Workup and treat     Admission: Date/Time/Statement:   Admission Orders (From admission, onward)       Ordered        04/16/25 0814  INPATIENT ADMISSION  Once            04/15/25 1316  Place in Observation  Once                          Orders Placed This Encounter   Procedures    INPATIENT ADMISSION     Standing Status:   Standing     Number of Occurrences:   1     Level of Care:   Med Surg [16]     Estimated length of stay:   More than 2 Midnights     Certification:   I certify that inpatient services are medically necessary for this patient for a duration of greater than two midnights. See H&P and MD Progress Notes for additional information about the patient's course of treatment.     ED Arrival Information       Expected   -    Arrival   4/15/2025 09:57    Acuity   Urgent              Means of arrival   Walk-In    Escorted by   Family Member    Service   Hospitalist    Admission type   Emergency              Arrival complaint   Dizziness             Chief Complaint   Patient presents with    Neck Pain     C/o of neck pain since last night     Dizziness     Started last night states room spinning took hit of thc pen before arriving denies other alc. Or drug use.          Initial Presentation: 39 y.o. male to ED presents for Intractable neck pain and bitemporal headache. Pt with cervical stenosis. Notes muscle stiffness recurrent falls. Intermittent dizziness. Takes gabapentin and naproxen at home. In Ed, given Robaxin and oxycodone as well as acetaminophen and ketorolac and unfortunately pain was not resolved. Acute shortness of breath at this time. Chronic neck pain.  PMH for Cervical spinal stenosis. Polysubstance abuse, Anxiety, Asthma, Tobacco abuse, smokes 1/2 pk of cigarettes daily  Admit  to Observation Dx; Neck pain, Intermitted dizziness  Plan; Neurology and APS consult. Pain regimen; Iv Toradol q8h x5 days, Methocarbamol q8h x 5 days and Tylenol around the clock.   Continue home meds. Try to avoid opioids.    4/15  APS cons; Continue current pain regimen; scheduled Iv Toradol, Methocarbamol and Tylenol. Gabapentin bid.  Oxycodone prn. Prednisone taper. Narcan prn  Pt admits to prior use of methamphetamines but states he has not used this in several years.   Occasional use of marijuana currently  Pt reluctant to move head    Neurology cons; Neck pain, Headache  Notes phonophobia, nausea, appears to be more associated with the dizziness rather than the headache itself.   Given 15 mg of Toradol without relief in ED  MRI C and L spine. Meds: Tylenol 975 mg every 8 hours, lidocaine patches  Recommend prednisone taper beginning 4/16  Suspect patient's source of headache is likely from his longstanding chronic neck pain     4/16 Changed to Inpatient status  Progress notes; Continue current pain regimen. Prednisone taper x6 days.   Pt continues to have neck pain however does report some improvement after receiving his pain regimen.  He continues with some numbness and tingling of his hand bilaterally  On exam; Limited ROM, able to rotate his head side-to-side with some pain, states that sidebending instead would cause more severe pain and also states that his head will get stuck with these motion.   With neck flexion and extension he has pain as well.       ED Treatment-Medication Administration from 04/15/2025 0957 to 04/15/2025 1643         Date/Time Order Dose Route Action     04/15/2025 1044 acetaminophen (TYLENOL) tablet 650 mg 650 mg Oral Given     04/15/2025 1044 ketorolac (TORADOL) injection 15 mg 15 mg Intravenous Given     04/15/2025 1044 methocarbamol (ROBAXIN) tablet 500 mg 500 mg Oral Given     04/15/2025 1045 lidocaine (LIDODERM) 5 % patch 1 patch 1 patch Topical Medication Applied      04/15/2025 1045 multi-electrolyte (Plasmalyte-A/Isolyte-S PH 7.4/Normosol-R) IV bolus 1,000 mL 1,000 mL Intravenous New Bag     04/15/2025 1226 oxyCODONE (ROXICODONE) IR tablet 5 mg 5 mg Oral Given     04/15/2025 1518 gabapentin (NEURONTIN) capsule 100 mg 100 mg Oral Given            Scheduled Medications:  acetaminophen, 975 mg, Oral, Q8H  Diclofenac Sodium, 2 g, Topical, 4x Daily  enoxaparin, 40 mg, Subcutaneous, Daily  gabapentin, 100 mg, Oral, TID  ketorolac, 15 mg, Intravenous, Q6H PETER   Followed by  [START ON 4/17/2025] ibuprofen, 600 mg, Oral, Q6H PETER  methocarbamol, 750 mg, Oral, Q6H PETER  nicotine, 1 patch, Transdermal, Daily  polyethylene glycol, 17 g, Oral, Daily  predniSONE, 60 mg, Oral, Daily   Followed by  [START ON 4/18/2025] predniSONE, 40 mg, Oral, Daily   Followed by  [START ON 4/20/2025] predniSONE, 20 mg, Oral, Daily  senna-docusate sodium, 1 tablet, Oral, HS      Continuous IV Infusions: None     PRN Meds:  naloxone, 0.04 mg, Intravenous, Q1MIN PRN  ondansetron, 4 mg, Intravenous, Q4H PRN  oxyCODONE, 2.5 mg, Oral, Q4H PRN   Or  oxyCODONE, 5 mg, Oral, Q4H PRN 4/15 x1, 4/16 x1      ED Triage Vitals   Temperature Pulse Respirations Blood Pressure SpO2 Pain Score   04/15/25 1002 04/15/25 1002 04/15/25 1002 04/15/25 1002 04/15/25 1002 04/15/25 1044   98.3 °F (36.8 °C) (!) 113 18 148/91 98 % 8     Weight (last 2 days)       None            Vital Signs (last 3 days)       Date/Time Temp Pulse Resp BP MAP (mmHg) SpO2 O2 Device Edwards Coma Scale Score Pain    04/16/25 1121 -- -- -- -- -- -- -- -- 8    04/16/25 08:52:50 -- 60 -- 165/106 126 98 % -- -- --    04/16/25 0810 -- -- -- -- -- -- -- -- 8 04/16/25 0800 -- -- -- -- -- -- -- 15 8    04/16/25 07:33:07 98.2 °F (36.8 °C) 49 -- 140/95 110 97 % -- -- --    04/16/25 0603 -- -- -- -- -- -- -- -- 8 04/16/25 05:00:58 -- 48 -- 133/82 99 96 % -- -- --    04/16/25 0012 -- -- -- -- -- -- -- -- 9    04/15/25 2147 -- -- -- -- -- -- -- -- 8    04/15/25 2112  -- -- -- -- -- -- -- -- 8    04/15/25 20:52:13 98.6 °F (37 °C) 59 -- 153/91 112 99 % -- -- --    04/15/25 2000 -- -- -- -- -- 94 % None (Room air) 15 8    04/15/25 1734 -- -- -- -- -- -- -- -- 9    04/15/25 1713 -- -- -- -- -- -- -- -- 9    04/15/25 1657 -- -- -- -- -- -- -- -- 9    04/15/25 16:49:03 -- 109 16 167/91 116 98 % -- -- --    04/15/25 1226 -- -- -- -- -- -- -- -- 9    04/15/25 1044 -- -- -- -- -- -- -- -- 8    04/15/25 1002 98.3 °F (36.8 °C) 113 18 148/91 -- 98 % None (Room air) -- --            Pertinent Labs/Diagnostic Test Results:   Radiology:  MRI lumbar spine wo contrast   Final Interpretation by Vaughn Roldan MD (04/16 3898)      No acute abnormality of lumbar spine.      Multilevel degenerative changes of lumbar spine with varying degrees of canal stenosis (mild L5-S1) and foraminal narrowing (mild bilateral L4-L5 and bilateral L5-S1 - worse at left L5-S1), as detailed above.         Workstation performed: [id number]         MRI cervical spine wo contrast   Final Interpretation by Vaughn Roldan MD (04/16 6638)      No acute abnormality of cervical spine.      Multilevel degenerative changes of cervical spine with varying degrees of canal stenosis (multilevel mild, worse at C6-C7) and foraminal narrowing (severe right C6-C7), as detailed above.      1.2 cm lesion in left deep parotid gland just posterior and slightly medial to left retromandibular vein. 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 Recommend ENT consultation for further evaluation.      The study was marked in EPIC for immediate notification.               Workstation performed: [id number]         CT spine cervical without contrast   Final Interpretation by Navi Bruce MD (04/15 1158)      No cervical spine fracture or traumatic malalignment.                  Workstation performed: [id number]         XR chest 2 views   Final Interpretation by Lucy Russ MD  (04/15 1550)      No acute cardiopulmonary disease.            Workstation performed: [id number]           Cardiology:  ECG 12 lead   Final Result by Del Miller MD (04/16 0722)   Sinus tachycardia   Nonspecific ST and T wave abnormality   Abnormal ECG   When compared with ECG of 20-Mar-2025 18:22,   Vent. rate has increased by  47 bpm   Confirmed by Del Miller ([id number]) on 4/16/2025 7:22:41 AM        GI:  No orders to display           Results from last 7 days   Lab Units 04/16/25  0759 04/15/25  1041   WBC Thousand/uL 5.49 6.51   HEMOGLOBIN g/dL 14.8 15.6   HEMATOCRIT % 43.3 44.0   PLATELETS Thousands/uL 243 259   TOTAL NEUT ABS Thousands/µL  --  4.56         Results from last 7 days   Lab Units 04/16/25  0759 04/15/25  1041   SODIUM mmol/L 139 137   POTASSIUM mmol/L 4.3 3.7   CHLORIDE mmol/L 106 106   CO2 mmol/L 28 19*   ANION GAP mmol/L 5 12   BUN mg/dL 21 18   CREATININE mg/dL 1.17 1.14   EGFR ml/min/1.73sq m 76 79   CALCIUM mg/dL 8.8 9.3             Results from last 7 days   Lab Units 04/16/25  0759 04/15/25  1041   GLUCOSE RANDOM mg/dL 102 91       Results from last 7 days   Lab Units 04/15/25  1041   HS TNI 0HR ng/L 4         Past Medical History:   Diagnosis Date    Asthma     Chronic pain     Hypertension     Neck pain     Psychoactive 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HCC)     Thyroglossal duct cyst      Present on Admission:   Asthma   Cervical spinal stenosis   Neck pain   Tobacco abuse   Anxiety   Polysubstance abuse (HCC)      Admitting Diagnosis: Neck pain [M54.2]  Dizziness [R42]  Chronic neck pain [M54.2, G89.29]  Intractable pain [R52]  Headache [R51.9]  Age/Sex: 39 y.o. male    Network Utilization Review Department  ATTENTION: Please call with any questions or concerns to [phone] and carefully listen to the prompts so that you are directed to the right person. All voicemails are confidential.   For Discharge needs, contact Care Management DC Support Team at [phone] opt. 2  Send all  requests for admission clinical reviews, approved or denied determinations and any other requests to dedicated fax number below belonging to the campus where the patient is receiving treatment. List of dedicated fax numbers for the Facilities:  FACILITY NAME UR FAX NUMBER   ADMISSION DENIALS (Administrative/Medical Necessity) [phone]   DISCHARGE SUPPORT TEAM (NETWORK) [phone]   PARENT CHILD HEALTH (Maternity/NICU/Pediatrics) [phone]   [facility] [phone]   [facility] [phone]   [facility] [phone]   [facility] [phone]   [facility] [phone]   [facility] [phone]   [facility] [phone]   [facility] [phone]   [facility] [phone]   [facility] [phone]   [facility] [phone]   [facility] [phone]

## 2025-04-16 NOTE — ASSESSMENT & PLAN NOTE
4/16: MRI cervical spine/L spine revealed no acute abnormalities.    Acetaminophen 975 mg p.o. every 8 hours scheduled.  Continue lidocaine patch, on for 12 hours and off for 12 hours.  Gabapentin 300 mg p.o. twice daily (patient reports this is a home medication).  Methocarbamol 750 mg p.o. every 6 hours scheduled.  Oxycodone 2.5 mg p.o. every 4 hours as needed moderate pain or 5 mg p.o. every 4 hours as needed severe pain.  Prednisone taper initiated by neurology.  No indication for IV opioids and, in fact, relatively contraindicated in setting of intractable headache.  Ice or heat for up to 20 minutes every hour as needed.  As needed Narcan in the event that opioid reversal becomes necessary.  Bowel regimen to avoid opioid-induced constipation while on opioid pain medication.  Patient needs to follow through with obtaining a primary care provider as well as potentially a chronic pain management provider.  Ambulatory referrals to comprehensive spine program as well as [facility] have been placed.    At discharge suggest the following:  Acetaminophen 975 mg p.o. every 8 hours scheduled.  Prednisone taper per neurology.  Continue lidocaine patch, on for 12 hours and off for 12 hours.  Gabapentin 300 mg p.o. twice daily (patient reports this is a home medication).  Methocarbamol 750 mg p.o. every 6 hours scheduled.  Oxycodone 2.5 mg p.o. every 4 hours as needed moderate pain or 5 mg p.o. every 4 hours as needed severe pain x 3-5 days maximum  Ice or heat for up to 20 minutes every hour as needed.  Ambulatory referrals to comprehensive spine program as well as [facility] have been placed. -Patient encouraged to establish follow-up.

## 2025-04-16 NOTE — PROGRESS NOTES
Progress Note - Neurology   Name: Amado Chin 39 y.o. male I MRN: [id number]  Unit/Bed#: [facility] 711-01 I Date of Admission: 4/15/2025   Date of Service: 4/16/2025 I Hospital Day: 0    Assessment & Plan  Neck pain    Headache  Pt presenting for headache that appears to be exacerbated by his history of chronic neck pain  Onset: 4/14  Duration: Constant   Character: Pressure  PTA meds: Tylenol, naproxen, gabapentin, Cymbalta  In the past he states that ketamine has been effective, has tried Percocet and Dilaudid which he states were not effective.  Aggravating/alleviating factors: He denies photophobia, does have some phonophobia no osmophobia.  He notes some nausea without vomiting as well, however this appears to be more associated with the dizziness rather than the headache itself.  Pt denies red flag symptoms of headache such as thunderclap onset, LOC, weight loss, visual changes.  In the ED he received 15 mg of Toradol without any benefit.  The admitting team has also tried to give the patient some Robaxin without much help as well.    Workup:  Lab Results   Component Value Date    GLUC 102 04/16/2025    [id number] 208 05/17/2024    FOLATE 6.3 05/17/2024    MG 2.2 05/15/2024    SODIUM 139 04/16/2025    [id number] 0.698 04/01/2022   Imaging: CT C-spine negative for acute spine fracture  MRI C spine: No acute abnormality of cervical spine.  Multilevel degenerative changes of cervical spine with varying degrees of canal stenosis (multilevel mild, worse at C6-C7) and foraminal narrowing (severe right C6-C7), as detailed above.  1.2 cm lesion in left deep parotid gland just posterior and slightly medial to left retromandibular vein. 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 Recommend ENT consultation for further evaluation.   MRI L spine: No acute abnormality of lumbar spine.  Multilevel degenerative changes of lumbar spine with varying degrees of canal stenosis (mild L5-S1) and foraminal  narrowing (mild bilateral L4-L5 and bilateral L5-S1 - worse at left L5-S1), as detailed above.    Plan:  Meds: Tylenol 975 mg every 8 hours, lidocaine patches  Recommend prednisone taper x6 days  Suspect patient's source of headache is likely from his longstanding chronic neck pain, appreciate APS recs  No further management from Neurology. Please call with questions or concerns      Asthma    Cervical spinal stenosis    Tobacco abuse    Anxiety    Polysubstance abuse (HCC)      Amado Chin will not need outpatient follow up with Neurology. He will not require outpatient neurological testing.  I have discussed with Dr. Vance the above plan to treat pt. He agrees with the plan.    Subjective   Patient was seen and evaluated bedside, he continues to have neck pain however does report some improvement after receiving his pain regimen.  Neurologically he remains stable from yesterday, continues to have some numbness and tingling of his hands bilaterally.    Review of Systems  numbness or tingling of hands    Objective :  Temp:  [98.2 °F (36.8 °C)-98.6 °F (37 °C)] 98.2 °F (36.8 °C)  HR:  [] 60  BP: (133-167)/() 165/106  Resp:  [16] 16  SpO2:  [94 %-99 %] 98 %  O2 Device: None (Room air)    Physical Exam  Constitutional:       Appearance: Normal appearance.   HENT:      Head: Normocephalic.      Mouth/Throat:      Mouth: Mucous membranes are moist.   Eyes:      General: Lids are normal.      Extraocular Movements: Extraocular movements intact.      Conjunctiva/sclera: Conjunctivae normal.      Pupils: Pupils are equal, round, and reactive to light.   Neck:      Comments: Limited range of motion, able to rotate his head side-to-side with some pain, states that sidebending instead would cause more severe pain and also states that his head will get stuck with these motion.  With neck flexion and extension he has pain as well.  Neurological:      Deep Tendon Reflexes: Reflexes are normal and symmetric.    Psychiatric:         Speech: Speech normal.     Neurological Exam  Mental Status  Awake, alert and oriented to person, place and time. Speech is normal. Language is fluent with no aphasia.    Cranial Nerves  CN II: Visual fields full to confrontation.  CN III, IV, VI: Extraocular movements intact bilaterally. Normal lids and orbits bilaterally. Pupils equal round and reactive to light bilaterally.  CN V: Facial sensation is normal.  CN VII: Full and symmetric facial movement.  CN VIII: Hearing is normal.  CN IX, X: Palate elevates symmetrically  CN XI: Shoulder shrug strength is normal.  CN XII: Tongue midline without atrophy or fasciculations.    Motor   Strength is 5/5 in all four extremities except as noted.  Range of motion limited, and does appear to be pain limited today, with 4 out of 5 bilaterally with withhip flexors, 5 out of 5 distally..    Sensory  Light touch is normal in upper and lower extremities.     Reflexes  Deep tendon reflexes are 2+ and symmetric in all four extremities.    Coordination  Right: Finger-to-nose normal. Heel-to-shin normal.Left: Finger-to-nose normal. Heel-to-shin normal.        Lab Results: I have reviewed the following results:  Imaging Results Review: I personally reviewed the following image studies in PACS and associated radiology reports: MRI spine. My interpretation of the radiology images/reports is: As above.  Other Study Results Review: No additional pertinent studies reviewed.    VTE Pharmacologic Prophylaxis: VTE covered by:  enoxaparin, Subcutaneous, 40 mg at 04/16/25 0809

## 2025-04-16 NOTE — RESTORATIVE TECHNICIAN NOTE
Restorative Technician Note      Patient Name: Amado Chin     Note Type: Mobility (Pt refused OOB/ambulation 2* c/o feeling too dizzy RN aware.)    Tasia Jin BS, Restorative Technician,

## 2025-04-16 NOTE — TELEPHONE ENCOUNTER
Referred into comp spine. However patient is admitted to the hospital for same DX. Neck pain    Will defer     NOTE: Pt had PT eval 8/10/2022 with 3 follow ups.     Had a consult with Dr Miller PM on 2/9/2024  Had referral to [facility] 8/25/2024, does not appear to have been scheduled

## 2025-04-17 PROCEDURE — 99232 SBSQ HOSP IP/OBS MODERATE 35: CPT | Performed by: INTERNAL MEDICINE

## 2025-04-17 RX ADMIN — KETOROLAC TROMETHAMINE 15 MG: 30 INJECTION, SOLUTION INTRAMUSCULAR; INTRAVENOUS at 12:26

## 2025-04-17 RX ADMIN — ACETAMINOPHEN 975 MG: 325 TABLET, FILM COATED ORAL at 05:27

## 2025-04-17 RX ADMIN — OXYCODONE HYDROCHLORIDE 5 MG: 5 TABLET ORAL at 03:23

## 2025-04-17 RX ADMIN — GABAPENTIN 300 MG: 300 CAPSULE ORAL at 08:00

## 2025-04-17 RX ADMIN — METHOCARBAMOL 750 MG: 750 TABLET ORAL at 12:28

## 2025-04-17 RX ADMIN — OXYCODONE HYDROCHLORIDE 5 MG: 5 TABLET ORAL at 21:11

## 2025-04-17 RX ADMIN — ENOXAPARIN SODIUM 40 MG: 40 INJECTION SUBCUTANEOUS at 08:00

## 2025-04-17 RX ADMIN — METHOCARBAMOL 750 MG: 750 TABLET ORAL at 17:14

## 2025-04-17 RX ADMIN — SENNOSIDES AND DOCUSATE SODIUM 1 TABLET: 50; 8.6 TABLET ORAL at 22:37

## 2025-04-17 RX ADMIN — QUETIAPINE FUMARATE 300 MG: 300 TABLET ORAL at 22:37

## 2025-04-17 RX ADMIN — DICLOFENAC SODIUM 2 G: 10 GEL TOPICAL at 08:00

## 2025-04-17 RX ADMIN — DICLOFENAC SODIUM 2 G: 10 GEL TOPICAL at 17:14

## 2025-04-17 RX ADMIN — DICLOFENAC SODIUM 2 G: 10 GEL TOPICAL at 12:26

## 2025-04-17 RX ADMIN — IBUPROFEN 600 MG: 600 TABLET, FILM COATED ORAL at 17:14

## 2025-04-17 RX ADMIN — KETOROLAC TROMETHAMINE 15 MG: 30 INJECTION, SOLUTION INTRAMUSCULAR; INTRAVENOUS at 00:36

## 2025-04-17 RX ADMIN — KETOROLAC TROMETHAMINE 15 MG: 30 INJECTION, SOLUTION INTRAMUSCULAR; INTRAVENOUS at 05:27

## 2025-04-17 RX ADMIN — GABAPENTIN 300 MG: 300 CAPSULE ORAL at 17:14

## 2025-04-17 RX ADMIN — PREDNISONE 60 MG: 20 TABLET ORAL at 08:00

## 2025-04-17 RX ADMIN — OXYCODONE HYDROCHLORIDE 5 MG: 5 TABLET ORAL at 12:27

## 2025-04-17 RX ADMIN — OXYCODONE HYDROCHLORIDE 5 MG: 5 TABLET ORAL at 07:54

## 2025-04-17 RX ADMIN — METHOCARBAMOL 750 MG: 750 TABLET ORAL at 00:36

## 2025-04-17 RX ADMIN — ACETAMINOPHEN 975 MG: 325 TABLET, FILM COATED ORAL at 14:01

## 2025-04-17 RX ADMIN — METHOCARBAMOL 750 MG: 750 TABLET ORAL at 05:27

## 2025-04-17 RX ADMIN — OXYCODONE HYDROCHLORIDE 5 MG: 5 TABLET ORAL at 17:14

## 2025-04-17 RX ADMIN — ACETAMINOPHEN 975 MG: 325 TABLET, FILM COATED ORAL at 22:37

## 2025-04-17 RX ADMIN — DICLOFENAC SODIUM 2 G: 10 GEL TOPICAL at 22:37

## 2025-04-17 NOTE — RESTORATIVE TECHNICIAN NOTE
Restorative Technician Note      Patient Name: Amado Chin     Note Type: Mobility (Pt refused OOB/ambulation 2* c/o neck pain/dizziness RN Amado aware.)  Tasia Jin BS, Restorative Technician,

## 2025-04-17 NOTE — CASE MANAGEMENT
Case Management Discharge Planning Note    Patient name Amado Chin  Location [facility] 711/[facility] 711-01 MRN [id number]  : 1985 Date 2025       Current Admission Date: 4/15/2025  Current Admission Diagnosis:Neck pain   Patient Active Problem List    Diagnosis Date Noted Date Diagnosed    Headache 04/15/2025     Polysubstance abuse (HCC) 2024     Psychoactive 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HCC) 2024     Drug use 2024     Cellulitis of left lower extremity 2024     Homelessness 2024     Cervical spinal stenosis 2024     Cervical radiculopathy 2024     Chronic bilateral low back pain 2023     Chronic neck pain 2023     Recurrent major depressive disorder (HCC) 2022     Anxiety 2022     Substance induced mood disorder (HCC) 2022     Mood disorder due to medical condition 2022     Opioid abuse (HCC) 02/10/2022     Fecal smearing 2022     Urinary incontinence 2022     Drug-induced constipation 2022     Intractable pain 2021     Vitamin B12 deficiency 2021     Asthma 2021     Tobacco abuse 2021     Dizziness 2021     Neck pain 2021     Elevated blood pressure reading 2021     Weakness of both lower extremities 2021     Cervical disc herniation 2020     Paresthesia and pain of extremity 2020     Fall 2020     Concussion without loss of consciousness 2020     Acute pain of right shoulder 2020     Alcohol abuse 2020     Burn 2020     Furuncle of axilla 2020       LOS (days): 1  Geometric Mean LOS (GMLOS) (days):   Days to GMLOS:     OBJECTIVE:  Risk of Unplanned Readmission Score: 17.77         Current admission status: Inpatient   Preferred Pharmacy:   [id number] - [place], [place] - [address]  [place] [address]  Phone: [phone] Fax: [phone]    [facility] - [place], [place]  - [address]  [address] [facility]  [place] [address]  Phone: [phone] Fax: [phone]    [facility] - [place] - [address]  [address]  [place] [address]  Phone: [phone] Fax: [phone]    Primary Care Provider: No primary care provider on file.    Primary Insurance: [employer] [facility]  Secondary Insurance:     DISCHARGE DETAILS:    Discharge planning discussed with:: Patient        Other Referral/Resources/Interventions Provided:  Referral Comments: Patient is interested in inpatient rehab. - [facility] referral entered in [facility]

## 2025-04-17 NOTE — PROGRESS NOTES
Progress Note - Hospitalist   Name: Amado Chin 39 y.o. male I MRN: [id number]  Unit/Bed#: [facility] 711-01 I Date of Admission: 4/15/2025   Date of Service: 4/17/2025 I Hospital Day: 1    Assessment & Plan  Neck pain  Patient with multiple ED visits and hospitalization for chronic neck pain /headache  Per patient, it had been well-managed however recently has had increasing pain particularly in the bitemporal area as well as the neck radiating to the back and associated with stiffness and intermittent dizziness  History of psychosis previous inpatient psychiatric admission on 7/2024  Noted home meds: Patient takes gabapentin and naproxen at home.  Patient not currently on any opioids per PDMP  Patient has been referred to outpatient pain management and has yet to follow-up with him  He also reports intermittent tingling in his upper extremities as well as saddle anesthesia at times and urinary dribbling/incontinence  Cervical spine CT scan without acute abnormality  Was evaluated by neurology suspect headache is cervicogenic in origin  Cervical and lumbar spine MRI showed progression of degenerative changes but no cord compression, no further inpatient neurology workup  He was recommended to establish an outpatient pain management  Acute pain service consulted  Patient started on oral prednisone  Ambulatory referral to comprehensive spine program as well as [facility] have been placed  PT OT recommending level 3  Continue with pain control  Continue to work with PT OT  Cervical spinal stenosis  Plan noted above.  Headache  Cervicogenic in origin  Continue with above pain control  Polysubstance abuse (HCC)  Admits to prior use of methamphetamines but states he has not used this in several years.  Admits to occasional use of marijuana currently.  Denies any other drug use.  Tobacco abuse  Patient admits to smoking half a pack of cigarettes daily.   With nicotine patch  Asthma  No acute  exacerbation  Anxiety  Continue with Seroquel 300 mg at bedtime    VTE Pharmacologic Prophylaxis: VTE Score: 4 Moderate Risk (Score 3-4) - Pharmacological DVT Prophylaxis Ordered: enoxaparin (Lovenox).    Mobility:   Basic Mobility Inpatient Raw Score: 18  JH-HLM Goal: 6: Walk 10 steps or more  JH-HLM Achieved: 6: Walk 10 steps or more  JH-HLM Goal achieved. Continue to encourage appropriate mobility.    Patient Centered Rounds: I performed bedside rounds with nursing staff today.   Discussions with Specialists or Other Care Team Provider: Nursing      Current Length of Stay: 1 day(s)  Current Patient Status: Inpatient   Certification Statement: The patient will continue to require additional inpatient hospital stay due to ambulatory dysfunction and pain management  Discharge Plan: Anticipate discharge in 24-48 hrs to rehab facility.    Code Status: Level 1 - Full Code    Subjective   Patient seen and examined  Continue to complain of neck pain  No nausea vomiting or diarrhea    Objective :  Temp:  [98.1 °F (36.7 °C)-98.2 °F (36.8 °C)] 98.2 °F (36.8 °C)  HR:  [69-78] 69  BP: (123-153)/() 123/73  Resp:  [16-20] 20  SpO2:  [96 %] 96 %    There is no height or weight on file to calculate BMI.     Input and Output Summary (last 24 hours):     Intake/Output Summary (Last 24 hours) at 4/17/2025 1123  Last data filed at 4/17/2025 0900  Gross per 24 hour   Intake 578 ml   Output --   Net 578 ml       Physical Exam    Patient is awake alert no acute distress   Looks uncomfortable in bed with limited neck range of motion due to pain  Lung clear to auscultation bilateral anteriorly  Heart positive S1-S2 no murmur  Abdomen soft nontender  Lower extremities no edema    Lines/Drains:        Lab Results: I have reviewed the following results:   Results from last 7 days   Lab Units 04/16/25  0759 04/15/25  1041   WBC Thousand/uL 5.49 6.51   HEMOGLOBIN g/dL 14.8 15.6   HEMATOCRIT % 43.3 44.0   PLATELETS Thousands/uL 243 259    SEGS PCT %  --  70   LYMPHO PCT %  --  16   MONO PCT %  --  11   EOS PCT %  --  1     Results from last 7 days   Lab Units 04/16/25  0759   SODIUM mmol/L 139   POTASSIUM mmol/L 4.3   CHLORIDE mmol/L 106   CO2 mmol/L 28   BUN mg/dL 21   CREATININE mg/dL 1.17   ANION GAP mmol/L 5   CALCIUM mg/dL 8.8   GLUCOSE RANDOM mg/dL 102                       Recent Cultures (last 7 days):         Imaging Results Review: No pertinent imaging studies reviewed.  Other Study Results Review: No additional pertinent studies reviewed.    Last 24 Hours Medication List:     Current Facility-Administered Medications:     acetaminophen (TYLENOL) tablet 975 mg, Q8H    Diclofenac Sodium (VOLTAREN) 1 % topical gel 2 g, 4x Daily    enoxaparin (LOVENOX) subcutaneous injection 40 mg, Daily    gabapentin (NEURONTIN) capsule 300 mg, BID    hydrALAZINE (APRESOLINE) injection 10 mg, Q6H PRN    ketorolac (TORADOL) injection 15 mg, Q6H PETER **FOLLOWED BY** ibuprofen (MOTRIN) tablet 600 mg, Q6H PETER    methocarbamol (ROBAXIN) tablet 750 mg, Q6H PETER    naloxone (NARCAN) 0.04 mg/mL syringe 0.04 mg, Q1MIN PRN    nicotine (NICODERM CQ) 14 mg/24hr TD 24 hr patch 1 patch, Daily    ondansetron (ZOFRAN) injection 4 mg, Q4H PRN    oxyCODONE (ROXICODONE) split tablet 2.5 mg, Q4H PRN **OR** oxyCODONE (ROXICODONE) IR tablet 5 mg, Q4H PRN    polyethylene glycol (MIRALAX) packet 17 g, Daily    [COMPLETED] predniSONE tablet 60 mg, Daily **FOLLOWED BY** [START ON 4/18/2025] predniSONE tablet 40 mg, Daily **FOLLOWED BY** [START ON 4/20/2025] predniSONE tablet 20 mg, Daily    QUEtiapine (SEROquel) tablet 300 mg, HS    senna-docusate sodium (SENOKOT S) 8.6-50 mg per tablet 1 tablet, HS    Administrative Statements   Today, Patient Was Seen By: Velasquez Villegas, DO  I have spent a total time of 35 minutes in caring for this patient on the day of the visit/encounter including Counseling / Coordination of care, Documenting in the medical record, Reviewing/placing orders in the  medical record (including tests, medications, and/or procedures), Obtaining or reviewing history  , and Communicating with other healthcare professionals .    **Please Note: This note may have been constructed using a voice recognition system.**

## 2025-04-17 NOTE — UTILIZATION REVIEW
Continued Stay Review    Date: 4/17                          Current Patient Class: Inpatient  Current Level of Care: Med/surg    HPI:39 y.o. male initially admitted on 4/16     Assessment/Plan:  Date: 4/17  Day 3: Has surpassed a 2nd midnight with active treatments and services.Continues to complain of pain, requiring oxcodone.  H/O polysubstance abuse. Will continue to require additional inpatient hospital stay due to ambulatory dysfunction and pain management     Medications:   Scheduled Medications:  acetaminophen, 975 mg, Oral, Q8H  Diclofenac Sodium, 2 g, Topical, 4x Daily  enoxaparin, 40 mg, Subcutaneous, Daily  gabapentin, 300 mg, Oral, BID  ibuprofen, 600 mg, Oral, Q6H PETER  methocarbamol, 750 mg, Oral, Q6H PETER  nicotine, 1 patch, Transdermal, Daily  polyethylene glycol, 17 g, Oral, Daily  [START ON 4/18/2025] predniSONE, 40 mg, Oral, Daily   Followed by  [START ON 4/20/2025] predniSONE, 20 mg, Oral, Daily  QUEtiapine, 300 mg, Oral, HS  senna-docusate sodium, 1 tablet, Oral, HS      Continuous IV Infusions:     PRN Meds:  hydrALAZINE, 10 mg, Intravenous, Q6H PRN  naloxone, 0.04 mg, Intravenous, Q1MIN PRN  ondansetron, 4 mg, Intravenous, Q4H PRN  oxyCODONE, 2.5 mg, Oral, Q4H PRN   Or  oxyCODONE, 5 mg, Oral, Q4H PRN x5 4/17      Discharge Plan: TBD    Vital Signs (last 3 days)       Date/Time Temp Pulse Resp BP MAP (mmHg) SpO2 O2 Device Patient Position - Orthostatic VS [place] Coma Scale Score Pain    04/17/25 1227 -- -- -- -- -- -- -- -- -- 7    04/17/25 1226 -- -- -- -- -- -- -- -- -- 7    04/17/25 0800 -- -- -- -- -- -- -- -- 15 9    04/17/25 0754 -- -- -- -- -- -- -- -- -- 9    04/17/25 07:35:03 98.2 °F (36.8 °C) 69 20 123/73 90 96 % -- -- -- --    04/17/25 0527 -- -- -- -- -- -- -- -- -- 9    04/17/25 0323 -- -- -- -- -- -- -- -- -- 10 - Worst Possible Pain    04/17/25 0036 -- -- -- -- -- -- -- -- -- 10 - Worst Possible Pain    04/16/25 2239 -- -- -- -- -- -- -- -- -- 10 - Worst Possible Pain    04/16/25  22:00:09 98.1 °F (36.7 °C) 78 -- 153/103 120 96 % -- -- -- --    04/16/25 2153 -- -- -- -- -- -- -- -- -- 9 04/16/25 2000 -- -- -- -- -- -- -- -- 15 --    04/16/25 1824 -- -- -- -- -- -- -- -- -- 8 04/16/25 1726 -- -- -- -- -- -- -- -- -- 7 04/16/25 15:36:49 98.1 °F (36.7 °C) 69 16 135/97 110 96 % -- Lying -- --    04/16/25 1400 -- -- -- -- -- -- -- -- -- 7 04/16/25 1359 -- -- -- -- -- -- -- -- -- 7 04/16/25 1121 -- -- -- -- -- -- -- -- -- 8 04/16/25 0912 -- -- -- -- -- -- -- -- -- 6 04/16/25 08:52:50 -- 60 -- 165/106 126 98 % -- -- -- --    04/16/25 0810 -- -- -- -- -- -- -- -- -- 8 04/16/25 0800 -- -- -- -- -- -- -- -- 15 8 04/16/25 07:33:07 98.2 °F (36.8 °C) 49 -- 140/95 110 97 % -- -- -- --    04/16/25 0603 -- -- -- -- -- -- -- -- -- 8 04/16/25 05:00:58 -- 48 -- 133/82 99 96 % -- -- -- --    04/16/25 0012 -- -- -- -- -- -- -- -- -- 9    04/15/25 2147 -- -- -- -- -- -- -- -- -- 8    04/15/25 2112 -- -- -- -- -- -- -- -- -- 8    04/15/25 20:52:13 98.6 °F (37 °C) 59 -- 153/91 112 99 % -- -- -- --    04/15/25 2000 -- -- -- -- -- 94 % None (Room air) -- 15 8    04/15/25 1734 -- -- -- -- -- -- -- -- -- 9    04/15/25 1713 -- -- -- -- -- -- -- -- -- 9    04/15/25 1657 -- -- -- -- -- -- -- -- -- 9    04/15/25 16:49:03 -- 109 16 167/91 116 98 % -- -- -- --    04/15/25 1226 -- -- -- -- -- -- -- -- -- 9    04/15/25 1044 -- -- -- -- -- -- -- -- -- 8    04/15/25 1002 98.3 °F (36.8 °C) 113 18 148/91 -- 98 % None (Room air) -- -- --          Weight (last 2 days)       None            Pertinent Labs/Diagnostic Results:   Radiology:  MRI lumbar spine wo contrast   Final Interpretation by Vaughn Roldan MD (04/16 4900)      No acute abnormality of lumbar spine.      Multilevel degenerative changes of lumbar spine with varying degrees of canal stenosis (mild L5-S1) and foraminal narrowing (mild bilateral L4-L5 and bilateral L5-S1 - worse at left L5-S1), as detailed above.         Workstation  performed: [id number]         MRI cervical spine wo contrast   Final Interpretation by Vaughn Roldan MD (04/16 0829)      No acute abnormality of cervical spine.      Multilevel degenerative changes of cervical spine with varying degrees of canal stenosis (multilevel mild, worse at C6-C7) and foraminal narrowing (severe right C6-C7), as detailed above.      1.2 cm lesion in left deep parotid gland just posterior and slightly medial to left retromandibular vein. 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 Recommend ENT consultation for further evaluation.      The study was marked in EPIC for immediate notification.               Workstation performed: [id number]         CT spine cervical without contrast   Final Interpretation by Navi Bruce MD (04/15 1157)      No cervical spine fracture or traumatic malalignment.                  Workstation performed: [id number]         XR chest 2 views   Final Interpretation by Lucy Russ MD (04/15 1610)      No acute cardiopulmonary disease.            Workstation performed: [id number]           Cardiology:  ECG 12 lead   Final Result by Del Miller MD (04/16 0722)   Sinus tachycardia   Nonspecific ST and T wave abnormality   Abnormal ECG   When compared with ECG of 20-Mar-2025 18:22,   Vent. rate has increased by  47 bpm   Confirmed by Del Miller ([id number]) on 4/16/2025 7:22:41 AM            Results from last 7 days   Lab Units 04/16/25  0759 04/15/25  1041   WBC Thousand/uL 5.49 6.51   HEMOGLOBIN g/dL 14.8 15.6   HEMATOCRIT % 43.3 44.0   PLATELETS Thousands/uL 243 259   TOTAL NEUT ABS Thousands/µL  --  4.56         Results from last 7 days   Lab Units 04/16/25  0759 04/15/25  1041   SODIUM mmol/L 139 137   POTASSIUM mmol/L 4.3 3.7   CHLORIDE mmol/L 106 106   CO2 mmol/L 28 19*   ANION GAP mmol/L 5 12   BUN mg/dL 21 18   CREATININE mg/dL 1.17 1.14   EGFR ml/min/1.73sq m 76 79   CALCIUM mg/dL 8.8 9.3              Results from last 7 days   Lab Units 04/16/25  0759 04/15/25  1041   GLUCOSE RANDOM mg/dL 102 91           Results from last 7 days   Lab Units 04/15/25  1041   HS TNI 0HR ng/L 4         Network Utilization Review Department  ATTENTION: Please call with any questions or concerns to [phone] and carefully listen to the prompts so that you are directed to the right person. All voicemails are confidential.   For Discharge needs, contact Care Management DC Support Team at [phone] opt. [phone]  Send all requests for admission clinical reviews, approved or denied determinations and any other requests to dedicated fax number below belonging to the campus where the patient is receiving treatment. List of dedicated fax numbers for the Facilities:  FACILITY NAME UR FAX NUMBER   ADMISSION DENIALS (Administrative/Medical Necessity) [phone]   DISCHARGE SUPPORT TEAM (NETWORK) [phone]   PARENT CHILD HEALTH (Maternity/NICU/Pediatrics) [phone]   [facility] [phone]   [facility] [phone]   [facility] [phone]   [facility] [phone]   [facility] [phone]   [facility] [phone]   [facility] [phone]   [facility] [phone]   [facility] [phone]   [facility] [phone]   [facility] [phone]   [facility] [phone]

## 2025-04-17 NOTE — ASSESSMENT & PLAN NOTE
Patient with multiple ED visits and hospitalization for chronic neck pain /headache  Per patient, it had been well-managed however recently has had increasing pain particularly in the bitemporal area as well as the neck radiating to the back and associated with stiffness and intermittent dizziness  History of psychosis previous inpatient psychiatric admission on 7/2024  Noted home meds: Patient takes gabapentin and naproxen at home.  Patient not currently on any opioids per PDMP  Patient has been referred to outpatient pain management and has yet to follow-up with him  He also reports intermittent tingling in his upper extremities as well as saddle anesthesia at times and urinary dribbling/incontinence  Cervical spine CT scan without acute abnormality  Was evaluated by neurology suspect headache is cervicogenic in origin  Cervical and lumbar spine MRI showed progression of degenerative changes but no cord compression, no further inpatient neurology workup  He was recommended to establish an outpatient pain management  Acute pain service consulted  Patient started on oral prednisone  Ambulatory referral to comprehensive spine program as well as [facility] have been placed  PT OT recommending level 3  Continue with pain control  Continue to work with PT OT

## 2025-04-18 PROCEDURE — 97535 SELF CARE MNGMENT TRAINING: CPT

## 2025-04-18 PROCEDURE — 97530 THERAPEUTIC ACTIVITIES: CPT

## 2025-04-18 PROCEDURE — 99232 SBSQ HOSP IP/OBS MODERATE 35: CPT | Performed by: INTERNAL MEDICINE

## 2025-04-18 RX ORDER — LISINOPRIL 5 MG/1
5 TABLET ORAL DAILY
Status: DISCONTINUED | OUTPATIENT
Start: 2025-04-18 | End: 2025-04-21

## 2025-04-18 RX ADMIN — IBUPROFEN 600 MG: 600 TABLET, FILM COATED ORAL at 00:29

## 2025-04-18 RX ADMIN — DICLOFENAC SODIUM 2 G: 10 GEL TOPICAL at 09:58

## 2025-04-18 RX ADMIN — SENNOSIDES AND DOCUSATE SODIUM 1 TABLET: 50; 8.6 TABLET ORAL at 21:07

## 2025-04-18 RX ADMIN — OXYCODONE HYDROCHLORIDE 5 MG: 5 TABLET ORAL at 10:06

## 2025-04-18 RX ADMIN — DICLOFENAC SODIUM 2 G: 10 GEL TOPICAL at 21:13

## 2025-04-18 RX ADMIN — IBUPROFEN 600 MG: 600 TABLET, FILM COATED ORAL at 18:09

## 2025-04-18 RX ADMIN — DICLOFENAC SODIUM 2 G: 10 GEL TOPICAL at 18:09

## 2025-04-18 RX ADMIN — HYDRALAZINE HYDROCHLORIDE 10 MG: 20 INJECTION, SOLUTION INTRAMUSCULAR; INTRAVENOUS at 21:07

## 2025-04-18 RX ADMIN — METHOCARBAMOL 750 MG: 750 TABLET ORAL at 12:48

## 2025-04-18 RX ADMIN — QUETIAPINE FUMARATE 300 MG: 300 TABLET ORAL at 21:07

## 2025-04-18 RX ADMIN — OXYCODONE HYDROCHLORIDE 5 MG: 5 TABLET ORAL at 19:50

## 2025-04-18 RX ADMIN — Medication 2.5 MG: at 15:33

## 2025-04-18 RX ADMIN — HYDRALAZINE HYDROCHLORIDE 10 MG: 20 INJECTION, SOLUTION INTRAMUSCULAR; INTRAVENOUS at 15:33

## 2025-04-18 RX ADMIN — METHOCARBAMOL 750 MG: 750 TABLET ORAL at 00:29

## 2025-04-18 RX ADMIN — GABAPENTIN 300 MG: 300 CAPSULE ORAL at 18:09

## 2025-04-18 RX ADMIN — LISINOPRIL 5 MG: 5 TABLET ORAL at 10:08

## 2025-04-18 RX ADMIN — GABAPENTIN 300 MG: 300 CAPSULE ORAL at 09:58

## 2025-04-18 RX ADMIN — ACETAMINOPHEN 975 MG: 325 TABLET, FILM COATED ORAL at 05:58

## 2025-04-18 RX ADMIN — ENOXAPARIN SODIUM 40 MG: 40 INJECTION SUBCUTANEOUS at 09:58

## 2025-04-18 RX ADMIN — IBUPROFEN 600 MG: 600 TABLET, FILM COATED ORAL at 12:49

## 2025-04-18 RX ADMIN — IBUPROFEN 600 MG: 600 TABLET, FILM COATED ORAL at 05:58

## 2025-04-18 RX ADMIN — OXYCODONE HYDROCHLORIDE 5 MG: 5 TABLET ORAL at 01:53

## 2025-04-18 RX ADMIN — PREDNISONE 40 MG: 20 TABLET ORAL at 09:58

## 2025-04-18 RX ADMIN — METHOCARBAMOL 750 MG: 750 TABLET ORAL at 18:09

## 2025-04-18 RX ADMIN — ACETAMINOPHEN 975 MG: 325 TABLET, FILM COATED ORAL at 21:07

## 2025-04-18 RX ADMIN — METHOCARBAMOL 750 MG: 750 TABLET ORAL at 05:58

## 2025-04-18 NOTE — PROGRESS NOTES
Progress Note - Hospitalist   Name: Amado Chin 39 y.o. male I MRN: [id number]  Unit/Bed#: [facility] 711-01 I Date of Admission: 4/15/2025   Date of Service: 4/18/2025 I Hospital Day: 2    Assessment & Plan  Neck pain  Patient with multiple ED visits and hospitalization for chronic neck pain /headache  Per patient, it had been well-managed however recently has had increasing pain particularly in the bitemporal area as well as the neck radiating to the back and associated with stiffness and intermittent dizziness  History of psychosis previous inpatient psychiatric admission on 7/2024  Noted home meds: Patient takes gabapentin and naproxen at home.  Patient not currently on any opioids per PDMP  Patient has been referred to outpatient pain management and has yet to follow-up with him  He also reports intermittent tingling in his upper extremities as well as saddle anesthesia at times and urinary dribbling/incontinence  Cervical spine CT scan without acute abnormality  Was evaluated by neurology suspect headache is cervicogenic in origin  Cervical and lumbar spine MRI showed progression of degenerative changes but no cord compression, no further inpatient neurology workup  He was recommended to establish an outpatient pain management  Acute pain service consulted  Patient started on oral prednisone taper  Ambulatory referral to comprehensive spine program as well as [facility] have been placed  PT OT recommending level 3  Continue with pain control  Continue to work with PT OT  Medically stable for discharge  Awaiting rehab placement/[facility]  Cervical spinal stenosis  Plan noted above.  Elevated blood pressure reading  Likely secondary to pain/ headache and steroidS effect  Add lisinopril  Continue with IV hydralazine as needed  Monitor  Headache  Cervicogenic in origin  Continue with above pain control  Polysubstance abuse (HCC)  Admits to prior use of methamphetamines but states he has not used this in  several years.  Admits to occasional use of marijuana currently.  Denies any other drug use.  Tobacco abuse  Patient admits to smoking half a pack of cigarettes daily.   Refusing nicotine patch  Asthma  No acute exacerbation  Anxiety  Continue with Seroquel 300 mg at bedtime    VTE Pharmacologic Prophylaxis: VTE Score: 4 Moderate Risk (Score 3-4) - Pharmacological DVT Prophylaxis Ordered: enoxaparin (Lovenox).    Mobility:   Basic Mobility Inpatient Raw Score: 18  JH-HLM Goal: 6: Walk 10 steps or more  JH-HLM Achieved: 6: Walk 10 steps or more  JH-HLM Goal achieved. Continue to encourage appropriate mobility.    Patient Centered Rounds: I performed bedside rounds with nursing staff today.   Discussions with Specialists or Other Care Team Provider: Nursing      Current Length of Stay: 2 day(s)  Current Patient Status: Inpatient   Certification Statement: The patient will continue to require additional inpatient hospital stay due to awaiting rehab placement      Code Status: Level 1 - Full Code    Subjective   Patient seen and examined  Comfortable in bed  Continues to feel the same  No chest pain or shortness of breath  No nausea vomiting or diarrhea    Objective :  Temp:  [98.2 °F (36.8 °C)-98.5 °F (36.9 °C)] 98.2 °F (36.8 °C)  HR:  [54-90] 54  BP: (139-191)/() 161/100  Resp:  [16] 16  SpO2:  [92 %-98 %] 96 %  O2 Device: None (Room air)    There is no height or weight on file to calculate BMI.     Input and Output Summary (last 24 hours):     Intake/Output Summary (Last 24 hours) at 4/18/2025 1009  Last data filed at 4/18/2025 0101  Gross per 24 hour   Intake 480 ml   Output --   Net 480 ml       Physical Exam    Patient is awake alert no acute distress   Comfortable in bed with limited neck range of motion due to pain  Lung clear to auscultation bilateral anteriorly  Heart positive S1-S2 no murmur  Abdomen soft nontender  Lower extremities no edema    Lines/Drains:        Lab Results: I have reviewed the  following results:   Results from last 7 days   Lab Units 04/16/25  0759 04/15/25  1041   WBC Thousand/uL 5.49 6.51   HEMOGLOBIN g/dL 14.8 15.6   HEMATOCRIT % 43.3 44.0   PLATELETS Thousands/uL 243 259   SEGS PCT %  --  70   LYMPHO PCT %  --  16   MONO PCT %  --  11   EOS PCT %  --  1     Results from last 7 days   Lab Units 04/16/25  0759   SODIUM mmol/L 139   POTASSIUM mmol/L 4.3   CHLORIDE mmol/L 106   CO2 mmol/L 28   BUN mg/dL 21   CREATININE mg/dL 1.17   ANION GAP mmol/L 5   CALCIUM mg/dL 8.8   GLUCOSE RANDOM mg/dL 102                       Recent Cultures (last 7 days):         Imaging Results Review: No pertinent imaging studies reviewed.  Other Study Results Review: No additional pertinent studies reviewed.    Last 24 Hours Medication List:     Current Facility-Administered Medications:     acetaminophen (TYLENOL) tablet 975 mg, Q8H    Diclofenac Sodium (VOLTAREN) 1 % topical gel 2 g, 4x Daily    enoxaparin (LOVENOX) subcutaneous injection 40 mg, Daily    gabapentin (NEURONTIN) capsule 300 mg, BID    hydrALAZINE (APRESOLINE) injection 10 mg, Q6H PRN    [COMPLETED] ketorolac (TORADOL) injection 15 mg, Q6H PETER **FOLLOWED BY** ibuprofen (MOTRIN) tablet 600 mg, Q6H PETER    lisinopril (ZESTRIL) tablet 5 mg, Daily    methocarbamol (ROBAXIN) tablet 750 mg, Q6H PETER    naloxone (NARCAN) 0.04 mg/mL syringe 0.04 mg, Q1MIN PRN    nicotine (NICODERM CQ) 14 mg/24hr TD 24 hr patch 1 patch, Daily    ondansetron (ZOFRAN) injection 4 mg, Q4H PRN    oxyCODONE (ROXICODONE) split tablet 2.5 mg, Q4H PRN **OR** oxyCODONE (ROXICODONE) IR tablet 5 mg, Q4H PRN    polyethylene glycol (MIRALAX) packet 17 g, Daily    [COMPLETED] predniSONE tablet 60 mg, Daily **FOLLOWED BY** predniSONE tablet 40 mg, Daily **FOLLOWED BY** [START ON 4/20/2025] predniSONE tablet 20 mg, Daily    QUEtiapine (SEROquel) tablet 300 mg, HS    senna-docusate sodium (SENOKOT S) 8.6-50 mg per tablet 1 tablet, HS    Administrative Statements   Today, Patient Was Seen  By: Velasquez Villegas, DO  I have spent a total time of 35 minutes in caring for this patient on the day of the visit/encounter including Counseling / Coordination of care, Documenting in the medical record, Reviewing/placing orders in the medical record (including tests, medications, and/or procedures), Obtaining or reviewing history  , and Communicating with other healthcare professionals .    **Please Note: This note may have been constructed using a voice recognition system.**

## 2025-04-18 NOTE — OCCUPATIONAL THERAPY NOTE
"  Occupational Therapy Progress Note     Patient Name: Amado Chin  Today's Date: 4/18/2025  Problem List  Principal Problem:    Neck pain  Active Problems:    Elevated blood pressure reading    Asthma    Tobacco abuse    Anxiety    Cervical spinal stenosis    Polysubstance abuse (HCC)    Headache         OCCUPATIONAL THERAPY           04/18/25 0943   OT Last Visit   OT Visit Date 04/18/25   Note Type   Note Type Treatment   Pain Assessment   Pain Assessment Tool 0-10   Pain Location/Orientation Location: Neck   Restrictions/Precautions   Weight Bearing Precautions Per Order No   Lifestyle   Autonomy I with ADL's/assisatnce from sister for IADL's (laundry, driving tasks0   Reciprocal Relationships sister, co-worker (assists with driving to work)   Service to Others full time employement as a    Intrinsic Gratification playing games   ADL   Grooming Assistance 4  Minimal Assistance   Grooming Deficit Steadying;Wash/dry hands   UB Bathing Assistance 5  Supervision/Setup   LB Bathing Assistance 4  Minimal Assistance   UB Dressing Assistance 4  Minimal Assistance   UB Dressing Deficit Thread RUE;Thread LUE;Pull around back   LB Dressing Assistance 4  Minimal Assistance   Toileting Assistance  4  Minimal Assistance   Toileting Deficit Steadying   Bed Mobility   Supine to Sit 5  Supervision   Additional items Assist x 1   Sit to Supine 5  Supervision   Additional items Assist x 1  (Log Roll technique)   Transfers   Sit to Stand 4  Minimal assistance   Additional items Assist x 1   Stand to Sit 4  Minimal assistance   Additional items Assist x 1   Toilet transfer 3  Moderate assistance   Additional items Assist x 1   Functional Mobility   Functional Mobility 4  Minimal assistance   Toilet Transfers   Toilet Transfer From Rolling walker   Toilet Transfer Type To and from   Toilet Transfer to Standard toilet   Toilet Transfer Technique Ambulating   Toilet Transfers Moderate assistance   Subjective   Subjective \" I " "am always dizzy\"   Cognition   Overall Cognitive Status WFL   Arousal/Participation Alert;Responsive;Cooperative   Attention Within functional limits   Orientation Level Oriented X4   Memory Within functional limits   Following Commands Follows all commands and directions without difficulty   Activity Tolerance   Activity Tolerance (S)  Treatment limited secondary to medical complications (Comment)  (/115 prior to going to the bathroom and 191/121 following return to bed)   Assessment   Assessment Pt seen for Occupational Therapy session with focus on activity tolerance, bed mob, functional transfers/mob, sitting balance and tolerance and standing tolerance and balance for pt engagement in UB/LB self-care tasks and bed mob technique/Log roll. RN/ Carol cleared  pt participated in OT session. Pt presented supine/HOB raised pt awake/alert and reported significant neck pain. Pt however agreeable to participate in therapy following pt identifiers confirmed reporting that he wanted to walk.  Pt required assist for functional transfers/mob, grooming and toileting 2* pt limited pt pain and deconditioning. He with limited activity tolerate due to pt BP issues  160/115 prior to bathroom and 191/121 following return to EOB. Pt nurse informed and involved. Therapy time spend to educate pt on Log Roll techniques. Pt remains appropriate for  I (Maximum Resources) Pt return to bed post session bed alarm activated and all needs within reach.   Plan   Treatment Interventions ADL retraining;Functional transfer training   Goal Expiration Date 04/30/25   OT Treatment Day 1   OT Frequency 2-3x/wk   Discharge Recommendation   Rehab Resource Intensity Level, OT I (Maximum Resource Intensity)  (pending pt progress)   AM-PAC Daily Activity Inpatient   Lower Body Dressing 2   Bathing 3   Toileting 3   Upper Body Dressing 3   Grooming 3   Eating 4   Daily Activity Raw Score 18   Daily Activity Standardized Score (Calc for Raw Score " >=11) 38.66   AM-PAC Applied Cognition Inpatient   Following a Speech/Presentation 2   Understanding Ordinary Conversation 4   Taking Medications 2   Remembering Where Things Are Placed or Put Away 3   Remembering List of 4-5 Errands 3   Taking Care of Complicated Tasks 2   Applied Cognition Raw Score 16   Applied Cognition Standardized Score 35.03   Barthel Index   Grooming Score 5   Dressing Score 10   Toilet Use Score 10   Transfers (Bed/Chair) Score 10   Mobility (Level Surface) Score 0         Yelitza Mayorga OTR/L

## 2025-04-18 NOTE — PLAN OF CARE
Problem: PAIN - ADULT  Goal: Verbalizes/displays adequate comfort level or baseline comfort level  Description: Interventions:- Encourage patient to monitor pain and request assistance- Assess pain using appropriate pain scale- Administer analgesics based on type and severity of pain and evaluate response- Implement non-pharmacological measures as appropriate and evaluate response- Consider cultural and social influences on pain and pain management- Notify physician/advanced practitioner if interventions unsuccessful or patient reports new pain  4/18/2025 1138 by Juliette Hubbard RN  Outcome: Progressing  4/18/2025 1119 by Juliette Hubbard RN  Outcome: Progressing     Problem: SAFETY ADULT  Goal: Patient will remain free of falls  Description: INTERVENTIONS:- Educate patient/family on patient safety including physical limitations- Instruct patient to call for assistance with activity - Consult OT/PT to assist with strengthening/mobility - Keep Call bell within reach- Keep bed low and locked with side rails adjusted as appropriate- Keep care items and personal belongings within reach- Initiate and maintain comfort rounds- Make Fall Risk Sign visible to staff- Offer Toileting every 2 Hours, in advance of need- Initiate/Maintain bed and chair alarm- Obtain necessary fall risk management equipment: non skid socks - Apply yellow socks and bracelet for high fall risk patients- Consider moving patient to room near nurses station  4/18/2025 1138 by Juliette Hubbard RN  Outcome: Progressing  4/18/2025 1119 by Juliette Hubbard RN  Outcome: Progressing  Goal: Maintain or return to baseline ADL function  Description: INTERVENTIONS:-  Assess patient's ability to carry out ADLs; assess patient's baseline for ADL function and identify physical deficits which impact ability to perform ADLs (bathing, care of mouth/teeth, toileting, grooming, dressing, etc.)- Assess/evaluate cause of self-care deficits - Assess range of motion- Assess  patient's mobility; develop plan if impaired- Assess patient's need for assistive devices and provide as appropriate- Encourage maximum independence but intervene and supervise when necessary- Involve family in performance of ADLs- Assess for home care needs following discharge - Consider OT consult to assist with ADL evaluation and planning for discharge- Provide patient education as appropriate  4/18/2025 1138 by Juliette Hubbard RN  Outcome: Progressing  4/18/2025 1119 by Juliette Hubbard RN  Outcome: Progressing  Goal: Maintains/Returns to pre admission functional level  Description: INTERVENTIONS:- Perform AM-PAC 6 Click Basic Mobility/ Daily Activity assessment daily.- Set and communicate daily mobility goal to care team and patient/family/caregiver. - Collaborate with rehabilitation services on mobility goals if consulted- Perform Range of Motion 3 times a day.- Reposition patient every 2 hours.- Dangle patient 3 times a day- Stand patient 3 times a day- Ambulate patient 3 times a day- Out of bed to chair 3 times a day - Out of bed for meals 3 times a day- Out of bed for toileting- Record patient progress and toleration of activity level   4/18/2025 1138 by Juliette Hubbard RN  Outcome: Progressing  4/18/2025 1119 by Juliette Hubbard RN  Outcome: Progressing     Problem: DISCHARGE PLANNING  Goal: Discharge to home or other facility with appropriate resources  Description: INTERVENTIONS:- Identify barriers to discharge w/patient and caregiver- Arrange for needed discharge resources and transportation as appropriate- Identify discharge learning needs (meds, wound care, etc.)- Arrange for interpretive services to assist at discharge as needed- Refer to Case Management Department for coordinating discharge planning if the patient needs post-hospital services based on physician/advanced practitioner order or complex needs related to functional status, cognitive ability, or social support system  4/18/2025 1138 by  Juliette Hubbard RN  Outcome: Progressing  4/18/2025 1119 by Juliette Hubbard RN  Outcome: Progressing     Problem: Knowledge Deficit  Goal: Patient/family/caregiver demonstrates understanding of disease process, treatment plan, medications, and discharge instructions  Description: Complete learning assessment and assess knowledge base.Interventions:- Provide teaching at level of understanding- Provide teaching via preferred learning methods  4/18/2025 1138 by Juliette Hubbard RN  Outcome: Progressing  4/18/2025 1119 by Juliette Hubbard RN  Outcome: Progressing     Problem: NEUROSENSORY - ADULT  Goal: Achieves stable or improved neurological status  Description: INTERVENTIONS- Monitor and report changes in neurological status- Monitor vital signs such as temperature, blood pressure, glucose, and any other labs ordered - Initiate measures to prevent increased intracranial pressure- Monitor for seizure activity and implement precautions if appropriate    4/18/2025 1138 by Juliette Hubbard RN  Outcome: Progressing  4/18/2025 1119 by Juliette Hubbard RN  Outcome: Progressing  Goal: Remains free of injury related to seizures activity  Description: INTERVENTIONS- Maintain airway, patient safety  and administer oxygen as ordered- Monitor patient for seizure activity, document and report duration and description of seizure to physician/advanced practitioner- If seizure occurs,  ensure patient safety during seizure- Reorient patient post seizure- Seizure pads on all 4 side rails- Instruct patient/family to notify RN of any seizure activity including if an aura is experienced- Instruct patient/family to call for assistance with activity based on nursing assessment- Administer anti-seizure medications if ordered  4/18/2025 1138 by Juliette Hubbard RN  Outcome: Progressing  4/18/2025 1119 by Juliette Hubbard RN  Outcome: Progressing  Goal: Achieves maximal functionality and self care  Description: INTERVENTIONS- Monitor swallowing and  airway patency with patient fatigue and changes in neurological status- Encourage and assist patient to increase activity and self care. - Encourage visually impaired, hearing impaired and aphasic patients to use assistive/communication devices  4/18/2025 1138 by Juliette Hubbard RN  Outcome: Progressing  4/18/2025 1119 by Juliette Hubbard RN  Outcome: Progressing     Problem: METABOLIC, FLUID AND ELECTROLYTES - ADULT  Goal: Electrolytes maintained within normal limits  Description: INTERVENTIONS:- Monitor labs and assess patient for signs and symptoms of electrolyte imbalances- Administer electrolyte replacement as ordered- Monitor response to electrolyte replacements, including repeat lab results as appropriate- Instruct patient on fluid and nutrition as appropriate  4/18/2025 1138 by Juliette Hubbard RN  Outcome: Progressing  4/18/2025 1119 by Juliette Hubbard RN  Outcome: Progressing  Goal: Fluid balance maintained  Description: INTERVENTIONS:- Monitor labs - Monitor I/O and WT- Instruct patient on fluid and nutrition as appropriate- Assess for signs & symptoms of volume excess or deficit  4/18/2025 1138 by Juliette Hubbard RN  Outcome: Progressing  4/18/2025 1119 by Juliette Hubbard RN  Outcome: Progressing  Goal: Glucose maintained within target range  Description: INTERVENTIONS:- Monitor Blood Glucose as ordered- Assess for signs and symptoms of hyperglycemia and hypoglycemia- Administer ordered medications to maintain glucose within target range- Assess nutritional intake and initiate nutrition service referral as needed  4/18/2025 1138 by Juliette Hubbard RN  Outcome: Progressing  4/18/2025 1119 by Juliette Hubbard RN  Outcome: Progressing     Problem: SKIN/TISSUE INTEGRITY - ADULT  Goal: Skin Integrity remains intact(Skin Breakdown Prevention)  Description: Assess:-Perform Oliver assessment every shift-Clean and moisturize skin every day/prn-Inspect skin when repositioning, toileting, and assisting with  ADLS-Assess under medical devices such as masimo every hr-Assess extremities for adequate circulation and sensation Bed Management:-Have minimal linens on bed & keep smooth, unwrinkled-Change linens as needed when moist or perspiring-Avoid sitting or lying in one position for more than 2 hours while in bed-Keep HOB at 30 degrees Toileting:-Offer bedside commode-Assess for incontinence every hr Use incontinent care products after each incontinent episode such as soap and water Activity:-Mobilize patient 3 times a day-Encourage activity and walks on unit-Encourage or provide ROM exercises -Turn and reposition patient every 2 Hours-Use appropriate equipment to lift or move patient in bed-Instruct/ Assist with weight shifting every hr when out of bed in chair-Consider limitation of chair time 4 hour intervalsSkin Care:-Avoid use of baby powder, tape, friction and shearing, hot water or constrictive clothing-Relieve pressure over bony prominences using cushion -Do not massage red bony areasNext Steps:-Teach patient strategies to minimize risks such as wounds  -Consider consults to  interdisciplinary teams such as wounds  4/18/2025 1138 by Juliette Hubbard RN  Outcome: Progressing  4/18/2025 1119 by Juliette Hubbard RN  Outcome: Progressing  Goal: Incision(s), wounds(s) or drain site(s) healing without S/S of infection  Description: INTERVENTIONS- Assess and document dressing, incision, wound bed, drain sites and surrounding tissue- Provide patient and family education- Perform skin care/dressing changes every day/prn  4/18/2025 1138 by Juliette Hubbard RN  Outcome: Progressing  4/18/2025 1119 by Juliette Hubbard RN  Outcome: Progressing  Goal: Pressure injury heals and does not worsen  Description: Interventions:- Implement low air loss mattress or specialty surface (Criteria met)- Apply silicone foam dressing- Instruct/assist with weight shifting every 60 minutes when in chair - Limit chair time to 4 hour intervals- Use  special pressure reducing interventions such as cushion when in chair - Apply fecal or urinary incontinence containment device - Perform passive or active ROM every 2- Turn and reposition patient & offload bony prominences every 2 hours - Utilize friction reducing device or surface for transfers - Consider consults to  interdisciplinary teams such as wounds- Use incontinent care products after each incontinent episode such as soap and water - Consider nutrition services referral as needed  4/18/2025 1138 by Juliette Hubbard RN  Outcome: Progressing  4/18/2025 1119 by Juliette Hubbard, RN  Outcome: Progressing

## 2025-04-18 NOTE — ASSESSMENT & PLAN NOTE
Likely secondary to pain/ headache and steroidS effect  Add lisinopril  Continue with IV hydralazine as needed  Monitor

## 2025-04-18 NOTE — DISCHARGE SUPPORT
Case Management Assessment & Discharge Planning Note    Patient name Amado Chin  Location [facility] 711/[facility] 711-01 MRN [id number]  : 1985 Date 2025       Current Admission Date: 4/15/2025  Current Admission Diagnosis:Neck pain   Patient Active Problem List    Diagnosis Date Noted Date Diagnosed    Headache 04/15/2025     Polysubstance abuse (HCC) 2024     Psychoactive 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HCC) 2024     Drug use 2024     Cellulitis of left lower extremity 2024     Homelessness 2024     Cervical spinal stenosis 2024     Cervical radiculopathy 2024     Chronic bilateral low back pain 2023     Chronic neck pain 2023     Recurrent major depressive disorder (HCC) 2022     Anxiety 2022     Substance induced mood disorder (HCC) 2022     Mood disorder due to medical condition 2022     Opioid abuse (HCC) 02/10/2022     Fecal smearing 2022     Urinary incontinence 2022     Drug-induced constipation 2022     Intractable pain 2021     Vitamin B12 deficiency 2021     Asthma 2021     Tobacco abuse 2021     Dizziness 2021     Neck pain 2021     Elevated blood pressure reading 2021     Weakness of both lower extremities 2021     Cervical disc herniation 2020     Paresthesia and pain of extremity 2020     Fall 2020     Concussion without loss of consciousness 2020     Acute pain of right shoulder 2020     Alcohol abuse 2020     Burn 2020     Furuncle of axilla 2020       LOS (days): 2  Geometric Mean LOS (GMLOS) (days):   Days to GMLOS:   Facility Authorization Initiated  [facility] received request for auth from : Anastacia COTTON  Authorization Request Submitted for: Acute Rehab  Requested Start of Care Date: 25  Facility Name: [facility]  Facility NPI: [id number]  Facility MD: Dr. Segovia  DePadua Facility MD NPI: [id number]  Authorization initiated by contacting insurance: [employer]  Via: H&CC Portal  Clinicals submitted via: Portal Attachment  Pending reference #: [id number]   notified: Anastacia COTTON     Updates to authorization status will be noted in chart.    Please reach out to CM for updates on any clinical information.

## 2025-04-18 NOTE — PHYSICAL THERAPY NOTE
PHYSICAL THERAPY NOTE          Patient Name: Amado Chin  Today's Date: 4/18/2025 04/18/25 0944   PT Last Visit   PT Visit Date 04/18/25   Note Type   Note Type Treatment for insurance authorization   Pain Assessment   Pain Assessment Tool 0-10   Pain Score 7   Pain Location/Orientation Location: Neck   Pain Onset/Description Onset: Ongoing   Effect of Pain on Daily Activities limits mobility and activity tolerance   Patient's Stated Pain Goal No pain   Hospital Pain Intervention(s) Repositioned;Ambulation/increased activity;Emotional support   Restrictions/Precautions   Weight Bearing Precautions Per Order No   Other Precautions Chair Alarm;Bed Alarm;Multiple lines;Telemetry;Fall Risk;Pain  (unsteady)   General   Chart Reviewed Yes   Response to Previous Treatment Patient reporting fatigue but able to participate.   Family/Caregiver Present No   Cognition   Overall Cognitive Status WFL   Arousal/Participation Responsive;Cooperative   Attention Within functional limits   Orientation Level Oriented X4   Memory Within functional limits   Following Commands Follows one step commands without difficulty   Comments pt cooperative   Subjective   Subjective pt ageeable to mobilize   Bed Mobility   Supine to Sit 5  Supervision   Additional items HOB elevated;Increased time required;Verbal cues   Sit to Supine 5  Supervision   Additional items HOB elevated;Verbal cues;Increased time required   Transfers   Sit to Stand 4  Minimal assistance   Additional items Assist x 1;Increased time required;Verbal cues   Stand to Sit 4  Minimal assistance   Additional items Assist x 1;Increased time required;Verbal cues   Toilet transfer 4  Minimal assistance   Additional items Assist x 1;Verbal cues;Standard toilet   Additional Comments c RW. x3 STS   Ambulation/Elevation   Gait pattern Improper Weight shift;Inconsistent curry;Foward flexed;Short  stride;Excessively slow;Shuffling   Gait Assistance 4  Minimal assist  (cues for RW management)   Additional items Assist x 1;Verbal cues   Assistive Device Rolling walker   Distance 10'+5'+10'   Stair Management Assistance Not tested   Ambulation/Elevation Additional Comments one posterior LOB requiring mod A to correct. seated rest breaks throughout mobility 2* to pain and dizziness   Balance   Static Sitting Fair +   Dynamic Sitting Fair   Static Standing Poor +   Dynamic Standing Poor +   Ambulatory Poor +   Endurance Deficit   Endurance Deficit Yes   Endurance Deficit Description pt limited by dizziness, pain, fatigue and decreased activity tolerance   Activity Tolerance   Activity Tolerance Treatment limited secondary to medical complications (Comment);Patient limited by pain  (/121, RN aware)   Medical Staff Made Aware KATE Torre   Nurse Made Aware yes-RN cleared   Exercises   Balance training  pt standing at sink for 4+1 minute with min A. pt sitting at EOB with S for 5 minutes   Assessment   Prognosis Good   Problem List Impaired balance;Decreased mobility;Decreased range of motion;Decreased endurance;Pain   Assessment Pt cooperative and agreeable to participate in PT session. Completes mobility and therapeutic activity as outlined above. Pt continues to express neck pain and dizziness during mobility. During today's session pt's BP sitting /115. Pt asking to use the bathroom. Upon returning to bathroom /121. Deferred further mobility at this time, RN aware of BP. Pt continues to require Ax1 for mobility. One noted LOB while standing in bathroom requiring mod A to correct. Pt is progressing towards his goals and will continue to benefit from skilled acute PT services to address deficits and promote mobility. Pt left supine in bed with bed alarm donned, call bell and personal items within reach and all needs met.   Barriers to Discharge Inaccessible home environment;Decreased caregiver  support   Goals   Patient Goals to go to the bathroom   STG Expiration Date 04/30/25   PT Treatment Day 1   Plan   Treatment/Interventions Functional transfer training;LE strengthening/ROM;Elevations;Therapeutic exercise;Endurance training;Patient/family training;Equipment eval/education;Bed mobility;Gait training;Compensatory technique education;Continued evaluation;Spoke to nursing;OT   Progress Progressing toward goals   PT Frequency 3-5x/wk   Discharge Recommendation   Rehab Resource Intensity Level, PT I (Maximum Resource Intensity)   Equipment Recommended Walker   Walker Package Recommended Wheeled walker   AM-PAC Basic Mobility Inpatient   Turning in Flat Bed Without Bedrails 3   Lying on Back to Sitting on Edge of Flat Bed Without Bedrails 3   Moving Bed to Chair 3   Standing Up From Chair Using Arms 3   Walk in Room 2   Climb 3-5 Stairs With Railing 2   Basic Mobility Inpatient Raw Score 16   Basic Mobility Standardized Score 38.32   [facility] Highest Level Of Mobility   -[facility] Goal 5: Stand one or more mins   -HL Achieved 6: Walk 10 steps or more   Education   Education Provided Mobility training;Assistive device   Patient Demonstrates acceptance/verbal understanding   End of Consult   Patient Position at End of Consult Supine;Bed/Chair alarm activated;All needs within reach   Agueda Rosenberg DPT

## 2025-04-18 NOTE — PLAN OF CARE
Problem: OCCUPATIONAL THERAPY ADULT  Goal: Performs self-care activities at highest level of function for planned discharge setting.  See evaluation for individualized goals.  Description: Treatment Interventions: ADL retraining, Functional transfer training  Equipment Recommended:  (tbd)       See flowsheet documentation for full assessment, interventions and recommendations.   Outcome: Progressing  Note: Limitation: Decreased ADL status, Decreased Safe judgement during ADL, Decreased cognition, Decreased endurance, Decreased sensation, Visual deficit, Decreased high-level ADLs, Decreased self-care trans, Decreased fine motor control  Prognosis: Fair  Assessment: Pt seen for Occupational Therapy session with focus on activity tolerance, bed mob, functional transfers/mob, sitting balance and tolerance and standing tolerance and balance for pt engagement in UB/LB self-care tasks and bed mob technique/Log roll. RN/ Carol cleared  pt participated in OT session. Pt presented supine/HOB raised pt awake/alert and reported significant neck pain. Pt however agreeable to participate in therapy following pt identifiers confirmed reporting that he wanted to walk.  Pt required assist for functional transfers/mob, grooming and toileting 2* pt limited pt pain and deconditioning. He with limited activity tolerate due to pt BP issues  160/115 prior to bathroom and 191/121 following return to EOB. Pt nurse informed and involved. Therapy time spend to educate pt on Log Roll techniques. Pt remains appropriate for  I (Maximum Resources) Pt return to bed post session bed alarm activated and all needs within reach.     Rehab Resource Intensity Level, OT: I (Maximum Resource Intensity) (pending pt progress)

## 2025-04-18 NOTE — RESTORATIVE TECHNICIAN NOTE
Restorative Technician Note      Patient Name: Amado Chin     Note Type: Mobility (Pt on hold for ambulation per VELMA Segovia 2* Hypertension.)    Tasia Jin BS, Restorative Technician,

## 2025-04-18 NOTE — PLAN OF CARE
Problem: PHYSICAL THERAPY ADULT  Goal: Performs mobility at highest level of function for planned discharge setting.  See evaluation for individualized goals.  Description: Treatment/Interventions: Functional transfer training, LE strengthening/ROM, Therapeutic exercise, Elevations, Endurance training, Patient/family training, Equipment eval/education, Bed mobility, Gait training, Spoke to nursing, Spoke to case management, OT  Equipment Recommended: Walker       See flowsheet documentation for full assessment, interventions and recommendations.  Outcome: Progressing  Note: Prognosis: Good  Problem List: Impaired balance, Decreased mobility, Decreased range of motion, Decreased endurance, Pain  Assessment: Pt cooperative and agreeable to participate in PT session. Completes mobility and therapeutic activity as outlined above. Pt continues to express neck pain and dizziness during mobility. During today's session pt's BP sitting /115. Pt asking to use the bathroom. Upon returning to bathroom /121. Deferred further mobility at this time, RN aware of BP. Pt continues to require Ax1 for mobility. One noted LOB while standing in bathroom requiring mod A to correct. Pt is progressing towards his goals and will continue to benefit from skilled acute PT services to address deficits and promote mobility. Pt left supine in bed with bed alarm donned, call bell and personal items within reach and all needs met.  Barriers to Discharge: Inaccessible home environment, Decreased caregiver support     Rehab Resource Intensity Level, PT: I (Maximum Resource Intensity)    See flowsheet documentation for full assessment.

## 2025-04-18 NOTE — CASE MANAGEMENT
Case Management Discharge Planning Note    Patient name Amado Chin  Location [facility]-01 MRN [id number]  : 1985 Date 2025       Current Admission Date: 4/15/2025  Current Admission Diagnosis:Neck pain   Patient Active Problem List    Diagnosis Date Noted Date Diagnosed    Headache 04/15/2025     Polysubstance abuse (HCC) 2024     Psychoactive 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HCC) 2024     Drug use 2024     Cellulitis of left lower extremity 2024     Homelessness 2024     Cervical spinal stenosis 2024     Cervical radiculopathy 2024     Chronic bilateral low back pain 2023     Chronic neck pain 2023     Recurrent major depressive disorder (HCC) 2022     Anxiety 2022     Substance induced mood disorder (HCC) 2022     Mood disorder due to medical condition 2022     Opioid abuse (HCC) 02/10/2022     Fecal smearing 2022     Urinary incontinence 2022     Drug-induced constipation 2022     Intractable pain 2021     Vitamin B12 deficiency 2021     Asthma 2021     Tobacco abuse 2021     Dizziness 2021     Neck pain 2021     Elevated blood pressure reading 2021     Weakness of both lower extremities 2021     Cervical disc herniation 2020     Paresthesia and pain of extremity 2020     Fall 2020     Concussion without loss of consciousness 2020     Acute pain of right shoulder 2020     Alcohol abuse 2020     Burn 2020     Furuncle of axilla 2020       LOS (days): 2  Geometric Mean LOS (GMLOS) (days):   Days to GMLOS:     OBJECTIVE:  Risk of Unplanned Readmission Score: 17.84         Current admission status: Inpatient   Preferred Pharmacy:   [id number] - [place], [place] - [address]  [place] [address]  Phone: [phone] Fax: [phone]    [facility] - [place], [place]  - [address]  [address] [facility]  [place] [address]  Phone: [phone] Fax: [phone]    [facility] - [place] - [address]  [address]  [place] [address]  Phone: [phone] Fax: [phone]    Primary Care Provider: No primary care provider on file.    Primary Insurance: [employer] [facility]  Secondary Insurance:     DISCHARGE DETAILS:         Other Referral/Resources/Interventions Provided:  Referral Comments: Patient accepted at [facility] and is medically stable.  Insurance auth. requested via  discharge support.

## 2025-04-18 NOTE — ASSESSMENT & PLAN NOTE
Patient with multiple ED visits and hospitalization for chronic neck pain /headache  Per patient, it had been well-managed however recently has had increasing pain particularly in the bitemporal area as well as the neck radiating to the back and associated with stiffness and intermittent dizziness  History of psychosis previous inpatient psychiatric admission on 7/2024  Noted home meds: Patient takes gabapentin and naproxen at home.  Patient not currently on any opioids per PDMP  Patient has been referred to outpatient pain management and has yet to follow-up with him  He also reports intermittent tingling in his upper extremities as well as saddle anesthesia at times and urinary dribbling/incontinence  Cervical spine CT scan without acute abnormality  Was evaluated by neurology suspect headache is cervicogenic in origin  Cervical and lumbar spine MRI showed progression of degenerative changes but no cord compression, no further inpatient neurology workup  He was recommended to establish an outpatient pain management  Acute pain service consulted  Patient started on oral prednisone taper  Ambulatory referral to comprehensive spine program as well as [facility] have been placed  PT OT recommending level 3  Continue with pain control  Continue to work with PT OT  Medically stable for discharge  Awaiting rehab placement/ARC

## 2025-04-19 PROCEDURE — 99232 SBSQ HOSP IP/OBS MODERATE 35: CPT | Performed by: INTERNAL MEDICINE

## 2025-04-19 RX ORDER — GABAPENTIN 300 MG/1
300 CAPSULE ORAL 3 TIMES DAILY
Status: DISCONTINUED | OUTPATIENT
Start: 2025-04-19 | End: 2025-04-22 | Stop reason: HOSPADM

## 2025-04-19 RX ADMIN — HYDRALAZINE HYDROCHLORIDE 10 MG: 20 INJECTION, SOLUTION INTRAMUSCULAR; INTRAVENOUS at 14:40

## 2025-04-19 RX ADMIN — METHOCARBAMOL 750 MG: 750 TABLET ORAL at 00:26

## 2025-04-19 RX ADMIN — GABAPENTIN 300 MG: 300 CAPSULE ORAL at 21:03

## 2025-04-19 RX ADMIN — SENNOSIDES AND DOCUSATE SODIUM 1 TABLET: 50; 8.6 TABLET ORAL at 21:03

## 2025-04-19 RX ADMIN — LISINOPRIL 5 MG: 5 TABLET ORAL at 10:11

## 2025-04-19 RX ADMIN — ACETAMINOPHEN 975 MG: 325 TABLET, FILM COATED ORAL at 05:30

## 2025-04-19 RX ADMIN — GABAPENTIN 300 MG: 300 CAPSULE ORAL at 18:51

## 2025-04-19 RX ADMIN — ENOXAPARIN SODIUM 40 MG: 40 INJECTION SUBCUTANEOUS at 10:12

## 2025-04-19 RX ADMIN — PREDNISONE 40 MG: 20 TABLET ORAL at 10:11

## 2025-04-19 RX ADMIN — METHOCARBAMOL 750 MG: 750 TABLET ORAL at 05:30

## 2025-04-19 RX ADMIN — QUETIAPINE FUMARATE 300 MG: 300 TABLET ORAL at 21:03

## 2025-04-19 RX ADMIN — OXYCODONE HYDROCHLORIDE 5 MG: 5 TABLET ORAL at 20:00

## 2025-04-19 RX ADMIN — OXYCODONE HYDROCHLORIDE 5 MG: 5 TABLET ORAL at 10:11

## 2025-04-19 RX ADMIN — GABAPENTIN 300 MG: 300 CAPSULE ORAL at 10:11

## 2025-04-19 RX ADMIN — METHOCARBAMOL 750 MG: 750 TABLET ORAL at 13:24

## 2025-04-19 RX ADMIN — ACETAMINOPHEN 975 MG: 325 TABLET, FILM COATED ORAL at 13:24

## 2025-04-19 RX ADMIN — DICLOFENAC SODIUM 2 G: 10 GEL TOPICAL at 21:03

## 2025-04-19 RX ADMIN — METHOCARBAMOL 750 MG: 750 TABLET ORAL at 18:51

## 2025-04-19 RX ADMIN — IBUPROFEN 600 MG: 600 TABLET, FILM COATED ORAL at 00:26

## 2025-04-19 RX ADMIN — IBUPROFEN 600 MG: 600 TABLET, FILM COATED ORAL at 05:30

## 2025-04-19 RX ADMIN — OXYCODONE HYDROCHLORIDE 5 MG: 5 TABLET ORAL at 14:55

## 2025-04-19 RX ADMIN — ACETAMINOPHEN 975 MG: 325 TABLET, FILM COATED ORAL at 21:03

## 2025-04-19 NOTE — ASSESSMENT & PLAN NOTE
Patient with multiple ED visits and hospitalization for chronic neck pain /headache  Per patient, it had been well-managed however recently has had increasing pain particularly in the bitemporal area as well as the neck radiating to the back and associated with stiffness and intermittent dizziness  History of psychosis previous inpatient psychiatric admission on 7/2024  Noted home meds: Patient takes gabapentin and naproxen at home.  Patient not currently on any opioids per PDMP  Patient has been referred to outpatient pain management and has yet to follow-up with him  He also reports intermittent tingling in his upper extremities as well as saddle anesthesia at times and urinary dribbling/incontinence  Cervical spine CT scan without acute abnormality  Was evaluated by neurology suspect headache is cervicogenic in origin  Cervical and lumbar spine MRI showed progression of degenerative changes but no cord compression, no further inpatient neurology workup  He was recommended to establish an outpatient pain management  Acute pain service consulted  Patient started on oral prednisone taper, continue  Ambulatory referral to comprehensive spine program as well as [facility] have been placed  PT OT recommending level 3  Continue with pain control  Continue to work with PT OT  Medically stable for discharge  Awaiting rehab placement/ARC  Pain management and neurology signed off

## 2025-04-19 NOTE — DISCHARGE SUPPORT
Pt does not show up in the H&CC portal, called H&CC P: [phone] to check status of [facility] and the offices were closed.     CM notified:  Christal SU

## 2025-04-19 NOTE — ASSESSMENT & PLAN NOTE
Likely secondary to pain/ headache and steroidS effect  Started on lisinopril  Continue with IV hydralazine as needed  Monitor

## 2025-04-19 NOTE — PROGRESS NOTES
Progress Note - Hospitalist   Name: Amado Chin 39 y.o. male I MRN: [id number]  Unit/Bed#: [facility] 711-01 I Date of Admission: 4/15/2025   Date of Service: 4/19/2025 I Hospital Day: 3    Assessment & Plan  Neck pain  Patient with multiple ED visits and hospitalization for chronic neck pain /headache  Per patient, it had been well-managed however recently has had increasing pain particularly in the bitemporal area as well as the neck radiating to the back and associated with stiffness and intermittent dizziness  History of psychosis previous inpatient psychiatric admission on 7/2024  Noted home meds: Patient takes gabapentin and naproxen at home.  Patient not currently on any opioids per PDMP  Patient has been referred to outpatient pain management and has yet to follow-up with him  He also reports intermittent tingling in his upper extremities as well as saddle anesthesia at times and urinary dribbling/incontinence  Cervical spine CT scan without acute abnormality  Was evaluated by neurology suspect headache is cervicogenic in origin  Cervical and lumbar spine MRI showed progression of degenerative changes but no cord compression, no further inpatient neurology workup  He was recommended to establish an outpatient pain management  Acute pain service consulted  Patient started on oral prednisone taper, continue  Ambulatory referral to comprehensive spine program as well as [facility] have been placed  PT OT recommending level 3  Continue with pain control  Continue to work with PT OT  Medically stable for discharge  Awaiting rehab placement/ARC  Pain management and neurology signed off  Cervical spinal stenosis  Plan noted above.  Elevated blood pressure reading  Likely secondary to pain/ headache and steroidS effect  Started on lisinopril  Continue with IV hydralazine as needed  Monitor  Headache  Cervicogenic in origin  Continue with above pain control  Polysubstance abuse (HCC)  Admits to prior use of  methamphetamines but states he has not used this in several years.  Admits to occasional use of marijuana currently.  Denies any other drug use.  Tobacco abuse  Patient admits to smoking half a pack of cigarettes daily.   Refusing nicotine patch  Asthma  No acute exacerbation  Anxiety  Continue with Seroquel 300 mg at bedtime    VTE Pharmacologic Prophylaxis: VTE Score: 4 Moderate Risk (Score 3-4) - Pharmacological DVT Prophylaxis Ordered: enoxaparin (Lovenox).    Mobility:   Basic Mobility Inpatient Raw Score: 16  JH-HL Goal: 5: Stand one or more mins  JH-HLM Achieved: 6: Walk 10 steps or more  JH-HLM Goal achieved. Continue to encourage appropriate mobility.    Patient Centered Rounds: I performed bedside rounds with nursing staff today.   Discussions with Specialists or Other Care Team Provider: Nursing      Current Length of Stay: 3 day(s)  Current Patient Status: Inpatient   Certification Statement: The patient will continue to require additional inpatient hospital stay due to awaiting rehab placement      Code Status: Level 1 - Full Code    Subjective   Patient seen and examined  Comfortable in bed  Continue to complain of pain no nausea vomiting or diarrhea      Objective :  Temp:  [97.9 °F (36.6 °C)-98.7 °F (37.1 °C)] 98.7 °F (37.1 °C)  HR:  [56-88] 61  BP: (106-198)/() 136/76  Resp:  [15-18] 15  SpO2:  [92 %-98 %] 96 %  O2 Device: None (Room air)    There is no height or weight on file to calculate BMI.     Input and Output Summary (last 24 hours):     Intake/Output Summary (Last 24 hours) at 4/19/2025 1054  Last data filed at 4/19/2025 0501  Gross per 24 hour   Intake 960 ml   Output --   Net 960 ml       Physical Exam  Patient is awake alert no acute distress   Comfortable in bed with limited neck range of motion due to pain  Lung clear to auscultation bilateral anteriorly  Heart positive S1-S2 no murmur  Abdomen soft nontender  Lower extremities no edema    Lines/Drains:        Lab Results: I have  reviewed the following results:   Results from last 7 days   Lab Units 04/16/25  0759 04/15/25  1041   WBC Thousand/uL 5.49 6.51   HEMOGLOBIN g/dL 14.8 15.6   HEMATOCRIT % 43.3 44.0   PLATELETS Thousands/uL 243 259   SEGS PCT %  --  70   LYMPHO PCT %  --  16   MONO PCT %  --  11   EOS PCT %  --  1     Results from last 7 days   Lab Units 04/16/25  0759   SODIUM mmol/L 139   POTASSIUM mmol/L 4.3   CHLORIDE mmol/L 106   CO2 mmol/L 28   BUN mg/dL 21   CREATININE mg/dL 1.17   ANION GAP mmol/L 5   CALCIUM mg/dL 8.8   GLUCOSE RANDOM mg/dL 102                       Recent Cultures (last 7 days):         Imaging Results Review: No pertinent imaging studies reviewed.  Other Study Results Review: No additional pertinent studies reviewed.    Last 24 Hours Medication List:     Current Facility-Administered Medications:     acetaminophen (TYLENOL) tablet 975 mg, Q8H    Diclofenac Sodium (VOLTAREN) 1 % topical gel 2 g, 4x Daily    enoxaparin (LOVENOX) subcutaneous injection 40 mg, Daily    gabapentin (NEURONTIN) capsule 300 mg, BID    hydrALAZINE (APRESOLINE) injection 10 mg, Q6H PRN    [COMPLETED] ketorolac (TORADOL) injection 15 mg, Q6H PETER **FOLLOWED BY** ibuprofen (MOTRIN) tablet 600 mg, Q6H PETER    lisinopril (ZESTRIL) tablet 5 mg, Daily    methocarbamol (ROBAXIN) tablet 750 mg, Q6H PETER    naloxone (NARCAN) 0.04 mg/mL syringe 0.04 mg, Q1MIN PRN    nicotine (NICODERM CQ) 14 mg/24hr TD 24 hr patch 1 patch, Daily    ondansetron (ZOFRAN) injection 4 mg, Q4H PRN    oxyCODONE (ROXICODONE) split tablet 2.5 mg, Q4H PRN **OR** oxyCODONE (ROXICODONE) IR tablet 5 mg, Q4H PRN    polyethylene glycol (MIRALAX) packet 17 g, Daily    [COMPLETED] predniSONE tablet 60 mg, Daily **FOLLOWED BY** [COMPLETED] predniSONE tablet 40 mg, Daily **FOLLOWED BY** [START ON 4/20/2025] predniSONE tablet 20 mg, Daily    QUEtiapine (SEROquel) tablet 300 mg, HS    senna-docusate sodium (SENOKOT S) 8.6-50 mg per tablet 1 tablet, HS    Administrative Statements    Today, Patient Was Seen By: Velasquez Villegas, DO  I have spent a total time of 35 minutes in caring for this patient on the day of the visit/encounter including Counseling / Coordination of care, Documenting in the medical record, Reviewing/placing orders in the medical record (including tests, medications, and/or procedures), Obtaining or reviewing history  , and Communicating with other healthcare professionals .    **Please Note: This note may have been constructed using a voice recognition system.**

## 2025-04-19 NOTE — PLAN OF CARE
Problem: PAIN - ADULT  Goal: Verbalizes/displays adequate comfort level or baseline comfort level  Description: Interventions:- Encourage patient to monitor pain and request assistance- Assess pain using appropriate pain scale- Administer analgesics based on type and severity of pain and evaluate response- Implement non-pharmacological measures as appropriate and evaluate response- Consider cultural and social influences on pain and pain management- Notify physician/advanced practitioner if interventions unsuccessful or patient reports new pain  Outcome: Progressing     Problem: SAFETY ADULT  Goal: Patient will remain free of falls  Description: INTERVENTIONS:- Educate patient/family on patient safety including physical limitations- Instruct patient to call for assistance with activity - Consult OT/PT to assist with strengthening/mobility - Keep Call bell within reach- Keep bed low and locked with side rails adjusted as appropriate- Keep care items and personal belongings within reach- Initiate and maintain comfort rounds- Make Fall Risk Sign visible to staff- Offer Toileting every 2 Hours, in advance of need- Initiate/Maintain bed and chair alarm- Obtain necessary fall risk management equipment: non skid socks - Apply yellow socks and bracelet for high fall risk patients- Consider moving patient to room near nurses station  Outcome: Progressing  Goal: Maintain or return to baseline ADL function  Description: INTERVENTIONS:-  Assess patient's ability to carry out ADLs; assess patient's baseline for ADL function and identify physical deficits which impact ability to perform ADLs (bathing, care of mouth/teeth, toileting, grooming, dressing, etc.)- Assess/evaluate cause of self-care deficits - Assess range of motion- Assess patient's mobility; develop plan if impaired- Assess patient's need for assistive devices and provide as appropriate- Encourage maximum independence but intervene and supervise when necessary-  Involve family in performance of ADLs- Assess for home care needs following discharge - Consider OT consult to assist with ADL evaluation and planning for discharge- Provide patient education as appropriate  Outcome: Progressing  Goal: Maintains/Returns to pre admission functional level  Description: INTERVENTIONS:- Perform AM-PAC 6 Click Basic Mobility/ Daily Activity assessment daily.- Set and communicate daily mobility goal to care team and patient/family/caregiver. - Collaborate with rehabilitation services on mobility goals if consulted- Perform Range of Motion 3 times a day.- Reposition patient every 2 hours.- Dangle patient 3 times a day- Stand patient 3 times a day- Ambulate patient 3 times a day- Out of bed to chair 3 times a day - Out of bed for meals 3 times a day- Out of bed for toileting- Record patient progress and toleration of activity level   Outcome: Progressing     Problem: DISCHARGE PLANNING  Goal: Discharge to home or other facility with appropriate resources  Description: INTERVENTIONS:- Identify barriers to discharge w/patient and caregiver- Arrange for needed discharge resources and transportation as appropriate- Identify discharge learning needs (meds, wound care, etc.)- Arrange for interpretive services to assist at discharge as needed- Refer to Case Management Department for coordinating discharge planning if the patient needs post-hospital services based on physician/advanced practitioner order or complex needs related to functional status, cognitive ability, or social support system  Outcome: Progressing     Problem: Knowledge Deficit  Goal: Patient/family/caregiver demonstrates understanding of disease process, treatment plan, medications, and discharge instructions  Description: Complete learning assessment and assess knowledge base.Interventions:- Provide teaching at level of understanding- Provide teaching via preferred learning methods  Outcome: Progressing     Problem: NEUROSENSORY  - ADULT  Goal: Achieves stable or improved neurological status  Description: INTERVENTIONS- Monitor and report changes in neurological status- Monitor vital signs such as temperature, blood pressure, glucose, and any other labs ordered - Initiate measures to prevent increased intracranial pressure- Monitor for seizure activity and implement precautions if appropriate    Outcome: Progressing  Goal: Remains free of injury related to seizures activity  Description: INTERVENTIONS- Maintain airway, patient safety  and administer oxygen as ordered- Monitor patient for seizure activity, document and report duration and description of seizure to physician/advanced practitioner- If seizure occurs,  ensure patient safety during seizure- Reorient patient post seizure- Seizure pads on all 4 side rails- Instruct patient/family to notify RN of any seizure activity including if an aura is experienced- Instruct patient/family to call for assistance with activity based on nursing assessment- Administer anti-seizure medications if ordered  Outcome: Progressing  Goal: Achieves maximal functionality and self care  Description: INTERVENTIONS- Monitor swallowing and airway patency with patient fatigue and changes in neurological status- Encourage and assist patient to increase activity and self care. - Encourage visually impaired, hearing impaired and aphasic patients to use assistive/communication devices  Outcome: Progressing     Problem: METABOLIC, FLUID AND ELECTROLYTES - ADULT  Goal: Electrolytes maintained within normal limits  Description: INTERVENTIONS:- Monitor labs and assess patient for signs and symptoms of electrolyte imbalances- Administer electrolyte replacement as ordered- Monitor response to electrolyte replacements, including repeat lab results as appropriate- Instruct patient on fluid and nutrition as appropriate  Outcome: Progressing  Goal: Fluid balance maintained  Description: INTERVENTIONS:- Monitor labs - Monitor  I/O and WT- Instruct patient on fluid and nutrition as appropriate- Assess for signs & symptoms of volume excess or deficit  Outcome: Progressing  Goal: Glucose maintained within target range  Description: INTERVENTIONS:- Monitor Blood Glucose as ordered- Assess for signs and symptoms of hyperglycemia and hypoglycemia- Administer ordered medications to maintain glucose within target range- Assess nutritional intake and initiate nutrition service referral as needed  Outcome: Progressing     Problem: SKIN/TISSUE INTEGRITY - ADULT  Goal: Skin Integrity remains intact(Skin Breakdown Prevention)  Description: Assess:-Perform Oliver assessment every shift-Clean and moisturize skin every day/prn-Inspect skin when repositioning, toileting, and assisting with ADLS-Assess under medical devices such as masimo every hr-Assess extremities for adequate circulation and sensation Bed Management:-Have minimal linens on bed & keep smooth, unwrinkled-Change linens as needed when moist or perspiring-Avoid sitting or lying in one position for more than 2 hours while in bed-Keep HOB at 30 degrees Toileting:-Offer bedside commode-Assess for incontinence every hr Use incontinent care products after each incontinent episode such as soap and water Activity:-Mobilize patient 3 times a day-Encourage activity and walks on unit-Encourage or provide ROM exercises -Turn and reposition patient every 2 Hours-Use appropriate equipment to lift or move patient in bed-Instruct/ Assist with weight shifting every hr when out of bed in chair-Consider limitation of chair time 4 hour intervalsSkin Care:-Avoid use of baby powder, tape, friction and shearing, hot water or constrictive clothing-Relieve pressure over bony prominences using cushion -Do not massage red bony areasNext Steps:-Teach patient strategies to minimize risks such as wounds  -Consider consults to  interdisciplinary teams such as wounds  Outcome: Progressing  Goal: Incision(s), wounds(s) or  drain site(s) healing without S/S of infection  Description: INTERVENTIONS- Assess and document dressing, incision, wound bed, drain sites and surrounding tissue- Provide patient and family education- Perform skin care/dressing changes every day/prn  Outcome: Progressing  Goal: Pressure injury heals and does not worsen  Description: Interventions:- Implement low air loss mattress or specialty surface (Criteria met)- Apply silicone foam dressing- Instruct/assist with weight shifting every 60 minutes when in chair - Limit chair time to 4 hour intervals- Use special pressure reducing interventions such as cushion when in chair - Apply fecal or urinary incontinence containment device - Perform passive or active ROM every 2- Turn and reposition patient & offload bony prominences every 2 hours - Utilize friction reducing device or surface for transfers - Consider consults to  interdisciplinary teams such as wounds- Use incontinent care products after each incontinent episode such as soap and water - Consider nutrition services referral as needed  Outcome: Progressing

## 2025-04-20 LAB
AMPHETAMINES SERPL QL SCN: NEGATIVE
BARBITURATES UR QL: NEGATIVE
BENZODIAZ UR QL: NEGATIVE
COCAINE UR QL: NEGATIVE
FENTANYL UR QL SCN: NEGATIVE
HYDROCODONE UR QL SCN: NEGATIVE
METHADONE UR QL: NEGATIVE
OPIATES UR QL SCN: POSITIVE
OXYCODONE+OXYMORPHONE UR QL SCN: POSITIVE
PCP UR QL: NEGATIVE
THC UR QL: POSITIVE

## 2025-04-20 PROCEDURE — 80307 DRUG TEST PRSMV CHEM ANLYZR: CPT | Performed by: INTERNAL MEDICINE

## 2025-04-20 PROCEDURE — 99232 SBSQ HOSP IP/OBS MODERATE 35: CPT | Performed by: INTERNAL MEDICINE

## 2025-04-20 RX ORDER — OXYCODONE HYDROCHLORIDE 5 MG/1
5 TABLET ORAL ONCE
Refills: 0 | Status: COMPLETED | OUTPATIENT
Start: 2025-04-20 | End: 2025-04-20

## 2025-04-20 RX ADMIN — GABAPENTIN 300 MG: 300 CAPSULE ORAL at 06:13

## 2025-04-20 RX ADMIN — ENOXAPARIN SODIUM 40 MG: 40 INJECTION SUBCUTANEOUS at 09:00

## 2025-04-20 RX ADMIN — SENNOSIDES AND DOCUSATE SODIUM 1 TABLET: 50; 8.6 TABLET ORAL at 21:06

## 2025-04-20 RX ADMIN — OXYCODONE HYDROCHLORIDE 5 MG: 5 TABLET ORAL at 15:07

## 2025-04-20 RX ADMIN — QUETIAPINE FUMARATE 300 MG: 300 TABLET ORAL at 21:06

## 2025-04-20 RX ADMIN — HYDRALAZINE HYDROCHLORIDE 10 MG: 20 INJECTION, SOLUTION INTRAMUSCULAR; INTRAVENOUS at 20:23

## 2025-04-20 RX ADMIN — OXYCODONE HYDROCHLORIDE 5 MG: 5 TABLET ORAL at 21:34

## 2025-04-20 RX ADMIN — OXYCODONE HYDROCHLORIDE 5 MG: 5 TABLET ORAL at 09:08

## 2025-04-20 RX ADMIN — METHOCARBAMOL 750 MG: 750 TABLET ORAL at 13:44

## 2025-04-20 RX ADMIN — ACETAMINOPHEN 975 MG: 325 TABLET, FILM COATED ORAL at 13:44

## 2025-04-20 RX ADMIN — LISINOPRIL 5 MG: 5 TABLET ORAL at 09:01

## 2025-04-20 RX ADMIN — PREDNISONE 20 MG: 20 TABLET ORAL at 09:01

## 2025-04-20 RX ADMIN — ACETAMINOPHEN 975 MG: 325 TABLET, FILM COATED ORAL at 21:06

## 2025-04-20 RX ADMIN — OXYCODONE HYDROCHLORIDE 5 MG: 5 TABLET ORAL at 19:19

## 2025-04-20 RX ADMIN — METHOCARBAMOL 750 MG: 750 TABLET ORAL at 19:19

## 2025-04-20 RX ADMIN — METHOCARBAMOL 750 MG: 750 TABLET ORAL at 01:28

## 2025-04-20 RX ADMIN — DICLOFENAC SODIUM 2 G: 10 GEL TOPICAL at 21:06

## 2025-04-20 RX ADMIN — ACETAMINOPHEN 975 MG: 325 TABLET, FILM COATED ORAL at 06:13

## 2025-04-20 RX ADMIN — GABAPENTIN 300 MG: 300 CAPSULE ORAL at 13:44

## 2025-04-20 RX ADMIN — GABAPENTIN 300 MG: 300 CAPSULE ORAL at 19:19

## 2025-04-20 RX ADMIN — METHOCARBAMOL 750 MG: 750 TABLET ORAL at 06:13

## 2025-04-20 NOTE — DISCHARGE SUPPORT
Pt still does not show up in H&CC portal, called [employer] [phone] to check status of IRF auth. I think this may have been submitted on [employer] and not H&CC as was listed. No access to [employer] portal. Left message for call back. CM notified: Gisele SULLIVAN

## 2025-04-20 NOTE — ASSESSMENT & PLAN NOTE
Patient with multiple ED visits and hospitalization for chronic neck pain /headache  Per patient, it had been well-managed however recently has had increasing pain particularly in the bitemporal area as well as the neck radiating to the back and associated with stiffness and intermittent dizziness  History of psychosis previous inpatient psychiatric admission on 7/2024  Noted home meds: Patient takes gabapentin and naproxen at home.  Patient not currently on any opioids per PDMP  Patient has been referred to outpatient pain management and has yet to follow-up with him  He also reports intermittent tingling in his upper extremities as well as saddle anesthesia at times and urinary dribbling/incontinence  Cervical spine CT scan without acute abnormality  Patient was evaluated by neurology suspect headache is cervicogenic in origin  Cervical and lumbar spine MRI showed progression of degenerative changes but no cord compression, no further inpatient neurology workup  He was recommended to establish an outpatient pain management  Acute pain service consulted  Patient started on oral prednisone taper, continue  Ambulatory referral to comprehensive spine program as well as [facility] pain have been placed  PT OT recommending level 3  Continue with pain control  Continue to work with PT OT  Medically stable for discharge  Awaiting rehab placement/ARC  Pain management and neurology signed off

## 2025-04-20 NOTE — PROGRESS NOTES
Progress Note - Hospitalist   Name: Amado Chin 39 y.o. male I MRN: [id number]  Unit/Bed#: [facility] 711-01 I Date of Admission: 4/15/2025   Date of Service: 4/20/2025 I Hospital Day: 4    Assessment & Plan  Neck pain  Patient with multiple ED visits and hospitalization for chronic neck pain /headache  Per patient, it had been well-managed however recently has had increasing pain particularly in the bitemporal area as well as the neck radiating to the back and associated with stiffness and intermittent dizziness  History of psychosis previous inpatient psychiatric admission on 7/2024  Noted home meds: Patient takes gabapentin and naproxen at home.  Patient not currently on any opioids per PDMP  Patient has been referred to outpatient pain management and has yet to follow-up with him  He also reports intermittent tingling in his upper extremities as well as saddle anesthesia at times and urinary dribbling/incontinence  Cervical spine CT scan without acute abnormality  Patient was evaluated by neurology suspect headache is cervicogenic in origin  Cervical and lumbar spine MRI showed progression of degenerative changes but no cord compression, no further inpatient neurology workup  He was recommended to establish an outpatient pain management  Acute pain service consulted  Patient started on oral prednisone taper, continue  Ambulatory referral to comprehensive spine program as well as [facility] pain have been placed  PT OT recommending level 3  Continue with pain control  Continue to work with PT OT  Medically stable for discharge  Awaiting rehab placement/ARC  Pain management and neurology signed off  Cervical spinal stenosis  Plan noted above.  Elevated blood pressure reading  Likely secondary to pain/ headache and steroidS effect  Started on lisinopril  Continue with IV hydralazine as needed  Monitor  Headache  Cervicogenic in origin  Continue with above pain control  Polysubstance abuse (HCC)  Admits to  prior use of methamphetamines but states he has not used this in several years.  Admits to occasional use of marijuana currently.  Denies any other drug use.  Tobacco abuse  Patient admits to smoking half a pack of cigarettes daily.   Refusing nicotine patch  Asthma  No acute exacerbation  Anxiety  Continue with Seroquel 300 mg at bedtime    VTE Pharmacologic Prophylaxis: VTE Score: 4 Moderate Risk (Score 3-4) - Pharmacological DVT Prophylaxis Ordered: enoxaparin (Lovenox).    Mobility:   Basic Mobility Inpatient Raw Score: 16  -[facility] Goal: 5: Stand one or more mins  JH-HLM Achieved: 6: Walk 10 steps or more  JH-HLM Goal achieved. Continue to encourage appropriate mobility.    Patient Centered Rounds: I performed bedside rounds with nursing staff today.   Discussions with Specialists or Other Care Team Provider: Nursing    Current Length of Stay: 4 day(s)  Current Patient Status: Inpatient   Certification Statement: The patient will continue to require additional inpatient hospital stay due to awaiting rehab placement    Code Status: Level 1 - Full Code    Subjective   Patient is comfortably sleeping in bed  Per nursing, nursing found marijuana vape pen thing in his room yesterday        Objective :  Temp:  [98.6 °F (37 °C)-98.7 °F (37.1 °C)] 98.7 °F (37.1 °C)  HR:  [58-79] 58  BP: (130-170)/() 138/73  Resp:  [16] 16  SpO2:  [95 %-98 %] 96 %  O2 Device: None (Room air)    There is no height or weight on file to calculate BMI.     Input and Output Summary (last 24 hours):     Intake/Output Summary (Last 24 hours) at 4/20/2025 1002  Last data filed at 4/20/2025 0601  Gross per 24 hour   Intake 480 ml   Output 1 ml   Net 479 ml       Physical Exam  Patient is comfortably sleeping in bed  No respiratory distress    Lines/Drains:        Lab Results: I have reviewed the following results:   Results from last 7 days   Lab Units 04/16/25  0759 04/15/25  1041   WBC Thousand/uL 5.49 6.51   HEMOGLOBIN g/dL 14.8 15.6    HEMATOCRIT % 43.3 44.0   PLATELETS Thousands/uL 243 259   SEGS PCT %  --  70   LYMPHO PCT %  --  16   MONO PCT %  --  11   EOS PCT %  --  1     Results from last 7 days   Lab Units 04/16/25  0759   SODIUM mmol/L 139   POTASSIUM mmol/L 4.3   CHLORIDE mmol/L 106   CO2 mmol/L 28   BUN mg/dL 21   CREATININE mg/dL 1.17   ANION GAP mmol/L 5   CALCIUM mg/dL 8.8   GLUCOSE RANDOM mg/dL 102                       Recent Cultures (last 7 days):         Imaging Results Review: No pertinent imaging studies reviewed.  Other Study Results Review: No additional pertinent studies reviewed.    Last 24 Hours Medication List:     Current Facility-Administered Medications:     acetaminophen (TYLENOL) tablet 975 mg, Q8H    Diclofenac Sodium (VOLTAREN) 1 % topical gel 2 g, 4x Daily    enoxaparin (LOVENOX) subcutaneous injection 40 mg, Daily    gabapentin (NEURONTIN) capsule 300 mg, TID    hydrALAZINE (APRESOLINE) injection 10 mg, Q6H PRN    lisinopril (ZESTRIL) tablet 5 mg, Daily    methocarbamol (ROBAXIN) tablet 750 mg, Q6H PETER    naloxone (NARCAN) 0.04 mg/mL syringe 0.04 mg, Q1MIN PRN    nicotine (NICODERM CQ) 14 mg/24hr TD 24 hr patch 1 patch, Daily    ondansetron (ZOFRAN) injection 4 mg, Q4H PRN    oxyCODONE (ROXICODONE) split tablet 2.5 mg, Q4H PRN **OR** oxyCODONE (ROXICODONE) IR tablet 5 mg, Q4H PRN    polyethylene glycol (MIRALAX) packet 17 g, Daily    [COMPLETED] predniSONE tablet 60 mg, Daily **FOLLOWED BY** [COMPLETED] predniSONE tablet 40 mg, Daily **FOLLOWED BY** predniSONE tablet 20 mg, Daily    QUEtiapine (SEROquel) tablet 300 mg, HS    senna-docusate sodium (SENOKOT S) 8.6-50 mg per tablet 1 tablet, HS    Administrative Statements   Today, Patient Was Seen By: Velasquez Villegas, DO  I have spent a total time of 35 minutes in caring for this patient on the day of the visit/encounter including Counseling / Coordination of care, Documenting in the medical record, Reviewing/placing orders in the medical record (including tests,  medications, and/or procedures), Obtaining or reviewing history  , and Communicating with other healthcare professionals .    **Please Note: This note may have been constructed using a voice recognition system.**

## 2025-04-21 LAB
ANION GAP SERPL CALCULATED.3IONS-SCNC: 6 MMOL/L (ref 4–13)
BASOPHILS # BLD AUTO: 0.09 THOUSANDS/ÂΜL (ref 0–0.1)
BASOPHILS NFR BLD AUTO: 1 % (ref 0–1)
BUN SERPL-MCNC: 22 MG/DL (ref 5–25)
CALCIUM SERPL-MCNC: 8.6 MG/DL (ref 8.4–10.2)
CHLORIDE SERPL-SCNC: 108 MMOL/L (ref 96–108)
CO2 SERPL-SCNC: 26 MMOL/L (ref 21–32)
CREAT SERPL-MCNC: 0.87 MG/DL (ref 0.6–1.3)
EOSINOPHIL # BLD AUTO: 0.2 THOUSAND/ÂΜL (ref 0–0.61)
EOSINOPHIL NFR BLD AUTO: 3 % (ref 0–6)
ERYTHROCYTE [DISTWIDTH] IN BLOOD BY AUTOMATED COUNT: 14.9 % (ref 11.6–15.1)
GFR SERPL CREATININE-BSD FRML MDRD: 107 ML/MIN/1.73SQ M
GLUCOSE SERPL-MCNC: 92 MG/DL (ref 65–140)
HCT VFR BLD AUTO: 42.3 % (ref 36.5–49.3)
HGB BLD-MCNC: 14.5 G/DL (ref 12–17)
IMM GRANULOCYTES # BLD AUTO: 0.07 THOUSAND/UL (ref 0–0.2)
IMM GRANULOCYTES NFR BLD AUTO: 1 % (ref 0–2)
LYMPHOCYTES # BLD AUTO: 2.5 THOUSANDS/ÂΜL (ref 0.6–4.47)
LYMPHOCYTES NFR BLD AUTO: 31 % (ref 14–44)
MAGNESIUM SERPL-MCNC: 2 MG/DL (ref 1.9–2.7)
MCH RBC QN AUTO: 31.3 PG (ref 26.8–34.3)
MCHC RBC AUTO-ENTMCNC: 34.3 G/DL (ref 31.4–37.4)
MCV RBC AUTO: 91 FL (ref 82–98)
MONOCYTES # BLD AUTO: 0.97 THOUSAND/ÂΜL (ref 0.17–1.22)
MONOCYTES NFR BLD AUTO: 12 % (ref 4–12)
NEUTROPHILS # BLD AUTO: 4.15 THOUSANDS/ÂΜL (ref 1.85–7.62)
NEUTS SEG NFR BLD AUTO: 52 % (ref 43–75)
NRBC BLD AUTO-RTO: 0 /100 WBCS
PLATELET # BLD AUTO: 277 THOUSANDS/UL (ref 149–390)
PMV BLD AUTO: 10.2 FL (ref 8.9–12.7)
POTASSIUM SERPL-SCNC: 3.7 MMOL/L (ref 3.5–5.3)
RBC # BLD AUTO: 4.63 MILLION/UL (ref 3.88–5.62)
SODIUM SERPL-SCNC: 140 MMOL/L (ref 135–147)
WBC # BLD AUTO: 7.98 THOUSAND/UL (ref 4.31–10.16)

## 2025-04-21 PROCEDURE — 97530 THERAPEUTIC ACTIVITIES: CPT

## 2025-04-21 PROCEDURE — 80048 BASIC METABOLIC PNL TOTAL CA: CPT | Performed by: INTERNAL MEDICINE

## 2025-04-21 PROCEDURE — 85025 COMPLETE CBC W/AUTO DIFF WBC: CPT | Performed by: INTERNAL MEDICINE

## 2025-04-21 PROCEDURE — 99232 SBSQ HOSP IP/OBS MODERATE 35: CPT | Performed by: INTERNAL MEDICINE

## 2025-04-21 PROCEDURE — 97116 GAIT TRAINING THERAPY: CPT

## 2025-04-21 PROCEDURE — 97535 SELF CARE MNGMENT TRAINING: CPT

## 2025-04-21 PROCEDURE — 83735 ASSAY OF MAGNESIUM: CPT | Performed by: INTERNAL MEDICINE

## 2025-04-21 RX ORDER — ALBUTEROL SULFATE 90 UG/1
2 INHALANT RESPIRATORY (INHALATION) EVERY 4 HOURS PRN
Status: DISCONTINUED | OUTPATIENT
Start: 2025-04-21 | End: 2025-04-22 | Stop reason: HOSPADM

## 2025-04-21 RX ORDER — LISINOPRIL 10 MG/1
10 TABLET ORAL DAILY
Status: DISCONTINUED | OUTPATIENT
Start: 2025-04-22 | End: 2025-04-22 | Stop reason: HOSPADM

## 2025-04-21 RX ORDER — LISINOPRIL 5 MG/1
5 TABLET ORAL ONCE
Status: COMPLETED | OUTPATIENT
Start: 2025-04-21 | End: 2025-04-21

## 2025-04-21 RX ADMIN — SENNOSIDES AND DOCUSATE SODIUM 1 TABLET: 50; 8.6 TABLET ORAL at 21:57

## 2025-04-21 RX ADMIN — HYDRALAZINE HYDROCHLORIDE 10 MG: 20 INJECTION, SOLUTION INTRAMUSCULAR; INTRAVENOUS at 22:09

## 2025-04-21 RX ADMIN — ENOXAPARIN SODIUM 40 MG: 40 INJECTION SUBCUTANEOUS at 08:32

## 2025-04-21 RX ADMIN — DICLOFENAC SODIUM 2 G: 10 GEL TOPICAL at 08:37

## 2025-04-21 RX ADMIN — METHOCARBAMOL 750 MG: 750 TABLET ORAL at 01:17

## 2025-04-21 RX ADMIN — METHOCARBAMOL 750 MG: 750 TABLET ORAL at 06:57

## 2025-04-21 RX ADMIN — DICLOFENAC SODIUM 2 G: 10 GEL TOPICAL at 21:57

## 2025-04-21 RX ADMIN — GABAPENTIN 300 MG: 300 CAPSULE ORAL at 18:29

## 2025-04-21 RX ADMIN — ALBUTEROL SULFATE 2 PUFF: 90 AEROSOL, METERED RESPIRATORY (INHALATION) at 08:33

## 2025-04-21 RX ADMIN — LISINOPRIL 5 MG: 5 TABLET ORAL at 13:39

## 2025-04-21 RX ADMIN — LISINOPRIL 5 MG: 5 TABLET ORAL at 08:33

## 2025-04-21 RX ADMIN — OXYCODONE HYDROCHLORIDE 5 MG: 5 TABLET ORAL at 21:57

## 2025-04-21 RX ADMIN — GABAPENTIN 300 MG: 300 CAPSULE ORAL at 06:57

## 2025-04-21 RX ADMIN — DICLOFENAC SODIUM 2 G: 10 GEL TOPICAL at 12:24

## 2025-04-21 RX ADMIN — QUETIAPINE FUMARATE 300 MG: 300 TABLET ORAL at 21:57

## 2025-04-21 RX ADMIN — GABAPENTIN 300 MG: 300 CAPSULE ORAL at 12:23

## 2025-04-21 RX ADMIN — ACETAMINOPHEN 975 MG: 325 TABLET, FILM COATED ORAL at 21:56

## 2025-04-21 RX ADMIN — METHOCARBAMOL 750 MG: 750 TABLET ORAL at 18:29

## 2025-04-21 RX ADMIN — PREDNISONE 20 MG: 20 TABLET ORAL at 08:33

## 2025-04-21 RX ADMIN — OXYCODONE HYDROCHLORIDE 5 MG: 5 TABLET ORAL at 17:29

## 2025-04-21 RX ADMIN — ACETAMINOPHEN 975 MG: 325 TABLET, FILM COATED ORAL at 05:54

## 2025-04-21 RX ADMIN — OXYCODONE HYDROCHLORIDE 5 MG: 5 TABLET ORAL at 06:57

## 2025-04-21 RX ADMIN — OXYCODONE HYDROCHLORIDE 5 MG: 5 TABLET ORAL at 12:23

## 2025-04-21 RX ADMIN — ACETAMINOPHEN 975 MG: 325 TABLET, FILM COATED ORAL at 13:38

## 2025-04-21 RX ADMIN — DICLOFENAC SODIUM 2 G: 10 GEL TOPICAL at 18:29

## 2025-04-21 RX ADMIN — METHOCARBAMOL 750 MG: 750 TABLET ORAL at 12:23

## 2025-04-21 NOTE — DISCHARGE SUPPORT
Case Management Assessment & Discharge Planning Note    Patient name Amado Chin  Location [facility] 711/[facility] 711-01 MRN [id number]  : 1985 Date 2025       Current Admission Date: 4/15/2025  Current Admission Diagnosis:Neck pain   Patient Active Problem List    Diagnosis Date Noted Date Diagnosed    Headache 04/15/2025     Polysubstance abuse (HCC) 2024     Psychoactive 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HCC) 2024     Drug use 2024     Cellulitis of left lower extremity 2024     Homelessness 2024     Cervical spinal stenosis 2024     Cervical radiculopathy 2024     Chronic bilateral low back pain 2023     Chronic neck pain 2023     Recurrent major depressive disorder (HCC) 2022     Anxiety 2022     Substance induced mood disorder (HCC) 2022     Mood disorder due to medical condition 2022     Opioid abuse (HCC) 02/10/2022     Fecal smearing 2022     Urinary incontinence 2022     Drug-induced constipation 2022     Intractable pain 2021     Vitamin B12 deficiency 2021     Asthma 2021     Tobacco abuse 2021     Dizziness 2021     Neck pain 2021     Elevated blood pressure reading 2021     Weakness of both lower extremities 2021     Cervical disc herniation 2020     Paresthesia and pain of extremity 2020     Fall 2020     Concussion without loss of consciousness 2020     Acute pain of right shoulder 2020     Alcohol abuse 2020     Burn 2020     Furuncle of axilla 2020       LOS (days): 5  Geometric Mean LOS (GMLOS) (days):   Days to GMLOS:   Facility Authorization Approved  [facility] received approved auth for: Acute Rehab  Insurance: [employer]  Authorization Obtained Via: Portal  Facility Name: [facility]  Approved Authorization Number: [id number]  Start of Care Date: 25  Next Review Date:  05/04/25  Submit Next Review to: Vannessa   notified: Ninfa COTTON     Updates to authorization status will be noted in chart.    Please reach out to CM for updates on any clinical information.

## 2025-04-21 NOTE — ASSESSMENT & PLAN NOTE
Admits to prior use of methamphetamines but states he has not used this in several years.  Admits to occasional use of marijuana currently.  Nursing found marijuana vape pen in his room and was taken away  Urine drug screen positive for THC  Denies any other drug use.

## 2025-04-21 NOTE — PHYSICAL THERAPY NOTE
"                                                                                  PHYSICAL THERAPY NOTE          Patient Name: Amado Chin  Today's Date: 4/21/2025 04/21/25 0927   PT Last Visit   PT Visit Date 04/21/25   Note Type   Note Type Treatment for insurance authorization   Pain Assessment   Pain Assessment Tool 0-10   Pain Score 7   Pain Location/Orientation Location: Neck   Pain Onset/Description Onset: Ongoing   Patient's Stated Pain Goal No pain   Hospital Pain Intervention(s) Repositioned;Ambulation/increased activity;Emotional support   Restrictions/Precautions   Weight Bearing Precautions Per Order No   Other Precautions Chair Alarm;Bed Alarm;Multiple lines;Telemetry;Impulsive;Fall Risk;Pain   General   Chart Reviewed Yes   Response to Previous Treatment Patient with no complaints from previous session.   Family/Caregiver Present No   Cognition   Overall Cognitive Status WFL   Arousal/Participation Alert;Responsive;Cooperative   Attention Within functional limits   Orientation Level Oriented X4   Memory Within functional limits   Following Commands Follows one step commands without difficulty   Comments pt pleasant and cooperative   Subjective   Subjective \"I always feel dizzy\"   Bed Mobility   Supine to Sit 5  Supervision   Additional items HOB elevated   Sit to Supine Unable to assess   Additional Comments pt OOB in chair at end of session   Transfers   Sit to Stand 4  Minimal assistance   Additional items Assist x 1;Increased time required;Verbal cues   Stand to Sit 5  Supervision   Additional items Armrests;Increased time required;Verbal cues   Toilet transfer 4  Minimal assistance   Additional items Assist x 1;Increased time required;Verbal cues;Standard toilet   Additional Comments c RW. x7 STS throughout session progressing to S level   Ambulation/Elevation   Gait pattern Improper Weight shift;Wide ESME;Decreased foot clearance;Foward flexed;Inconsistent curry;Short stride;Excessively " slow  (b/l knee hyperextension)   Gait Assistance 4  Minimal assist  (+chair follow)   Additional items Assist x 1;Verbal cues   Assistive Device Rolling walker   Distance 25'+15'+10'   Stair Management Assistance Not tested   Ambulation/Elevation Additional Comments seated rest breaks throughout, posterior LOB x3 throughotu session requiring mod A to correct   Balance   Static Sitting Fair +   Dynamic Sitting Fair   Static Standing Poor +   Dynamic Standing Poor +   Ambulatory Poor   Endurance Deficit   Endurance Deficit Yes   Endurance Deficit Description pt limited by pain, dizziness, fatigue and decreased activity tolerance   Activity Tolerance   Activity Tolerance Patient limited by fatigue;Patient limited by pain   Medical Staff Made Aware OT Gabbi   Nurse Made Aware yes-RN cleared   Exercises   Neuro re-ed x5 STS without AD at [facility]   Assessment   Prognosis Good   Problem List Decreased strength;Decreased endurance;Impaired balance;Decreased mobility;Decreased coordination;Impaired judgement;Decreased safety awareness;Pain;Impaired sensation   Assessment Pt pleasant and agreeable to participate in PT session. Continues to express neck pain and dizziness throughout session. Completes mobility and therapeutic activity as outlined above. Completes multiple STS throughout session progressing from min A to S level with RW. Pt continues to require min-mod A for ambulating short distances. Demonstrates wide ESME and unsteady gait. Pt at times stepping too far forwards with R leg causing him to lose his balance posteriorly and requires mod A to correct. Pt with increased rate of breathing after ambulating, reports it is from the pain. Pt then able to get into shower at end of PT session with min A. Pt left with OT in room with all needs met. Pt is progressing well towards his goals and will continue to benefit from skilled acute PT services to address deficits and promote mobility.   Barriers to Discharge Inaccessible  home environment;Decreased caregiver support   Goals   Patient Goals to shower   STG Expiration Date 04/30/25   PT Treatment Day 2   Plan   Treatment/Interventions Functional transfer training;LE strengthening/ROM;Elevations;Therapeutic exercise;Endurance training;Patient/family training;Bed mobility;Equipment eval/education;Gait training;Compensatory technique education;Spoke to nursing;OT   Progress Progressing toward goals   PT Frequency 3-5x/wk   Discharge Recommendation   Rehab Resource Intensity Level, PT I (Maximum Resource Intensity)   Equipment Recommended Walker   Walker Package Recommended Wheeled walker   AM-PAC Basic Mobility Inpatient   Turning in Flat Bed Without Bedrails 4   Lying on Back to Sitting on Edge of Flat Bed Without Bedrails 3   Moving Bed to Chair 3   Standing Up From Chair Using Arms 3   Walk in Room 2   Climb 3-5 Stairs With Railing 2   Basic Mobility Inpatient Raw Score 17   Basic Mobility Standardized Score 39.67   [facility] Highest Level Of Mobility   -HLM Goal 5: Stand one or more mins   JH-HLM Achieved 7: Walk 25 feet or more   Education   Education Provided Mobility training;Assistive device   Patient Demonstrates acceptance/verbal understanding   End of Consult   Patient Position at End of Consult Other (comment)  (in shower with OT present)   Agueda Rosenberg DPT

## 2025-04-21 NOTE — PLAN OF CARE
Patient participated in Skilled OT session this date with interventions consisting of self care tasks, functional transfers/mobility . Patient agreeable to OT treatment session, upon arrival patient was found supine in bed. In comparison to previous session, patient with improvements in self care I levels . Patient requiring verbal cues for safety and verbal cues for correct technique. Patient continues to be functioning below baseline level, occupational performance remains limited secondary to factors listed above and increased risk for falls and injury. From OT standpoint, recommendation at time of d/c would be max level I. The patient's raw score on the -PAC Daily Activity Inpatient Short Form is 20. A raw score of greater than or equal to 19 suggests the patient may benefit from discharge to home. Please refer to the recommendation of the Occupational Therapist for safe discharge planning. Patient to benefit from continued Occupational Therapy treatment while in the hospital to address deficits as defined above and maximize level of functional independence with ADLs and functional mobility.

## 2025-04-21 NOTE — ASSESSMENT & PLAN NOTE
Likely secondary to pain/ headache/marijuana vaping and steroidS effect  Started on lisinopril, increase to 10 mg daily today  Continue with IV hydralazine as needed  Monitor

## 2025-04-21 NOTE — PROGRESS NOTES
Patient:    MRN:  [id number]    Delfin Request ID:  [id number]    Level of care reserved:  Inpatient Rehab Facility    Partner Reserved:  [facility] - ([place]), [place] [id number] [phone]    Clinical needs requested:    Geography searched:  10 miles around [id number]    Start of Service:    Request sent:  1:13pm EDT on 4/17/2025 by Anastacia Villafuerte    Partner reserved:  8:54am EDT on 4/21/2025 by Anastacia Villafuerte    Choice list shared:  8:54am EDT on 4/21/2025 by Anastacia Villafuerte

## 2025-04-21 NOTE — ASSESSMENT & PLAN NOTE
Patient with multiple ED visits and hospitalization for chronic neck pain /headache  Per patient, it had been well-managed however recently has had increasing pain particularly in the bitemporal area as well as the neck radiating to the back and associated with stiffness and intermittent dizziness  History of psychosis previous inpatient psychiatric admission on 7/2024  Noted home meds: Patient takes gabapentin and naproxen at home.  Patient not currently on any opioids per PDMP  Patient has been referred to outpatient pain management and has yet to follow-up with him  He also reports intermittent tingling in his upper extremities as well as saddle anesthesia at times and urinary dribbling/incontinence  Cervical spine CT scan without acute abnormality  Patient was evaluated by neurology suspect headache is cervicogenic in origin  Cervical and lumbar spine MRI showed progression of degenerative changes but no cord compression, no further inpatient neurology workup  He was recommended to establish an outpatient pain management  Acute pain service consulted  Patient completed oral prednisone course  Ambulatory referral to comprehensive spine program as well as [facility] have been placed  PT OT recommending level 3  Continue with pain control  Continue to work with PT OT  Medically stable for discharge  Awaiting rehab placement/ARC  Pain management and neurology signed off

## 2025-04-21 NOTE — PROGRESS NOTES
Progress Note - Hospitalist   Name: Amado Chin 39 y.o. male I MRN: [id number]  Unit/Bed#: [facility] 711-01 I Date of Admission: 4/15/2025   Date of Service: 4/21/2025 I Hospital Day: 5    Assessment & Plan  Neck pain  Patient with multiple ED visits and hospitalization for chronic neck pain /headache  Per patient, it had been well-managed however recently has had increasing pain particularly in the bitemporal area as well as the neck radiating to the back and associated with stiffness and intermittent dizziness  History of psychosis previous inpatient psychiatric admission on 7/2024  Noted home meds: Patient takes gabapentin and naproxen at home.  Patient not currently on any opioids per PDMP  Patient has been referred to outpatient pain management and has yet to follow-up with him  He also reports intermittent tingling in his upper extremities as well as saddle anesthesia at times and urinary dribbling/incontinence  Cervical spine CT scan without acute abnormality  Patient was evaluated by neurology suspect headache is cervicogenic in origin  Cervical and lumbar spine MRI showed progression of degenerative changes but no cord compression, no further inpatient neurology workup  He was recommended to establish an outpatient pain management  Acute pain service consulted  Patient completed oral prednisone course  Ambulatory referral to comprehensive spine program as well as [facility] have been placed  PT OT recommending level 3  Continue with pain control  Continue to work with PT OT  Medically stable for discharge  Awaiting rehab placement/ARC  Pain management and neurology signed off  Cervical spinal stenosis  Plan noted above.  Elevated blood pressure reading  Likely secondary to pain/ headache/marijuana vaping and steroidS effect  Started on lisinopril, increase to 10 mg daily today  Continue with IV hydralazine as needed  Monitor  Headache  Cervicogenic in origin  Continue with above pain  control  Polysubstance abuse (HCC)  Admits to prior use of methamphetamines but states he has not used this in several years.  Admits to occasional use of marijuana currently.  Nursing found marijuana vape pen in his room and was taken away  Urine drug screen positive for THC  Denies any other drug use.  Tobacco abuse  Patient admits to smoking half a pack of cigarettes daily.   Refusing nicotine patch  Asthma  No acute exacerbation  Anxiety  Continue with Seroquel 300 mg at bedtime    VTE Pharmacologic Prophylaxis: VTE Score: 4 Moderate Risk (Score 3-4) - Pharmacological DVT Prophylaxis Ordered: enoxaparin (Lovenox).    Mobility:   Basic Mobility Inpatient Raw Score: 19  JH-HLM Goal: 6: Walk 10 steps or more  JH-HLM Achieved: 7: Walk 25 feet or more  JH-HLM Goal achieved. Continue to encourage appropriate mobility.    Patient Centered Rounds: I performed bedside rounds with nursing staff today.   Discussions with Specialists or Other Care Team Provider: Nursing      Current Length of Stay: 5 day(s)  Current Patient Status: Inpatient   Certification Statement: The patient will continue to require additional inpatient hospital stay due to awaiting rehab placement    Code Status: Level 1 - Full Code    Subjective   Patient seen and examined  Comfortable in bed  No event overnight    Objective :  Temp:  [97.9 °F (36.6 °C)-98.5 °F (36.9 °C)] 97.9 °F (36.6 °C)  HR:  [63-98] 63  BP: (142-200)/() 171/94  Resp:  [20] 20  SpO2:  [96 %-98 %] 98 %  O2 Device: None (Room air)    There is no height or weight on file to calculate BMI.     Input and Output Summary (last 24 hours):     Intake/Output Summary (Last 24 hours) at 4/21/2025 1228  Last data filed at 4/20/2025 1401  Gross per 24 hour   Intake 360 ml   Output --   Net 360 ml       Physical Exam  Patient is awake alert no acute distress   Comfortable in bed  Lung clear to auscultation bilateral anteriorly  Heart positive S1-S2 no murmur  Abdomen soft nontender  Lower  extremities no edema    Lines/Drains:              Lab Results: I have reviewed the following results:   Results from last 7 days   Lab Units 04/21/25  0602   WBC Thousand/uL 7.98   HEMOGLOBIN g/dL 14.5   HEMATOCRIT % 42.3   PLATELETS Thousands/uL 277   SEGS PCT % 52   LYMPHO PCT % 31   MONO PCT % 12   EOS PCT % 3     Results from last 7 days   Lab Units 04/21/25  0602   SODIUM mmol/L 140   POTASSIUM mmol/L 3.7   CHLORIDE mmol/L 108   CO2 mmol/L 26   BUN mg/dL 22   CREATININE mg/dL 0.87   ANION GAP mmol/L 6   CALCIUM mg/dL 8.6   GLUCOSE RANDOM mg/dL 92                       Recent Cultures (last 7 days):         Imaging Results Review: No pertinent imaging studies reviewed.  Other Study Results Review: No additional pertinent studies reviewed.    Last 24 Hours Medication List:     Current Facility-Administered Medications:     acetaminophen (TYLENOL) tablet 975 mg, Q8H    albuterol (PROVENTIL HFA,VENTOLIN HFA) inhaler 2 puff, Q4H PRN    Diclofenac Sodium (VOLTAREN) 1 % topical gel 2 g, 4x Daily    enoxaparin (LOVENOX) subcutaneous injection 40 mg, Daily    gabapentin (NEURONTIN) capsule 300 mg, TID    hydrALAZINE (APRESOLINE) injection 10 mg, Q6H PRN    [START ON 4/22/2025] lisinopril (ZESTRIL) tablet 10 mg, Daily    lisinopril (ZESTRIL) tablet 5 mg, Once    methocarbamol (ROBAXIN) tablet 750 mg, Q6H PETER    naloxone (NARCAN) 0.04 mg/mL syringe 0.04 mg, Q1MIN PRN    nicotine (NICODERM CQ) 14 mg/24hr TD 24 hr patch 1 patch, Daily    ondansetron (ZOFRAN) injection 4 mg, Q4H PRN    oxyCODONE (ROXICODONE) split tablet 2.5 mg, Q4H PRN **OR** oxyCODONE (ROXICODONE) IR tablet 5 mg, Q4H PRN    polyethylene glycol (MIRALAX) packet 17 g, Daily    QUEtiapine (SEROquel) tablet 300 mg, HS    senna-docusate sodium (SENOKOT S) 8.6-50 mg per tablet 1 tablet, HS    Administrative Statements   Today, Patient Was Seen By: Velasquez Villegas, DO  I have spent a total time of 35 minutes in caring for this patient on the day of the  visit/encounter including Counseling / Coordination of care, Documenting in the medical record, Reviewing/placing orders in the medical record (including tests, medications, and/or procedures), Obtaining or reviewing history  , and Communicating with other healthcare professionals .    **Please Note: This note may have been constructed using a voice recognition system.**

## 2025-04-21 NOTE — PLAN OF CARE
Problem: PHYSICAL THERAPY ADULT  Goal: Performs mobility at highest level of function for planned discharge setting.  See evaluation for individualized goals.  Description: Treatment/Interventions: Functional transfer training, LE strengthening/ROM, Therapeutic exercise, Elevations, Endurance training, Patient/family training, Equipment eval/education, Bed mobility, Gait training, Spoke to nursing, Spoke to case management, OT  Equipment Recommended: Walker       See flowsheet documentation for full assessment, interventions and recommendations.  Outcome: Progressing  Note: Prognosis: Good  Problem List: Decreased strength, Decreased endurance, Impaired balance, Decreased mobility, Decreased coordination, Impaired judgement, Decreased safety awareness, Pain, Impaired sensation  Assessment: Pt pleasant and agreeable to participate in PT session. Continues to express neck pain and dizziness throughout session. Completes mobility and therapeutic activity as outlined above. Completes multiple STS throughout session progressing from min A to S level with RW. Pt continues to require min-mod A for ambulating short distances. Demonstrates wide ESME and unsteady gait. Pt at times stepping too far forwards with R leg causing him to lose his balance posteriorly and requires mod A to correct. Pt with increased rate of breathing after ambulating, reports it is from the pain. Pt then able to get into shower at end of PT session with min A. Pt left with OT in room with all needs met. Pt is progressing well towards his goals and will continue to benefit from skilled acute PT services to address deficits and promote mobility.  Barriers to Discharge: Inaccessible home environment, Decreased caregiver support     Rehab Resource Intensity Level, PT: I (Maximum Resource Intensity)    See flowsheet documentation for full assessment.

## 2025-04-21 NOTE — OCCUPATIONAL THERAPY NOTE
Occupational Therapy Progress Note     Patient Name: Amado Chin  Today's Date: 4/21/2025  Problem List  Principal Problem:    Neck pain  Active Problems:    Elevated blood pressure reading    Asthma    Tobacco abuse    Anxiety    Cervical spinal stenosis    Polysubstance abuse (HCC)    Headache     04/21/25 1340   OT Last Visit   OT Visit Date 04/21/25   Note Type   Note Type Treatment for insurance authorization   Pain Assessment   Pain Assessment Tool 0-10   Pain Score 6   Pain Location/Orientation Location: Neck   Hospital Pain Intervention(s) Repositioned;Ambulation/increased activity;Emotional support;Rest   Restrictions/Precautions   Weight Bearing Precautions Per Order No   Other Precautions Cognitive;Chair Alarm;Bed Alarm;Telemetry;Fall Risk;Pain   Lifestyle   Autonomy I with ADL's/assistance from sister for IADL's (laundry, driving tasks0   Reciprocal Relationships sister, co-worker (assists with driving to work)   Service to Others full time employement as a    Intrinsic Gratification playing games   ADL   Where Assessed Other (Comment)  (shower level seated (shower chair without back) and standing with grab bar use)   Eating Assistance 7  Independent   Grooming Assistance 5  Supervision/Setup   Grooming Deficit Setup;Wash/dry hands;Wash/dry face  (shaved at sink side with RW and S (hip supported by sink)   UB Bathing Assistance 5  Supervision/Setup   UB Bathing Deficit Setup   LB Bathing Assistance 4  Minimal Assistance   LB Bathing Deficit Left lower leg including foot;Right lower leg including foot   UB Dressing Assistance 5  Supervision/Setup   UB Dressing Deficit Setup  (pull over scrub shirt)   LB Dressing Assistance 5  Supervision/Setup   LB Dressing Deficit Don/doff R sock;Don/doff L sock;Thread RLE into underwear;Thread LLE into underwear;Thread LLE into pants;Thread RLE into pants  (seated grab bar)   Bed Mobility   Supine to Sit 5  Supervision   Additional items Assist x 1   Sit to  Supine 5  Supervision   Additional items Assist x 1   Transfers   Sit to Stand 4  Minimal assistance   Additional items Assist x 1   Stand to Sit 5  Supervision   Additional items Assist x 1   Toilet transfer 4  Minimal assistance   Additional items Assist x 1;Standard toilet cues for safety quick sit to bed, chair   Functional Mobility   Functional Mobility 4  Minimal assistance   Additional Comments min a x 1 with RW short distance functional mobility pain limiting I levels, +KOWALSKI (pt reports 2* pain levels-RN aware)  resolving with rest, SP02 WFL   Toilet Transfers   Toilet Transfer From Rolling walker   Toilet Transfer Type To and from   Toilet Transfer to Standard toilet   Toilet Transfer Technique Ambulating   Toilet Transfers Minimal assistance   Shower Transfers   Shower Transfer From Walker   Shower Transfer Type To and From   Shower Transfer to Shower seat without back   Shower Transfer Technique Stand pivot;Ambulating   Shower Transfers Minimal assistance  (verbal cues for safety)   Cognition   Overall Cognitive Status    Arousal/Participation Alert;Responsive;Cooperative   Attention Within functional limits   Orientation Level Oriented X4   Memory Within functional limits   Following Commands Follows one step commands without difficulty   Comments pt pleasant and cooperative, impulsive at times, verbal cues for safety, good memory.   Assessment   Assessment Patient participated in Skilled OT session this date with interventions consisting of self care tasks, functional transfers/mobility . Patient agreeable to OT treatment session, upon arrival patient was found supine in bed.  In comparison to previous session, patient with improvements in self care I levels . Patient requiring verbal cues for safety and verbal cues for correct technique. Patient continues to be functioning below baseline level, occupational performance remains limited secondary to factors listed above and increased risk for falls and  injury.   From OT standpoint, recommendation at time of d/c would be max level I.  The patient's raw score on the AM-PAC Daily Activity Inpatient Short Form is 20. A raw score of greater than or equal to 19 suggests the patient may benefit from discharge to home. Please refer to the recommendation of the Occupational Therapist for safe discharge planning.  Patient to benefit from continued Occupational Therapy treatment while in the hospital to address deficits as defined above and maximize level of functional independence with ADLs and functional mobility.   Plan   Treatment Interventions ADL retraining;Functional transfer training;Endurance training;Patient/family training;Equipment evaluation/education;Compensatory technique education;Continued evaluation;Activityengagement   Goal Expiration Date 04/30/25   OT Treatment Day 2   OT Frequency 2-3x/wk   Discharge Recommendation   Rehab Resource Intensity Level, OT I (Maximum Resource Intensity)   AM-PAC Daily Activity Inpatient   Lower Body Dressing 3   Bathing 3   Toileting 3   Upper Body Dressing 3   Grooming 4   Eating 4   Daily Activity Raw Score 20   Daily Activity Standardized Score (Calc for Raw Score >=11) 42.03   AM-[facility] Applied Cognition Inpatient   Following a Speech/Presentation 3   Understanding Ordinary Conversation 4   Taking Medications 3   Remembering Where Things Are Placed or Put Away 3   Remembering List of 4-5 Errands 3   Taking Care of Complicated Tasks 3   Applied Cognition Raw Score 19   Applied Cognition Standardized Score 39.77   End of Consult   Education Provided Yes  (safe shower transfer method)   Patient Position at End of Consult Supine;Bed/Chair alarm activated;All needs within reach   Nurse Communication Nurse aware of consult   Raina Manning OTR/L

## 2025-04-22 ENCOUNTER — HOSPITAL ENCOUNTER (INPATIENT)
Facility: HOSPITAL | Age: 40
LOS: 8 days | Discharge: HOME | DRG: 862 | End: 2025-04-30
Attending: INTERNAL MEDICINE | Admitting: INTERNAL MEDICINE
Payer: MEDICARE

## 2025-04-22 VITALS
SYSTOLIC BLOOD PRESSURE: 133 MMHG | TEMPERATURE: 97.8 F | OXYGEN SATURATION: 95 % | HEART RATE: 58 BPM | RESPIRATION RATE: 18 BRPM | DIASTOLIC BLOOD PRESSURE: 77 MMHG

## 2025-04-22 DIAGNOSIS — I10 HYPERTENSION: ICD-10-CM

## 2025-04-22 DIAGNOSIS — M48.02 CERVICAL SPINAL STENOSIS: Primary | ICD-10-CM

## 2025-04-22 DIAGNOSIS — G44.86 CERVICOGENIC HEADACHE: ICD-10-CM

## 2025-04-22 DIAGNOSIS — G89.29 CHRONIC NECK PAIN: ICD-10-CM

## 2025-04-22 DIAGNOSIS — F19.959 PSYCHOACTIVE SUBSTANCE-INDUCED PSYCHOSIS (HCC): ICD-10-CM

## 2025-04-22 DIAGNOSIS — Z74.09 IMPAIRED MOBILITY AND ACTIVITIES OF DAILY LIVING: ICD-10-CM

## 2025-04-22 DIAGNOSIS — J45.909 ASTHMA, UNSPECIFIED ASTHMA SEVERITY, UNSPECIFIED WHETHER COMPLICATED, UNSPECIFIED WHETHER PERSISTENT: ICD-10-CM

## 2025-04-22 DIAGNOSIS — M54.2 CHRONIC NECK PAIN: ICD-10-CM

## 2025-04-22 DIAGNOSIS — K11.9 LESION OF PAROTID GLAND: ICD-10-CM

## 2025-04-22 DIAGNOSIS — Z78.9 IMPAIRED MOBILITY AND ACTIVITIES OF DAILY LIVING: ICD-10-CM

## 2025-04-22 DIAGNOSIS — F41.9 ANXIETY: ICD-10-CM

## 2025-04-22 PROBLEM — Z75.8 DOES NOT HAVE PRIMARY CARE PROVIDER: Status: ACTIVE | Noted: 2025-04-22

## 2025-04-22 PROBLEM — F29 PSYCHOSIS (HCC): Status: ACTIVE | Noted: 2025-04-22

## 2025-04-22 PROCEDURE — 99239 HOSP IP/OBS DSCHRG MGMT >30: CPT | Performed by: INTERNAL MEDICINE

## 2025-04-22 PROCEDURE — NC001 PR NO CHARGE: Performed by: STUDENT IN AN ORGANIZED HEALTH CARE EDUCATION/TRAINING PROGRAM

## 2025-04-22 PROCEDURE — 99255 IP/OBS CONSLTJ NEW/EST HI 80: CPT | Performed by: STUDENT IN AN ORGANIZED HEALTH CARE EDUCATION/TRAINING PROGRAM

## 2025-04-22 PROCEDURE — 99222 1ST HOSP IP/OBS MODERATE 55: CPT | Performed by: INTERNAL MEDICINE

## 2025-04-22 RX ORDER — ENOXAPARIN SODIUM 100 MG/ML
40 INJECTION SUBCUTANEOUS DAILY
Status: DISCONTINUED | OUTPATIENT
Start: 2025-04-23 | End: 2025-04-30 | Stop reason: HOSPADM

## 2025-04-22 RX ORDER — LISINOPRIL 10 MG/1
10 TABLET ORAL DAILY
Status: DISCONTINUED | OUTPATIENT
Start: 2025-04-23 | End: 2025-04-24

## 2025-04-22 RX ORDER — HYDROXYZINE HYDROCHLORIDE 25 MG/1
25 TABLET, FILM COATED ORAL EVERY 6 HOURS PRN
Status: DISCONTINUED | OUTPATIENT
Start: 2025-04-22 | End: 2025-04-30 | Stop reason: HOSPADM

## 2025-04-22 RX ORDER — POLYETHYLENE GLYCOL 3350 17 G/17G
17 POWDER, FOR SOLUTION ORAL DAILY PRN
Status: DISCONTINUED | OUTPATIENT
Start: 2025-04-22 | End: 2025-04-30 | Stop reason: HOSPADM

## 2025-04-22 RX ORDER — QUETIAPINE FUMARATE 100 MG/1
300 TABLET, FILM COATED ORAL
Status: DISCONTINUED | OUTPATIENT
Start: 2025-04-22 | End: 2025-04-30 | Stop reason: HOSPADM

## 2025-04-22 RX ORDER — NICOTINE 21 MG/24HR
1 PATCH, TRANSDERMAL 24 HOURS TRANSDERMAL DAILY
Status: DISCONTINUED | OUTPATIENT
Start: 2025-04-23 | End: 2025-04-24

## 2025-04-22 RX ORDER — OXYCODONE HYDROCHLORIDE 5 MG/1
5 TABLET ORAL EVERY 4 HOURS PRN
Refills: 0 | Status: DISCONTINUED | OUTPATIENT
Start: 2025-04-22 | End: 2025-04-30 | Stop reason: HOSPADM

## 2025-04-22 RX ORDER — NICOTINE 21 MG/24HR
1 PATCH, TRANSDERMAL 24 HOURS TRANSDERMAL DAILY
Start: 2025-04-23 | End: 2025-04-30

## 2025-04-22 RX ORDER — BISACODYL 10 MG
10 SUPPOSITORY, RECTAL RECTAL DAILY PRN
Status: DISCONTINUED | OUTPATIENT
Start: 2025-04-22 | End: 2025-04-30 | Stop reason: HOSPADM

## 2025-04-22 RX ORDER — POLYETHYLENE GLYCOL 3350 17 G/17G
17 POWDER, FOR SOLUTION ORAL DAILY
Status: DISCONTINUED | OUTPATIENT
Start: 2025-04-23 | End: 2025-04-22

## 2025-04-22 RX ORDER — METHOCARBAMOL 750 MG/1
750 TABLET, FILM COATED ORAL EVERY 6 HOURS SCHEDULED
Start: 2025-04-22 | End: 2025-04-30

## 2025-04-22 RX ORDER — OXYCODONE HYDROCHLORIDE 5 MG/1
2.5 TABLET ORAL EVERY 4 HOURS PRN
Start: 2025-04-22 | End: 2025-04-30

## 2025-04-22 RX ORDER — GABAPENTIN 400 MG/1
400 CAPSULE ORAL 3 TIMES DAILY
Status: DISCONTINUED | OUTPATIENT
Start: 2025-04-22 | End: 2025-04-24

## 2025-04-22 RX ORDER — ACETAMINOPHEN 325 MG/1
975 TABLET ORAL EVERY 8 HOURS
Status: DISCONTINUED | OUTPATIENT
Start: 2025-04-22 | End: 2025-04-30 | Stop reason: HOSPADM

## 2025-04-22 RX ORDER — LISINOPRIL 10 MG/1
10 TABLET ORAL DAILY
Status: ON HOLD
Start: 2025-04-23 | End: 2025-04-28

## 2025-04-22 RX ORDER — DULOXETIN HYDROCHLORIDE 20 MG/1
20 CAPSULE, DELAYED RELEASE ORAL DAILY
Status: DISCONTINUED | OUTPATIENT
Start: 2025-04-23 | End: 2025-04-30 | Stop reason: HOSPADM

## 2025-04-22 RX ORDER — SENNOSIDES 8.6 MG
2 TABLET ORAL
Status: DISCONTINUED | OUTPATIENT
Start: 2025-04-23 | End: 2025-04-30 | Stop reason: HOSPADM

## 2025-04-22 RX ORDER — GABAPENTIN 300 MG/1
300 CAPSULE ORAL 3 TIMES DAILY
Status: DISCONTINUED | OUTPATIENT
Start: 2025-04-22 | End: 2025-04-22

## 2025-04-22 RX ORDER — HYDRALAZINE HYDROCHLORIDE 25 MG/1
25 TABLET, FILM COATED ORAL EVERY 8 HOURS PRN
Status: DISCONTINUED | OUTPATIENT
Start: 2025-04-22 | End: 2025-04-30 | Stop reason: HOSPADM

## 2025-04-22 RX ORDER — POLYETHYLENE GLYCOL 3350 17 G/17G
17 POWDER, FOR SOLUTION ORAL DAILY
Start: 2025-04-23 | End: 2025-04-30

## 2025-04-22 RX ORDER — OXYCODONE HYDROCHLORIDE 5 MG/1
5 TABLET ORAL EVERY 4 HOURS PRN
Start: 2025-04-22 | End: 2025-04-30

## 2025-04-22 RX ORDER — METHOCARBAMOL 750 MG/1
750 TABLET, FILM COATED ORAL EVERY 6 HOURS SCHEDULED
Status: DISCONTINUED | OUTPATIENT
Start: 2025-04-22 | End: 2025-04-30 | Stop reason: HOSPADM

## 2025-04-22 RX ORDER — AMOXICILLIN 250 MG
1 CAPSULE ORAL
Status: DISCONTINUED | OUTPATIENT
Start: 2025-04-22 | End: 2025-04-22

## 2025-04-22 RX ORDER — DOCUSATE SODIUM 100 MG/1
100 CAPSULE, LIQUID FILLED ORAL 2 TIMES DAILY
Status: DISCONTINUED | OUTPATIENT
Start: 2025-04-22 | End: 2025-04-30 | Stop reason: HOSPADM

## 2025-04-22 RX ORDER — ACETAMINOPHEN 325 MG/1
975 TABLET ORAL EVERY 8 HOURS
Status: ON HOLD
Start: 2025-04-22 | End: 2025-04-29

## 2025-04-22 RX ORDER — ALBUTEROL SULFATE 90 UG/1
2 INHALANT RESPIRATORY (INHALATION) EVERY 4 HOURS PRN
Status: DISCONTINUED | OUTPATIENT
Start: 2025-04-22 | End: 2025-04-30 | Stop reason: HOSPADM

## 2025-04-22 RX ORDER — AMOXICILLIN 250 MG
1 CAPSULE ORAL
Start: 2025-04-22 | End: 2025-04-30

## 2025-04-22 RX ADMIN — ACETAMINOPHEN 975 MG: 325 TABLET, FILM COATED ORAL at 14:08

## 2025-04-22 RX ADMIN — ACETAMINOPHEN 975 MG: 325 TABLET, FILM COATED ORAL at 21:51

## 2025-04-22 RX ADMIN — GABAPENTIN 400 MG: 400 CAPSULE ORAL at 21:52

## 2025-04-22 RX ADMIN — METHOCARBAMOL 750 MG: 750 TABLET ORAL at 06:18

## 2025-04-22 RX ADMIN — METHOCARBAMOL 750 MG: 750 TABLET ORAL at 00:16

## 2025-04-22 RX ADMIN — DICLOFENAC SODIUM 2 G: 10 GEL TOPICAL at 09:45

## 2025-04-22 RX ADMIN — OXYCODONE HYDROCHLORIDE 5 MG: 5 TABLET ORAL at 19:00

## 2025-04-22 RX ADMIN — LISINOPRIL 10 MG: 10 TABLET ORAL at 09:43

## 2025-04-22 RX ADMIN — ENOXAPARIN SODIUM 40 MG: 40 INJECTION SUBCUTANEOUS at 09:43

## 2025-04-22 RX ADMIN — QUETIAPINE FUMARATE 300 MG: 100 TABLET ORAL at 21:51

## 2025-04-22 RX ADMIN — OXYCODONE HYDROCHLORIDE 5 MG: 5 TABLET ORAL at 05:29

## 2025-04-22 RX ADMIN — POLYETHYLENE GLYCOL 3350 17 G: 17 POWDER, FOR SOLUTION ORAL at 09:42

## 2025-04-22 RX ADMIN — DICLOFENAC SODIUM 2 G: 10 GEL TOPICAL at 21:54

## 2025-04-22 RX ADMIN — GABAPENTIN 300 MG: 300 CAPSULE ORAL at 06:18

## 2025-04-22 RX ADMIN — DICLOFENAC SODIUM 2 G: 10 GEL TOPICAL at 19:04

## 2025-04-22 RX ADMIN — ACETAMINOPHEN 975 MG: 325 TABLET, FILM COATED ORAL at 05:29

## 2025-04-22 RX ADMIN — DOCUSATE SODIUM 100 MG: 100 CAPSULE, LIQUID FILLED ORAL at 19:00

## 2025-04-22 RX ADMIN — HYDROXYZINE HYDROCHLORIDE 25 MG: 25 TABLET, FILM COATED ORAL at 21:51

## 2025-04-22 RX ADMIN — GABAPENTIN 300 MG: 300 CAPSULE ORAL at 14:08

## 2025-04-22 RX ADMIN — METHOCARBAMOL 750 MG: 750 TABLET ORAL at 14:07

## 2025-04-22 RX ADMIN — METHOCARBAMOL 750 MG: 750 TABLET ORAL at 19:01

## 2025-04-22 NOTE — ASSESSMENT & PLAN NOTE
"Noted on MRI of the cervical spine  \"1.2 cm lesion in left deep parotid gland just posterior and slightly medial to left retromandibular vein. 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 Recommend ENT consultation for further evaluation.\"  "

## 2025-04-22 NOTE — RESTORATIVE TECHNICIAN NOTE
Restorative Technician Note      Patient Name: Amado Chin     Note Type: Mobility  Patient Position Upon Consult: Supine  Activity Performed: Ambulated; Dangled; Stood  Assistive Device: Roller walker; Other (Comment) (Assist x1 with chair follow.)  Education Provided: Yes  Patient Position at End of Consult: Supine; All needs within reach; Bed/Chair alarm activated    Tasia CHEEK, Restorative Technician,

## 2025-04-22 NOTE — ASSESSMENT & PLAN NOTE
Likely secondary to pain/ headache/marijuana vaping and steroid effect  Started on lisinopril 10 mg daily; continue on discharge

## 2025-04-22 NOTE — ASSESSMENT & PLAN NOTE
Patient with multiple ED visits and hospitalization for chronic neck pain /headache  Per patient, it had been well-managed however recently has had increasing pain particularly in the bitemporal area as well as the neck radiating to the back and associated with stiffness and intermittent dizziness  History of psychosis previous inpatient psychiatric admission on 7/2024  Noted home meds: Patient takes gabapentin and naproxen at home.  Patient not currently on any opioids per PDMP  Patient has been referred to outpatient pain management and has yet to follow-up with him  He also reports intermittent tingling in his upper extremities as well as saddle anesthesia at times and urinary dribbling/incontinence    Cervical spine CT scan without acute abnormality  Patient was evaluated by neurology suspect headache is cervicogenic in origin  Cervical and lumbar spine MRI showed progression of degenerative changes but no cord compression, no further inpatient neurology workup  He was recommended to establish an outpatient pain management  Acute pain service consulted  Patient completed oral prednisone course  Ambulatory referral to comprehensive spine program as well as [facility] and [facility] have been placed  Discharged to [facility] for ongoing care and rehabilitation

## 2025-04-22 NOTE — ASSESSMENT & PLAN NOTE
Patient was evaluated by the rehabilitation team MD and an appropriate candidate for acute inpatient rehabilitation program at this time.  The patient will tolerate 3 hours/day 5 to 7 days/week of intensive physical, occupational in order to obtain goals for community discharge  Due to the patient's functional Compared to their baseline level of function in addition to their ongoing medical needs, the patient would benefit from daily supervision from a rehabilitation physician as well as rehabilitation nursing to implement and adjust the medical as well as functional plan of care in order to meet the patient's goals.

## 2025-04-22 NOTE — PLAN OF CARE
Problem: PAIN - ADULT  Goal: Verbalizes/displays adequate comfort level or baseline comfort level  Description: Interventions:- Encourage patient to monitor pain and request assistance- Assess pain using appropriate pain scale- Administer analgesics based on type and severity of pain and evaluate response- Implement non-pharmacological measures as appropriate and evaluate response- Consider cultural and social influences on pain and pain management- Notify physician/advanced practitioner if interventions unsuccessful or patient reports new pain  Outcome: Progressing     Problem: SAFETY ADULT  Goal: Patient will remain free of falls  Description: INTERVENTIONS:- Educate patient/family on patient safety including physical limitations- Instruct patient to call for assistance with activity - Consult OT/PT to assist with strengthening/mobility - Keep Call bell within reach- Keep bed low and locked with side rails adjusted as appropriate- Keep care items and personal belongings within reach- Initiate and maintain comfort rounds- Make Fall Risk Sign visible to staff- Offer Toileting every 2 Hours, in advance of need- Initiate/Maintain bed and chair alarm- Obtain necessary fall risk management equipment: non skid socks - Apply yellow socks and bracelet for high fall risk patients- Consider moving patient to room near nurses station  Outcome: Progressing     Problem: NEUROSENSORY - ADULT  Goal: Achieves stable or improved neurological status  Description: INTERVENTIONS- Monitor and report changes in neurological status- Monitor vital signs such as temperature, blood pressure, glucose, and any other labs ordered - Initiate measures to prevent increased intracranial pressure- Monitor for seizure activity and implement precautions if appropriate    Outcome: Progressing      Never

## 2025-04-22 NOTE — CASE MANAGEMENT
Case Management Discharge Planning Note    Patient name Amado Chin  [facility] MRN [id number]  : 1985 Date 2025       Current Admission Date: 4/15/2025  Current Admission Diagnosis:Neck pain   Patient Active Problem List    Diagnosis Date Noted Date Diagnosed    Headache 04/15/2025     Polysubstance abuse (HCC) 2024     Psychoactive 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HCC) 2024     Drug use 2024     Cellulitis of left lower extremity 2024     Homelessness 2024     Cervical spinal stenosis 2024     Cervical radiculopathy 2024     Chronic bilateral low back pain 2023     Chronic neck pain 2023     Recurrent major depressive disorder (HCC) 2022     Anxiety 2022     Substance induced mood disorder (HCC) 2022     Mood disorder due to medical condition 2022     Opioid abuse (HCC) 02/10/2022     Fecal smearing 2022     Urinary incontinence 2022     Drug-induced constipation 2022     Intractable pain 2021     Vitamin B12 deficiency 2021     Asthma 2021     Tobacco abuse 2021     Dizziness 2021     Neck pain 2021     Elevated blood pressure reading 2021     Weakness of both lower extremities 2021     Cervical disc herniation 2020     Paresthesia and pain of extremity 2020     Fall 2020     Concussion without loss of consciousness 2020     Acute pain of right shoulder 2020     Alcohol abuse 2020     Burn 2020     Furuncle of axilla 2020       LOS (days): 6  Geometric Mean LOS (GMLOS) (days):   Days to GMLOS:     OBJECTIVE:  Risk of Unplanned Readmission Score: 13.54         Current admission status: Inpatient   Preferred Pharmacy:   [id number] - [place], [place] - [address]  [place] [address]  Phone: [phone] Fax: [phone]    [facility] - [place], [place]  - [address]  [address] [facility]  [place] [address]  Phone: [phone] Fax: [phone]    [facility] - [place] - [address]  [address]  [place] [address]  Phone: [phone] Fax: [phone]    Primary Care Provider: No primary care provider on file.    Primary Insurance: [employer]  Secondary Insurance:     DISCHARGE DETAILS:    Discharge planning discussed with:: Patient                Other Referral/Resources/Interventions Provided:  Referral Comments: Insurance auth. received. [facility] has bed available for patient, room 960.  Provider, nurse and patient made aware.         Treatment Team Recommendation: Acute Rehab  Discharge Destination Plan:: Acute Rehab

## 2025-04-22 NOTE — ASSESSMENT & PLAN NOTE
Smokes half a pack a day as an outpatient  Not interested in a patch or gum or replacement therapy at this time  Education on smoking and pain provided

## 2025-04-22 NOTE — ASSESSMENT & PLAN NOTE
History of B12 deficiency but most recent level within normal range tested back in May 2024  Not currently on supplementation will need to have follow-up levels on next set of routine labs

## 2025-04-22 NOTE — ASSESSMENT & PLAN NOTE
Associated with neck pain  Has had multiple ED visits and hospitalization for chronic neck pain and headaches  He saw NS here at [facility] in 2/2022 and became verbally abusive and threatening to the PA and Dr Johnson and they will no longer see him  He was then seen by NS at [facility] 9/2022 and they did not recommend surgery but referred to pain management  Takes gabapentin and naproxen at home but no opioids.  Cervical spine CT scan without acute abnormality  Cervical/lumbar spine MRI = progression of degenerative changes but no cord compression,   No further inpatient neurology workup  Acute pain service saw during his hospital stay  He was recommended to establish an outpatient pain management   Ambulatory referral to comprehensive spine program as well as [facility] and pain were placed

## 2025-04-22 NOTE — PLAN OF CARE
Problem: PAIN - ADULT  Goal: Verbalizes/displays adequate comfort level or baseline comfort level  Description: Interventions:- Encourage patient to monitor pain and request assistance- Assess pain using appropriate pain scale- Administer analgesics based on type and severity of pain and evaluate response- Implement non-pharmacological measures as appropriate and evaluate response- Consider cultural and social influences on pain and pain management- Notify physician/advanced practitioner if interventions unsuccessful or patient reports new pain  Outcome: Progressing     Problem: SAFETY ADULT  Goal: Patient will remain free of falls  Description: INTERVENTIONS:- Educate patient/family on patient safety including physical limitations- Instruct patient to call for assistance with activity - Consult OT/PT to assist with strengthening/mobility - Keep Call bell within reach- Keep bed low and locked with side rails adjusted as appropriate- Keep care items and personal belongings within reach- Initiate and maintain comfort rounds- Make Fall Risk Sign visible to staff- Offer Toileting every 2 Hours, in advance of need- Initiate/Maintain bed and chair alarm- Obtain necessary fall risk management equipment: non skid socks - Apply yellow socks and bracelet for high fall risk patients- Consider moving patient to room near nurses station  Outcome: Progressing  Goal: Maintain or return to baseline ADL function  Description: INTERVENTIONS:-  Assess patient's ability to carry out ADLs; assess patient's baseline for ADL function and identify physical deficits which impact ability to perform ADLs (bathing, care of mouth/teeth, toileting, grooming, dressing, etc.)- Assess/evaluate cause of self-care deficits - Assess range of motion- Assess patient's mobility; develop plan if impaired- Assess patient's need for assistive devices and provide as appropriate- Encourage maximum independence but intervene and supervise when necessary-  Involve family in performance of ADLs- Assess for home care needs following discharge - Consider OT consult to assist with ADL evaluation and planning for discharge- Provide patient education as appropriate  Outcome: Progressing  Goal: Maintains/Returns to pre admission functional level  Description: INTERVENTIONS:- Perform AM-PAC 6 Click Basic Mobility/ Daily Activity assessment daily.- Set and communicate daily mobility goal to care team and patient/family/caregiver. - Collaborate with rehabilitation services on mobility goals if consulted- Perform Range of Motion 3 times a day.- Reposition patient every 2 hours.- Dangle patient 3 times a day- Stand patient 3 times a day- Ambulate patient 3 times a day- Out of bed to chair 3 times a day - Out of bed for meals 3 times a day- Out of bed for toileting- Record patient progress and toleration of activity level   Outcome: Progressing     Problem: SKIN/TISSUE INTEGRITY - ADULT  Goal: Skin Integrity remains intact(Skin Breakdown Prevention)  Description: Assess:-Perform Oliver assessment every shift-Clean and moisturize skin every day/prn-Inspect skin when repositioning, toileting, and assisting with ADLS-Assess under medical devices such as masimo every hr-Assess extremities for adequate circulation and sensation Bed Management:-Have minimal linens on bed & keep smooth, unwrinkled-Change linens as needed when moist or perspiring-Avoid sitting or lying in one position for more than 2 hours while in bed-Keep HOB at 30 degrees Toileting:-Offer bedside commode-Assess for incontinence every hr Use incontinent care products after each incontinent episode such as soap and water Activity:-Mobilize patient 3 times a day-Encourage activity and walks on unit-Encourage or provide ROM exercises -Turn and reposition patient every 2 Hours-Use appropriate equipment to lift or move patient in bed-Instruct/ Assist with weight shifting every hr when out of bed in chair-Consider limitation  of chair time 4 hour intervalsSkin Care:-Avoid use of baby powder, tape, friction and shearing, hot water or constrictive clothing-Relieve pressure over bony prominences using cushion -Do not massage red bony areasNext Steps:-Teach patient strategies to minimize risks such as wounds  -Consider consults to  interdisciplinary teams such as wounds  Outcome: Progressing  Goal: Incision(s), wounds(s) or drain site(s) healing without S/S of infection  Description: INTERVENTIONS- Assess and document dressing, incision, wound bed, drain sites and surrounding tissue- Provide patient and family education- Perform skin care/dressing changes every day/prn  Outcome: Progressing  Goal: Pressure injury heals and does not worsen  Description: Interventions:- Implement low air loss mattress or specialty surface (Criteria met)- Apply silicone foam dressing- Instruct/assist with weight shifting every 60 minutes when in chair - Limit chair time to 4 hour intervals- Use special pressure reducing interventions such as cushion when in chair - Apply fecal or urinary incontinence containment device - Perform passive or active ROM every 2- Turn and reposition patient & offload bony prominences every 2 hours - Utilize friction reducing device or surface for transfers - Consider consults to  interdisciplinary teams such as wounds- Use incontinent care products after each incontinent episode such as soap and water - Consider nutrition services referral as needed  Outcome: Progressing

## 2025-04-22 NOTE — DISCHARGE SUMMARY
Discharge Summary - Hospitalist   Name: Amado Chin 39 y.o. male I MRN: [id number]  Unit/Bed#: [facility] 711-01 I Date of Admission: 4/15/2025   Date of Service: 4/22/2025 I Hospital Day: 6     Assessment & Plan  Neck pain  Patient with multiple ED visits and hospitalization for chronic neck pain /headache  Per patient, it had been well-managed however recently has had increasing pain particularly in the bitemporal area as well as the neck radiating to the back and associated with stiffness and intermittent dizziness  History of psychosis previous inpatient psychiatric admission on 7/2024  Noted home meds: Patient takes gabapentin and naproxen at home.  Patient not currently on any opioids per PDMP  Patient has been referred to outpatient pain management and has yet to follow-up with him  He also reports intermittent tingling in his upper extremities as well as saddle anesthesia at times and urinary dribbling/incontinence    Cervical spine CT scan without acute abnormality  Patient was evaluated by neurology suspect headache is cervicogenic in origin  Cervical and lumbar spine MRI showed progression of degenerative changes but no cord compression, no further inpatient neurology workup  He was recommended to establish an outpatient pain management  Acute pain service consulted  Patient completed oral prednisone course  Ambulatory referral to comprehensive spine program as well as [facility] spine and pain have been placed  Discharged to [facility] for ongoing care and rehabilitation  Cervical spinal stenosis  Plan noted above.  Elevated blood pressure reading  Likely secondary to pain/ headache/marijuana vaping and steroid effect  Started on lisinopril 10 mg daily; continue on discharge  Headache  Cervicogenic in origin  Continue with above pain control  Polysubstance abuse (HCC)  Admits to prior use of methamphetamines but states he has not used this in several years.  Admits to occasional use of marijuana currently.  Nursing  found marijuana vape pen in his room and was taken away  Urine drug screen positive for THC  Denies any other drug use.  Tobacco abuse  Patient admits to smoking half a pack of cigarettes daily.   Nicotine patch ordered  Asthma  No acute exacerbation  Anxiety  Continue with Seroquel 300 mg at bedtime  Lesion of parotid gland  Incidental finding on CT of the cervical spine.  1.2 cm lesion in the left deep parotid gland.  Differential includes benign and malignant lesions.  Will need outpatient follow-up with ENT after discharge from rehab.  Will place ambulatory referral on discharge.     Medical Problems       Resolved Problems  Date Reviewed: 9/4/2024   None       Discharging Physician / Practitioner: Troy Rosales DO  PCP: No primary care provider on file.  Admission Date:   Admission Orders (From admission, onward)       Ordered        04/16/25 0814  INPATIENT ADMISSION  Once            04/15/25 1316  Place in Observation  Once                          Discharge Date: 04/22/25    Consultations During Hospital Stay:  Neurology  Acute Pain Service    Procedures Performed:   none    Significant Findings / Test Results:   MRI lumbar spine 4/16/25:  No acute abnormality of lumbar spine.  Level degenerative changes of lumbar spine with varying degrees of canal stenosis mild at L5-S1, and foraminal narrowing mild bilateral L4-L5 and bilateral L5-S1, worse at L5-S1.  MRI cervical spine 4/16/2025:  No acute abnormality of the cervical spine  Multilevel degenerative changes of cervical spine with varying degrees of canal stenosis mild multilevel but worst at C6-C7, and foraminal narrowing severe right C6-C7.  1.2 cm lesion in the left deep parotid gland just posterior and slightly medial to the left retromandibular vein.  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  Recommend ENT consultation for further evaluation.    Incidental Findings:   Left parotid gland lesion incidentally  identified on CT of the cervical spine.  I reviewed the above mentioned incidental findings with the patient and/or family and they expressed understanding.  Outpatient ambulatory referral sent to ENT on discharge.    Test Results Pending at Discharge (will require follow up):   None at this time     Outpatient Tests Requested:  None at this time    Complications:  none    Reason for Admission: neck pain    Hospital Course:   Amado Chin is a 39 y.o. male patient who originally presented to the hospital on 4/15/2025 due to acute on chronic neck pain.  He was admitted to the hospitalist service and seen in consult by acute pain service and neurology.  He had repeat MRIs performed of his cervical and lumbar spines.  While he did show progression of some degenerative changes there was no acute abnormalities, or need for surgical intervention.  He was managed conservatively with a steroid course.  He improved modestly prior to discharge.  Will be discharged to acute rehab at [facility] for ongoing care and rehabilitation.  All questions and concerns addressed prior to discharge.    Please see above list of diagnoses and related plan for additional information.     Condition at Discharge: good    Discharge Day Visit / Exam:   Subjective: Patient seen and examined on the day of discharge.  No events overnight.  Neck pain unchanged.  Still having trouble with ambulation.  Afebrile.  Vitals: Blood Pressure: 133/77 (04/22/25 0943)  Pulse: 58 (04/22/25 0943)  Temperature: 97.8 °F (36.6 °C) (04/22/25 0700)  Temp Source: Oral (04/22/25 0700)  Respirations: 18 (04/22/25 0700)  SpO2: 95 % (04/22/25 0943)  PHYSICAL EXAM:    Vitals signs reviewed  Constitutional   Awake and cooperative. NAD.   Head/Neck   Normocephalic. Atraumatic.   HEENT   No scleral icterus. EOMI.   Heart   Regular rate and rhythm. No murmurs.   Lungs   Clear to auscultation bilaterally. Respirations unlaboured.   Abdomen   Soft. Nontender. Nondistended.    Skin    Skin color normal. No rashes.   Extremities   No deformities. No peripheral edema.   Neuro   Alert and oriented. No new deficits.   Psych   Mood stable. Affect normal.         Discussion with Family: Patient declined call to .     Discharge instructions/Information to patient and family:   See after visit summary for information provided to patient and family.      Provisions for Follow-Up Care:  See after visit summary for information related to follow-up care and any pertinent home health orders.      Mobility at time of Discharge:   Basic Mobility Inpatient Raw Score: 19  JH-HLM Goal: 6: Walk 10 steps or more  JH-HLM Achieved: 6: Walk 10 steps or more  HLM Goal achieved. Continue to encourage appropriate mobility.     Disposition:   Acute Rehab at [facility].    Planned Readmission: no    Discharge Medications:  See after visit summary for reconciled discharge medications provided to patient and/or family.      Administrative Statements   Discharge Statement:  I have spent a total time of 45 minutes in caring for this patient on the day of the visit/encounter. >30 minutes of time was spent on: Diagnostic results, Prognosis, Risks and benefits of tx options, Instructions for management, Patient and family education, Importance of tx compliance, Risk factor reductions, Impressions, Counseling / Coordination of care, Documenting in the medical record, Reviewing / ordering tests, medicine, procedures  , and Communicating with other healthcare professionals .    **Please Note: This note may have been constructed using a voice recognition system**

## 2025-04-22 NOTE — PROGRESS NOTES
PHYSICAL MEDICINE AND REHABILITATION   PREADMISSION ASSESSMENT     Projected IGC and Rehabilitation Diagnoses:  Impairment of mobility, safety and Activities of Daily Living (ADLs) due to Neurologic Conditions:  03.9  Other Neurologic Disorder cervicogenic headache  Etiologic: cervicogenic headache  Date of Onset: 4/15/25   Date of surgery: n/a    PATIENT INFORMATION  Name: Amado Chin Phone #: There are no phone numbers on file.  Address: [address] [place] [address]  YOB: 1985 Age: 39 y.o. #   Marital Status: Single  Ethnicity: ,  and or Syriac  Employment Status: on disability  Extended Emergency Contact Information  Primary Emergency Contact: Laurel,Cecy  Mobile Phone: [phone]  Relation: Friend  Advance Directive: Level 1 Full Code (no ACP docs)    INSURANCE/COVERAGE:     Primary Payor: [employer] / Plan: [employer] / Product Type: Medicaid HMO /  #[id number] Secondary Payer:Self pay   Payer Contact:  Payer Contact:   Contact Phone:  Contact Phone:     Authorization #:  [id number]   Coverage Dates: 4/18/25-5/4/25  LCD: 5/4/25  Submit Next Review to: Vannessa Salazar at insurance a new SOC (4/22/25) date can be faxed to [phone] on 5/3/25  MEDICARE #: n/a  Medicare Days:  n/a  Medical Record #: [id number]    REFERRAL SOURCE:   Referring provider: Troy Rosales, *  Referring facility: [facility]  Room: [facility] 711/[facility]-  PCP: No primary care provider on file. PCP phone number: None    MEDICAL INFORMATION  HPI: This is a 39 year old male who presented to [facility] ER on 4/15/25 for intractable neck pain and bitemporal headache.  Patient has been seen in the ER multiple times over the past year for neck pain.  His most recent MRI of the cervical spine in May 2024 demonstrated multilevel degenerative changes including a .  Ct disc bulge at C5-C6 and C6 -C7.  CT  cervical spine was completed in the ER did not show any acute findings.  At baseline, patient is on an extensive pain regimen at home including cyclobenzaprine, duloxetine, gabapentin, ibuprofen and methocarbamol. On exam, he had limited neck range of motion.  He reported bilateral upper extremity weakness, primarily due to pain.  Both lower extremities were 5/5 although he did endorse diffuse soreness.  Patient also reported dizziness and an unstable gait as well as blurry vision since day before presentation to the ER.  Furthermore he reported intermittent tingling in his upper extremities as well as saddle anesthesia at times and urinary dribbling/incontinence, but not consistently.  Neurology was consulted and suspected that his headache was cervicogenic in origin.  He has a longstanding history of neck pain that has not been appropriately treated in the outpatient setting.  Neurology recommended continuing his home pain regimen and reaching out to the acute pain service for any potential inpatient interventions or adjustments. He was started on a course of steroids.  MRI cervical and lumbar spine was completed and showed progression of degenerative changes as suspected.  No further workup was recommended.  Patient also noted that have elevated BP, likely secondary to pain and steroid effect.  Lisinopril was added and then increased on 4/22.  He was also given IV hydralazine PRN.  Patient worked with therapy and was recommended for rehab following his hospitalization.  He has demonstrated that he can tolerate three hours of therapy, five days a week and is now medically cleared for discharge to the [facility].    Past Medical History:   Past Surgical History:  .   Allergies:     Past Medical History:   Diagnosis Date    Asthma     Chronic pain     Hypertension     Neck pain     Psychoactive 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HCC)     Thyroglossal duct cyst     Past Surgical History:   Procedure Laterality Date     "THYROGLOSSAL DUCT EXCISION      THYROID SURGERY      THYROID SURGERY       No Known Allergies      Medical/functional conditions requiring inpatient rehabilitation:   Neck pain/headache-cervicogenic in origin  Progression of degenerative changes on cervical and lumbar spine  Cervical spine stenosis  Elevated BP  Intermittent tingling in upper extremities  Saddle anesthesia  Urinary dribbling/incontinence  Polysubstance abuse  Tobacco abuse  Asthma  Anxiety  Chronic pain  Impaired mobility  Impaired self care    Risk for medical/clinical complications: risk for falls, risk for uncontrolled pain, risk for skin breakdown, risk for DVT/PE, risk for hypertensive episodes, risk for infection, risk for persistent tachycardia    Comorbidities/Surgeries in the last 100 days:   Chronic pain  Cervical spine stenosis  Asthma  Anxiety  Neck pain    CURRENT VITAL SIGNS:   Temp:  [97.8 °F (36.6 °C)-98.5 °F (36.9 °C)] 97.8 °F (36.6 °C)  HR:  [] 58  BP: (128-181)/() 133/77  Resp:  [18] 18  SpO2:  [95 %-98 %] 95 %  O2 Device: None (Room air)   Intake/Output Summary (Last 24 hours) at 4/22/2025 1400  Last data filed at 4/21/2025 2100  Gross per 24 hour   Intake 240 ml   Output --   Net 240 ml        LABORATORY RESULTS:      Lab Results   Component Value Date    HGB 14.5 04/21/2025    HCT 42.3 04/21/2025    WBC 7.98 04/21/2025     Lab Results   Component Value Date    BUN 22 04/21/2025    BUN 26 09/05/2024    K 3.7 04/21/2025    K 4.3 09/05/2024     04/21/2025     09/05/2024    GLUCOSE 111 01/04/2019    CREATININE 0.87 04/21/2025    CREATININE 1.17 09/05/2024     No results found for: \"PROTIME\", \"INR\"     DIAGNOSTIC STUDIES:  MRI lumbar spine wo contrast  Result Date: 4/16/2025  Impression: No acute abnormality of lumbar spine. Multilevel degenerative changes of lumbar spine with varying degrees of canal stenosis (mild L5-S1) and foraminal narrowing (mild bilateral L4-L5 and bilateral L5-S1 - worse at left " L5-S1), as detailed above. Workstation performed: [id number]     MRI cervical spine wo contrast  Result Date: 4/16/2025  Impression: No acute abnormality of cervical spine. Multilevel degenerative changes of cervical spine with varying degrees of canal stenosis (multilevel mild, worse at C6-C7) and foraminal narrowing (severe right C6-C7), as detailed above. 1.2 cm lesion in left deep parotid gland just posterior and slightly medial to left retromandibular vein. 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 Recommend ENT consultation for further evaluation. The study was marked in EPIC for immediate notification. Workstation performed: [id number]     XR chest 2 views  Result Date: 4/15/2025  Impression: No acute cardiopulmonary disease. Workstation performed: [id number]     CT spine cervical without contrast  Result Date: 4/15/2025  Impression: No cervical spine fracture or traumatic malalignment. Workstation performed: [id number]       PRECAUTIONS/SPECIAL NEEDS:  Substance Abuse: patient has a h/o methamphetamine use and occasional use of marijuana, Tobacco:   Social History     Tobacco Use   Smoking Status Every Day    Current packs/day: 0.50    Types: Cigarettes   Smokeless Tobacco Never   Tobacco Comments    5 cigerettes a day    , Pain Management, Dietary Restrictions: Regular diet, Language Preference: English, and fall precautions    MEDICATIONS:     Current Facility-Administered Medications:     acetaminophen (TYLENOL) tablet 975 mg, 975 mg, Oral, Q8H, Juan Redman MD, 975 mg at 04/22/25 0529    albuterol (PROVENTIL HFA,VENTOLIN HFA) inhaler 2 puff, 2 puff, Inhalation, Q4H PRN, Sai Jerez PA-C, 2 puff at 04/21/25 0833    Diclofenac Sodium (VOLTAREN) 1 % topical gel 2 g, 2 g, Topical, 4x Daily, Silvio Rodas PA-C, 2 g at 04/22/25 0945    enoxaparin (LOVENOX) subcutaneous injection 40 mg, 40 mg, Subcutaneous, Daily, Juan Redman MD, 40 mg at  04/22/25 0943    gabapentin (NEURONTIN) capsule 300 mg, 300 mg, Oral, TID, Velasquez Villegas, DO, 300 mg at 04/22/25 0618    hydrALAZINE (APRESOLINE) injection 10 mg, 10 mg, Intravenous, Q6H PRN, Velasquez Villegas, DO, 10 mg at 04/21/25 2209    lisinopril (ZESTRIL) tablet 10 mg, 10 mg, Oral, Daily, Velasquez Villegas, DO, 10 mg at 04/22/25 0943    methocarbamol (ROBAXIN) tablet 750 mg, 750 mg, Oral, Q6H PETER, Silvio Rodas PA-C, 750 mg at 04/22/25 0618    naloxone (NARCAN) 0.04 mg/mL syringe 0.04 mg, 0.04 mg, Intravenous, Q1MIN PRN, Silvio Rodas PA-C    nicotine (NICODERM CQ) 14 mg/24hr TD 24 hr patch 1 patch, 1 patch, Transdermal, Daily, Juan Redman MD    ondansetron (ZOFRAN) injection 4 mg, 4 mg, Intravenous, Q4H PRN, Silvio Rodas PA-C    oxyCODONE (ROXICODONE) split tablet 2.5 mg, 2.5 mg, Oral, Q4H PRN, 2.5 mg at 04/18/25 1533 **OR** oxyCODONE (ROXICODONE) IR tablet 5 mg, 5 mg, Oral, Q4H PRN, Silvio Rodas PA-C, 5 mg at 04/22/25 0529    polyethylene glycol (MIRALAX) packet 17 g, 17 g, Oral, Daily, Silvio Rodas PA-C, 17 g at 04/22/25 0942    QUEtiapine (SEROquel) tablet 300 mg, 300 mg, Oral, HS, Velasquez Villegas DO, 300 mg at 04/21/25 2157    senna-docusate sodium (SENOKOT S) 8.6-50 mg per tablet 1 tablet, 1 tablet, Oral, HS, Silvio Rodas PA-C, 1 tablet at 04/21/25 2157    SKIN INTEGRITY:   Skin intact    PRIOR LEVEL OF FUNCTION:  He lives in a(n) apartment  Amado Chin is single and  lives with his sister .  Self Care: Independent, Indoor Mobility: independent with a RW, Stairs (in/outdoor): Independent, and Cognition: Independent    FALLS IN THE LAST 6 MONTHS: >10    HOME ENVIRONMENT:  The living area:  Patient lives in a 2nd floor apartment with a full flight of steps to enter.  No elevate access.  There are Greater than 10 steps to enter the home.    The patient will not have 24 hour supervision/physical assistance available upon discharge.    PREVIOUS DME:  Equipment in home (previous  "DME): None    FUNCTIONAL STATUS:  Physical Therapy Occupational Therapy Speech Therapy   04/21/25 0927    PT Last Visit   PT Visit Date 04/21/25   Note Type   Note Type Treatment for insurance authorization   Pain Assessment   Pain Assessment Tool 0-10   Pain Score 7   Pain Location/Orientation Location: Neck   Pain Onset/Description Onset: Ongoing   Patient's Stated Pain Goal No pain   Hospital Pain Intervention(s) Repositioned;Ambulation/increased activity;Emotional support   Restrictions/Precautions   Weight Bearing Precautions Per Order No   Other Precautions Chair Alarm;Bed Alarm;Multiple lines;Telemetry;Impulsive;Fall Risk;Pain   General   Chart Reviewed Yes   Response to Previous Treatment Patient with no complaints from previous session.   Family/Caregiver Present No   Cognition   Overall Cognitive Status WFL   Arousal/Participation Alert;Responsive;Cooperative   Attention Within functional limits   Orientation Level Oriented X4   Memory Within functional limits   Following Commands Follows one step commands without difficulty   Comments pt pleasant and cooperative   Subjective   Subjective \"I always feel dizzy\"   Bed Mobility   Supine to Sit 5  Supervision   Additional items HOB elevated   Sit to Supine Unable to assess   Additional Comments pt OOB in chair at end of session   Transfers   Sit to Stand 4  Minimal assistance   Additional items Assist x 1;Increased time required;Verbal cues   Stand to Sit 5  Supervision   Additional items Armrests;Increased time required;Verbal cues   Toilet transfer 4  Minimal assistance   Additional items Assist x 1;Increased time required;Verbal cues;Standard toilet   Additional Comments c RW. x7 STS throughout session progressing to S level   Ambulation/Elevation   Gait pattern Improper Weight shift;Wide ESME;Decreased foot clearance;Foward flexed;Inconsistent curry;Short stride;Excessively slow  (b/l knee hyperextension)   Gait Assistance 4  Minimal assist  (+chair " follow)   Additional items Assist x 1;Verbal cues   Assistive Device Rolling walker   Distance 25'+15'+10'   Stair Management Assistance Not tested   Ambulation/Elevation Additional Comments seated rest breaks throughout, posterior LOB x3 throughotu session requiring mod A to correct   Balance   Static Sitting Fair +   Dynamic Sitting Fair   Static Standing Poor +   Dynamic Standing Poor +   Ambulatory Poor   Endurance Deficit   Endurance Deficit Yes   Endurance Deficit Description pt limited by pain, dizziness, fatigue and decreased activity tolerance   Activity Tolerance   Activity Tolerance Patient limited by fatigue;Patient limited by pain   Medical Staff Made Aware OT Gabbi   Nurse Made Aware yes-RN cleared   Exercises   Neuro re-ed x5 STS without AD at CGA   Assessment   Prognosis Good   Problem List Decreased strength;Decreased endurance;Impaired balance;Decreased mobility;Decreased coordination;Impaired judgement;Decreased safety awareness;Pain;Impaired sensation   Assessment Pt pleasant and agreeable to participate in PT session. Continues to express neck pain and dizziness throughout session. Completes mobility and therapeutic activity as outlined above. Completes multiple STS throughout session progressing from min A to S level with RW. Pt continues to require min-mod A for ambulating short distances. Demonstrates wide ESME and unsteady gait. Pt at times stepping too far forwards with R leg causing him to lose his balance posteriorly and requires mod A to correct. Pt with increased rate of breathing after ambulating, reports it is from the pain. Pt then able to get into shower at end of PT session with min A. Pt left with OT in room with all needs met. Pt is progressing well towards his goals and will continue to benefit from skilled acute PT services to address deficits and promote mobility.    04/21/25 1340    OT Last Visit   OT Visit Date 04/21/25   Note Type   Note Type Treatment for insurance  authorization   Pain Assessment   Pain Assessment Tool 0-10   Pain Score 6   Pain Location/Orientation Location: Neck   Hospital Pain Intervention(s) Repositioned;Ambulation/increased activity;Emotional support;Rest   Restrictions/Precautions   Weight Bearing Precautions Per Order No   Other Precautions Cognitive;Chair Alarm;Bed Alarm;Telemetry;Fall Risk;Pain   Lifestyle   Autonomy I with ADL's/assistance from sister for IADL's (laundry, driving tasks0   Reciprocal Relationships sister, co-worker (assists with driving to work)   Service to Others full time employement as a    Intrinsic Gratification playing games   ADL   Where Assessed Other (Comment)  (shower level seated (shower chair without back) and standing with grab bar use)   Eating Assistance 7  Independent   Grooming Assistance 5  Supervision/Setup   Grooming Deficit Setup;Wash/dry hands;Wash/dry face  (shaved at sink side with RW and S (hip supported by sink)   UB Bathing Assistance 5  Supervision/Setup   UB Bathing Deficit Setup   LB Bathing Assistance 4  Minimal Assistance   LB Bathing Deficit Left lower leg including foot;Right lower leg including foot   UB Dressing Assistance 5  Supervision/Setup   UB Dressing Deficit Setup  (pull over scrub shirt)   LB Dressing Assistance 5  Supervision/Setup   LB Dressing Deficit Don/doff R sock;Don/doff L sock;Thread RLE into underwear;Thread LLE into underwear;Thread LLE into pants;Thread RLE into pants  (seated grab bar)   Bed Mobility   Supine to Sit 5  Supervision   Additional items Assist x 1   Sit to Supine 5  Supervision   Additional items Assist x 1   Transfers   Sit to Stand 4  Minimal assistance   Additional items Assist x 1   Stand to Sit 5  Supervision   Additional items Assist x 1   Toilet transfer 4  Minimal assistance   Additional items Assist x 1;Standard toilet cues for safety quick sit to bed, chair   Functional Mobility   Functional Mobility 4  Minimal assistance   Additional Comments min  a x 1 with RW short distance functional mobility pain limiting I levels, +KOWALSKI (pt reports 2* pain levels-RN aware)  resolving with rest, SP02 WFL   Toilet Transfers   Toilet Transfer From Rolling walker   Toilet Transfer Type To and from   Toilet Transfer to Standard toilet   Toilet Transfer Technique Ambulating   Toilet Transfers Minimal assistance   Shower Transfers   Shower Transfer From Walker   Shower Transfer Type To and From   Shower Transfer to Shower seat without back   Shower Transfer Technique Stand pivot;Ambulating   Shower Transfers Minimal assistance  (verbal cues for safety)   Cognition   Overall Cognitive Status     Arousal/Participation Alert;Responsive;Cooperative   Attention Within functional limits   Orientation Level Oriented X4   Memory Within functional limits   Following Commands Follows one step commands without difficulty   Comments pt pleasant and cooperative, impulsive at times, verbal cues for safety, good memory.   Assessment   Assessment Patient participated in Skilled OT session this date with interventions consisting of self care tasks, functional transfers/mobility . Patient agreeable to OT treatment session, upon arrival patient was found supine in bed.  In comparison to previous session, patient with improvements in self care I levels . Patient requiring verbal cues for safety and verbal cues for correct technique. Patient continues to be functioning below baseline level, occupational performance remains limited secondary to factors listed above and increased risk for falls and injury.   From OT standpoint, recommendation at time of d/c would be max level I.  The patient's raw score on the AM-PAC Daily Activity Inpatient Short Form is 20. A raw score of greater than or equal to 19 suggests the patient may benefit from discharge to home. Please refer to the recommendation of the Occupational Therapist for safe discharge planning.  Patient to benefit from continued Occupational  Therapy treatment while in the hospital to address deficits as defined above and maximize level of functional independence with ADLs and functional mobility.         CARE SCORES:  Self Care:  Eatin: Independent  Oral hygiene: 04: Supervision or touching  assistance  Shower/bathing self: 03: Partial/moderate assistance  Upper body dressin: Supervision or touching  assistance  Lower body dressin: Supervision or touching  assistance  Putting on/taking off footwear: 04: Supervision or touching  assistance  Toilet hygiene: 09: Not applicable   Transfers:  Roll left and right: 09: Not applicable  Sit to lyin: Not applicable  Lying to sitting on side of bed: 04: Supervision or touching  assistance  Sit to stand: 03: Partial/moderate assistance  Chair/bed to chair transfer: 03: Partial/moderate assistance  Toilet transfer: 03: Partial/moderate assistance  Mobility:  Walk 10 ft: 03: Partial/moderate assistance  Walk 50 ft with two turns: 03: Partial/moderate assistance  Walk 150ft: 88: Not attempted due to medical conditions or safety concerns    CURRENT GAP IN FUNCTION  Prior to Admission: Functional Status: Patient was independent with mobility/ambulation, transfers, ADL's, IADL's.    Expected functional outcomes: It is expected that with skilled acute rehabilitation services the patient will progress to Independent for self care and Independent for mobility     Estimated length of stay: 10 to 14 days    Anticipated Post-Discharge Disposition/Treatment  Disposition: Return to previous home/apartment.  Outpatient Services: Physical Therapy (PT) and Occupational Therapy (OT)    BARRIERS TO DISCHARGE  Home Accessibility and Caregiver Accessibility;Decreased strength;Decreased endurance;Impaired balance;Decreased mobility;Decreased coordination;Impaired judgement;Decreased safety awareness;Pain;Impaired sensation;Decreased ADL status;Decreased cognition;Decreased endurance;Decreased sensation;Decreased  high-level ADLs;Decreased self-care trans;Decreased fine motor control     INTERVENTIONS FOR DISCHARGE  Functional transfer training;LE strengthening/ROM;Elevations;Therapeutic exercise;Endurance training;Patient/family training;Bed mobility;Equipment eval/education;Gait training;Compensatory technique education; ADL retraining     REQUIRED THERAPY:  Patient will require PT and OT 90 minutes each per day, five days per week to achieve rehab goals.     REQUIRED FUNCTIONAL AND MEDICAL MANAGEMENT FOR INPATIENT REHABILITATION:  Skin:  There are no pressure sores currently, patient needs close monitoring of his skin for any breakdown secondary to decreased mobility, Pain Management: Overall pain is moderately controlled, Deep Vein Thrombosis (DVT) Prophylaxis:  low molecular weight heparin and SCD's while in bed, PT and OT interventions, any labs, consults, imaging and medication adjustments patient may need while on [facility]    RECOMMENDED LEVEL OF CARE:   This is a 39 year old male who presented to [facility] ER on 4/15/25 for intractable neck pain and bitemporal headache.  Patient has been seen in the ER multiple times over the past year for neck pain.  His most recent MRI of the cervical spine in May 2024 demonstrated multilevel degenerative changes including a .  Ct disc bulge at C5-C6 and C6 -C7.  CT cervical spine was completed in the ER did not show any acute findings.  At baseline, patient is on an extensive pain regimen at home including cyclobenzaprine, duloxetine, gabapentin, ibuprofen and methocarbamol. On exam, he had limited neck range of motion.  He reported bilateral upper extremity weakness, primarily due to pain.  Both lower extremities were 5/5 although he did endorse diffuse soreness.  Patient also reported dizziness and an unstable gait as well as blurry vision since day before presentation to the ER.  Furthermore he reported intermittent tingling in his upper extremities as well as saddle  anesthesia at times and urinary dribbling/incontinence, but not consistently.  Neurology was consulted and suspected that his headache was cervicogenic in origin.  He has a longstanding history of neck pain that has not been appropriately treated in the outpatient setting.  Neurology recommended continuing his home pain regimen and reaching out to the acute pain service for any potential inpatient interventions or adjustments. He was started on a course of steroids.  MRI cervical and lumbar spine was completed and showed progression of degenerative changes as suspected.  No further workup was recommended.  Patient also noted that have elevated BP, likely secondary to pain and steroid effect.  Lisinopril was added and then increased on 4/22.  He was also given IV hydralazine PRN.  Patient worked with therapy and was recommended for rehab following his hospitalization.  He has demonstrated that he can tolerate three hours of therapy, five days a week and is now medically cleared for discharge to the [facility].    Patient lives with his sister in a 2nd floor apartment with a FFOS to access, no elevator. It is all one level once inside. Has a walk in shower. Typically employed, - .  Prior to admission, he was fully independent with ADLS, IADLS, and mobility. Most recently was using RW.  He is now functioning below his baseline requiring supervision to min assistance to complete his ADLs.  With PT, he is requiring supervision for bed mobility and supervision to min assistance for transfers.  He ambulated a total of 50 feet with a RW and min assistance plus chair follow.  Gait pattern: improper weight shift, wide ESME, decreased foot clearance, forward flexed, inconsistent curry, short stride and excessively slow.    Patient requires close oversight by a physician, PM&R management, 24/7 rehabilitation nursing care and specialized interdisciplinary therapy services in preparation for discharge which can only be provided  in the inpatient acute rehabilitation setting.  While on ARC, this patient will close monitoring of his VS, I/Os, skin, pain level and his overall condition.  Inpatient acute rehabilitation is recommended for this patient to maximize his overall strength, endurance, self-care and mobility upon discharge back to his apartment.

## 2025-04-22 NOTE — ASSESSMENT & PLAN NOTE
Feels weak in the bilateral lower extremities primarily in the left lower extremity greater than right but appears functional on examination  Some tremoring in the left lower extremity on MMT  Continue with physical and Occupational Therapy

## 2025-04-22 NOTE — CONSULTS
PHYSICAL MEDICINE AND REHABILITATION CONSULT NOTE  Amado Chin 39 y.o. male MRN: [id number]  Unit/Bed#: [facility] 960-01 Encounter: [id number]     Rehab Diagnosis: Impairment of mobility, safety and Activities of Daily Living (ADLs) due to Neurologic Conditions:  03.9  Other Neurologic Disorder cervicogenic headache    Date of Onset: 4/15/25     Date of surgery: n/a     History of Present Illness:   Amado Chin is a 39 y.o. male with PMHx of depression, anxiety, questionable schizophrenia, substance abuse disorder, tobacco use who presented to the [facility] on 4/15/25 for intractable neck pain and bitemporal headache. Of note, patient has had multiple ED visits and hospitalizations for neck pain. Cervical CT imaging did not show any acute abnormalities. He endorsed intermittent paresthesias in his upper extremities, intermittent saddle anesthesia and occasional urinary dribbling/incontinence. He was subsquently admitted to to inpatient medicine with consults placed to acute pain service and neurology. MRI of cervical and lumbar spines were done which showed some progression of chronic degeneration changes, but no acute abnormalities or cord compression. Neurology believed his headaches were cervicogenic in nature and patient was ultimately started on a steroid taper, with improvement in his symptoms. Patient was then deemed medically stable and appropriate for inpatient rehabilitation. He was transferred to [facility] on 4/22.          Plan:     Rehabilitation  Functional deficits: impaired mobility, self care  Begin PT/OT/SLP.  Rehabilitation goals are to achieve a modified independent level with mobility and self care.  Prognosis is good.  ELOS is 7 to 10 days.  Estimated discharge is home.     DVT prophylaxis  Lovenox    Pain  Acetaminophen 975 mg every 8 hours  Voltaren gel 4 times daily to the posterior neck  Aqua K-pad but cannot be applied to the same areas Voltaren gel  Gabapentin 400 mg  3 times daily which is increased from his previous 300 mg 3 times daily  Methocarbamol 750 mg every 6 hours  Oxycodone 2.5-5 mg every 4 hours as needed moderate to severe pain    Bladder plan  Continent    Bowel plan  Continent. Last BM documented as 4/18    Code Status  Level 1 full code    Incidental Findings  Patient has parotid lesion 1.2 cm noted on MRI that was completed on 4/15  Recommended for ENT evaluation    * Cervicogenic headache  Assessment & Plan  Patient presents with chronic headaches for significant amount of time worsening but has been present for years  Imaging negative for an organic source however describes what appears to be a cervicogenic headache with pain in the back of the neck and stiffness radiating up to the suboccipital triangle and across the temporal regions and behind the eye  Pain is anywhere from a 3 or 4 up to a 10/10 averaging 6-7/10  At this time we will try a regimen including gabapentin 300 mg 3 times daily titrating up to 500 mg 3 times daily which was on a previous regimen, Cymbalta 20 mg daily, Robaxin 750 mg every 6 hours, Tylenol 975 mg 3 times daily.  He is also on a regimen of as needed oxycodone 2.5-5 mg every 4 hours as needed.  Due to his history of substance use and request for escalation and the acute on chronic nature of his neck pain and headaches we will avoid escalation of any opioid medications at this time.  Additionally will add Voltaren gel  Physical and Occupational Therapy, desensitization and discharge planning    Impaired mobility and activities of daily living  Assessment & Plan  Patient was evaluated by the rehabilitation team MD and an appropriate candidate for acute inpatient rehabilitation program at this time.  The patient will tolerate 3 hours/day 5 to 7 days/week of intensive physical, occupational in order to obtain goals for community discharge  Due to the patient's functional Compared to their baseline level of function in addition to their  "ongoing medical needs, the patient would benefit from daily supervision from a rehabilitation physician as well as rehabilitation nursing to implement and adjust the medical as well as functional plan of care in order to meet the patient's goals.      Hypertension  Assessment & Plan  Patient with elevated blood pressures with some lability some of it likely due to component of pain, anxiety  Started on lisinopril 10 mg daily and on as needed hydralazine  Management per internal medicine, will work on anxiety reduction to help with blood pressures    Lesion of parotid gland  Assessment & Plan  Noted on MRI of the cervical spine  \"1.2 cm lesion in left deep parotid gland just posterior and slightly medial to left retromandibular vein. 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 Recommend ENT consultation for further evaluation.\"    Polysubstance abuse (HCC)  Assessment & Plan  History of methamphetamine use in the past per documentation  Also on Norco chronically in the past as well.  Currently on a regimen of oxycodone  Discussed with patient that we would not escalate his opioid therapies.  Did inquire about IV therapies which was also declined as a form of escalation    Cervical spinal stenosis  Assessment & Plan  History of some mild cervical spinal stenosis on most recent MRI  Has seen neurosurgery in the past but was discharged from their practice after demanding surgery and threatening staff  Continue to clinically monitor his neuro examination    Anxiety  Assessment & Plan  Patient with mixed history of mood disorder is seen as depression and anxiety as well as schizophrenia throughout the chart.  Had multiple behavioral health inpatient unit stays over the last few years and frequently visits the ER  Currently on Seroquel 300 mg nightly  Will add Atarax as needed for acute anxiety  Also starting Cymbalta which he had been on in the past during a prior inpatient behavioral health stay, " increase gabapentin from 300 mg 3 times daily to 400 mg 3 times daily.  He had previously been on regimen of 500 mg 3 times daily during that stay at the behavioral health unit    Vitamin B12 deficiency  Assessment & Plan  History of B12 deficiency but most recent level within normal range tested back in May 2024  Not currently on supplementation will need to have follow-up levels on next set of routine labs    Tobacco abuse  Assessment & Plan  Smokes half a pack a day as an outpatient  Not interested in a patch or gum or replacement therapy at this time  Education on smoking and pain provided    Asthma  Assessment & Plan  Albuterol as needed    Weakness of both lower extremities  Assessment & Plan  Feels weak in the bilateral lower extremities primarily in the left lower extremity greater than right but appears functional on examination  Some tremoring in the left lower extremity on MMT  Continue with physical and Occupational Therapy          Subjective/Interval Events:       Review of Systems   Constitutional:  Negative for chills and fever.   HENT:  Negative for hearing loss and trouble swallowing.    Eyes:  Negative for photophobia and visual disturbance.   Respiratory:  Negative for cough and shortness of breath.    Cardiovascular:  Negative for chest pain and leg swelling.   Gastrointestinal:  Negative for constipation, diarrhea, nausea and vomiting.   Endocrine: Negative for cold intolerance and heat intolerance.   Genitourinary:  Negative for dysuria and hematuria.   Musculoskeletal:  Positive for gait problem, neck pain and neck stiffness. Negative for back pain.   Skin:  Negative for rash and wound.   Allergic/Immunologic: Negative for environmental allergies and food allergies.   Neurological:  Positive for dizziness, weakness, numbness and headaches.   Hematological:  Negative for adenopathy. Does not bruise/bleed easily.   Psychiatric/Behavioral:  Negative for dysphoric mood and sleep disturbance.   "        Function:  Prior level of function and living situation:  PRIOR LEVEL OF FUNCTION:  He lives in a(n) apartment  Amado Chin is single and  lives with his sister .  Self Care: Independent, Indoor Mobility: independent with a RW, Stairs (in/outdoor): Independent, and Cognition: Independent     FALLS IN THE LAST 6 MONTHS: >10     HOME ENVIRONMENT:  The living area:  Patient lives in a 2nd floor apartment with a full flight of steps to enter.  No elevate access.  There are Greater than 10 steps to enter the home.    The patient will not have 24 hour supervision/physical assistance available upon discharge    Current level of function:  CARE SCORES:  Self Care:  Eatin: Independent  Oral hygiene: 04: Supervision or touching  assistance  Shower/bathing self: 03: Partial/moderate assistance  Upper body dressin: Supervision or touching  assistance  Lower body dressin: Supervision or touching  assistance  Putting on/taking off footwear: 04: Supervision or touching  assistance  Toilet hygiene: 09: Not applicable   Transfers:  Roll left and right: 09: Not applicable  Sit to lyin: Not applicable  Lying to sitting on side of bed: 04: Supervision or touching  assistance  Sit to stand: 03: Partial/moderate assistance  Chair/bed to chair transfer: 03: Partial/moderate assistance  Toilet transfer: 03: Partial/moderate assistance  Mobility:  Walk 10 ft: 03: Partial/moderate assistance  Walk 50 ft with two turns: 03: Partial/moderate assistance  Walk 150ft: 88: Not attempted due to medical conditions or safety concerns    Physical Exam:  BP (!) 156/102 (BP Location: Left arm)   Pulse 85   Temp 98.2 °F (36.8 °C) (Oral)   Resp 18   Ht 5' 5\" (1.651 m)   SpO2 99%   BMI 31.28 kg/m²        Intake/Output Summary (Last 24 hours) at 2025 1653  Last data filed at 2025 2100  Gross per 24 hour   Intake 240 ml   Output --   Net 240 ml       Body mass index is 31.28 kg/m².      Physical Exam  Vitals and " "nursing note reviewed.   Constitutional:       General: He is not in acute distress.     Comments: Anxious appearing   HENT:      Head: Normocephalic and atraumatic.      Right Ear: External ear normal.      Left Ear: External ear normal.      Nose: Nose normal. No rhinorrhea.      Mouth/Throat:      Mouth: Mucous membranes are moist.      Pharynx: Oropharynx is clear.   Eyes:      General: No scleral icterus.  Cardiovascular:      Rate and Rhythm: Normal rate.      Pulses: Normal pulses.   Pulmonary:      Effort: Pulmonary effort is normal. No respiratory distress.   Abdominal:      General: There is no distension.      Palpations: Abdomen is soft.   Musculoskeletal:      Cervical back: Tenderness present.      Right lower leg: No edema.      Left lower leg: No edema.   Skin:     General: Skin is warm and dry.   Neurological:      Mental Status: He is alert and oriented to person, place, and time.      Sensory: Sensory deficit present.      Comments: Endorses sensory deficits in the left lower extremity, MMT normal at 5/5 throughout   Psychiatric:         Mood and Affect: Mood normal.      Comments: Anxious behavior and pressured speech            Labs, medications, and imaging personally reviewed.    Laboratory:    Lab Results   Component Value Date    SODIUM 140 04/21/2025    K 3.7 04/21/2025     04/21/2025    CO2 26 04/21/2025    BUN 22 04/21/2025    CREATININE 0.87 04/21/2025    GLUC 92 04/21/2025    CALCIUM 8.6 04/21/2025     Lab Results   Component Value Date    WBC 7.98 04/21/2025    HGB 14.5 04/21/2025    HCT 42.3 04/21/2025    MCV 91 04/21/2025     04/21/2025     No results found for: \"INR\", \"PROTIME\"      Current Facility-Administered Medications:     acetaminophen (TYLENOL) tablet 975 mg, 975 mg, Oral, Q8H, SALAZAR Juarez    albuterol (PROVENTIL HFA,VENTOLIN HFA) inhaler 2 puff, 2 puff, Inhalation, Q4H PRN, SALAZAR Juarez    Diclofenac Sodium (VOLTAREN) 1 % topical " gel 2 g, 2 g, Topical, 4x Daily, SALAZAR Juarez    docusate sodium (COLACE) capsule 100 mg, 100 mg, Oral, BID, Carlyyara Wynnel, DO    [START ON 4/23/2025] DULoxetine (CYMBALTA) delayed release capsule 20 mg, 20 mg, Oral, Daily, Carly T Vance, DO    [START ON 4/23/2025] enoxaparin (LOVENOX) subcutaneous injection 40 mg, 40 mg, Subcutaneous, Daily, SALAZAR Juarez    gabapentin (NEURONTIN) capsule 400 mg, 400 mg, Oral, TID, Craly Wynnel, DO    hydrALAZINE (APRESOLINE) tablet 25 mg, 25 mg, Oral, Q8H PRN, SALAZAR Juarez    hydrOXYzine HCL (ATARAX) tablet 25 mg, 25 mg, Oral, Q6H PRN, Carly Vance, DO    [START ON 4/23/2025] lisinopril (ZESTRIL) tablet 10 mg, 10 mg, Oral, Daily, SALAZAR Juarez    methocarbamol (ROBAXIN) tablet 750 mg, 750 mg, Oral, Q6H PETER, SALAZAR Juarez    [START ON 4/23/2025] nicotine (NICODERM CQ) 14 mg/24hr TD 24 hr patch 1 patch, 1 patch, Transdermal, Daily, SALAZAR Juarez    oxyCODONE (ROXICODONE) split tablet 2.5 mg, 2.5 mg, Oral, Q4H PRN **OR** oxyCODONE (ROXICODONE) IR tablet 5 mg, 5 mg, Oral, Q4H PRN, SALAZAR Juarez    polyethylene glycol (MIRALAX) packet 17 g, 17 g, Oral, Daily PRN, Carly Vance, DO    QUEtiapine (SEROquel) tablet 300 mg, 300 mg, Oral, HS, SALAZAR Juarez    [START ON 4/23/2025] senna (SENOKOT) tablet 17.2 mg, 2 tablet, Oral, Daily With Lunch, Carly Vance, DO  No Known Allergies   Patient Active Problem List    Diagnosis Date Noted    Cervicogenic headache 04/15/2025    Lesion of parotid gland 04/22/2025    Hypertension 04/22/2025    Impaired mobility and activities of daily living 04/22/2025    Polysubstance abuse (HCC) 09/03/2024    Psychoactive 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HCC) 07/31/2024    Drug use 07/30/2024    Cellulitis of left lower extremity 07/29/2024    Homelessness 07/29/2024    Cervical spinal stenosis 02/09/2024    Cervical radiculopathy 02/09/2024    Chronic  bilateral low back pain 12/26/2023    Chronic neck pain 12/26/2023    Recurrent major depressive disorder (HCC) 04/01/2022    Anxiety 04/01/2022    Substance induced mood disorder (HCC) 04/01/2022    Mood disorder due to medical condition 04/01/2022    Opioid abuse (HCC) 02/10/2022    Fecal smearing 02/02/2022    Urinary incontinence 02/02/2022    Drug-induced constipation 02/02/2022    Intractable pain 11/22/2021    Vitamin B12 deficiency 02/05/2021    Asthma 02/04/2021    Tobacco abuse 02/04/2021    Dizziness 02/04/2021    Neck pain 02/03/2021    Elevated blood pressure reading 02/03/2021    Weakness of both lower extremities 02/03/2021    Cervical disc herniation 08/04/2020    Paresthesia and pain of extremity 08/04/2020    Fall 08/04/2020    Concussion without loss of consciousness 08/04/2020    Acute pain of right shoulder 08/04/2020    Alcohol abuse 08/04/2020    Burn 08/04/2020    Furuncle of axilla 08/04/2020     Past Medical History:   Diagnosis Date    Asthma     Chronic pain     Hypertension     Neck pain     Psychoactive 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HCC)     Thyroglossal duct cyst      Past Surgical History:   Procedure Laterality Date    THYROGLOSSAL DUCT EXCISION      THYROID SURGERY      THYROID SURGERY       Social History     Socioeconomic History    Marital status: Single     Spouse name: Not on file    Number of children: Not on file    Years of education: Not on file    Highest education level: Not on file   Occupational History    Not on file   Tobacco Use    Smoking status: Every Day     Current packs/day: 0.50     Types: Cigarettes    Smokeless tobacco: Never    Tobacco comments:     5 cigerettes a day    Vaping Use    Vaping status: Former    Substances: Nicotine, Flavoring   Substance and Sexual Activity    Alcohol use: Yes     Comment: occas    Drug use: Not Currently     Types: Marijuana     Comment: last used 09/2024    Sexual activity: Yes     Partners: Female     Birth  control/protection: Condom Male   Other Topics Concern    Not on file   Social History Narrative    Not on file     Social Drivers of Health     Financial Resource Strain: Patient Declined (2024)    Received from [facility]    Overall Financial Resource Strain (CARDIA)     Difficulty of Paying Living Expenses: Patient declined   Food Insecurity: Patient Declined (2025)    Nursing - Inadequate Food Risk Classification     Worried About Running Out of Food in the Last Year: Patient declined     Ran Out of Food in the Last Year: Patient declined     Ran Out of Food in the Last Year: Never true   Transportation Needs: Unmet Transportation Needs (2025)    Nursing - Transportation Risk Classification     Lack of Transportation: Not on file     Lack of Transportation: Yes   Physical Activity: Not on file   Stress: Not on file   Social Connections: Not on file   Intimate Partner Violence: Unknown (2025)    Nursing IPS     Feels Physically and Emotionally Safe: Not on file     Physically Hurt by Someone: Not on file     Humiliated or Emotionally Abused by Someone: Not on file     Physically Hurt by Someone: No     Hurt or Threatened by Someone: No   Housing Stability: Unknown (2025)    Nursing: Inadequate Housing Risk Classification     Has Housing: Not on file     Worried About Losing Housing: Not on file     Unable to Get Utilities: Not on file     Unable to Pay for Housing in the Last Year: No     Has Housin     Social History     Tobacco Use   Smoking Status Every Day    Current packs/day: 0.50    Types: Cigarettes   Smokeless Tobacco Never   Tobacco Comments    5 cigerettes a day      Social History     Substance and Sexual Activity   Alcohol Use Yes    Comment: occas     Family History   Problem Relation Age of Onset    Hypertension Mother     No Known Problems Father          Medical Necessity Criteria for [facility] Admission: Hypertension and Bowel/Bladder Management. In  addition, the preadmission screen, post-admission physical evaluation, overall plan of care and admissions order demonstrate a reasonable expectation that the following criteria were met at the time of admission to the [facility].  The patient requires active and ongoing therapeutic intervention of multiple therapy disciplines (physical therapy, occupational therapy, speech-language pathology, or prosthetics/orthotics), one of which is physical or occupational therapy.    Patient requires an intensive rehabilitation therapy program, as defined in Chapter 1, section 110.2.2 of the CMS Medicare Policy Manual. This intensive rehabilitation therapy program will consist of at least 3 hours of therapy per day at least 5 days per week or at least 15 hours of intensive rehabilitation therapy within a 7 consecutive day period, beginning with the date of admission to the [facility].    The patient is reasonably expected to actively participate in, and benefit significantly from, the intensive rehabilitation therapy program as defined in Chapter 1, section 110.2.2 of the CMS Medicare Policy Manual at this time of admission to the [facility]. He can reasonably be expected to make measurable improvement (that will be of practical value to improve the patient’s functional capacity or adaptation to impairments) as a result of the rehabilitation treatment, as defined in section 110.3, and such improvement can be expected to be made within the prescribed period of time. As noted in the CMS Medicare Policy Manual, the patient need not be expected to achieve complete independence in the domain of self-care nor be expected to return to his or her prior level of functioning in order to meet this standard.  The patient must require physician supervision by a rehabilitation physician. As such, a rehabilitation physician will conduct face-to-face visits with the patient at least 3 days per week throughout the patient’s stay in the [facility] to assess the patient  both medically and functionally, as well as to modify the course of treatment as needed to maximize the patient’s capacity to benefit from the rehabilitation process.  The patient requires an intensive and coordinated interdisciplinary approach to providing rehabilitation, as defined in Chapter 1, section 110.2.5 of the CMS Medicare Policy Manual. This will be achieved through periodic team conferences, conducted at least once in a 7-day period, and comprising of an interdisciplinary team of medical professionals consisting of: a rehabilitation physician, registered nurse,  and/or , and a licensed/certified therapist from each therapy discipline involved in treating the patient.     Changes Since Pre-admission Assessment: None -This patient's participation in rehab continues to be reasonable, necessary and appropriate.    CMS Required Post-Admission Physician Evaluation Elements  History and Physical, including medical history, functional history and active comorbidities as in above text.     Post-Admission Physician Evaluation:  The patient has the potential to make improvement and is in need of physical, occupational, and/or therapy services. The patient may also need nutritional services. Given the patient's complex medical condition and risk of further medical complications, rehabilitative services cannot be safely provided at a lower level of care, such as a skilled nursing facility. I have reviewed the patient's functional and medical status at the time of the preadmission screening and they are the same as on the day of this admission. I acknowledge that I have personally performed a full physical examination on this patient within 24 hours of admission. The patient and/or family demonstrated understanding the rehabilitation program and the discharge process after we discussed them.     Agree in entirety: yes  Minor adaptions: none    Major changes: none    Fausto Noriega,   Physical  Medicine and Rehabilitation  [facility] [address]    I have spent a total time of 90 minutes in caring for this patient on the day of the visit/encounter including Risks and benefits of tx options, Instructions for management, Importance of tx compliance, Risk factor reductions, Impressions, Counseling / Coordination of care, Documenting in the medical record, Reviewing/placing orders in the medical record (including tests, medications, and/or procedures), Obtaining or reviewing history  , and Communicating with other healthcare professionals .

## 2025-04-22 NOTE — ASSESSMENT & PLAN NOTE
Patient with mixed history of mood disorder is seen as depression and anxiety as well as schizophrenia throughout the chart.  Had multiple behavioral health inpatient unit stays over the last few years and frequently visits the ER  Currently on Seroquel 300 mg nightly  Will add Atarax as needed for acute anxiety  Also starting Cymbalta which he had been on in the past during a prior inpatient behavioral health stay, increase gabapentin from 300 mg 3 times daily to 400 mg 3 times daily.  He had previously been on regimen of 500 mg 3 times daily during that stay at the behavioral health unit

## 2025-04-22 NOTE — TREATMENT PLAN
Individualized Plan of Care - [facility]  Amado Chin 39 y.o. male MRN: [id number]  Unit/Bed#: [facility] 960-01 Encounter: [id number]     PATIENT INFORMATION  ADMISSION DATE: 4/22/2025  2:33 PM GARRY CATEGORY:Neurologic Conditions:  03.9  Other Neurologic Disorder cervicogenic headache   ADMISSION DIAGNOSIS: Neck pain [M54.2]  EXPECTED LOS: 7 to 10 days     MEDICAL/FUNCTIONAL PROGNOSIS  Based on my assessment of the patient's medical conditions and current functional status, the prognosis for attaining medical and functional goals or the IRF stay is:  Excellent    Medical Goals: Patient will be medically stable for discharge to [facility] upon completion of rehab program and Patient will be able to manage medical conditions and comorbid conditions with medications and follow up upon completion of rehab program    Cardiopulmonary function: Ensure cardiopulmonary stability and optimize cardiopulmonary function not only at rest but with activity as patient's activity level significantly increases in acute rehab compared with prior to transfer in preparation for safe discharge from [facility].  Must closely and frequently monitor blood pressure and HR to ensure adequate cardiac output during ADLs and ambulation as patient is at increased risk for orthostatic hypotension/syncope and potential injury if not monitored for and managed adequately.    Blood pressure management:    Frequent monitoring of blood pressure with appropriate adjustments in blood pressure medication management to optimize blood pressure control and prevent/limit renal complications.   Monitoring impact of blood pressure and side-effects of blood pressure medications at rest and with activity.  Hypoxia prevention: Ensure appropriate level of oxygenation at rest and with activity to avoid symptomatic hypoxia, maximize functional performance, and decrease risk of atelectasis/pneumonia through close and frequent monitoring,  providing appropriate respiratory treatments (such as incentive spirometry), and when necessary provide/adjust respiratory medications.    Pain management:  Pain will improve with frequent evaluation of pain, careful adjustments in medications, frequent re-evaluation of patient's pain and medical/neurologic status to ensure optimal pain control, avoidance of potential serious and even life-threatening side-effects and drug interactions, as well as weaning pain medications as soon as possible to decrease risk of short and long-term use.     Neurologic Disorder: Cervicogenic headaches causing impaired mobility, ADLs, and gait:  intensive skilled therapies with physical therapy and occupational therapy with close oversight and management by rehab specialized physician in acute rehabilitation setting to most expeditiously and effectively improve functional mobility, transfers, upper and lower body strengthening, conditioning, balance, and gait training with appropriate assistive device.  Patient will have optimal supervision and management of patient's underlying neurologic disorder with specialized rehabilitation physician during this period of recovery to ensure most expeditious and optimal recovery with decreased risks of fall/injury and other complications including acute worsening of neuro disorder, decrease risk of VTE, PNA, and skin ulceration.  Inpatient rehabilitation education/teaching:  To be provided to patient and typically family/caregiver (if able to be identified) by all skilled therapists, rehab nursing, case management, and rehab specialized physician to ensure optimal recovery and decrease risks of complications in both acute rehabilitation setting as well as after discharge.   Anxiety: Patient's anxiety and it's impact on therapy participation and functional recovery will improve during course with supportive counseling, relaxation/breathing techniques and if necessary medication management.   Requires frequent re-assessment and close management to ensure anxiety/depression management during acute rehab course with planning for appropriate outpatient management to ensure optimal mental health and functional recovery.    Depression: Patient's depression and it's impact on therapy participation and functional recovery will improve during course with supportive counseling, relaxation/breathing techniques and if necessary medication management.  Requires frequent re-assessment and close management to ensure anxiety/depression management during acute rehab course with planning for appropriate outpatient management to ensure optimal mental health and functional recovery.  Substance abuse hx: Alcohol, tobacco, drug dependence history - provide teaching/resources on substance abuse to patient (and family if appropriate) as well as counseling on abstinence, coping with anxiety/depression, and provide outpatient resources.  Rehab physician oversight of behavioral management and if necessary pharmacologic management during course.  Recommend psychology consultation and management as well during acute rehab course and possible additional recommendations after discharge from acute rehab setting.      Bladder dysfunction:  Appropriate bladder management with appropriate toileting program from rehab nursing and staff with oversight management by rehabilitation physicians which include appropriate monitoring and possible adjustments in medications to with goals to optimize bladder function and decrease risk of bladder retention, incontinence, and urinary tract infection.   Bowel dysfunction: Appropriate bowel management with appropriate toileting program from rehab nursing and staff with oversight management by rehabilitation physicians which include adjustments in medications to optimize appropriate bowel function and prevent/decrease risk of constipation and bowel obstruction.    Skin management: Increased risk of skin  wounds/breakdown/rashes due to recent immobility and co-morbidities.   Appropriate skin checks from nursing and as needed physician.  Frequent turning, application of appropriate barrier creams/dressings, cushions.  Patient/caregiver education.  Optimal bowel/bladder hygiene and mobilization.        ANTICIPATED DISCHARGE DISPOSITION AND SERVICES  COMMUNITY SETTING: Home - independent/modified independent    ANTICIPATED FOLLOW-UP SERVICE:   Outpatient Therapy Services: PT         DISCIPLINE SPECIFIC PLANS:  Required Disciplines & Services: Rehabillitation Nursing, Case Management, and Psychology    REQUIRED THERAPY:  Therapy Hours per Day Days per Week   Physical Therapy 1-2 5-6   Occupational Therapy 1-2 5-6   NOTE: Additional therapy time(s) or changes to allocation of therapies as appropriate to meet patient needs and to achieve functional goals.      Patient will participate in above therapy regimen consisting of PT and OT due to the following medical procedure/condition:Neurologic Conditions:  03.9  Other Neurologic Disorder cervicogenic headache    ANTICIPATED FUNCTIONAL OUTCOMES:  ADL:  Modified independent independent   Bladder/Bowel:  Independent   Transfers:  Independent to supervision   Locomotion:  Independent to supervision   Cognitive:  Independent     DISCHARGE PLANNING NEEDS  Equipment needs: Discharge needs to be reviewed with team      REHAB ANTICIPATED PARTICIPATION RESTRICTIONS:  Assist with Mobility, Decreaed Safety Awareness, Rquires Assist with ADLS, Requires Assit with Homemaking, and Requires Assit with Steps    Fausto Noriega,   Physical Medicine and Rehabilitation  [facility]

## 2025-04-22 NOTE — ASSESSMENT & PLAN NOTE
Patient with elevated blood pressures with some lability some of it likely due to component of pain, anxiety  Started on lisinopril 10 mg daily and on as needed hydralazine  Management per internal medicine, will work on anxiety reduction to help with blood pressures

## 2025-04-22 NOTE — ASSESSMENT & PLAN NOTE
History of some mild cervical spinal stenosis on most recent MRI  Has seen neurosurgery in the past but was discharged from their practice after demanding surgery and threatening staff  Continue to clinically monitor his neuro examination

## 2025-04-22 NOTE — H&P
H&P - Hospitalist   Name: Amado Chin 39 y.o. male I MRN: [id number]  Unit/Bed#: -01 I Date of Admission: 4/22/2025   Date of Service: 4/22/2025 I Hospital Day: 0     Assessment & Plan  Cervicogenic headache  Secondary to neck pain  S/p course of prednisone per Neurology  Pain management per PMR  Cervical spinal stenosis  Associated with neck pain  Has had multiple ED visits and hospitalization for chronic neck pain and headaches  He saw NS here at [facility] in 2/2022 and became verbally abusive and threatening to the PA and Dr Johnson and they will no longer see him  He was then seen by NS at [facility] 9/2022 and they did not recommend surgery but referred to pain management  Takes gabapentin and naproxen at home but no opioids.  Cervical spine CT scan without acute abnormality  Cervical/lumbar spine MRI = progression of degenerative changes but no cord compression,   No further inpatient neurology workup  Acute pain service saw during his hospital stay  He was recommended to establish an outpatient pain management   Ambulatory referral to comprehensive spine program as well as [facility] spine and pain were placed  Hypertension  On no meds at home but was placed on Lisinopril while in hospital  Will continue  Had been on Norvasc in the past but stopped it  Will order prn PO Hydralazine for  or greater  Asthma  Stable w/o exacerbation  Tobacco abuse  Smokes 1/2 ppd  Refuses patch  Anxiety  Seroquel 300mg qhs  Per PMR  Polysubstance abuse (HCC)  Meth in past  In hospital staff found a marijuana vape pen  Lesion of parotid gland  Found incidentally = 1.2 cm  See ENT as OP  Does not have primary care provider  Was seeing [facility] but has not seen them in a while and he hasn't returned their phone calls for followup  Prior to seeing them he was being seen by FP at [facility]  Psychosis (HCC)  History of psychosis previous inpatient psychiatric admission on 7/2024      Subjective/HPI:    Amado Chin is a 39  year old patient with history psychosis with inpatient psychiatric admission 7/2024, cervical stenosis, asthma, polysubstance abuse, anxiety and tobacco abuse who presented with bitemporal headache and intractable neck pain.  He was also complaining of muscle stiffness and recurrent falls.  APS saw in consult and helped manage his pain medications.  CT of the cervical spine was without acute findings.  MRI of the lumbar and cervical spine showed some progression of the degenerative changes but no cord compression.  Neurology saw in consult and he was given a course of Prednisone.  An ambulatory referral to comprehensive spine program as well as [facility] spine and pain were placed at the time of discharge.  CT of the cervical spine showed an incidental finding of a left parotid gland lesion for which he will need to see ENT as an outpatient.        Patient is referred to the [facility] for ongoing rehabilitation needs.      Currently, patient denies CP, SOB, dizziness, N/V/D.        Review of Systems: A 10 point ROS was performed; negative except as noted above.       Social History:    Substance Use History:   Social History     Substance and Sexual Activity   Alcohol Use Yes    Comment: occas     Social History     Tobacco Use   Smoking Status Every Day    Current packs/day: 0.50    Types: Cigarettes   Smokeless Tobacco Never   Tobacco Comments    5 cigerettes a day      Social History     Substance and Sexual Activity   Drug Use Not Currently    Types: Marijuana    Comment: last used 09/2024       Past Medical History:    Past Medical History:   Diagnosis Date    Asthma     Chronic pain     Hypertension     Neck pain     Psychoactive 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HCC)     Thyroglossal duct cyst          Review of Scheduled Meds:  Current Facility-Administered Medications   Medication Dose Route Frequency Provider Last Rate    acetaminophen  975 mg Oral Q8H SALAZAR Juarez      albuterol  2 puff Inhalation  Q4H PRN SALAZAR Juarez      Diclofenac Sodium  2 g Topical 4x Daily SALAZAR Juarez      docusate sodium  100 mg Oral BID Carly T Vance, DO      [START ON 4/23/2025] DULoxetine  20 mg Oral Daily Carly T Vance, DO      [START ON 4/23/2025] enoxaparin  40 mg Subcutaneous Daily SALAZAR Juarez      gabapentin  400 mg Oral TID Carly T Vacne, DO      hydrALAZINE  25 mg Oral Q8H PRN SALAZAR Juarez      hydrOXYzine HCL  25 mg Oral Q6H PRN Carly T Vance, DO      [START ON 4/23/2025] lisinopril  10 mg Oral Daily SALAZAR Juarez      methocarbamol  750 mg Oral Q6H PETER SALAZAR Juarez      [START ON 4/23/2025] nicotine  1 patch Transdermal Daily SALAZAR Juarez      oxyCODONE  2.5 mg Oral Q4H PRN SALAZAR Juarez      Or    oxyCODONE  5 mg Oral Q4H PRN SALAZAR Juarez      polyethylene glycol  17 g Oral Daily PRN Carly T Vance, DO      QUEtiapine  300 mg Oral HS SALAZAR Juarez      [START ON 4/23/2025] senna  2 tablet Oral Daily With Lunch Carly T Vance, DO         Physical Exam:  Temp:  [97.8 °F (36.6 °C)-98.5 °F (36.9 °C)] 98.2 °F (36.8 °C)  HR:  [] 85  Resp:  [18] 18  BP: (128-181)/() 156/102  SpO2:  [95 %-99 %] 99 %    General Appearance: no distress, nontoxic appearing  HEENT:  External ear normal.  Nose normal w/o drainage. Mucous membranes are moist. Oropharynx is clear. Conjunctiva clear w/o icterus or redness.  Lungs: BBS without crackles/wheeze/rhonchi; respirations unlabored with normal inspiratory/expiratory effort.  No retractions noted.  On RA  CV: regular rate and rhythm; no rubs/murmurs/gallops, PMI normal   ABD: Abdomen is soft.  Bowel sounds all quadrants.  Nontender with no distention.    EXT: no edema  Skin: normal turgor, normal texture  Psych: affect normal, mood normal  Neuro: AAO       Laboratory:    Results from last 7 days   Lab Units 04/21/25  0602 04/16/25  0757  "  HEMOGLOBIN g/dL 14.5 14.8   HEMATOCRIT % 42.3 43.3   WBC Thousand/uL 7.98 5.49     Results from last 7 days   Lab Units 04/21/25  0602 04/16/25  0759   BUN mg/dL 22 21   SODIUM mmol/L 140 139   POTASSIUM mmol/L 3.7 4.3   CHLORIDE mmol/L 108 106   CREATININE mg/dL 0.87 1.17            Wt Readings from Last 1 Encounters:   03/20/25 85.3 kg (188 lb)     Estimated body mass index is 31.28 kg/m² as calculated from the following:    Height as of this encounter: 5' 5\" (1.651 m).    Weight as of 3/20/25: 85.3 kg (188 lb).    Imaging:  No orders to display       Level 1 - Full Code    Counseling / Coordination of Care:   Total floor / unit time spent today 65 minutes. and Greater than 50% of total time was spent with the patient and / or family counseling and / or coordination of care.  A description of the counseling / coordination of care: review of records, examination of the patient and explanation of the plan of care to the patient.      ** Please Note: Fluency Direct voice to text software may have been used in the creation of this document. **        "

## 2025-04-22 NOTE — ASSESSMENT & PLAN NOTE
Was seeing [facility] but has not seen them in a while and he hasn't returned their phone calls for followup  Prior to seeing them he was being seen by FP at [facility]

## 2025-04-22 NOTE — ASSESSMENT & PLAN NOTE
Patient presents with chronic headaches for significant amount of time worsening but has been present for years  Imaging negative for an organic source however describes what appears to be a cervicogenic headache with pain in the back of the neck and stiffness radiating up to the suboccipital triangle and across the temporal regions and behind the eye  Pain is anywhere from a 3 or 4 up to a 10/10 averaging 6-7/10  At this time we will try a regimen including gabapentin 300 mg 3 times daily titrating up to 500 mg 3 times daily which was on a previous regimen, Cymbalta 20 mg daily, Robaxin 750 mg every 6 hours, Tylenol 975 mg 3 times daily.  He is also on a regimen of as needed oxycodone 2.5-5 mg every 4 hours as needed.  Due to his history of substance use and request for escalation and the acute on chronic nature of his neck pain and headaches we will avoid escalation of any opioid medications at this time.  Additionally will add Voltaren gel  Physical and Occupational Therapy, desensitization and discharge planning

## 2025-04-22 NOTE — ARC ADMISSION
Patient is medically cleared for discharge and is approved for admission to [facility] today.  Patient will admit to [facility] room 960 and has a 1430 transport time.  Report can be called to [phone].  CM has been updated with same in Secure Chat.

## 2025-04-22 NOTE — ASSESSMENT & PLAN NOTE
History of methamphetamine use in the past per documentation  Also on Norco chronically in the past as well.  Currently on a regimen of oxycodone  Discussed with patient that we would not escalate his opioid therapies.  Did inquire about IV therapies which was also declined as a form of escalation

## 2025-04-22 NOTE — ASSESSMENT & PLAN NOTE
On no meds at home but was placed on Lisinopril while in hospital  Will continue  Had been on Norvasc in the past but stopped it  Will order prn PO Hydralazine for  or greater

## 2025-04-22 NOTE — ASSESSMENT & PLAN NOTE
Incidental finding on CT of the cervical spine.  1.2 cm lesion in the left deep parotid gland.  Differential includes benign and malignant lesions.  Will need outpatient follow-up with ENT after discharge from rehab.  Will place ambulatory referral on discharge.

## 2025-04-23 PROCEDURE — 97110 THERAPEUTIC EXERCISES: CPT

## 2025-04-23 PROCEDURE — 97166 OT EVAL MOD COMPLEX 45 MIN: CPT

## 2025-04-23 PROCEDURE — 97535 SELF CARE MNGMENT TRAINING: CPT

## 2025-04-23 PROCEDURE — 99232 SBSQ HOSP IP/OBS MODERATE 35: CPT | Performed by: NURSE PRACTITIONER

## 2025-04-23 PROCEDURE — 97162 PT EVAL MOD COMPLEX 30 MIN: CPT

## 2025-04-23 PROCEDURE — 99232 SBSQ HOSP IP/OBS MODERATE 35: CPT | Performed by: STUDENT IN AN ORGANIZED HEALTH CARE EDUCATION/TRAINING PROGRAM

## 2025-04-23 PROCEDURE — 97530 THERAPEUTIC ACTIVITIES: CPT

## 2025-04-23 RX ADMIN — DOCUSATE SODIUM 100 MG: 100 CAPSULE, LIQUID FILLED ORAL at 08:30

## 2025-04-23 RX ADMIN — METHOCARBAMOL 750 MG: 750 TABLET ORAL at 21:25

## 2025-04-23 RX ADMIN — ACETAMINOPHEN 975 MG: 325 TABLET, FILM COATED ORAL at 05:12

## 2025-04-23 RX ADMIN — GABAPENTIN 400 MG: 400 CAPSULE ORAL at 21:26

## 2025-04-23 RX ADMIN — QUETIAPINE FUMARATE 300 MG: 100 TABLET ORAL at 21:25

## 2025-04-23 RX ADMIN — ACETAMINOPHEN 975 MG: 325 TABLET, FILM COATED ORAL at 21:25

## 2025-04-23 RX ADMIN — NICOTINE 1 PATCH: 14 PATCH, EXTENDED RELEASE TRANSDERMAL at 08:31

## 2025-04-23 RX ADMIN — HYDRALAZINE HYDROCHLORIDE 25 MG: 25 TABLET ORAL at 20:15

## 2025-04-23 RX ADMIN — SENNOSIDES 17.2 MG: 8.6 TABLET, FILM COATED ORAL at 14:36

## 2025-04-23 RX ADMIN — GABAPENTIN 400 MG: 400 CAPSULE ORAL at 14:36

## 2025-04-23 RX ADMIN — ACETAMINOPHEN 975 MG: 325 TABLET, FILM COATED ORAL at 14:36

## 2025-04-23 RX ADMIN — OXYCODONE HYDROCHLORIDE 5 MG: 5 TABLET ORAL at 05:12

## 2025-04-23 RX ADMIN — DICLOFENAC SODIUM 2 G: 10 GEL TOPICAL at 14:38

## 2025-04-23 RX ADMIN — ENOXAPARIN SODIUM 40 MG: 40 INJECTION SUBCUTANEOUS at 08:31

## 2025-04-23 RX ADMIN — DICLOFENAC SODIUM 2 G: 10 GEL TOPICAL at 21:25

## 2025-04-23 RX ADMIN — ALBUTEROL SULFATE 2 PUFF: 90 AEROSOL, METERED RESPIRATORY (INHALATION) at 08:33

## 2025-04-23 RX ADMIN — OXYCODONE HYDROCHLORIDE 5 MG: 5 TABLET ORAL at 08:57

## 2025-04-23 RX ADMIN — METHOCARBAMOL 750 MG: 750 TABLET ORAL at 06:29

## 2025-04-23 RX ADMIN — METHOCARBAMOL 750 MG: 750 TABLET ORAL at 01:02

## 2025-04-23 RX ADMIN — DULOXETINE HYDROCHLORIDE 20 MG: 20 CAPSULE, DELAYED RELEASE ORAL at 08:30

## 2025-04-23 RX ADMIN — GABAPENTIN 400 MG: 400 CAPSULE ORAL at 06:29

## 2025-04-23 RX ADMIN — METHOCARBAMOL 750 MG: 750 TABLET ORAL at 14:36

## 2025-04-23 RX ADMIN — OXYCODONE HYDROCHLORIDE 5 MG: 5 TABLET ORAL at 20:16

## 2025-04-23 RX ADMIN — DICLOFENAC SODIUM 2 G: 10 GEL TOPICAL at 08:32

## 2025-04-23 RX ADMIN — DOCUSATE SODIUM 100 MG: 100 CAPSULE, LIQUID FILLED ORAL at 21:25

## 2025-04-23 RX ADMIN — LISINOPRIL 10 MG: 10 TABLET ORAL at 08:31

## 2025-04-23 NOTE — PROGRESS NOTES
OT Initial Evaluation       04/23/25 0700   Patient Data   Rehab Impairment Impairment of mobility, safety and Activities of Daily Living (ADLs) due to Neurologic Conditions:  03.9  Other Neurologic Disorder cervicogenic headache   Etiologic Diagnosis cervicogenic headache   Date of Onset 04/15/25   Support System   Name Cecy   Relationship friend   Able to provide 24 hour supervision No   Able to provide physical help? Yes   Home Setup   Type of Home Multi Level   Method of Entry Stairs   Number of Stairs 1   Number of Stairs in Home 13  (FF to 2nd floor)   First Floor Bathroom Full;Shower   Second Floor Bathroom Full;Tub;Shower   First Floor Setup Available Yes   Home Modification Comment (S)  to note, pt was living with his sister (the home setup above is his sisters home) but she is moving so the pt needs to find alternate living arrangements. He reports he can move in with his gf Pushpa but he was unable to provide a home setup of her home. Will need to follow up   Available Equipment   (n/a)   Baseline Information   Vocation Work Part Time;Work Full Time  ( - hasnt worked in months)   Transportation Family/friends drive  (Cecy is primary  for [employer] and coworker takes him to work)   Prior Device(s) Used   (n/a - reports he has used a RW in the past but only if needed and the RW was not his so he doesnt have one to use currently)   Prior IADL Participation   Money Management Identify Money;Estimate Costs;Estimate Change;Combine Bills;Manage Checkbook   Meal Preparation Full Participation   Laundry Full Participation   Home Cleaning Full Participation   Prior Level of Function   Self-Care 3. Independent - Patient completed the activities by him/herself, with or without an assistive device, with no assistance from a helper.   Indoor-Mobility (Ambulation) 3. Independent - Patient completed the activities by him/herself, with or without an assistive device, with no assistance from a helper.   Stairs  "3. Independent - Patient completed the activities by him/herself, with or without an assistive device, with no assistance from a helper.   Functional Cognition 3. Independent - Patient completed the activities by him/herself, with or without an assistive device, with no assistance from a helper.   Prior Assistance Needed for Driving;Shopping   Prior Device Used Z. None of the above   Falls in the Last Year   Number of falls in the past 12 months 10  (reports it is due to his dizziness with episodes of blacking out)   Type of Injury Associated with Fall No injury  (states he is unsure because he \"blacks out\")   Patient Preference   Nickname (Patient Preference) Amado  (\"Mitesh\")   Psychosocial   Psychosocial (WDL) WDL   Restrictions/Precautions   Precautions Bed/chair alarms;Fall Risk;Pain;Supervision on toilet/commode  (hypertension (take manual BP); dizziness)   Pain Assessment   Pain Assessment Tool 0-10   Pain Score 10 - Worst Possible Pain  (states \"my head feels like it weighs 500 lbs\")   Pain Location/Orientation Location: [facility] Pain Intervention(s) Emotional support   Eating Assessment   Type of Assistance Needed Independent   Physical Assistance Level No physical assistance   Comment seated in recliner   Eating CARE Score 6   Oral Hygiene   Type of Assistance Needed Physical assistance   Physical Assistance Level 51%-75%   Comment to steady in stance at sink; IND when seated   Oral Hygiene CARE Score 2   Shower/Bathe Self   Type of Assistance Needed Physical assistance   Physical Assistance Level 76% or more   Comment would require Max A if completing at a baseline level; completed SB seated in chair today w/ sup. pt able to bathe zahra area via WSing   Shower/Bathe Self CARE Score 2   Bathing   Positioning Seated   Dressing/Undressing Clothing   Type of Assistance Needed Supervision   Physical Assistance Level No physical assistance   Upper Body Dressing CARE Score 4   Type of Assistance Needed " Physical assistance   Physical Assistance Level 25% or less   Comment would require min A if completing at baseline level; completed all parts of donning/doffing pants while seated today via WSing   Lower Body Dressing CARE Score 3   Putting On/Taking Off Footwear   Type of Assistance Needed Physical assistance   Physical Assistance Level 51%-75%   Comment setup for sock mgmt; TA for donning sneakers   Putting On/Taking Off Footwear CARE Score 2   Toileting Hygiene   Type of Assistance Needed Physical assistance   Physical Assistance Level 25% or less   Comment would require min A to steady if completing CM in stance; completed all parts of toileting today seated via WS due to balance deficits   Toileting Hygiene CARE Score 3   Toilet Transfer   Type of Assistance Needed Physical assistance   Physical Assistance Level 25% or less   Comment standard toilet height. SUP for STS; Min A when ambulating to bathroom. pt presents with almost a ataxic gait pattern which pt reports is due to BLE weakness and constant dizziness.   Toilet Transfer CARE Score 3   Transfer Bed/Chair/Wheelchair   Type of Assistance Needed Physical assistance   Physical Assistance Level 51%-75%   Comment would require mod A w/o an AD due to balance deficits; completed with Min A w/ RW to steady. pt presents with almost a ataxic gait pattern which pt reports is due to BLE weakness and constant dizziness.   Chair/Bed-to-Chair Transfer CARE Score 2   Sit to Lying   Type of Assistance Needed Supervision   Physical Assistance Level No physical assistance   Sit to Lying CARE Score 4   Lying to Sitting on Side of Bed   Type of Assistance Needed Supervision   Physical Assistance Level No physical assistance   Lying to Sitting on Side of Bed CARE Score 4   Sit to Stand   Type of Assistance Needed Physical assistance   Physical Assistance Level 25% or less   Comment Min A w/o an AD; SUP w/ RW   Sit to Stand CARE Score 3   Comprehension   QI: Comprehension 3.  "Usually Understands: Understands most conversations, but misses some part/intent of message. Requires cues at times to understand.   Expression   QI: Expression 4. Express complex messages without difficulty and with speech that is clear and easy to understan   RUE Assessment   RUE Assessment WFL   LUE Assessment   LUE Assessment WFL   Sensation   Additional Comments reports sensation deficits in BUE but unable to formally assess due to time restraints   Cognition   Overall Cognitive Status WFL   Arousal/Participation Alert;Responsive;Cooperative   Attention Within functional limits   Orientation Level Oriented X4   Memory Within functional limits   Following Commands Follows one step commands without difficulty   Comments AOX4; not impulsive this session. Pts report and admission screen are not consistent - will need to follow-up to determine true DC plan/home setup.   Vision   Vision Comments reported worsening L eye bluriness compared to R. reports significant decline w/ acuity. has never seen an opthamologist.   Discharge Information   Vocational Plan Return to work   Barriers to Return to Vocation Environmental Access;Skill Set Limitation;Transportation Issues;Strength;Endurance   Patient's Discharge Plan DC MOD IND   Patient's Rehab Expectations \"to get my strength and balance back up. minimze the pain that Im having\"   Barriers to Discharge Home Limited Family Support;Unsafe Home Setup;Decreased Strength;Decreased Endurance;Pain;Safety Considerations   Impressions Pt is a 39 y.o. male who was admitted on 4/15/25 due to intractable neck pain and bitemporal headache. Patient has been seen in the ER multiple times over the past year for neck pain.Pt was dx with cervicogenic headache and transferred to [facility] to receive skilled therapy services. Pt  has a past medical history of Asthma, Chronic pain, Hypertension, Neck pain, Psychoactive 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HCC), and Thyroglossal duct cyst.. Current " precautions include bed/chair alarms, fall risk, pain, and supervision on toilet/commode. See flowsheet for details of pts home setup, PLOF and current functional status. Pts impairments include pain, endurance, activity tolerance, functional mobility, balance, functional standing tolerance, unsupportive home environment, decreased I w/ ADLS/IADLS, strength, visual deficits, sensation deficits, coordination deficits, hypertension (BP was 160/110 manually - RN and IM notified) and primarily pt c/o constant dizziness. Pt reports he feels like he is on a boat - denies room spinning. States his dizziness has caused black outs in the past and has led to repeated falls. These impairments along with  limited caregiver support, steps to enter, difficulty performing ADLs, and difficulty performing IADLs causes the inability to safely complete A/IADLs including Grooming, Bathing, UB dressing, LB dressing, Toileting, Toilet transfer, Tub/shower Transfer, and IADL Management. Pt presents with good rehab potential with motivation to participate and achieve goal of DC home. Pt is unsafe to DC home at this time, recommending 7-10 days to achieve independent with assistive device level goals with RW and LRAD . Plan to achieve stated goals with interventions focused on ADL Retraining , LB Dressing, UB dressing, IADL training , Kitchen Mobilty, Meal preparation, Medication management , Functional Transfers, Standing tolerance, Standing balance , DME training/education, Family training/education, Energy conservation training/education, healthy coping education, Leisure and social pursuits, and community re-integration.   OT Therapy Minutes   OT Time In 0700   OT Time Out 0830   OT Total Time (minutes) 90   OT Mode of treatment - Individual (minutes) 90   OT Mode of treatment - Concurrent (minutes) 0   OT Mode of treatment - Group (minutes) 0   OT Mode of treatment - Co-treat (minutes) 0   OT Mode of Treatment - Total time(minutes) 90  minutes   OT Cumulative Minutes 90   Cumulative Minutes   Cumulative therapy minutes 90

## 2025-04-23 NOTE — NURSING NOTE
When obtaining weight this am, pt began screaming profanities because a pillow was removed from behind his head.  He stated that he was unaware that the pillow was being removed, but staff did inform him prior to removing pillow.  Pt screaming profanities and threw pillows across the room. Encouraged pt to express feelings without screaming.  Pt given time to relax.  Medicated for pain.  Schedule for rest of this am reviewed w him so he will know what to expect.  Pt quietly laying in bed when I left the room.

## 2025-04-23 NOTE — PROGRESS NOTES
Progress Note - PMR   Name: Amado Chin 39 y.o. male I MRN: [id number]  Unit/Bed#: -01 I Date of Admission: 4/22/2025   Date of Service: 4/23/2025 I Hospital Day: 1     Assessment & Plan  Cervicogenic headache  Patient presents with chronic headaches for significant amount of time worsening but has been present for years  Imaging negative for an organic source however describes what appears to be a cervicogenic headache with pain in the back of the neck and stiffness radiating up to the suboccipital triangle and across the temporal regions and behind the eye  Pain is anywhere from a 3 or 4 up to a 10/10 averaging 6-7/10  At this time we will try a regimen including gabapentin 300 mg 3 times daily titrating up to 500 mg 3 times daily which was on a previous regimen, Cymbalta 20 mg daily, Robaxin 750 mg every 6 hours, Tylenol 975 mg 3 times daily.  He is also on a regimen of as needed oxycodone 2.5-5 mg every 4 hours as needed.  Due to his history of substance use and request for escalation and the acute on chronic nature of his neck pain and headaches we will avoid escalation of any opioid medications at this time.  Additionally will add Voltaren gel  Physical and Occupational Therapy, desensitization and discharge planning  Neck pain    Weakness of both lower extremities  Feels weak in the bilateral lower extremities primarily in the left lower extremity greater than right but appears functional on examination  Some tremoring in the left lower extremity on MMT  Continue with physical and Occupational Therapy  Asthma  Albuterol as needed  Tobacco abuse  Smokes half a pack a day as an outpatient  Not interested in a patch or gum or replacement therapy at this time  Education on smoking and pain provided  Vitamin B12 deficiency  History of B12 deficiency but most recent level within normal range tested back in May 2024  Not currently on supplementation will need to have follow-up levels on next set of routine  "labs  Anxiety  Patient with mixed history of mood disorder is seen as depression and anxiety as well as schizophrenia throughout the chart.  Had multiple behavioral health inpatient unit stays over the last few years and frequently visits the ER  Currently on Seroquel 300 mg nightly  Will add Atarax as needed for acute anxiety  Also starting Cymbalta which he had been on in the past during a prior inpatient behavioral health stay, increase gabapentin from 300 mg 3 times daily to 400 mg 3 times daily.  He had previously been on regimen of 500 mg 3 times daily during that stay at the behavioral health unit  Cervical spinal stenosis  History of some mild cervical spinal stenosis on most recent MRI  Has seen neurosurgery in the past but was discharged from their practice after demanding surgery and threatening staff  Continue to clinically monitor his neuro examination  Polysubstance abuse (HCC)  History of methamphetamine use in the past per documentation  Also on Norco chronically in the past as well.  Currently on a regimen of oxycodone  Discussed with patient that we would not escalate his opioid therapies.  Did inquire about IV therapies which was also declined as a form of escalation  Lesion of parotid gland  Noted on MRI of the cervical spine  \"1.2 cm lesion in left deep parotid gland just posterior and slightly medial to left retromandibular vein. 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 Recommend ENT consultation for further evaluation.\"  Hypertension  Patient with elevated blood pressures with some lability some of it likely due to component of pain, anxiety  Started on lisinopril 10 mg daily and on as needed hydralazine  Management per internal medicine, will work on anxiety reduction to help with blood pressures  Impaired mobility and activities of daily living  Patient was evaluated by the rehabilitation team MD and an appropriate candidate for acute inpatient rehabilitation " program at this time.  The patient will tolerate 3 hours/day 5 to 7 days/week of intensive physical, occupational in order to obtain goals for community discharge  Due to the patient's functional Compared to their baseline level of function in addition to their ongoing medical needs, the patient would benefit from daily supervision from a rehabilitation physician as well as rehabilitation nursing to implement and adjust the medical as well as functional plan of care in order to meet the patient's goals.    Does not have primary care provider    Psychosis (HCC)      Subjective   Amado Chin is a 39 y.o. male with PMHx of depression, anxiety, questionable schizophrenia, substance abuse disorder, tobacco use who presented to the [facility] on 4/15/25 for intractable neck pain and bitemporal headache. Of note, patient has had multiple ED visits and hospitalizations for neck pain. Cervical CT imaging did not show any acute abnormalities. He endorsed intermittent paresthesias in his upper extremities, intermittent saddle anesthesia and occasional urinary dribbling/incontinence. He was subsquently admitted to to inpatient medicine with consults placed to acute pain service and neurology. MRI of cervical and lumbar spines were done which showed some progression of chronic degeneration changes, but no acute abnormalities or cord compression. Neurology believed his headaches were cervicogenic in nature and patient was ultimately started on a steroid taper, with improvement in his symptoms. Patient was then deemed medically stable and appropriate for inpatient rehabilitation. He was transferred to [facility] on 4/22     Chief Complaint: f/u ambulatory dysfunction,k neck pain, agitation    Interval: Patient seen and evaluated in room with no acute issues overnight however this morning became agitated and threw pillows and was screaming profanities at the staff removing his pillow.  Does have a labile  mood.  Discussed his medication regimen and continue with the increased dose of gabapentin and Cymbalta which was recently added today.  May take some time for the Cymbalta to work and we will increase the gabapentin likely tomorrow discussed a behavioral plan in the morning conference discussing a body system for staff nursing for safety reasons and to make sure that we limit any extra activities especially early in the morning. Patient denies fever, chills, nausea, emesis, cough, shortness of breath, diarrhea, or constipation. Sleep was fine, mood stable.  Continue to make sure the patient does not have his opioid regimen escalated        Objective :  Temp:  [98.2 °F (36.8 °C)-98.4 °F (36.9 °C)] 98.4 °F (36.9 °C)  HR:  [58-85] 60  BP: (129-173)/() 129/79  Resp:  [16-18] 18  SpO2:  [95 %-99 %] 98 %  O2 Device: None (Room air)    Functional Update:  Mobility: evals  Transfers: evals  ADLs: evals    Physical Exam  Vitals and nursing note reviewed.   Constitutional:       General: He is not in acute distress.     Comments: Anxious   HENT:      Head: Normocephalic and atraumatic.      Right Ear: External ear normal.      Left Ear: External ear normal.      Nose: Nose normal. No rhinorrhea.      Mouth/Throat:      Mouth: Mucous membranes are moist.      Pharynx: Oropharynx is clear.   Eyes:      General: No scleral icterus.  Cardiovascular:      Rate and Rhythm: Normal rate.      Pulses: Normal pulses.   Pulmonary:      Effort: Pulmonary effort is normal. No respiratory distress.   Abdominal:      General: There is no distension.      Palpations: Abdomen is soft.   Musculoskeletal:      Cervical back: Tenderness present.      Right lower leg: No edema.      Left lower leg: No edema.   Skin:     General: Skin is warm and dry.   Neurological:      Mental Status: He is alert and oriented to person, place, and time.      Comments: Endorses sensory deficits in the left leg below the knee.  No change in MMT    Psychiatric:         Mood and Affect: Mood normal.      Comments: Anxious behavior and pressured speech           Scheduled Meds:  Current Facility-Administered Medications   Medication Dose Route Frequency Provider Last Rate    acetaminophen  975 mg Oral Q8H SALAZAR Juarez      albuterol  2 puff Inhalation Q4H PRN SALAZAR Juarez      bisacodyl  10 mg Rectal Daily PRN Carly T Vance, DO      Diclofenac Sodium  2 g Topical 4x Daily SALAZAR Juarez      docusate sodium  100 mg Oral BID Carly T Vance, DO      DULoxetine  20 mg Oral Daily Carly T Vance, DO      enoxaparin  40 mg Subcutaneous Daily Isabel Villarreal, SALAZAR      gabapentin  400 mg Oral TID Carly T Vance, DO      hydrALAZINE  25 mg Oral Q8H PRN Isabel Villarreal, SALAZAR      hydrOXYzine HCL  25 mg Oral Q6H PRN Carly T Vance, DO      lisinopril  10 mg Oral Daily Isabel Villarreal, SALAZAR      methocarbamol  750 mg Oral Q6H PETER Isabel Villarreal, CRNP      nicotine  1 patch Transdermal Daily Isabel Villarreal, SALAZAR      oxyCODONE  2.5 mg Oral Q4H PRN SALAZAR Juarez      Or    oxyCODONE  5 mg Oral Q4H PRN Isabel Villarreal, SALAZAR      polyethylene glycol  17 g Oral Daily PRN Carly T Vance, DO      QUEtiapine  300 mg Oral HS Isabel Villarreal, SALAZAR      senna  2 tablet Oral Daily With Lunch Carly T Vance, DO           Lab Results: I have reviewed the following results:  Results from last 7 days   Lab Units 04/21/25  0602   HEMOGLOBIN g/dL 14.5   HEMATOCRIT % 42.3   WBC Thousand/uL 7.98   PLATELETS Thousands/uL 277     Results from last 7 days   Lab Units 04/21/25  0602   BUN mg/dL 22   SODIUM mmol/L 140   POTASSIUM mmol/L 3.7   CHLORIDE mmol/L 108   CREATININE mg/dL 0.87                Fausto Noriega,   Physical Medicine and Rehabilitation  [facility]    I have spent a total time of 35 minutes in caring for this patient on the day of the visit/encounter including  Counseling / Coordination of care, Documenting in the medical record, and Communicating with other healthcare professionals .

## 2025-04-23 NOTE — PROGRESS NOTES
PT Initial Evaluation       04/23/25 1000   Patient Data   Rehab Impairment Impairment of mobility, safety and Activities of Daily Living (ADLs) due to Neurologic Conditions:  03.9  Other Neurologic Disorder cervicogenic headache   Etiologic Diagnosis Cervicogenic headache   Date of Onset 04/15/25   Support System   Name Cecy  (main  but will be living with his girlfriend)   Relationship friend   Phone Number [phone]   Able to provide 24 hour supervision No   Home Setup   Type of Home Multi Level  (this is his sister's home set up)   Method of Entry Stairs   Number of Stairs 1   Number of Stairs in Home 13  (FF)   First Floor Bathroom Full;Shower   Second Floor Bathroom Full;Tub;Shower   First Floor Setup Available Yes   Home Modification Comment Pt. was on first floor set up when he was living with his sister , stated that he will be living with his girlfriend in [place] but is not sure of their home set up   Available Equipment   (none)   Baseline Information   Vocation Work Part Time;Work Full Time  ([employer])   Transportation Family/friends drive   Prior Device(s) Used   (none)   Prior Level of Function   Self-Care 3. Independent - Patient completed the activities by him/herself, with or without an assistive device, with no assistance from a helper.   Indoor-Mobility (Ambulation) 3. Independent - Patient completed the activities by him/herself, with or without an assistive device, with no assistance from a helper.   Stairs 3. Independent - Patient completed the activities by him/herself, with or without an assistive device, with no assistance from a helper.   Functional Cognition 3. Independent - Patient completed the activities by him/herself, with or without an assistive device, with no assistance from a helper.   Prior Assistance Needed for Driving;Shopping   Prior Device Used Z. None of the above   Falls in the Last Year   Number of falls in the past 12 months 1   Type of Injury  Associated with Fall No injury   Patient Preference   Nickname (Patient Preference) Amado   Restrictions/Precautions   Precautions Bed/chair alarms;Fall Risk;Pain;Supervision on toilet/commode  (constant dizziness ,BP can be high)   Pain Assessment   Pain Score 7   Hospital Pain Intervention(s) Emotional support  (breathing exercise)   Transfer Bed/Chair/Wheelchair   Type of Assistance Needed Physical assistance   Physical Assistance Level 51%-75%   Comment min to mod A using RW , would need mod to max A without AD but refused to be asses without a RW. pt. claus as he takes steps to pivot, 1 LOB to the L needing mod A   Chair/Bed-to-Chair Transfer CARE Score 2   Roll Left and Right   Type of Assistance Needed Supervision   Comment flat surface with no rails, pt. supports his neck throughout movement   Roll Left and Right CARE Score 4   Sit to Lying   Type of Assistance Needed Supervision   Comment flat surface with no rails, pt. supports his neck throughout movement   Sit to Lying CARE Score 4   Lying to Sitting on Side of Bed   Type of Assistance Needed Supervision   Comment flat surface with no rails, pt. supports his neck throughout movement   Lying to Sitting on Side of Bed CARE Score 4   Sit to Stand   Type of Assistance Needed Physical assistance   Physical Assistance Level 25% or less   Comment min A, needs RW for support as he stands   Sit to Stand CARE Score 3   Picking Up Object   Comment unsafe without grabber   Reason if not Attempted Safety concerns   Picking Up Object CARE Score 88   Car Transfer   Type of Assistance Needed Physical assistance   Physical Assistance Level 51%-75%   Comment mod A using RW   Car Transfer CARE Score 2   Ambulation   Primary Mode of Locomotion Prior to Admission Walk   Distance Walked (feet) 45 ft   Assist Device Roller Walker   Gait Pattern Slow Gissel;Ataxic;Improper weight shift;Step to;Narrow ESME;Decreased foot clearance  (heavily relies on CAROLYN GONCALVES when wt  "bearing)   Limitations Noted In Balance;Coordination;Endurance;Safety   Provided Assistance with: Balance;Trunk Support   Walk Assist Level Moderate Assist;Chair Follow   Walk 10 Feet   Type of Assistance Needed Physical assistance   Physical Assistance Level Total assistance   Comment mod A with RW, occasionally needing max A with LOB to the L , w/c follow for safety   Walk 10 Feet CARE Score 1   Walk 50 Feet with Two Turns   Reason if not Attempted Safety concerns   Walk 50 Feet with Two Turns CARE Score 88   Walk 150 Feet   Reason if not Attempted Safety concerns   Walk 150 Feet CARE Score 88   Walking 10 Feet on Uneven Surfaces   Reason if not Attempted Safety concerns   Walking 10 Feet on Uneven Surfaces CARE Score 88   Wheelchair mobility   Findings Pt. anticipated to be ambulatory at d/c.   Wheel 50 Feet with Two Turns   Reason if not Attempted Activity not applicable   Wheel 50 Feet with Two Turns CARE Score 9   Wheel 150 Feet   Reason if not Attempted Activity not applicable   Wheel 150 Feet CARE Score 9   Curb or Single Stair   Style negotiated Single stair   Type of Assistance Needed Physical assistance   Physical Assistance Level Total assistance   Comment mod A x 1 with CGA of 2nd person for safe, Only stepped up and down bottom of 4\"  steps descending backwards   1 Step (Curb) CARE Score 1   4 Steps   Type of Assistance Needed Physical assistance   Physical Assistance Level Total assistance   Comment mod A x 1 with CGA of 2nd person for safe, Only stepped up and down bottom of 4\"  steps descending backwards   4 Steps CARE Score 1   12 Steps   Reason if not Attempted Safety concerns   12 Steps CARE Score 88   Stairs   Type [place] Steps   # of Steps 4   Weight Bearing Precautions Fall Risk   Assist Devices Bilateral Rail   Comprehension   QI: Comprehension 3. Usually Understands: Understands most conversations, but misses some part/intent of message. Requires cues at times to understand. " "  Expression   QI: Expression 4. Express complex messages without difficulty and with speech that is clear and easy to understan   RLE Assessment   RLE Assessment WFL  (4/5)   LLE Assessment   LLE Assessment WFL  (4+/5)   Coordination   Movements are Fluid and Coordinated 0   Coordination and Movement Description B LE tremors when weight bearing   Sensation   Light Touch No apparent deficits   Cognition   Arousal/Participation Alert;Cooperative   Attention Within functional limits   Memory Within functional limits   Following Commands Follows one step commands without difficulty   Objective Measure   PT Measure(s) (S)  Assess 5X STS and 2MWT tomorrow if safe   PT Findings BP were elevated initially but improved towards end of session. All Vitals are reflected on vitals section. Offered to pt. HEP but refused stating that he has his own exercises regimen.   Therapeutic Exercise   Therapeutic Exercise/Activity Supine prirformis stretching (passive), long sitting self hamstring stretching. in // bars squats, nu step level 6-8 X 10 mins LE only.   Discharge Information   Vocational Plan Return to work   Barriers to Return to Vocation Environmental Access;Strength;Endurance;Transportation Issues   Patient's Discharge Plan return home mod I   Patient's Rehab Expectations \"To get my legs stronger \"   Barriers to Discharge Home Limited Family Support;Unsafe Home Setup;Decreased Strength;Decreased Endurance;Pain;Safety Considerations   Impressions Pt. Is a 40 y/o male who was admitted [facility] on 4/15 due to intractable neck pain and bitemporal headache. Pt. Has hx of being seen at ER the past year due to neck pain with MRI in May 2024 done revealing multilevel degenerative changes including a . CT disc bulge at C5-C6 and C6 -C7. CT of cervical spine done this time and no acute findings. Also reported B UE weakness, dizziness, unstable gait and blurry vision. Neurology was consulted and headache might be from  cervicogenic in " origin.  He has a longstanding history of neck pain that has not been appropriately treated in the outpatient setting.  Neurology recommended continuing his home pain regimen and reaching out to the acute pain service for any potential inpatient interventions or adjustments. He was started on a course of steroids.  MRI cervical and lumbar spine was completed and showed progression of degenerative changes as suspected. Current hospitalization complicated  with elevated BP.  Pt. Now admitted to [facility] and mod complexity eval completed with the following deficits/  barriers identified: dec L LE strength, dec balance and coordination with LE spasms when weight bearing and dec activity tolerance due to severe pain. Pt. Currently needing mod A X 1-2 to complete functional mobility using RW which is below his baseline. 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 Reported that over the last few months, he had gotten weaker and had multiple falls. Pt. Lived with his sister but reported that her sister is moving and plan is for him to d/c to his girlfriend's house in [place]. Pt's personal barrier includes: dec caregiver support and unknown home set up for now. Will need to follow up with patient any steps he needs to manage prior to d/c to home. Pt. Is a good rehab candidate and will benefit from skilled PT to maximize strength, endurance, balance, safety and independence prior to d/c to home . ELOS 7-10 days with mod I goals.   PT Therapy Minutes   PT Time In 1000   PT Time Out 1130   PT Total Time (minutes) 90   PT Mode of treatment - Individual (minutes) 90   PT Mode of treatment - Concurrent (minutes) 0   PT Mode of treatment - Group (minutes) 0   PT Mode of treatment - Co-treat (minutes) 0   PT Mode of Treatment - Total time(minutes) 90 minutes   PT Cumulative Minutes 90   Cumulative Minutes   Cumulative therapy minutes 180

## 2025-04-23 NOTE — ASSESSMENT & PLAN NOTE
Was seeing [place] [facility] but has not seen them in a while and he hasn't returned their phone calls for followup  Prior to seeing them he was being seen by FP at [facility]

## 2025-04-23 NOTE — NURSING NOTE
Attempted again to try & softly wake pt up for medications. Pt is sleeping on his stomach with his neck/head turned to right side. Will reattempt shortly.

## 2025-04-23 NOTE — PROGRESS NOTES
PT ICP       04/23/25 1000   Rehab Team Goals   Transfer Team Goal Patient will be independent with transfers with least restrictive device upon completion of rehab program   Locomotion Team Goal Patient will be independent with locomotion with least restrictive device upon completion of rehab program   PT Transfer Goal   Roll left and right Goal 06. Independent - Patient completes the activity by him/herself with no assistance from a helper.   Sit to lying Goal 06. Independent - Patient completes the activity by him/herself with no assistance from a helper.   Lying to sitting on side of bed Goal 06. Independent - Patient completes the activity by him/herself with no assistance from a helper.   Sit to stand Goal 06. Independent - Patient completes the activity by him/herself with no assistance from a helper.   Chair/bed-to-chair transfer Goal 06. Independent - Patient completes the activity by him/herself with no assistance from a helper.   Car Transfer Goal 05. Setup or clean-up assistance - [place] SETS UP or CLEANS UP, patient completes activity. [place] assists only prior to or following the activity.   Assistive Device Roller Walker  (vs LRAD)   Safety Precautions WBAT   Environment Level Surface;Well Lit   Status Ongoing;Target goal - one week;Target goal - two weeks  (7-10 days)   Locomotion Goal   Primary discharge mode of locomotion Walking   Target Walk Distance 150 ft   Assist Device Roller Walker   Environment Level Surface;Well Lit   Walk 10 feet Goal 06. Independent - Patient completes the activity by him/herself with no assistance from a helper.   Walk 50 feet with 2 turns Goal 06. Independent - Patient completes the activity by him/herself with no assistance from a helper.   Walk 150 feet Goal 04. Supervision or touching assistance- [place] provides VERBAL CUES or supervision throughout activity.   Walking 10 feet on uneven surface 04. Supervision or touching assistance- [place] provides VERBAL CUES or  supervision throughout activity.   Walking Goal Status Ongoing;Target goal - one week;Target goal - two weeks  (7-10 days)   Wheel 50 feet with 2 turns Goal 09. Not applicable   Wheel 150 feet Goal 09. Not applicable   Stairs Goal   1 step or curb goal 04. Supervision or touching assistance- [place] provides VERBAL CUES or supervision throughout activity.   4 steps Goal 04. Supervision or touching assistance- [place] provides VERBAL CUES or supervision throughout activity.   12 steps Goal 04. Supervision or touching assistance- [place] provides VERBAL CUES or supervision throughout activity.   Assist Level Supervision   Number of Stairs 12   Technique Non-reciprocal   Hand Rail Bilateral   Status Ongoing;Target goal - one week;Target goal - two weeks  (7-10 days)   Object Retrieval Goal   Picking up object Goal 06. Independent - Patient completes the activity by him/herself with no assistance from a helper.   Assistive Device  Reacher   Small Object Picked Up marker

## 2025-04-23 NOTE — ASSESSMENT & PLAN NOTE
History of psychosis previous inpatient psychiatric admission on 7/2024.  Other hx states bipolar disorder

## 2025-04-23 NOTE — PROGRESS NOTES
OT LTG       04/23/25 0700   Rehab Team Goals   ADL Team Goal Patient will be independent with ADLs with least restrictive device upon completion of rehab program  (7-10 days)   Rehab Team Interventions   OT Interventions Self Care;Home Management;Therapeutic Exercise;Community Reintegration;Cognitive Reintegration;Energy Conservation;Patient/Family Education   Eating Goal   Eating Goal 06. Independent - Patient completes the activity by him/herself with no assistance from a helper.   Status Target goal - one week;Target goal - two weeks   Interventions Optimal Position   Grooming Goal   Oral Hygiene Goal 06. Independent - Patient completes the activity by him/herself with no assistance from a helper.   Status Target goal - one week;Target goal - two weeks   Intervention Balance Work;Assistive Device;Neuromuscular Education;Therapeutic Exercise;Tolerance Work   Tub/Shower Transfer Goal   Method Tub Shower   Assist Device Seat with Back   Status Target goal - one week;Target goal - two weeks   Interventions ADL Training;Assistive Device;Neuromuscular Education   Bathing Goal   Shower/bathe self Goal 06. Independent - Patient completes the activity by him/herself with no assistance from a helper.   Status Target goal - one week;Target goal - two weeks   Intervention Assistive Device;ADL Training;Neuromuscular Education;Therapeutic Exercise   Upper Body Dressing Goal   Upper body dressing Goal 06. Independent - Patient completes the activity by him/herself with no assistance from a helper.   Status Target goal - one week;Target goal - two weeks   Intervention Assistive Device;Balance Work;Neuromuscular Education;Therapeutic Exercise;Tolerance Work   Lower Body Dressing Goal   Lower body dressing Goal 06. Independent - Patient completes the activity by him/herself with no assistance from a helper.   Putting on/taking off footwear Goal 06. Independent - Patient completes the activity by him/herself with no assistance  from a helper.   Status Target goal - one week;Target goal - two weeks   Intervention Assistive Device;Balance Work;Neuromuscular Education;Therapeutic Exercise;Tolerance Work   Toileting Transfer Goal   Toilet transfer Goal 06. Independent - Patient completes the activity by him/herself with no assistance from a helper.   Status Target goal - one week;Target goal - two weeks   Intervention ADL Training;Balance Work;Assistive Device   Toileting Goal   Toileting hygiene Goal 06. Independent - Patient completes the activity by him/herself with no assistance from a helper.   Status Target goal - one week;Target goal - two weeks   Intervention ADL Training;Balance Work;Assistive Device   Meal Prep and Kitchen Mobility   Assist Level Independent   Status Target goal - one week;Target goal - two weeks   Medication Management   Assist Level Independent   Status Target goal - one week;Target goal - two weeks   Laundry   Assist Level Independent   Status Target goal - one week;Target goal - two weeks

## 2025-04-23 NOTE — PCC OCCUPATIONAL THERAPY
04/23/25: Pt is a 39 y.o. male who was admitted on 4/15/25 due to intractable neck pain and bitemporal headache. Patient has been seen in the ER multiple times over the past year for neck pain.Pt was dx with cervicogenic headache and transferred to [facility] [place] to receive skilled therapy services. Pt  has a past medical history of Asthma, Chronic pain, Hypertension, Neck pain, Psychoactive 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HCC), and Thyroglossal duct cyst.. Current precautions include bed/chair alarms, fall risk, pain, and supervision on toilet/commode. See flowsheet for details of pts home setup, PLOF and current functional status. Pts impairments include pain, endurance, activity tolerance, functional mobility, balance, functional standing tolerance, unsupportive home environment, decreased I w/ ADLS/IADLS, strength, visual deficits, sensation deficits, coordination deficits, hypertension (BP was 160/110 manually - RN and IM notified) and primarily pt c/o constant dizziness. Pt reports he feels like he is on a boat - denies room spinning. States his dizziness has caused black outs in the past and has led to repeated falls. These impairments along with  limited caregiver support, steps to enter, difficulty performing ADLs, and difficulty performing IADLs causes the inability to safely complete A/IADLs including Grooming, Bathing, UB dressing, LB dressing, Toileting, Toilet transfer, Tub/shower Transfer, and IADL Management. Pt presents with good rehab potential with motivation to participate and achieve goal of DC home. Pt is unsafe to DC home at this time, recommending 7-10 days to achieve independent with assistive device level goals with RW and LRAD . Plan to achieve stated goals with interventions focused on ADL Retraining , LB Dressing, UB dressing, IADL training , Kitchen Mobilty, Meal preparation, Medication management , Functional Transfers, Standing tolerance, Standing balance , DME training/education, Family  training/education, Energy conservation training/education, healthy coping education, Leisure and social pursuits, and community re-integration.

## 2025-04-23 NOTE — PCC PHYSICAL THERAPY
Pt. Is a 38 y/o male who was admitted [facility] on 4/15 due to intractable neck pain and bitemporal headache. Pt. Has hx of being seen at ER the past year due to neck pain with MRI in May 2024 done revealing multilevel degenerative changes including a . CT disc bulge at C5-C6 and C6 -C7. CT of cervical spine done this time and no acute findings. Also reported B UE weakness, dizziness, unstable gait and blurry vision. Neurology was consulted and headache might be from  cervicogenic in origin.  He has a longstanding history of neck pain that has not been appropriately treated in the outpatient setting.  Neurology recommended continuing his home pain regimen and reaching out to the acute pain service for any potential inpatient interventions or adjustments. He was started on a course of steroids.  MRI cervical and lumbar spine was completed and showed progression of degenerative changes as suspected. Current hospitalization complicated  with elevated BP.  Pt. Now admitted to [facility] and mod complexity eval completed with the following deficits/  barriers identified: dec L LE strength, dec balance and coordination with LE spasms when weight bearing and dec activity tolerance due to severe pain. Pt. Currently needing mod A X 1-2 to complete functional mobility using RW which is below his baseline. 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 Reported that over the last few months, he had gotten weaker and had multiple falls. Pt. Lived with his sister but reported that her sister is moving and plan is for him to d/c to his girlfriend's house in [place]. Pt's personal barrier includes: dec caregiver support and unknown home set up for now. Will need to follow up with patient any steps he needs to manage prior to d/c to home. Pt. Is a good rehab candidate and will benefit from skilled PT to maximize strength, endurance, balance, safety and independence prior to d/c to home . ELOS 7-10 days with mod I goals.

## 2025-04-23 NOTE — ASSESSMENT & PLAN NOTE
On no meds at home but was placed on Lisinopril 10 mg qd while in hospital  Had been on Norvasc in the past but stopped it  Has prn PO Hydralazine for  or greater  BP is very labile 2/2 becoming upset at times.    May need to increase Lisinopril but will reassess 4/24/25

## 2025-04-23 NOTE — ASSESSMENT & PLAN NOTE
Associated with neck pain  Has had multiple ED visits and hospitalization for chronic neck pain and headaches  He saw NS here at [facility] in 2/2022 and became verbally abusive and threatening to the PA and Dr Johnson and they will no longer see him  He was then seen by NS at [facility] 9/2022 and they did not recommend surgery but referred to pain management  Takes gabapentin and naproxen at home but no opioids.  Cervical spine CT scan without acute abnormality  Cervical/lumbar spine MRI = progression of degenerative changes but no cord compression,   No further inpatient neurology workup  Acute pain service saw during his hospital stay  He was recommended to establish an outpatient pain management   Ambulatory referral to comprehensive spine program as well as [facility] spine and pain were placed

## 2025-04-23 NOTE — NURSING NOTE
Pt is due to his [id number] meds & currently is sleeping soundly & snoring on his stomach  with his head turned to the left side in bed. Attempted to call out his name several times & continues to snore. Will wait for him to wake up on his on to give medications given his psych hx & behaviors of begging startled as the situation that happened on nightshift this AM. We are also to recheck his BP manually when he wakes up.  He had a elevated BP taken by ALEKSANDRA Lagos between [id number] & he just notified me @ 1350 when he was entering them into the computer system. I did immediately notify [facility] medical team.

## 2025-04-23 NOTE — UTILIZATION REVIEW
NOTIFICATION OF ADMISSION DISCHARGE   This is a Notification of Discharge from [facility]. Please be advised that this patient has been discharge from our facility. Below you will find the admission and discharge date and time including the patient’s disposition.   UTILIZATION REVIEW CONTACT:  Utilization Review Assistants  Network Utilization Review Department  Phone: [phone] x carefully listen to the prompts. All voicemails are confidential.  Email: [email or web address]     ADMISSION INFORMATION  PRESENTATION DATE: 4/15/2025 10:06 AM  OBERVATION ADMISSION DATE: 04/15/2025 1316  INPATIENT ADMISSION DATE: 4/16/25  8:14 AM   DISCHARGE DATE: 4/22/2025  2:33 PM   DISPOSITION:[facility]    Network Utilization Review Department  ATTENTION: Please call with any questions or concerns to [phone] and carefully listen to the prompts so that you are directed to the right person. All voicemails are confidential.   For Discharge needs, contact Care Management DC Support Team at [phone] opt. [phone]  Send all requests for admission clinical reviews, approved or denied determinations and any other requests to dedicated fax number below belonging to the campus where the patient is receiving treatment. List of dedicated fax numbers for the Facilities:  FACILITY NAME UR FAX NUMBER   ADMISSION DENIALS (Administrative/Medical Necessity) [phone]   DISCHARGE SUPPORT TEAM ([facility]) [phone]   PARENT CHILD HEALTH (Maternity/NICU/Pediatrics) [phone]   [facility] [phone]   [facility] [phone]   [facility] [phone]   [facility] [phone]   [facility] [phone]   [facility] [phone]   [facility] [phone]   [facility] [phone]   [facility]  [facility] [phone]   [facility] [phone]   [facility] [phone]   [facility] [phone]

## 2025-04-23 NOTE — PROGRESS NOTES
Progress Note - Hospitalist   Name: Amado Chin 39 y.o. male I MRN: [id number]  Unit/Bed#: -01 I Date of Admission: 4/22/2025   Date of Service: 4/23/2025 I Hospital Day: 1    Assessment & Plan  Cervicogenic headache  Secondary to neck pain  S/p course of prednisone per Neurology  Pain management per PMR  Cervical spinal stenosis  Associated with neck pain  Has had multiple ED visits and hospitalization for chronic neck pain and headaches  He saw NS here at [facility] in 2/2022 and became verbally abusive and threatening to the PA and Dr Johnson and they will no longer see him  He was then seen by NS at [facility] 9/2022 and they did not recommend surgery but referred to pain management  Takes gabapentin and naproxen at home but no opioids.  Cervical spine CT scan without acute abnormality  Cervical/lumbar spine MRI = progression of degenerative changes but no cord compression,   No further inpatient neurology workup  Acute pain service saw during his hospital stay  He was recommended to establish an outpatient pain management   Ambulatory [facility]  Hypertension  On no meds at home but was placed on Lisinopril 10 mg qd while in hospital  Had been on Norvasc in the past but stopped it  Has prn PO Hydralazine for  or greater  BP is very labile 2/2 becoming upset at times.    May need to increase Lisinopril but will reassess 4/24/25  Asthma  Stable w/o exacerbation  Continue prn Albuterol inhaler  Tobacco abuse  Smokes 1/2 ppd  Refuses patch  Anxiety  Seroquel 300mg qhs  Per PMR  Polysubstance abuse (HCC)  Meth in past  In hospital staff found a marijuana vape pen  Lesion of parotid gland  Found incidentally = 1.2 cm  See ENT as OP  Does not have primary care provider  [facility] but has not seen them in a while and he hasn't returned their phone calls for followup  Prior to seeing them he was being seen by FP at  [facility]  Psychosis (HCC)  History of psychosis previous inpatient psychiatric admission on 7/2024.  Other hx states bipolar disorder    The above assessment and plan was reviewed and updated as determined by my evaluation of the patient on 4/23/2025.    History of Present Illness   Patient seen and examined. Patients overnight issues or events were reviewed with nursing staff. New or overnight issues include the following:   No new issues.  Has some dizziness w/o ortho stasis when turning head    Review of Systems    Objective :  Temp:  [98.2 °F (36.8 °C)-98.4 °F (36.9 °C)] 98.4 °F (36.9 °C)  HR:  [58-85] 60  BP: (129-173)/() 129/79  Resp:  [16-18] 18  SpO2:  [95 %-99 %] 98 %  O2 Device: None (Room air)    Invasive Devices       None                   Physical Exam  General Appearance: no distress, non toxic appearing  HEENT: PERRLA, conjuctiva normal; oropharynx clear; mucous membranes moist   Lungs: CTA, normal respiratory effort, no retractions, expiratory effort normal  CV: regular rate and rhythm; no rubs/murmurs/gallops, PMI normal   ABD: soft; ND/NT; +BS  EXT: no edema  Skin: normal turgor, normal texture  Psych: affect normal, mood normal  Neuro: AAO        The above physical exam was reviewed and updated as determined by my evaluation of the patient on 4/23/2025.      Lab Results: I have reviewed the following results:  Results from last 7 days   Lab Units 04/21/25  0602   WBC Thousand/uL 7.98   HEMOGLOBIN g/dL 14.5   HEMATOCRIT % 42.3   PLATELETS Thousands/uL 277     Results from last 7 days   Lab Units 04/21/25  0602   SODIUM mmol/L 140   POTASSIUM mmol/L 3.7   CHLORIDE mmol/L 108   CO2 mmol/L 26   BUN mg/dL 22   CREATININE mg/dL 0.87   CALCIUM mg/dL 8.6                   Imaging Results Review: No pertinent imaging studies reviewed.  Other Study Results Review: No additional pertinent studies reviewed.    Review of Scheduled Meds: Medications  reviewed and reconciled as needed  Current  Facility-Administered Medications   Medication Dose Route Frequency Provider Last Rate    acetaminophen  975 mg Oral Q8H Isabel Villarreal, SALAZAR      albuterol  2 puff Inhalation Q4H PRN SALAZAR Juarez      bisacodyl  10 mg Rectal Daily PRN Carly T Vance, DO      Diclofenac Sodium  2 g Topical 4x Daily Isabel Villarreal, SALAZAR      docusate sodium  100 mg Oral BID Carly T Vance, DO      DULoxetine  20 mg Oral Daily Carly T Vance, DO      enoxaparin  40 mg Subcutaneous Daily Isabel Villarreal, SALAZAR      gabapentin  400 mg Oral TID Carly T Vance, DO      hydrALAZINE  25 mg Oral Q8H PRN Isabel Villarreal, SALAZAR      hydrOXYzine HCL  25 mg Oral Q6H PRN Carly T Vance, DO      lisinopril  10 mg Oral Daily Isabel Villarreal, SALAZAR      methocarbamol  750 mg Oral Q6H PETER Isabel Villarrela, SALAZAR      nicotine  1 patch Transdermal Daily SALAZAR Juarez      oxyCODONE  2.5 mg Oral Q4H PRN SALZAAR Juarez      Or    oxyCODONE  5 mg Oral Q4H PRN Isabel Villarreal, SALAZAR      polyethylene glycol  17 g Oral Daily PRN Carly T Vance, DO      QUEtiapine  300 mg Oral HS Isabel Villarreal, SALAZAR      senna  2 tablet Oral Daily With Lunch Carly T Vance, DO         VTE Pharmacologic Prophylaxis: Lovenox  Code Status: Level 1 - Full Code  Current Length of Stay: 1 day(s)    Administrative Statements     ** Please Note:  voice to text software may have been used in the creation of this document. Although proof errors in transcription or interpretation are a potential of such software**

## 2025-04-23 NOTE — PLAN OF CARE
Problem: PAIN - ADULT  Goal: Verbalizes/displays adequate comfort level or baseline comfort level  Description: Interventions:- Encourage patient to monitor pain and request assistance- Assess pain using appropriate pain scale- Administer analgesics based on type and severity of pain and evaluate response- Implement non-pharmacological measures as appropriate and evaluate response- Consider cultural and social influences on pain and pain management- Notify physician/advanced practitioner if interventions unsuccessful or patient reports new pain  Outcome: Progressing     Problem: INFECTION - ADULT  Goal: Absence or prevention of progression during hospitalization  Description: INTERVENTIONS:- Assess and monitor for signs and symptoms of infection- Monitor lab/diagnostic results- Monitor all insertion sites, i.e. indwelling lines, tubes, and drains- Monitor endotracheal if appropriate and nasal secretions for changes in amount and color- [place] appropriate cooling/warming therapies per order- Administer medications as ordered- Instruct and encourage patient and family to use good hand hygiene technique- Identify and instruct in appropriate isolation precautions for identified infection/condition  Outcome: Progressing     Problem: SAFETY ADULT  Goal: Patient will remain free of falls  Description: INTERVENTIONS:- Educate patient/family on patient safety including physical limitations- Instruct patient to call for assistance with activity - Consult OT/PT to assist with strengthening/mobility - Keep Call bell within reach- Keep bed low and locked with side rails adjusted as appropriate- Keep care items and personal belongings within reach- Initiate and maintain comfort rounds- Make Fall Risk Sign visible to staff- Offer Toileting every  Hours, in advance of need- Initiate/Maintain alarm- Obtain necessary fall risk management equipment: - Apply yellow socks and bracelet for high fall risk patients- Consider moving  patient to room near nurses station  Outcome: Progressing  Goal: Maintain or return to baseline ADL function  Description: INTERVENTIONS:-  Assess patient's ability to carry out ADLs; assess patient's baseline for ADL function and identify physical deficits which impact ability to perform ADLs (bathing, care of mouth/teeth, toileting, grooming, dressing, etc.)- Assess/evaluate cause of self-care deficits - Assess range of motion- Assess patient's mobility; develop plan if impaired- Assess patient's need for assistive devices and provide as appropriate- Encourage maximum independence but intervene and supervise when necessary- Involve family in performance of ADLs- Assess for home care needs following discharge - Consider OT consult to assist with ADL evaluation and planning for discharge- Provide patient education as appropriate  Outcome: Progressing  Goal: Maintains/Returns to pre admission functional level  Description: INTERVENTIONS:- Perform AM-PAC 6 Click Basic Mobility/ Daily Activity assessment daily.- Set and communicate daily mobility goal to care team and patient/family/caregiver. - Collaborate with rehabilitation services on mobility goals if consulted- Perform Range of Motion  times a day.- Reposition patient every  hours.- Dangle patient  times a day- Stand patient  times a day- Ambulate patient  times a day- Out of bed to chair  times a day - Out of bed for meals  times a day- Out of bed for toileting- Record patient progress and toleration of activity level   Outcome: Progressing     Problem: DISCHARGE PLANNING  Goal: Discharge to home or other facility with appropriate resources  Description: INTERVENTIONS:- Identify barriers to discharge w/patient and caregiver- Arrange for needed discharge resources and transportation as appropriate- Identify discharge learning needs (meds, wound care, etc.)- Arrange for interpretive services to assist at discharge as needed- Refer to Case Management Department for  coordinating discharge planning if the patient needs post-hospital services based on physician/advanced practitioner order or complex needs related to functional status, cognitive ability, or social support system  Outcome: Progressing     Problem: Knowledge Deficit  Goal: Patient/family/caregiver demonstrates understanding of disease process, treatment plan, medications, and discharge instructions  Description: Complete learning assessment and assess knowledge base.Interventions:- Provide teaching at level of understanding- Provide teaching via preferred learning methods  Outcome: Progressing

## 2025-04-24 PROCEDURE — 97110 THERAPEUTIC EXERCISES: CPT

## 2025-04-24 PROCEDURE — 97140 MANUAL THERAPY 1/> REGIONS: CPT

## 2025-04-24 PROCEDURE — 97530 THERAPEUTIC ACTIVITIES: CPT

## 2025-04-24 PROCEDURE — 97116 GAIT TRAINING THERAPY: CPT

## 2025-04-24 PROCEDURE — 99233 SBSQ HOSP IP/OBS HIGH 50: CPT | Performed by: STUDENT IN AN ORGANIZED HEALTH CARE EDUCATION/TRAINING PROGRAM

## 2025-04-24 PROCEDURE — 97535 SELF CARE MNGMENT TRAINING: CPT

## 2025-04-24 PROCEDURE — 99231 SBSQ HOSP IP/OBS SF/LOW 25: CPT | Performed by: NURSE PRACTITIONER

## 2025-04-24 RX ORDER — LISINOPRIL 20 MG/1
20 TABLET ORAL DAILY
Status: DISCONTINUED | OUTPATIENT
Start: 2025-04-25 | End: 2025-04-30 | Stop reason: HOSPADM

## 2025-04-24 RX ADMIN — DOCUSATE SODIUM 100 MG: 100 CAPSULE, LIQUID FILLED ORAL at 18:05

## 2025-04-24 RX ADMIN — ACETAMINOPHEN 975 MG: 325 TABLET, FILM COATED ORAL at 08:18

## 2025-04-24 RX ADMIN — ACETAMINOPHEN 975 MG: 325 TABLET, FILM COATED ORAL at 21:18

## 2025-04-24 RX ADMIN — HYDRALAZINE HYDROCHLORIDE 25 MG: 25 TABLET ORAL at 21:18

## 2025-04-24 RX ADMIN — METHOCARBAMOL 750 MG: 750 TABLET ORAL at 18:05

## 2025-04-24 RX ADMIN — DICLOFENAC SODIUM 2 G: 10 GEL TOPICAL at 08:21

## 2025-04-24 RX ADMIN — DULOXETINE HYDROCHLORIDE 20 MG: 20 CAPSULE, DELAYED RELEASE ORAL at 08:18

## 2025-04-24 RX ADMIN — DICLOFENAC SODIUM 2 G: 10 GEL TOPICAL at 18:06

## 2025-04-24 RX ADMIN — DOCUSATE SODIUM 100 MG: 100 CAPSULE, LIQUID FILLED ORAL at 08:19

## 2025-04-24 RX ADMIN — OXYCODONE HYDROCHLORIDE 5 MG: 5 TABLET ORAL at 08:19

## 2025-04-24 RX ADMIN — GABAPENTIN 400 MG: 400 CAPSULE ORAL at 08:20

## 2025-04-24 RX ADMIN — METHOCARBAMOL 750 MG: 750 TABLET ORAL at 08:21

## 2025-04-24 RX ADMIN — GABAPENTIN 500 MG: 100 CAPSULE ORAL at 18:05

## 2025-04-24 RX ADMIN — METHOCARBAMOL 750 MG: 750 TABLET ORAL at 12:59

## 2025-04-24 RX ADMIN — QUETIAPINE FUMARATE 300 MG: 100 TABLET ORAL at 21:18

## 2025-04-24 RX ADMIN — GABAPENTIN 500 MG: 100 CAPSULE ORAL at 13:00

## 2025-04-24 RX ADMIN — OXYCODONE HYDROCHLORIDE 5 MG: 5 TABLET ORAL at 18:08

## 2025-04-24 RX ADMIN — OXYCODONE HYDROCHLORIDE 5 MG: 5 TABLET ORAL at 13:04

## 2025-04-24 RX ADMIN — HYDRALAZINE HYDROCHLORIDE 25 MG: 25 TABLET ORAL at 13:09

## 2025-04-24 RX ADMIN — SENNOSIDES 17.2 MG: 8.6 TABLET, FILM COATED ORAL at 12:59

## 2025-04-24 RX ADMIN — LISINOPRIL 10 MG: 10 TABLET ORAL at 08:20

## 2025-04-24 RX ADMIN — OXYCODONE HYDROCHLORIDE 5 MG: 5 TABLET ORAL at 22:15

## 2025-04-24 RX ADMIN — DICLOFENAC SODIUM 2 G: 10 GEL TOPICAL at 14:13

## 2025-04-24 RX ADMIN — ACETAMINOPHEN 975 MG: 325 TABLET, FILM COATED ORAL at 14:12

## 2025-04-24 RX ADMIN — ENOXAPARIN SODIUM 40 MG: 40 INJECTION SUBCUTANEOUS at 08:18

## 2025-04-24 NOTE — ASSESSMENT & PLAN NOTE
Patient with mixed history of mood disorder is seen as depression and anxiety as well as schizophrenia throughout the chart.  Had multiple behavioral health inpatient unit stays over the last few years and frequently visits the ER  Currently on Seroquel 300 mg nightly  Continue Atarax as needed for acute anxiety  Continue Cymbalta which he had been on in the past during a prior inpatient behavioral health stay. Increased gabapentin to 500 mg 3 times daily to mimic regimen that he was on at the behavioral health unit

## 2025-04-24 NOTE — PROGRESS NOTES
Progress Note - Hospitalist   Name: Amado Chin 39 y.o. male I MRN: [id number]  Unit/Bed#: -01 I Date of Admission: 4/22/2025   Date of Service: 4/24/2025 I Hospital Day: 2    Assessment & Plan  Cervicogenic headache  Secondary to neck pain  S/p course of prednisone per Neurology  Pain management per PMR  Cervical spinal stenosis  Associated with neck pain  Has had multiple ED visits and hospitalization for chronic neck pain and headaches  He saw NS here at [facility] in 2/2022 and became verbally abusive and threatening to the PA and Dr Johnson and they will no longer see him  He was then seen by NS at [facility] 9/2022 and they did not recommend surgery but referred to pain management  Takes gabapentin and naproxen at home but no opioids.  Cervical spine CT scan without acute abnormality  Cervical/lumbar spine MRI = progression of degenerative changes but no cord compression  No further inpatient neurology workup  Acute pain service saw during his hospital stay  He was recommended to establish an outpatient pain management   Ambulatory referral to comprehensive spine program as well as [facility]'s spine and pain were placed  Hypertension  On no meds at home but was placed on Lisinopril 10 mg qd while in hospital  Had been on Norvasc in the past but stopped it  Has prn PO Hydralazine for  or greater  BP is very labile 2/2 becoming upset frequently.    Had to have a dose of prn Hydralazine 25 mg last evening 4/23/24  Increase Lisinopril to 20mg qd starting 4/25/25  Asthma  Stable w/o exacerbation  Continue prn Albuterol inhaler  Tobacco abuse  Smokes 1/2 ppd  Refuses patch so will discontinue it today 4/24/25  Anxiety  Seroquel 300mg qhs  Per PMR  Polysubstance abuse (HCC)  Methamphetamine use in past  In hospital staff found a marijuana vape pen  Lesion of parotid gland  Found incidentally = 1.2 cm  See ENT as OP  Does not have primary care provider  Was seeing [place] [facility] but has not seen them in a while  and he hasn't returned their phone calls for followup  Prior to seeing them he was being seen by FP at [facility]  Psychosis (HCC)  History of psychosis previous inpatient psychiatric admission on 7/2024.  Other hx states bipolar disorder    The above assessment and plan was reviewed and updated as determined by my evaluation of the patient on 4/24/2025.    History of Present Illness   Patient seen and examined. Patients overnight issues or events were reviewed with nursing staff. New or overnight issues include the following:   No new or overnight issues.  Offers no complaints    Review of Systems   All other systems reviewed and are negative.      Objective :  Temp:  [97.6 °F (36.4 °C)-98.1 °F (36.7 °C)] 97.6 °F (36.4 °C)  HR:  [55-91] 55  BP: (136-180)/() 138/70  Resp:  [17-18] 17  SpO2:  [96 %-97 %] 96 %  O2 Device: None (Room air)    Invasive Devices       None                   Physical Exam  General Appearance: no distress, nontoxic appearing  HEENT:  External ear normal.  Nose normal w/o drainage. Mucous membranes are moist. Oropharynx is clear. Conjunctiva clear w/o icterus or redness  Lungs: BBS without crackles/wheeze/rhonchi; respirations unlabored with normal inspiratory/expiratory effort.  No retractions noted.  On RA  CV: regular rate and rhythm; no rubs/murmurs/gallops, PMI normal   ABD: Abdomen is soft.  Bowel sounds all quadrants.  Nontender with no distention.    EXT: no edema  Skin: normal turgor, normal texture  Psych: affect normal, mood normal  Neuro: AAO       The above physical exam was reviewed and updated as determined by my evaluation of the patient on 4/24/2025.      Lab Results: I have reviewed the following results:  Results from last 7 days   Lab Units 04/21/25  0602   WBC Thousand/uL 7.98   HEMOGLOBIN g/dL 14.5   HEMATOCRIT % 42.3   PLATELETS Thousands/uL 277     Results from last 7 days   Lab Units 04/21/25  0602   SODIUM mmol/L 140   POTASSIUM mmol/L 3.7   CHLORIDE mmol/L 108   CO2  mmol/L 26   BUN mg/dL 22   CREATININE mg/dL 0.87   CALCIUM mg/dL 8.6                   Imaging Results Review: No pertinent imaging studies reviewed.  Other Study Results Review: No additional pertinent studies reviewed.    Review of Scheduled Meds: Medications  reviewed and reconciled as needed  Current Facility-Administered Medications   Medication Dose Route Frequency Provider Last Rate    acetaminophen  975 mg Oral Q8H Isabel Villarreal, SALAZAR      albuterol  2 puff Inhalation Q4H PRN Isabel Villarreal, EPHRAIMNP      bisacodyl  10 mg Rectal Daily PRN Carly T Vance, DO      Diclofenac Sodium  2 g Topical 4x Daily Isabel Villarreal, CRNP      docusate sodium  100 mg Oral BID Carly T Vance, DO      DULoxetine  20 mg Oral Daily Carly T Vance, DO      enoxaparin  40 mg Subcutaneous Daily Isabel Villarreal, SALAZAR      gabapentin  400 mg Oral TID Carly T Avnce, DO      hydrALAZINE  25 mg Oral Q8H PRN Isabel Villarreal, CRNP      hydrOXYzine HCL  25 mg Oral Q6H PRN Carly T Vance, DO      lisinopril  10 mg Oral Daily Isabel Villarreal, CRNP      methocarbamol  750 mg Oral Q6H PETER Isabel Villarreal, CRNP      nicotine  1 patch Transdermal Daily Isabel Villarreal, SALAZAR      oxyCODONE  2.5 mg Oral Q4H PRN Isabel Villarreal, EPHRAIMNP      Or    oxyCODONE  5 mg Oral Q4H PRN Isabel Villarreal, SALAZAR      polyethylene glycol  17 g Oral Daily PRN Carly T Vance, DO      QUEtiapine  300 mg Oral HS Isabel Villarreal, CRNP      senna  2 tablet Oral Daily With Lunch Carly T Vance, DO         VTE Pharmacologic Prophylaxis: Lovenox  Code Status: Level 1 - Full Code  Current Length of Stay: 2 day(s)    Administrative Statements     ** Please Note:  voice to text software may have been used in the creation of this document. Although proof errors in transcription or interpretation are a potential of such software**

## 2025-04-24 NOTE — CASE MANAGEMENT
Met w/pt along with Dr. Vance to introduce cm role and rehab routine. Pt was residing with his sister lorenzo but now states she is moving and he is not able to go with her. Pt states he is going to a female friends home in [place] but does not yet have the address. Pt has no prior experience with any dme and has been educated on the process of ordering should therapy recommend. Cm reivewed team mtg update from today and informed contd outpt therapy. Cm will assist with identifying a location and assisting with an appmt when pt provides dc address. Pt wishes to use [facility] on [address] for rx needs.

## 2025-04-24 NOTE — PROGRESS NOTES
"OT daily treatment note       04/24/25 3884   Pain Assessment   Pain Assessment Tool 0-10   Pain Score 10 - Worst Possible Pain   Pain Location/Orientation Location: Neck   Restrictions/Precautions   Precautions Bed/chair alarms;Fall Risk;Pain;Supervision on toilet/commode  (constant dizziness; hypertension)   Lifestyle   Autonomy \"I'm scared that this is going to hurt\" referring to cervical ROM   Oral Hygiene   Type of Assistance Needed Physical assistance   Physical Assistance Level 51%-75%   Comment to steady in stance at sink; IND when seated   Oral Hygiene CARE Score 2   Shower/Bathe Self   Type of Assistance Needed Physical assistance   Physical Assistance Level 76% or more   Comment completed full shower seated today requiring SUP as pt remained seated the entire time. would require max A if completing  in stance.   Shower/Bathe Self CARE Score 2   Upper Body Dressing   Type of Assistance Needed Supervision   Physical Assistance Level No physical assistance   Upper Body Dressing CARE Score 4   Lower Body Dressing   Type of Assistance Needed Physical assistance   Physical Assistance Level 25% or less   Comment would require min A if completing at baseline level; completed all parts of donning/doffing pants while seated today via WSing   Lower Body Dressing CARE Score 3   Putting On/Taking Off Footwear   Type of Assistance Needed Supervision   Physical Assistance Level No physical assistance   Comment socks and sneakers   Putting On/Taking Off Footwear CARE Score 4   Sit to Stand   Type of Assistance Needed Physical assistance   Physical Assistance Level 25% or less   Comment RW   Sit to Stand CARE Score 3   Bed-Chair Transfer   Type of Assistance Needed Physical assistance   Physical Assistance Level 51%-75%   Comment SPT w/ RW; assist to steady w/ balance due to instability   Chair/Bed-to-Chair Transfer CARE Score 2   Therapeutic Exercise - ROM   UE-ROM Yes   ROM- Right Upper Extremities   RUE ROM Comment In " an effort to decrease cervical pain and increase ROM, pt was provided w/ moist heat pack for ~10 mins followed by a cervical ROM HEP. Pt completed anywhere from 5-10x1 rep pending pain. Pt with fair tolerance but does appear to have increased pain with L cervical ROM. Plan to continue to educate on pain mgmt techniques. HEP is as follows: Access Code: [email or web address]  URL: [email or web address]  Date: 04/24/2025  Prepared by: Violeta Hernandez    Exercises  - Seated Cervical Flexion AROM  - 1 x daily - 7 x weekly - 3 sets - 10 reps  - Seated Cervical Extension AROM  - 1 x daily - 7 x weekly - 3 sets - 10 reps  - Seated Bent Over Cervical Extension  - 1 x daily - 7 x weekly - 3 sets - 10 reps  - Seated Cervical Rotation AROM  - 1 x daily - 7 x weekly - 3 sets - 10 reps  - Seated Cervical Sidebending AROM  - 1 x daily - 7 x weekly - 3 sets - 10 reps  - Seated Upper Trapezius Stretch  - 1 x daily - 7 x weekly - 3 sets - 10 reps  - Seated Levator Scapulae Stretch  - 1 x daily - 7 x weekly - 3 sets - 10 reps   ROM - Left Upper Extremities    LUE ROM Comment see above   Cognition   Overall Cognitive Status WFL   Arousal/Participation Alert;Cooperative   Attention Within functional limits   Orientation Level Oriented X4   Memory Within functional limits   Following Commands Follows one step commands without difficulty   Additional Activities   Additional Activities Other (Comment)   Additional Activities Comments discussed DC plan. pt reported he plans to move to his GF home in [place]. OT edu and urged pt to obtain the home setup for his gf home to ensure we can simulate as needed. pt reported he will have information prior to PM PT session   Assessment   Treatment Assessment Pt participated in skilled OT session with focus on ADL retraining, functional transfer training, UE strengthening/ROM, endurance training, and compensatory technique education. See flowsheet for details of session and current functional  status. Pt is limited by weakness, impaired balance, decreased endurance, increased fall risk, decreased ADLS, decreased IADLS, pain, decreased activity tolerance, and decreased strength and requires skilled OT services to increase independence and safety with ADL completion in prep for DC home. Plan to continue ADL retraining, functional transfer training, UE strengthening/ROM, endurance training, Pt/caregiver education, equipment evaluation/education, compensatory technique education, continued education, energy conservation, and activity engagement  to address barriers mentioned above. Plan to address VOR tomorrow.   Prognosis Good   Problem List Decreased strength;Decreased endurance;Impaired balance;Decreased mobility;Decreased coordination;Impaired judgement;Decreased safety awareness;Pain;Impaired sensation   Barriers to Discharge Inaccessible home environment;Decreased caregiver support   Plan   Treatment/Interventions ADL retraining;Functional transfer training;Endurance training;Therapeutic exercise;Patient/family training;Equipment eval/education;Compensatory technique education   Progress Progressing toward goals   OT Therapy Minutes   OT Time In 0830   OT Time Out 1000   OT Total Time (minutes) 90   OT Mode of treatment - Individual (minutes) 90   OT Mode of treatment - Concurrent (minutes) 0   OT Mode of treatment - Group (minutes) 0   OT Mode of treatment - Co-treat (minutes) 0   OT Mode of Treatment - Total time(minutes) 90 minutes   OT Cumulative Minutes 180   Therapy Time missed   Time missed? No

## 2025-04-24 NOTE — ASSESSMENT & PLAN NOTE
Chronic neck pain requiring multiple hospitalizations/ED visits - see above  CT imaging showed degenerative changes in cervical spine    4/24: will increase gabapentin to 500mg TID for pain control

## 2025-04-24 NOTE — ASSESSMENT & PLAN NOTE
Was seeing [facility] but has not seen them in a while and he hasn't returned their phone calls for followup  Prior to seeing them he was being seen by FP at [facility]

## 2025-04-24 NOTE — PCC CARE MANAGEMENT
Pt admitted with neck pain and dizziness. Pt is reportedly going to stay with a friend in [place] and is working with therapy on the home set up. Team feels los is 7-10 days.  Cm to complete assessment today and assist w/dc planning recommendations.

## 2025-04-24 NOTE — PROGRESS NOTES
04/24/25 1000   Pain Assessment   Pain Assessment Tool 0-10   Pain Score 8   Pain Location/Orientation Location: Neck   Hospital Pain Intervention(s) Repositioned;Other (Comment)  (Manual interventions)   Restrictions/Precautions   Precautions Bed/chair alarms;Fall Risk;Supervision on toilet/commode;Pain   Cognition   Overall Cognitive Status WFL   Arousal/Participation Alert;Cooperative   Attention Within functional limits   Orientation Level Oriented X4   Memory Within functional limits   Following Commands Follows one step commands without difficulty   Subjective   Subjective He reports he has pain and he is always dizzy   Sit to Stand   Type of Assistance Needed Physical assistance   Physical Assistance Level 26%-50%   Comment bilateral HHA in front to stand and assess changes in dizziness   Sit to Stand CARE Score 3   Transfer Bed/Chair/Wheelchair   Limitations Noted In Balance;Vision   Sit to Stand Minimal Assist   Stand to Sit Minimal Assist   Therapeutic Interventions   Flexibility Active trigger point release: bilateral upper traps, levator scaps, suboccipitals, SCMs, rhomboids   Other Discussed positioning techniques to decrease stress on cervical musculature, discussed pain threshold being lowered and that pain does not necessarily mean damage is happening; attempted VOMS but unable to complete   Assessment   Treatment Assessment Initally started with HA 8/10, neck pain 8/10, and dizziness 8/10. After manual interventions pain and HA were down to 5-6/10, but dizziness remained at 8/10. Attempted VOMS and he tolerated near-point convergence (normal), but he was unable to tolerate smooth pursuit. He had increased dizziness and began to lean to his L while seated in the recliner. VOMS was stopped, but his dizziness remained 8.5/10. He was receptive to suggestions for positioning and for the beginning of the pain neuroscience discussion. He did not move his neck at the start of the session and all movement  was guarded. At the end of the session, he was rotating his neck and looking up and down. Motion was still limited in all directions, but he was moving with less pain. He will continue to benefit from skilled PT to maximize his safety and function. If his dizziness and pain are reduced, he should be able to improve his functional mobility.   Problem List Decreased strength;Decreased range of motion;Decreased endurance;Impaired balance;Decreased mobility;Decreased coordination;Decreased safety awareness;Impaired sensation;Pain   Barriers to Discharge Inaccessible home environment;Decreased caregiver support   PT Barriers   Physical Impairment Decreased strength;Decreased range of motion;Decreased endurance;Impaired balance;Decreased mobility;Decreased coordination;Decreased safety awareness;Impaired sensation;Pain   Functional Limitation Car transfers;Stair negotiation;Standing;Transfers;Walking   Plan   Treatment/Interventions ADL retraining;Functional transfer training;LE strengthening/ROM;Elevations;Therapeutic exercise;Endurance training;Gait training   Progress Progressing toward goals   Discharge Recommendation   Rehab Resource Intensity Level, PT   (Continue to assess)   Equipment Recommended   (Continue to assess)   PT Therapy Minutes   PT Time In 1000   PT Time Out 1030   PT Total Time (minutes) 30   PT Mode of treatment - Individual (minutes) 30   PT Mode of treatment - Concurrent (minutes) 0   PT Mode of treatment - Group (minutes) 0   PT Mode of treatment - Co-treat (minutes) 0   PT Mode of Treatment - Total time(minutes) 30 minutes   PT Cumulative Minutes 120   Therapy Time missed   Time missed? No

## 2025-04-24 NOTE — ASSESSMENT & PLAN NOTE
Associated with neck pain  Has had multiple ED visits and hospitalization for chronic neck pain and headaches  He saw NS here at [facility] in 2/2022 and became verbally abusive and threatening to the PA and Dr Johnson and they will no longer see him  He was then seen by NS at [facility] 9/2022 and they did not recommend surgery but referred to pain management  Takes gabapentin and naproxen at home but no opioids.  Cervical spine CT scan without acute abnormality  Cervical/lumbar spine MRI = progression of degenerative changes but no cord compression  No further inpatient neurology workup  Acute pain service saw during his hospital stay  He was recommended to establish an outpatient pain management   Ambulatory referral to comprehensive spine program as well as [facility] spine and pain were placed

## 2025-04-24 NOTE — PLAN OF CARE
Problem: PAIN - ADULT  Goal: Verbalizes/displays adequate comfort level or baseline comfort level  Description: Interventions:- Encourage patient to monitor pain and request assistance- Assess pain using appropriate pain scale- Administer analgesics based on type and severity of pain and evaluate response- Implement non-pharmacological measures as appropriate and evaluate response- Consider cultural and social influences on pain and pain management- Notify physician/advanced practitioner if interventions unsuccessful or patient reports new pain  4/23/2025 2040 by Triny Corley RN  Outcome: Progressing  4/23/2025 2040 by Triny Corley RN  Outcome: Progressing     Problem: INFECTION - ADULT  Goal: Absence or prevention of progression during hospitalization  Description: INTERVENTIONS:- Assess and monitor for signs and symptoms of infection- Monitor lab/diagnostic results- Monitor all insertion sites, i.e. indwelling lines, tubes, and drains- Monitor endotracheal if appropriate and nasal secretions for changes in amount and color- [place] appropriate cooling/warming therapies per order- Administer medications as ordered- Instruct and encourage patient and family to use good hand hygiene technique- Identify and instruct in appropriate isolation precautions for identified infection/condition  4/23/2025 2040 by Triny Corley RN  Outcome: Progressing  4/23/2025 2040 by Triny Corley RN  Outcome: Progressing     Problem: SAFETY ADULT  Goal: Patient will remain free of falls  Description: INTERVENTIONS:- Educate patient/family on patient safety including physical limitations- Instruct patient to call for assistance with activity - Consult OT/PT to assist with strengthening/mobility - Keep Call bell within reach- Keep bed low and locked with side rails adjusted as appropriate- Keep care items and personal belongings within reach- Initiate and maintain comfort rounds- Make Fall Risk Sign visible to staff- Offer Toileting  every 2 Hours, in advance of need- Initiate/Maintain bed/chair alarm- Obtain necessary fall risk management equipment: alarms- Apply yellow socks and bracelet for high fall risk patients- Consider moving patient to room near nurses station  4/23/2025 2040 by Triny Corley RN  Outcome: Progressing  4/23/2025 2040 by Triny Corley RN  Outcome: Progressing  Goal: Maintain or return to baseline ADL function  Description: INTERVENTIONS:-  Assess patient's ability to carry out ADLs; assess patient's baseline for ADL function and identify physical deficits which impact ability to perform ADLs (bathing, care of mouth/teeth, toileting, grooming, dressing, etc.)- Assess/evaluate cause of self-care deficits - Assess range of motion- Assess patient's mobility; develop plan if impaired- Assess patient's need for assistive devices and provide as appropriate- Encourage maximum independence but intervene and supervise when necessary- Involve family in performance of ADLs- Assess for home care needs following discharge - Consider OT consult to assist with ADL evaluation and planning for discharge- Provide patient education as appropriate  4/23/2025 2040 by Triny Corley RN  Outcome: Progressing  4/23/2025 2040 by Triny Corley RN  Outcome: Progressing  Goal: Maintains/Returns to pre admission functional level  Description: INTERVENTIONS:- Perform AM-PAC 6 Click Basic Mobility/ Daily Activity assessment daily.- Set and communicate daily mobility goal to care team and patient/family/caregiver. - Collaborate with rehabilitation services on mobility goals if consulted- Perform Range of Motion 3 times a day.- Reposition patient every 2 hours.- Dangle patient 3 times a day- Stand patient 3 times a day- Ambulate patient 3 times a day- Out of bed to chair 3 times a day - Out of bed for meals 3 times a day- Out of bed for toileting- Record patient progress and toleration of activity level   4/23/2025 2040 by Triny Corley RN  Outcome:  Progressing  4/23/2025 2040 by Triny Corley RN  Outcome: Progressing     Problem: DISCHARGE PLANNING  Goal: Discharge to home or other facility with appropriate resources  Description: INTERVENTIONS:- Identify barriers to discharge w/patient and caregiver- Arrange for needed discharge resources and transportation as appropriate- Identify discharge learning needs (meds, wound care, etc.)- Arrange for interpretive services to assist at discharge as needed- Refer to Case Management Department for coordinating discharge planning if the patient needs post-hospital services based on physician/advanced practitioner order or complex needs related to functional status, cognitive ability, or social support system  4/23/2025 2040 by Triny Corley RN  Outcome: Progressing  4/23/2025 2040 by Triny Corley RN  Outcome: Progressing     Problem: Knowledge Deficit  Goal: Patient/family/caregiver demonstrates understanding of disease process, treatment plan, medications, and discharge instructions  Description: Complete learning assessment and assess knowledge base.Interventions:- Provide teaching at level of understanding- Provide teaching via preferred learning methods  4/23/2025 2040 by Triny Corley RN  Outcome: Progressing  4/23/2025 2040 by Triny Corley RN  Outcome: Progressing

## 2025-04-24 NOTE — PROGRESS NOTES
Progress Note - PMR   Name: Amado Chin 39 y.o. male I MRN: [id number]  Unit/Bed#: -01 I Date of Admission: 4/22/2025   Date of Service: 4/24/2025 I Hospital Day: 2     Assessment & Plan  Cervicogenic headache  Patient presents with chronic headaches for significant amount of time worsening but has been present for years  Imaging negative for an organic source however describes what appears to be a cervicogenic headache with pain in the back of the neck and stiffness radiating up to the suboccipital triangle and across the temporal regions and behind the eye  Pain is anywhere from a 3 or 4 up to a 10/10 averaging 6-7/10  At this time we will try a regimen including gabapentin 300 mg 3 times daily titrating up to 500 mg 3 times daily which was on a previous regimen, Cymbalta 20 mg daily, Robaxin 750 mg every 6 hours, Tylenol 975 mg 3 times daily.  He is also on a regimen of as needed oxycodone 2.5-5 mg every 4 hours as needed.  Due to his history of substance use and request for escalation and the acute on chronic nature of his neck pain and headaches we will avoid escalation of any opioid medications at this time.  Additionally will add Voltaren gel  Physical and Occupational Therapy, desensitization and discharge planning  Neck pain  Chronic neck pain requiring multiple hospitalizations/ED visits - see above  CT imaging showed degenerative changes in cervical spine    4/24: will increase gabapentin to 500mg TID for pain control  Impaired mobility and activities of daily living  Patient was evaluated by the rehabilitation team MD and an appropriate candidate for acute inpatient rehabilitation program at this time.  The patient will tolerate 3 hours/day 5 to 7 days/week of intensive physical, occupational in order to obtain goals for community discharge  Due to the patient's functional Compared to their baseline level of function in addition to their ongoing medical needs, the patient would benefit from daily  supervision from a rehabilitation physician as well as rehabilitation nursing to implement and adjust the medical as well as functional plan of care in order to meet the patient's goals.    Weakness of both lower extremities  Feels weak in the bilateral lower extremities primarily in the left lower extremity greater than right but appears functional on examination  Some tremoring in the left lower extremity on MMT  Continue with physical and Occupational Therapy  Asthma  Albuterol as needed  Tobacco abuse  Smokes half a pack a day as an outpatient  Not interested in a patch or gum or replacement therapy at this time  Education on smoking and pain provided  Vitamin B12 deficiency  History of B12 deficiency but most recent level within normal range tested back in May 2024  Not currently on supplementation will need to have follow-up levels on next set of routine labs  Anxiety  Patient with mixed history of mood disorder is seen as depression and anxiety as well as schizophrenia throughout the chart.  Had multiple behavioral health inpatient unit stays over the last few years and frequently visits the ER  Currently on Seroquel 300 mg nightly  Continue Atarax as needed for acute anxiety  Continue Cymbalta which he had been on in the past during a prior inpatient behavioral health stay. Increased gabapentin to 500 mg 3 times daily to mimic regimen that he was on at the behavioral health unit  Cervical spinal stenosis  History of some mild cervical spinal stenosis on most recent MRI  Has seen neurosurgery in the past but was discharged from their practice after demanding surgery and threatening staff  Continue to clinically monitor his neuro examination  Polysubstance abuse (HCC)  History of methamphetamine use in the past per documentation  Also on Norco chronically in the past as well.  Currently on a regimen of oxycodone  Discussed with patient that we would not escalate his opioid therapies.  Did inquire about IV  "therapies which was also declined as a form of escalation  Lesion of parotid gland  Noted on MRI of the cervical spine  \"1.2 cm lesion in left deep parotid gland just posterior and slightly medial to left retromandibular vein. 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 Recommend ENT consultation for further evaluation.\"  Hypertension  Patient with elevated blood pressures with some lability some of it likely due to component of pain, anxiety  Started on lisinopril 10 mg daily and on as needed hydralazine  Management per internal medicine, will work on anxiety reduction to help with blood pressures  Does not have primary care provider    Psychosis (HCC)      Subjective   39 y.o. male with PMHx of depression, anxiety, questionable schizophrenia, substance abuse disorder, tobacco use who presented to the [facility] on 4/15/25 for intractable neck pain and bitemporal headache. Of note, patient has had multiple ED visits and hospitalizations for neck pain. Cervical CT imaging did not show any acute abnormalities. He endorsed intermittent paresthesias in his upper extremities, intermittent saddle anesthesia and occasional urinary dribbling/incontinence. He was subsquently admitted to  with consults placed to APS and neurology. MRI of cervical and lumbar spines were done which showed some progression of chronic degeneration changes, but no acute abnormalities or cord compression. Neurology believed his headaches were cervicogenic in nature and patient was ultimately started on a steroid taper, with improvement in his symptoms.    Chief Complaint: f/u ambulatory dysfunction, anxiety, and neck pain    Interval: Patient seen and examined. No acute overnight events. Patient's pain is still intolerable- agreeable to increasing gabapentin today. Otherwise eating and sleeping well. Last BM 4/23      Objective :  Temp:  [97.6 °F (36.4 °C)-98.1 °F (36.7 °C)] 97.6 °F (36.4 " °C)  HR:  [55-91] 55  BP: (136-180)/() 138/70  Resp:  [17-18] 17  SpO2:  [96 %-97 %] 96 %  O2 Device: None (Room air)    Functional Update:  Participated in interdisciplinary team meeting today. DC date tentatively set. Will benefit from continued OP PT/OT  Mobility: supervision bed mobility, mod-totalA ambulation with RW (10')  Transfers: Efrain with RW. modA with RW for car transfer  ADLs: ind eating, mod-maxA oral care/footwear, maxA bathing, sup UBD, Efrain toileting    Physical Exam  Vitals reviewed.   Constitutional:       Appearance: Normal appearance.   HENT:      Head: Normocephalic and atraumatic.      Right Ear: External ear normal.      Left Ear: External ear normal.      Nose: Nose normal.      Mouth/Throat:      Pharynx: Oropharynx is clear.   Eyes:      Conjunctiva/sclera: Conjunctivae normal.   Cardiovascular:      Rate and Rhythm: Normal rate and regular rhythm.      Heart sounds: Normal heart sounds.   Pulmonary:      Effort: Pulmonary effort is normal. No respiratory distress.      Breath sounds: Normal breath sounds. No wheezing.   Abdominal:      General: Bowel sounds are normal.      Palpations: Abdomen is soft.      Tenderness: There is no abdominal tenderness.   Skin:     General: Skin is warm and dry.   Neurological:      Mental Status: He is alert. Mental status is at baseline.   Psychiatric:      Comments: Anxious appearing       Scheduled Meds:  Current Facility-Administered Medications   Medication Dose Route Frequency Provider Last Rate    acetaminophen  975 mg Oral Q8H SALAZAR Juarez      albuterol  2 puff Inhalation Q4H PRN SALAZAR Juarez      bisacodyl  10 mg Rectal Daily PRN Carly T Vance, DO      Diclofenac Sodium  2 g Topical 4x Daily SALAZAR Juarez      docusate sodium  100 mg Oral BID Carly T Vance, DO      DULoxetine  20 mg Oral Daily Carly T Vance, DO      enoxaparin  40 mg Subcutaneous Daily SALAZAR Juarez      gabapentin   400 mg Oral TID Carly Wynnel, DO      hydrALAZINE  25 mg Oral Q8H PRN Isabel Villarreal, EPHRAIMNP      hydrOXYzine HCL  25 mg Oral Q6H PRN Carly Vance, DO      lisinopril  10 mg Oral Daily Isabel Villarreal, EPHRAIMNP      methocarbamol  750 mg Oral Q6H PETER Isabel Villarreal, CRNP      nicotine  1 patch Transdermal Daily Isabel Villarreal, CRNP      oxyCODONE  2.5 mg Oral Q4H PRN Isabel Villarreal, CRNP      Or    oxyCODONE  5 mg Oral Q4H PRN Isabel Villarreal, EPHRAIMNP      polyethylene glycol  17 g Oral Daily PRN Carly Vance, DO      QUEtiapine  300 mg Oral HS Isabel Villarreal, SALAZAR      senna  2 tablet Oral Daily With Lunch Carly Vance, DO           Lab Results: I have reviewed the following results:  Results from last 7 days   Lab Units 04/21/25  0602   HEMOGLOBIN g/dL 14.5   HEMATOCRIT % 42.3   WBC Thousand/uL 7.98   PLATELETS Thousands/uL 277     Results from last 7 days   Lab Units 04/21/25  0602   BUN mg/dL 22   SODIUM mmol/L 140   POTASSIUM mmol/L 3.7   CHLORIDE mmol/L 108   CREATININE mg/dL 0.87

## 2025-04-24 NOTE — TEAM CONFERENCE
Acute RehabilitationTeam Conference Note  Date: 4/24/2025   Time: 10:34 AM       Patient Name:  Amado Chin       Medical Record Number: [id number]   YOB: 1985  Sex: Male          Room/Bed:  [facility]0/[facility]0-01  Payor Info:  Payor: [employer] / Plan: [employer] / Product Type: Medicaid HMO /      Admitting Diagnosis: Neck pain [M54.2]   Admit Date/Time:  4/22/2025  2:33 PM  Admission Comments: No comment available     Primary Diagnosis:  Cervicogenic headache  Principal Problem: Cervicogenic headache    Patient Active Problem List    Diagnosis Date Noted    Lesion of parotid gland 04/22/2025    Hypertension 04/22/2025    Impaired mobility and activities of daily living 04/22/2025    Does not have primary care provider 04/22/2025    Psychosis (HCC) 04/22/2025    Cervicogenic headache 04/15/2025    Polysubstance abuse (HCC) 09/03/2024    Psychoactive 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HCC) 07/31/2024    Drug use 07/30/2024    Cellulitis of left lower extremity 07/29/2024    Homelessness 07/29/2024    Cervical spinal stenosis 02/09/2024    Cervical radiculopathy 02/09/2024    Chronic bilateral low back pain 12/26/2023    Chronic neck pain 12/26/2023    Recurrent major depressive disorder (HCC) 04/01/2022    Anxiety 04/01/2022    Substance induced mood disorder (HCC) 04/01/2022    Mood disorder due to medical condition 04/01/2022    Opioid abuse (HCC) 02/10/2022    Fecal smearing 02/02/2022    Urinary incontinence 02/02/2022    Drug-induced constipation 02/02/2022    Intractable pain 11/22/2021    Vitamin B12 deficiency 02/05/2021    Asthma 02/04/2021    Tobacco abuse 02/04/2021    Dizziness 02/04/2021    Neck pain 02/03/2021    Elevated blood pressure reading 02/03/2021    Weakness of both lower extremities 02/03/2021    Cervical disc herniation 08/04/2020    Paresthesia and pain of extremity 08/04/2020    Fall 08/04/2020    Concussion without loss of consciousness 08/04/2020     Acute pain of right shoulder 08/04/2020    Alcohol abuse 08/04/2020    Burn 08/04/2020    Furuncle of axilla 08/04/2020       Physical Therapy:    Weight Bearing Status: Full Weight Bearing  Transfers: Moderate Assistance  Bed Mobility: Supervision  Amulation Distance (ft): 45 feet  Ambulation: Assist of 2 (mod A x 1 with CF for safety)  Assistive Device for Ambulation: Roller Walker  Wheelchair Mobility Distance:  (N/A)  Number of Stairs: 4  Assistive Device for Stairs: Bilateral Hand Rails  Stair Assistance: Assist of 2 (min to mod A x 1 with 2nd person CGA)  Discharge Recommendations: Home with:  DC Home with:: Family Support, First Floor Setup, Home Physical Therapy, Outpatient Physical Therapy    Pt. Is a 40 y/o male who was admitted [facility] on 4/15 due to intractable neck pain and bitemporal headache. Pt. Has hx of being seen at ER the past year due to neck pain with MRI in May 2024 done revealing multilevel degenerative changes including a . CT disc bulge at C5-C6 and C6 -C7. CT of cervical spine done this time and no acute findings. Also reported B UE weakness, dizziness, unstable gait and blurry vision. Neurology was consulted and headache might be from  cervicogenic in origin.  He has a longstanding history of neck pain that has not been appropriately treated in the outpatient setting.  Neurology recommended continuing his home pain regimen and reaching out to the acute pain service for any potential inpatient interventions or adjustments. He was started on a course of steroids.  MRI cervical and lumbar spine was completed and showed progression of degenerative changes as suspected. Current hospitalization complicated  with elevated BP.  Pt. Now admitted to [facility] and mod complexity eval completed with the following deficits/  barriers identified: dec L LE strength, dec balance and coordination with LE spasms when weight bearing and dec activity tolerance due to severe pain. Pt. Currently needing mod A X 1-2  to complete functional mobility using RW which is below his baseline. 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 Reported that over the last few months, he had gotten weaker and had multiple falls. Pt. Lived with his sister but reported that her sister is moving and plan is for him to d/c to his girlfriend's house in [place]. Pt's personal barrier includes: dec caregiver support and unknown home set up for now. Will need to follow up with patient any steps he needs to manage prior to d/c to home. Pt. Is a good rehab candidate and will benefit from skilled PT to maximize strength, endurance, balance, safety and independence prior to d/c to home . ELOS 7-10 days with mod I goals.     Occupational Therapy:  Eating: Independent  Grooming: Moderate Assistance  Bathing: Maximum Assistance  Bathing: Maximum Assistance  Upper Body Dressing: Supervision  Lower Body Dressing: Minimal Assistance  Toileting: Minimal Assistance  Toilet Transfer: Minimal Assistance  Cognition: Within Defined Limits  Orientation: Person, Place, Time, Situation  Discharge Recommendations: Home Independently       04/23/25: Pt is a 39 y.o. male who was admitted on 4/15/25 due to intractable neck pain and bitemporal headache. Patient has been seen in the ER multiple times over the past year for neck pain.Pt was dx with cervicogenic headache and transferred to [facility] to receive skilled therapy services. Pt  has a past medical history of Asthma, Chronic pain, Hypertension, Neck pain, Psychoactive 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HCC), and Thyroglossal duct cyst.. Current precautions include bed/chair alarms, fall risk, pain, and supervision on toilet/commode. See flowsheet for details of pts home setup, PLOF and current functional status. Pts impairments include pain, endurance, activity tolerance, functional mobility, balance, functional standing tolerance, unsupportive home environment, decreased I w/ ADLS/IADLS, strength, visual  deficits, sensation deficits, coordination deficits, hypertension (BP was 160/110 manually - RN and IM notified) and primarily pt c/o constant dizziness. Pt reports he feels like he is on a boat - denies room spinning. States his dizziness has caused black outs in the past and has led to repeated falls. These impairments along with  limited caregiver support, steps to enter, difficulty performing ADLs, and difficulty performing IADLs causes the inability to safely complete A/IADLs including Grooming, Bathing, UB dressing, LB dressing, Toileting, Toilet transfer, Tub/shower Transfer, and IADL Management. Pt presents with good rehab potential with motivation to participate and achieve goal of DC home. Pt is unsafe to DC home at this time, recommending 7-10 days to achieve independent with assistive device level goals with RW and LRAD . Plan to achieve stated goals with interventions focused on ADL Retraining , LB Dressing, UB dressing, IADL training , Kitchen Mobilty, Meal preparation, Medication management , Functional Transfers, Standing tolerance, Standing balance , DME training/education, Family training/education, Energy conservation training/education, healthy coping education, Leisure and social pursuits, and community re-integration.     Speech Therapy:           No notes on file    Nursing Notes:     Diet Type: Regular/House                                                                     Pain Location/Orientation: Location: Neck  Pain Score: 9                       Hospital Pain Intervention(s): Medication (See MAR)          39 y.o. male with PMHx of depression, anxiety, questionable schizophrenia, substance abuse disorder, tobacco use who presented to the [facility] on 4/15/25 for intractable neck pain and bitemporal headache. Of note, patient has had multiple ED visits and hospitalizations for neck pain. Cervical CT imaging did not show any acute abnormalities. He endorsed  intermittent paresthesias in his upper extremities, intermittent saddle anesthesia and occasional urinary dribbling/incontinence. He was subsquently admitted to to inpatient medicine with consults placed to acute pain service and neurology. MRI of cervical and lumbar spines were done which showed some progression of chronic degeneration changes, but no acute abnormalities or cord compression. Neurology believed his headaches were cervicogenic in nature and patient was ultimately started on a steroid taper, with improvement in his symptoms. Patient was then deemed medically stable and appropriate for inpatient rehabilitation. He was transferred to [facility] on 4/22 4/24:  Patient alarmed for safety, rings appropriately. Patient complains for worsening head/neck pain anywhere from a 3 or 4 up to a 10/10, regimen including gabapentin 300 mg 3 times daily titrating up to 500 mg 3 times daily which was on a previous regimen, Cymbalta 20 mg daily, Robaxin 750 mg every 6 hours, Tylenol 975 mg 3 times daily.  He is also on a regimen of as needed oxycodone 2.5-5 mg every 4 hours as needed.  Due to his history of substance use and request for escalation and the acute on chronic nature of his neck pain and headaches we will avoid escalation of any opioid medications at this time.  Additionally will add Voltaren gel. Elevated blood pressures with some lability some of it likely due to component of pain, anxiety, started on lisinopril 10 mg daily and on as needed hydralazine and manual BP's, will reassess 4/24/25. Anxiety managed with Seroquel 300mg qhs     Case Management:     Discharge Planning  Living Arrangements: Lives w/ Family members  Support Systems: Self  Assistance Needed: no  Type of Current Residence: Other (Comment) (apt)  Current Home Care Services: No  Pt admitted with neck pain and dizziness. Pt is reportedly going to stay with a friend in [place] and is working with therapy on the home set up. Team feels  los is 7-10 days.  Cm to complete assessment today and assist w/dc planning recommendations.     Is the patient actively participating in therapies? yes  List any modifications to the treatment plan:     Barriers Interventions   Chronic pain Medication changes   Gait dysfunction, ataxia, dizziness Strengthening exercises, balance training use of device VOR training   stairs Therapy stair training, endurance and strengthening exercises   Max a lb adls Lhae training, compensatory strategies for lb adls, adl retraining         Is the patient making expected progress toward goals? yes  List any update or changes to goals:     Medical Goals: Patient will be medically stable for discharge to [facility] upon completion of rehab program and Patient will be able to manage medical conditions and comorbid conditions with medications and follow up upon completion of rehab program    Weekly Team Goals:   Rehab Team Goals  ADL Team Goal: Patient will be independent with ADLs with least restrictive device upon completion of rehab program (7-10 days)  Transfer Team Goal: Patient will be independent with transfers with least restrictive device upon completion of rehab program  Locomotion Team Goal: Patient will be independent with locomotion with least restrictive device upon completion of rehab program    Discussion: pt presents with the above barriers and is benefiting from strategies stated above. Team esitmates a shorter los with goals of independent with supervision for longer distance walking and stairs. Pt will most likely use the roller walker on dc. Pts chief complaint of dizziness impacts overall balance and lb adls. Recommendations are for contd outpt physical therapy.     Anticipated Discharge Date:  4/30/25  [facility] Team Members Present:The following team members are supervising care for this patient and were present during this Weekly Team Conference.    Physician: Dr. Hari DO  : Marybeth  MS RjW  Registered Nurse: Bonnie Morales RN  Physical Therapist: GAMAL KramerT  Occupational Therapist: Enrrique Poe MS, OTR/L  Speech Therapist:

## 2025-04-24 NOTE — PCC NURSING
39 y.o. male with PMHx of depression, anxiety, questionable schizophrenia, substance abuse disorder, tobacco use who presented to the [facility] on 4/15/25 for intractable neck pain and bitemporal headache. Of note, patient has had multiple ED visits and hospitalizations for neck pain. Cervical CT imaging did not show any acute abnormalities. He endorsed intermittent paresthesias in his upper extremities, intermittent saddle anesthesia and occasional urinary dribbling/incontinence. He was subsquently admitted to to inpatient medicine with consults placed to acute pain service and neurology. MRI of cervical and lumbar spines were done which showed some progression of chronic degeneration changes, but no acute abnormalities or cord compression. Neurology believed his headaches were cervicogenic in nature and patient was ultimately started on a steroid taper, with improvement in his symptoms. Patient was then deemed medically stable and appropriate for inpatient rehabilitation. He was transferred to [facility] on 4/22 4/24:  Patient alarmed for safety, rings appropriately. Patient complains for worsening head/neck pain anywhere from a 3 or 4 up to a 10/10, regimen including gabapentin 300 mg 3 times daily titrating up to 500 mg 3 times daily which was on a previous regimen, Cymbalta 20 mg daily, Robaxin 750 mg every 6 hours, Tylenol 975 mg 3 times daily.  He is also on a regimen of as needed oxycodone 2.5-5 mg every 4 hours as needed.  Due to his history of substance use and request for escalation and the acute on chronic nature of his neck pain and headaches we will avoid escalation of any opioid medications at this time.  Additionally will add Voltaren gel. Elevated blood pressures with some lability some of it likely due to component of pain, anxiety, started on lisinopril 10 mg daily and on as needed hydralazine and manual BP's, will reassess 4/24/25. Anxiety managed with Seroquel 300mg  qhs

## 2025-04-24 NOTE — ASSESSMENT & PLAN NOTE
On no meds at home but was placed on Lisinopril 10 mg qd while in hospital  Had been on Norvasc in the past but stopped it  Has prn PO Hydralazine for  or greater  BP is very labile 2/2 becoming upset frequently.    Had to have a dose of prn Hydralazine 25 mg last evening 4/23/24  Increase Lisinopril to 20mg qd starting 4/25/25

## 2025-04-25 PROCEDURE — 97112 NEUROMUSCULAR REEDUCATION: CPT

## 2025-04-25 PROCEDURE — 97530 THERAPEUTIC ACTIVITIES: CPT

## 2025-04-25 PROCEDURE — 97140 MANUAL THERAPY 1/> REGIONS: CPT

## 2025-04-25 PROCEDURE — 99231 SBSQ HOSP IP/OBS SF/LOW 25: CPT | Performed by: NURSE PRACTITIONER

## 2025-04-25 PROCEDURE — 97535 SELF CARE MNGMENT TRAINING: CPT

## 2025-04-25 PROCEDURE — 99232 SBSQ HOSP IP/OBS MODERATE 35: CPT | Performed by: STUDENT IN AN ORGANIZED HEALTH CARE EDUCATION/TRAINING PROGRAM

## 2025-04-25 PROCEDURE — 97110 THERAPEUTIC EXERCISES: CPT

## 2025-04-25 PROCEDURE — 97010 HOT OR COLD PACKS THERAPY: CPT

## 2025-04-25 RX ORDER — ECHINACEA PURPUREA EXTRACT 125 MG
1 TABLET ORAL
Status: DISCONTINUED | OUTPATIENT
Start: 2025-04-25 | End: 2025-04-30 | Stop reason: HOSPADM

## 2025-04-25 RX ADMIN — ACETAMINOPHEN 975 MG: 325 TABLET, FILM COATED ORAL at 08:56

## 2025-04-25 RX ADMIN — OXYCODONE HYDROCHLORIDE 5 MG: 5 TABLET ORAL at 21:39

## 2025-04-25 RX ADMIN — METHOCARBAMOL 750 MG: 750 TABLET ORAL at 18:15

## 2025-04-25 RX ADMIN — HYDRALAZINE HYDROCHLORIDE 25 MG: 25 TABLET ORAL at 21:51

## 2025-04-25 RX ADMIN — GABAPENTIN 500 MG: 100 CAPSULE ORAL at 08:57

## 2025-04-25 RX ADMIN — METHOCARBAMOL 750 MG: 750 TABLET ORAL at 08:56

## 2025-04-25 RX ADMIN — ENOXAPARIN SODIUM 40 MG: 40 INJECTION SUBCUTANEOUS at 08:56

## 2025-04-25 RX ADMIN — SALINE NASAL SPRAY 1 SPRAY: 1.5 SOLUTION NASAL at 21:44

## 2025-04-25 RX ADMIN — ACETAMINOPHEN 975 MG: 325 TABLET, FILM COATED ORAL at 14:09

## 2025-04-25 RX ADMIN — OXYCODONE HYDROCHLORIDE 5 MG: 5 TABLET ORAL at 09:01

## 2025-04-25 RX ADMIN — DICLOFENAC SODIUM 2 G: 10 GEL TOPICAL at 11:43

## 2025-04-25 RX ADMIN — GABAPENTIN 500 MG: 100 CAPSULE ORAL at 14:09

## 2025-04-25 RX ADMIN — OXYCODONE HYDROCHLORIDE 5 MG: 5 TABLET ORAL at 15:41

## 2025-04-25 RX ADMIN — QUETIAPINE FUMARATE 300 MG: 100 TABLET ORAL at 21:39

## 2025-04-25 RX ADMIN — DICLOFENAC SODIUM 2 G: 10 GEL TOPICAL at 21:40

## 2025-04-25 RX ADMIN — METHOCARBAMOL 750 MG: 750 TABLET ORAL at 14:09

## 2025-04-25 RX ADMIN — SENNOSIDES 17.2 MG: 8.6 TABLET, FILM COATED ORAL at 11:43

## 2025-04-25 RX ADMIN — ALBUTEROL SULFATE 2 PUFF: 90 AEROSOL, METERED RESPIRATORY (INHALATION) at 21:40

## 2025-04-25 RX ADMIN — DOCUSATE SODIUM 100 MG: 100 CAPSULE, LIQUID FILLED ORAL at 08:59

## 2025-04-25 RX ADMIN — LISINOPRIL 20 MG: 20 TABLET ORAL at 08:57

## 2025-04-25 RX ADMIN — DOCUSATE SODIUM 100 MG: 100 CAPSULE, LIQUID FILLED ORAL at 18:16

## 2025-04-25 RX ADMIN — GABAPENTIN 500 MG: 100 CAPSULE ORAL at 18:15

## 2025-04-25 RX ADMIN — ACETAMINOPHEN 975 MG: 325 TABLET, FILM COATED ORAL at 21:39

## 2025-04-25 RX ADMIN — DULOXETINE HYDROCHLORIDE 20 MG: 20 CAPSULE, DELAYED RELEASE ORAL at 08:57

## 2025-04-25 NOTE — PROGRESS NOTES
Progress Note - Hospitalist   Name: Amado Chin 39 y.o. male I MRN: [id number]  Unit/Bed#: -01 I Date of Admission: 4/22/2025   Date of Service: 4/25/2025 I Hospital Day: 3    Assessment & Plan  Cervicogenic headache  Secondary to neck pain  S/p course of prednisone per Neurology  Pain management per PMR  Cervical spinal stenosis  Associated with neck pain  Has had multiple ED visits and hospitalization for chronic neck pain and headaches  He saw NS here at [facility] in 2/2022 and became verbally abusive and threatening to the PA and Dr Johnson and they will no longer see him  He was then seen by NS at [facility] 9/2022 and they did not recommend surgery but referred to pain management  Takes gabapentin and naproxen at home but no opioids.  Cervical spine CT scan without acute abnormality  Cervical/lumbar spine MRI = progression of degenerative changes but no cord compression  No further inpatient neurology workup  Acute pain service saw during his hospital stay  He was recommended to establish an outpatient pain management   Ambulatory referral to comprehensive spine program as well as [facility]'s spine and pain were placed  Hypertension  On no meds at home but was placed on Lisinopril 10 mg qd while in hospital  Had been on Norvasc in the past but stopped it  Has prn PO Hydralazine for  or greater  BP is very labile 2/2 becoming upset frequently.    Had to have a dose of prn Hydralazine 25 mg last evening 4/23/24  Increased Lisinopril to 20mg qd starting 4/25/25  Asthma  Stable w/o exacerbation  Continue prn Albuterol inhaler  Tobacco abuse  Smokes 1/2 ppd  Refuses patch so will discontinue it today 4/24/25  Anxiety  Seroquel 300mg qhs  Per PMR  Polysubstance abuse (HCC)  Methamphetamine use in past  In hospital staff found a marijuana vape pen  Lesion of parotid gland  Found incidentally = 1.2 cm  See ENT as OP  Does not have primary care provider  Was seeing [facility] but has not seen them in a while  and he hasn't returned their phone calls for followup  Prior to seeing them he was being seen by FP at [facility]  Psychosis (HCC)  History of psychosis previous inpatient psychiatric admission on 7/2024.  Other hx states bipolar disorder    The above assessment and plan was reviewed and updated as determined by my evaluation of the patient on 4/25/2025.    History of Present Illness   Patient seen and examined. Patients overnight issues or events were reviewed with nursing staff. New or overnight issues include the following:   No new or overnight issues.  Offers no complaints    Review of Systems   All other systems reviewed and are negative.      Objective :  Temp:  [98 °F (36.7 °C)-98.2 °F (36.8 °C)] 98 °F (36.7 °C)  HR:  [60-80] 60  BP: (114-186)/() 124/86  Resp:  [17-18] 17  SpO2:  [95 %-96 %] 96 %  O2 Device: None (Room air)    Invasive Devices       None                   Physical Exam  General Appearance: no distress, non toxic appearing  HEENT: PERRLA, conjuctiva normal; oropharynx clear; mucous membranes moist   Lungs: CTA, normal respiratory effort, no retractions, expiratory effort normal  CV: regular rate and rhythm; no rubs/murmurs/gallops, PMI normal   ABD: soft; ND/NT; +BS  EXT: no edema  Skin: normal turgor, normal texture  Psych: affect normal, mood normal  Neuro: AAO        The above physical exam was reviewed and updated as determined by my evaluation of the patient on 4/25/2025.      Lab Results: I have reviewed the following results:  Results from last 7 days   Lab Units 04/21/25  0602   WBC Thousand/uL 7.98   HEMOGLOBIN g/dL 14.5   HEMATOCRIT % 42.3   PLATELETS Thousands/uL 277     Results from last 7 days   Lab Units 04/21/25  0602   SODIUM mmol/L 140   POTASSIUM mmol/L 3.7   CHLORIDE mmol/L 108   CO2 mmol/L 26   BUN mg/dL 22   CREATININE mg/dL 0.87   CALCIUM mg/dL 8.6                   Imaging Results Review: No pertinent imaging studies reviewed.  Other Study Results Review: No additional  pertinent studies reviewed.    Review of Scheduled Meds: Medications  reviewed and reconciled as needed  Current Facility-Administered Medications   Medication Dose Route Frequency Provider Last Rate    acetaminophen  975 mg Oral Q8H SALAZAR Juarez      albuterol  2 puff Inhalation Q4H PRN Isabel Villarreal, SALAZAR      bisacodyl  10 mg Rectal Daily PRN Carly T Vance, DO      Diclofenac Sodium  2 g Topical 4x Daily SALAZAR Juarez      docusate sodium  100 mg Oral BID Carly T Vance, DO      DULoxetine  20 mg Oral Daily Carly T Vance, DO      enoxaparin  40 mg Subcutaneous Daily SALAZAR Juarez      gabapentin  500 mg Oral TID Carly T Vance, DO      hydrALAZINE  25 mg Oral Q8H PRN Isabel Villarreal, SALAZAR      hydrOXYzine HCL  25 mg Oral Q6H PRN Carly T Vance, DO      lisinopril  20 mg Oral Daily Isabel Villarreal, SALAZAR      methocarbamol  750 mg Oral Q6H PETER Isabel Villarreal, SALAZAR      oxyCODONE  2.5 mg Oral Q4H PRN Isabel Villarreal, SALAZAR      Or    oxyCODONE  5 mg Oral Q4H PRN Isabel Villarreal, SALAZAR      polyethylene glycol  17 g Oral Daily PRN Carly T Vance, DO      QUEtiapine  300 mg Oral HS Isabel Villarreal, SALAZAR      senna  2 tablet Oral Daily With Lunch Carly T Vance, DO      sodium chloride  1 spray Each Nare Q1H PRN Carly T Vance, DO         VTE Pharmacologic Prophylaxis: Lovenox  Code Status: Level 1 - Full Code  Current Length of Stay: 3 day(s)    Administrative Statements     ** Please Note:  voice to text software may have been used in the creation of this document. Although proof errors in transcription or interpretation are a potential of such software**

## 2025-04-25 NOTE — PROGRESS NOTES
"   04/25/25 7230   Pain Assessment   Pain Assessment Tool 0-10   Pain Score 9   Pain Location/Orientation Location: Neck   Hospital Pain Intervention(s) Repositioned   Restrictions/Precautions   Precautions Bed/chair alarms;Fall Risk;Supervision on toilet/commode;Pain   Cognition   Arousal/Participation   (Encouragement to participate)   Orientation Level Oriented X4   Subjective   Subjective \"My neck is still really hurting\"  (Pt noted to be self-positioned in prone and sidelying on right with both left and right cervical rotation during session.)   Roll Left and Right   Type of Assistance Needed Supervision   Physical Assistance Level No physical assistance   Roll Left and Right CARE Score 4   Sit to Lying   Type of Assistance Needed Supervision   Physical Assistance Level No physical assistance   Sit to Lying CARE Score 4   Lying to Sitting on Side of Bed   Type of Assistance Needed Supervision   Physical Assistance Level No physical assistance   Lying to Sitting on Side of Bed CARE Score 4   Sit to Stand   Type of Assistance Needed Supervision   Physical Assistance Level No physical assistance   Comment without AD   Sit to Stand CARE Score 4   Therapeutic Interventions   Strengthening SLR 3x10 bilaterally, dead bug progressions 3x10 ea bilaterally   Other STM upper trap, SCM; trigger point massage upper trap; SOR   Assessment   Treatment Assessment Pt is agreeable to participate in second session, though does continue to require encouragement. Session focused on core strengthening per pt request and manual therapy for neck pain. Pt responds well to core strengthening exercises, but fatigues fairly easily. Pt with limited reported improvement from manual techniques, though is noted to have increased willingness to move the cervical spine post treatment. Pt continues to be limited by neck pain, dec strength, dec activity, tolerance, dec endurance, and dec functional mobility. He will continue to benefit from " skilled inpatient PT services to address these deficits to maximize his function and facilitate safe D/C.   Problem List Decreased strength;Decreased range of motion;Decreased endurance;Impaired balance;Decreased mobility;Pain   Barriers to Discharge Inaccessible home environment;Decreased caregiver support   PT Barriers   Physical Impairment Decreased strength;Decreased range of motion;Decreased endurance;Impaired balance;Decreased mobility;Pain   Functional Limitation Car transfers;Stair negotiation;Standing;Transfers;Walking   Plan   Treatment/Interventions Functional transfer training;LE strengthening/ROM;Elevations;Therapeutic exercise;Endurance training;Bed mobility;Gait training   PT Therapy Minutes   PT Time In 1435   PT Time Out 1510   PT Total Time (minutes) 35   PT Mode of treatment - Individual (minutes) 35   PT Mode of treatment - Concurrent (minutes) 0   PT Mode of treatment - Group (minutes) 0   PT Mode of treatment - Co-treat (minutes) 0   PT Mode of Treatment - Total time(minutes) 35 minutes   PT Cumulative Minutes 300

## 2025-04-25 NOTE — PROGRESS NOTES
OT daily treatment note       04/25/25 3845   Pain Assessment   Pain Assessment Tool 0-10   Pain Score 8   Pain Location/Orientation Location: Neck   Hospital Pain Intervention(s) Emotional support   Restrictions/Precautions   Precautions Bed/chair alarms;Fall Risk;Pain   Lifestyle   Autonomy Pt requesting to wash up as OT woke him upon arrival   Eating   Type of Assistance Needed Independent   Physical Assistance Level No physical assistance   Eating CARE Score 6   Oral Hygiene   Type of Assistance Needed Supervision   Physical Assistance Level No physical assistance   Comment CS in stance at sink   Oral Hygiene CARE Score 4   Grooming   Findings completing grooming tasks in stance at sink including washing face and combing/styling hair   Putting On/Taking Off Footwear   Type of Assistance Needed Supervision   Physical Assistance Level No physical assistance   Putting On/Taking Off Footwear CARE Score 4   Lying to Sitting on Side of Bed   Type of Assistance Needed Supervision   Physical Assistance Level No physical assistance   Lying to Sitting on Side of Bed CARE Score 4   Sit to Stand   Type of Assistance Needed Incidental touching   Physical Assistance Level No physical assistance   Comment RW   Sit to Stand CARE Score 4   Bed-Chair Transfer   Type of Assistance Needed Physical assistance   Physical Assistance Level 25% or less   Comment SPT w/ RW   Chair/Bed-to-Chair Transfer CARE Score 3   Toileting Hygiene   Type of Assistance Needed Supervision   Physical Assistance Level No physical assistance   Comment CM/hygiene   Toileting Hygiene CARE Score 4   Toilet Transfer   Type of Assistance Needed Supervision   Physical Assistance Level No physical assistance   Comment RW; standard toilet height   Toilet Transfer CARE Score 4   Cognition   Overall Cognitive Status WFL   Arousal/Participation Alert;Cooperative   Attention Within functional limits   Orientation Level Oriented X4   Memory Within functional limits    Following Commands Follows one step commands without difficulty   Assessment   Treatment Assessment Pt participated in brief skilled OT session focusing on ADL retraining and functional transfer training. Plan to address VOR/cervical ROM later this morning. OT s/w nsg re: medication regime as pt is blocked off from 7-830 to allow for pain and BP meds to be given but per pt, they have not been. Nsg on board with ensuring medications are given prior to therapy.   Prognosis Good   Problem List Decreased strength;Decreased range of motion;Decreased endurance;Impaired balance;Decreased mobility;Decreased coordination;Pain   Barriers to Discharge Inaccessible home environment;Decreased caregiver support   Plan   Treatment/Interventions ADL retraining;Functional transfer training;Therapeutic exercise;Endurance training;Equipment eval/education;Compensatory technique education   Progress Progressing toward goals   OT Therapy Minutes   OT Time In 0830   OT Time Out 0900   OT Total Time (minutes) 30   OT Mode of treatment - Individual (minutes) 30   OT Mode of treatment - Concurrent (minutes) 0   OT Mode of treatment - Group (minutes) 0   OT Mode of treatment - Co-treat (minutes) 0   OT Mode of Treatment - Total time(minutes) 30 minutes   OT Cumulative Minutes 210   Therapy Time missed   Time missed? No

## 2025-04-25 NOTE — ASSESSMENT & PLAN NOTE
On no meds at home but was placed on Lisinopril 10 mg qd while in hospital  Had been on Norvasc in the past but stopped it  Has prn PO Hydralazine for  or greater  BP is very labile 2/2 becoming upset frequently.    Had to have a dose of prn Hydralazine 25 mg last evening 4/23/24  Increased Lisinopril to 20mg qd starting 4/25/25

## 2025-04-25 NOTE — PROGRESS NOTES
"OT daily treatment note       04/25/25 1030   Pain Assessment   Pain Assessment Tool 0-10   Pain Score 8   Pain Location/Orientation Location: Neck   Hospital Pain Intervention(s) Heat applied   Restrictions/Precautions   Precautions Bed/chair alarms;Fall Risk;Pain;Supervision on toilet/commode   Lifestyle   Autonomy \"I just always feel dizzy\"   Roll Left and Right   Type of Assistance Needed Supervision   Physical Assistance Level No physical assistance   Roll Left and Right CARE Score 4   Sit to Lying   Type of Assistance Needed Supervision   Physical Assistance Level No physical assistance   Sit to Lying CARE Score 4   Lying to Sitting on Side of Bed   Type of Assistance Needed Supervision   Physical Assistance Level No physical assistance   Lying to Sitting on Side of Bed CARE Score 4   Sit to Stand   Type of Assistance Needed Incidental touching   Physical Assistance Level No physical assistance   Comment CG/CS w/o an AD this session   Sit to Stand CARE Score 4   Bed-Chair Transfer   Type of Assistance Needed Incidental touching   Physical Assistance Level No physical assistance   Comment pt instructed to complete a SPT from bed > recliner w/ use of RW and pt requested to complete w/o an AD. Pt observed to complete a SPT w/ no AD requiring CGA. no LOB or BLE weakness observed.   Chair/Bed-to-Chair Transfer CARE Score 4   Therapeutic Exercise - ROM   UE-ROM Yes   ROM- Right Upper Extremities   RUE ROM Comment S/P moist heat removal for 10 mins, pt completed 5 x 2 sets of cervical flexion, extension, rotation and upper trap and levator scapulae stretch. Pt w/ better tolerance today reporting feeling \"more lose\" s/p exercise.   ROM - Left Upper Extremities    LUE ROM Comment see above   Cognition   Overall Cognitive Status WFL   Arousal/Participation Alert;Cooperative   Attention Within functional limits   Orientation Level Oriented X4   Memory Within functional limits   Following Commands Follows one step " commands without difficulty   Additional Activities   Additional Activities Other (Comment)   Additional Activities Comments Pt is consistently reporting dizziness when at rest and particularly w/ movement. In an effort to determine the cause of the dizziness, pt was assessed for ortho BP - supine 141/80 seated 169/106 and standing 135/95. pt reported no worsening dizziness w/ positional changes but rather just a constant 4/10 dizziness. Pt cannot tolerate a katerina gudino pike due to the severity of his cervical pain so OT had pt engage in a variety of VOR exercises to see if any of them induce nystagmus. pt completed 30 seconds of horizontal, vertical and lateral smooth pursuits w/ pt c/o worsening dizziness when tracking to the left. No nystagmus noted. Pt then completed 30 seconds of saccades in all 4 planes w/o any c/o worsening dizziness.Pt then completed gaze stabilization w/ head turns - no worsening dizziness or nystagmus noted. Overall, there does not appear to be any vertigo - related triggers even though pt cont to complain of constant dizziness. Unsure if this is more of a FND component or a non-oculotor motor vestibular deficit that cannot be observed. Will cont to assess and treat as able.   Activity Tolerance   Activity Tolerance Patient tolerated treatment well   Other Comments   Assessment had additional conversation w/ pt re: DC plan as pt was instructed to reach out to his S.O. yesterday to figure out home setup and has yet to do so. Pt reports that he has trialed to contact her on several occassions and she has not responded. Pt verbalized he is awre of the timeframe and his plan B is to live w/ his friend Devonte. No further details were provided. CM made aware.   Assessment   Treatment Assessment Pt participated in skilled OT session with focus on functional transfer training, cervical strengthening/ROM, pain mgmt, endurance training, VOR assessments and DC Planning. See flowsheet for details of  session and current functional status. Pt is limited by decreased ROM, impaired balance, decreased endurance, increased fall risk, decreased IADLS, decreased activity tolerance, decreased safety awareness, impaired judgement, decreased strength, and visual deficits and requires skilled OT services to increase independence and safety with ADL completion in prep for DC home. Plan to address IADLS in upcoming sessions as pt will need to be responsible for all IADLs at [place]. Pending progress, plan to progress pt to IRP by Mon vs Tues w/ a DC on Wed w/ OP therapies.   Prognosis Good   Problem List Decreased strength;Decreased range of motion;Decreased endurance;Impaired balance;Decreased mobility;Decreased coordination;Pain   Barriers to Discharge Inaccessible home environment;Decreased caregiver support   Plan   Treatment/Interventions Functional transfer training;ADL retraining;Therapeutic exercise;Endurance training;Equipment eval/education;Compensatory technique education   Progress Progressing toward goals   OT Therapy Minutes   OT Time In 1030   OT Time Out 1130   OT Total Time (minutes) 60   OT Mode of treatment - Individual (minutes) 60   OT Mode of treatment - Concurrent (minutes) 0   OT Mode of treatment - Group (minutes) 0   OT Mode of treatment - Co-treat (minutes) 0   OT Mode of Treatment - Total time(minutes) 60 minutes   OT Cumulative Minutes 270   Therapy Time missed   Time missed? No

## 2025-04-25 NOTE — PROGRESS NOTES
Progress Note - PMR   Name: Amado Chin 39 y.o. male I MRN: [id number]  Unit/Bed#: -01 I Date of Admission: 4/22/2025   Date of Service: 4/25/2025 I Hospital Day: 3     Assessment & Plan  Cervicogenic headache  Patient presents with chronic headaches for significant amount of time worsening but has been present for years  Imaging negative for an organic source however describes what appears to be a cervicogenic headache with pain in the back of the neck and stiffness radiating up to the suboccipital triangle and across the temporal regions and behind the eye  Pain is anywhere from a 3 or 4 up to a 10/10 averaging 6-7/10  At this time we will try a regimen including gabapentin 300 mg 3 times daily titrating up to 500 mg 3 times daily which was on a previous regimen, Cymbalta 20 mg daily, Robaxin 750 mg every 6 hours, Tylenol 975 mg 3 times daily.  He is also on a regimen of as needed oxycodone 2.5-5 mg every 4 hours as needed.  Due to his history of substance use and request for escalation and the acute on chronic nature of his neck pain and headaches we will avoid escalation of any opioid medications at this time.  Additionally will add Voltaren gel  Physical and Occupational Therapy, desensitization and discharge planning  Neck pain  Chronic neck pain requiring multiple hospitalizations/ED visits - see above  CT imaging showed degenerative changes in cervical spine    4/24: will increase gabapentin to 500mg TID for pain control > tolerating current dose. Can uptitrate to 600 mg TID if needed on Monday   Impaired mobility and activities of daily living  Patient was evaluated by the rehabilitation team MD and an appropriate candidate for acute inpatient rehabilitation program at this time.  The patient will tolerate 3 hours/day 5 to 7 days/week of intensive physical, occupational in order to obtain goals for community discharge  Due to the patient's functional Compared to their baseline level of function in  addition to their ongoing medical needs, the patient would benefit from daily supervision from a rehabilitation physician as well as rehabilitation nursing to implement and adjust the medical as well as functional plan of care in order to meet the patient's goals.    Weakness of both lower extremities  Feels weak in the bilateral lower extremities primarily in the left lower extremity greater than right but appears functional on examination  Some tremoring in the left lower extremity on MMT  Continue with physical and Occupational Therapy  Asthma  Albuterol as needed  Tobacco abuse  Smokes half a pack a day as an outpatient  Not interested in a patch or gum or replacement therapy at this time  Education on smoking and pain provided  Vitamin B12 deficiency  History of B12 deficiency but most recent level within normal range tested back in May 2024  Not currently on supplementation will need to have follow-up levels on next set of routine labs  Anxiety  Patient with mixed history of mood disorder is seen as depression and anxiety as well as schizophrenia throughout the chart.  Had multiple behavioral health inpatient unit stays over the last few years and frequently visits the ER  Currently on Seroquel 300 mg nightly  Continue Atarax as needed for acute anxiety  Continue Cymbalta which he had been on in the past during a prior inpatient behavioral health stay. Increased gabapentin to 500 mg 3 times daily to mimic regimen that he was on at the behavioral health unit   Cervical spinal stenosis  History of some mild cervical spinal stenosis on most recent MRI  Has seen neurosurgery in the past but was discharged from their practice after demanding surgery and threatening staff  Continue to clinically monitor his neuro examination  Polysubstance abuse (HCC)  History of methamphetamine use in the past per documentation  Also on Norco chronically in the past as well.  Currently on a regimen of oxycodone  Discussed with  "patient that we would not escalate his opioid therapies.  Did inquire about IV therapies which was also declined as a form of escalation  Lesion of parotid gland  Noted on MRI of the cervical spine  \"1.2 cm lesion in left deep parotid gland just posterior and slightly medial to left retromandibular vein. 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 Recommend ENT consultation for further evaluation.\"  Hypertension    Patient with elevated blood pressures with some lability some of it likely due to component of pain, anxiety - has required PO hydralazine for the last two nights for SBP >180s  Continue on lisinopril 20 mg daily  Management per internal medicine, will work on anxiety reduction to help with blood pressures  Does not have primary care provider    Psychosis (HCC)      Subjective   39 y.o. male with PMHx of depression, anxiety, questionable schizophrenia, substance abuse disorder, tobacco use who presented to the [facility] on 4/15/25 for intractable neck pain and bitemporal headache. Of note, patient has had multiple ED visits and hospitalizations for neck pain. Cervical CT imaging did not show any acute abnormalities. He endorsed intermittent paresthesias in his upper extremities, intermittent saddle anesthesia and occasional urinary dribbling/incontinence. He was subsquently admitted to  with consults placed to APS and neurology. MRI of cervical and lumbar spines were done which showed some progression of chronic degeneration changes, but no acute abnormalities or cord compression. Neurology believed his headaches were cervicogenic in nature and patient was ultimately started on a steroid taper, with improvement in his symptoms.     Chief Complaint: f/u ambulatory dysfunction and neck pain    Interval: Patient seen and examined. No acute overnight events. He states that PT massaged his neck yesterday and he felt significant improvement with better ROM of " his neck. He states he feels dry and notices a raspy voice- is agreeable to PRN nasal spray. He also feels like his upper chest is tight- he wants an inhaler that he used to have in the past, but he cannot recall the name. Otherwise, eating and sleeping well. Last BM 4/24. No new labs to review    Objective :  Temp:  [98 °F (36.7 °C)-98.2 °F (36.8 °C)] 98 °F (36.7 °C)  HR:  [60-80] 60  BP: (114-186)/() 114/80  Resp:  [17-18] 17  SpO2:  [95 %-96 %] 96 %  O2 Device: None (Room air)    Functional Update:  Mobility: supervision bed mobility, mod-totalA ambulation with RW (10')  Transfers: Efrain with RW. modA with RW for car transfer  ADLs: ind eating, mod-maxA oral care/footwear, maxA bathing, sup UBD, Efrain toileting    Physical Exam  Vitals reviewed.   Constitutional:       Appearance: Normal appearance.   HENT:      Head: Normocephalic and atraumatic.      Right Ear: External ear normal.      Left Ear: External ear normal.      Nose: Nose normal.      Mouth/Throat:      Mouth: Mucous membranes are moist.   Eyes:      Conjunctiva/sclera: Conjunctivae normal.   Cardiovascular:      Rate and Rhythm: Normal rate and regular rhythm.      Heart sounds: Normal heart sounds.   Pulmonary:      Effort: Pulmonary effort is normal. No respiratory distress.      Breath sounds: Normal breath sounds. No wheezing.   Abdominal:      General: Bowel sounds are normal. There is no distension.      Palpations: Abdomen is soft.   Musculoskeletal:      Right lower leg: No edema.      Left lower leg: No edema.   Skin:     General: Skin is warm and dry.   Neurological:      Mental Status: He is alert and oriented to person, place, and time. Mental status is at baseline.       Scheduled Meds:  Current Facility-Administered Medications   Medication Dose Route Frequency Provider Last Rate    acetaminophen  975 mg Oral Q8H SALAZAR Juarez      albuterol  2 puff Inhalation Q4H PRN SALAZAR Juarez      bisacodyl  10 mg  Rectal Daily PRN Carly T Vance, DO      Diclofenac Sodium  2 g Topical 4x Daily Isabel Villarreal, SALAZAR      docusate sodium  100 mg Oral BID Carly T Vance, DO      DULoxetine  20 mg Oral Daily Carly T Vance, DO      enoxaparin  40 mg Subcutaneous Daily Isabel Villarreal, SALAZAR      gabapentin  500 mg Oral TID Carly T Vance, DO      hydrALAZINE  25 mg Oral Q8H PRN Isabel Villarreal, CRNP      hydrOXYzine HCL  25 mg Oral Q6H PRN Carly T Vance, DO      lisinopril  20 mg Oral Daily Isabel Villarreal, CRNP      methocarbamol  750 mg Oral Q6H PETER Isabel Villarreal, EPHRAIMNP      oxyCODONE  2.5 mg Oral Q4H PRN Isabel Villarreal, EPHRAIMNP      Or    oxyCODONE  5 mg Oral Q4H PRN Isabel Villarreal, EPHRAIMNP      polyethylene glycol  17 g Oral Daily PRN Carly T Vance, DO      QUEtiapine  300 mg Oral HS Isabel Villarreal, CRNP      senna  2 tablet Oral Daily With Lunch Carly T Vance, DO           Lab Results: I have reviewed the following results:  Results from last 7 days   Lab Units 04/21/25  0602   HEMOGLOBIN g/dL 14.5   HEMATOCRIT % 42.3   WBC Thousand/uL 7.98   PLATELETS Thousands/uL 277     Results from last 7 days   Lab Units 04/21/25  0602   BUN mg/dL 22   SODIUM mmol/L 140   POTASSIUM mmol/L 3.7   CHLORIDE mmol/L 108   CREATININE mg/dL 0.87

## 2025-04-25 NOTE — ASSESSMENT & PLAN NOTE
Chronic neck pain requiring multiple hospitalizations/ED visits - see above  CT imaging showed degenerative changes in cervical spine    4/24: will increase gabapentin to 500mg TID for pain control > tolerating current dose. Can uptitrate to 600 mg TID if needed on Monday

## 2025-04-25 NOTE — ASSESSMENT & PLAN NOTE
Patient with elevated blood pressures with some lability some of it likely due to component of pain, anxiety - has required PO hydralazine for the last two nights for SBP >180s  Continue on lisinopril 20 mg daily  Management per internal medicine, will work on anxiety reduction to help with blood pressures

## 2025-04-25 NOTE — PLAN OF CARE
Problem: PAIN - ADULT  Goal: Verbalizes/displays adequate comfort level or baseline comfort level  Description: Interventions:- Encourage patient to monitor pain and request assistance- Assess pain using appropriate pain scale- Administer analgesics based on type and severity of pain and evaluate response- Implement non-pharmacological measures as appropriate and evaluate response- Consider cultural and social influences on pain and pain management- Notify physician/advanced practitioner if interventions unsuccessful or patient reports new pain  Outcome: Progressing     Problem: INFECTION - ADULT  Goal: Absence or prevention of progression during hospitalization  Description: INTERVENTIONS:- Assess and monitor for signs and symptoms of infection- Monitor lab/diagnostic results- Monitor all insertion sites, i.e. indwelling lines, tubes, and drains- Monitor endotracheal if appropriate and nasal secretions for changes in amount and color- Hollister appropriate cooling/warming therapies per order- Administer medications as ordered- Instruct and encourage patient and family to use good hand hygiene technique- Identify and instruct in appropriate isolation precautions for identified infection/condition  Outcome: Progressing     Problem: SAFETY ADULT  Goal: Patient will remain free of falls  Description: INTERVENTIONS:- Educate patient/family on patient safety including physical limitations- Instruct patient to call for assistance with activity - Consult OT/PT to assist with strengthening/mobility - Keep Call bell within reach- Keep bed low and locked with side rails adjusted as appropriate- Keep care items and personal belongings within reach- Initiate and maintain comfort rounds- Make Fall Risk Sign visible to staff- Offer Toileting every 2 Hours, in advance of need- Initiate/Maintain bed/chair alarm- Obtain necessary fall risk management equipment: alarms - Apply yellow socks and bracelet for high fall risk patients-  Consider moving patient to room near nurses station  Outcome: Progressing  Goal: Maintain or return to baseline ADL function  Description: INTERVENTIONS:-  Assess patient's ability to carry out ADLs; assess patient's baseline for ADL function and identify physical deficits which impact ability to perform ADLs (bathing, care of mouth/teeth, toileting, grooming, dressing, etc.)- Assess/evaluate cause of self-care deficits - Assess range of motion- Assess patient's mobility; develop plan if impaired- Assess patient's need for assistive devices and provide as appropriate- Encourage maximum independence but intervene and supervise when necessary- Involve family in performance of ADLs- Assess for home care needs following discharge - Consider OT consult to assist with ADL evaluation and planning for discharge- Provide patient education as appropriate  Outcome: Progressing  Goal: Maintains/Returns to pre admission functional level  Description: INTERVENTIONS:- Perform AM-PAC 6 Click Basic Mobility/ Daily Activity assessment daily.- Set and communicate daily mobility goal to care team and patient/family/caregiver. - Collaborate with rehabilitation services on mobility goals if consulted- Perform Range of Motion 3 times a day.- Reposition patient every 2 hours.- Dangle patient 3 times a day- Stand patient 3 times a day- Ambulate patient 3 times a day- Out of bed to chair 3 times a day - Out of bed for meals 3 times a day- Out of bed for toileting- Record patient progress and toleration of activity level   Outcome: Progressing     Problem: DISCHARGE PLANNING  Goal: Discharge to home or other facility with appropriate resources  Description: INTERVENTIONS:- Identify barriers to discharge w/patient and caregiver- Arrange for needed discharge resources and transportation as appropriate- Identify discharge learning needs (meds, wound care, etc.)- Arrange for interpretive services to assist at discharge as needed- Refer to Case  Management Department for coordinating discharge planning if the patient needs post-hospital services based on physician/advanced practitioner order or complex needs related to functional status, cognitive ability, or social support system  Outcome: Progressing     Problem: Knowledge Deficit  Goal: Patient/family/caregiver demonstrates understanding of disease process, treatment plan, medications, and discharge instructions  Description: Complete learning assessment and assess knowledge base.Interventions:- Provide teaching at level of understanding- Provide teaching via preferred learning methods  Outcome: Progressing

## 2025-04-25 NOTE — PROGRESS NOTES
"   04/25/25 1310   Pain Assessment   Pain Assessment Tool 0-10   Pain Score 9   Pain Location/Orientation Location: Neck   Pain Onset/Description Onset: Ongoing  (radiating to his head)   Hospital Pain Intervention(s) Heat applied;Guided imagery   Restrictions/Precautions   Precautions Bed/chair alarms;Pain;Supervision on toilet/commode;Fall Risk   Cognition   Arousal/Participation Other (Comment)  (requires encouragement due to inc pain at start of session)   Orientation Level Oriented X4   Subjective   Subjective \"My neck hurts, I don't want to move.\"   Therapeutic Interventions   Neuromuscular Re-Education Using the [employer] randy, pt participated in multiple rounds of left/right discrimination at the \"basic\", \"vanilla\", and \"context\" levels as well as mutliple rounds of the memory game at the easy and medium levels.   Modalities MHP applied to cervical region while pt in right sidelying in hospital bed, pt later transitioned to prone; skin assessed pre and post application with no noted changes   Other Provided education about pain neuroscience during session including graded motor imagery with progressive left/right discrimination, imagined movement, and mirror therapy.   Assessment   Treatment Assessment Pt with increased complaints of neck pain at start of session, requiring encouragement to initiate participation in session. MHP applied for pain relief during session x30 min total. Pt provided with education about positioning for pain relief (pt positioned in prone with cervical rotation at times during session and educated pt that supine may be more comfortable). Pt also provided with education about pain neuroscience including all stages of graded motor imagery. Using the [employer] randy, pt participated in GMI including left/right discrimination and memory images. Due to pain, pt was in bed throughout session. Pt continues to be limited by pain, complaints of dizziness, dec strength, dec activity tolerance, " and dec endurance. These impairments significantly limit his functional mobility. He continues to benefit from skilled inpatient PT services to address these deficits to maximize his functional potential and facilitate safe D/C.   Problem List Decreased strength;Decreased range of motion;Decreased endurance;Impaired balance;Decreased mobility;Pain  (dizziness)   Barriers to Discharge Inaccessible home environment;Decreased caregiver support   PT Barriers   Physical Impairment Decreased strength;Decreased range of motion;Decreased endurance;Impaired balance;Decreased mobility;Pain  (dizziness)   Functional Limitation Car transfers;Stair negotiation;Standing;Transfers;Walking   Plan   Treatment/Interventions Functional transfer training;LE strengthening/ROM;Elevations;Therapeutic exercise;Endurance training;Bed mobility;Gait training   PT Therapy Minutes   PT Time In 1310   PT Time Out 1405   PT Total Time (minutes) 55   PT Mode of treatment - Individual (minutes) 55   PT Mode of treatment - Concurrent (minutes) 0   PT Mode of treatment - Group (minutes) 0   PT Mode of treatment - Co-treat (minutes) 0   PT Mode of Treatment - Total time(minutes) 55 minutes   PT Cumulative Minutes 265

## 2025-04-25 NOTE — ASSESSMENT & PLAN NOTE
Was seeing [place] [facility] but has not seen them in a while and he hasn't returned their phone calls for followup  Prior to seeing them he was being seen by FP at [facility]

## 2025-04-25 NOTE — PROGRESS NOTES
04/24/25 1230   Pain Assessment   Pain Score 7   Pain Location/Orientation Location: [facility]   Hospital Pain Intervention(s) Heat applied;Repositioned;Rest  (recieved pain meds from nursing during session)   Restrictions/Precautions   Precautions Bed/chair alarms;Fall Risk;Pain;Supervision on toilet/commode   Cognition   Arousal/Participation Alert;Cooperative   Attention Within functional limits   Memory Within functional limits   Following Commands Follows one step commands without difficulty   Subjective   Subjective Pt. reported soreness on buttocks area from nu step yesterday, otherwise no new complaints reported. BP assessed at start of session and are all reflected on vitals section. Nurse made aware and gave pt. BP meds   Sit to Lying   Type of Assistance Needed Supervision   Comment at mat table   Sit to Lying CARE Score 4   Lying to Sitting on Side of Bed   Type of Assistance Needed Supervision   Comment at mat tabke   Lying to Sitting on Side of Bed CARE Score 4   Sit to Stand   Type of Assistance Needed Incidental touching;Physical assistance   Physical Assistance Level 25% or less   Comment CGA/ min A with RW   Sit to Stand CARE Score 3   Bed-Chair Transfer   Type of Assistance Needed Physical assistance   Physical Assistance Level 26%-50%   Comment SPT with RW   Chair/Bed-to-Chair Transfer CARE Score 3   Walk 10 Feet   Type of Assistance Needed Physical assistance   Physical Assistance Level 26%-50%   Comment min to mod A with RW   Walk 10 Feet CARE Score 3   Walk 50 Feet with Two Turns   Type of Assistance Needed Physical assistance   Physical Assistance Level Total assistance   Comment min to mod A with RW with CF for safety   Walk 50 Feet with Two Turns CARE Score 1   Walk 150 Feet   Reason if not Attempted Safety concerns   Walk 150 Feet CARE Score 88   Walking 10 Feet on Uneven Surfaces   Type of Assistance Needed Physical assistance   Physical Assistance Level Total assistance   Comment min to  "mod A with RW with CF for safety over floor mat   Walking 10 Feet on Uneven Surfaces CARE Score 1   Ambulation   Primary Mode of Locomotion Prior to Admission Walk   Distance Walked (feet) 97 ft  (40, 35)   Assist Device Roller Walker   Gait Pattern Slow Gissel;Decreased foot clearance;Improper weight shift  (B LE spasms/shaking,)   Limitations Noted In Balance;Endurance;Coordination   Provided Assistance with: Balance;Trunk Support   Walk Assist Level Minimum Assist;Moderate Assist   Findings CGA/ min A , mod A for LOB mostly to the L with excessive lean, not necessariy buckling   Does the patient walk? 2. Yes   Wheel 50 Feet with Two Turns   Reason if not Attempted Activity not applicable   Wheel 50 Feet with Two Turns CARE Score 9   Wheel 150 Feet   Reason if not Attempted Activity not applicable   Wheel 150 Feet CARE Score 9   Wheelchair mobility   Findings does wheel himself in short distances   Does the patient use a wheelchair? 0. No   Curb or Single Stair   Style negotiated Single stair   Type of Assistance Needed Physical assistance   Physical Assistance Level Total assistance   Comment min A X 1 with 2nd person CGA for safety on 6\"  steps using B HR descending backwards   1 Step (Curb) CARE Score 1   4 Steps   Type of Assistance Needed Physical assistance   Physical Assistance Level Total assistance   Comment min A X 1 with 2nd person CGA for safety on 6\"  steps using B HR descending backwards   4 Steps CARE Score 1   12 Steps   Reason if not Attempted Safety concerns   12 Steps CARE Score 88   Stairs   Type Stairs   # of Steps 4   Weight Bearing Precautions Fall Risk   Assist Devices Bilateral Rail   Picking Up Object   Comment trial with grabber tomorrow   Reason if not Attempted Safety concerns   Picking Up Object CARE Score 88   Therapeutic Interventions   Strengthening seated LAQ and hip flex with 2.5# wts, at // bars sit to stand from w/c   Flexibility supine gluts and piriformis " stretching , long sitting hamstring self stretching   Balance side stepping along // bars   Modalities HMP X 10 mins on cervical area , skin intact before and after application. As per pt. nursing had applied voltaren gel earlier and so skin was wiped with wet wash cloth prior to HCP application   Other Comments   Comments Outcome measures assessed today: 5X STS: with UE support:  , 2MWT- 40 feet with RW   Assessment   Treatment Assessment Pt. engaged in 90 mins session and tolerated session well. Pain increased about [facility] during session but improved after pain meds from nursing. HMP also provided some relief. Pt.  demonstrates inc overall activitiy tolerance in ambulation but still has some minor LOB mostly to the L needing mod A to correct. Pt. also demonstrates dec LE spasm today and no buckling was noted. Pt. cont to report ongoing headache and neck pain but does not limit participation in PT. Pt. was assisted back to bed at end of session. WIll benefit from continued PT to achieve goals.   Problem List Decreased strength;Decreased range of motion;Decreased endurance;Impaired balance;Decreased mobility;Decreased coordination;Pain   Barriers to Discharge Inaccessible home environment;Decreased caregiver support   Barriers to Discharge Comments still unable to obtain info from girlfriend their home set up. Please follow up again tomorrow. As per pt. girlfriend's phone is broke that is why she is not answering his calls   PT Barriers   Functional Limitation Car transfers;Stair negotiation;Standing;Walking;Transfers   Plan   Treatment/Interventions Functional transfer training;LE strengthening/ROM;Elevations;Therapeutic exercise;Endurance training;Patient/family training;Equipment eval/education;Bed mobility;Gait training   Discharge Recommendation   Rehab Resource Intensity Level, PT   (Out patient PT)   Equipment Recommended Walker   PT Therapy Minutes   PT Time In 1230   PT Time Out 1400   PT Total Time  (minutes) 90   PT Mode of treatment - Individual (minutes) 90   PT Mode of treatment - Concurrent (minutes) 0   PT Mode of treatment - Group (minutes) 0   PT Mode of treatment - Co-treat (minutes) 0   PT Mode of Treatment - Total time(minutes) 90 minutes   PT Cumulative Minutes 210   Therapy Time missed   Time missed? No

## 2025-04-25 NOTE — ASSESSMENT & PLAN NOTE
Associated with neck pain  Has had multiple ED visits and hospitalization for chronic neck pain and headaches  He saw NS here at [facility] in 2/2022 and became verbally abusive and threatening to the PA and Dr Johnson and they will no longer see him  He was then seen by NS at [facility] 9/2022 and they did not recommend surgery but referred to pain management  Takes gabapentin and naproxen at home but no opioids.  Cervical spine CT scan without acute abnormality  Cervical/lumbar spine MRI = progression of degenerative changes but no cord compression  No further inpatient neurology workup  Acute pain service saw during his hospital stay  He was recommended to establish an outpatient pain management   Ambulatory referral to comprehensive spine program as well as [facility] spine and pain were placed

## 2025-04-26 PROCEDURE — 97010 HOT OR COLD PACKS THERAPY: CPT

## 2025-04-26 PROCEDURE — 97112 NEUROMUSCULAR REEDUCATION: CPT

## 2025-04-26 PROCEDURE — 97140 MANUAL THERAPY 1/> REGIONS: CPT

## 2025-04-26 PROCEDURE — 99232 SBSQ HOSP IP/OBS MODERATE 35: CPT | Performed by: PHYSICIAN ASSISTANT

## 2025-04-26 PROCEDURE — 99232 SBSQ HOSP IP/OBS MODERATE 35: CPT | Performed by: STUDENT IN AN ORGANIZED HEALTH CARE EDUCATION/TRAINING PROGRAM

## 2025-04-26 PROCEDURE — 97530 THERAPEUTIC ACTIVITIES: CPT

## 2025-04-26 RX ADMIN — SENNOSIDES 17.2 MG: 8.6 TABLET, FILM COATED ORAL at 12:47

## 2025-04-26 RX ADMIN — GABAPENTIN 500 MG: 100 CAPSULE ORAL at 18:32

## 2025-04-26 RX ADMIN — DICLOFENAC SODIUM 2 G: 10 GEL TOPICAL at 18:32

## 2025-04-26 RX ADMIN — ACETAMINOPHEN 975 MG: 325 TABLET, FILM COATED ORAL at 09:13

## 2025-04-26 RX ADMIN — OXYCODONE HYDROCHLORIDE 5 MG: 5 TABLET ORAL at 21:48

## 2025-04-26 RX ADMIN — GABAPENTIN 500 MG: 100 CAPSULE ORAL at 09:10

## 2025-04-26 RX ADMIN — METHOCARBAMOL 750 MG: 750 TABLET ORAL at 09:11

## 2025-04-26 RX ADMIN — OXYCODONE HYDROCHLORIDE 5 MG: 5 TABLET ORAL at 13:15

## 2025-04-26 RX ADMIN — GABAPENTIN 500 MG: 100 CAPSULE ORAL at 12:47

## 2025-04-26 RX ADMIN — ENOXAPARIN SODIUM 40 MG: 40 INJECTION SUBCUTANEOUS at 09:10

## 2025-04-26 RX ADMIN — LISINOPRIL 20 MG: 20 TABLET ORAL at 09:10

## 2025-04-26 RX ADMIN — ACETAMINOPHEN 975 MG: 325 TABLET, FILM COATED ORAL at 13:15

## 2025-04-26 RX ADMIN — ACETAMINOPHEN 975 MG: 325 TABLET, FILM COATED ORAL at 21:48

## 2025-04-26 RX ADMIN — DOCUSATE SODIUM 100 MG: 100 CAPSULE, LIQUID FILLED ORAL at 18:32

## 2025-04-26 RX ADMIN — METHOCARBAMOL 750 MG: 750 TABLET ORAL at 18:32

## 2025-04-26 RX ADMIN — DICLOFENAC SODIUM 2 G: 10 GEL TOPICAL at 21:50

## 2025-04-26 RX ADMIN — OXYCODONE HYDROCHLORIDE 5 MG: 5 TABLET ORAL at 09:11

## 2025-04-26 RX ADMIN — DULOXETINE HYDROCHLORIDE 20 MG: 20 CAPSULE, DELAYED RELEASE ORAL at 09:10

## 2025-04-26 RX ADMIN — DOCUSATE SODIUM 100 MG: 100 CAPSULE, LIQUID FILLED ORAL at 09:10

## 2025-04-26 RX ADMIN — METHOCARBAMOL 750 MG: 750 TABLET ORAL at 23:42

## 2025-04-26 RX ADMIN — METHOCARBAMOL 750 MG: 750 TABLET ORAL at 12:47

## 2025-04-26 RX ADMIN — QUETIAPINE FUMARATE 300 MG: 100 TABLET ORAL at 21:48

## 2025-04-26 NOTE — ASSESSMENT & PLAN NOTE
Associated with neck pain  Has had multiple ED visits and hospitalization for chronic neck pain and headaches  He saw NS here at [facility] in 2/2022 and became verbally abusive and threatening to the PA and Dr Johnson and they will no longer see him  He was then seen by NS at [facility] 9/2022 and they did not recommend surgery but referred to pain management  Takes gabapentin and naproxen at home but no opioids.  Cervical spine CT scan without acute abnormality  Cervical/lumbar spine MRI = progression of degenerative changes but no cord compression  No further inpatient neurology workup  Acute pain service saw during his hospital stay  He was recommended to establish an outpatient pain management   Ambulatory referral to comprehensive spine program as well as [facility] and pain were placed

## 2025-04-26 NOTE — ASSESSMENT & PLAN NOTE
On no meds at home but was placed on Lisinopril 10 mg qd while in hospital. Increased to 20mg 4/25/25 due to elevated BP.  Had been on Norvasc in the past but stopped it  Has prn PO Hydralazine for  or greater  BP is very labile 2/2 becoming upset frequently.

## 2025-04-26 NOTE — PLAN OF CARE
Problem: PAIN - ADULT  Goal: Verbalizes/displays adequate comfort level or baseline comfort level  Description: Interventions:- Encourage patient to monitor pain and request assistance- Assess pain using appropriate pain scale- Administer analgesics based on type and severity of pain and evaluate response- Implement non-pharmacological measures as appropriate and evaluate response- Consider cultural and social influences on pain and pain management- Notify physician/advanced practitioner if interventions unsuccessful or patient reports new pain  Outcome: Progressing     Problem: INFECTION - ADULT  Goal: Absence or prevention of progression during hospitalization  Description: INTERVENTIONS:- Assess and monitor for signs and symptoms of infection- Monitor lab/diagnostic results- Monitor all insertion sites, i.e. indwelling lines, tubes, and drains- Monitor endotracheal if appropriate and nasal secretions for changes in amount and color- [place] appropriate cooling/warming therapies per order- Administer medications as ordered- Instruct and encourage patient and family to use good hand hygiene technique- Identify and instruct in appropriate isolation precautions for identified infection/condition  Outcome: Progressing     Problem: SAFETY ADULT  Goal: Patient will remain free of falls  Description: INTERVENTIONS:- Educate patient/family on patient safety including physical limitations- Instruct patient to call for assistance with activity - Consult OT/PT to assist with strengthening/mobility - Keep Call bell within reach- Keep bed low and locked with side rails adjusted as appropriate- Keep care items and personal belongings within reach- Initiate and maintain comfort rounds- Make Fall Risk Sign visible to staff- Offer Toileting every  Hours, in advance of need- Initiate/Maintain alarm- Obtain necessary fall risk management equipment: - Apply yellow socks and bracelet for high fall risk patients- Consider moving  patient to room near nurses station  Outcome: Progressing  Goal: Maintain or return to baseline ADL function  Description: INTERVENTIONS:-  Assess patient's ability to carry out ADLs; assess patient's baseline for ADL function and identify physical deficits which impact ability to perform ADLs (bathing, care of mouth/teeth, toileting, grooming, dressing, etc.)- Assess/evaluate cause of self-care deficits - Assess range of motion- Assess patient's mobility; develop plan if impaired- Assess patient's need for assistive devices and provide as appropriate- Encourage maximum independence but intervene and supervise when necessary- Involve family in performance of ADLs- Assess for home care needs following discharge - Consider OT consult to assist with ADL evaluation and planning for discharge- Provide patient education as appropriate  Outcome: Progressing  Goal: Maintains/Returns to pre admission functional level  Description: INTERVENTIONS:- Perform AM-PAC 6 Click Basic Mobility/ Daily Activity assessment daily.- Set and communicate daily mobility goal to care team and patient/family/caregiver. - Collaborate with rehabilitation services on mobility goals if consulted- Perform Range of Motion  times a day.- Reposition patient every  hours.- Dangle patient  times a day- Stand patient  times a day- Ambulate patient  times a day- Out of bed to chair  times a day - Out of bed for meals  times a day- Out of bed for toileting- Record patient progress and toleration of activity level   Outcome: Progressing     Problem: DISCHARGE PLANNING  Goal: Discharge to home or other facility with appropriate resources  Description: INTERVENTIONS:- Identify barriers to discharge w/patient and caregiver- Arrange for needed discharge resources and transportation as appropriate- Identify discharge learning needs (meds, wound care, etc.)- Arrange for interpretive services to assist at discharge as needed- Refer to Case Management Department for  coordinating discharge planning if the patient needs post-hospital services based on physician/advanced practitioner order or complex needs related to functional status, cognitive ability, or social support system  Outcome: Progressing     Problem: Knowledge Deficit  Goal: Patient/family/caregiver demonstrates understanding of disease process, treatment plan, medications, and discharge instructions  Description: Complete learning assessment and assess knowledge base.Interventions:- Provide teaching at level of understanding- Provide teaching via preferred learning methods  Outcome: Progressing

## 2025-04-26 NOTE — PROGRESS NOTES
"   04/26/25 1040   Pain Assessment   Pain Assessment Tool 0-10   Pain Score 8   Pain Location/Orientation Orientation: Bilateral;Location: Neck   Pain Onset/Description Onset: Ongoing;Frequency: Constant/Continuous;Descriptor: Aching   Hospital Pain Intervention(s) Repositioned;Emotional support;Guided imagery;Relaxation technique;Rest   Restrictions/Precautions   Precautions Bed/chair alarms;Fall Risk;Supervision on toilet/commode;Pain   Subjective   Subjective Patient agreeable to continued PT, with interest in GMI   Sit to Lying   Type of Assistance Needed Supervision   Sit to Lying CARE Score 4   Lying to Sitting on Side of Bed   Type of Assistance Needed Supervision   Lying to Sitting on Side of Bed CARE Score 4   Therapeutic Interventions   Neuromuscular Re-Education Using the [employer] randy, pt participated in multiple rounds of left/right discrimination at the \"basic\" and \"vanilla\" levels as well as mutliple rounds of the memory game at the medium levels. Overall averaging > 90% accuracy. Progressed to Stage 2: \"Imagined movements\" utilizing mental imagery of self performing the task in pain-free ROM > neutral for 10 images (30s ea image). Noted pain for 1 of 10 activities.   Other Provided pain education, purpose of graded motor imagery, implications/limtiations, PT goals   Assessment   Treatment Assessment Patient continued PT, with transition to different therapist. Focus of session on graded motor imagery using the [employer] randy. Patient performed GMI in bed, with progression from Stage 1 \"L/R Discrimination\" to Stage 2: \"Imagined Movements\" with overall good tolerance. Can continue Stage 2 if appropriate. Pt continues to be limited by pain, complaints of dizziness, dec strength, dec activity tolerance, and dec endurance. These impairments significantly limit his functional mobility. He continues to benefit from skilled inpatient PT services to address these deficits to maximize his functional potential " and facilitate safe D/C.   Problem List Decreased strength;Decreased range of motion;Decreased endurance;Impaired balance;Decreased mobility;Pain   Barriers to Discharge Inaccessible home environment;Decreased caregiver support   PT Barriers   Physical Impairment Decreased strength;Decreased range of motion;Decreased endurance;Impaired balance;Decreased mobility;Pain   Functional Limitation Car transfers;Stair negotiation;Standing;Transfers;Walking   Plan   Progress Progressing toward goals   PT Therapy Minutes   PT Time In 1040   PT Time Out 1112   PT Total Time (minutes) 32   PT Mode of treatment - Individual (minutes) 32   PT Mode of treatment - Concurrent (minutes) 0   PT Mode of treatment - Group (minutes) 0   PT Mode of treatment - Co-treat (minutes) 0   PT Mode of Treatment - Total time(minutes) 32 minutes   PT Cumulative Minutes 332   Therapy Time missed   Time missed? No

## 2025-04-26 NOTE — PROGRESS NOTES
Progress Note - Hospitalist   Name: Amado Chin 39 y.o. male I MRN: [id number]  Unit/Bed#: -01 I Date of Admission: 4/22/2025   Date of Service: 4/26/2025 I Hospital Day: 4    Assessment & Plan  Cervicogenic headache  Secondary to neck pain  S/p course of prednisone per Neurology  Pain management per PMR  Cervical spinal stenosis  Associated with neck pain  Has had multiple ED visits and hospitalization for chronic neck pain and headaches  He saw NS here at [facility] in 2/2022 and became verbally abusive and threatening to the PA and Dr Johnson and they will no longer see him  He was then seen by NS at [facility] 9/2022 and they did not recommend surgery but referred to pain management  Takes gabapentin and naproxen at home but no opioids.  Cervical spine CT scan without acute abnormality  Cervical/lumbar spine MRI = progression of degenerative changes but no cord compression  No further inpatient neurology workup  Acute pain service saw during his hospital stay  He was recommended to establish an outpatient pain management   Ambulatory referral to comprehensive spine program as well as [facility] spine and pain were placed  Hypertension  On no meds at home but was placed on Lisinopril 10 mg qd while in hospital. Increased to 20mg 4/25/25 due to elevated BP.  Had been on Norvasc in the past but stopped it  Has prn PO Hydralazine for  or greater  BP is very labile 2/2 becoming upset frequently.    Asthma  Stable w/o exacerbation  Continue prn Albuterol inhaler  Tobacco abuse  Smokes 1/2 ppd  Refuses patch so will discontinue it today 4/24/25  Anxiety  Seroquel 300mg qhs  Per PMR  Polysubstance abuse (HCC)  Methamphetamine use in past  In hospital staff found a marijuana vape pen  Lesion of parotid gland  Found incidentally = 1.2 cm  See ENT as OP  Does not have primary care provider  Was seeing [facility] but has not seen them in a while and he hasn't returned their phone calls for followup  Prior to seeing  them he was being seen by FP at [facility]  Psychosis (HCC)  History of psychosis previous inpatient psychiatric admission on 7/2024.  Other hx states bipolar disorder    The above assessment and plan was reviewed and updated as determined by my evaluation of the patient on 4/26/2025.    History of Present Illness   Patient seen and examined. Patients overnight issues or events were reviewed with nursing staff. New or overnight issues include the following:     Pt seen in his room. He denies any current complaints.    A 10 point review of systems was negative except for what is noted in the HPI.    Objective :  BP: (132-194)/() 132/70    Invasive Devices       None                   Physical Exam  General Appearance: NAD; pleasant  HEENT: PERRLA, conjuctiva normal; mucous membranes moist; face symmetrical  Neck:  Supple  Lungs: clear bilaterally, normal respiratory effort, no retractions, expiratory effort normal, on room air  CV: regular rate and rhythm, no murmurs rubs or gallops noted   ABD: soft non tender, +BS x4  EXT: DP pulses intact, no lymphadenopathy, no edema  Skin: normal turgor, normal texture, no rash  Psych: affect normal, mood normal  Neuro: AAOx3    The above physical exam was reviewed and updated as determined by my evaluation of the patient on 4/26/2025.      Lab Results: I have reviewed the following results:  Results from last 7 days   Lab Units 04/21/25  0602   WBC Thousand/uL 7.98   HEMOGLOBIN g/dL 14.5   HEMATOCRIT % 42.3   PLATELETS Thousands/uL 277     Results from last 7 days   Lab Units 04/21/25  0602   SODIUM mmol/L 140   POTASSIUM mmol/L 3.7   CHLORIDE mmol/L 108   CO2 mmol/L 26   BUN mg/dL 22   CREATININE mg/dL 0.87   CALCIUM mg/dL 8.6                   Imaging Results Review: No pertinent imaging studies reviewed.  Other Study Results Review: No additional pertinent studies reviewed.    Review of Scheduled Meds: Medications  reviewed and reconciled as needed  Current  Facility-Administered Medications   Medication Dose Route Frequency Provider Last Rate    acetaminophen  975 mg Oral Q8H Isabel Villarreal, SALAZAR      albuterol  2 puff Inhalation Q4H PRN Isabel Villarreal, SALAZAR      bisacodyl  10 mg Rectal Daily PRN Carly T Vance, DO      Diclofenac Sodium  2 g Topical 4x Daily Isabel Villarreal, EPHRAIMNP      docusate sodium  100 mg Oral BID Carly T Vance, DO      DULoxetine  20 mg Oral Daily Carly T Vance, DO      enoxaparin  40 mg Subcutaneous Daily Isabel Villarreal, CRNP      gabapentin  500 mg Oral TID Carly T Vance, DO      hydrALAZINE  25 mg Oral Q8H PRN Isabel Villarreal, CRNP      hydrOXYzine HCL  25 mg Oral Q6H PRN Carly T Vance, DO      lisinopril  20 mg Oral Daily Isabel Villarreal, EPHRAIMNP      methocarbamol  750 mg Oral Q6H PETER Isabel Villarreal, CRNP      oxyCODONE  2.5 mg Oral Q4H PRN Isabel Villarreal, CRNP      Or    oxyCODONE  5 mg Oral Q4H PRN Isabel Villarreal, CRNP      polyethylene glycol  17 g Oral Daily PRN Carly T Vance, DO      QUEtiapine  300 mg Oral HS Isabel Villarreal, EPHRAIMNP      senna  2 tablet Oral Daily With Lunch Carly T Vance, DO      sodium chloride  1 spray Each Nare Q1H PRN Carly T Vance, DO         VTE Pharmacologic Prophylaxis: Lovenox  Code Status: Level 1 - Full Code  Current Length of Stay: 4 day(s)    Administrative Statements   I have spent a total time of 35 minutes in caring for this patient on the day of the visit/encounter including Prognosis, Risks and benefits of tx options, Instructions for management, Patient and family education, Importance of tx compliance, Risk factor reductions, Impressions, Counseling / Coordination of care, Documenting in the medical record, Reviewing/placing orders in the medical record (including tests, medications, and/or procedures), Obtaining or reviewing history  , and Communicating with other healthcare professionals .  ** Please Note:  voice to text software may have  been used in the creation of this document. Although proof errors in transcription or interpretation are a potential of such software**

## 2025-04-26 NOTE — ASSESSMENT & PLAN NOTE
Was seeing [facility] but has not seen them in a while and he hasn't returned their phone calls for followup  Prior to seeing them he was being seen by FP at [facility]

## 2025-04-26 NOTE — PROGRESS NOTES
"OT daily treatment note       04/26/25 1230   Pain Assessment   Pain Assessment Tool 0-10   Pain Score 7   Pain Location/Orientation Location: [facility]   Hospital Pain Intervention(s) Emotional support   Restrictions/Precautions   Precautions Bed/chair alarms;Fall Risk;Pain;Supervision on toilet/commode   Lifestyle   Autonomy \"I'm just really tired. Things arent working out for me\"   Tub/Shower Transfer   Findings (S)  bathroom setup is TBD due to pts DC location being TBD. Assume pt will have a tub shower at his DC location so please complete a dry tub transfer next session and educate on TTB vs shower chair for home. Provide handout on how to purchase for whatever device is chosen.   Health Management   Health Management pt states at baseline he was not using a pill box and would take medications directly from the bottle. OT spent time reviewing his current medication list - pt minimally participatory in this and would only say yes or no if he was taking the medication PTA. OT spent time educating on purpose and frequency of each med. Creating a cheat sheet would not necessarily be beneficial for him today as he was not engaged at all so plan to re-review the medications and create a cheat sheet for him at the next session.   Cognition   Overall Cognitive Status WFL   Arousal/Participation Alert;Responsive   Attention Within functional limits   Orientation Level Oriented X4   Memory Within functional limits   Following Commands Follows one step commands without difficulty   Additional Activities   Additional Activities Other (Comment)   Additional Activities Comments Session grossly focused on DC planning and setting goals in prep for DC on Wednesday. OT inquired if pt has spoke w/ his gf and confirmed if he can DC to her home - he said he spoke with her today and DC to her home is no longer possible. OT inquired about pts plan B which is to live w/ his friend Devonte. He reports he needs to s/w Devonte to confirm " "this. OT educated on importance of having DC plan figured out by Monday to allow the team time to setup services. OT and pt then created a \"to-do list\" of all the tasks he needs to complete by DC Wed. Pt was minimally engaged in this conversation but did verbalize agreement w/ all of the tasks OT stated he needs to do. See below for to-do list. Copies left w/ pt in room. OT also discussed determining an AD for home as pt cont to state he believes he can walk with a cane but has not yet done so w/ PT or OT. Pt states that he has been taking himself to the bathroom w/o calling for assistance with and without use of RW and states he has been doing fine w/o the RW. OT encouraged pt to use his PT session tomorrow to help finalize his decision so an order can be placed on monday. pt in agreement. OT then discussed OP vs HH which pt stated he cannot drive due to his constant dizziness so he wants to do HH. OT educated on pros and cons of HH vs OP and inquired about pt either ubering or using his family friend Cecy to provide a ride for him to OP. Pt did not provide a response so OT urged pt to also have a decision by monday. please encourage pt to go through checklist daily to allow for goals to be met by DC.   Activity Tolerance   Activity Tolerance Patient tolerated treatment well   Assessment   Treatment Assessment Pt participated in skilled OT session focusing on medication mgmt, DC planning and preparing goal-directed care to increase pt engagement in DC preparation. A to-do list was created (see below) and ideally needs to be strictly followed to allow for a timely DC. Pt is limited by pain, dizziness, decreased endurance, activity tolerance and generalized strength and would benefit from skilled OT services to increase independence and safety with ADL tasks in prep for DC (home?) Wednesday. Please use the to-do list as a POC. Ideally, pt will be IND with all ADLs, IADLs, transfers and mobility by Tuesday. Pending " pts decision, please order pt any AD needs and provide CM w/ therapy recommendations on Monday. Whether pt chooses HH or OP, OT would be beneficial. Please inquire if pt would like a BSC for home. Outside of the above mentioned POC, pt would benefit from endurance training, cervical pain mgmt, strength training and ADL retraining.    Discharge is Wednesday, April 30th!!  To-do list:  Thing to do Due date Check when complete   Determine discharge location Monday    Determine device choice (walker, cane, etc.) Monday    Determine if you can go to outpatient or home health therapy  Monday    Complete all IADLS (cooking, medication mgmt, laundry, etc) Monday    Complete full flight of stairs independently  Tuesday    Walk household distance independently  Tuesday    Progress to in room privileges  Tuesday    See eye doctor Schedule appt when you get home!        Prognosis Good   Problem List Decreased strength;Decreased range of motion;Decreased endurance;Impaired balance;Decreased mobility;Pain   Barriers to Discharge Inaccessible home environment;Decreased caregiver support   Plan   Treatment/Interventions ADL retraining;Functional transfer training;Therapeutic exercise;Endurance training;Patient/family training;Equipment eval/education;Compensatory technique education   Progress Progressing toward goals   OT Therapy Minutes   OT Time In 1230   OT Time Out 1310   OT Total Time (minutes) 40   OT Mode of treatment - Individual (minutes) 40   OT Mode of treatment - Concurrent (minutes) 0   OT Mode of treatment - Group (minutes) 0   OT Mode of treatment - Co-treat (minutes) 0   OT Mode of Treatment - Total time(minutes) 40 minutes   OT Cumulative Minutes 310   Therapy Time missed   Time missed? No

## 2025-04-26 NOTE — PROGRESS NOTES
04/26/25 0830   Pain Assessment   Pain Score 10 - Worst Possible Pain   Pain Location/Orientation Location: Neck   Pain Radiating Towards down both arms, down the right side of his back   Pain Onset/Description Onset: Ongoing   Hospital Pain Intervention(s) Heat applied;Repositioned;Ambulation/increased activity;Rest  (manual therapy to neck/ middle traps/rhomboids)   Restrictions/Precautions   Precautions Fall Risk;Supervision on toilet/commode;Pain   Subjective   Subjective start of session Amado reported feeling tired, had eaten most of breakfast before 830 and he was in bed. He said he has a lot of neck pain, offered to start with hot pack for his neck. put this on with extra towels in supine, he did change position and move in the bed as needed to adjust for hot pack placement (I). after hot pack taken off (on for 30 min), skin integrity was fine, he said he wanted to try walking. After 30' walk, he said he felt very dizzy (room spinning), with a mix of double vision and burred vision. After sitting, doing deep breathing and grounding work, he said he felt better to try walking again. Rest of session focused on STM/pain interventions and edu about pain science. At end of session he said he felt much better.   Roll Left and Right   Type of Assistance Needed Independent   Roll Left and Right CARE Score 6   Sit to Lying   Type of Assistance Needed Independent   Sit to Lying CARE Score 6   Lying to Sitting on Side of Bed   Type of Assistance Needed Independent   Lying to Sitting on Side of Bed CARE Score 6   Sit to Stand   Type of Assistance Needed Supervision   Sit to Stand CARE Score 4   Bed-Chair Transfer   Type of Assistance Needed Supervision;Set-up / clean-up;Physical assistance   Physical Assistance Level 25% or less   Comment transfered from WC to chair in gym at DS level. Mark for transfers when standing up from bed and Mark for initial standing balance. Transfers flucutate based upon pain.    Chair/Bed-to-Chair Transfer CARE Score 3   Walk 10 Feet   Type of Assistance Needed Physical assistance;Adaptive equipment;Supervision   Physical Assistance Level 26%-50%   Comment first 30' walk at S with use of RW. Then he had a LOB with varied foot placement, giving Min-ModA to help correct LOB. at this time he said he had increased dizziness and blurred/double vision, eyes were watering. got WC for him to sit down. when walking the second time, Heather. when walking at end of session back to room, was CGA with use of RW.   Walk 10 Feet CARE Score 3   Walk 50 Feet with Two Turns   Type of Assistance Needed Incidental touching;Adaptive equipment   Comment last walk of session was CGA with RW   Walk 50 Feet with Two Turns CARE Score 4   Ambulation   Assist Device Roller Walker   Gait Pattern Inconsistant Gissel;Slow Gissel;Decreased foot clearance;Step to;Step through   Limitations Noted In Balance;Endurance;Heel Strike   Provided Assistance with: Balance   Findings VC to relax shoulders, edu that his legs are strong and can hold him up and that we are only using the RW for balance, encouraged him to relax his arms on the RW and not put as much weight through the RW. THis seemed to help at end of session as he was able to relax his shoulders and had improved foot placement. positive feedback provided about his walking in this instance and that he's capable of completing this task.   Therapeutic Interventions   Modalities 30 min hot pack start of session   Other manual therapy: trigger points held for 90 sec each to upper traps, R middle traps/rhomboids, suboccipitals bilaterally. 1st rib mobs bilaterally. started scap mobs seated to R side. recommending assessing scap mobs bilaterally to contribute to pain reduction. EDU about how he can perform self stretching at home for pecs, upper back. Also had him perform AROM for neck flexion, L rotation, L side bending when holding trigger point on R upper trap.   Other  Comments   Comments EDU about pain science, neuroplasticity and how pain is meant to be a protective response. Edu about how the brain can re-wire itself to maladaptive wasy and trigger the pain perception even when something isn't damaging the body. Provided edu that the tools we are using here today of deep breathing (tried variations of inhale 3-5 seconds, hold 3-5 seconds and exhale 3-5 seconds / tried in for 5, hold for 5, out for 10 but he felt SOB with this technique so stopped this one / also tried diaphragmatic breahting with long slow inhale and long exhale, focusing on the exhale), reviewing the breathing techniques and grounding techniques (what he can hear, feel with his feet/feel with his legs if he is sitting) can help manage the symptoms that he has (the vision changes/headache/pain). He demonstrated understanding of the information and encouragement provided for him to view these tools as things under his control to positively change his perception of pain over time. Reviewed recommendation to cont with lateralization  to decrease chronic pain. During session also put on music he likes , which was started during the time frame had more pain./dizziness to help his nervous system calm down, which seemed to contribute to helping. During the bout of increased dizziness, tried smooth pursuits(no noted nystagmus but he said it made the dizziness worse).   Assessment   Treatment Assessment Amado's overall functional mobility status cont to flucuate based upon the symptoms he has at the time. When he doesnt have the double/blurry vision he can transfer at a DS/I level with good control of balance/mobility. When he has his symptoms, he had a LOB with use of RW and Min-ModA was provided as a precautionary measure. Overall PT POC cont to focus on pain management with pain science education to help him re-wire how his brain perceives pain. PT POC to cont to focus on manual techniques and ways to decrease his  nervous system sensitization, through deep breathing, grounding techniques and tools he is in control of managing. Additionally, he will need cont practice of walking/stairs to prep for dc home on Wednesday. Will need to submit RW DME Monday in case he needs this on wednesday.   Barriers to Discharge Decreased caregiver support;Inaccessible home environment   PT Barriers   Physical Impairment Decreased strength;Decreased range of motion;Impaired balance;Decreased endurance;Decreased mobility;Pain   Functional Limitation Car transfers;Stair negotiation;Transfers;Standing;Walking   Plan   Treatment/Interventions Functional transfer training;LE strengthening/ROM;Elevations;Therapeutic exercise;Endurance training;Patient/family training;Equipment eval/education;Bed mobility;Gait training   Progress Progressing toward goals   PT Therapy Minutes   PT Time In 0830   PT Time Out 1040   PT Total Time (minutes) 130   PT Mode of treatment - Individual (minutes) 130   PT Mode of treatment - Concurrent (minutes) 0   PT Mode of treatment - Group (minutes) 0   PT Mode of treatment - Co-treat (minutes) 0   PT Mode of Treatment - Total time(minutes) 130 minutes   PT Cumulative Minutes 430   Therapy Time missed   Time missed? No

## 2025-04-27 PROCEDURE — 97112 NEUROMUSCULAR REEDUCATION: CPT

## 2025-04-27 PROCEDURE — 97530 THERAPEUTIC ACTIVITIES: CPT

## 2025-04-27 PROCEDURE — 97140 MANUAL THERAPY 1/> REGIONS: CPT

## 2025-04-27 RX ADMIN — LISINOPRIL 20 MG: 20 TABLET ORAL at 09:52

## 2025-04-27 RX ADMIN — DOCUSATE SODIUM 100 MG: 100 CAPSULE, LIQUID FILLED ORAL at 17:14

## 2025-04-27 RX ADMIN — GABAPENTIN 500 MG: 100 CAPSULE ORAL at 17:14

## 2025-04-27 RX ADMIN — DOCUSATE SODIUM 100 MG: 100 CAPSULE, LIQUID FILLED ORAL at 09:52

## 2025-04-27 RX ADMIN — METHOCARBAMOL 750 MG: 750 TABLET ORAL at 17:14

## 2025-04-27 RX ADMIN — OXYCODONE HYDROCHLORIDE 5 MG: 5 TABLET ORAL at 17:17

## 2025-04-27 RX ADMIN — QUETIAPINE FUMARATE 300 MG: 100 TABLET ORAL at 20:54

## 2025-04-27 RX ADMIN — METHOCARBAMOL 750 MG: 750 TABLET ORAL at 20:57

## 2025-04-27 RX ADMIN — GABAPENTIN 500 MG: 100 CAPSULE ORAL at 09:52

## 2025-04-27 RX ADMIN — ENOXAPARIN SODIUM 40 MG: 40 INJECTION SUBCUTANEOUS at 09:53

## 2025-04-27 RX ADMIN — OXYCODONE HYDROCHLORIDE 5 MG: 5 TABLET ORAL at 20:59

## 2025-04-27 RX ADMIN — OXYCODONE HYDROCHLORIDE 5 MG: 5 TABLET ORAL at 09:55

## 2025-04-27 RX ADMIN — METHOCARBAMOL 750 MG: 750 TABLET ORAL at 09:52

## 2025-04-27 RX ADMIN — SENNOSIDES 17.2 MG: 8.6 TABLET, FILM COATED ORAL at 17:14

## 2025-04-27 RX ADMIN — ACETAMINOPHEN 975 MG: 325 TABLET, FILM COATED ORAL at 17:14

## 2025-04-27 RX ADMIN — DULOXETINE HYDROCHLORIDE 20 MG: 20 CAPSULE, DELAYED RELEASE ORAL at 09:52

## 2025-04-27 RX ADMIN — GABAPENTIN 500 MG: 100 CAPSULE ORAL at 20:54

## 2025-04-27 RX ADMIN — ACETAMINOPHEN 975 MG: 325 TABLET, FILM COATED ORAL at 09:52

## 2025-04-27 NOTE — PROGRESS NOTES
04/27/25 1000   Pain Assessment   Pain Assessment Tool 0-10   Pain Score No Pain   Restrictions/Precautions   Precautions Bed/chair alarms;Fall Risk;Supervision on toilet/commode;Pain;Other (comment)  (Dizziness, HTN)   Cognition   Overall Cognitive Status WFL   Arousal/Participation Alert;Responsive   Attention Within functional limits   Orientation Level Oriented X4   Memory Within functional limits   Following Commands Follows one step commands without difficulty   Subjective   Subjective He reports no pain because he had pain meds. He reports a HA as 7/10 and dizziness at 6/10. At the end of the session, he reported pain as 5/10, HA 6/10, and dizziness 5/10.   Roll Left and Right   Type of Assistance Needed Independent   Comment flat bed, no rails   Roll Left and Right CARE Score 6   Sit to Lying   Type of Assistance Needed Independent   Comment flat bed, no rails   Sit to Lying CARE Score 6   Lying to Sitting on Side of Bed   Type of Assistance Needed Independent   Comment flat bed, no rails   Lying to Sitting on Side of Bed CARE Score 6   Sit to Stand   Type of Assistance Needed Supervision   Comment no AD   Sit to Stand CARE Score 4   Bed-Chair Transfer   Type of Assistance Needed Incidental touching   Comment gait belt, no AD   Chair/Bed-to-Chair Transfer CARE Score 4   Transfer Bed/Chair/Wheelchair   Limitations Noted In Balance   Stand Pivot Contact Guard   Sit to Stand Supervision   Stand to Sit Supervision   Supine to Sit Independent   Sit to Supine Independent   Walk 10 Feet   Type of Assistance Needed Incidental touching   Walk 10 Feet CARE Score 4   Walk 50 Feet with Two Turns   Type of Assistance Needed Incidental touching   Walk 50 Feet with Two Turns CARE Score 4   Walk 150 Feet   Type of Assistance Needed Incidental touching   Walk 150 Feet CARE Score 4   Ambulation   Primary Mode of Locomotion Prior to Admission Walk   Distance Walked (feet) 150 ft  (x4)   Assist Device   (RW for 150'x1, no AD  for 150'x3)   Gait Pattern Inconsistant Gissel;Slow Gissel;Step through   Limitations Noted In Balance;Endurance;Speed   Provided Assistance with: Direction   Walk Assist Level Contact Guard   Findings CG via gait belt, RW used for 150'x1 to assess tolerance to distance, no RW for other walking and he was still eleno to complete at CG level   Does the patient walk? 2. Yes   Wheel 50 Feet with Two Turns   Reason if not Attempted Activity not applicable   Wheel 50 Feet with Two Turns CARE Score 9   Wheel 150 Feet   Reason if not Attempted Activity not applicable   Wheel 150 Feet CARE Score 9   Therapeutic Interventions   Strengthening STS with tidal tank 2x15   Neuromuscular Re-Education Walking no AD with cues for increased speed, standing with 0-1 UE support to brush his teeth and wash his face   Modalities HP as noted below   Other Manual interventions for trigger point release to bilateral upper traps, bilateral rhomboids, bilateral suboccipitals, and bilateral SCMs   Other Comments   Comments Hot pack to posterior neck while discussing pain neuroscience, dog-training, and then about pain neuroscience again (15 mins). Skin inspection after was unremarkable .   Assessment   Treatment Assessment He used his hands appropriately for transfers and could perform all bed mobility independently. He did stand up without assistance and appeared to lean posteriorly, but he attempted to correct this (CG was provided). He has good balance with the RW and then he has fair balance with no AD. He does reach out for the gudino rails, but he only touches them. He was lying supine in bed and talking about dog-training and he was moving his neck and eyes with no reports of pain or dizziness. Pain was only mentioned x2 in the session, before manual interventions and at the very end of the session. There were no complaints of pain, HE, or dizziness throughout the session or any activity. He did not have any tremor-like movement during  transfers or walking. However, his walking is initially slow and then the speed increases when he is distracted with conversation. Distraction appeared to be the most helpful throughout the session because it seemed as if he had no limitations. He can contine to benefit from graded motor imagery and feedback for positive performance. Continue to encourage him based on objective information from the sessions such as, inform him when things go well to help reinforce that he can perform mobility with no symptoms. He will continue to benefit from skilled PT to maximize his function.   Problem List Decreased endurance;Impaired balance;Decreased mobility;Pain   Barriers to Discharge Inaccessible home environment;Decreased caregiver support   PT Barriers   Physical Impairment Decreased endurance;Impaired balance;Decreased mobility;Pain   Functional Limitation Car transfers;Stair negotiation;Standing;Transfers;Walking   Plan   Treatment/Interventions Functional transfer training;LE strengthening/ROM;Elevations;Therapeutic exercise;Endurance training;Gait training   Progress Progressing toward goals   Discharge Recommendation   Rehab Resource Intensity Level, PT   (Continue to assess)   Equipment Recommended   (Continue to assess)   PT Therapy Minutes   PT Time In 1000   PT Time Out 1130   PT Total Time (minutes) 90   PT Mode of treatment - Individual (minutes) 90   PT Mode of treatment - Concurrent (minutes) 0   PT Mode of treatment - Group (minutes) 0   PT Mode of treatment - Co-treat (minutes) 0   PT Mode of Treatment - Total time(minutes) 90 minutes   PT Cumulative Minutes 552   Therapy Time missed   Time missed? No

## 2025-04-27 NOTE — NURSING NOTE
Pt sleeping soundly on his left side in bed until now when I woke him for medications prior to his therapy session shortly. Pt starting to eat bfast now

## 2025-04-27 NOTE — NURSING NOTE
Pt ambulated with RW out to the entrance of his door to report that his lunch tray was incorrect. No noted deficient with ambulating & redirected to go back in. Staff believes pt is turning off his alarms since it didn't go & pt didn't use the callbell for assistance.

## 2025-04-27 NOTE — ASSESSMENT & PLAN NOTE
Patient presents with chronic headaches for significant amount of time worsening but has been present for years  Imaging negative for an organic source however describes what appears to be a cervicogenic headache with pain in the back of the neck and stiffness radiating up to the suboccipital triangle and across the temporal regions and behind the eye  Pain is anywhere from a 3 or 4 up to a 10/10 averaging 6-7/10  At this time we will try a regimen including gabapentin 300 mg 3 times daily titrating up to 500 mg 3 times daily which was on a previous regimen, Cymbalta 20 mg daily, Robaxin 750 mg every 6 hours, Tylenol 975 mg 3 times daily.  He is also on a regimen of as needed oxycodone 2.5-5 mg every 4 hours as needed.  Due to his history of substance use and request for escalation and the acute on chronic nature of his neck pain and headaches we will avoid escalation of any opioid medications at this time.  Additionally will add Voltaren gel  Physical and Occupational Therapy, desensitization and discharge planning  Improving with manual therapy he is getting done in therapies and overall has been improving.  Patient is trying to self wean a bit from the opioids

## 2025-04-27 NOTE — PROGRESS NOTES
Progress Note - PMR   Name: Amado Chin 39 y.o. male I MRN: [id number]  Unit/Bed#: -01 I Date of Admission: 4/22/2025   Date of Service: 4/26/2025 I Hospital Day: 4     Assessment & Plan  Cervicogenic headache  Patient presents with chronic headaches for significant amount of time worsening but has been present for years  Imaging negative for an organic source however describes what appears to be a cervicogenic headache with pain in the back of the neck and stiffness radiating up to the suboccipital triangle and across the temporal regions and behind the eye  Pain is anywhere from a 3 or 4 up to a 10/10 averaging 6-7/10  At this time we will try a regimen including gabapentin 300 mg 3 times daily titrating up to 500 mg 3 times daily which was on a previous regimen, Cymbalta 20 mg daily, Robaxin 750 mg every 6 hours, Tylenol 975 mg 3 times daily.  He is also on a regimen of as needed oxycodone 2.5-5 mg every 4 hours as needed.  Due to his history of substance use and request for escalation and the acute on chronic nature of his neck pain and headaches we will avoid escalation of any opioid medications at this time.  Additionally will add Voltaren gel  Physical and Occupational Therapy, desensitization and discharge planning  Improving with manual therapy he is getting done in therapies and overall has been improving.  Patient is trying to self wean a bit from the opioids  Neck pain  Chronic neck pain requiring multiple hospitalizations/ED visits - see above  CT imaging showed degenerative changes in cervical spine    4/24: will increase gabapentin to 500mg TID for pain control > tolerating current dose. Can uptitrate to 600 mg TID if needed on Monday   Impaired mobility and activities of daily living  Patient was evaluated by the rehabilitation team MD and an appropriate candidate for acute inpatient rehabilitation program at this time.  The patient will tolerate 3 hours/day 5 to 7 days/week of intensive  physical, occupational in order to obtain goals for community discharge  Due to the patient's functional Compared to their baseline level of function in addition to their ongoing medical needs, the patient would benefit from daily supervision from a rehabilitation physician as well as rehabilitation nursing to implement and adjust the medical as well as functional plan of care in order to meet the patient's goals.    Weakness of both lower extremities  Feels weak in the bilateral lower extremities primarily in the left lower extremity greater than right but appears functional on examination  Some tremoring in the left lower extremity on MMT  Continue with physical and Occupational Therapy  Asthma  Albuterol as needed  Tobacco abuse  Smokes half a pack a day as an outpatient  Not interested in a patch or gum or replacement therapy at this time  Education on smoking and pain provided  Vitamin B12 deficiency  History of B12 deficiency but most recent level within normal range tested back in May 2024  Not currently on supplementation will need to have follow-up levels on next set of routine labs  Anxiety  Patient with mixed history of mood disorder is seen as depression and anxiety as well as schizophrenia throughout the chart.  Had multiple behavioral health inpatient unit stays over the last few years and frequently visits the ER  Currently on Seroquel 300 mg nightly  Continue Atarax as needed for acute anxiety  Continue Cymbalta which he had been on in the past during a prior inpatient behavioral health stay. Increased gabapentin to 500 mg 3 times daily to mimic regimen that he was on at the behavioral health unit   Cervical spinal stenosis  History of some mild cervical spinal stenosis on most recent MRI  Has seen neurosurgery in the past but was discharged from their practice after demanding surgery and threatening staff  Continue to clinically monitor his neuro examination  Polysubstance abuse (HCC)  History of  "methamphetamine use in the past per documentation  Also on Norco chronically in the past as well.  Currently on a regimen of oxycodone  Discussed with patient that we would not escalate his opioid therapies.  Did inquire about IV therapies which was also declined as a form of escalation  Lesion of parotid gland  Noted on MRI of the cervical spine  \"1.2 cm lesion in left deep parotid gland just posterior and slightly medial to left retromandibular vein. 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 Recommend ENT consultation for further evaluation.\"  Hypertension    Patient with elevated blood pressures with some lability some of it likely due to component of pain, anxiety - has required PO hydralazine for the last two nights for SBP >180s  Continue on lisinopril 20 mg daily  Management per internal medicine, will work on anxiety reduction to help with blood pressures  Does not have primary care provider    Psychosis (HCC)      Subjective   39-year-old male with history of anxiety, depression, questionable schizophrenia, substance abuse disorder and tobacco use who presents on 4/15 with intractable neck pain and headache. Imaging unrevealing and was treated for acute on chronic pain     Chief Complaint: f/u ambulatory dysfunction and pain    Interval: Patient seen and evaluated in room with no acute issues overnight.  He is working well with therapy and some of the manual therapies that they are doing/modalities are helping with his overall range of motion of the neck.  He endorses doing some stretching exercises as well.  He also endorses trying to wean a bit on the opioids.  I did review the MAR.  Yesterday and today's taken a bit last 2-3 times per day will continue to monitor. Patient denies fever, chills, nausea, emesis, cough, shortness of breath, diarrhea, or constipation. Sleep was fine, mood stable. Pain is controlled.      Objective :  Temp:  [99.5 °F (37.5 °C)] 99.5 °F (37.5 °C)  HR:  " [79] 79  BP: (132-194)/() 167/101  Resp:  [20] 20  SpO2:  [98 %] 98 %  O2 Device: None (Room air)    Functional Update:  Physical Therapy Occupational Therapy Speech Therapy   Weight Bearing Status: Full Weight Bearing  Transfers: Moderate Assistance  Bed Mobility: Supervision  Amulation Distance (ft): 45 feet  Ambulation: Assist of 2 (mod A x 1 with CF for safety)  Assistive Device for Ambulation: Roller Walker  Wheelchair Mobility Distance:  (N/A)  Number of Stairs: 4  Assistive Device for Stairs: Bilateral Hand Rails  Stair Assistance: Assist of 2 (min to mod A x 1 with 2nd person CGA)  Discharge Recommendations: Home with:  DC Home with:: Family Support, First Floor Setup, Home Physical Therapy, Outpatient Physical Therapy   Eating: Independent  Grooming: Moderate Assistance  Bathing: Maximum Assistance  Bathing: Maximum Assistance  Upper Body Dressing: Supervision  Lower Body Dressing: Minimal Assistance  Toileting: Minimal Assistance  Toilet Transfer: Minimal Assistance  Cognition: Within Defined Limits  Orientation: Person, Place, Time, Situation                 Physical Exam  Vitals reviewed.   Constitutional:       General: He is not in acute distress.  HENT:      Head: Normocephalic and atraumatic.      Right Ear: External ear normal.      Left Ear: External ear normal.      Nose: Nose normal. No rhinorrhea.      Mouth/Throat:      Mouth: Mucous membranes are moist.      Pharynx: Oropharynx is clear.   Eyes:      General: No scleral icterus.  Cardiovascular:      Rate and Rhythm: Normal rate.      Pulses: Normal pulses.   Pulmonary:      Effort: Pulmonary effort is normal. No respiratory distress.   Abdominal:      General: There is no distension.      Palpations: Abdomen is soft.   Musculoskeletal:      Cervical back: Tenderness present.      Right lower leg: No edema.      Left lower leg: No edema.   Skin:     General: Skin is warm and dry.   Neurological:      Mental Status: He is alert and  oriented to person, place, and time.      Comments: MMT not completed today   Psychiatric:         Mood and Affect: Mood normal.         Behavior: Behavior normal.      Comments: Less anxious than prior exams             Scheduled Meds:  Current Facility-Administered Medications   Medication Dose Route Frequency Provider Last Rate    acetaminophen  975 mg Oral Q8H SALAZAR Juarez      albuterol  2 puff Inhalation Q4H PRN Isabel Villarreal, SALAZAR      bisacodyl  10 mg Rectal Daily PRN Carly T Vance, DO      Diclofenac Sodium  2 g Topical 4x Daily Isabel Villarreal, SALAZAR      docusate sodium  100 mg Oral BID Carly T Vance, DO      DULoxetine  20 mg Oral Daily Carly T Vance, DO      enoxaparin  40 mg Subcutaneous Daily Isabel Villarreal, SALAZAR      gabapentin  500 mg Oral TID Carly T Vance, DO      hydrALAZINE  25 mg Oral Q8H PRN Isabel Villarreal, CRNP      hydrOXYzine HCL  25 mg Oral Q6H PRN Carly T Vance, DO      lisinopril  20 mg Oral Daily Isabel Villarreal, EPHRAIMNP      methocarbamol  750 mg Oral Q6H PETER Isabel Villarreal, CRNP      oxyCODONE  2.5 mg Oral Q4H PRN Isabel Villarreal, SALAZAR      Or    oxyCODONE  5 mg Oral Q4H PRN Isabel Villarreal, CRNP      polyethylene glycol  17 g Oral Daily PRN Carly T Vance, DO      QUEtiapine  300 mg Oral HS Isabel Villarreal, EPHRAIMNP      senna  2 tablet Oral Daily With Lunch Carly T Vance, DO      sodium chloride  1 spray Each Nare Q1H PRN Carly T Vance, DO           Lab Results: I have reviewed the following results:  Results from last 7 days   Lab Units 04/21/25  0602   HEMOGLOBIN g/dL 14.5   HEMATOCRIT % 42.3   WBC Thousand/uL 7.98   PLATELETS Thousands/uL 277     Results from last 7 days   Lab Units 04/21/25  0602   BUN mg/dL 22   SODIUM mmol/L 140   POTASSIUM mmol/L 3.7   CHLORIDE mmol/L 108   CREATININE mg/dL 0.87                Fausto Noriega, DO  Physical Medicine and Rehabilitation  [facility]    I have  spent a total time of 35 minutes in caring for this patient on the day of the visit/encounter including Counseling / Coordination of care, Documenting in the medical record, and Communicating with other healthcare professionals .

## 2025-04-27 NOTE — ASSESSMENT & PLAN NOTE
Associated with neck pain  Has had multiple ED visits and hospitalization for chronic neck pain and headaches  He saw NS here at [facility] in 2/2022 and became verbally abusive and threatening to the PA and Dr Johnson and they will no longer see him  He was then seen by NS at [facility] 9/2022 and they did not recommend surgery but referred to pain management  Takes gabapentin and naproxen at home but no opioids.  Cervical spine CT scan without acute abnormality  Cervical/lumbar spine MRI = progression of degenerative changes but no cord compression  No further inpatient neurology workup  Acute pain service saw during his hospital stay  He was recommended to establish an outpatient pain management   Ambulatory referral to comprehensive spine program as well as [facility] spine and pain were placed

## 2025-04-27 NOTE — PLAN OF CARE
Problem: PAIN - ADULT  Goal: Verbalizes/displays adequate comfort level or baseline comfort level  Description: Interventions:- Encourage patient to monitor pain and request assistance- Assess pain using appropriate pain scale- Administer analgesics based on type and severity of pain and evaluate response- Implement non-pharmacological measures as appropriate and evaluate response- Consider cultural and social influences on pain and pain management- Notify physician/advanced practitioner if interventions unsuccessful or patient reports new pain  Outcome: Progressing     Problem: INFECTION - ADULT  Goal: Absence or prevention of progression during hospitalization  Description: INTERVENTIONS:- Assess and monitor for signs and symptoms of infection- Monitor lab/diagnostic results- Monitor all insertion sites, i.e. indwelling lines, tubes, and drains- Monitor endotracheal if appropriate and nasal secretions for changes in amount and color- [place] appropriate cooling/warming therapies per order- Administer medications as ordered- Instruct and encourage patient and family to use good hand hygiene technique- Identify and instruct in appropriate isolation precautions for identified infection/condition  Outcome: Progressing     Problem: SAFETY ADULT  Goal: Patient will remain free of falls  Description: INTERVENTIONS:- Educate patient/family on patient safety including physical limitations- Instruct patient to call for assistance with activity - Consult OT/PT to assist with strengthening/mobility - Keep Call bell within reach- Keep bed low and locked with side rails adjusted as appropriate- Keep care items and personal belongings within reach- Initiate and maintain comfort rounds- Make Fall Risk Sign visible to staff- Offer Toileting every  Hours, in advance of need- Initiate/Maintain alarm- Obtain necessary fall risk management equipment: - Apply yellow socks and bracelet for high fall risk patients- Consider moving  patient to room near nurses station  Outcome: Progressing  Goal: Maintain or return to baseline ADL function  Description: INTERVENTIONS:-  Assess patient's ability to carry out ADLs; assess patient's baseline for ADL function and identify physical deficits which impact ability to perform ADLs (bathing, care of mouth/teeth, toileting, grooming, dressing, etc.)- Assess/evaluate cause of self-care deficits - Assess range of motion- Assess patient's mobility; develop plan if impaired- Assess patient's need for assistive devices and provide as appropriate- Encourage maximum independence but intervene and supervise when necessary- Involve family in performance of ADLs- Assess for home care needs following discharge - Consider OT consult to assist with ADL evaluation and planning for discharge- Provide patient education as appropriate  Outcome: Progressing  Goal: Maintains/Returns to pre admission functional level  Description: INTERVENTIONS:- Perform AM-PAC 6 Click Basic Mobility/ Daily Activity assessment daily.- Set and communicate daily mobility goal to care team and patient/family/caregiver. - Collaborate with rehabilitation services on mobility goals if consulted- Perform Range of Motion  times a day.- Reposition patient every  hours.- Dangle patient  times a day- Stand patient  times a day- Ambulate patient  times a day- Out of bed to chair  times a day - Out of bed for meals  times a day- Out of bed for toileting- Record patient progress and toleration of activity level   Outcome: Progressing     Problem: DISCHARGE PLANNING  Goal: Discharge to home or other facility with appropriate resources  Description: INTERVENTIONS:- Identify barriers to discharge w/patient and caregiver- Arrange for needed discharge resources and transportation as appropriate- Identify discharge learning needs (meds, wound care, etc.)- Arrange for interpretive services to assist at discharge as needed- Refer to Case Management Department for  coordinating discharge planning if the patient needs post-hospital services based on physician/advanced practitioner order or complex needs related to functional status, cognitive ability, or social support system  Outcome: Progressing     Problem: Knowledge Deficit  Goal: Patient/family/caregiver demonstrates understanding of disease process, treatment plan, medications, and discharge instructions  Description: Complete learning assessment and assess knowledge base.Interventions:- Provide teaching at level of understanding- Provide teaching via preferred learning methods  Outcome: Progressing

## 2025-04-27 NOTE — ASSESSMENT & PLAN NOTE
Was seeing [place] [facility] but has not seen them in a while and he hasn't returned their phone calls for followup  Prior to seeing them he was being seen by FP at [facility]

## 2025-04-27 NOTE — NURSING NOTE
While receiving report from nightshift it was reported that pt again came out into the hallway ambulating independently with RW to investigate the control team occurring in room 974 at 2300. No callbelll was used & no alarms reported going off by staff. Pt was redirected by staff to go back into room during this control team alert.

## 2025-04-28 PROCEDURE — 99231 SBSQ HOSP IP/OBS SF/LOW 25: CPT | Performed by: NURSE PRACTITIONER

## 2025-04-28 PROCEDURE — 97530 THERAPEUTIC ACTIVITIES: CPT

## 2025-04-28 PROCEDURE — 97112 NEUROMUSCULAR REEDUCATION: CPT

## 2025-04-28 PROCEDURE — 97116 GAIT TRAINING THERAPY: CPT

## 2025-04-28 PROCEDURE — 99232 SBSQ HOSP IP/OBS MODERATE 35: CPT | Performed by: STUDENT IN AN ORGANIZED HEALTH CARE EDUCATION/TRAINING PROGRAM

## 2025-04-28 RX ORDER — GABAPENTIN 300 MG/1
600 CAPSULE ORAL 3 TIMES DAILY
Status: DISCONTINUED | OUTPATIENT
Start: 2025-04-28 | End: 2025-04-30 | Stop reason: HOSPADM

## 2025-04-28 RX ADMIN — DULOXETINE HYDROCHLORIDE 20 MG: 20 CAPSULE, DELAYED RELEASE ORAL at 08:33

## 2025-04-28 RX ADMIN — DICLOFENAC SODIUM 2 G: 10 GEL TOPICAL at 22:07

## 2025-04-28 RX ADMIN — ACETAMINOPHEN 975 MG: 325 TABLET, FILM COATED ORAL at 13:26

## 2025-04-28 RX ADMIN — METHOCARBAMOL 750 MG: 750 TABLET ORAL at 08:33

## 2025-04-28 RX ADMIN — ACETAMINOPHEN 975 MG: 325 TABLET, FILM COATED ORAL at 03:12

## 2025-04-28 RX ADMIN — OXYCODONE HYDROCHLORIDE 5 MG: 5 TABLET ORAL at 11:02

## 2025-04-28 RX ADMIN — GABAPENTIN 600 MG: 300 CAPSULE ORAL at 18:23

## 2025-04-28 RX ADMIN — QUETIAPINE FUMARATE 300 MG: 100 TABLET ORAL at 22:04

## 2025-04-28 RX ADMIN — GABAPENTIN 500 MG: 100 CAPSULE ORAL at 08:32

## 2025-04-28 RX ADMIN — DOCUSATE SODIUM 100 MG: 100 CAPSULE, LIQUID FILLED ORAL at 08:33

## 2025-04-28 RX ADMIN — METHOCARBAMOL 750 MG: 750 TABLET ORAL at 18:23

## 2025-04-28 RX ADMIN — METHOCARBAMOL 750 MG: 750 TABLET ORAL at 13:26

## 2025-04-28 RX ADMIN — GABAPENTIN 500 MG: 100 CAPSULE ORAL at 13:26

## 2025-04-28 RX ADMIN — ACETAMINOPHEN 975 MG: 325 TABLET, FILM COATED ORAL at 22:04

## 2025-04-28 RX ADMIN — LISINOPRIL 20 MG: 20 TABLET ORAL at 08:33

## 2025-04-28 RX ADMIN — OXYCODONE HYDROCHLORIDE 5 MG: 5 TABLET ORAL at 17:00

## 2025-04-28 NOTE — PROGRESS NOTES
04/28/25 1400   PT Therapy Minutes   PT Time In 1400   PT Time Out 1415   PT Total Time (minutes) 15   PT Mode of treatment - Individual (minutes) 0   PT Mode of treatment - Concurrent (minutes) 0   PT Mode of treatment - Group (minutes) 0   PT Mode of treatment - Co-treat (minutes) 0   PT Mode of Treatment - Total time(minutes) 0 minutes   PT Cumulative Minutes 627   Therapy Time missed   Time missed? Yes   Amount of time missed 15   Reason for time missed Extreme fatigue;Illness   Time(s) multiple attempts made x3

## 2025-04-28 NOTE — PROGRESS NOTES
04/28/25 1000   Pain Assessment   Pain Assessment Tool 0-10   Pain Score 2   Subjective   Subjective pt agreeable to perfrom skilled PT and requested to go outside for PT session   Roll Left and Right   Type of Assistance Needed Independent   Roll Left and Right CARE Score 6   Sit to Lying   Type of Assistance Needed Independent   Sit to Lying CARE Score 6   Lying to Sitting on Side of Bed   Type of Assistance Needed Independent   Lying to Sitting on Side of Bed CARE Score 6   Sit to Stand   Type of Assistance Needed Independent   Sit to Stand CARE Score 6   Bed-Chair Transfer   Type of Assistance Needed Independent   Comment with RW and SPC   Chair/Bed-to-Chair Transfer CARE Score 6   Transfer Bed/Chair/Wheelchair   Adaptive Equipment Roller Walker;Cane   Car Transfer   Type of Assistance Needed Set-up / clean-up   Comment RW   Car Transfer CARE Score 5   Walk 10 Feet   Type of Assistance Needed Independent   Comment SPC/RW   Walk 10 Feet CARE Score 6   Walk 50 Feet with Two Turns   Type of Assistance Needed Independent   Comment SPC /RW   Walk 50 Feet with Two Turns CARE Score 6   Walk 150 Feet   Type of Assistance Needed Supervision;Adaptive equipment   Comment RW SPC   Walk 150 Feet CARE Score 4   Walking 10 Feet on Uneven Surfaces   Type of Assistance Needed Supervision;Adaptive equipment   Comment SPC/RW outside  ramps   Walking 10 Feet on Uneven Surfaces CARE Score 4   Ambulation   Primary Mode of Locomotion Prior to Admission Walk   Distance Walked (feet) 400 ft  (x4 outside amd 150 feet inside)   Assist Device Roller Walker;Cane   Gait Pattern Inconsistant Gissel;Slow Gissel;Step through   Does the patient walk? 2. Yes   Wheel 50 Feet with Two Turns   Reason if not Attempted Activity not applicable   Wheel 50 Feet with Two Turns CARE Score 9   Wheel 150 Feet   Reason if not Attempted Activity not applicable   Wheel 150 Feet CARE Score 9   Wheelchair mobility   Does the patient use a wheelchair? 0. No    Curb or Single Stair   Style negotiated Single stair   Type of Assistance Needed Supervision   Comment single HR SPC   1 Step (Curb) CARE Score 4   4 Steps   Type of Assistance Needed Supervision   Comment single HR SPC   4 Steps CARE Score 4   12 Steps   Type of Assistance Needed Supervision   Comment single HR SPC outside   12 Steps CARE Score 4   Stairs   Type Stairs   # of Steps 12   Assist Devices Single Rail;Cane   Findings outside   Picking Up Object   Type of Assistance Needed Supervision   Comment no reacher needed using SPC   Picking Up Object CARE Score 4   Therapeutic Interventions   Flexibility manual stretch LE hamstring and calfs stretch   Neuromuscular Re-Education ambulation with RW  and SPC outside to increase safety awareness and overall different surfaces and overall up and down ramps , 8 inch curb steps using SPC , pt requested using SPC at home .   Assessment   Treatment Assessment pt focus on being more indep lvl in his room and possible IRP for tomorrow post OT session in PM .Pt more indep level with SPC sridhar walking outside and has following with NMR : ambulation with RW and SPC outside to increase safety awareness and overall different surfaces and overall up and down ramps , 8 inch curb steps using SPC , pt requested using SPC at home . Pt c/o having 10/10 head ach about 1115 am and requested to end AM sessiona dn see if nsging and get pain meds.See pt in PM for HEP next session   Barriers to Discharge Inaccessible home environment;Decreased caregiver support   Plan   Progress Progressing toward goals   PT Therapy Minutes   PT Time In 1000   PT Time Out 1115   PT Total Time (minutes) 75   PT Mode of treatment - Individual (minutes) 75   PT Mode of treatment - Concurrent (minutes) 0   PT Mode of treatment - Group (minutes) 0   PT Mode of treatment - Co-treat (minutes) 0   PT Mode of Treatment - Total time(minutes) 75 minutes   PT Cumulative Minutes 627   Therapy Time missed   Time missed?  No

## 2025-04-28 NOTE — PROGRESS NOTES
Progress Note - PMR   Name: Amado Chin 39 y.o. male I MRN: [id number]  Unit/Bed#: -01 I Date of Admission: 4/22/2025   Date of Service: 4/28/2025 I Hospital Day: 6     Assessment & Plan  Cervicogenic headache  Patient presents with chronic headaches for significant amount of time worsening but has been present for years  Imaging negative for an organic source however describes what appears to be a cervicogenic headache with pain in the back of the neck and stiffness radiating up to the suboccipital triangle and across the temporal regions and behind the eye  Pain is anywhere from a 3 or 4 up to a 10/10 averaging 6-7/10  At this time we will try a regimen including gabapentin 300 mg 3 times daily titrating up to 500 mg 3 times daily which was on a previous regimen, Cymbalta 20 mg daily, Robaxin 750 mg every 6 hours, Tylenol 975 mg 3 times daily.  He is also on a regimen of as needed oxycodone 2.5-5 mg every 4 hours as needed.  Due to his history of substance use and request for escalation and the acute on chronic nature of his neck pain and headaches we will avoid escalation of any opioid medications at this time.  Additionally will add Voltaren gel  Physical and Occupational Therapy, desensitization and discharge planning  Improving with manual therapy he is getting done in therapies and overall has been improving.  Patient is trying to self wean a bit from the opioids  Neck pain  Chronic neck pain requiring multiple hospitalizations/ED visits - see above  CT imaging showed degenerative changes in cervical spine    4/24: will increase gabapentin to 500mg TID for pain control > tolerating current dose. Can uptitrate to 600 mg TID if needed on Monday   Impaired mobility and activities of daily living  Patient was evaluated by the rehabilitation team MD and an appropriate candidate for acute inpatient rehabilitation program at this time.  The patient will tolerate 3 hours/day 5 to 7 days/week of intensive  physical, occupational in order to obtain goals for community discharge  Due to the patient's functional Compared to their baseline level of function in addition to their ongoing medical needs, the patient would benefit from daily supervision from a rehabilitation physician as well as rehabilitation nursing to implement and adjust the medical as well as functional plan of care in order to meet the patient's goals.    Weakness of both lower extremities  Feels weak in the bilateral lower extremities primarily in the left lower extremity greater than right but appears functional on examination  Some tremoring in the left lower extremity on MMT  Continue with physical and Occupational Therapy  Asthma  Albuterol as needed  Tobacco abuse  Smokes half a pack a day as an outpatient  Not interested in a patch or gum or replacement therapy at this time  Education on smoking and pain provided  Vitamin B12 deficiency  History of B12 deficiency but most recent level within normal range tested back in May 2024  Not currently on supplementation will need to have follow-up levels on next set of routine labs  Anxiety  Patient with mixed history of mood disorder is seen as depression and anxiety as well as schizophrenia throughout the chart.  Had multiple behavioral health inpatient unit stays over the last few years and frequently visits the ER  Currently on Seroquel 300 mg nightly  Continue Atarax as needed for acute anxiety  Continue Cymbalta which he had been on in the past during a prior inpatient behavioral health stay. Increased gabapentin to 600 mg 3 times dailyon 4/28  Cervical spinal stenosis  History of some mild cervical spinal stenosis on most recent MRI  Has seen neurosurgery in the past but was discharged from their practice after demanding surgery and threatening staff  Continue to clinically monitor his neuro examination  Polysubstance abuse (HCC)  History of methamphetamine use in the past per documentation  Also on  "Norco chronically in the past as well.  Currently on a regimen of oxycodone  Discussed with patient that we would not escalate his opioid therapies.  Did inquire about IV therapies which was also declined as a form of escalation  Lesion of parotid gland  Noted on MRI of the cervical spine  \"1.2 cm lesion in left deep parotid gland just posterior and slightly medial to left retromandibular vein. 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 Recommend ENT consultation for further evaluation.\"  Hypertension    Patient with elevated blood pressures with some lability some of it likely due to component of pain, anxiety   Continue on lisinopril  Management per internal medicine, will work on anxiety reduction to help with blood pressures  Does not have primary care provider    Psychosis (HCC)      Subjective   39-year-old male with history of anxiety, depression, questionable schizophrenia, substance abuse disorder and tobacco use who presents on 4/15 with intractable neck pain and headache. Imaging unrevealing and was treated for acute on chronic pain     Chief Complaint: f/u ambulatory dysfunction and pain    Interval: Patient seen and evaluated in room.  Has been doing well and has improved with his overall pain and stiffness mostly with the medications and manual therapies.  I did review the MAR as he said that he was trying to wean from the Oxy is much as he could.  He has been consistently taking approximately 3 times a day over the last few days I will continue promoting overall weaning and taking the lower dose of the oxycodone x 2. Patient denies fever, chills, nausea, emesis, cough, shortness of breath, diarrhea, or constipation. Sleep was fine, mood stable. Pain is controlled.      Objective :  Temp:  [97.7 °F (36.5 °C)-98.6 °F (37 °C)] 98.3 °F (36.8 °C)  HR:  [78-84] 78  BP: (112-140)/(70-84) 112/70  Resp:  [16-18] 18  SpO2:  [94 %-96 %] 96 %  O2 Device: None (Room air)    Functional " Update:  Physical Therapy Occupational Therapy Speech Therapy   Weight Bearing Status: Full Weight Bearing  Transfers: Moderate Assistance  Bed Mobility: Supervision  Amulation Distance (ft): 45 feet  Ambulation: Assist of 2 (mod A x 1 with CF for safety)  Assistive Device for Ambulation: Roller Walker  Wheelchair Mobility Distance:  (N/A)  Number of Stairs: 4  Assistive Device for Stairs: Bilateral Hand Rails  Stair Assistance: Assist of 2 (min to mod A x 1 with 2nd person CGA)  Discharge Recommendations: Home with:  DC Home with:: Family Support, First Floor Setup, Home Physical Therapy, Outpatient Physical Therapy   Eating: Independent  Grooming: Moderate Assistance  Bathing: Maximum Assistance  Bathing: Maximum Assistance  Upper Body Dressing: Supervision  Lower Body Dressing: Minimal Assistance  Toileting: Minimal Assistance  Toilet Transfer: Minimal Assistance  Cognition: Within Defined Limits  Orientation: Person, Place, Time, Situation               Physical Exam  Vitals reviewed.   Constitutional:       General: He is not in acute distress.  HENT:      Head: Normocephalic and atraumatic.      Right Ear: External ear normal.      Left Ear: External ear normal.      Nose: Nose normal. No rhinorrhea.      Mouth/Throat:      Mouth: Mucous membranes are moist.      Pharynx: Oropharynx is clear.   Eyes:      General: No scleral icterus.  Cardiovascular:      Rate and Rhythm: Normal rate.      Pulses: Normal pulses.   Pulmonary:      Effort: Pulmonary effort is normal. No respiratory distress.   Abdominal:      General: There is no distension.      Palpations: Abdomen is soft.   Musculoskeletal:      Cervical back: Tenderness present.      Right lower leg: No edema.      Left lower leg: No edema.   Skin:     General: Skin is warm and dry.   Neurological:      Mental Status: He is alert and oriented to person, place, and time.   Psychiatric:         Mood and Affect: Mood normal.         Behavior: Behavior normal.            Scheduled Meds:  Current Facility-Administered Medications   Medication Dose Route Frequency Provider Last Rate    acetaminophen  975 mg Oral Q8H Isabel Villarreal, SALAZAR      albuterol  2 puff Inhalation Q4H PRN Isabel Villarreal, EPHRAIMNP      bisacodyl  10 mg Rectal Daily PRN Carly T Vance, DO      Diclofenac Sodium  2 g Topical 4x Daily Isabel Villarreal, EPHRAIMNP      docusate sodium  100 mg Oral BID Carly T Vance, DO      DULoxetine  20 mg Oral Daily Carly T Vance, DO      enoxaparin  40 mg Subcutaneous Daily Isabel Villarreal, EPHRAIMNP      gabapentin  500 mg Oral TID Carly T Vance, DO      hydrALAZINE  25 mg Oral Q8H PRN Isabel Villarreal, CRNP      hydrOXYzine HCL  25 mg Oral Q6H PRN Carly T Vance, DO      lisinopril  20 mg Oral Daily Isabel Villarreal, CRNP      methocarbamol  750 mg Oral Q6H PETER Isabel Villarreal, CRNP      oxyCODONE  2.5 mg Oral Q4H PRN Isabel Villarreal, EPHRAIMNP      Or    oxyCODONE  5 mg Oral Q4H PRN Isabel Villarreal, SALAZAR      polyethylene glycol  17 g Oral Daily PRN Carly T Vance, DO      QUEtiapine  300 mg Oral HS Isabel Villarreal, CRNP      senna  2 tablet Oral Daily With Lunch Carly T Vance, DO      sodium chloride  1 spray Each Nare Q1H PRN Carly T Vance, DO           Lab Results: I have reviewed the following results:                     Fausto Noriega, DO  Physical Medicine and Rehabilitation  [facility]    I have spent a total time of 35 minutes in caring for this patient on the day of the visit/encounter including Counseling / Coordination of care, Documenting in the medical record, and Communicating with other healthcare professionals .

## 2025-04-28 NOTE — ASSESSMENT & PLAN NOTE
Was seeing [facility] but has not seen them in a while and he hasn't returned their phone calls for followup  Prior to seeing them he was being seen by FP at [facility]

## 2025-04-28 NOTE — ASSESSMENT & PLAN NOTE
Patient with mixed history of mood disorder is seen as depression and anxiety as well as schizophrenia throughout the chart.  Had multiple behavioral health inpatient unit stays over the last few years and frequently visits the ER  Currently on Seroquel 300 mg nightly  Continue Atarax as needed for acute anxiety  Continue Cymbalta which he had been on in the past during a prior inpatient behavioral health stay. Increased gabapentin to 600 mg 3 times dailyon 4/28

## 2025-04-28 NOTE — ASSESSMENT & PLAN NOTE
On no meds at home but was placed on Lisinopril 10 mg qd while in hospital which was increased to 20mg 4/25/25 due to elevated BP.  Had been on Norvasc in the past but stopped it  Has prn PO Hydralazine for  or greater  BP is very labile 2/2 becoming upset frequently but seems to have stabilized  No changes today 4/28/25

## 2025-04-28 NOTE — PROGRESS NOTES
Progress Note - Hospitalist   Name: Amado Chin 39 y.o. male I MRN: [id number]  Unit/Bed#: -01 I Date of Admission: 4/22/2025   Date of Service: 4/28/2025 I Hospital Day: 6    Assessment & Plan  Cervicogenic headache  Secondary to neck pain  S/p course of prednisone per Neurology  Pain management per PMR  Cervical spinal stenosis  Associated with neck pain  Has had multiple ED visits and hospitalization for chronic neck pain and headaches  He saw NS here at [facility] in 2/2022 and became verbally abusive and threatening to the PA and Dr Johnson and they will no longer see him  He was then seen by NS at [facility] 9/2022 and they did not recommend surgery but referred to pain management  Takes gabapentin and naproxen at home but no opioids.  Cervical spine CT scan without acute abnormality  Cervical/lumbar spine MRI = progression of degenerative changes but no cord compression  No further inpatient neurology workup  Acute pain service saw during his hospital stay  He was recommended to establish an outpatient pain management   Ambulatory referral to comprehensive spine program as well as [facility] and pain were placed  Hypertension  On no meds at home but was placed on Lisinopril 10 mg qd while in hospital which was increased to 20mg 4/25/25 due to elevated BP.  Had been on Norvasc in the past but stopped it  Has prn PO Hydralazine for  or greater  BP is very labile 2/2 becoming upset frequently but seems to have stabilized  No changes today 4/28/25    Asthma  Stable w/o exacerbation  Continue prn Albuterol inhaler  Tobacco abuse  Smokes 1/2 ppd  Refuses patch so discontinued it on 4/24/25  Anxiety  Seroquel 300mg qhs  Per PMR  Polysubstance abuse (HCC)  Methamphetamine use in past  In hospital staff found a marijuana vape pen  Lesion of parotid gland  Found incidentally = 1.2 cm  See ENT as OP  Does not have primary care provider  Was seeing [facility] but has not seen them in a while and he hasn't  "returned their phone calls for followup  Prior to seeing them he was being seen by FP at [facility]  Psychosis (HCC)  History of psychosis previous inpatient psychiatric admission on 7/2024.  Other hx states bipolar disorder    The above assessment and plan was reviewed and updated as determined by my evaluation of the patient on 4/28/2025.    History of Present Illness   Patient seen and examined. Patients overnight issues or events were reviewed with nursing staff. New or overnight issues include the following:   No new or overnight issues.  Offers no complaints    Review of Systems   All other systems reviewed and are negative.      Objective :  Temp:  [97.7 °F (36.5 °C)-98.6 °F (37 °C)] 97.7 °F (36.5 °C)  HR:  [80-84] 84  BP: (116-140)/(72-84) 120/84  Resp:  [16-18] 16  SpO2:  [94 %-95 %] 95 %  O2 Device: None (Room air)    Invasive Devices       None                   Physical Exam  General Appearance: no distress, non toxic appearing  HEENT: PERRLA, conjuctiva normal; oropharynx clear; mucous membranes moist   Lungs: CTA, normal respiratory effort, no retractions, expiratory effort normal  CV: regular rate and rhythm; no rubs/murmurs/gallops, PMI normal   ABD: soft; ND/NT; +BS  EXT: no edema  Skin: normal turgor, normal texture  Psych: affect normal, mood normal  Neuro: AAO        The above physical exam was reviewed and updated as determined by my evaluation of the patient on 4/28/2025.      Lab Results: I have reviewed the following results:            Invalid input(s): \"LABGLOM\", \"CMP\"                Imaging Results Review: No pertinent imaging studies reviewed.  Other Study Results Review: No additional pertinent studies reviewed.    Review of Scheduled Meds: Medications  reviewed and reconciled as needed  Current Facility-Administered Medications   Medication Dose Route Frequency Provider Last Rate    acetaminophen  975 mg Oral Q8H SALAAZR Juarez      albuterol  2 puff Inhalation Q4H PRN Isabel " Erendira Villarreal, SALAZAR      bisacodyl  10 mg Rectal Daily PRN Carly T Vance, DO      Diclofenac Sodium  2 g Topical 4x Daily SALAZAR Juarez      docusate sodium  100 mg Oral BID Carly T Vance, DO      DULoxetine  20 mg Oral Daily Carly T Vance, DO      enoxaparin  40 mg Subcutaneous Daily Isabel Villarreal, SALAZAR      gabapentin  500 mg Oral TID Carly T Vance, DO      hydrALAZINE  25 mg Oral Q8H PRN Isabel Villarreal, EPHRAIMNP      hydrOXYzine HCL  25 mg Oral Q6H PRN Carly T Vance, DO      lisinopril  20 mg Oral Daily Isabel Villarreal, SALAZAR      methocarbamol  750 mg Oral Q6H PETER Isabel Villarreal, SALAZAR      oxyCODONE  2.5 mg Oral Q4H PRN Isabel Villarreal, SALAZAR      Or    oxyCODONE  5 mg Oral Q4H PRN Isabel Villarreal, SALAZAR      polyethylene glycol  17 g Oral Daily PRN Carly T Vance, DO      QUEtiapine  300 mg Oral HS Isabel Villarreal, SALAZAR      senna  2 tablet Oral Daily With Lunch Carly T Vance, DO      sodium chloride  1 spray Each Nare Q1H PRN Carly T Vance, DO         VTE Pharmacologic Prophylaxis: Lovenox  Code Status: Level 1 - Full Code  Current Length of Stay: 6 day(s)    Administrative Statements     ** Please Note:  voice to text software may have been used in the creation of this document. Although proof errors in transcription or interpretation are a potential of such software**

## 2025-04-28 NOTE — PROGRESS NOTES
"   04/28/25 1300   Pain Assessment   Pain Assessment Tool 0-10   Pain Score 8   Pain Location/Orientation Location: Head   Hospital Pain Intervention(s)   (RN Christina provided due pain medications, pt NOT able to get tylenol nor oxycodone at this time.)   Therapy Time missed   Time missed? Yes   Amount of time missed 90   Reason for time missed Extreme fatigue  (fatigue and headache)   Time(s) multiple attempts made   (pt refusing therapist re-entry, he keeps door closed. Instructed pt that if he feels up to therapy this afternoon to ring bell and session to be completed then. Pt agreeable.)     Amado refusing PM OT this date, citing 8/10 HA pain and that pain is too great for him to trial OOB or discuss DC planning. Reports, \"I just came in from outside\" reminded pt that this was about 2hrs ago and he has had time to eat lunch and rest since then. Reminded pt that primary OT assisted him w/ coming up w/ a to-do list to prepare for Wednesday DC. Pt nonchalant regarding this stating, \"I've been working at it.\" OT asked pt if he knows where he will stay upon DC (the number one item on his to-do list) pt states, \"I'm getting close. My friend is going to take me to a place. It'll be good.\" Confirmed friend he is referring to is [place], but pt offers no other details regarding DC location. Potential plan for this date was to assess for IRP w/ SPC or RW. Pt declining OOB for assessment 2' HA pain. Pt did offer that his preferred mobility device is SPC rather than RW.   "

## 2025-04-28 NOTE — DISCHARGE INSTR - AVS FIRST PAGE
DISCHARGE INSTRUCTIONS: [facility] ACUTE REHABILITATION [place]    Bring these instructions with you to your Outpatient Physician appointments so they can order and follow-up any additional lab work or imaging recommended at time of discharge.    Resume follow-up with all your prior providers that you have established care prior to this hospitalization.  Discuss with primary care physician (PCP) if you have additional questions.     It  is you or your caregivers responsibility to obtain follow-up MEDICATION REFILLS  As indicated through your Primary Care Physician (PCP) and other outpatient specialty provider(s) after discharge.  Please follow-up with your PCP as soon as possible after discharge to set-up follow-up management and when appropriate refills.      You remain a fall and injury risk which could be severe.  Your risk of fall has decreased however since admission to acute rehab.  Caregiver training has been completed with our staff.  Appropriate supervision +/- assistance as instructed during your rehab course is recommended to decrease risk of fall and injury.    Continue skilled therapy as discussed after discharge to further decrease this risk    If you (or your health care proxy) have any questions or concerns regarding your acute rehabilitation stay including issues with medications, rehabilitation, and follow-up plan, please call:          [facility] Acute Rehabilitation Unit in [place] at [phone] or [phone].      Should you develop fevers, chills, new weakness, changes in sensation, difficulty speaking, facial weakness, confusion, shortness of breath, chest pain, or other concerning symptoms please call 911 and/or obtain transportation to nearest ER immediately.    Should you develop worsening pain, swelling, or drainage notify your surgeon right away or obtain transportation to nearest ER for evaluation.    Should you develop feelings of significant hopelessness,  "severe depression, severe anxiety, or suicide you should call 911 or obtain transportation to nearest ER.    24-7 [employer] suicide and crisis lifeline call \"259\"  National Suicide Prevention Lifeline is 1-772.395.5568 and is available 24 hrs/7 days a week.   [facility] Crisis [phone] is available 24 hrs/7 days for mental health  [facility] Crisis [phone] is available 24 hrs/7 days for mental health   [facility] Crisis [phone]    PHYSICIANS to see:  Please see your doctors listed in the follow up providers section of your discharge paperwork, and take the discharge paperwork with you to your appointments.    Due to the following you are at increased risk of skin breakdown/wounds/worsening wounds:  Impaired mobility    Buttocks/Sacrum  Turn as full as possible off sacrum/buttocks every 2 hours  Weight shift every 10-15 minutes while in chair  Monitor skin for increased breakdown which you are at risk of and notify nursing, PCP, or other physician providers should this occur right away    Heels/bony edges of feet  Float heels off edge of pillow for added pressure relief.  Monitor heels and bony protuberances and notify physician or nursing for any increased redness, bogginess, or breakdown      WEIGHTBEARING/ACTIVITY PRECAUTIONS to follow:  Weightbearing as tolerated.    Driving restrictions:    You should NOT operate a motor vehicle while under the influence of an opiate medication, muscle relaxant, or other sedative.  Doing so may lead to an accident resulting in serious injury or death to yourself or others.  You have agreed to avoid driving when under the influence of this medication.      Work restrictions:  You should NOT return to work (if working) until cleared by an outpatient physician.    You should not operate heavy machinery (if applicable) until cleared by an outpatient physician.      Alcohol restrictions:  You are recommended to not drink " alcohol at this time unless cleared by an outpatient physician.     Smoking restrictions:  You are recommended to not smoke nicotine.  Smoking increases your risk of heart attack, stroke, emphysema/COPD, and lung cancer.      Cannabis restrictions:  You are recommended to not smoke/ingest/consume/use cannabis/marijuana at this time unless cleared by an outpatient physician.    Illicit drug use restrictions:   You should not use cocaine, crack, speed/methamphetamine, heroin, LSD, ecstasy or other illicit substances as they can increase your risk of injury and medical complication which could be severe and lead to sub-optimal functional recovery.    MEDICATIONS:  Please see a full list of your medications outlined in the After Visit Summary that is attached to these Discharge Instructions.  Please note changes may have been made to your medications please refer to your discharge paperwork for your current medications and take this list with you to all your doctors appointments for your doctors to review.  Please do not resume a home medication unless the medication reconciliation sheet indicates to do so, please do not assume that a medication that you were given a prescription for is the same as a medication you have at home based on both medications having the same name as dosages and frequency may have changed.      Unless specifically noted in your medication list provided to you in your discharge paper work do not resume prior vitamins, minerals, or supplements you may have been taking prior to your hospitalization unless instructed by an outpatient physician in the future.  Discuss with your primary care at next visit if applicable.      Acetaminophen (Tylenol) Dosing Warning:    You may have up to 3000 mg of acetaminophen (Tylenol) from all sources spread out over a 24 hours period.  Do not have more than that, as this can increase your risk of liver injury which can be serious.      NSAID Warning:  Please  Limit NSAID (including but not limited to advil, aleve, motrin, naproxen, ibuprofen, mobic, meloxicam, diclofenac etc) medications as NSAID medications may increase your risk of bleeding (which can be life-threatening), stroke, worsening kidney disease, heart attack, developing/worsening GI ulcers, worsening heart failure, worsening liver (cirrhosis) disease, potentially delay bone healing.        Sedating Medications with increased risk of complications:    Your goal will be to wean off of opiate medications    There are risks associated with opioid medications, including dependence, addiction and tolerance. The patient understands and agrees to use these medications only as prescribed. Potential side effects of the medications include, but are not limited to, constipation, drowsiness, addiction, impaired judgment and risk of fatal overdose if not taken as prescribed. You should not drive after taking this medication.  The patient was warned against driving while taking sedation medications.  Sharing medications is a felony. At this point in time, the patient is showing no signs of addiction, abuse, diversion or suicidal ideation.    Oxycodone (opiate) pain medication has been used to help your acute pain.  You tolerated this medication adequately during your recent hospital stay.       You will be given a limited supply of opiate pain medications on discharge; should you require additional refills/medications, your PCP and/or surgeon may prescribe at his/her discretion.   This can be quite helpful particularly for severe acute pain.      There are risks associated with opioid medications. They can increase your risk of falling, injury, and breathing difficulties which can be severe and even life-threatening.  Note it is advisable to limit use of opiate pain medications as they can become habit forming and lead to addiction.  Other potential side effects of the medication include, but are not limited to,  constipation, drowsiness, impaired judgment and risk of fatal overdose if not taken as prescribed. You should not drive while taking this medication  The patient was warned against driving while taking sedation medications.  Sharing medications is a felony. At this point in time, the patient is showing no signs of addiction, abuse, diversion or suicidal ideation.  The patient (you/or relevant caregiver) understands and agrees to use this medication only as prescribed.    Methocarbamol (Robaxin) pain/muscle spasm medication has been used to help your acute pain and spasms.  You tolerated this medication adequately during your recent hospital stay.     - Do not take with alcohol (or marijuana/cannabis) while on this medication as this can cause increased confusion, breathing problems, falls, and severe injury.  - Follow-up with your primary care physician      You will be given a very limited supply of both opiate pain and muscle relaxant medications both of which can increase your risk of fall and severe and even life-threatening injury.  Taking both medications together further increase your risk of fall and even life-threatening injury as well as respiratory depression (slowing and stopping of breathing).  Physicians at times will carefully use these medications in combination but this must be done with close oversight.  You have been carefully monitored during your rehab course on these medications without signs of increased confusion, respiratory depression, or worsening balance.  Nevertheless, this risk remains.  Should you develop confusion, difficulty staying awake, imbalance or other concerning symptoms do NOT take these medications and notify your physician right away or obtain transportation to nearest emergency room.  You have been discussed risks and benefits of these medications and at this time have agreed to risks associated with these medications.      Unless specifically discussed with physician,  please do not combine any muscle relaxants (including but not limited to zanaflex, flexaril, soma, robaxin, etc) pain medications (including but not limited to tramadol, vicodin, norco, percocet, oxycodone, oxycontin, morphine, hydropmorphone, oxymorphone, fentanyl, hydrocodone, etc)  or sedatives/sleep aids/anti-anxiety medications (including but not limited to ambien, trazodone, valium, xanax, klonipin, ativan, lorazepam, restoril, temazepam etc) that you may have been prescribed prior to admission with the pain medication you are being prescribed upon discharge from the rehab unit (unless listed to do so on your medication reconciliation in your discharge paperwork) .     Note:  It is advisable to limit the use of pain medications (including but not limited to tramadol, vicodin, norco, percocet, oxycodone, oxycontin, morphine, hydropmorphone, oxymorphone, fentanyl, hydrocodone, etc) and limit certain sedatives medications (including but not limited to ambien, trazodone, valium, xanax, klonipin, ativan, lorazepam, restoril, temazepam etc), muscle relaxants (including but not limited to zanaflex, flexaril, soma, robaxin, etc); unless listed to do so on your medication reconciliation in your discharge paperwork, as they may become habit forming and lead to additiction. Some of these medications can be particularly important however to take.  You should not change how you take a prescribed medication unless done so in coordination with a physician.      Gabapentin has been used to help pain.  You tolerated this medication adequately during your recent hospital stay.     - Take 600 mg 3 times per day.  - Do not stop this medication abruptly as this can cause seizures.  - If stopping medication I would decrease by 300mg (1 capsule) every 2-3 days  - Do not take with alcohol (or marijuana/cannabis) while on this medication as this can cause increased confusion, breathing problems, falls, and severe injury.  - This  medication can increase risk of confusion, fall, and injury although you did tolerate adequately during rehab course.  - Follow-up with your primary care physician    Psychotropic Medications (Medications intended to improve mental, cognitive, and functional health)  You were evaluated recently and found to have signs and symptoms of depression anxiety labile (unstable) mood insomnia (impaired sleep) that can negatively impact your function and quality of life.      You were continued on your home Seroquel 300 mg at nighttime as well as the Cymbalta which was started during the day     You have functionally improved significantly and appear to be doing better.  Treatment team (rehab physician, internal medicine team, skilled therapists, nursing, and social work) feel you are adequately appropriate for discharge.  You nevertheless remain at risk for fall, injury, and additional medical and physical complications in the future.  Obtain transportation to nearest hospital from family/friends or call 911 for any concerning new symptoms.        Antidepressants and suicide risk  Prior to beginning of treatment new psychotropic medications risks and benefits and possible side effects including risk of suicidality related to treatment with antidepressants were reviewed with the patient. The patient verbalized understanding and agreement for treatment.       MEDICAL MANAGEMENT AT HOME specific to you:    Followup with ENT for parotid lesion.  A referral was sent for them to call to set up an appointment      For pain - try to use over the counter topicals (creams/gels) as discussed or acetaminophen 1-2 regular or extra-strength Tylenol up to 2-3 times per day.  Could consider heat or ice as well maximum 20 minutes at a time.  Do not use heat or ice if using topicals at the same time.  Notify primary care and/or orthopedics for poorly controlled pain for additional recommendations.          Please note a summary of your  hospital stay with relevant information for your doctors will try to be sent to them.  Please confirm with your doctors at your follow up visits that they have received this summary and have them contact [facility] Medical Records if they have not received them along with any other medical records they may require.     [facility] Phone Number:  [phone]

## 2025-04-28 NOTE — ASSESSMENT & PLAN NOTE
Patient with elevated blood pressures with some lability some of it likely due to component of pain, anxiety   Continue on lisinopril  Management per internal medicine, will work on anxiety reduction to help with blood pressures

## 2025-04-28 NOTE — ASSESSMENT & PLAN NOTE
Associated with neck pain  Has had multiple ED visits and hospitalization for chronic neck pain and headaches  He saw NS here at [facility] in 2/2022 and became verbally abusive and threatening to the PA and Dr Johnson and they will no longer see him  He was then seen by NS at [facility] 9/2022 and they did not recommend surgery but referred to pain management  Takes gabapentin and naproxen at home but no opioids.  Cervical spine CT scan without acute abnormality  Cervical/lumbar spine MRI = progression of degenerative changes but no cord compression  No further inpatient neurology workup  Acute pain service saw during his hospital stay  He was recommended to establish an outpatient pain management   Ambulatory referral to comprehensive spine program as well as [facility] and pain were placed

## 2025-04-29 LAB
ANION GAP SERPL CALCULATED.3IONS-SCNC: 5 MMOL/L (ref 4–13)
BUN SERPL-MCNC: 20 MG/DL (ref 5–25)
CALCIUM SERPL-MCNC: 9.1 MG/DL (ref 8.4–10.2)
CHLORIDE SERPL-SCNC: 106 MMOL/L (ref 96–108)
CO2 SERPL-SCNC: 30 MMOL/L (ref 21–32)
CREAT SERPL-MCNC: 1.03 MG/DL (ref 0.6–1.3)
GFR SERPL CREATININE-BSD FRML MDRD: 89 ML/MIN/1.73SQ M
GLUCOSE SERPL-MCNC: 105 MG/DL (ref 65–140)
POTASSIUM SERPL-SCNC: 4.1 MMOL/L (ref 3.5–5.3)
SODIUM SERPL-SCNC: 141 MMOL/L (ref 135–147)

## 2025-04-29 PROCEDURE — 99231 SBSQ HOSP IP/OBS SF/LOW 25: CPT | Performed by: NURSE PRACTITIONER

## 2025-04-29 PROCEDURE — 97530 THERAPEUTIC ACTIVITIES: CPT

## 2025-04-29 PROCEDURE — 80048 BASIC METABOLIC PNL TOTAL CA: CPT | Performed by: NURSE PRACTITIONER

## 2025-04-29 PROCEDURE — 97110 THERAPEUTIC EXERCISES: CPT

## 2025-04-29 PROCEDURE — 99233 SBSQ HOSP IP/OBS HIGH 50: CPT | Performed by: STUDENT IN AN ORGANIZED HEALTH CARE EDUCATION/TRAINING PROGRAM

## 2025-04-29 PROCEDURE — 97140 MANUAL THERAPY 1/> REGIONS: CPT

## 2025-04-29 RX ORDER — METHOCARBAMOL 750 MG/1
750 TABLET, FILM COATED ORAL 4 TIMES DAILY PRN
Qty: 120 TABLET | Refills: 0 | Status: SHIPPED | OUTPATIENT
Start: 2025-04-29

## 2025-04-29 RX ORDER — GABAPENTIN 300 MG/1
600 CAPSULE ORAL 3 TIMES DAILY
Qty: 180 CAPSULE | Refills: 0 | Status: SHIPPED | OUTPATIENT
Start: 2025-04-29

## 2025-04-29 RX ORDER — OXYCODONE HYDROCHLORIDE 5 MG/1
5 TABLET ORAL EVERY 6 HOURS PRN
Qty: 21 TABLET | Refills: 0 | Status: SHIPPED | OUTPATIENT
Start: 2025-04-29 | End: 2025-05-09

## 2025-04-29 RX ORDER — DULOXETIN HYDROCHLORIDE 20 MG/1
20 CAPSULE, DELAYED RELEASE ORAL DAILY
Qty: 30 CAPSULE | Refills: 0 | Status: SHIPPED | OUTPATIENT
Start: 2025-04-30

## 2025-04-29 RX ORDER — ACETAMINOPHEN 325 MG/1
650 TABLET ORAL EVERY 6 HOURS PRN
Start: 2025-04-29

## 2025-04-29 RX ORDER — POLYETHYLENE GLYCOL 3350 17 G/17G
17 POWDER, FOR SOLUTION ORAL DAILY PRN
Start: 2025-04-29

## 2025-04-29 RX ORDER — QUETIAPINE FUMARATE 300 MG/1
300 TABLET, FILM COATED ORAL
Qty: 30 TABLET | Refills: 0 | Status: SHIPPED | OUTPATIENT
Start: 2025-04-29

## 2025-04-29 RX ADMIN — ENOXAPARIN SODIUM 40 MG: 40 INJECTION SUBCUTANEOUS at 08:15

## 2025-04-29 RX ADMIN — ACETAMINOPHEN 975 MG: 325 TABLET, FILM COATED ORAL at 22:15

## 2025-04-29 RX ADMIN — DULOXETINE HYDROCHLORIDE 20 MG: 20 CAPSULE, DELAYED RELEASE ORAL at 08:15

## 2025-04-29 RX ADMIN — QUETIAPINE FUMARATE 300 MG: 100 TABLET ORAL at 22:15

## 2025-04-29 RX ADMIN — OXYCODONE HYDROCHLORIDE 5 MG: 5 TABLET ORAL at 22:15

## 2025-04-29 RX ADMIN — METHOCARBAMOL 750 MG: 750 TABLET ORAL at 06:59

## 2025-04-29 RX ADMIN — GABAPENTIN 600 MG: 300 CAPSULE ORAL at 07:00

## 2025-04-29 RX ADMIN — LISINOPRIL 20 MG: 20 TABLET ORAL at 08:19

## 2025-04-29 RX ADMIN — METHOCARBAMOL 750 MG: 750 TABLET ORAL at 20:30

## 2025-04-29 RX ADMIN — METHOCARBAMOL 750 MG: 750 TABLET ORAL at 01:05

## 2025-04-29 RX ADMIN — GABAPENTIN 600 MG: 300 CAPSULE ORAL at 20:30

## 2025-04-29 RX ADMIN — METHOCARBAMOL 750 MG: 750 TABLET ORAL at 14:53

## 2025-04-29 RX ADMIN — DOCUSATE SODIUM 100 MG: 100 CAPSULE, LIQUID FILLED ORAL at 08:16

## 2025-04-29 RX ADMIN — ACETAMINOPHEN 975 MG: 325 TABLET, FILM COATED ORAL at 06:59

## 2025-04-29 RX ADMIN — ACETAMINOPHEN 975 MG: 325 TABLET, FILM COATED ORAL at 14:53

## 2025-04-29 RX ADMIN — GABAPENTIN 600 MG: 300 CAPSULE ORAL at 14:53

## 2025-04-29 NOTE — ASSESSMENT & PLAN NOTE
Was seeing [facility] but has not seen them in a while and he hasn't returned their phone calls for followup.  Will send him back to them after discharge  Prior to seeing them he was being seen by FP at [facility]

## 2025-04-29 NOTE — PROGRESS NOTES
Progress Note - Hospitalist   Name: Amado Chin 39 y.o. male I MRN: [id number]  Unit/Bed#: -01 I Date of Admission: 4/22/2025   Date of Service: 4/29/2025 I Hospital Day: 7    Assessment & Plan  Cervicogenic headache  Secondary to neck pain  S/p course of prednisone per Neurology  Pain management per PMR  Cervical spinal stenosis  Associated with neck pain  Has had multiple ED visits and hospitalization for chronic neck pain and headaches  He saw NS here at [facility] in 2/2022 and became verbally abusive and threatening to the PA and Dr Johnson and they will no longer see him  He was then seen by NS at [facility] 9/2022 and they did not recommend surgery but referred to pain management  Takes gabapentin and naproxen at home but no opioids.  Cervical spine CT scan without acute abnormality  Cervical/lumbar spine MRI = progression of degenerative changes but no cord compression  No further inpatient neurology workup  Acute pain service saw during his hospital stay  He was recommended to establish an outpatient pain management   Ambulatory referral to comprehensive spine program as well [facility] were placed  Hypertension  On no meds at home but was placed on Lisinopril 10 mg qd while in hospital which was increased to 20mg 4/25/25 due to elevated BP.  Had been on Norvasc in the past but stopped it  Has prn PO Hydralazine for  or greater  BP is very labile 2/2 becoming upset frequently but seems to have stabilized and normotensive currently  No changes today 4/29/25    Asthma  Stable w/o exacerbation  Continue prn Albuterol inhaler  Tobacco abuse  Smokes 1/2 ppd  Refuses patch so discontinued it on 4/24/25  Anxiety  Seroquel 300mg qhs  Per PMR  Polysubstance abuse (HCC)  Methamphetamine use in past  In hospital staff found a marijuana vape pen  Lesion of parotid gland  Found incidentally = 1.2 cm  See ENT as OP  Does not have primary care provider  Was seeing [facility] but has not seen  "them in a while and he hasn't returned their phone calls for followup.  Will send him back to them after discharge  Prior to seeing them he was being seen by FP at [facility]  Psychosis (HCC)  History of psychosis previous inpatient psychiatric admission on 7/2024.  Other hx states bipolar disorder    The above assessment and plan was reviewed and updated as determined by my evaluation of the patient on 4/29/2025.    History of Present Illness   Patient seen and examined. Patients overnight issues or events were reviewed with nursing staff. New or overnight issues include the following:   No new or overnight issues.  Offers no complaints    Review of Systems    Objective :  Temp:  [98 °F (36.7 °C)-98.3 °F (36.8 °C)] 98 °F (36.7 °C)  HR:  [78-87] 87  BP: (112-144)/(70-88) 120/80  Resp:  [17-18] 17  SpO2:  [96 %-97 %] 97 %  O2 Device: None (Room air)    Invasive Devices       None                   Physical Exam  General Appearance: no distress, nontoxic appearing  HEENT:  External ear normal.  Nose normal w/o drainage. Mucous membranes are moist. Oropharynx is clear. Conjunctiva clear w/o icterus or redness.  Neck:  Supple, normal ROM  Lungs: BBS without crackles/wheeze/rhonchi; respirations unlabored with normal inspiratory/expiratory effort.  No retractions noted.  On RA  CV: regular rate and rhythm; no rubs/murmurs/gallops, PMI normal   ABD: Abdomen is soft.  Bowel sounds all quadrants.  Nontender with no distention.    EXT: no edema  Skin: normal turgor, normal texture  Psych: affect normal, mood normal  Neuro: AAO       The above physical exam was reviewed and updated as determined by my evaluation of the patient on 4/29/2025.      Lab Results: I have reviewed the following results:            Invalid input(s): \"LABGLOM\", \"CMP\"                Imaging Results Review: No pertinent imaging studies reviewed.  Other Study Results Review: No additional pertinent studies reviewed.    Review of Scheduled Meds: Medications  " reviewed and reconciled as needed  Current Facility-Administered Medications   Medication Dose Route Frequency Provider Last Rate    acetaminophen  975 mg Oral Q8H SALAZAR Juarez      albuterol  2 puff Inhalation Q4H PRN SALAZAR Juarez      bisacodyl  10 mg Rectal Daily PRN Carly T Vance, DO      Diclofenac Sodium  2 g Topical 4x Daily Isabel Villarreal, SALAZAR      docusate sodium  100 mg Oral BID Carly T Vance, DO      DULoxetine  20 mg Oral Daily Carly T Vance, DO      enoxaparin  40 mg Subcutaneous Daily Isabel Villarreal, SALAZAR      gabapentin  600 mg Oral TID Fausto Noriega, DO      hydrALAZINE  25 mg Oral Q8H PRN Isabel Villarreal, SALAZAR      hydrOXYzine HCL  25 mg Oral Q6H PRN Carly T Vance, DO      lisinopril  20 mg Oral Daily SALAZAR Juarez      methocarbamol  750 mg Oral Q6H PETER SALAZAR Juarez      oxyCODONE  2.5 mg Oral Q4H PRN SALAZAR Juarez      Or    oxyCODONE  5 mg Oral Q4H PRN SALAZAR Juarez      polyethylene glycol  17 g Oral Daily PRN Carly T Vance, DO      QUEtiapine  300 mg Oral HS Isabel Villarreal, SALAZAR      senna  2 tablet Oral Daily With Lunch Carly T Vance, DO      sodium chloride  1 spray Each Nare Q1H PRN Carly T Vance, DO         VTE Pharmacologic Prophylaxis: Lovenox  Code Status: Level 1 - Full Code  Current Length of Stay: 7 day(s)    Administrative Statements     ** Please Note:  voice to text software may have been used in the creation of this document. Although proof errors in transcription or interpretation are a potential of such software**

## 2025-04-29 NOTE — PLAN OF CARE
Problem: PAIN - ADULT  Goal: Verbalizes/displays adequate comfort level or baseline comfort level  Description: Interventions:- Encourage patient to monitor pain and request assistance- Assess pain using appropriate pain scale- Administer analgesics based on type and severity of pain and evaluate response- Implement non-pharmacological measures as appropriate and evaluate response- Consider cultural and social influences on pain and pain management- Notify physician/advanced practitioner if interventions unsuccessful or patient reports new pain  Outcome: Progressing     Problem: INFECTION - ADULT  Goal: Absence or prevention of progression during hospitalization  Description: INTERVENTIONS:- Assess and monitor for signs and symptoms of infection- Monitor lab/diagnostic results- Monitor all insertion sites, i.e. indwelling lines, tubes, and drains- Monitor endotracheal if appropriate and nasal secretions for changes in amount and color- [place] appropriate cooling/warming therapies per order- Administer medications as ordered- Instruct and encourage patient and family to use good hand hygiene technique- Identify and instruct in appropriate isolation precautions for identified infection/condition  Outcome: Progressing     Problem: SAFETY ADULT  Goal: Patient will remain free of falls  Description: INTERVENTIONS:- Educate patient/family on patient safety including physical limitations- Instruct patient to call for assistance with activity - Consult OT/PT to assist with strengthening/mobility - Keep Call bell within reach- Keep bed low and locked with side rails adjusted as appropriate- Keep care items and personal belongings within reach- Initiate and maintain comfort rounds- Make Fall Risk Sign visible to staff- Offer Toileting every 2 Hours, in advance of need- Initiate/Maintain bed/chair alarm- Obtain necessary fall risk management equipment: alarms - Apply yellow socks and bracelet for high fall risk patients-  Consider moving patient to room near nurses station  Outcome: Progressing  Goal: Maintain or return to baseline ADL function  Description: INTERVENTIONS:-  Assess patient's ability to carry out ADLs; assess patient's baseline for ADL function and identify physical deficits which impact ability to perform ADLs (bathing, care of mouth/teeth, toileting, grooming, dressing, etc.)- Assess/evaluate cause of self-care deficits - Assess range of motion- Assess patient's mobility; develop plan if impaired- Assess patient's need for assistive devices and provide as appropriate- Encourage maximum independence but intervene and supervise when necessary- Involve family in performance of ADLs- Assess for home care needs following discharge - Consider OT consult to assist with ADL evaluation and planning for discharge- Provide patient education as appropriate  Outcome: Progressing  Goal: Maintains/Returns to pre admission functional level  Description: INTERVENTIONS:- Perform AM-PAC 6 Click Basic Mobility/ Daily Activity assessment daily.- Set and communicate daily mobility goal to care team and patient/family/caregiver. - Collaborate with rehabilitation services on mobility goals if consulted- Perform Range of Motion 3 times a day.- Reposition patient every 2 hours.- Dangle patient 3 times a day- Stand patient 3 times a day- Ambulate patient 3 times a day- Out of bed to chair 3 times a day - Out of bed for meals 3 times a day- Out of bed for toileting- Record patient progress and toleration of activity level   Outcome: Progressing     Problem: DISCHARGE PLANNING  Goal: Discharge to home or other facility with appropriate resources  Description: INTERVENTIONS:- Identify barriers to discharge w/patient and caregiver- Arrange for needed discharge resources and transportation as appropriate- Identify discharge learning needs (meds, wound care, etc.)- Arrange for interpretive services to assist at discharge as needed- Refer to Case  Management Department for coordinating discharge planning if the patient needs post-hospital services based on physician/advanced practitioner order or complex needs related to functional status, cognitive ability, or social support system  Outcome: Progressing     Problem: Knowledge Deficit  Goal: Patient/family/caregiver demonstrates understanding of disease process, treatment plan, medications, and discharge instructions  Description: Complete learning assessment and assess knowledge base.Interventions:- Provide teaching at level of understanding- Provide teaching via preferred learning methods  Outcome: Progressing

## 2025-04-29 NOTE — ASSESSMENT & PLAN NOTE
Associated with neck pain  Has had multiple ED visits and hospitalization for chronic neck pain and headaches  He saw NS here at [facility] in 2/2022 and became verbally abusive and threatening to the PA and Dr Johnson and they will no longer see him  He was then seen by NS at [facility] 9/2022 and they did not recommend surgery but referred to pain management  Takes gabapentin and naproxen at home but no opioids.  Cervical spine CT scan without acute abnormality  Cervical/lumbar spine MRI = progression of degenerative changes but no cord compression  No further inpatient neurology workup  Acute pain service saw during his hospital stay  He was recommended to establish an outpatient pain management   Ambulatory referral to comprehensive spine program as well as [facility] spine and pain were placed

## 2025-04-29 NOTE — PLAN OF CARE
Problem: PAIN - ADULT  Goal: Verbalizes/displays adequate comfort level or baseline comfort level  Description: Interventions:- Encourage patient to monitor pain and request assistance- Assess pain using appropriate pain scale- Administer analgesics based on type and severity of pain and evaluate response- Implement non-pharmacological measures as appropriate and evaluate response- Consider cultural and social influences on pain and pain management- Notify physician/advanced practitioner if interventions unsuccessful or patient reports new pain  Outcome: Progressing     Problem: INFECTION - ADULT  Goal: Absence or prevention of progression during hospitalization  Description: INTERVENTIONS:- Assess and monitor for signs and symptoms of infection- Monitor lab/diagnostic results- Monitor all insertion sites, i.e. indwelling lines, tubes, and drains- Monitor endotracheal if appropriate and nasal secretions for changes in amount and color- [place] appropriate cooling/warming therapies per order- Administer medications as ordered- Instruct and encourage patient and family to use good hand hygiene technique- Identify and instruct in appropriate isolation precautions for identified infection/condition  Outcome: Progressing     Problem: SAFETY ADULT  Goal: Patient will remain free of falls  Description: INTERVENTIONS:- Educate patient/family on patient safety including physical limitations- Instruct patient to call for assistance with activity - Consult OT/PT to assist with strengthening/mobility - Keep Call bell within reach- Keep bed low and locked with side rails adjusted as appropriate- Keep care items and personal belongings within reach- Initiate and maintain comfort rounds- Make Fall Risk Sign visible to staff- Offer Toileting every 2 Hours, in advance of need- Initiate/Maintain bed.chair alarm- Obtain necessary fall risk management equipment: nonskid socks- Apply yellow socks and bracelet for high fall risk  patients- Consider moving patient to room near nurses station  Outcome: Progressing  Goal: Maintain or return to baseline ADL function  Description: INTERVENTIONS:-  Assess patient's ability to carry out ADLs; assess patient's baseline for ADL function and identify physical deficits which impact ability to perform ADLs (bathing, care of mouth/teeth, toileting, grooming, dressing, etc.)- Assess/evaluate cause of self-care deficits - Assess range of motion- Assess patient's mobility; develop plan if impaired- Assess patient's need for assistive devices and provide as appropriate- Encourage maximum independence but intervene and supervise when necessary- Involve family in performance of ADLs- Assess for home care needs following discharge - Consider OT consult to assist with ADL evaluation and planning for discharge- Provide patient education as appropriate  Outcome: Progressing  Goal: Maintains/Returns to pre admission functional level  Description: INTERVENTIONS:- Perform AM-PAC 6 Click Basic Mobility/ Daily Activity assessment daily.- Set and communicate daily mobility goal to care team and patient/family/caregiver. - Collaborate with rehabilitation services on mobility goals if consulted- Perform Range of Motion 3 times a day.- Reposition patient every 2 hours.- Dangle patient  times a day- Stand patient 3 times a day- Ambulate patient 3 times a day- Out of bed to chair 3 times a day - Out of bed for meals 3 times a day- Out of bed for toileting- Record patient progress and toleration of activity level   Outcome: Progressing     Problem: DISCHARGE PLANNING  Goal: Discharge to home or other facility with appropriate resources  Description: INTERVENTIONS:- Identify barriers to discharge w/patient and caregiver- Arrange for needed discharge resources and transportation as appropriate- Identify discharge learning needs (meds, wound care, etc.)- Arrange for interpretive services to assist at discharge as needed- Refer  to Case Management Department for coordinating discharge planning if the patient needs post-hospital services based on physician/advanced practitioner order or complex needs related to functional status, cognitive ability, or social support system  Outcome: Progressing     Problem: Knowledge Deficit  Goal: Patient/family/caregiver demonstrates understanding of disease process, treatment plan, medications, and discharge instructions  Description: Complete learning assessment and assess knowledge base.Interventions:- Provide teaching at level of understanding- Provide teaching via preferred learning methods  Outcome: Progressing

## 2025-04-29 NOTE — PROGRESS NOTES
04/29/25 1230   Pain Assessment   Pain Assessment Tool 0-10   Pain Score 7   Pain Location/Orientation Location: Head;Location: Neck   Pain Onset/Description Onset: Ongoing;Descriptor: Sore;Descriptor: Aching   Hospital Pain Intervention(s) Other (Comment)  (Soft tissue and stretching)   General   Change In Medical/Functional Status Progressed to IND in room   Subjective   Subjective Initially did not want to get out of bed, reports decreased pain and mobility at end of session   Roll Left and Right   Type of Assistance Needed Independent   Roll Left and Right CARE Score 6   Sit to Lying   Type of Assistance Needed Independent   Sit to Lying CARE Score 6   Lying to Sitting on Side of Bed   Type of Assistance Needed Independent   Lying to Sitting on Side of Bed CARE Score 6   Sit to Stand   Type of Assistance Needed Independent   Sit to Stand CARE Score 6   Bed-Chair Transfer   Type of Assistance Needed Independent   Chair/Bed-to-Chair Transfer CARE Score 6   Car Transfer   Type of Assistance Needed Independent   Car Transfer CARE Score 6   Walk 10 Feet   Type of Assistance Needed Independent;Adaptive equipment   Comment SPC   Walk 10 Feet CARE Score 6   Walk 50 Feet with Two Turns   Type of Assistance Needed Independent;Adaptive equipment   Comment SPC   Walk 50 Feet with Two Turns CARE Score 6   Walk 150 Feet   Type of Assistance Needed Independent;Adaptive equipment   Comment SPC   Walk 150 Feet CARE Score 6   Walking 10 Feet on Uneven Surfaces   Type of Assistance Needed Independent;Adaptive equipment   Comment SPC   Walking 10 Feet on Uneven Surfaces CARE Score 6   Ambulation   Primary Mode of Locomotion Prior to Admission Walk   Distance Walked (feet) 350 ft  (x3)   Assist Device Cane   Gait Pattern WNL   Does the patient walk? 2. Yes   Wheelchair mobility   Does the patient use a wheelchair? 0. No   Curb or Single Stair   Style negotiated Single stair   Type of Assistance Needed Independent;Adaptive  equipment   Comment HR or SPC   1 Step (Curb) CARE Score 6   4 Steps   Type of Assistance Needed Adaptive equipment;Independent   Comment HR or SPC   4 Steps CARE Score 6   12 Steps   Type of Assistance Needed Independent;Adaptive equipment   Comment HR or SPC   12 Steps CARE Score 6   Stairs   # of Steps 12   Picking Up Object   Type of Assistance Needed Independent   Picking Up Object CARE Score 6   Toilet Transfer   Type of Assistance Needed Independent   Toilet Transfer CARE Score 6   Therapeutic Interventions   Flexibility manual stretch B HS & calves, fwd/bkwd shoulder rolls, AROM cervical spine to tolerance   Other Manual cervical traction, Sub-occipital release, Manual stretching of Scalenes/upper traps & Levator Scap. Soft tissue massage x 25 min   Equipment Use   NuStep 10 min all extremities lvl 6, tolerating very well.   Assessment   Treatment Assessment Pt seen for skilled therapy with continued c/o HA and neck pain. Manual hands on techniques performed, see above, with pt reporting pain relief and noted improved AROM. Tolerated all functional mobility then very weel, walking halls with cane, laughing and having conversation. Pt demo abiluty to be independent using cane. Progressed to Ind in room. Advised pt to look at purchasing cane on his own vs through [employer] to give him option in future for new DME if needed.   Barriers to Discharge None   PT Therapy Minutes   PT Time In 1230   PT Time Out 1400   PT Total Time (minutes) 90   PT Mode of treatment - Individual (minutes) 90   PT Mode of treatment - Concurrent (minutes) 0   PT Mode of treatment - Group (minutes) 0   PT Mode of treatment - Co-treat (minutes) 0   PT Mode of Treatment - Total time(minutes) 90 minutes   PT Cumulative Minutes 717   Therapy Time missed   Time missed? No

## 2025-04-29 NOTE — ASSESSMENT & PLAN NOTE
On no meds at home but was placed on Lisinopril 10 mg qd while in hospital which was increased to 20mg 4/25/25 due to elevated BP.  Had been on Norvasc in the past but stopped it  Has prn PO Hydralazine for  or greater  BP is very labile 2/2 becoming upset frequently but seems to have stabilized and normotensive currently  No changes today 4/29/25

## 2025-04-29 NOTE — ASSESSMENT & PLAN NOTE
Chronic neck pain requiring multiple hospitalizations/ED visits - see above  CT imaging showed degenerative changes in cervical spine    4/24: will increase gabapentin to 500mg TID for pain control > tolerating current dose. Can uptitrate to 600 mg TID.

## 2025-04-29 NOTE — PROGRESS NOTES
Progress Note - PMR   Name: Amado Chin 39 y.o. male I MRN: [id number]  Unit/Bed#: -01 I Date of Admission: 4/22/2025   Date of Service: 4/29/2025 I Hospital Day: 7     Assessment & Plan  Cervicogenic headache  Patient presents with chronic headaches for significant amount of time worsening but has been present for years  Imaging negative for an organic source however describes what appears to be a cervicogenic headache with pain in the back of the neck and stiffness radiating up to the suboccipital triangle and across the temporal regions and behind the eye  Pain is anywhere from a 3 or 4 up to a 10/10 averaging 6-7/10  At this time we will try a regimen including gabapentin 300 mg 3 times daily titrating up to 500 mg 3 times daily which was on a previous regimen, Cymbalta 20 mg daily, Robaxin 750 mg every 6 hours, Tylenol 975 mg 3 times daily.  He is also on a regimen of as needed oxycodone 2.5-5 mg every 4 hours as needed.  Due to his history of substance use and request for escalation and the acute on chronic nature of his neck pain and headaches we will avoid escalation of any opioid medications at this time.  Additionally will add Voltaren gel  Physical and Occupational Therapy, desensitization and discharge planning  Improving with manual therapy he is getting done in therapies and overall has been improving.  Patient is trying to self wean a bit from the opioids  Neck pain  Chronic neck pain requiring multiple hospitalizations/ED visits - see above  CT imaging showed degenerative changes in cervical spine    4/24: will increase gabapentin to 500mg TID for pain control > tolerating current dose. Can uptitrate to 600 mg TID.  Impaired mobility and activities of daily living  Patient was evaluated by the rehabilitation team MD and an appropriate candidate for acute inpatient rehabilitation program at this time.  The patient will tolerate 3 hours/day 5 to 7 days/week of intensive physical, occupational  in order to obtain goals for community discharge  Due to the patient's functional Compared to their baseline level of function in addition to their ongoing medical needs, the patient would benefit from daily supervision from a rehabilitation physician as well as rehabilitation nursing to implement and adjust the medical as well as functional plan of care in order to meet the patient's goals.    Weakness of both lower extremities  Feels weak in the bilateral lower extremities primarily in the left lower extremity greater than right but appears functional on examination  Some tremoring in the left lower extremity on MMT  Continue with physical and Occupational Therapy  Asthma  Albuterol as needed  Tobacco abuse  Smokes half a pack a day as an outpatient  Not interested in a patch or gum or replacement therapy at this time  Education on smoking and pain provided  Vitamin B12 deficiency  History of B12 deficiency but most recent level within normal range tested back in May 2024  Not currently on supplementation will need to have follow-up levels on next set of routine labs  Anxiety  Patient with mixed history of mood disorder is seen as depression and anxiety as well as schizophrenia throughout the chart.  Had multiple behavioral health inpatient unit stays over the last few years and frequently visits the ER  Currently on Seroquel 300 mg nightly  Continue Atarax as needed for acute anxiety  Continue Cymbalta which he had been on in the past during a prior inpatient behavioral health stay. Increased gabapentin to 600 mg 3 times dailyon 4/28  Cervical spinal stenosis  History of some mild cervical spinal stenosis on most recent MRI  Has seen neurosurgery in the past but was discharged from their practice after demanding surgery and threatening staff  Continue to clinically monitor his neuro examination  Polysubstance abuse (HCC)  History of methamphetamine use in the past per documentation  Also on Norco chronically in  "the past as well.  Currently on a regimen of oxycodone  Discussed with patient that we would not escalate his opioid therapies.  Did inquire about IV therapies which was also declined as a form of escalation  Lesion of parotid gland  Noted on MRI of the cervical spine  \"1.2 cm lesion in left deep parotid gland just posterior and slightly medial to left retromandibular vein. 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 Recommend ENT consultation for further evaluation.\"  Hypertension    Patient with elevated blood pressures with some lability some of it likely due to component of pain, anxiety   Continue on lisinopril  Management per internal medicine, will work on anxiety reduction to help with blood pressures  Does not have primary care provider    Psychosis (HCC)      Subjective   39-year-old male with history of anxiety, depression, questionable schizophrenia, substance abuse disorder and tobacco use who presents on 4/15 with intractable neck pain and headache. Imaging unrevealing and was treated for acute on chronic pain     Chief Complaint: f/u ambulatory dysfunction    Interval: Patient seen and evaluated in room today.  No acute issues overnight but does have a bit of a headache this morning.  We went over all of his discharge medications and he will need most of them filled as he does not have them at home.  We also talked about opioid weaning plan and agreement.  Discharge related activities completed including medication reconciliation, sending medications to his pharmacy which is [facility] on [address], additionally with discharge instructions and follow-up appointments and recommendations. Patient denies fever, chills, nausea, emesis, cough, shortness of breath, diarrhea, or constipation. Sleep was fine, mood stable. Pain is controlled.      Objective :  Temp:  [98 °F (36.7 °C)-98.3 °F (36.8 °C)] 98 °F (36.7 °C)  HR:  [78-87] 87  BP: (112-144)/(70-88) 120/80  Resp:  [17-18] " 17  SpO2:  [96 %-97 %] 97 %  O2 Device: None (Room air)    Functional Update:  Physical Therapy Occupational Therapy Speech Therapy   Weight Bearing Status: Full Weight Bearing  Transfers: Moderate Assistance  Bed Mobility: Supervision  Amulation Distance (ft): 45 feet  Ambulation: Assist of 2 (mod A x 1 with CF for safety)  Assistive Device for Ambulation: Roller Walker  Wheelchair Mobility Distance:  (N/A)  Number of Stairs: 4  Assistive Device for Stairs: Bilateral Hand Rails  Stair Assistance: Assist of 2 (min to mod A x 1 with 2nd person CGA)  Discharge Recommendations: Home with:  DC Home with:: Family Support, First Floor Setup, Home Physical Therapy, Outpatient Physical Therapy   Eating: Independent  Grooming: Moderate Assistance  Bathing: Maximum Assistance  Bathing: Maximum Assistance  Upper Body Dressing: Supervision  Lower Body Dressing: Minimal Assistance  Toileting: Minimal Assistance  Toilet Transfer: Minimal Assistance  Cognition: Within Defined Limits  Orientation: Person, Place, Time, Situation               Physical Exam  Vitals reviewed.   Constitutional:       General: He is not in acute distress.  HENT:      Head: Normocephalic and atraumatic.      Right Ear: External ear normal.      Left Ear: External ear normal.      Nose: Nose normal. No rhinorrhea.      Mouth/Throat:      Mouth: Mucous membranes are moist.      Pharynx: Oropharynx is clear.   Eyes:      General: No scleral icterus.  Cardiovascular:      Rate and Rhythm: Normal rate.      Pulses: Normal pulses.   Pulmonary:      Effort: Pulmonary effort is normal. No respiratory distress.   Abdominal:      General: There is no distension.      Palpations: Abdomen is soft.   Musculoskeletal:      Cervical back: Tenderness present.      Right lower leg: No edema.      Left lower leg: No edema.   Skin:     General: Skin is warm and dry.   Neurological:      Mental Status: He is alert and oriented to person, place, and time.   Psychiatric:          Mood and Affect: Mood normal.         Behavior: Behavior normal.           Scheduled Meds:  Current Facility-Administered Medications   Medication Dose Route Frequency Provider Last Rate    acetaminophen  975 mg Oral Q8H SALAZAR Juarez      albuterol  2 puff Inhalation Q4H PRN Isabel Villarreal, SALAZAR      bisacodyl  10 mg Rectal Daily PRN Carly T Vance, DO      Diclofenac Sodium  2 g Topical 4x Daily Isabel Villarreal, SALAZAR      docusate sodium  100 mg Oral BID Carly T Vance, DO      DULoxetine  20 mg Oral Daily Carly T Vance, DO      enoxaparin  40 mg Subcutaneous Daily Isabel Villarreal, SALAZAR      gabapentin  600 mg Oral TID Fausto Noriega, DO      hydrALAZINE  25 mg Oral Q8H PRN Isabel Villarreal, CRNP      hydrOXYzine HCL  25 mg Oral Q6H PRN Carly T Vance, DO      lisinopril  20 mg Oral Daily Isabel Villarreal, EPHRAIMNP      methocarbamol  750 mg Oral Q6H EPTER Isabel Villarreal, CRNP      oxyCODONE  2.5 mg Oral Q4H PRN Isabel Villarreal, CRNP      Or    oxyCODONE  5 mg Oral Q4H PRN Isabel Villarreal, CRNP      polyethylene glycol  17 g Oral Daily PRN Carly T Vance, DO      QUEtiapine  300 mg Oral HS Isabel Villarreal, CRNP      senna  2 tablet Oral Daily With Lunch Carly T Vance, DO      sodium chloride  1 spray Each Nare Q1H PRN Carly T Vance, DO           Lab Results: I have reviewed the following results:                     Fausto Noriega DO  Physical Medicine and Rehabilitation  [facility]    I have spent a total time of 55 minutes in caring for this patient on the day of the visit/encounter including Risks and benefits of tx options, Instructions for management, Importance of tx compliance, Risk factor reductions, Counseling / Coordination of care, Documenting in the medical record, Reviewing/placing orders in the medical record (including tests, medications, and/or procedures), and Communicating with other healthcare professionals .   Additional time spent during discharge related activities

## 2025-04-29 NOTE — PROGRESS NOTES
"OT daily treatment note       04/29/25 1000   Pain Assessment   Pain Assessment Tool 0-10   Pain Score 10 - Worst Possible Pain   Pain Location/Orientation Location: Head   Hospital Pain Intervention(s) Heat applied   Restrictions/Precautions   Precautions Bed/chair alarms;Fall Risk;Pain;Other (comment)  (dizziness, HTN)   Lifestyle   Autonomy \"I cant do anything with this headache\"   Assessment   Treatment Assessment Upon arrival, pt reported a 10/10 HA and stated he was unable to participate. OT s/w nsg and nsg offered oxy in which pt declined only requesting tylenol but pt is not due for tylenol until 3pm. OT then offered a moist heat pack which pt was agreeable to. MHP left on for 30 mins w/ education on pain mgmt including deep breathing. Pt minimally participated, did not open eyes or speak to OT. S/P MHS removal, OT encouraged pt to engage in ADL. Pt cont to refuse stating he would try PT later. 60 mins total missed. Pt is scheduled to DC tomorrow so plan to engage in ADL tomorrow morning.   OT Therapy Minutes   OT Time In 1000   OT Time Out 1030   OT Total Time (minutes) 30   OT Mode of treatment - Individual (minutes) 30   OT Mode of treatment - Concurrent (minutes) 0   OT Mode of treatment - Group (minutes) 0   OT Mode of treatment - Co-treat (minutes) 0   OT Mode of Treatment - Total time(minutes) 30 minutes   OT Cumulative Minutes 340   Therapy Time missed   Time missed? Yes   Amount of time missed 60   Reason for time missed Extreme fatigue;Illness  (pt refusal)       "

## 2025-04-30 VITALS
TEMPERATURE: 98 F | WEIGHT: 179.23 LBS | OXYGEN SATURATION: 97 % | SYSTOLIC BLOOD PRESSURE: 122 MMHG | BODY MASS INDEX: 29.86 KG/M2 | HEIGHT: 65 IN | RESPIRATION RATE: 19 BRPM | DIASTOLIC BLOOD PRESSURE: 82 MMHG | HEART RATE: 60 BPM

## 2025-04-30 PROCEDURE — 99232 SBSQ HOSP IP/OBS MODERATE 35: CPT | Performed by: STUDENT IN AN ORGANIZED HEALTH CARE EDUCATION/TRAINING PROGRAM

## 2025-04-30 PROCEDURE — 99239 HOSP IP/OBS DSCHRG MGMT >30: CPT | Performed by: NURSE PRACTITIONER

## 2025-04-30 PROCEDURE — 97535 SELF CARE MNGMENT TRAINING: CPT

## 2025-04-30 RX ORDER — LISINOPRIL 20 MG/1
20 TABLET ORAL DAILY
Qty: 30 TABLET | Refills: 0 | Status: SHIPPED | OUTPATIENT
Start: 2025-04-30

## 2025-04-30 RX ORDER — ALBUTEROL SULFATE 90 UG/1
2 INHALANT RESPIRATORY (INHALATION) EVERY 4 HOURS PRN
Qty: 6.7 G | Refills: 0 | Status: SHIPPED | OUTPATIENT
Start: 2025-04-30

## 2025-04-30 RX ADMIN — LISINOPRIL 20 MG: 20 TABLET ORAL at 08:27

## 2025-04-30 RX ADMIN — GABAPENTIN 600 MG: 300 CAPSULE ORAL at 13:07

## 2025-04-30 RX ADMIN — DULOXETINE HYDROCHLORIDE 20 MG: 20 CAPSULE, DELAYED RELEASE ORAL at 08:23

## 2025-04-30 RX ADMIN — GABAPENTIN 600 MG: 300 CAPSULE ORAL at 08:23

## 2025-04-30 RX ADMIN — METHOCARBAMOL 750 MG: 750 TABLET ORAL at 01:38

## 2025-04-30 RX ADMIN — DOCUSATE SODIUM 100 MG: 100 CAPSULE, LIQUID FILLED ORAL at 08:23

## 2025-04-30 RX ADMIN — ACETAMINOPHEN 975 MG: 325 TABLET, FILM COATED ORAL at 08:23

## 2025-04-30 RX ADMIN — OXYCODONE HYDROCHLORIDE 5 MG: 5 TABLET ORAL at 11:28

## 2025-04-30 RX ADMIN — METHOCARBAMOL 750 MG: 750 TABLET ORAL at 13:07

## 2025-04-30 RX ADMIN — METHOCARBAMOL 750 MG: 750 TABLET ORAL at 08:23

## 2025-04-30 NOTE — PROGRESS NOTES
04/30/25 0930   Pain Assessment   Pain Assessment Tool 0-10   Pain Score No Pain   Restrictions/Precautions   Precautions Bed/chair alarms;Fall Risk;Pain  (dizziness, hypertension)   Eating   Type of Assistance Needed Independent   Physical Assistance Level No physical assistance   Comment seated EOB for breakfast meal at end of OT session   Eating CARE Score 6   Oral Hygiene   Type of Assistance Needed Independent   Physical Assistance Level No physical assistance   Comment in stance at sink   Oral Hygiene CARE Score 6   Shower/Bathe Self   Type of Assistance Needed Independent;Adaptive equipment   Physical Assistance Level No physical assistance   Comment Pt engaged in showering, able to wash 10/10 parts seated on tub bench and in stance with unilateral UE support on GB, no LOB.   Shower/Bathe Self CARE Score 6   Bathing   Assessed Bath Style Shower   Able to Gather/Transport Yes   Able to Adjust Water Temperature Yes   Able to Wash/Rinse/Dry (body part) Left Arm;Right Arm;L Upper Leg;R Upper Leg;L Lower Leg/Foot;R Lower Leg/Foot;Chest;Abdomen;Perineal Area;Buttocks   Limitations Noted in Balance   Positioning Seated;Standing   Adaptive Equipment Tub Bench;Shower Bars;Hand Held Shower   Tub/Shower Transfer   Adaptive Equipment Grab Bars;Transfer Bench   Assessed Shower   Findings Mod I fxl mob w/SPC, EOB<>shower room, with pt side-stepping in/out of walk-in shower with BUE support on GB, no LOB   Upper Body Dressing   Type of Assistance Needed Independent   Physical Assistance Level No physical assistance   Comment seated   Upper Body Dressing CARE Score 6   Lower Body Dressing   Type of Assistance Needed Independent   Physical Assistance Level No physical assistance   Comment seated to thread LEs using dynamic fxl reaching, in unsupported stance for CM over hips, no LOB   Lower Body Dressing CARE Score 6   Putting On/Taking Off Footwear   Type of Assistance Needed Independent   Physical Assistance Level No  physical assistance   Comment seated using dynamic fxl reaching to doff/don socks and slip-on sneakers   Putting On/Taking Off Footwear CARE Score 6   Dressing/Undressing Clothing   Remove UB Clothes Pullover Shirt   Don UB Clothes Pullover Shirt   Remove LB Clothes Pants;Undergarment;Socks;Shoes   Don LB Clothes Pants;Undergarment;Socks;Shoes   Limitations Noted In Balance;Endurance;Strength   Positioning Supported Sit;Standing   Roll Left and Right   Type of Assistance Needed Independent   Physical Assistance Level No physical assistance   Comment HOB flat, no rails   Roll Left and Right CARE Score 6   Sit to Lying   Type of Assistance Needed Independent   Physical Assistance Level No physical assistance   Comment HOB flat, no rails   Sit to Lying CARE Score 6   Lying to Sitting on Side of Bed   Type of Assistance Needed Independent   Physical Assistance Level No physical assistance   Comment HOB flat, no rails   Lying to Sitting on Side of Bed CARE Score 6   Sit to Stand   Type of Assistance Needed Independent   Physical Assistance Level No physical assistance   Comment no AD   Sit to Stand CARE Score 6   Bed-Chair Transfer   Type of Assistance Needed Independent;Adaptive equipment   Physical Assistance Level No physical assistance   Comment Mod I fxl mob w/SPC   Chair/Bed-to-Chair Transfer CARE Score 6   Toileting Hygiene   Type of Assistance Needed Independent   Physical Assistance Level No physical assistance   Toileting Hygiene CARE Score 6   Toilet Transfer   Type of Assistance Needed Independent;Adaptive equipment   Physical Assistance Level No physical assistance   Comment Mod I fxl mob w/SPC to standard toilet   Toilet Transfer CARE Score 6   Cognition   Overall Cognitive Status WFL   Arousal/Participation Alert;Cooperative   Attention Within functional limits   Orientation Level Oriented X4   Memory Within functional limits   Following Commands Follows one step commands without difficulty   Activity  Tolerance   Activity Tolerance Patient tolerated treatment well   Assessment   Treatment Assessment Pt seen for 45min skilled OT session focused on D/C ADL (shower), fxl mobility w/SPC, and fxl activity tolerance for increased independence w/ADLs/IADLs and decreased caregiver burden. See detailed descriptions of fxl performance above. Pt tolerated session well, but initially refused therapy, requiring encouragement to engage in session prior to D/C later today. Pt has demonstrated that he has met all OT goals, completing entire ADL routine today at Mod I level w/SPC. Pt denied pain. Pt cont to be limited by decreased ROM, impaired balance, decreased endurance, increased fall risk, decreased activity tolerance, decreased safety awareness, impaired judgement, decreased strength, and visual deficits. Pt anticipated to D/C later today, with pt reporting his friend will pick him up at 1400, no further questions/concerns for OT team, or DME/AE needs. Plan is for OP therapies.   Prognosis Good   Problem List Decreased strength;Decreased endurance;Impaired balance;Decreased mobility;Pain   Barriers to Discharge None   Discharge Recommendation   Rehab Resource Intensity Level, OT   (OP therapies)   OT Therapy Minutes   OT Time In 0930   OT Time Out 1015   OT Total Time (minutes) 45   OT Mode of treatment - Individual (minutes) 45   OT Mode of treatment - Concurrent (minutes) 0   OT Mode of treatment - Group (minutes) 0   OT Mode of treatment - Co-treat (minutes) 0   OT Mode of Treatment - Total time(minutes) 45 minutes   OT Cumulative Minutes 385   Therapy Time missed   Time missed? No

## 2025-04-30 NOTE — DISCHARGE SUMMARY
Discharge Summary - Hospitalist   Name: Amado Chin 39 y.o. male I MRN: [id number]  Unit/Bed#: -01 I Date of Admission: 4/22/2025   Date of Service: 4/30/2025 I Hospital Day: 8     Assessment & Plan  Cervicogenic headache  Secondary to neck pain  S/p course of prednisone per Neurology  Pain management per PMR  Cervical spinal stenosis  Associated with neck pain  Has had multiple ED visits and hospitalization for chronic neck pain and headaches  He saw NS here at [facility] in 2/2022 and became verbally abusive and threatening to the PA and Dr Johnson and they will no longer see him  He was then seen by NS at [facility] 9/2022 and they did not recommend surgery but referred to pain management  Takes gabapentin and naproxen at home but no opioids.  Cervical spine CT scan without acute abnormality  Cervical/lumbar spine MRI = progression of degenerative changes but no cord compression  No further inpatient neurology workup  Acute pain service saw during his hospital stay  He was recommended to establish an outpatient pain management   Ambulatory referral to comprehensive spine program as well as [facility] spine and pain were placed  Hypertension  On no meds at home but was placed on Lisinopril 10 mg qd while in hospital which was increased to 20mg 4/25/25 due to elevated BP.  Had been on Norvasc in the past but stopped it  Has prn PO Hydralazine for  or greater  BP is very labile 2/2 becoming upset frequently but seems to have stabilized and normotensive currently  No changes today 4/30/25  for home  BMP on 4/29 was stable on Lisinopril  Asthma  Stable w/o exacerbation  Continue prn Albuterol inhaler  Tobacco abuse  Smokes 1/2 ppd  Refuses patch so discontinued it on 4/24/25  Anxiety  Seroquel 300mg qhs  Per PMR  Polysubstance abuse (HCC)  Methamphetamine use in past  In hospital staff found a marijuana vape pen  Lesion of parotid gland  Found incidentally = 1.2 cm  See ENT as OP  Does not have primary care  "provider  Was seeing [facility] but has not seen them in a while and he hasn't returned their phone calls for followup.  Will send him back to them after discharge  Prior to seeing them he was being seen by FP at [facility]  Psychosis (HCC)  History of psychosis previous inpatient psychiatric admission on 7/2024.  Other hx states bipolar disorder     Discharge Summary   Amado Chin 39 y.o. male MRN: [id number]  Unit/Bed#: [facility] 960-01 Encounter: [id number]  Admission Date: 4/22/2025     Discharge Date: 4/30/25    Discharging Provider: Isabel Villarreal    PCP:  None      HPI: Refer to previous hospitalization for complete record    Summary of [facility] Course:     During the course of his stay in [facility], his Lisinopril was increased to 20mg daily from 10mg daily with improved BPs.  BMP was stable on 4/29/25.  Pain management was handled by PMR.      The patient was discharged to home in stable condition on 4/30/25.    Discharge Physical Examination:  /82   Pulse 60   Temp 98 °F (36.7 °C) (Oral)   Resp 19   Ht 5' 5\" (1.651 m)   Wt 81.3 kg (179 lb 3.7 oz)   SpO2 97%   BMI 29.83 kg/m²     General Appearance: no distress, non toxic appearing  HEENT: PERRLA, conjuctiva normal; oropharynx clear; mucous membranes moist   Lungs: CTA, normal respiratory effort, no retractions, expiratory effort normal  CV: regular rate and rhythm; no rubs/murmurs/gallops, PMI normal   ABD: soft; ND/NT; +BS  EXT: no edema  Skin: normal turgor, normal texture  Psych: affect normal, mood normal  Neuro: AAO        Significant Findings, Care, Treatment and Services Provided: Acute comprehensive interdisciplinary inpatient rehabilitation including PT, OT, SLP, RN, CM, SW, dietary, psychology, etc.      Physiatry Additions/Comorbidities:    Cervicogenic headache  Patient presents with chronic headaches for significant amount of time worsening but has been present for years  Imaging negative for an organic source however describes what " appears to be a cervicogenic headache with pain in the back of the neck and stiffness radiating up to the suboccipital triangle and across the temporal regions and behind the eye  Pain is anywhere from a 3 or 4 up to a 10/10 averaging 6-7/10  At this time we will try a regimen including gabapentin 300 mg 3 times daily titrating up to 500 mg 3 times daily which was on a previous regimen, Cymbalta 20 mg daily, Robaxin 750 mg every 6 hours, Tylenol 975 mg 3 times daily.  He is also on a regimen of as needed oxycodone 2.5-5 mg every 4 hours as needed.  Due to his history of substance use and request for escalation and the acute on chronic nature of his neck pain and headaches we will avoid escalation of any opioid medications at this time.  Additionally will add Voltaren gel  Physical and Occupational Therapy, desensitization and discharge planning  Improving with manual therapy he is getting done in therapies and overall has been improving.  Patient is trying to self wean a bit from the opioids.  Discussed a weaning plan and protocol with the patient in order to safely decrease the overall use and dosage and frequency over the next 5 to 7 days.    Neck pain  Chronic neck pain requiring multiple hospitalizations/ED visits - see above  CT imaging showed degenerative changes in cervical spine     4/24: will increase gabapentin to 500mg TID for pain control > tolerating current dose. Can uptitrate to 600 mg TID.    Tobacco abuse  Smokes half a pack a day as an outpatient  Not interested in a patch or gum or replacement therapy at this time  Education on smoking and pain provided    Vitamin B12 deficiency  History of B12 deficiency but most recent level within normal range tested back in May 2024  Not currently on supplementation will need to have follow-up levels on next set of routine labs    Anxiety  Patient with mixed history of mood disorder is seen as depression and anxiety as well as schizophrenia throughout the  chart.  Had multiple behavioral health inpatient unit stays over the last few years and frequently visits the ER  Currently on Seroquel 300 mg nightly  Continue Atarax as needed for acute anxiety  Continue Cymbalta which he had been on in the past during a prior inpatient behavioral health stay. Increased gabapentin to 600 mg 3 times dailyon     Cervical spinal stenosis  History of some mild cervical spinal stenosis on most recent MRI  Has seen neurosurgery in the past but was discharged from their practice after demanding surgery and threatening staff  Continue to clinically monitor his neuro examination    Polysubstance abuse (HCC)  History of methamphetamine use in the past per documentation  Also on Norco chronically in the past as well.  Currently on a regimen of oxycodone  Discussed with patient that we would not escalate his opioid therapies.  Did inquire about IV therapies which was also declined as a form of escalation    Acute Rehabilitation Center Course: Patient participated in a comprehensive interdisciplinary inpatient rehabilitation program which included involvment of Rehab MD, therapies (PT, OT), RN, CM    Etiologic/Rehabilitation Diagnosis: Impairment of mobility, safety and Activities of Daily Living (ADLs) due to Neurologic Conditions:  03.9  Other Neurologic Disorder cervicogenic headache    Date of Onset: 4/15/25     Date of surgery: n/a    Functional Status Upon Admission to [facility]:  CARE SCORES:  Self Care:  Eatin: Independent  Oral hygiene: 04: Supervision or touching  assistance  Shower/bathing self: 03: Partial/moderate assistance  Upper body dressin: Supervision or touching  assistance  Lower body dressin: Supervision or touching  assistance  Putting on/taking off footwear: 04: Supervision or touching  assistance  Toilet hygiene: 09: Not applicable   Transfers:  Roll left and right: 09: Not applicable  Sit to lyin: Not applicable  Lying to sitting on side of bed: 04:  Supervision or touching  assistance  Sit to stand: 03: Partial/moderate assistance  Chair/bed to chair transfer: 03: Partial/moderate assistance  Toilet transfer: 03: Partial/moderate assistance  Mobility:  Walk 10 ft: 03: Partial/moderate assistance  Walk 50 ft with two turns: 03: Partial/moderate assistance  Walk 150ft: 88: Not attempted due to medical conditions or safety concerns    Functional Status Upon Discharge from [facility]:   Physical Therapy Occupational Therapy Speech Therapy   Weight Bearing Status: Full Weight Bearing  Transfers: Moderate Assistance  Bed Mobility: Supervision  Amulation Distance (ft): 45 feet  Ambulation: Assist of 2 (mod A x 1 with CF for safety)  Assistive Device for Ambulation: Roller Walker  Wheelchair Mobility Distance:  (N/A)  Number of Stairs: 4  Assistive Device for Stairs: Bilateral Hand Rails  Stair Assistance: Assist of 2 (min to mod A x 1 with 2nd person CGA)  Discharge Recommendations: Home with:  DC Home with:: Family Support, First Floor Setup, Home Physical Therapy, Outpatient Physical Therapy   Eating: Independent  Grooming: Moderate Assistance  Bathing: Maximum Assistance  Bathing: Maximum Assistance  Upper Body Dressing: Supervision  Lower Body Dressing: Minimal Assistance  Toileting: Minimal Assistance  Toilet Transfer: Minimal Assistance  Cognition: Within Defined Limits  Orientation: Person, Place, Time, Situation                 Condition at Discharge: good     Discharge instructions/Information to patient and family:   See after visit summary for information provided to patient and family.      Provisions for Follow-Up Care:  See after visit summary for information related to follow-up care and any pertinent home health orders.      No future appointments.        Disposition: Home    Planned Readmission: No    Discharge Statement   I spent 60 minutes or more discharging the patient.  I had direct contact with the patient on the day of discharge. Greater than 50% of  the total time was spent examining patient, answering all patient questions, arranging and discussing plan of care with patient and care team as well as directly providing post-discharge instructions. Additional time then spent on discharge activities.    Discharge Medications:  See after visit summary for reconciled discharge medications provided to patient and family.

## 2025-04-30 NOTE — ASSESSMENT & PLAN NOTE
Associated with neck pain  Has had multiple ED visits and hospitalization for chronic neck pain and headaches  He saw NS here at hospitals in 2/2022 and became verbally abusive and threatening to the PA and Dr Johnson and they will no longer see him  He was then seen by NS at [facility] 9/2022 and they did not recommend surgery but referred to pain management  Takes gabapentin and naproxen at home but no opioids.  Cervical spine CT scan without acute abnormality  Cervical/lumbar spine MRI = progression of degenerative changes but no cord compression  No further inpatient neurology workup  Acute pain service saw during his hospital stay  He was recommended to establish an outpatient pain management   Ambulatory referral to comprehensive spine program as well as [facility] spine and pain were placed

## 2025-04-30 NOTE — PROGRESS NOTES
AVS reviewed w/ pt, all questions addressed. Pt was given his belongings from security including a vape, cigarettes, and an inhaler. A&Ox4, independent w/ a cane.

## 2025-04-30 NOTE — ASSESSMENT & PLAN NOTE
Was seeing [facility] but has not seen them in a while and he hasn't returned their phone calls for followup.  Will send him back to them after discharge  Prior to seeing them he was being seen by FP at [facility]

## 2025-04-30 NOTE — ASSESSMENT & PLAN NOTE
Patient presents with chronic headaches for significant amount of time worsening but has been present for years  Imaging negative for an organic source however describes what appears to be a cervicogenic headache with pain in the back of the neck and stiffness radiating up to the suboccipital triangle and across the temporal regions and behind the eye  Pain is anywhere from a 3 or 4 up to a 10/10 averaging 6-7/10  At this time we will try a regimen including gabapentin 300 mg 3 times daily titrating up to 500 mg 3 times daily which was on a previous regimen, Cymbalta 20 mg daily, Robaxin 750 mg every 6 hours, Tylenol 975 mg 3 times daily.  He is also on a regimen of as needed oxycodone 2.5-5 mg every 4 hours as needed.  Due to his history of substance use and request for escalation and the acute on chronic nature of his neck pain and headaches we will avoid escalation of any opioid medications at this time.  Additionally will add Voltaren gel  Physical and Occupational Therapy, desensitization and discharge planning  Improving with manual therapy he is getting done in therapies and overall has been improving.  Patient is trying to self wean a bit from the opioids.  Discussed a weaning plan and protocol with the patient in order to safely decrease the overall use and dosage and frequency over the next 5 to 7 days.

## 2025-04-30 NOTE — PROGRESS NOTES
Progress Note - PMR   Name: Amado Chin 39 y.o. male I MRN: [id number]  Unit/Bed#: -01 I Date of Admission: 4/22/2025   Date of Service: 4/30/2025 I Hospital Day: 8     Assessment & Plan  Cervicogenic headache  Patient presents with chronic headaches for significant amount of time worsening but has been present for years  Imaging negative for an organic source however describes what appears to be a cervicogenic headache with pain in the back of the neck and stiffness radiating up to the suboccipital triangle and across the temporal regions and behind the eye  Pain is anywhere from a 3 or 4 up to a 10/10 averaging 6-7/10  At this time we will try a regimen including gabapentin 300 mg 3 times daily titrating up to 500 mg 3 times daily which was on a previous regimen, Cymbalta 20 mg daily, Robaxin 750 mg every 6 hours, Tylenol 975 mg 3 times daily.  He is also on a regimen of as needed oxycodone 2.5-5 mg every 4 hours as needed.  Due to his history of substance use and request for escalation and the acute on chronic nature of his neck pain and headaches we will avoid escalation of any opioid medications at this time.  Additionally will add Voltaren gel  Physical and Occupational Therapy, desensitization and discharge planning  Improving with manual therapy he is getting done in therapies and overall has been improving.  Patient is trying to self wean a bit from the opioids.  Discussed a weaning plan and protocol with the patient in order to safely decrease the overall use and dosage and frequency over the next 5 to 7 days.  Neck pain  Chronic neck pain requiring multiple hospitalizations/ED visits - see above  CT imaging showed degenerative changes in cervical spine    4/24: will increase gabapentin to 500mg TID for pain control > tolerating current dose. Can uptitrate to 600 mg TID.  Impaired mobility and activities of daily living  Patient was evaluated by the rehabilitation team MD and an appropriate  candidate for acute inpatient rehabilitation program at this time.  The patient will tolerate 3 hours/day 5 to 7 days/week of intensive physical, occupational in order to obtain goals for community discharge  Due to the patient's functional Compared to their baseline level of function in addition to their ongoing medical needs, the patient would benefit from daily supervision from a rehabilitation physician as well as rehabilitation nursing to implement and adjust the medical as well as functional plan of care in order to meet the patient's goals.    Weakness of both lower extremities  Feels weak in the bilateral lower extremities primarily in the left lower extremity greater than right but appears functional on examination  Some tremoring in the left lower extremity on MMT  Continue with physical and Occupational Therapy  Asthma  Albuterol as needed  Tobacco abuse  Smokes half a pack a day as an outpatient  Not interested in a patch or gum or replacement therapy at this time  Education on smoking and pain provided  Vitamin B12 deficiency  History of B12 deficiency but most recent level within normal range tested back in May 2024  Not currently on supplementation will need to have follow-up levels on next set of routine labs  Anxiety  Patient with mixed history of mood disorder is seen as depression and anxiety as well as schizophrenia throughout the chart.  Had multiple behavioral health inpatient unit stays over the last few years and frequently visits the ER  Currently on Seroquel 300 mg nightly  Continue Atarax as needed for acute anxiety  Continue Cymbalta which he had been on in the past during a prior inpatient behavioral health stay. Increased gabapentin to 600 mg 3 times dailyon 4/28  Cervical spinal stenosis  History of some mild cervical spinal stenosis on most recent MRI  Has seen neurosurgery in the past but was discharged from their practice after demanding surgery and threatening staff  Continue to  "clinically monitor his neuro examination  Polysubstance abuse (HCC)  History of methamphetamine use in the past per documentation  Also on Norco chronically in the past as well.  Currently on a regimen of oxycodone  Discussed with patient that we would not escalate his opioid therapies.  Did inquire about IV therapies which was also declined as a form of escalation  Lesion of parotid gland  Noted on MRI of the cervical spine  \"1.2 cm lesion in left deep parotid gland just posterior and slightly medial to left retromandibular vein. 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 Recommend ENT consultation for further evaluation.\"  Hypertension    Patient with elevated blood pressures with some lability some of it likely due to component of pain, anxiety   Continue on lisinopril  Management per internal medicine, will work on anxiety reduction to help with blood pressures  Does not have primary care provider    Psychosis (HCC)      Subjective   39-year-old male with history of anxiety, depression, questionable schizophrenia, substance abuse disorder and tobacco use who presents on 4/15 with intractable neck pain and headache. Imaging unrevealing and was treated for acute on chronic pain     Chief Complaint: f/u ambulatory dysfunction    Interval: BMP within normal limits    Objective :  Temp:  [98 °F (36.7 °C)] 98 °F (36.7 °C)  HR:  [60-67] 60  BP: (122-164)/(82-95) 122/82  Resp:  [18-19] 19  SpO2:  [95 %-97 %] 97 %  O2 Device: None (Room air)    Functional Update:  Physical Therapy Occupational Therapy Speech Therapy   Weight Bearing Status: Full Weight Bearing  Transfers: Moderate Assistance  Bed Mobility: Supervision  Amulation Distance (ft): 45 feet  Ambulation: Assist of 2 (mod A x 1 with CF for safety)  Assistive Device for Ambulation: Roller Walker  Wheelchair Mobility Distance:  (N/A)  Number of Stairs: 4  Assistive Device for Stairs: Bilateral Hand Rails  Stair Assistance: Assist of 2 (min " to mod A x 1 with 2nd person CGA)  Discharge Recommendations: Home with:  DC Home with:: Family Support, First Floor Setup, Home Physical Therapy, Outpatient Physical Therapy   Eating: Independent  Grooming: Moderate Assistance  Bathing: Maximum Assistance  Bathing: Maximum Assistance  Upper Body Dressing: Supervision  Lower Body Dressing: Minimal Assistance  Toileting: Minimal Assistance  Toilet Transfer: Minimal Assistance  Cognition: Within Defined Limits  Orientation: Person, Place, Time, Situation                 Physical Exam  Vitals reviewed.   Constitutional:       General: He is not in acute distress.  HENT:      Head: Normocephalic and atraumatic.      Right Ear: External ear normal.      Left Ear: External ear normal.      Nose: Nose normal. No rhinorrhea.      Mouth/Throat:      Mouth: Mucous membranes are moist.      Pharynx: Oropharynx is clear.   Eyes:      General: No scleral icterus.  Cardiovascular:      Rate and Rhythm: Normal rate.      Pulses: Normal pulses.   Pulmonary:      Effort: Pulmonary effort is normal. No respiratory distress.   Abdominal:      General: There is no distension.      Palpations: Abdomen is soft.   Musculoskeletal:      Cervical back: Tenderness present.      Right lower leg: No edema.      Left lower leg: No edema.   Skin:     General: Skin is warm and dry.   Neurological:      Mental Status: He is alert and oriented to person, place, and time.      Motor: No weakness.      Coordination: Coordination normal.      Gait: Gait normal.   Psychiatric:         Mood and Affect: Mood normal.         Behavior: Behavior normal.           Scheduled Meds:  Current Facility-Administered Medications   Medication Dose Route Frequency Provider Last Rate    acetaminophen  975 mg Oral Q8H SALAZAR Juarez      albuterol  2 puff Inhalation Q4H PRN SALAZAR Juarez      bisacodyl  10 mg Rectal Daily PRN Carly Vance DO      Diclofenac Sodium  2 g Topical 4x Daily  Isabel Villarreal, SALAZAR      docusate sodium  100 mg Oral BID Carly T Vance, DO      DULoxetine  20 mg Oral Daily Carly T Vance, DO      enoxaparin  40 mg Subcutaneous Daily Isabel Villarreal, EPHRAIMNP      gabapentin  600 mg Oral TID Fausto Noriega, DO      hydrALAZINE  25 mg Oral Q8H PRN Isabel Villarreal, CRNP      hydrOXYzine HCL  25 mg Oral Q6H PRN Carly T Vance, DO      lisinopril  20 mg Oral Daily Isabel Villarreal, CRNP      methocarbamol  750 mg Oral Q6H PETER Isabel Villarreal, CRNP      oxyCODONE  2.5 mg Oral Q4H PRN Isabel Villarreal, EPHRAIMNP      Or    oxyCODONE  5 mg Oral Q4H PRN Isabel Villarreal, EPHRAIMNP      polyethylene glycol  17 g Oral Daily PRN Carly T Vance, DO      QUEtiapine  300 mg Oral HS Isabel Villarreal, SALAZAR      senna  2 tablet Oral Daily With Lunch Carly T Vance, DO      sodium chloride  1 spray Each Nare Q1H PRN Carly T Vance, DO           Lab Results: I have reviewed the following results:      Results from last 7 days   Lab Units 04/29/25  1452   BUN mg/dL 20   SODIUM mmol/L 141   POTASSIUM mmol/L 4.1   CHLORIDE mmol/L 106   CREATININE mg/dL 1.03                Fausto Noriega,   Physical Medicine and Rehabilitation  [facility]    I have spent a total time of 35 minutes in caring for this patient on the day of the visit/encounter including Counseling / Coordination of care, Documenting in the medical record, Reviewing/placing orders in the medical record (including tests, medications, and/or procedures), and Communicating with other healthcare professionals .

## 2025-04-30 NOTE — ASSESSMENT & PLAN NOTE
On no meds at home but was placed on Lisinopril 10 mg qd while in hospital which was increased to 20mg 4/25/25 due to elevated BP.  Had been on Norvasc in the past but stopped it  Has prn PO Hydralazine for  or greater  BP is very labile 2/2 becoming upset frequently but seems to have stabilized and normotensive currently  No changes today 4/30/25  for home  BMP on 4/29 was stable on Lisinopril

## 2025-05-01 NOTE — CASE MANAGEMENT
Team dc summary- pt made good progress and dc'd to a friends home, address unknonw as pt was vague.  No dme needs identified at dc. Pt aware of recommendations for contd outpt physical therapy and locations available if close to where he was staying. Pts friend provided transportation at [place].

## 2025-05-08 ENCOUNTER — HOSPITAL ENCOUNTER (INPATIENT)
Facility: HOSPITAL | Age: 40
LOS: 5 days | Discharge: OTHER INSTITUTION | DRG: 469 | End: 2025-05-13
Attending: EMERGENCY MEDICINE
Payer: MEDICARE

## 2025-05-08 ENCOUNTER — APPOINTMENT (EMERGENCY)
Dept: RADIOLOGY | Facility: HOSPITAL | Age: 40
DRG: 469 | End: 2025-05-08
Payer: MEDICARE

## 2025-05-08 DIAGNOSIS — R79.89 ELEVATED TROPONIN: ICD-10-CM

## 2025-05-08 DIAGNOSIS — M54.2 CHRONIC NECK PAIN: ICD-10-CM

## 2025-05-08 DIAGNOSIS — Z72.0 TOBACCO ABUSE: ICD-10-CM

## 2025-05-08 DIAGNOSIS — I10 HYPERTENSION: ICD-10-CM

## 2025-05-08 DIAGNOSIS — F15.10 METHAMPHETAMINE USE (HCC): ICD-10-CM

## 2025-05-08 DIAGNOSIS — L03.90 CELLULITIS: Primary | ICD-10-CM

## 2025-05-08 DIAGNOSIS — N17.9 AKI (ACUTE KIDNEY INJURY) (HCC): ICD-10-CM

## 2025-05-08 DIAGNOSIS — F41.9 ANXIETY: ICD-10-CM

## 2025-05-08 DIAGNOSIS — G89.29 CHRONIC NECK PAIN: ICD-10-CM

## 2025-05-08 DIAGNOSIS — F19.959 PSYCHOACTIVE SUBSTANCE-INDUCED PSYCHOSIS (HCC): ICD-10-CM

## 2025-05-08 DIAGNOSIS — J45.909 ASTHMA, UNSPECIFIED ASTHMA SEVERITY, UNSPECIFIED WHETHER COMPLICATED, UNSPECIFIED WHETHER PERSISTENT: ICD-10-CM

## 2025-05-08 DIAGNOSIS — R74.8 ELEVATED CK: ICD-10-CM

## 2025-05-08 DIAGNOSIS — G44.86 CERVICOGENIC HEADACHE: ICD-10-CM

## 2025-05-08 PROBLEM — F11.90 METHADONE USE: Status: ACTIVE | Noted: 2025-05-08

## 2025-05-08 LAB
2HR DELTA HS TROPONIN: 99 NG/L
ALBUMIN SERPL BCG-MCNC: 5.1 G/DL (ref 3.5–5)
ALP SERPL-CCNC: 89 U/L (ref 34–104)
ALT SERPL W P-5'-P-CCNC: 53 U/L (ref 7–52)
ANION GAP SERPL CALCULATED.3IONS-SCNC: 16 MMOL/L (ref 4–13)
APAP SERPL-MCNC: <2 UG/ML (ref 10–20)
APTT PPP: 25 SECONDS (ref 23–34)
AST SERPL W P-5'-P-CCNC: 17 U/L (ref 13–39)
BASOPHILS # BLD AUTO: 0.04 THOUSANDS/ÂΜL (ref 0–0.1)
BASOPHILS NFR BLD AUTO: 1 % (ref 0–1)
BILIRUB SERPL-MCNC: 1.2 MG/DL (ref 0.2–1)
BUN SERPL-MCNC: 37 MG/DL (ref 5–25)
CALCIUM SERPL-MCNC: 9.9 MG/DL (ref 8.4–10.2)
CARDIAC TROPONIN I PNL SERPL HS: 182 NG/L (ref ?–50)
CARDIAC TROPONIN I PNL SERPL HS: 281 NG/L (ref ?–50)
CHLORIDE SERPL-SCNC: 103 MMOL/L (ref 96–108)
CK SERPL-CCNC: 385 U/L (ref 39–308)
CO2 SERPL-SCNC: 18 MMOL/L (ref 21–32)
CREAT SERPL-MCNC: 3.75 MG/DL (ref 0.6–1.3)
EOSINOPHIL # BLD AUTO: 0.1 THOUSAND/ÂΜL (ref 0–0.61)
EOSINOPHIL NFR BLD AUTO: 1 % (ref 0–6)
ERYTHROCYTE [DISTWIDTH] IN BLOOD BY AUTOMATED COUNT: 14.6 % (ref 11.6–15.1)
ETHANOL EXG-MCNC: 0 MG/DL
ETHANOL SERPL-MCNC: <10 MG/DL
GFR SERPL CREATININE-BSD FRML MDRD: 18 ML/MIN/1.73SQ M
GLUCOSE SERPL-MCNC: 95 MG/DL (ref 65–140)
HCT VFR BLD AUTO: 49.6 % (ref 36.5–49.3)
HGB BLD-MCNC: 17.2 G/DL (ref 12–17)
IMM GRANULOCYTES # BLD AUTO: 0.03 THOUSAND/UL (ref 0–0.2)
IMM GRANULOCYTES NFR BLD AUTO: 0 % (ref 0–2)
INR PPP: 1.05 (ref 0.85–1.19)
LIPASE SERPL-CCNC: 15 U/L (ref 11–82)
LYMPHOCYTES # BLD AUTO: 1.27 THOUSANDS/ÂΜL (ref 0.6–4.47)
LYMPHOCYTES NFR BLD AUTO: 17 % (ref 14–44)
MAGNESIUM SERPL-MCNC: 2.4 MG/DL (ref 1.9–2.7)
MCH RBC QN AUTO: 31.2 PG (ref 26.8–34.3)
MCHC RBC AUTO-ENTMCNC: 34.7 G/DL (ref 31.4–37.4)
MCV RBC AUTO: 90 FL (ref 82–98)
MONOCYTES # BLD AUTO: 1.51 THOUSAND/ÂΜL (ref 0.17–1.22)
MONOCYTES NFR BLD AUTO: 20 % (ref 4–12)
NEUTROPHILS # BLD AUTO: 4.76 THOUSANDS/ÂΜL (ref 1.85–7.62)
NEUTS SEG NFR BLD AUTO: 61 % (ref 43–75)
NRBC BLD AUTO-RTO: 0 /100 WBCS
PLATELET # BLD AUTO: 276 THOUSANDS/UL (ref 149–390)
PLATELET # BLD AUTO: 327 THOUSANDS/UL (ref 149–390)
PMV BLD AUTO: 9.7 FL (ref 8.9–12.7)
PMV BLD AUTO: 9.7 FL (ref 8.9–12.7)
POTASSIUM SERPL-SCNC: 4.4 MMOL/L (ref 3.5–5.3)
PROT SERPL-MCNC: 8.2 G/DL (ref 6.4–8.4)
PROTHROMBIN TIME: 14 SECONDS (ref 12.3–15)
RBC # BLD AUTO: 5.51 MILLION/UL (ref 3.88–5.62)
SALICYLATES SERPL-MCNC: <5 MG/DL (ref 5–20)
SODIUM SERPL-SCNC: 137 MMOL/L (ref 135–147)
WBC # BLD AUTO: 7.71 THOUSAND/UL (ref 4.31–10.16)

## 2025-05-08 PROCEDURE — 85025 COMPLETE CBC W/AUTO DIFF WBC: CPT

## 2025-05-08 PROCEDURE — 82075 ASSAY OF BREATH ETHANOL: CPT

## 2025-05-08 PROCEDURE — 99284 EMERGENCY DEPT VISIT MOD MDM: CPT

## 2025-05-08 PROCEDURE — 85610 PROTHROMBIN TIME: CPT

## 2025-05-08 PROCEDURE — 84484 ASSAY OF TROPONIN QUANT: CPT

## 2025-05-08 PROCEDURE — 96365 THER/PROPH/DIAG IV INF INIT: CPT

## 2025-05-08 PROCEDURE — 99291 CRITICAL CARE FIRST HOUR: CPT | Performed by: EMERGENCY MEDICINE

## 2025-05-08 PROCEDURE — 85730 THROMBOPLASTIN TIME PARTIAL: CPT

## 2025-05-08 PROCEDURE — 83735 ASSAY OF MAGNESIUM: CPT

## 2025-05-08 PROCEDURE — 93005 ELECTROCARDIOGRAM TRACING: CPT

## 2025-05-08 PROCEDURE — 85049 AUTOMATED PLATELET COUNT: CPT

## 2025-05-08 PROCEDURE — 96375 TX/PRO/DX INJ NEW DRUG ADDON: CPT

## 2025-05-08 PROCEDURE — 82077 ASSAY SPEC XCP UR&BREATH IA: CPT

## 2025-05-08 PROCEDURE — 99223 1ST HOSP IP/OBS HIGH 75: CPT

## 2025-05-08 PROCEDURE — 80179 DRUG ASSAY SALICYLATE: CPT

## 2025-05-08 PROCEDURE — 80143 DRUG ASSAY ACETAMINOPHEN: CPT

## 2025-05-08 PROCEDURE — 83690 ASSAY OF LIPASE: CPT

## 2025-05-08 PROCEDURE — 71046 X-RAY EXAM CHEST 2 VIEWS: CPT

## 2025-05-08 PROCEDURE — 36415 COLL VENOUS BLD VENIPUNCTURE: CPT

## 2025-05-08 PROCEDURE — 80053 COMPREHEN METABOLIC PANEL: CPT

## 2025-05-08 PROCEDURE — 82550 ASSAY OF CK (CPK): CPT

## 2025-05-08 RX ORDER — DULOXETIN HYDROCHLORIDE 20 MG/1
20 CAPSULE, DELAYED RELEASE ORAL DAILY
Status: DISCONTINUED | OUTPATIENT
Start: 2025-05-08 | End: 2025-05-13 | Stop reason: HOSPADM

## 2025-05-08 RX ORDER — GABAPENTIN 300 MG/1
600 CAPSULE ORAL 3 TIMES DAILY
Status: DISCONTINUED | OUTPATIENT
Start: 2025-05-08 | End: 2025-05-09

## 2025-05-08 RX ORDER — OXYCODONE HYDROCHLORIDE 5 MG/1
5 TABLET ORAL EVERY 6 HOURS PRN
Status: DISCONTINUED | OUTPATIENT
Start: 2025-05-08 | End: 2025-05-09

## 2025-05-08 RX ORDER — SODIUM CHLORIDE, SODIUM GLUCONATE, SODIUM ACETATE, POTASSIUM CHLORIDE, MAGNESIUM CHLORIDE, SODIUM PHOSPHATE, DIBASIC, AND POTASSIUM PHOSPHATE .53; .5; .37; .037; .03; .012; .00082 G/100ML; G/100ML; G/100ML; G/100ML; G/100ML; G/100ML; G/100ML
1000 INJECTION, SOLUTION INTRAVENOUS ONCE
Status: COMPLETED | OUTPATIENT
Start: 2025-05-08 | End: 2025-05-09

## 2025-05-08 RX ORDER — LISINOPRIL 20 MG/1
20 TABLET ORAL DAILY
Status: DISCONTINUED | OUTPATIENT
Start: 2025-05-09 | End: 2025-05-13 | Stop reason: HOSPADM

## 2025-05-08 RX ORDER — ALBUTEROL SULFATE 90 UG/1
2 INHALANT RESPIRATORY (INHALATION) EVERY 4 HOURS PRN
Status: DISCONTINUED | OUTPATIENT
Start: 2025-05-08 | End: 2025-05-13 | Stop reason: HOSPADM

## 2025-05-08 RX ORDER — VANCOMYCIN HYDROCHLORIDE 1 G/200ML
12.5 INJECTION, SOLUTION INTRAVENOUS DAILY PRN
Status: DISCONTINUED | OUTPATIENT
Start: 2025-05-09 | End: 2025-05-10

## 2025-05-08 RX ORDER — HYDROMORPHONE HCL/PF 1 MG/ML
1 SYRINGE (ML) INJECTION ONCE
Status: COMPLETED | OUTPATIENT
Start: 2025-05-08 | End: 2025-05-08

## 2025-05-08 RX ORDER — POLYETHYLENE GLYCOL 3350 17 G/17G
17 POWDER, FOR SOLUTION ORAL DAILY PRN
Status: DISCONTINUED | OUTPATIENT
Start: 2025-05-08 | End: 2025-05-13 | Stop reason: HOSPADM

## 2025-05-08 RX ORDER — NICOTINE 21 MG/24HR
1 PATCH, TRANSDERMAL 24 HOURS TRANSDERMAL DAILY
Status: DISCONTINUED | OUTPATIENT
Start: 2025-05-09 | End: 2025-05-13 | Stop reason: HOSPADM

## 2025-05-08 RX ORDER — METHOCARBAMOL 750 MG/1
750 TABLET, FILM COATED ORAL 4 TIMES DAILY PRN
Status: DISCONTINUED | OUTPATIENT
Start: 2025-05-08 | End: 2025-05-13 | Stop reason: HOSPADM

## 2025-05-08 RX ORDER — ACETAMINOPHEN 325 MG/1
975 TABLET ORAL EVERY 8 HOURS SCHEDULED
Status: DISCONTINUED | OUTPATIENT
Start: 2025-05-08 | End: 2025-05-09

## 2025-05-08 RX ORDER — QUETIAPINE FUMARATE 300 MG/1
300 TABLET, FILM COATED ORAL
Status: DISCONTINUED | OUTPATIENT
Start: 2025-05-08 | End: 2025-05-13 | Stop reason: HOSPADM

## 2025-05-08 RX ORDER — HEPARIN SODIUM 5000 [USP'U]/ML
5000 INJECTION, SOLUTION INTRAVENOUS; SUBCUTANEOUS EVERY 8 HOURS SCHEDULED
Status: DISCONTINUED | OUTPATIENT
Start: 2025-05-08 | End: 2025-05-13 | Stop reason: HOSPADM

## 2025-05-08 RX ORDER — SODIUM CHLORIDE 9 MG/ML
125 INJECTION, SOLUTION INTRAVENOUS CONTINUOUS
Status: DISCONTINUED | OUTPATIENT
Start: 2025-05-08 | End: 2025-05-09

## 2025-05-08 RX ORDER — ASPIRIN 81 MG/1
324 TABLET, CHEWABLE ORAL ONCE
Status: COMPLETED | OUTPATIENT
Start: 2025-05-08 | End: 2025-05-08

## 2025-05-08 RX ADMIN — VANCOMYCIN HYDROCHLORIDE 1750 MG: 10 INJECTION, POWDER, LYOPHILIZED, FOR SOLUTION INTRAVENOUS at 19:24

## 2025-05-08 RX ADMIN — HYDROMORPHONE HYDROCHLORIDE 1 MG: 1 INJECTION, SOLUTION INTRAMUSCULAR; INTRAVENOUS; SUBCUTANEOUS at 18:30

## 2025-05-08 RX ADMIN — SODIUM CHLORIDE 125 ML/HR: 0.9 INJECTION, SOLUTION INTRAVENOUS at 21:53

## 2025-05-08 RX ADMIN — ASPIRIN 81 MG CHEWABLE TABLET 324 MG: 81 TABLET CHEWABLE at 19:20

## 2025-05-08 RX ADMIN — DULOXETINE HYDROCHLORIDE 20 MG: 20 CAPSULE, DELAYED RELEASE PELLETS ORAL at 23:19

## 2025-05-08 RX ADMIN — ACETAMINOPHEN 975 MG: 325 TABLET ORAL at 23:19

## 2025-05-08 RX ADMIN — GABAPENTIN 600 MG: 300 CAPSULE ORAL at 23:19

## 2025-05-08 RX ADMIN — QUETIAPINE FUMARATE 300 MG: 300 TABLET ORAL at 23:19

## 2025-05-08 RX ADMIN — METHOCARBAMOL 750 MG: 750 TABLET ORAL at 23:19

## 2025-05-08 RX ADMIN — OXYCODONE HYDROCHLORIDE 5 MG: 5 TABLET ORAL at 23:19

## 2025-05-08 RX ADMIN — SODIUM CHLORIDE, SODIUM GLUCONATE, SODIUM ACETATE, POTASSIUM CHLORIDE, MAGNESIUM CHLORIDE, SODIUM PHOSPHATE, DIBASIC, AND POTASSIUM PHOSPHATE 1000 ML: .53; .5; .37; .037; .03; .012; .00082 INJECTION, SOLUTION INTRAVENOUS at 18:29

## 2025-05-08 NOTE — ED PROVIDER NOTES
Time reflects when diagnosis was documented in both MDM as applicable and the Disposition within this note       Time User Action Codes Description Comment    5/8/2025  7:11 PM Redd, Victoriano Add [F11.90] Methadone use     5/8/2025  7:11 PM Redd, Victoriano Remove [F11.90] Methadone use     5/8/2025  7:11 PM Redd, Victoriano Add [F15.10] Methamphetamine use (HCC)     5/8/2025  7:11 PM Fidelity, Victoriano Add [N17.9] ANETA (acute kidney injury) (HCC)     5/8/2025  7:11 PM Fidelity, Victoriano Add [R79.89] Elevated troponin     5/8/2025  7:11 PM Redd, Victoriano Jasmine [R74.8] Elevated CK           ED Disposition       ED Disposition   Admit    Condition   Stable    Date/Time   Thu May 8, 2025  7:11 PM    Comment   Case was discussed with jessy and the patient's admission status was agreed to be Admission Status: inpatient status to the service of Dr. Feroz Martinez.               Assessment & Plan       Medical Decision Making  Given broad complaints as well is his chest pain.  Will obtain labs including troponin and CK.  Will also get a chest x-ray and EKG.  Will give fluids and pain control.    Elevated troponin will give aspirin at this time.  Additionally for the wounds on his face will give vancomycin.    Patient has a severe ANETA will admit at this time for continued medical management.    Amount and/or Complexity of Data Reviewed  Labs: ordered. Decision-making details documented in ED Course.  Radiology: ordered.  ECG/medicine tests:  Decision-making details documented in ED Course.    Risk  OTC drugs.  Prescription drug management.  Decision regarding hospitalization.        ED Course as of 05/08/25 1926   Thu May 08, 2025   1844 Hemoglobin(!): 17.2   1901 Total CK(!): 385   1901 ECG 12 lead  Sinus rhythm, T wave inversions in 2 3 aVF unchanged from prior.  V1 and V2 subtle ST elevation, normal intervals, normal axis, abnormal EKG largely unchanged from prior   1922 Due to his ANETA elevated CK and elevated troponin will admit for medical  "management.  Patient is not medically cleared for rehab/detox at this time.       Medications   multi-electrolyte (Plasmalyte-A/Isolyte-S PH 7.4/Normosol-R) IV bolus 1,000 mL (1,000 mL Intravenous New Bag 5/8/25 1829)   vancomycin (VANCOCIN) 1,750 mg in sodium chloride 0.9 % 500 mL IVPB (has no administration in time range)   HYDROmorphone (DILAUDID) injection 1 mg (1 mg Intravenous Given 5/8/25 1830)   aspirin chewable tablet 324 mg (324 mg Oral Given 5/8/25 1920)       ED Risk Strat Scores                    No data recorded        SBIRT 22yo+      Flowsheet Row Most Recent Value   Initial Alcohol Screen: US AUDIT-C     1. How often do you have a drink containing alcohol? 0 Filed at: 05/08/2025 1907   2. How many drinks containing alcohol do you have on a typical day you are drinking?  0 Filed at: 05/08/2025 1907   3a. Male UNDER 65: How often do you have five or more drinks on one occasion? 0 Filed at: 05/08/2025 1907   3b. FEMALE Any Age, or MALE 65+: How often do you have 4 or more drinks on one occassion? 0 Filed at: 05/08/2025 1907   Audit-C Score 0 Filed at: 05/08/2025 1907   YANELI: How many times in the past year have you...    Used an illegal drug or used a prescription medication for non-medical reasons? Once or Twice Filed at: 05/08/2025 1833   DAST-10: In the past 12 months...    1. Have you used drugs other than those required for medical reasons? 1 Filed at: 05/08/2025 1907   2. Do you use more than one drug at a time? 0 Filed at: 05/08/2025 1907   3. Have you had medical problems as a result of your drug use (e.g., memory loss, hepatitis, convulsions, bleeding, etc.)? 0 Filed at: 05/08/2025 1907   4. Have you had \"blackouts\" or \"flashbacks\" as a result of drug use?YesNo 0 Filed at: 05/08/2025 1907   5. Do you ever feel bad or guilty about your drug use? 1 Filed at: 05/08/2025 1907   6. Does your spouse (or parent) ever complain about your involvement with drugs? 0 Filed at: 05/08/2025 1907   7. Have " you neglected your family because of your use of drugs? 0 Filed at: 05/08/2025 1907   8. Have you engaged in illegal activities in order to obtain drugs? 0 Filed at: 05/08/2025 1907   9. Have you ever experienced withdrawal symptoms (felt sick) when you stopped taking drugs? 1 Filed at: 05/08/2025 1907   10. Are you always able to stop using drugs when you want to? 0 Filed at: 05/08/2025 1907   DAST-10 Score 3 Filed at: 05/08/2025 1907                            History of Present Illness       Chief Complaint   Patient presents with    Detox Evaluation     Pt reports meth use and has pain all over including burns on mouth, tongue and face. Pt requesting to go to detox.       Past Medical History:   Diagnosis Date    Asthma     Chronic pain     Hypertension     Neck pain     Psychoactive 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HCC)     Thyroglossal duct cyst       Past Surgical History:   Procedure Laterality Date    THYROGLOSSAL DUCT EXCISION      THYROID SURGERY      THYROID SURGERY        Family History   Problem Relation Age of Onset    Hypertension Mother     No Known Problems Father       Social History     Tobacco Use    Smoking status: Every Day     Current packs/day: 0.50     Types: Cigarettes    Smokeless tobacco: Never    Tobacco comments:     5 cigerettes a day    Vaping Use    Vaping status: Former    Substances: Nicotine, Flavoring   Substance Use Topics    Alcohol use: Yes     Comment: occas    Drug use: Not Currently     Types: Marijuana, Methamphetamines     Comment: last used 09/2024      E-Cigarette/Vaping    E-Cigarette Use Former User     Cartridges/Day 1       E-Cigarette/Vaping Substances    Nicotine Yes     THC No     CBD No     Flavoring Yes     Other No     Unknown No       I have reviewed and agree with the history as documented.     39-year-old male past medical history of EtOH use, polysubstance use, chronic pain presenting to the emergency department after using meth last night.  He states that he  relapsed on meth in which he snorted 2 lines of methamphetamine he says he has never gotten drugs from this individual before.  He thinks it was laced something because when he woke up today he has been having burning and discomfort around his mouth like he has wounds.  He says his nose hurt and he also has chest pain.  He says he has general myalgias and he thinks that he got a carpet burn on his face.  Patient states that he does not recall having trauma.  He says he has no headache dizziness, blurry vision, shortness of breath, nausea, vomiting, diarrhea, abdominal pain.  He does take oxycodone daily for chronic pain.  Last time that he took his oxycodone he states was this morning.  He denies any other substance use.  Denies alcohol use denies tobacco use.      Detox Evaluation      Review of Systems        Objective       ED Triage Vitals [05/08/25 1803]   Temperature Pulse Blood Pressure Respirations SpO2 Patient Position - Orthostatic VS   (!) 97 °F (36.1 °C) (!) 110 (!) 82/51 20 98 % Sitting      Temp Source Heart Rate Source BP Location FiO2 (%) Pain Score    Temporal Monitor Right arm -- 10 - Worst Possible Pain      Vitals      Date and Time Temp Pulse SpO2 Resp BP Pain Score FACES Pain Rating User   05/08/25 1905 -- -- -- -- -- 7 -- WW   05/08/25 1845 -- 78 96 % 12 96/52 -- -- AM   05/08/25 1830 -- 96 98 % 18 96/54 10 - Worst Possible Pain -- AM   05/08/25 1803 97 °F (36.1 °C) 110 98 % 20 82/51 10 - Worst Possible Pain -- BL            Physical Exam  Vitals and nursing note reviewed.   Constitutional:       Appearance: Normal appearance. He is well-developed.   HENT:      Head: Normocephalic.      Comments: Erythematous crusting to wounds on bilateral cheeks.     Nose: Nose normal.      Mouth/Throat:      Mouth: Mucous membranes are dry.      Pharynx: No oropharyngeal exudate or posterior oropharyngeal erythema.      Comments: Discoloration of the bottom inside lip with cracking of the lip.  Eyes:       Extraocular Movements: Extraocular movements intact.      Conjunctiva/sclera: Conjunctivae normal.      Pupils: Pupils are equal, round, and reactive to light.   Cardiovascular:      Rate and Rhythm: Regular rhythm. Tachycardia present.      Pulses: Normal pulses.      Heart sounds: Normal heart sounds.   Pulmonary:      Effort: Pulmonary effort is normal.      Breath sounds: Normal breath sounds.   Abdominal:      General: Bowel sounds are normal. There is no distension.      Palpations: Abdomen is soft.      Tenderness: There is no abdominal tenderness. There is no guarding or rebound.   Musculoskeletal:         General: Normal range of motion.      Cervical back: Normal range of motion and neck supple.      Right lower leg: No edema.      Left lower leg: No edema.   Skin:     Capillary Refill: Capillary refill takes less than 2 seconds.   Neurological:      General: No focal deficit present.      Mental Status: He is alert and oriented to person, place, and time.      Cranial Nerves: No cranial nerve deficit.   Psychiatric:         Mood and Affect: Mood normal.         Behavior: Behavior normal.         Results Reviewed       Procedure Component Value Units Date/Time    Ethanol [[id number]]  (Normal) Collected: 05/08/25 1824    Lab Status: Final result Specimen: Blood from Arm, Right Updated: 05/08/25 1859     Ethanol Lvl <10 mg/dL     Comprehensive metabolic panel [[id number]]  (Abnormal) Collected: 05/08/25 1824    Lab Status: Final result Specimen: Blood from Arm, Right Updated: 05/08/25 1859     Sodium 137 mmol/L      Potassium 4.4 mmol/L      Chloride 103 mmol/L      CO2 18 mmol/L      ANION GAP 16 mmol/L      BUN 37 mg/dL      Creatinine 3.75 mg/dL      Glucose 95 mg/dL      Calcium 9.9 mg/dL      AST 17 U/L      ALT 53 U/L      Alkaline Phosphatase 89 U/L      Total Protein 8.2 g/dL      Albumin 5.1 g/dL      Total Bilirubin 1.20 mg/dL      eGFR 18 ml/min/1.73sq m     Narrative:      National Kidney Disease  Foundation guidelines for Chronic Kidney Disease (CKD):     Stage 1 with normal or high GFR (GFR > 90 mL/min/1.73 square meters)    Stage 2 Mild CKD (GFR = 60-89 mL/min/1.73 square meters)    Stage 3A Moderate CKD (GFR = 45-59 mL/min/1.73 square meters)    Stage 3B Moderate CKD (GFR = 30-44 mL/min/1.73 square meters)    Stage 4 Severe CKD (GFR = 15-29 mL/min/1.73 square meters)    Stage 5 End Stage CKD (GFR <15 mL/min/1.73 square meters)  Note: GFR calculation is accurate only with a steady state creatinine    Lipase [[id number]]  (Normal) Collected: 05/08/25 1824    Lab Status: Final result Specimen: Blood from Arm, Right Updated: 05/08/25 1859     Lipase 15 u/L     Magnesium [[id number]]  (Normal) Collected: 05/08/25 1824    Lab Status: Final result Specimen: Blood from Arm, Right Updated: 05/08/25 1859     Magnesium 2.4 mg/dL     CK [[id number]]  (Abnormal) Collected: 05/08/25 1824    Lab Status: Final result Specimen: Blood from Arm, Right Updated: 05/08/25 1859     Total  U/L     Salicylate level [[id number]]  (Abnormal) Collected: 05/08/25 1824    Lab Status: Final result Specimen: Blood from Arm, Right Updated: 05/08/25 1859     Salicylate Lvl <5 mg/dL     Acetaminophen level-If concentration is detectable, please discuss with medical  on call. [[id number]]  (Abnormal) Collected: 05/08/25 1824    Lab Status: Final result Specimen: Blood from Arm, Right Updated: 05/08/25 1859     Acetaminophen Level <2 ug/mL     HS Troponin I 2hr [[id number]]     Lab Status: No result Specimen: Blood     HS Troponin 0hr (reflex protocol) [[id number]]  (Abnormal) Collected: 05/08/25 1824    Lab Status: Final result Specimen: Blood from Arm, Right Updated: 05/08/25 1855     hs TnI 0hr 182 ng/L     Protime-INR [[id number]]  (Normal) Collected: 05/08/25 1824    Lab Status: Final result Specimen: Blood from Arm, Right Updated: 05/08/25 1848     Protime 14.0 seconds      INR 1.05    Narrative:      INR Therapeutic  Range    Indication                                             INR Range      Atrial Fibrillation                                               2.0-3.0  Hypercoagulable State                                    2.0.2.3  Left Ventricular Asist Device                            2.0-3.0  Mechanical Heart Valve                                  -    Aortic(with afib, MI, embolism, HF, LA enlargement,    and/or coagulopathy)                                     2.0-3.0 (2.5-3.5)     Mitral                                                             2.5-3.5  Prosthetic/Bioprosthetic Heart Valve               2.0-3.0  Venous thromboembolism (VTE: VT, PE        2.0-3.0    APTT [[id number]]  (Normal) Collected: 05/08/25 1824    Lab Status: Final result Specimen: Blood from Arm, Right Updated: 05/08/25 1848     PTT 25 seconds     CBC and differential [[id number]]  (Abnormal) Collected: 05/08/25 1824    Lab Status: Final result Specimen: Blood from Arm, Right Updated: 05/08/25 1843     WBC 7.71 Thousand/uL      RBC 5.51 Million/uL      Hemoglobin 17.2 g/dL      Hematocrit 49.6 %      MCV 90 fL      MCH 31.2 pg      MCHC 34.7 g/dL      RDW 14.6 %      MPV 9.7 fL      Platelets 327 Thousands/uL      nRBC 0 /100 WBCs      Segmented % 61 %      Immature Grans % 0 %      Lymphocytes % 17 %      Monocytes % 20 %      Eosinophils Relative 1 %      Basophils Relative 1 %      Absolute Neutrophils 4.76 Thousands/µL      Absolute Immature Grans 0.03 Thousand/uL      Absolute Lymphocytes 1.27 Thousands/µL      Absolute Monocytes 1.51 Thousand/µL      Eosinophils Absolute 0.10 Thousand/µL      Basophils Absolute 0.04 Thousands/µL     POCT alcohol breath test [[id number]]  (Normal) Resulted: 05/08/25 1833    Lab Status: Final result Updated: 05/08/25 1833     EXTBreath Alcohol 0.000    Rapid drug screen, urine [[id number]]     Lab Status: No result Specimen: Urine             XR chest 2 views    (Results Pending)       Procedures    ED  Medication and Procedure Management   Prior to Admission Medications   Prescriptions Last Dose Informant Patient Reported? Taking?   DULoxetine (CYMBALTA) 20 mg capsule   No No   Sig: Take 1 capsule (20 mg total) by mouth daily   Diclofenac Sodium (VOLTAREN) 1 %   No No   Sig: Apply 2 g topically 4 (four) times a day   QUEtiapine (SEROquel) 300 mg tablet   No No   Sig: Take 1 tablet (300 mg total) by mouth daily at bedtime   acetaminophen (TYLENOL) 325 mg tablet   No No   Sig: Take 2 tablets (650 mg total) by mouth every 6 (six) hours as needed for mild pain, moderate pain, severe pain, headaches or fever   albuterol (PROVENTIL HFA,VENTOLIN HFA) 90 mcg/act inhaler   No No   Sig: Inhale 2 puffs every 4 (four) hours as needed for wheezing   gabapentin (NEURONTIN) 300 mg capsule   No No   Sig: Take 2 capsules (600 mg total) by mouth 3 (three) times a day   lisinopril (ZESTRIL) 20 mg tablet   No No   Sig: Take 1 tablet (20 mg total) by mouth daily   methocarbamol (ROBAXIN) 750 mg tablet   No No   Sig: Take 1 tablet (750 mg total) by mouth 4 (four) times a day as needed for muscle spasms   oxyCODONE (ROXICODONE) 5 immediate release tablet   No No   Sig: Take 1 tablet (5 mg total) by mouth every 6 (six) hours as needed for severe pain for up to 10 days Max Daily Amount: 20 mg   polyethylene glycol (MIRALAX) 17 g packet   No No   Sig: Take 17 g by mouth daily as needed (constipation)      Facility-Administered Medications: None     Patient's Medications   Discharge Prescriptions    No medications on file     No discharge procedures on file.  ED SEPSIS DOCUMENTATION   Time reflects when diagnosis was documented in both MDM as applicable and the Disposition within this note       Time User Action Codes Description Comment    5/8/2025  7:11 PM Victoriano Rainey Add [F11.90] Methadone use     5/8/2025  7:11 PM Victoriano Rainey Remove [F11.90] Methadone use     5/8/2025  7:11 PM Victoriano Rainey Add [F15.10] Methamphetamine use (HCC)      5/8/2025  7:11 PM Victoriano Rainey [N17.9] ANETA (acute kidney injury) (HCC)     5/8/2025  7:11 PM Victoriano Rainey [R79.89] Elevated troponin     5/8/2025  7:11 PM Victoriano Rainey [R74.8] Elevated CK                  Victoriano Rainey MD  05/08/25 1926

## 2025-05-09 LAB
4HR DELTA HS TROPONIN: 190 NG/L
ALBUMIN SERPL BCG-MCNC: 3.4 G/DL (ref 3.5–5)
ALP SERPL-CCNC: 60 U/L (ref 34–104)
ALT SERPL W P-5'-P-CCNC: 32 U/L (ref 7–52)
AMPHETAMINES SERPL QL SCN: POSITIVE
ANION GAP SERPL CALCULATED.3IONS-SCNC: 7 MMOL/L (ref 4–13)
AST SERPL W P-5'-P-CCNC: 11 U/L (ref 13–39)
BACTERIA UR QL AUTO: ABNORMAL /HPF
BARBITURATES UR QL: NEGATIVE
BENZODIAZ UR QL: NEGATIVE
BILIRUB SERPL-MCNC: 0.5 MG/DL (ref 0.2–1)
BILIRUB UR QL STRIP: NEGATIVE
BUN SERPL-MCNC: 42 MG/DL (ref 5–25)
CALCIUM ALBUM COR SERPL-MCNC: 8.3 MG/DL (ref 8.3–10.1)
CALCIUM SERPL-MCNC: 7.8 MG/DL (ref 8.4–10.2)
CARDIAC TROPONIN I PNL SERPL HS: 372 NG/L (ref ?–50)
CHLORIDE SERPL-SCNC: 108 MMOL/L (ref 96–108)
CLARITY UR: CLEAR
CO2 SERPL-SCNC: 22 MMOL/L (ref 21–32)
COCAINE UR QL: NEGATIVE
COLOR UR: YELLOW
CREAT SERPL-MCNC: 3.68 MG/DL (ref 0.6–1.3)
CREAT UR-MCNC: 297.2 MG/DL
ERYTHROCYTE [DISTWIDTH] IN BLOOD BY AUTOMATED COUNT: 14.8 % (ref 11.6–15.1)
FENTANYL UR QL SCN: NEGATIVE
GFR SERPL CREATININE-BSD FRML MDRD: 19 ML/MIN/1.73SQ M
GLUCOSE SERPL-MCNC: 126 MG/DL (ref 65–140)
GLUCOSE UR STRIP-MCNC: NEGATIVE MG/DL
HCT VFR BLD AUTO: 39.2 % (ref 36.5–49.3)
HGB BLD-MCNC: 13.5 G/DL (ref 12–17)
HGB UR QL STRIP.AUTO: NEGATIVE
HYALINE CASTS #/AREA URNS LPF: ABNORMAL /LPF
HYDROCODONE UR QL SCN: POSITIVE
KETONES UR STRIP-MCNC: ABNORMAL MG/DL
LEUKOCYTE ESTERASE UR QL STRIP: NEGATIVE
MAGNESIUM SERPL-MCNC: 2.1 MG/DL (ref 1.9–2.7)
MCH RBC QN AUTO: 31.4 PG (ref 26.8–34.3)
MCHC RBC AUTO-ENTMCNC: 34.2 G/DL (ref 31.4–37.4)
MCV RBC AUTO: 92 FL (ref 82–98)
METHADONE UR QL: NEGATIVE
MUCOUS THREADS UR QL AUTO: ABNORMAL
NITRITE UR QL STRIP: NEGATIVE
NON-SQ EPI CELLS URNS QL MICRO: ABNORMAL /HPF
OPIATES UR QL SCN: POSITIVE
OXYCODONE+OXYMORPHONE UR QL SCN: POSITIVE
PCP UR QL: NEGATIVE
PH UR STRIP.AUTO: 5.5 [PH]
PLATELET # BLD AUTO: 268 THOUSANDS/UL (ref 149–390)
PMV BLD AUTO: 9.9 FL (ref 8.9–12.7)
POTASSIUM SERPL-SCNC: 4.1 MMOL/L (ref 3.5–5.3)
PROT SERPL-MCNC: 5.9 G/DL (ref 6.4–8.4)
PROT UR STRIP-MCNC: ABNORMAL MG/DL
RBC # BLD AUTO: 4.27 MILLION/UL (ref 3.88–5.62)
RBC #/AREA URNS AUTO: ABNORMAL /HPF
SODIUM SERPL-SCNC: 137 MMOL/L (ref 135–147)
SODIUM UR-SCNC: 54 MMOL/L
SP GR UR STRIP.AUTO: 1.02 (ref 1–1.03)
THC UR QL: POSITIVE
UROBILINOGEN UR STRIP-ACNC: 2 MG/DL
VANCOMYCIN TROUGH SERPL-MCNC: 21.3 UG/ML (ref 10–20)
WBC # BLD AUTO: 5.91 THOUSAND/UL (ref 4.31–10.16)
WBC #/AREA URNS AUTO: ABNORMAL /HPF

## 2025-05-09 PROCEDURE — 83735 ASSAY OF MAGNESIUM: CPT

## 2025-05-09 PROCEDURE — 80307 DRUG TEST PRSMV CHEM ANLYZR: CPT

## 2025-05-09 PROCEDURE — 84300 ASSAY OF URINE SODIUM: CPT | Performed by: INTERNAL MEDICINE

## 2025-05-09 PROCEDURE — 80202 ASSAY OF VANCOMYCIN: CPT

## 2025-05-09 PROCEDURE — 85027 COMPLETE CBC AUTOMATED: CPT

## 2025-05-09 PROCEDURE — 99233 SBSQ HOSP IP/OBS HIGH 50: CPT | Performed by: INTERNAL MEDICINE

## 2025-05-09 PROCEDURE — 84484 ASSAY OF TROPONIN QUANT: CPT

## 2025-05-09 PROCEDURE — 82570 ASSAY OF URINE CREATININE: CPT | Performed by: INTERNAL MEDICINE

## 2025-05-09 PROCEDURE — 80053 COMPREHEN METABOLIC PANEL: CPT

## 2025-05-09 PROCEDURE — 81001 URINALYSIS AUTO W/SCOPE: CPT | Performed by: INTERNAL MEDICINE

## 2025-05-09 RX ORDER — GABAPENTIN 100 MG/1
200 CAPSULE ORAL 3 TIMES DAILY
Status: DISCONTINUED | OUTPATIENT
Start: 2025-05-09 | End: 2025-05-13 | Stop reason: HOSPADM

## 2025-05-09 RX ORDER — MUPIROCIN 20 MG/G
OINTMENT TOPICAL 2 TIMES DAILY
Status: DISCONTINUED | OUTPATIENT
Start: 2025-05-09 | End: 2025-05-13 | Stop reason: HOSPADM

## 2025-05-09 RX ORDER — OXYCODONE AND ACETAMINOPHEN 5; 325 MG/1; MG/1
1 TABLET ORAL EVERY 4 HOURS PRN
Status: DISCONTINUED | OUTPATIENT
Start: 2025-05-09 | End: 2025-05-12

## 2025-05-09 RX ORDER — SODIUM CHLORIDE, SODIUM GLUCONATE, SODIUM ACETATE, POTASSIUM CHLORIDE, MAGNESIUM CHLORIDE, SODIUM PHOSPHATE, DIBASIC, AND POTASSIUM PHOSPHATE .53; .5; .37; .037; .03; .012; .00082 G/100ML; G/100ML; G/100ML; G/100ML; G/100ML; G/100ML; G/100ML
200 INJECTION, SOLUTION INTRAVENOUS CONTINUOUS
Status: DISCONTINUED | OUTPATIENT
Start: 2025-05-09 | End: 2025-05-09

## 2025-05-09 RX ADMIN — GABAPENTIN 200 MG: 100 CAPSULE ORAL at 21:35

## 2025-05-09 RX ADMIN — GABAPENTIN 200 MG: 100 CAPSULE ORAL at 15:57

## 2025-05-09 RX ADMIN — DULOXETINE HYDROCHLORIDE 20 MG: 20 CAPSULE, DELAYED RELEASE PELLETS ORAL at 08:01

## 2025-05-09 RX ADMIN — ALBUTEROL SULFATE 2 PUFF: 90 AEROSOL, METERED RESPIRATORY (INHALATION) at 08:01

## 2025-05-09 RX ADMIN — HEPARIN SODIUM 5000 UNITS: 5000 INJECTION INTRAVENOUS; SUBCUTANEOUS at 21:36

## 2025-05-09 RX ADMIN — ACETAMINOPHEN 975 MG: 325 TABLET ORAL at 07:02

## 2025-05-09 RX ADMIN — QUETIAPINE FUMARATE 300 MG: 300 TABLET ORAL at 21:36

## 2025-05-09 RX ADMIN — MUPIROCIN: 20 OINTMENT TOPICAL at 18:41

## 2025-05-09 RX ADMIN — ACETAMINOPHEN 975 MG: 325 TABLET ORAL at 15:57

## 2025-05-09 RX ADMIN — SODIUM CHLORIDE 125 ML/HR: 0.9 INJECTION, SOLUTION INTRAVENOUS at 03:09

## 2025-05-09 RX ADMIN — GABAPENTIN 600 MG: 300 CAPSULE ORAL at 08:00

## 2025-05-09 NOTE — PROGRESS NOTES
Amado Chin is a 39 y.o. male who is currently ordered Vancomycin IV with management by the Pharmacy Consult service.  Relevant clinical data and objective / subjective history reviewed.  Vancomycin Assessment:  Indication and Goal AUC/Trough: Soft tissue (goal -600, trough >10); Other, Pulse dosing  Clinical Status: stable  Micro:     Renal Function:  SCr: 3.68 mg/dL  (baseline 0.9-1)  CrCl: 26.5 mL/min  Renal replacement: Not on dialysis  Days of Therapy: 2  Current Dose: 1gm iv daily prn level < 15  Vancomycin Plan:  New Dosing:  continue 1 gm iv daily prn level < 15  Estimated AUC: N/A  Estimated Trough: N/A  No dose 5/9, level resulted at 21.3  Next Level: 5/10 0600  Renal Function Monitoring: Daily BMP and UOP  Pharmacy will continue to follow closely for s/sx of nephrotoxicity, infusion reactions and appropriateness of therapy.  BMP and CBC will be ordered per protocol. We will continue to follow the patient’s culture results and clinical progress daily.    Janis Acuna, Pharmacist

## 2025-05-09 NOTE — ASSESSMENT & PLAN NOTE
Lab Results   Component Value Date    CREATININE 3.75 (H) 05/08/2025    CREATININE 1.03 04/29/2025    CREATININE 0.87 04/21/2025       Lab Results   Component Value Date    EGFR 18 05/08/2025    EGFR 89 04/29/2025    EGFR 107 04/21/2025       POA3.75; (baseline 1.0)    Plan:  UA w/ microscopic, Urinary retention protocol, Bladder scan, Daily weights, I/O  Lactated ringer 100 mL/h  Monitor BMP daily and observe for downward trend of creatinine  Avoid hypoperfusion of the kidneys, minimize nephrotoxins  RAAS Blockers/Diuretics held: Holding lisinopril

## 2025-05-09 NOTE — PROGRESS NOTES
Amado Chin is a 39 y.o. male who is currently ordered Vancomycin IV with management by the Pharmacy Consult service.  Relevant clinical data and objective / subjective history reviewed.  Vancomycin Assessment:  Indication and Goal AUC/Trough: Soft tissue (goal -600, trough >10); Other, Pulse dosing  Clinical Status: worsening  Micro:   No results last 168 hrs  Renal Function:  SCr: 3.75 mg/dL  CrCl: 25.4 mL/min  Renal replacement: Not on dialysis  Days of Therapy: 1  Current Dose: 1750 mg IV load; 1000 mg IV daily prn - give if vanco level <15  Vancomycin Plan:  New Dosing:    Estimated AUC: NA mcg*hr/mL  Estimated Trough: NA mcg/mL  Next Level: 5-9-2025 at 0600  Renal Function Monitoring: Daily BMP and UOP  Pharmacy will continue to follow closely for s/sx of nephrotoxicity, infusion reactions and appropriateness of therapy.  BMP and CBC will be ordered per protocol. We will continue to follow the patient’s culture results and clinical progress daily.    Devonte Pickens, Pharmacist

## 2025-05-09 NOTE — DISCHARGE INSTR - OTHER ORDERS
Pushpa Rene   Certified     [facility]  [phone]  Monday-Friday 6:45am-3:15pm

## 2025-05-09 NOTE — CERTIFIED RECOVERY SPECIALIST
Certified  Note    Patient name: Amado Chin  Location: [facility]  [place]: [facility]  Attending:  Mikey Collins MD MRN [id number]  : 1985  Age: 39 y.o.    Sex: male Date 2025         Substance Use History:     Social History     Substance and Sexual Activity   Alcohol Use Yes    Comment: occas        Social History     Substance and Sexual Activity   Drug Use Not Currently    Types: Marijuana, Methamphetamines    Comment: last used 2024      CRS attempted to engage, patient resting comfortably.     CRS team will continue to follow until discharge- Patients charts indicates patient recently relapsed on meth     Contact imformation provided         Pushpa Rene
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
## 2025-05-09 NOTE — CASE MANAGEMENT
Case Management Progress Note    Patient name Amado Chin  Location [facility] MRN [id number]  : 1985 Date 2025       LOS (days): 1  Geometric Mean LOS (GMLOS) (days): 3  Days to GMLOS:2.2        OBJECTIVE:        Current admission status: Inpatient  Preferred Pharmacy:   [id number] - [place], [place] - [address]  [address]  [place] [place] [place] [address]  Phone: [phone] Fax: [phone]    [facility] - [place], [place] - [address]  [address]   [place] [address]  Phone: [phone] Fax: [phone]    [facility] - [place] - [address]  [address]  [place] [address]  Phone: [phone] Fax: [phone]    Primary Care Provider: Tree Robledo MD    Primary Insurance: [place] [facility]  Secondary Insurance:     PROGRESS NOTE:    Pt sleeping throughout day-difficult to arrouse. S/w  and Dr. Collins re: DCP. Plan for HOST referral to [facility] if pt is agreeable. Will follow.
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
## 2025-05-09 NOTE — ASSESSMENT & PLAN NOTE
Lab Results   Component Value Date    CREATININE 3.68 (H) 05/09/2025    CREATININE 3.75 (H) 05/08/2025    CREATININE 1.03 04/29/2025       Lab Results   Component Value Date    EGFR 19 05/09/2025    EGFR 18 05/08/2025    EGFR 89 04/29/2025     POA3.75; (baseline 1.0)     Plan:  Holding lisinopril  Urine studies consistent with prerenal azotemia, will increase IVF  Will adjust gabapentin for renal function today  Monitor UO and repeat BMP in the AM  Pt is voiding without difficulty so will defer bladder and renal imaging today

## 2025-05-10 LAB
ANION GAP SERPL CALCULATED.3IONS-SCNC: 5 MMOL/L (ref 4–13)
ATRIAL RATE: 94 BPM
BUN SERPL-MCNC: 29 MG/DL (ref 5–25)
CALCIUM SERPL-MCNC: 8.3 MG/DL (ref 8.4–10.2)
CHLORIDE SERPL-SCNC: 112 MMOL/L (ref 96–108)
CK SERPL-CCNC: 174 U/L (ref 39–308)
CO2 SERPL-SCNC: 22 MMOL/L (ref 21–32)
CREAT SERPL-MCNC: 1.46 MG/DL (ref 0.6–1.3)
GFR SERPL CREATININE-BSD FRML MDRD: 58 ML/MIN/1.73SQ M
GLUCOSE SERPL-MCNC: 94 MG/DL (ref 65–140)
MAGNESIUM SERPL-MCNC: 2.1 MG/DL (ref 1.9–2.7)
P AXIS: 76 DEGREES
PHOSPHATE SERPL-MCNC: 2.6 MG/DL (ref 2.7–4.5)
POTASSIUM SERPL-SCNC: 4.3 MMOL/L (ref 3.5–5.3)
PR INTERVAL: 110 MS
QRS AXIS: 55 DEGREES
QRSD INTERVAL: 88 MS
QT INTERVAL: 356 MS
QTC INTERVAL: 446 MS
SODIUM SERPL-SCNC: 139 MMOL/L (ref 135–147)
T WAVE AXIS: -23 DEGREES
VANCOMYCIN SERPL-MCNC: 5.6 UG/ML (ref 10–20)
VENTRICULAR RATE: 94 BPM

## 2025-05-10 PROCEDURE — 80202 ASSAY OF VANCOMYCIN: CPT | Performed by: INTERNAL MEDICINE

## 2025-05-10 PROCEDURE — 93010 ELECTROCARDIOGRAM REPORT: CPT | Performed by: INTERNAL MEDICINE

## 2025-05-10 PROCEDURE — 83735 ASSAY OF MAGNESIUM: CPT | Performed by: INTERNAL MEDICINE

## 2025-05-10 PROCEDURE — 80048 BASIC METABOLIC PNL TOTAL CA: CPT | Performed by: INTERNAL MEDICINE

## 2025-05-10 PROCEDURE — 82550 ASSAY OF CK (CPK): CPT | Performed by: INTERNAL MEDICINE

## 2025-05-10 PROCEDURE — 99232 SBSQ HOSP IP/OBS MODERATE 35: CPT | Performed by: INTERNAL MEDICINE

## 2025-05-10 PROCEDURE — 84100 ASSAY OF PHOSPHORUS: CPT | Performed by: INTERNAL MEDICINE

## 2025-05-10 PROCEDURE — 87081 CULTURE SCREEN ONLY: CPT | Performed by: INTERNAL MEDICINE

## 2025-05-10 RX ADMIN — VANCOMYCIN HYDROCHLORIDE 750 MG: 1 INJECTION, POWDER, LYOPHILIZED, FOR SOLUTION INTRAVENOUS at 13:27

## 2025-05-10 RX ADMIN — GABAPENTIN 200 MG: 100 CAPSULE ORAL at 21:30

## 2025-05-10 RX ADMIN — QUETIAPINE FUMARATE 300 MG: 300 TABLET ORAL at 21:30

## 2025-05-10 RX ADMIN — DULOXETINE HYDROCHLORIDE 20 MG: 20 CAPSULE, DELAYED RELEASE PELLETS ORAL at 10:40

## 2025-05-10 RX ADMIN — OXYCODONE HYDROCHLORIDE AND ACETAMINOPHEN 1 TABLET: 5; 325 TABLET ORAL at 19:55

## 2025-05-10 RX ADMIN — GABAPENTIN 200 MG: 100 CAPSULE ORAL at 17:22

## 2025-05-10 RX ADMIN — VANCOMYCIN HYDROCHLORIDE 750 MG: 1 INJECTION, POWDER, LYOPHILIZED, FOR SOLUTION INTRAVENOUS at 23:32

## 2025-05-10 RX ADMIN — MUPIROCIN: 20 OINTMENT TOPICAL at 17:23

## 2025-05-10 RX ADMIN — MUPIROCIN: 20 OINTMENT TOPICAL at 10:38

## 2025-05-10 NOTE — ASSESSMENT & PLAN NOTE
Lab Results   Component Value Date    CREATININE 1.46 (H) 05/10/2025    CREATININE 3.68 (H) 05/09/2025    CREATININE 3.75 (H) 05/08/2025       Lab Results   Component Value Date    EGFR 58 05/10/2025    EGFR 19 05/09/2025    EGFR 18 05/08/2025     POA3.75; (baseline 1.0)     Plan:  Holding lisinopril  Urine studies consistent with prerenal azotemia, d/c'd IVF due to peripheral edema  Pt is drinking well and has a good appetite  Monitor UO and repeat BMP in the AM  Pt is voiding without difficulty so will defer bladder and renal imaging today

## 2025-05-10 NOTE — ED ATTENDING ATTESTATION
5/8/2025  I, Chance Steven MD, saw and evaluated the patient. I have discussed the patient with the resident/non-physician practitioner and agree with the resident's/non-physician practitioner's findings, Plan of Care, and MDM as documented in the resident's/non-physician practitioner's note, except where noted. All available labs and Radiology studies were reviewed.  I was present for key portions of any procedure(s) performed by the resident/non-physician practitioner and I was immediately available to provide assistance.       At this point I agree with the current assessment done in the Emergency Department.  I have conducted an independent evaluation of this patient a history and physical is as follows:  39-year-old male polysubstance abuse chronic pain on narcotics  Patient used methamphetamine last night he woke up he was having burning in his lips and on his face  Complains of chest pain with no radiation no nausea vomiting diaphoresis fever or chills no cough no shortness of breath  Exam the patient has a rash on his right cheek he has stomatitis over his lower lip otherwise the oral mucosa without lesions  Neck supple no JVD lungs clear heart regular mildly tachycardic no murmurs abdomen soft nontender extremities nontender no edema    Impression chest pain  Facial rash  Stomatitis  EKG shows ST changes but stable compared to old  Initial troponin elevated  Aspirin  Admitted for evaluation of chest pain likely secondary to methamphetamine use/abuse  Labs reveal acute kidney injury  ED Course         Critical Care Time  Procedures  Critical Care Time Statement: Upon my evaluation, this patient had a high probability of imminent or life-threatening deterioration due to Acute Kidney injury   Acidosis  Elevated Troponin   Polysubstance abuse   , which required my direct attention, intervention, and personal management.  I spent a total of 30 minutes directly providing critical care services, including  interpretation of complex medical databases, evaluating for the presence of life-threatening injuries or illnesses, management of organ system failure(s) , complex medical decision making (to support/prevent further life-threatening deterioration)., interpretation of hemodynamic data, and titration of continuous IV medications (drips). This time is exclusive of procedures, teaching, treating other patients, family meetings, and any prior time recorded by providers other than myself.
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
## 2025-05-10 NOTE — PROGRESS NOTES
Amado Chin is a 39 y.o. male who is currently ordered Vancomycin IV with management by the Pharmacy Consult service.  Relevant clinical data and objective / subjective history reviewed.  Vancomycin Assessment:  Indication and Goal AUC/Trough: Soft tissue (goal -600, trough >10), Pulse dosing, -600, trough >10  Clinical Status: stable  Micro:   5/10: MRSA nares in process  Renal Function:  SCr: 1.46 mg/dL  CrCl: 66.9 mL/min  Renal replacement: Not on dialysis  Days of Therapy: 2  Current Dose: 1000 mg IV daily prn random level less than 15  Vancomycin Plan:  New Dosing: change to 750 mg IV q12h due to improved renal function  Estimated AUC: 511 mcg*hr/mL  Estimated Trough: 15.9 mcg/mL  Next Level: 5/11 with with AM labs  Renal Function Monitoring: Daily BMP and UOP  Pharmacy will continue to follow closely for s/sx of nephrotoxicity, infusion reactions and appropriateness of therapy.  BMP and CBC will be ordered per protocol. We will continue to follow the patient’s culture results and clinical progress daily.    Suzie Fuentes, Pharmacist

## 2025-05-11 PROBLEM — F11.90 METHADONE USE: Status: RESOLVED | Noted: 2025-05-08 | Resolved: 2025-05-11

## 2025-05-11 LAB
ANION GAP SERPL CALCULATED.3IONS-SCNC: 5 MMOL/L (ref 4–13)
BUN SERPL-MCNC: 24 MG/DL (ref 5–25)
CALCIUM SERPL-MCNC: 8.7 MG/DL (ref 8.4–10.2)
CHLORIDE SERPL-SCNC: 108 MMOL/L (ref 96–108)
CO2 SERPL-SCNC: 26 MMOL/L (ref 21–32)
CREAT SERPL-MCNC: 1.16 MG/DL (ref 0.6–1.3)
GFR SERPL CREATININE-BSD FRML MDRD: 77 ML/MIN/1.73SQ M
GLUCOSE SERPL-MCNC: 106 MG/DL (ref 65–140)
MRSA NOSE QL CULT: NORMAL
POTASSIUM SERPL-SCNC: 4.1 MMOL/L (ref 3.5–5.3)
SODIUM SERPL-SCNC: 139 MMOL/L (ref 135–147)
VANCOMYCIN SERPL-MCNC: 9 UG/ML (ref 10–20)

## 2025-05-11 PROCEDURE — 80202 ASSAY OF VANCOMYCIN: CPT | Performed by: INTERNAL MEDICINE

## 2025-05-11 PROCEDURE — 80048 BASIC METABOLIC PNL TOTAL CA: CPT | Performed by: INTERNAL MEDICINE

## 2025-05-11 PROCEDURE — 99232 SBSQ HOSP IP/OBS MODERATE 35: CPT | Performed by: INTERNAL MEDICINE

## 2025-05-11 RX ORDER — VANCOMYCIN HYDROCHLORIDE 1 G/200ML
1000 INJECTION, SOLUTION INTRAVENOUS EVERY 12 HOURS
Status: DISCONTINUED | OUTPATIENT
Start: 2025-05-11 | End: 2025-05-11

## 2025-05-11 RX ORDER — CEPHALEXIN 500 MG/1
500 CAPSULE ORAL EVERY 6 HOURS SCHEDULED
Status: DISCONTINUED | OUTPATIENT
Start: 2025-05-11 | End: 2025-05-13 | Stop reason: HOSPADM

## 2025-05-11 RX ADMIN — GABAPENTIN 200 MG: 100 CAPSULE ORAL at 08:37

## 2025-05-11 RX ADMIN — GABAPENTIN 200 MG: 100 CAPSULE ORAL at 21:05

## 2025-05-11 RX ADMIN — OXYCODONE HYDROCHLORIDE AND ACETAMINOPHEN 1 TABLET: 5; 325 TABLET ORAL at 16:58

## 2025-05-11 RX ADMIN — CEPHALEXIN 500 MG: 500 CAPSULE ORAL at 17:01

## 2025-05-11 RX ADMIN — MUPIROCIN: 20 OINTMENT TOPICAL at 17:00

## 2025-05-11 RX ADMIN — OXYCODONE HYDROCHLORIDE AND ACETAMINOPHEN 1 TABLET: 5; 325 TABLET ORAL at 08:55

## 2025-05-11 RX ADMIN — GABAPENTIN 200 MG: 100 CAPSULE ORAL at 16:58

## 2025-05-11 RX ADMIN — DULOXETINE HYDROCHLORIDE 20 MG: 20 CAPSULE, DELAYED RELEASE PELLETS ORAL at 08:37

## 2025-05-11 RX ADMIN — VANCOMYCIN HYDROCHLORIDE 1000 MG: 1 INJECTION, SOLUTION INTRAVENOUS at 10:23

## 2025-05-11 RX ADMIN — QUETIAPINE FUMARATE 300 MG: 300 TABLET ORAL at 21:05

## 2025-05-11 RX ADMIN — MUPIROCIN: 20 OINTMENT TOPICAL at 08:45
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
## 2025-05-11 NOTE — PROGRESS NOTES
Amado Chin is a 39 y.o. male who is currently ordered Vancomycin IV with management by the Pharmacy Consult service.  Relevant clinical data and objective / subjective history reviewed.  Vancomycin Assessment:  Indication and Goal AUC/Trough: Soft tissue (goal -600, trough >10), Pulse dosing, -600, trough >10  Clinical Status: stable  Micro:   MRSA nares: in process  Renal Function:  SCr: 1.16 mg/dL  CrCl: 84 mL/min  Renal replacement: Not on dialysis  Days of Therapy: 3  Current Dose: 750 mg IV q12h  Vancomycin Plan:  New Dosin mg IV q12h  Estimated AUC: 464 mcg*hr/mL  Estimated Trough: 12.6 mcg/mL  Next Level:  with AM labs  Renal Function Monitoring: Daily BMP and UOP  Pharmacy will continue to follow closely for s/sx of nephrotoxicity, infusion reactions and appropriateness of therapy.  BMP and CBC will be ordered per protocol. We will continue to follow the patient’s culture results and clinical progress daily.    Suzie Fuentes, Pharmacist

## 2025-05-11 NOTE — ASSESSMENT & PLAN NOTE
Lab Results   Component Value Date    CREATININE 1.16 05/11/2025    CREATININE 1.46 (H) 05/10/2025    CREATININE 3.68 (H) 05/09/2025       Lab Results   Component Value Date    EGFR 77 05/11/2025    EGFR 58 05/10/2025    EGFR 19 05/09/2025     POA3.75; (baseline 1.0)     Plan:  Resolved now, renal function is within normal parameters  Holding lisinopril, will resume on discharge  Pt is drinking well and has a good appetite  Pt is voiding without difficulty

## 2025-05-11 NOTE — ASSESSMENT & PLAN NOTE
Patient comes relapsed and inhaled methamphetamine.   Medically stable to discharge to inpatient drug rehabilitation today whenever a bed is available
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
## 2025-05-12 PROBLEM — R79.89 ELEVATED TROPONIN: Status: ACTIVE | Noted: 2025-05-12

## 2025-05-12 LAB — GLUCOSE SERPL-MCNC: 136 MG/DL (ref 65–140)

## 2025-05-12 PROCEDURE — 99239 HOSP IP/OBS DSCHRG MGMT >30: CPT | Performed by: INTERNAL MEDICINE

## 2025-05-12 PROCEDURE — 93005 ELECTROCARDIOGRAM TRACING: CPT

## 2025-05-12 PROCEDURE — 82948 REAGENT STRIP/BLOOD GLUCOSE: CPT

## 2025-05-12 RX ORDER — DULOXETIN HYDROCHLORIDE 20 MG/1
20 CAPSULE, DELAYED RELEASE ORAL DAILY
Qty: 30 CAPSULE | Refills: 0 | Status: SHIPPED | OUTPATIENT
Start: 2025-05-12

## 2025-05-12 RX ORDER — CEPHALEXIN 500 MG/1
500 CAPSULE ORAL EVERY 6 HOURS SCHEDULED
Qty: 28 CAPSULE | Refills: 0 | Status: SHIPPED | OUTPATIENT
Start: 2025-05-12 | End: 2025-05-19

## 2025-05-12 RX ORDER — METHOCARBAMOL 750 MG/1
750 TABLET, FILM COATED ORAL 4 TIMES DAILY PRN
Qty: 120 TABLET | Refills: 0 | Status: SHIPPED | OUTPATIENT
Start: 2025-05-12

## 2025-05-12 RX ORDER — NICOTINE 21 MG/24HR
1 PATCH, TRANSDERMAL 24 HOURS TRANSDERMAL DAILY
Qty: 28 PATCH | Refills: 0 | Status: SHIPPED | OUTPATIENT
Start: 2025-05-13

## 2025-05-12 RX ORDER — GABAPENTIN 300 MG/1
600 CAPSULE ORAL 3 TIMES DAILY
Qty: 180 CAPSULE | Refills: 0 | Status: SHIPPED | OUTPATIENT
Start: 2025-05-12

## 2025-05-12 RX ORDER — LISINOPRIL 20 MG/1
20 TABLET ORAL DAILY
Qty: 30 TABLET | Refills: 0 | Status: SHIPPED | OUTPATIENT
Start: 2025-05-12

## 2025-05-12 RX ORDER — QUETIAPINE FUMARATE 300 MG/1
300 TABLET, FILM COATED ORAL
Qty: 30 TABLET | Refills: 0 | Status: SHIPPED | OUTPATIENT
Start: 2025-05-12

## 2025-05-12 RX ORDER — ALBUTEROL SULFATE 90 UG/1
2 INHALANT RESPIRATORY (INHALATION) EVERY 4 HOURS PRN
Qty: 6.7 G | Refills: 0 | Status: SHIPPED | OUTPATIENT
Start: 2025-05-12

## 2025-05-12 RX ADMIN — GABAPENTIN 200 MG: 100 CAPSULE ORAL at 08:23

## 2025-05-12 RX ADMIN — GABAPENTIN 200 MG: 100 CAPSULE ORAL at 22:15

## 2025-05-12 RX ADMIN — CEPHALEXIN 500 MG: 500 CAPSULE ORAL at 12:17

## 2025-05-12 RX ADMIN — MUPIROCIN: 20 OINTMENT TOPICAL at 08:26

## 2025-05-12 RX ADMIN — MUPIROCIN: 20 OINTMENT TOPICAL at 17:30

## 2025-05-12 RX ADMIN — CEPHALEXIN 500 MG: 500 CAPSULE ORAL at 05:59

## 2025-05-12 RX ADMIN — CEPHALEXIN 500 MG: 500 CAPSULE ORAL at 17:30

## 2025-05-12 RX ADMIN — QUETIAPINE FUMARATE 300 MG: 300 TABLET ORAL at 22:15

## 2025-05-12 RX ADMIN — METHOCARBAMOL 750 MG: 750 TABLET ORAL at 16:44

## 2025-05-12 RX ADMIN — CEPHALEXIN 500 MG: 500 CAPSULE ORAL at 00:15

## 2025-05-12 RX ADMIN — OXYCODONE HYDROCHLORIDE AND ACETAMINOPHEN 1 TABLET: 5; 325 TABLET ORAL at 08:23

## 2025-05-12 RX ADMIN — DULOXETINE HYDROCHLORIDE 20 MG: 20 CAPSULE, DELAYED RELEASE PELLETS ORAL at 08:22

## 2025-05-12 RX ADMIN — GABAPENTIN 200 MG: 100 CAPSULE ORAL at 16:36
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
## 2025-05-12 NOTE — ASSESSMENT & PLAN NOTE
Lab Results   Component Value Date    CREATININE 1.16 05/11/2025    CREATININE 1.46 (H) 05/10/2025    CREATININE 3.68 (H) 05/09/2025       Lab Results   Component Value Date    EGFR 77 05/11/2025    EGFR 58 05/10/2025    EGFR 19 05/09/2025     POA3.75; (baseline 1.0)     Plan:  Resolved now, renal function is within normal parameters  Resume lisinopril on discharge  Pt is drinking well and has a good appetite  Pt is voiding without difficulty

## 2025-05-12 NOTE — PROGRESS NOTES
Vancomycin IV Pharmacy-to-Dose Consultation    Amado Chin is a 39 y.o. male who was receiving Vancomycin IV with management by the Pharmacy Consult service for treatment of Soft tissue (goal -600, trough >10) Pulse dosing.       The patient's Vancomycin therapy has been completed / discontinued. Thank you for allowing us to take part in this patient's care. Pharmacy will sign-off now; please call or re-consult if there are any questions.        Juan Malagon. R.Ph., Pharmacist, Extension [phone]
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
## 2025-05-12 NOTE — ASSESSMENT & PLAN NOTE
No cardiac symptoms to suggest ischemia  Suspect his elevated troponin is due to methamphetamine abuse  No further workup at this time.
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
## 2025-05-12 NOTE — CONSULTS
Vancomycin IV Pharmacy-to-Dose Consultation    Amado Chin is a 39 y.o. male who was receiving Vancomycin IV with management by the Pharmacy Consult service for treatment of Soft tissue (goal -600, trough >10) Pulse dosing.       The patient's Vancomycin therapy has been completed / discontinued. Thank you for allowing us to take part in this patient's care. Pharmacy will sign-off now; please call or re-consult if there are any questions.        Juan Malagon. R.Ph., Pharmacist, Extension [phone]
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
## 2025-05-12 NOTE — ASSESSMENT & PLAN NOTE
Continue OP regimen of robaxin  Will discontinue opiate analgesics on discharge as he is attending inpatient drug rehabilitation

## 2025-05-13 VITALS
DIASTOLIC BLOOD PRESSURE: 72 MMHG | HEIGHT: 65 IN | RESPIRATION RATE: 19 BRPM | OXYGEN SATURATION: 99 % | TEMPERATURE: 97.6 F | WEIGHT: 188 LBS | HEART RATE: 145 BPM | SYSTOLIC BLOOD PRESSURE: 106 MMHG | BODY MASS INDEX: 31.32 KG/M2

## 2025-05-13 LAB
ATRIAL RATE: 76 BPM
P AXIS: 73 DEGREES
PR INTERVAL: 138 MS
QRS AXIS: 13 DEGREES
QRSD INTERVAL: 92 MS
QT INTERVAL: 388 MS
QTC INTERVAL: 437 MS
T WAVE AXIS: 30 DEGREES
VENTRICULAR RATE: 76 BPM

## 2025-05-13 PROCEDURE — 93010 ELECTROCARDIOGRAM REPORT: CPT | Performed by: INTERNAL MEDICINE

## 2025-05-13 RX ADMIN — DULOXETINE HYDROCHLORIDE 20 MG: 20 CAPSULE, DELAYED RELEASE PELLETS ORAL at 08:24

## 2025-05-13 RX ADMIN — CEPHALEXIN 500 MG: 500 CAPSULE ORAL at 06:17

## 2025-05-13 RX ADMIN — MUPIROCIN: 20 OINTMENT TOPICAL at 08:25

## 2025-05-13 RX ADMIN — CEPHALEXIN 500 MG: 500 CAPSULE ORAL at 00:23

## 2025-05-13 RX ADMIN — GABAPENTIN 200 MG: 100 CAPSULE ORAL at 08:25
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
## 2025-05-14 ENCOUNTER — HOSPITAL ENCOUNTER (EMERGENCY)
Facility: HOSPITAL | Age: 40
Discharge: HOME | End: 2025-05-14
Attending: EMERGENCY MEDICINE
Payer: MEDICARE

## 2025-05-14 ENCOUNTER — APPOINTMENT (EMERGENCY)
Dept: RADIOLOGY | Facility: HOSPITAL | Age: 40
End: 2025-05-14
Payer: MEDICARE

## 2025-05-14 VITALS
TEMPERATURE: 97.8 F | DIASTOLIC BLOOD PRESSURE: 76 MMHG | RESPIRATION RATE: 17 BRPM | SYSTOLIC BLOOD PRESSURE: 137 MMHG | HEART RATE: 64 BPM | OXYGEN SATURATION: 95 %

## 2025-05-14 DIAGNOSIS — F41.9 ANXIETY: ICD-10-CM

## 2025-05-14 DIAGNOSIS — R00.2 PALPITATIONS: Primary | ICD-10-CM

## 2025-05-14 LAB
ALBUMIN SERPL BCG-MCNC: 4.3 G/DL (ref 3.5–5)
ALP SERPL-CCNC: 75 U/L (ref 34–104)
ALT SERPL W P-5'-P-CCNC: 44 U/L (ref 7–52)
ANION GAP SERPL CALCULATED.3IONS-SCNC: 6 MMOL/L (ref 4–13)
AST SERPL W P-5'-P-CCNC: 20 U/L (ref 13–39)
ATRIAL RATE: 83 BPM
BASOPHILS # BLD AUTO: 0.07 THOUSANDS/ÂΜL (ref 0–0.1)
BASOPHILS NFR BLD AUTO: 1 % (ref 0–1)
BILIRUB SERPL-MCNC: 0.34 MG/DL (ref 0.2–1)
BNP SERPL-MCNC: 13 PG/ML (ref 0–100)
BUN SERPL-MCNC: 24 MG/DL (ref 5–25)
CALCIUM SERPL-MCNC: 9.7 MG/DL (ref 8.4–10.2)
CARDIAC TROPONIN I PNL SERPL HS: 7 NG/L (ref ?–50)
CHLORIDE SERPL-SCNC: 108 MMOL/L (ref 96–108)
CO2 SERPL-SCNC: 26 MMOL/L (ref 21–32)
CREAT SERPL-MCNC: 1.23 MG/DL (ref 0.6–1.3)
EOSINOPHIL # BLD AUTO: 0.16 THOUSAND/ÂΜL (ref 0–0.61)
EOSINOPHIL NFR BLD AUTO: 2 % (ref 0–6)
ERYTHROCYTE [DISTWIDTH] IN BLOOD BY AUTOMATED COUNT: 13.8 % (ref 11.6–15.1)
GFR SERPL CREATININE-BSD FRML MDRD: 72 ML/MIN/1.73SQ M
GLUCOSE SERPL-MCNC: 94 MG/DL (ref 65–140)
HCT VFR BLD AUTO: 42.7 % (ref 36.5–49.3)
HGB BLD-MCNC: 14.6 G/DL (ref 12–17)
IMM GRANULOCYTES # BLD AUTO: 0.06 THOUSAND/UL (ref 0–0.2)
IMM GRANULOCYTES NFR BLD AUTO: 1 % (ref 0–2)
LIPASE SERPL-CCNC: 42 U/L (ref 11–82)
LYMPHOCYTES # BLD AUTO: 1.4 THOUSANDS/ÂΜL (ref 0.6–4.47)
LYMPHOCYTES NFR BLD AUTO: 20 % (ref 14–44)
MCH RBC QN AUTO: 31 PG (ref 26.8–34.3)
MCHC RBC AUTO-ENTMCNC: 34.2 G/DL (ref 31.4–37.4)
MCV RBC AUTO: 91 FL (ref 82–98)
MONOCYTES # BLD AUTO: 0.93 THOUSAND/ÂΜL (ref 0.17–1.22)
MONOCYTES NFR BLD AUTO: 13 % (ref 4–12)
NEUTROPHILS # BLD AUTO: 4.43 THOUSANDS/ÂΜL (ref 1.85–7.62)
NEUTS SEG NFR BLD AUTO: 63 % (ref 43–75)
NRBC BLD AUTO-RTO: 0 /100 WBCS
P AXIS: 67 DEGREES
PLATELET # BLD AUTO: 352 THOUSANDS/UL (ref 149–390)
PMV BLD AUTO: 9.7 FL (ref 8.9–12.7)
POTASSIUM SERPL-SCNC: 4.4 MMOL/L (ref 3.5–5.3)
PR INTERVAL: 134 MS
PROT SERPL-MCNC: 7.3 G/DL (ref 6.4–8.4)
QRS AXIS: 16 DEGREES
QRSD INTERVAL: 88 MS
QT INTERVAL: 364 MS
QTC INTERVAL: 427 MS
RBC # BLD AUTO: 4.71 MILLION/UL (ref 3.88–5.62)
SODIUM SERPL-SCNC: 140 MMOL/L (ref 135–147)
T WAVE AXIS: 37 DEGREES
VENTRICULAR RATE: 83 BPM
WBC # BLD AUTO: 7.05 THOUSAND/UL (ref 4.31–10.16)

## 2025-05-14 PROCEDURE — 93005 ELECTROCARDIOGRAM TRACING: CPT

## 2025-05-14 PROCEDURE — 36415 COLL VENOUS BLD VENIPUNCTURE: CPT | Performed by: EMERGENCY MEDICINE

## 2025-05-14 PROCEDURE — 84484 ASSAY OF TROPONIN QUANT: CPT | Performed by: EMERGENCY MEDICINE

## 2025-05-14 PROCEDURE — 85025 COMPLETE CBC W/AUTO DIFF WBC: CPT | Performed by: EMERGENCY MEDICINE

## 2025-05-14 PROCEDURE — 93010 ELECTROCARDIOGRAM REPORT: CPT | Performed by: INTERNAL MEDICINE

## 2025-05-14 PROCEDURE — 83690 ASSAY OF LIPASE: CPT | Performed by: EMERGENCY MEDICINE

## 2025-05-14 PROCEDURE — 99285 EMERGENCY DEPT VISIT HI MDM: CPT

## 2025-05-14 PROCEDURE — 83880 ASSAY OF NATRIURETIC PEPTIDE: CPT | Performed by: EMERGENCY MEDICINE

## 2025-05-14 PROCEDURE — 71045 X-RAY EXAM CHEST 1 VIEW: CPT

## 2025-05-14 PROCEDURE — 99285 EMERGENCY DEPT VISIT HI MDM: CPT | Performed by: EMERGENCY MEDICINE

## 2025-05-14 PROCEDURE — 80053 COMPREHEN METABOLIC PANEL: CPT | Performed by: EMERGENCY MEDICINE

## 2025-05-14 NOTE — ED NOTES
[facility] called for update.transport to facility      Belle Mckeon RN  05/14/25 2884

## 2025-05-14 NOTE — ED PROVIDER NOTES
ED Disposition       None          Assessment & Plan       Medical Decision Making     Reviewed past medical records: Yes, no previous history of MI noted.     History Provided by: Patient     Differential considered: Arrhythmia, ACS, recreational drug use, anxiety, medication side effect     Consideration of tests: EKG and electrolytes are all okay, patient is low risk from a heart score restratification.  Troponin okay, patient is hyper focused on the time of day and thinks that this happened.  More likely to be anxiety given age and lack of direct risk factors for acute myocardial infarction.  Will need follow-up with his clinical prescriber as this may be due to medication side effects but he takes high doses and will need to have medications titrated down before switching.  His hyperfocus, anxiety could also be associated with recreational drug use of methamphetamine.  Patient did endorse use within the last week.  Patient verbalizes understanding and agreement with plan.       I have reviewed the patient's visit and any testing done in the emergency department.  They have verbalized their understanding of any testing done today and have no further questions or concerns regarding their care in the emergency room.  They will follow up with their primary care physician as well as with any specialist in their discharge instructions.  Strict return precautions were discussed.    Amount and/or Complexity of Data Reviewed  Labs: ordered.  Radiology: ordered.        ED Course as of 05/15/25 0929   Wed May 14, 2025   1544 Procedure Note: EKG  Date/Time: 05/14/25 3:44 PM   Performed by: Benny Jones  Authorized by: Benny Jones  ECG interpreted by me, the ED Provider: yes   The EKG demonstrates:  Rate 83  Rhythm sinus  QTc 427  No ST elevations/depressions       1641 Labs okay. EKG non ischemic. CXR okay. Vitals stable. Likely medication side effect vs anxiety vs recreation drug use. Patient currently declining to  "provide urine for UDS. Does endorse meth use between hospital discharge and going to [facility].        Medications - No data to display    ED Risk Strat Scores                    No data recorded        SBIRT 20yo+      Flowsheet Row Most Recent Value   Initial Alcohol Screen: US AUDIT-C     1. How often do you have a drink containing alcohol? 0 Filed at: 05/14/2025 1517   2. How many drinks containing alcohol do you have on a typical day you are drinking?  0 Filed at: 05/14/2025 1519   3a. Male UNDER 65: How often do you have five or more drinks on one occasion? 0 Filed at: 05/14/2025 1518   3b. FEMALE Any Age, or MALE 65+: How often do you have 4 or more drinks on one occassion? 0 Filed at: 05/14/2025 1519   Audit-C Score 0 Filed at: 05/14/2025 1519   YANELI: How many times in the past year have you...    Used an illegal drug or used a prescription medication for non-medical reasons? Never Filed at: 05/14/2025 1519                            History of Present Illness       Chief Complaint   Patient presents with    Palpitations     Pt reports 2 days of withdraw from oxycodone. After taking his seroquel at bedtime, when he exhales he gets palpitations and \"feels like he's going to pass out\". Only happens at night time after taking his medications. Was prescribed 5mg oxycodone PRN - was taking dose twice a day for two weeks. Thinks that his seroquel is having a bad chemical reaction with his blood pressure medication.        Past Medical History:   Diagnosis Date    Asthma     Chronic pain     Hypertension     Neck pain     Psychoactive 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HCC)     Thyroglossal duct cyst       Past Surgical History:   Procedure Laterality Date    THYROGLOSSAL DUCT EXCISION      THYROID SURGERY      THYROID SURGERY        Family History   Problem Relation Age of Onset    Hypertension Mother     No Known Problems Father       Social History[1]   E-Cigarette/Vaping    E-Cigarette Use Former User     " Cartridges/Day 1       E-Cigarette/Vaping Substances    Nicotine Yes     THC No     CBD No     Flavoring Yes     Other No     Unknown No       I have reviewed and agree with the history as documented.     39-year-old male who presents for concerns of palpitations.  He notes they happen at night when asking her to go to sleep after taking his Seroquel.  He also thinks that some of his blood pressure medications which were recently added are affecting his symptoms.  He has never had a heart attack in the past.  Does endorse recreational drug use occasionally.  States he uses methamphetamine.  He thinks his last use was about a week ago.  He is currently at [facility] seeking treatment.  Had a recent admission for cellulitis and acute kidney injury.      Palpitations  Associated symptoms: no chest pain, no cough, no dizziness, no nausea, no numbness, no shortness of breath, no vomiting and no weakness        Review of Systems   Constitutional: Negative.  Negative for chills and fever.   HENT: Negative.  Negative for rhinorrhea, sore throat, trouble swallowing and voice change.    Eyes: Negative.  Negative for pain and visual disturbance.   Respiratory: Negative.  Negative for cough, shortness of breath and wheezing.    Cardiovascular:  Positive for palpitations. Negative for chest pain.   Gastrointestinal:  Negative for abdominal pain, diarrhea, nausea and vomiting.   Genitourinary: Negative.  Negative for dysuria and frequency.   Musculoskeletal: Negative.  Negative for neck pain and neck stiffness.   Skin: Negative.  Negative for rash.   Neurological: Negative.  Negative for dizziness, speech difficulty, weakness, light-headedness and numbness.           Objective       ED Triage Vitals [05/14/25 1516]   Temperature Pulse Blood Pressure Respirations SpO2 Patient Position - Orthostatic VS   97.8 °F (36.6 °C) 99 154/90 18 99 % Sitting      Temp Source Heart Rate Source BP Location FiO2 (%) Pain Score    Temporal  Monitor Left arm -- --      Vitals      Date and Time Temp Pulse SpO2 Resp BP Pain Score FACES Pain Rating User   05/14/25 1516 97.8 °F (36.6 °C) 99 99 % 18 154/90 -- -- HR            Physical Exam  Vitals and nursing note reviewed.   Constitutional:       General: He is not in acute distress.     Appearance: He is well-developed.   HENT:      Head: Normocephalic and atraumatic.     Eyes:      Conjunctiva/sclera: Conjunctivae normal.      Pupils: Pupils are equal, round, and reactive to light.     Neck:      Trachea: No tracheal deviation.     Cardiovascular:      Rate and Rhythm: Normal rate and regular rhythm.   Pulmonary:      Effort: Pulmonary effort is normal. No respiratory distress.      Breath sounds: Normal breath sounds. No wheezing or rales.   Abdominal:      General: Bowel sounds are normal. There is no distension.      Palpations: Abdomen is soft.      Tenderness: There is no abdominal tenderness. There is no guarding or rebound.     Musculoskeletal:         General: No tenderness or deformity. Normal range of motion.      Cervical back: Normal range of motion and neck supple.     Skin:     General: Skin is warm and dry.      Capillary Refill: Capillary refill takes less than 2 seconds.      Findings: No rash.     Neurological:      Mental Status: He is alert and oriented to person, place, and time.     Psychiatric:         Behavior: Behavior normal.       Results Reviewed       Procedure Component Value Units Date/Time    CBC and differential [[id number]]     Lab Status: No result Specimen: Blood     Comprehensive metabolic panel [[id number]]     Lab Status: No result Specimen: Blood     Lipase [[id number]]     Lab Status: No result Specimen: Blood     HS Troponin 0hr (reflex protocol) [[id number]]     Lab Status: No result Specimen: Blood     B-Type Natriuretic Peptide(BNP) [[id number]]     Lab Status: No result Specimen: Blood     Rapid drug screen, urine [[id number]]     Lab Status: No result Specimen:  Urine             XR chest 1 view portable    (Results Pending)       Procedures    ED Medication and Procedure Management   Prior to Admission Medications   Prescriptions Last Dose Informant Patient Reported? Taking?   DULoxetine (CYMBALTA) 20 mg capsule   No No   Sig: Take 1 capsule (20 mg total) by mouth daily   QUEtiapine (SEROquel) 300 mg tablet   No No   Sig: Take 1 tablet (300 mg total) by mouth daily at bedtime   albuterol (PROVENTIL HFA,VENTOLIN HFA) 90 mcg/act inhaler   No No   Sig: Inhale 2 puffs every 4 (four) hours as needed for wheezing   cephalexin (KEFLEX) 500 mg capsule   No No   Sig: Take 1 capsule (500 mg total) by mouth every 6 (six) hours for 28 doses   gabapentin (NEURONTIN) 300 mg capsule   No No   Sig: Take 2 capsules (600 mg total) by mouth 3 (three) times a day   lisinopril (ZESTRIL) 20 mg tablet   No No   Sig: Take 1 tablet (20 mg total) by mouth daily   methocarbamol (ROBAXIN) 750 mg tablet   No No   Sig: Take 1 tablet (750 mg total) by mouth 4 (four) times a day as needed for muscle spasms   nicotine (NICODERM CQ) 14 mg/24hr TD 24 hr patch   No No   Sig: Place 1 patch on the skin over 24 hours daily      Facility-Administered Medications: None     Patient's Medications   Discharge Prescriptions    No medications on file     No discharge procedures on file.  ED SEPSIS DOCUMENTATION              [1]   Social History  Tobacco Use    Smoking status: Every Day     Current packs/day: 0.50     Types: Cigarettes    Smokeless tobacco: Never    Tobacco comments:     5 cigerettes a day    Vaping Use    Vaping status: Former    Substances: Nicotine, Flavoring   Substance Use Topics    Alcohol use: Yes     Comment: occas    Drug use: Not Currently     Types: Marijuana, Methamphetamines     Comment: last used 09/2024        Owen Jones DO  05/15/25 0931

## 2025-05-14 NOTE — Clinical Note
Amado Chin was seen and treated in our emergency department on 5/14/2025.                Diagnosis:     Amado  .    He may return on this date: 05/17/2025         If you have any questions or concerns, please don't hesitate to call.      Owen Jones, DO    ______________________________           _______________          _______________  Hospital Representative                              Date                                Time

## 2025-05-23 ENCOUNTER — APPOINTMENT (EMERGENCY)
Dept: RADIOLOGY | Facility: HOSPITAL | Age: 40
End: 2025-05-23
Payer: MEDICARE

## 2025-05-23 ENCOUNTER — HOSPITAL ENCOUNTER (EMERGENCY)
Facility: HOSPITAL | Age: 40
Discharge: HOME | End: 2025-05-23
Attending: EMERGENCY MEDICINE
Payer: MEDICARE

## 2025-05-23 VITALS
HEART RATE: 89 BPM | TEMPERATURE: 97.1 F | OXYGEN SATURATION: 97 % | DIASTOLIC BLOOD PRESSURE: 87 MMHG | RESPIRATION RATE: 18 BRPM | WEIGHT: 190 LBS | HEIGHT: 65 IN | BODY MASS INDEX: 31.65 KG/M2 | SYSTOLIC BLOOD PRESSURE: 144 MMHG

## 2025-05-23 DIAGNOSIS — S93.402A LEFT ANKLE SPRAIN: Primary | ICD-10-CM

## 2025-05-23 PROCEDURE — 99284 EMERGENCY DEPT VISIT MOD MDM: CPT | Performed by: EMERGENCY MEDICINE

## 2025-05-23 PROCEDURE — 99283 EMERGENCY DEPT VISIT LOW MDM: CPT

## 2025-05-23 PROCEDURE — 73610 X-RAY EXAM OF ANKLE: CPT

## 2025-05-23 RX ORDER — ACETAMINOPHEN 325 MG/1
975 TABLET ORAL ONCE
Status: COMPLETED | OUTPATIENT
Start: 2025-05-23 | End: 2025-05-23

## 2025-05-23 RX ADMIN — ACETAMINOPHEN 975 MG: 325 TABLET, FILM COATED ORAL at 21:48

## 2025-05-24 NOTE — ED NOTES
Pt has an envelope with a phone number to call for his ride when D/C, pt is from [facility]      Elva Wood RN  05/23/25 [id number]

## 2025-05-24 NOTE — ED PROVIDER NOTES
Time reflects when diagnosis was documented in both MDM as applicable and the Disposition within this note       Time User Action Codes Description Comment    5/23/2025  9:55 PM Silvio Braxton Add [S93.402A] Left ankle sprain           ED Disposition       ED Disposition   Discharge    Condition   Stable    Date/Time   Fri May 23, 2025  9:55 PM    Comment   Amado Chin discharge to home/self care.                   Assessment & Plan       Medical Decision Making  Diff includes left ankle fx, sprain, less likely dislocation    Amount and/or Complexity of Data Reviewed  Radiology: ordered and independent interpretation performed.    Risk  OTC drugs.             Medications   acetaminophen (TYLENOL) tablet 975 mg (975 mg Oral Given 5/23/25 214)       ED Risk Strat Scores                    No data recorded                            History of Present Illness       Chief Complaint   Patient presents with    Ankle Injury     Pt states that he was jumping rope and he twisted the left ankle.        Past Medical History[1]   Past Surgical History[2]   Family History[3]   Social History[4]   E-Cigarette/Vaping    E-Cigarette Use Former User     Cartridges/Day 1       E-Cigarette/Vaping Substances    Nicotine Yes     THC No     CBD No     Flavoring Yes     Other No     Unknown No       I have reviewed and agree with the history as documented.     Hx from patient c/o left ankle pain since jumping rope today and heard cracks - pain worse with movement/ weight-bearing.  Fx of ankle 9 months ago, concerned if it is broke again.  Coming from [facility]        Review of Systems   Constitutional:  Negative for chills and fever.   HENT:  Negative for rhinorrhea and sore throat.    Respiratory:  Negative for shortness of breath.    Cardiovascular:  Negative for chest pain.   Gastrointestinal:  Negative for constipation, diarrhea, nausea and vomiting.   Genitourinary:  Negative for dysuria and frequency.   Skin:  Negative for  rash.   All other systems reviewed and are negative.          Objective       ED Triage Vitals [05/23/25 2125]   Temperature Pulse Blood Pressure Respirations SpO2 Patient Position - Orthostatic VS   (!) 97.1 °F (36.2 °C) 89 144/87 18 97 % --      Temp Source Heart Rate Source BP Location FiO2 (%) Pain Score    Temporal -- -- -- --      Vitals      Date and Time Temp Pulse SpO2 Resp BP Pain Score FACES Pain Rating User   05/23/25 2125 97.1 °F (36.2 °C) 89 97 % 18 144/87 -- -- LD            Physical Exam  Vitals and nursing note reviewed.   Constitutional:       Appearance: He is well-developed.   HENT:      Head: Normocephalic and atraumatic.      Right Ear: External ear normal.      Left Ear: External ear normal.      Nose: Nose normal.     Eyes:      Conjunctiva/sclera: Conjunctivae normal.      Pupils: Pupils are equal, round, and reactive to light.       Cardiovascular:      Rate and Rhythm: Normal rate and regular rhythm.      Heart sounds: Normal heart sounds.   Pulmonary:      Effort: Pulmonary effort is normal. No respiratory distress.      Breath sounds: Normal breath sounds. No wheezing.   Abdominal:      General: Bowel sounds are normal. There is no distension.      Palpations: Abdomen is soft.      Tenderness: There is no abdominal tenderness.     Musculoskeletal:         General: No deformity. Normal range of motion.      Cervical back: Normal range of motion and neck supple. No spinous process tenderness.      Left ankle: Tenderness present over the medial malleolus. Normal pulse.      Left Achilles Tendon: Normal.     Skin:     General: Skin is warm and dry.      Findings: No rash.     Neurological:      General: No focal deficit present.      Mental Status: He is alert.      GCS: GCS eye subscore is 4. GCS verbal subscore is 5. GCS motor subscore is 6.      Sensory: No sensory deficit.     Psychiatric:         Mood and Affect: Mood normal.         Results Reviewed       None            XR ankle 3+  views LEFT   ED Interpretation by Silvio Braxton DO (05/23 2153)   No acute abnl      Final Interpretation by Gil Lam MD (05/24 1023)      Mild soft tissue swelling over the lateral malleolus without an acute osseous abnormality.                  Resident: Manan Marie I, the attending radiologist, have reviewed the images and agree with the final report above.      Workstation performed: [id number]             Procedures    ED Medication and Procedure Management   Prior to Admission Medications   Prescriptions Last Dose Informant Patient Reported? Taking?   DULoxetine (CYMBALTA) 20 mg capsule   No No   Sig: Take 1 capsule (20 mg total) by mouth daily   QUEtiapine (SEROquel) 300 mg tablet   No No   Sig: Take 1 tablet (300 mg total) by mouth daily at bedtime   albuterol (PROVENTIL HFA,VENTOLIN HFA) 90 mcg/act inhaler   No No   Sig: Inhale 2 puffs every 4 (four) hours as needed for wheezing   gabapentin (NEURONTIN) 300 mg capsule   No No   Sig: Take 2 capsules (600 mg total) by mouth 3 (three) times a day   lisinopril (ZESTRIL) 20 mg tablet   No No   Sig: Take 1 tablet (20 mg total) by mouth daily   methocarbamol (ROBAXIN) 750 mg tablet   No No   Sig: Take 1 tablet (750 mg total) by mouth 4 (four) times a day as needed for muscle spasms   nicotine (NICODERM CQ) 14 mg/24hr TD 24 hr patch   No No   Sig: Place 1 patch on the skin over 24 hours daily      Facility-Administered Medications: None     Discharge Medication List as of 5/23/2025  9:56 PM        CONTINUE these medications which have NOT CHANGED    Details   albuterol (PROVENTIL HFA,VENTOLIN HFA) 90 mcg/act inhaler Inhale 2 puffs every 4 (four) hours as needed for wheezing, Starting Mon 5/12/2025, Print      DULoxetine (CYMBALTA) 20 mg capsule Take 1 capsule (20 mg total) by mouth daily, Starting Mon 5/12/2025, Print      gabapentin (NEURONTIN) 300 mg capsule Take 2 capsules (600 mg total) by mouth 3 (three) times a day, Starting Mon 5/12/2025,  Normal      lisinopril (ZESTRIL) 20 mg tablet Take 1 tablet (20 mg total) by mouth daily, Starting Mon 5/12/2025, Print      methocarbamol (ROBAXIN) 750 mg tablet Take 1 tablet (750 mg total) by mouth 4 (four) times a day as needed for muscle spasms, Starting Mon 5/12/2025, Print      nicotine (NICODERM CQ) 14 mg/24hr TD 24 hr patch Place 1 patch on the skin over 24 hours daily, Starting Tue 5/13/2025, Normal      QUEtiapine (SEROquel) 300 mg tablet Take 1 tablet (300 mg total) by mouth daily at bedtime, Starting Mon 5/12/2025, Print             ED SEPSIS DOCUMENTATION   Time reflects when diagnosis was documented in both MDM as applicable and the Disposition within this note       Time User Action Codes Description Comment    5/23/2025  9:55 PM Silvio Braxton Add [S93.402A] Left ankle sprain                      [1]   Past Medical History:  Diagnosis Date    Asthma     Chronic pain     Hypertension     Neck pain     Psychoactive 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HCC)     Thyroglossal duct cyst    [2]   Past Surgical History:  Procedure Laterality Date    THYROGLOSSAL DUCT EXCISION      THYROID SURGERY      THYROID SURGERY     [3]   Family History  Problem Relation Name Age of Onset    Hypertension Mother      No Known Problems Father     [4]   Social History  Tobacco Use    Smoking status: Every Day     Current packs/day: 0.50     Types: Cigarettes    Smokeless tobacco: Never    Tobacco comments:     5 cigerettes a day    Vaping Use    Vaping status: Former    Substances: Nicotine, Flavoring   Substance Use Topics    Alcohol use: Not Currently     Comment: occas    Drug use: Not Currently     Types: Marijuana, Methamphetamines     Comment: last used 09/2024        Silvio Braxton DO  05/24/25 1324

## 2025-06-02 NOTE — PROGRESS NOTES
Name: Amado Chin      : 1985      MRN: [id number]  Encounter Provider: Larry Song III, DO  Encounter Date: 6/3/2025   Encounter department: [facility]  :  Assessment & Plan  Pain, joint, ankle and foot, left  PMH: multiple ER visits, polysubstance abuse-  methamphetamine, marijuana  ANETA- severe with GFR 19, creatinine 3+ early May 2025- now resolved    - Patients examination today with ankle pain with multiple locations of irritation.  - Xray imaging negative for fracture or other osseous pathology at this time.  - no effusion of ankle joint on exam or on POCUS  - Examination appears most consistent with tibialis anterior strain and anterior ankle impingement. Also component of plantar fascitis  - Will place patient in CAM walking boot for foot support.  - Weight bearing as tolerated.  - Formal PT ordered for evaluation and treatment of tendonitis of the foot/ankle joint.    - Short interval follow up in 2 weeks for reassessment. If pain persists (3 weeks post injury) can discuss potential for advanced imaging(MRI) or other lab work for evaluation of persistent ankle swelling    Orders:  •  XR ankle 3+ vw left; Future  •  Durable Medical Equipment    Plantar fasciitis, left    Orders:  •  Ambulatory Referral to Physical Therapy; Future  •  Durable Medical Equipment    Peroneal tendinitis of left lower leg    Orders:  •  Ambulatory Referral to Physical Therapy; Future  •  Durable Medical Equipment    Strain of left tibialis anterior muscle, initial encounter    Orders:  •  Ambulatory Referral to Physical Therapy; Future  •  Durable Medical Equipment      History of Present Illness   HPI  Amado Chin is a 39 y.o. male who presents for evalaution of left ankle injury. Evaluated in the ED n 25 with xray imaging taken at that time.    2025:  PMH: multiple ER visits, polysubstance abuse-  methamphetamine, marijuana,     Describes that he has had diffuse  "pain over his ankle in multiple locations. He describes that it occurred after jumping rope for a brief period and then placing his feet under a weight to perform abdominal crunches. States that he felt his ankle \"was weird\". No traumatic injury to the ankle. No eversion/inversion injury to the ankle. Describes a remote history of a \"stable fracture\" this past October but we do not see imaging or encounters in the EMR to demonstrate this injury.    Mild swelling to the area and has been using a stirrup brace which has been giving him a rash. Using crutches to help with weight bearing and ambulation.      History obtained from: patient    Review of Systems  Pertinent Medical History            Medical History Reviewed by provider this encounter:     .  Medications Ordered Prior to Encounter[1]   Social History[2]     Objective   There were no vitals taken for this visit.     Physical Exam    Left Calf exam:  No swelling erythema or increased warmth, area of contact dermatitis near the area of were stirrup splint was contacting his skin. No abrasion or excoriations noted.  No palpable cords  Tenderness: Insertion of the achilles tendon on calcaneus  [place]aven Dorsiflexion DVT Test: Negative  (slight knee flexion, passive abrupt ankle dorsiflexion, squeeze calf)    LEFT ACHILLES EXAMINATION:  Simmonds’ Triad:  Palpable Gap or Defect of Achilles: none  Angle of Declination: angle of baseline plantarflexion symmetric to contralateral side  Matles Test (patient prone, intact and symmetric plantarflexion of ankle when flexing knee): intact  Still's Calf Squeeze Test: intact obligatory plantarflexion      Left Ankle and  foot exam  Minimal swelling   No erythema or increased warmth  Tenderness: none, along area of the tibialis anterior, anteromedial ankle, plantar fascia, tibialis posterior  ROM Toes extension: intact  ROM Toes flexion: intact  Strength Toes: 5/5 flex, ext  Sensation intact  Capillary refill " intact      Administrative Statements   I have spent a total time of 30 minutes in caring for this patient on the day of the visit/encounter including Diagnostic results, Prognosis, Risks and benefits of tx options, Instructions for management, Patient and family education, Importance of tx compliance, Risk factor reductions, Impressions, Counseling / Coordination of care, Documenting in the medical record, Reviewing/placing orders in the medical record (including tests, medications, and/or procedures), and Obtaining or reviewing history  .    ____________________________________________________________________    Return in about 2 weeks (around 6/17/2025) for Recheck.  ____________________________________________________________________    IMAGING STUDIES: (I personally reviewed images in PACS, and if available-report):     X-ray ankle 3+ views left (5/23/2025)  Mild soft tissue swelling over the lateral malleolus without an acute osseous abnormality.   Xray ankle 3+ views left (6/3/2025)  No roberto or obvious osseous pathology.  No areas of lucency appreciated over the talar dome.  Mild areas of soft tissue swelling along the distribution of the achilles tendon.    POCUS left ankle anterior 6/3/25:  no appreciable intraarticular joint swelling.    PAST REPORTS:    ____________________________________________________________________    Procedures  ____________________________________________________________________    Past Medical History[3]  Past Surgical History[4]  Social History   Social History     Substance and Sexual Activity   Alcohol Use Not Currently    Comment: occas     Social History     Substance and Sexual Activity   Drug Use Not Currently   • Types: Marijuana, Methamphetamines    Comment: last used 09/2024     Tobacco Use History[5]  Family History[6]  Allergies[7]  ____________________________________________________________________    Patient instructions below verbally summarized in person during  encounter:  There are no Patient Instructions on file for this visit.                   [1]  Current Outpatient Medications on File Prior to Visit   Medication Sig Dispense Refill   • albuterol (PROVENTIL HFA,VENTOLIN HFA) 90 mcg/act inhaler Inhale 2 puffs every 4 (four) hours as needed for wheezing 6.7 g 0   • DULoxetine (CYMBALTA) 20 mg capsule Take 1 capsule (20 mg total) by mouth daily 30 capsule 0   • gabapentin (NEURONTIN) 300 mg capsule Take 2 capsules (600 mg total) by mouth 3 (three) times a day 180 capsule 0   • lisinopril (ZESTRIL) 20 mg tablet Take 1 tablet (20 mg total) by mouth daily 30 tablet 0   • methocarbamol (ROBAXIN) 750 mg tablet Take 1 tablet (750 mg total) by mouth 4 (four) times a day as needed for muscle spasms 120 tablet 0   • nicotine (NICODERM CQ) 14 mg/24hr TD 24 hr patch Place 1 patch on the skin over 24 hours daily 28 patch 0   • QUEtiapine (SEROquel) 300 mg tablet Take 1 tablet (300 mg total) by mouth daily at bedtime 30 tablet 0     No current facility-administered medications on file prior to visit.   [2]  Social History  Tobacco Use   • Smoking status: Every Day     Current packs/day: 0.50     Types: Cigarettes   • Smokeless tobacco: Never   • Tobacco comments:     5 cigerettes a day    Vaping Use   • Vaping status: Former   • Substances: Nicotine, Flavoring   Substance and Sexual Activity   • Alcohol use: Not Currently     Comment: occas   • Drug use: Not Currently     Types: Marijuana, Methamphetamines     Comment: last used 09/2024   • Sexual activity: Yes     Partners: Female     Birth control/protection: Condom Male   [3]  Past Medical History:  Diagnosis Date   • Asthma    • Chronic pain    • Hypertension    • Neck pain    • Psychoactive 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HCC)    • Thyroglossal duct cyst    [4]  Past Surgical History:  Procedure Laterality Date   • THYROGLOSSAL DUCT EXCISION     • THYROID SURGERY     • THYROID SURGERY     [5]  Social History  Tobacco Use   Smoking  Status Every Day   • Current packs/day: 0.50   • Types: Cigarettes   Smokeless Tobacco Never   Tobacco Comments    5 cigerettes a day    [6]  Family History  Problem Relation Name Age of Onset   • Hypertension Mother     • No Known Problems Father     [7]  No Known Allergies

## 2025-06-03 ENCOUNTER — OFFICE VISIT (OUTPATIENT)
Dept: OBGYN CLINIC | Facility: OTHER | Age: 40
End: 2025-06-03
Attending: EMERGENCY MEDICINE
Payer: MEDICARE

## 2025-06-03 ENCOUNTER — APPOINTMENT (OUTPATIENT)
Dept: RADIOLOGY | Facility: OTHER | Age: 40
End: 2025-06-03
Payer: MEDICARE

## 2025-06-03 DIAGNOSIS — M25.572 PAIN, JOINT, ANKLE AND FOOT, LEFT: ICD-10-CM

## 2025-06-03 DIAGNOSIS — M76.72 PERONEAL TENDINITIS OF LEFT LOWER LEG: ICD-10-CM

## 2025-06-03 DIAGNOSIS — M72.2 PLANTAR FASCIITIS, LEFT: ICD-10-CM

## 2025-06-03 DIAGNOSIS — M25.572 PAIN, JOINT, ANKLE AND FOOT, LEFT: Primary | ICD-10-CM

## 2025-06-03 DIAGNOSIS — S86.812A STRAIN OF LEFT TIBIALIS ANTERIOR MUSCLE, INITIAL ENCOUNTER: ICD-10-CM

## 2025-06-03 PROCEDURE — 73610 X-RAY EXAM OF ANKLE: CPT

## 2025-06-03 PROCEDURE — 99203 OFFICE O/P NEW LOW 30 MIN: CPT | Performed by: FAMILY MEDICINE

## 2025-06-03 NOTE — ASSESSMENT & PLAN NOTE
PMH: multiple ER visits, polysubstance abuse-  methamphetamine, marijuana  ANETA- severe with GFR 19, creatinine 3+ early May 2025- now resolved    - Patients examination today with ankle pain with multiple locations of irritation.  - Xray imaging negative for fracture or other osseous pathology at this time.  - no effusion of ankle joint on exam or on POCUS  - Examination appears most consistent with tibialis anterior strain and anterior ankle impingement. Also component of plantar fascitis  - Will place patient in CAM walking boot for foot support.  - Weight bearing as tolerated.  - Formal PT ordered for evaluation and treatment of tendonitis of the foot/ankle joint.    - Short interval follow up in 2 weeks for reassessment. If pain persists (3 weeks post injury) can discuss potential for advanced imaging(MRI) or other lab work for evaluation of persistent ankle swelling    Orders:  •  XR ankle 3+ vw left; Future  •  Durable Medical Equipment

## 2025-06-26 ENCOUNTER — TELEPHONE (OUTPATIENT)
Age: 40
End: 2025-06-26

## 2025-06-26 NOTE — TELEPHONE ENCOUNTER
Caller: Patient    Doctor: Dr. Song    Reason for call:     Patient put request in for lyft ride for 06/27/25 at 12:15 pm at the [facility] office, sent request to plan  for approval.    Call back#: [phone]

## 2025-06-27 ENCOUNTER — OFFICE VISIT (OUTPATIENT)
Dept: OBGYN CLINIC | Facility: OTHER | Age: 40
End: 2025-06-27
Payer: MEDICARE

## 2025-06-27 VITALS — WEIGHT: 189 LBS | BODY MASS INDEX: 31.49 KG/M2 | HEIGHT: 65 IN

## 2025-06-27 DIAGNOSIS — S99.912A INJURY OF LEFT ANKLE, INITIAL ENCOUNTER: Primary | ICD-10-CM

## 2025-06-27 PROCEDURE — 99213 OFFICE O/P EST LOW 20 MIN: CPT | Performed by: FAMILY MEDICINE

## 2025-06-27 RX ORDER — NAPROXEN 500 MG/1
500 TABLET ORAL EVERY 12 HOURS PRN
COMMUNITY
Start: 2025-02-01 | End: 2026-02-01

## 2025-06-27 RX ORDER — LISINOPRIL 10 MG/1
TABLET ORAL
COMMUNITY
Start: 2025-05-14

## 2025-06-27 RX ORDER — IBUPROFEN 600 MG/1
TABLET, FILM COATED ORAL
COMMUNITY
Start: 2025-05-28

## 2025-06-27 RX ORDER — GABAPENTIN 600 MG/1
TABLET ORAL
COMMUNITY
Start: 2025-05-14

## 2025-06-27 NOTE — PROGRESS NOTES
"Name: Amado Chin      : 1985      MRN: [id number]  Encounter Provider: Larry Song III, DO  Encounter Date: 2025   Encounter department: [facility]  :  Assessment & Plan  Injury of left ankle, initial encounter    Orders:  •  MRI ankle/heel left  wo contrast; Future      History of Present Illness   HPI  Amado Chin is a 39 y.o. male who presents for evalaution of left ankle injury. Evaluated in the ED n 25 with xray imaging taken at that time.    2025:  PMH: multiple ER visits, polysubstance abuse-  methamphetamine, marijuana,     Describes that he has had diffuse pain over his ankle in multiple locations. He describes that it occurred after jumping rope for a brief period and then placing his feet under a weight to perform abdominal crunches. States that he felt his ankle \"was weird\". No traumatic injury to the ankle. No eversion/inversion injury to the ankle. Describes a remote history of a \"stable fracture\" this past October but we do not see imaging or encounters in the EMR to demonstrate this injury.    Mild swelling to the area and has been using a stirrup brace which has been giving him a rash. Using crutches to help with weight bearing and ambulation.    2025:  Follow-up evaluation of left ankle pain.  Patient has persistent pain and points to the medial aspect of the ankle as source of pain.  No physical therapy performed at this time.  X-rays have been nondiagnostic.       History obtained from: patient    Review of Systems  Pertinent Medical History           Medical History Reviewed by provider this encounter:     .  Medications Ordered Prior to Encounter[1]   Social History[2]     Objective   Ht 5' 5\" (1.651 m) Comment: verbal  Wt 85.7 kg (189 lb)   BMI 31.45 kg/m²      Physical Exam  LEFT ANKLE  EXAM  Inspection  Erythema: no  Ecchymosis: no  Edema:  none    Tenderness  Proximal Fibula: no  (Maisonneuve frx)  AiTFL: " no  (2cm proximal-medial to tip lateral malleolus 92% sens, 29% spec)  ATFL: +  CFL: no  PTFL: no  Achilles:  no  Deltoid: +  Peroneal: no  Tibialis Anterior: no  Tibialis Posterior: no    Bony Tenderpoints:  Lateral Malleolus: +  Base of 5th MT: no  Medial Malleolus: +  Navicular: no  Talar dome: +    ROM  Dorsiflexion: intact  Plantarflexion: intact    Muscle Strength  Pronation: intact   Supination: intact     Calcaneal Squeeze: negative    Pain at syndesmosis with dorsiflexion and external rotation    ____________________________________________________________________    Return for Follow-up after MRI is completed for review.  ____________________________________________________________________    IMAGING STUDIES: (I personally reviewed images in PACS, and if available-report):     X-ray ankle 3+ views left (5/23/2025)  Mild soft tissue swelling over the lateral malleolus without an acute osseous abnormality.   Xray ankle 3+ views left (6/3/2025)  No roberto or obvious osseous pathology.  No areas of lucency appreciated over the talar dome.  Mild areas of soft tissue swelling along the distribution of the achilles tendon.    POCUS left ankle anterior 6/3/25:  no appreciable intraarticular joint swelling.    PAST REPORTS:    ____________________________________________________________________    Procedures  ____________________________________________________________________    Past Medical History[3]  Past Surgical History[4]  Social History   Social History     Substance and Sexual Activity   Alcohol Use Not Currently    Comment: occas     Social History     Substance and Sexual Activity   Drug Use Not Currently   • Types: Marijuana, Methamphetamines    Comment: last used 09/2024     Tobacco Use History[5]  Family History[6]  Allergies[7]  ____________________________________________________________________    Patient instructions below verbally summarized in person during encounter:  There are no Patient  Instructions on file for this visit.                   [1]  Current Outpatient Medications on File Prior to Visit   Medication Sig Dispense Refill   • albuterol (PROVENTIL HFA,VENTOLIN HFA) 90 mcg/act inhaler Inhale 2 puffs every 4 (four) hours as needed for wheezing 6.7 g 0   • DULoxetine (CYMBALTA) 20 mg capsule Take 1 capsule (20 mg total) by mouth daily 30 capsule 0   • gabapentin (NEURONTIN) 600 MG tablet      • ibuprofen (MOTRIN) 600 mg tablet      • lisinopril (ZESTRIL) 10 mg tablet      • methocarbamol (ROBAXIN) 750 mg tablet Take 1 tablet (750 mg total) by mouth 4 (four) times a day as needed for muscle spasms 120 tablet 0   • naproxen (NAPROSYN) 500 mg tablet Take 500 mg by mouth every 12 (twelve) hours as needed     • nicotine (NICODERM CQ) 14 mg/24hr TD 24 hr patch Place 1 patch on the skin over 24 hours daily 28 patch 0   • QUEtiapine (SEROquel) 300 mg tablet Take 1 tablet (300 mg total) by mouth daily at bedtime 30 tablet 0   • gabapentin (NEURONTIN) 300 mg capsule Take 2 capsules (600 mg total) by mouth 3 (three) times a day (Patient not taking: Reported on 6/27/2025) 180 capsule 0   • lisinopril (ZESTRIL) 20 mg tablet Take 1 tablet (20 mg total) by mouth daily (Patient not taking: Reported on 6/27/2025) 30 tablet 0     No current facility-administered medications on file prior to visit.   [2]  Social History  Tobacco Use   • Smoking status: Every Day     Current packs/day: 0.50     Types: Cigarettes   • Smokeless tobacco: Never   • Tobacco comments:     5 cigerettes a day    Vaping Use   • Vaping status: Former   • Substances: Nicotine, Flavoring   Substance and Sexual Activity   • Alcohol use: Not Currently     Comment: occas   • Drug use: Not Currently     Types: Marijuana, Methamphetamines     Comment: last used 09/2024   • Sexual activity: Yes     Partners: Female     Birth control/protection: Condom Male   [3]  Past Medical History:  Diagnosis Date   • Asthma    • Chronic pain    • Hypertension     • Neck pain    • Psychoactive 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HCC)    • Thyroglossal duct cyst    [4]  Past Surgical History:  Procedure Laterality Date   • THYROGLOSSAL DUCT EXCISION     • THYROID SURGERY     • THYROID SURGERY     [5]  Social History  Tobacco Use   Smoking Status Every Day   • Current packs/day: 0.50   • Types: Cigarettes   Smokeless Tobacco Never   Tobacco Comments    5 cigerettes a day    [6]  Family History  Problem Relation Name Age of Onset   • Hypertension Mother     • No Known Problems Father     [7]  No Known Allergies

## 2025-07-14 ENCOUNTER — TELEPHONE (OUTPATIENT)
Dept: OBGYN CLINIC | Facility: OTHER | Age: 40
End: 2025-07-14

## 2025-07-14 DIAGNOSIS — G44.86 CERVICOGENIC HEADACHE: ICD-10-CM

## 2025-07-14 DIAGNOSIS — F41.9 ANXIETY: ICD-10-CM

## 2025-07-14 DIAGNOSIS — M54.2 CHRONIC NECK PAIN: ICD-10-CM

## 2025-07-14 DIAGNOSIS — G89.29 CHRONIC NECK PAIN: ICD-10-CM

## 2025-07-16 ENCOUNTER — APPOINTMENT (EMERGENCY)
Dept: RADIOLOGY | Facility: HOSPITAL | Age: 40
End: 2025-07-16
Payer: MEDICARE

## 2025-07-16 ENCOUNTER — HOSPITAL ENCOUNTER (INPATIENT)
Facility: HOSPITAL | Age: 40
LOS: 1 days | Discharge: HOME | End: 2025-07-16
Attending: EMERGENCY MEDICINE | Admitting: INTERNAL MEDICINE
Payer: MEDICARE

## 2025-07-16 ENCOUNTER — HOSPITAL ENCOUNTER (OUTPATIENT)
Dept: NON INVASIVE DIAGNOSTICS | Facility: HOSPITAL | Age: 40
Discharge: HOME | End: 2025-07-16
Attending: STUDENT IN AN ORGANIZED HEALTH CARE EDUCATION/TRAINING PROGRAM
Payer: MEDICARE

## 2025-07-16 VITALS
DIASTOLIC BLOOD PRESSURE: 108 MMHG | HEART RATE: 56 BPM | TEMPERATURE: 98.4 F | BODY MASS INDEX: 31.55 KG/M2 | RESPIRATION RATE: 18 BRPM | WEIGHT: 189.6 LBS | OXYGEN SATURATION: 95 % | SYSTOLIC BLOOD PRESSURE: 157 MMHG

## 2025-07-16 DIAGNOSIS — L02.412 ABSCESS OF LEFT AXILLA: ICD-10-CM

## 2025-07-16 DIAGNOSIS — R07.9 CHEST PAIN: Primary | ICD-10-CM

## 2025-07-16 DIAGNOSIS — R07.9 CHEST PAIN: ICD-10-CM

## 2025-07-16 LAB
2HR DELTA HS TROPONIN: 0 NG/L
ANION GAP SERPL CALCULATED.3IONS-SCNC: 7 MMOL/L (ref 4–13)
APTT PPP: 27 SECONDS (ref 23–34)
BUN SERPL-MCNC: 12 MG/DL (ref 5–25)
CALCIUM SERPL-MCNC: 8.7 MG/DL (ref 8.4–10.2)
CARDIAC TROPONIN I PNL SERPL HS: 4 NG/L (ref ?–50)
CARDIAC TROPONIN I PNL SERPL HS: 4 NG/L (ref ?–50)
CHLORIDE SERPL-SCNC: 109 MMOL/L (ref 96–108)
CO2 SERPL-SCNC: 23 MMOL/L (ref 21–32)
CREAT SERPL-MCNC: 0.97 MG/DL (ref 0.6–1.3)
D DIMER PPP FEU-MCNC: 0.33 UG/ML FEU
ERYTHROCYTE [DISTWIDTH] IN BLOOD BY AUTOMATED COUNT: 15.4 % (ref 11.6–15.1)
GFR SERPL CREATININE-BSD FRML MDRD: 96 ML/MIN/1.73SQ M
GLUCOSE SERPL-MCNC: 99 MG/DL (ref 65–140)
HCT VFR BLD AUTO: 39.9 % (ref 36.5–49.3)
HGB BLD-MCNC: 13.8 G/DL (ref 12–17)
INR PPP: 1.01 (ref 0.85–1.19)
MCH RBC QN AUTO: 31.6 PG (ref 26.8–34.3)
MCHC RBC AUTO-ENTMCNC: 34.6 G/DL (ref 31.4–37.4)
MCV RBC AUTO: 91 FL (ref 82–98)
PLATELET # BLD AUTO: 272 THOUSANDS/UL (ref 149–390)
PMV BLD AUTO: 10.5 FL (ref 8.9–12.7)
POTASSIUM SERPL-SCNC: 3.4 MMOL/L (ref 3.5–5.3)
PROTHROMBIN TIME: 13.6 SECONDS (ref 12.3–15)
RBC # BLD AUTO: 4.37 MILLION/UL (ref 3.88–5.62)
SODIUM SERPL-SCNC: 139 MMOL/L (ref 135–147)
WBC # BLD AUTO: 3.43 THOUSAND/UL (ref 4.31–10.16)

## 2025-07-16 PROCEDURE — 71045 X-RAY EXAM CHEST 1 VIEW: CPT

## 2025-07-16 PROCEDURE — 93005 ELECTROCARDIOGRAM TRACING: CPT

## 2025-07-16 PROCEDURE — 80048 BASIC METABOLIC PNL TOTAL CA: CPT

## 2025-07-16 PROCEDURE — 99291 CRITICAL CARE FIRST HOUR: CPT | Performed by: EMERGENCY MEDICINE

## 2025-07-16 PROCEDURE — 85027 COMPLETE CBC AUTOMATED: CPT

## 2025-07-16 PROCEDURE — 84484 ASSAY OF TROPONIN QUANT: CPT

## 2025-07-16 PROCEDURE — 85610 PROTHROMBIN TIME: CPT

## 2025-07-16 PROCEDURE — 99285 EMERGENCY DEPT VISIT HI MDM: CPT

## 2025-07-16 PROCEDURE — 85730 THROMBOPLASTIN TIME PARTIAL: CPT

## 2025-07-16 PROCEDURE — 76882 US LMTD JT/FCL EVL NVASC XTR: CPT | Performed by: EMERGENCY MEDICINE

## 2025-07-16 PROCEDURE — 36415 COLL VENOUS BLD VENIPUNCTURE: CPT

## 2025-07-16 PROCEDURE — 96374 THER/PROPH/DIAG INJ IV PUSH: CPT

## 2025-07-16 PROCEDURE — 93308 TTE F-UP OR LMTD: CPT | Performed by: INTERNAL MEDICINE

## 2025-07-16 PROCEDURE — 93308 TTE F-UP OR LMTD: CPT

## 2025-07-16 PROCEDURE — 85379 FIBRIN DEGRADATION QUANT: CPT

## 2025-07-16 RX ORDER — HEPARIN SODIUM 1000 [USP'U]/ML
4000 INJECTION, SOLUTION INTRAVENOUS; SUBCUTANEOUS ONCE
Status: COMPLETED | OUTPATIENT
Start: 2025-07-16 | End: 2025-07-16

## 2025-07-16 RX ORDER — HEPARIN SODIUM 1000 [USP'U]/ML
4000 INJECTION, SOLUTION INTRAVENOUS; SUBCUTANEOUS EVERY 6 HOURS PRN
Status: DISCONTINUED | OUTPATIENT
Start: 2025-07-16 | End: 2025-07-16 | Stop reason: HOSPADM

## 2025-07-16 RX ORDER — HEPARIN SODIUM 10000 [USP'U]/100ML
3-20 INJECTION, SOLUTION INTRAVENOUS
Status: DISCONTINUED | OUTPATIENT
Start: 2025-07-16 | End: 2025-07-16 | Stop reason: HOSPADM

## 2025-07-16 RX ORDER — SULFAMETHOXAZOLE AND TRIMETHOPRIM 800; 160 MG/1; MG/1
1 TABLET ORAL 2 TIMES DAILY
Qty: 14 TABLET | Refills: 0 | Status: SHIPPED | OUTPATIENT
Start: 2025-07-16 | End: 2025-07-23

## 2025-07-16 RX ORDER — SODIUM CHLORIDE 9 MG/ML
3 INJECTION INTRAVENOUS
Status: DISCONTINUED | OUTPATIENT
Start: 2025-07-16 | End: 2025-07-16 | Stop reason: HOSPADM

## 2025-07-16 RX ORDER — NITROGLYCERIN 20 MG/100ML
5-200 INJECTION INTRAVENOUS
Status: DISCONTINUED | OUTPATIENT
Start: 2025-07-16 | End: 2025-07-16 | Stop reason: HOSPADM

## 2025-07-16 RX ORDER — HYDROMORPHONE HCL/PF 1 MG/ML
0.5 SYRINGE (ML) INJECTION ONCE
Status: DISCONTINUED | OUTPATIENT
Start: 2025-07-16 | End: 2025-07-16 | Stop reason: HOSPADM

## 2025-07-16 RX ORDER — ASPIRIN 81 MG/1
4 TABLET, CHEWABLE ORAL ONCE
Status: COMPLETED | OUTPATIENT
Start: 2025-07-16 | End: 2025-07-16

## 2025-07-16 RX ORDER — HEPARIN SODIUM 1000 [USP'U]/ML
2000 INJECTION, SOLUTION INTRAVENOUS; SUBCUTANEOUS EVERY 6 HOURS PRN
Status: DISCONTINUED | OUTPATIENT
Start: 2025-07-16 | End: 2025-07-16 | Stop reason: HOSPADM

## 2025-07-16 RX ADMIN — HEPARIN SODIUM 4000 UNITS: 1000 INJECTION, SOLUTION INTRAVENOUS; SUBCUTANEOUS at 17:30

## 2025-07-16 RX ADMIN — NITROGLYCERIN 10 MCG/MIN: 20 INJECTION INTRAVENOUS at 17:32

## 2025-07-16 RX ADMIN — HEPARIN SODIUM 11.8 UNITS/KG/HR: 10000 INJECTION, SOLUTION INTRAVENOUS at 17:32

## 2025-07-16 NOTE — ED ATTENDING ATTESTATION
7/16/2025  I, Yoli Matias MD, saw and evaluated the patient. I have discussed the patient with the resident/non-physician practitioner and agree with the resident's/non-physician practitioner's findings, Plan of Care, and MDM as documented in the resident's/non-physician practitioner's note, except where noted. All available labs and Radiology studies were reviewed.  I was present for key portions of any procedure(s) performed by the resident/non-physician practitioner and I was immediately available to provide assistance.       At this point I agree with the current assessment done in the Emergency Department.  I have conducted an independent evaluation of this patient a history and physical is as follows: This is a 39-year-old male who presents to the emergency department with 2 complaints.  The patient reports an abscess under his left arm that has been painful, nondraining, no fevers or chills.  As a secondary complaint, the patient developed chest pain a few hours ago, substernal pressure radiating to his bilateral shoulders associated with diaphoresis and feeling generally unwell.  The patient had symptoms like this back in May when he was admitted to the hospital.  At that time, he was told he had a heart attack, but he did not undergo stress testing or cardiac catheterization.  The patient was given aspirin and sublingual nitroglycerin en route to the hospital, which improved his symptoms.  He now has ongoing chest pain, but it is somewhat improved.  The patient had a history of methamphetamine abuse, but is not using any methamphetamines currently.  He has not had fevers, chills, cough, no PE risk factors.  Review of systems otherwise -12 systems reviewed.  On exam the patient is pale and diaphoretic.  His HEENT exam is nonfocal.  His heart is regular without murmurs.  His lungs are clear.  His neck is supple with no JVD.  The patient has an approximately 1 cm abscess in his left axilla with no  surrounding cellulitis.  His abdomen is benign.  His extremities are intact.  Is no palpable cords and has intact pulses.  He is neurologically nonfocal. MEDICAL DECISION MAKING    Number and Complexity of Problems  Differential diagnosis: X-ray abscess, unstable angina, NSTEMI    Medical Decision Making Data  External documents reviewed: Prior hospitalization reviewed, patient with NSTEMI at that time, did not undergo catheterization  My EKG interpretation: Sinus with new inferolateral flipped T waves  My CT interpretation:   My X-ray interpretation:   My ultrasound interpretation:     X-ray chest 1 view portable   ED Interpretation   Chest x-ray does not indicate acute cardiopulmonary pathology or osseous abnormalities.      Final Result      No acute cardiopulmonary disease.                  Resident: Tristin Burgess I, the attending radiologist, have reviewed the images and agree with the final report above.      Workstation performed: [id number]             Labs Reviewed   CBC AND PLATELET - Abnormal       Result Value Ref Range Status    WBC 3.43 (*) 4.31 - 10.16 Thousand/uL Final    RBC 4.37  3.88 - 5.62 Million/uL Final    Hemoglobin 13.8  12.0 - 17.0 g/dL Final    Hematocrit 39.9  36.5 - 49.3 % Final    MCV 91  82 - 98 fL Final    MCH 31.6  26.8 - 34.3 pg Final    MCHC 34.6  31.4 - 37.4 g/dL Final    RDW 15.4 (*) 11.6 - 15.1 % Final    Platelets 272  149 - 390 Thousands/uL Final    MPV 10.5  8.9 - 12.7 fL Final   BASIC METABOLIC PANEL - Abnormal    Sodium 139  135 - 147 mmol/L Final    Potassium 3.4 (*) 3.5 - 5.3 mmol/L Final    Chloride 109 (*) 96 - 108 mmol/L Final    CO2 23  21 - 32 mmol/L Final    ANION GAP 7  4 - 13 mmol/L Final    BUN 12  5 - 25 mg/dL Final    Creatinine 0.97  0.60 - 1.30 mg/dL Final    Comment: Standardized to IDMS reference method    Glucose 99  65 - 140 mg/dL Final    Comment: If the patient is fasting, the ADA then defines impaired fasting glucose as > 100 mg/dL and  "diabetes as > or equal to 123 mg/dL.    Calcium 8.7  8.4 - 10.2 mg/dL Final    eGFR 96  ml/min/1.73sq m Final    Narrative:     National Kid[employer] guidelines for Chronic Kidney Disease (CKD):     Stage 1 with normal or high GFR (GFR > 90 mL/min/1.73 square meters)    Stage 2 Mild CKD (GFR = 60-89 mL/min/1.73 square meters)    Stage 3A Moderate CKD (GFR = 45-59 mL/min/1.73 square meters)    Stage 3B Moderate CKD (GFR = 30-44 mL/min/1.73 square meters)    Stage 4 Severe CKD (GFR = 15-29 mL/min/1.73 square meters)    Stage 5 End Stage CKD (GFR <15 mL/min/1.73 square meters)  Note: GFR calculation is accurate only with a steady state creatinine   APTT - Normal    PTT 27  23 - 34 seconds Final    Comment: Therapeutic Heparin Range =  60-90 seconds   PROTIME-INR - Normal    Protime 13.6  12.3 - 15.0 seconds Final    INR 1.01  0.85 - 1.19 Final    Narrative:     INR Therapeutic Range    Indication                                             INR Range      Atrial Fibrillation                                               2.0-3.0  Hypercoagulable State                                    2.0.2.3  Left Ventricular Asist Device                            2.0-3.0  Mechanical Heart Valve                                  -    Aortic(with afib, MI, embolism, HF, LA enlargement,    and/or coagulopathy)                                     2.0-3.0 (2.5-3.5)     Mitral                                                             2.5-3.5  Prosthetic/Bioprosthetic Heart Valve               2.0-3.0  Venous thromboembolism (VTE: VT, PE        2.0-3.0   HS TROPONIN I 0HR - Normal    hs TnI 0hr 4  \"Refer to ACS Flowchart\"- see link ng/L Final    Comment:                                              Initial (time 0) result  If >=50 ng/L, Myocardial injury suggested ;  Type of myocardial injury and treatment strategy  to be determined.  If 5-49 ng/L, a delta result at 2 hours will be needed to further evaluate.  If <4 ng/L, and " "chest pain has been >3 hours since onset, patient may qualify for discharge based on the HEART score in the ED.  If <5 ng/L and <3hours since onset of chest pain, a delta result at 2 hours will be needed to further evaluate.    HS Troponin 99th Percentile URL of a Health Population=12 ng/L with a 95% Confidence Interval of 8-18 ng/L.    Second Troponin (time 2 hours)  If calculated delta >= 20 ng/L,  Myocardial injury suggested ; Type of myocardial injury and treatment strategy to be determined.  If 5-49 ng/L and the calculated delta is 5-19 ng/L, consult medical service for evaluation.  Continue evaluation for ischemia on ecg and other possible etiology and repeat hs troponin at 4 hours.  If delta is <5 ng/L at 2 hours, consider discharge based on risk stratification via the HEART score (if in ED), or BERENICE risk score in IP/Observation.    HS Troponin 99th Percentile URL of a Health Population=12 ng/L with a 95% Confidence Interval of 8-18 ng/L.   HS TROPONIN I 2HR - Normal    hs TnI 2hr 4  \"Refer to ACS Flowchart\"- see link ng/L Final    Comment:                                              Initial (time 0) result  If >=50 ng/L, Myocardial injury suggested ;  Type of myocardial injury and treatment strategy  to be determined.  If 5-49 ng/L, a delta result at 2 hours will be needed to further evaluate.  If <4 ng/L, and chest pain has been >3 hours since onset, patient may qualify for discharge based on the HEART score in the ED.  If <5 ng/L and <3hours since onset of chest pain, a delta result at 2 hours will be needed to further evaluate.    HS Troponin 99th Percentile URL of a Health Population=12 ng/L with a 95% Confidence Interval of 8-18 ng/L.    Second Troponin (time 2 hours)  If calculated delta >= 20 ng/L,  Myocardial injury suggested ; Type of myocardial injury and treatment strategy to be determined.  If 5-49 ng/L and the calculated delta is 5-19 ng/L, consult medical service for evaluation.  Continue " evaluation for ischemia on ecg and other possible etiology and repeat hs troponin at 4 hours.  If delta is <5 ng/L at 2 hours, consider discharge based on risk stratification via the HEART score (if in ED), or BERENICE risk score in IP/Observation.    HS Troponin 99th Percentile URL of a Health Population=12 ng/L with a 95% Confidence Interval of 8-18 ng/L.    Delta 2hr hsTnI 0  <20 ng/L Final   D-DIMER, QUANTITATIVE - Normal    D-Dimer, Quant 0.33  <0.50 ug/ml FEU Final    Comment: Reference and upper limits to exclude DVT and PE are the same.  Do not use to exclude if clinical symptoms are present.       Labs reviewed by me are significant for:     Clinical decision rules/scores are significant for:     Discussed case with:   Considered admission for:     Treatment and Disposition  ED course:   Shared decision making:   Code status:         MEDICAL DECISION MAKING    Number and Complexity of Problems  Differential diagnosis: Cardiac event, doubt dissection, doubt PE    Medical Decision Making Data  External documents reviewed: Prior admission from May reviewed, patient with troponins in the 400s at that time  My EKG interpretation: Sinus rhythm, ST depressions inferolaterally, new since his last EKG  My CT interpretation:   My X-ray interpretation:   My ultrasound interpretation:     X-ray chest 1 view portable   ED Interpretation   Chest x-ray does not indicate acute cardiopulmonary pathology or osseous abnormalities.      Final Result      No acute cardiopulmonary disease.                  Resident: Tristin Burgess I, the attending radiologist, have reviewed the images and agree with the final report above.      Workstation performed: [id number]             Labs Reviewed   CBC AND PLATELET - Abnormal       Result Value Ref Range Status    WBC 3.43 (*) 4.31 - 10.16 Thousand/uL Final    RBC 4.37  3.88 - 5.62 Million/uL Final    Hemoglobin 13.8  12.0 - 17.0 g/dL Final    Hematocrit 39.9  36.5 - 49.3 % Final     MCV 91  82 - 98 fL Final    MCH 31.6  26.8 - 34.3 pg Final    MCHC 34.6  31.4 - 37.4 g/dL Final    RDW 15.4 (*) 11.6 - 15.1 % Final    Platelets 272  149 - 390 Thousands/uL Final    MPV 10.5  8.9 - 12.7 fL Final   BASIC METABOLIC PANEL - Abnormal    Sodium 139  135 - 147 mmol/L Final    Potassium 3.4 (*) 3.5 - 5.3 mmol/L Final    Chloride 109 (*) 96 - 108 mmol/L Final    CO2 23  21 - 32 mmol/L Final    ANION GAP 7  4 - 13 mmol/L Final    BUN 12  5 - 25 mg/dL Final    Creatinine 0.97  0.60 - 1.30 mg/dL Final    Comment: Standardized to IDMS reference method    Glucose 99  65 - 140 mg/dL Final    Comment: If the patient is fasting, the ADA then defines impaired fasting glucose as > 100 mg/dL and diabetes as > or equal to 123 mg/dL.    Calcium 8.7  8.4 - 10.2 mg/dL Final    eGFR 96  ml/min/1.73sq m Final    Narrative:     National Kidney Disease Foundation guidelines for Chronic Kidney Disease (CKD):     Stage 1 with normal or high GFR (GFR > 90 mL/min/1.73 square meters)    Stage 2 Mild CKD (GFR = 60-89 mL/min/1.73 square meters)    Stage 3A Moderate CKD (GFR = 45-59 mL/min/1.73 square meters)    Stage 3B Moderate CKD (GFR = 30-44 mL/min/1.73 square meters)    Stage 4 Severe CKD (GFR = 15-29 mL/min/1.73 square meters)    Stage 5 End Stage CKD (GFR <15 mL/min/1.73 square meters)  Note: GFR calculation is accurate only with a steady state creatinine   APTT - Normal    PTT 27  23 - 34 seconds Final    Comment: Therapeutic Heparin Range =  60-90 seconds   PROTIME-INR - Normal    Protime 13.6  12.3 - 15.0 seconds Final    INR 1.01  0.85 - 1.19 Final    Narrative:     INR Therapeutic Range    Indication                                             INR Range      Atrial Fibrillation                                               2.0-3.0  Hypercoagulable State                                    2.0.2.3  Left Ventricular Asist Device                            2.0-3.0  Mechanical Heart Valve                                   "-    Aortic(with afib, MI, embolism, HF, LA enlargement,    and/or coagulopathy)                                     2.0-3.0 (2.5-3.5)     Mitral                                                             2.5-3.5  Prosthetic/Bioprosthetic Heart Valve               2.0-3.0  Venous thromboembolism (VTE: VT, PE        2.0-3.0   HS TROPONIN I 0HR - Normal    hs TnI 0hr 4  \"Refer to ACS Flowchart\"- see link ng/L Final    Comment:                                              Initial (time 0) result  If >=50 ng/L, Myocardial injury suggested ;  Type of myocardial injury and treatment strategy  to be determined.  If 5-49 ng/L, a delta result at 2 hours will be needed to further evaluate.  If <4 ng/L, and chest pain has been >3 hours since onset, patient may qualify for discharge based on the HEART score in the ED.  If <5 ng/L and <3hours since onset of chest pain, a delta result at 2 hours will be needed to further evaluate.    HS Troponin 99th Percentile URL of a Health Population=12 ng/L with a 95% Confidence Interval of 8-18 ng/L.    Second Troponin (time 2 hours)  If calculated delta >= 20 ng/L,  Myocardial injury suggested ; Type of myocardial injury and treatment strategy to be determined.  If 5-49 ng/L and the calculated delta is 5-19 ng/L, consult medical service for evaluation.  Continue evaluation for ischemia on ecg and other possible etiology and repeat hs troponin at 4 hours.  If delta is <5 ng/L at 2 hours, consider discharge based on risk stratification via the HEART score (if in ED), or BERENICE risk score in IP/Observation.    HS Troponin 99th Percentile URL of a Health Population=12 ng/L with a 95% Confidence Interval of 8-18 ng/L.   HS TROPONIN I 2HR - Normal    hs TnI 2hr 4  \"Refer to ACS Flowchart\"- see link ng/L Final    Comment:                                              Initial (time 0) result  If >=50 ng/L, Myocardial injury suggested ;  Type of myocardial injury and treatment strategy  to be " determined.  If 5-49 ng/L, a delta result at 2 hours will be needed to further evaluate.  If <4 ng/L, and chest pain has been >3 hours since onset, patient may qualify for discharge based on the HEART score in the ED.  If <5 ng/L and <3hours since onset of chest pain, a delta result at 2 hours will be needed to further evaluate.    HS Troponin 99th Percentile URL of a Health Population=12 ng/L with a 95% Confidence Interval of 8-18 ng/L.    Second Troponin (time 2 hours)  If calculated delta >= 20 ng/L,  Myocardial injury suggested ; Type of myocardial injury and treatment strategy to be determined.  If 5-49 ng/L and the calculated delta is 5-19 ng/L, consult medical service for evaluation.  Continue evaluation for ischemia on ecg and other possible etiology and repeat hs troponin at 4 hours.  If delta is <5 ng/L at 2 hours, consider discharge based on risk stratification via the HEART score (if in ED), or BERENICE risk score in IP/Observation.    HS Troponin 99th Percentile URL of a Health Population=12 ng/L with a 95% Confidence Interval of 8-18 ng/L.    Delta 2hr hsTnI 0  <20 ng/L Final   D-DIMER, QUANTITATIVE - Normal    D-Dimer, Quant 0.33  <0.50 ug/ml FEU Final    Comment: Reference and upper limits to exclude DVT and PE are the same.  Do not use to exclude if clinical symptoms are present.       Labs reviewed by me are significant for:     Clinical decision rules/scores are significant for:     Discussed case with:   Considered admission for:     Treatment and Disposition  ED course: Patient seen and examined.  Patient's history very worrisome for cardiac event, prior history suggestive of concern for cardiac event, acute changes in EKG, diaphoresis, pallor.  IV access established.  Patient made type II MI alert.  Patient heparinized, had already received aspirin, placed on nitroglycerin drip.  Care discussed with cardiology, they will be in to see the patient.  Patient heparinized, cardiology to see patient,  patient angry and belligerent in the ED, ultrasound shows no drainable abscess.  Patient signed out AMA  Shared decision making:   Code status:     ED Course         Critical Care Time  Procedures    Critical Care Time Statement: Upon my evaluation, this patient had a high probability of imminent or life-threatening deterioration due to myocardial infarction, which required my direct attention, intervention, and personal management.  I spent a total of 32 minutes directly providing critical care services, including interpretation of complex medical databases, evaluating for the presence of life-threatening injuries or illnesses, and management of organ system failure(s) . This time is exclusive of procedures, teaching, treating other patients, family meetings, and any prior time recorded by providers other than myself.

## 2025-07-16 NOTE — ED PROCEDURE NOTE
Procedure  POC Cardiac US    Date/Time: 7/16/2025 7:13 PM    Performed by: Doron Macario DO  Authorized by: Doron Macario DO    Patient location:  ED  Other Assisting Provider: Yes (comment) (Dr. Rascon)    Procedure details:     Exam Type:  Transthoracic Echocardiography (TTE), limited    Purpose of Exam: diagnostic and educational    Indications: chest pain      Assessment / Evaluation for: cardiac function, pericardial effusion and right heart strain (suspected pulmonary embolism)      Exam Type: initial exam      Image quality: diagnostic      Image availability:  Images available in PACS  Patient Details:     Cardiac Rhythm:  Regular    Mechanical ventilation: No    Cardiac findings:     Echo modes evaluated: limited 2D      Views obtained: parasternal long axis, parasternal short axis, subcostal and apical 4-chamber      Pericardial effusion: absent      Tamponade physiology: absent      Wall motion: normal      LV systolic function: normal      RV dilation: none                     Doron Macario DO  07/16/25 [id number]

## 2025-07-16 NOTE — ED PROVIDER NOTES
Time reflects when diagnosis was documented in both MDM as applicable and the Disposition within this note       Time User Action Codes Description Comment    7/16/2025  4:43 PM Sai Rascon [R07.9] Chest pain     7/16/2025  9:24 PM Sai Rascon [L02.412] Abscess of left axilla           ED Disposition       ED Disposition   AMA    Condition   --    Date/Time   Wed Jul 16, 2025  9:27 PM    Comment   Date: 7/16/2025  Patient: Amado Chin  Admitted: 7/16/2025  4:14 PM  Attending Provider: Yoli Matias MD    Amado Chin or his authorized caregiver has made the decision for the patient to leave the emergency department against the advice  of his attending physician. He or his authorized caregiver has been informed and understands the inherent risks, including death, cardiac arrest, heart attack, and permanent disability.  He or his authorized caregiver has decided to accept the respo nsibility for this decision. Amado Chin and all necessary parties have been advised that he may return for further evaluation or treatment. His condition at time of discharge was guarded.  Amado Chin had current vital signs as follows:  / 75   Pulse 56   Temp 98.4 °F (36.9 °C) (Oral)   Resp 18   Wt 86 kg (189 lb 9.5 oz)                Assessment & Plan       Medical Decision Making  Patient is a 39-year-old male with a past medical history of hypertension, NSTEMI in May 2025 without catheterization, presenting to the emergency department with chest pain since this afternoon.  Patient states chest pain woke him up from sleep while he was napping.  Patient states increased pain with deep breaths.  Patient also has left axillary nodular swelling, states he has gotten ingrown hairs/abscesses in his axilla before.     Vital signs show mild hypertension, otherwise within normal limits. Exam as listed below.    History and physical exam are most consistent with NSTEMI causing chest pain, and  "abscess causing axillary pain.  Differential diagnosis also includes but is not limited to MI, esophageal rupture, PE, chest pain secondary to stimulant use, endocarditis/pericarditis.    Patient's symptoms improved with sublingual nitroglycerin usually with per EMS.  ECG upon arrival shows diffuse T wave inversions, mainly in inferolateral leads.  Findings prompted NSTEMI/level 2 MI alert.  Plan includes cardiac workup with repeat ECGs/troponins, and cardiology consultation with admission.  Per cardiology consultation, patient given heparin bolus and drip, nitroglycerin drip.  Cardiology concerned for possible pulmonary embolus, D-dimer ordered that was unremarkable.  Cardiology recommended medicine admission, patient did not want to be admitted because he had \"things to attend to at home\" but after discussion of risks he agreed to admission.  Patient admitted to [facility] with Dr. Clements.   POC ultrasound utilized to evaluate axillary abscess, noted to have very minimal fluid collection, incision and drainage was not indicated at this time.    View ED course below for further discussion on patient workup.     On review of previous records patient was in the [facility] emergency room in May with possible NSTEMI.  Patient not admitted for catheterization due to suspicion for amphetamine/stimulant causing symptoms and findings.    All labs reviewed and utilized in the medical decision making process  All radiology studies independently viewed by me and interpreted by the radiologist.  I reviewed all testing with the patient.     Upon re-evaluation patient chest pain is improved with nitro drip, still complaining of axillary pain.   After being seen by inpatient team, patient requested to go home.  Again risks were discussed with the patient including risk of death, cardiac arrest, MI, and permanent disability, and he still agreed to leave AGAINST MEDICAL ADVICE.    Discussed return precautions with the patient including " "chest pain, shortness of breath, nausea, vomiting, fever/chills and they expressed understanding.     Portions of the record may have been created with voice recognition software. Occasional wrong word or \"sound a like\" substitutions may have occurred due to the inherent limitations of voice recognition software. Read the chart carefully and recognize, using context, where substitutions have occurred.     ED 20/ED 20       Amount and/or Complexity of Data Reviewed  Labs: ordered. Decision-making details documented in ED Course.  Radiology: ordered and independent interpretation performed.  ECG/medicine tests:  Decision-making details documented in ED Course.    Risk  Prescription drug management.  Decision regarding hospitalization.        ED Course as of 07/16/25 2315   Wed Jul 16, 2025 1935 ECG 12 lead  Diffuse T wave inversions, most significant in inferolateral leads   1936 D-dimer, quantitative  Evaluating for possibility of PE, unremarkable       Medications   nitroGLYcerin (TRIDIL) 50 mg in 250 ml infusion (premix) (0 mcg/min Intravenous Stopped 7/16/25 2140)   sodium chloride (PF) 0.9 % injection 3 mL (has no administration in time range)   heparin (porcine) 25,000 units in 0.45% NaCl 250 mL infusion (premix) (0 Units/kg/hr × 85 kg (Order-Specific) Intravenous Stopped 7/16/25 2140)   heparin (porcine) injection 4,000 Units (has no administration in time range)   heparin (porcine) injection 2,000 Units (has no administration in time range)   HYDROmorphone (DILAUDID) injection 0.5 mg (0 mg Intravenous Hold 7/16/25 1914)   aspirin (FOR EMS ONLY) chewable tablet 324 mg (0 mg Does not apply Given to EMS 7/16/25 1629)   heparin (porcine) injection 4,000 Units (4,000 Units Intravenous Given 7/16/25 1730)       ED Risk Strat Scores                    No data recorded        SBIRT 20yo+      Flowsheet Row Most Recent Value   Initial Alcohol Screen: US AUDIT-C     1. How often do you have a drink containing alcohol? " 0 Filed at: 07/16/2025 1621   2. How many drinks containing alcohol do you have on a typical day you are drinking?  0 Filed at: 07/16/2025 1621   3a. Male UNDER 65: How often do you have five or more drinks on one occasion? 0 Filed at: 07/16/2025 1621   3b. FEMALE Any Age, or MALE 65+: How often do you have 4 or more drinks on one occassion? 0 Filed at: 07/16/2025 1621   Audit-C Score 0 Filed at: 07/16/2025 1621   YANELI: How many times in the past year have you...    Used an illegal drug or used a prescription medication for non-medical reasons? Never Filed at: 07/16/2025 1621                            History of Present Illness       Chief Complaint   Patient presents with    Abscess     Pt reports 4 days of an abscess under left arm that is painful, not draining, no fever.     Chest Pain     Pt reports chest pain for the last few hours, unsure if pain is radiating from the abscess under his arm. Pt reports pain is worse with a deep breath.        Past Medical History[1]   Past Surgical History[2]   Family History[3]   Social History[4]   E-Cigarette/Vaping    E-Cigarette Use Former User     Cartridges/Day 1       E-Cigarette/Vaping Substances    Nicotine Yes     THC No     CBD No     Flavoring Yes     Other No     Unknown No       I have reviewed and agree with the history as documented.     Patient is a 39-year-old male with a past medical history of hypertension, NSTEMI in May 2025 without catheterization, presenting to the emergency department with chest pain since this afternoon.  Patient states chest pain woke him up from sleep while he was napping.  Patient states increased pain with deep breaths.  Patient also has left axillary nodular swelling, states he has gotten ingrown hairs/abscesses in his axilla before.  Denies fever, chills, drainage from axilla, shortness of breath.  States he has headache and lightheadedness, but this is normal at his baseline with history of musculoskeletal neck  injury.          Review of Systems   Constitutional:  Negative for chills and fever.   HENT:  Negative for congestion.    Eyes:  Negative for visual disturbance.   Respiratory:  Positive for chest tightness and shortness of breath.    Cardiovascular:  Positive for chest pain. Negative for palpitations and leg swelling.   Gastrointestinal:  Negative for abdominal distention, abdominal pain, constipation, diarrhea, nausea and vomiting.   Genitourinary:  Negative for difficulty urinating.   Musculoskeletal:  Negative for back pain, neck pain and neck stiffness.   Neurological:  Positive for headaches. Negative for syncope, weakness and numbness.   Psychiatric/Behavioral:  Negative for agitation and confusion.            Objective       ED Triage Vitals   Temperature Pulse Blood Pressure Respirations SpO2 Patient Position - Orthostatic VS   07/16/25 1620 07/16/25 1620 07/16/25 1620 07/16/25 1620 07/16/25 1651 07/16/25 1915   98.4 °F (36.9 °C) 84 132/84 20 98 % Lying      Temp Source Heart Rate Source BP Location FiO2 (%) Pain Score    07/16/25 1620 07/16/25 1620 07/16/25 1700 -- 07/16/25 1815    Oral Monitor Right arm  8      Vitals      Date and Time Temp Pulse SpO2 Resp BP Pain Score FACES Pain Rating User   07/16/25 2100 -- -- -- -- 157/108 -- -- MI   07/16/25 1930 -- 56 95 % 18 123/75 -- -- MI   07/16/25 1915 -- 58 95 % 18 131/80 -- 0 MI   07/16/25 1832 -- 62 96 % 13 134/78 -- --    07/16/25 1830 -- 61 -- -- 134/78 -- --    07/16/25 1815 -- 56 96 % 13 136/78 8 --    07/16/25 1800 -- 68 96 % 15 144/82 -- --    07/16/25 1732 -- 70 -- -- 126/77 -- --    07/16/25 1700 -- 80 95 % 17 136/78 -- --    07/16/25 1651 -- -- 98 % -- -- -- --    07/16/25 1620 98.4 °F (36.9 °C) 84 -- 20 132/84 -- -- JK            Physical Exam  Constitutional:       Appearance: He is well-developed and normal weight. He is diaphoretic.   HENT:      Head: Normocephalic and atraumatic.      Nose: Nose normal.      Mouth/Throat:       Mouth: Mucous membranes are moist.      Pharynx: Oropharynx is clear.     Eyes:      Extraocular Movements: Extraocular movements intact.       Cardiovascular:      Rate and Rhythm: Normal rate and regular rhythm.      Pulses: Normal pulses.      Heart sounds: Normal heart sounds.   Pulmonary:      Effort: Pulmonary effort is normal.      Breath sounds: Normal breath sounds.   Abdominal:      General: Abdomen is flat.      Palpations: Abdomen is soft.      Tenderness: There is no abdominal tenderness. There is no guarding.     Musculoskeletal:         General: Normal range of motion.      Cervical back: Normal range of motion. No rigidity or tenderness.      Right lower leg: No edema.      Left lower leg: No edema.     Skin:     General: Skin is warm.      Coloration: Skin is pale. Skin is not cyanotic.     Neurological:      General: No focal deficit present.      Mental Status: He is alert and oriented to person, place, and time.      Cranial Nerves: No cranial nerve deficit.      Motor: No weakness.     Psychiatric:         Mood and Affect: Mood is anxious.         Behavior: Behavior normal. Behavior is not agitated.         Results Reviewed       Procedure Component Value Units Date/Time    HS Troponin I 2hr [[id number]]  (Normal) Collected: 07/16/25 1933    Lab Status: Final result Specimen: Blood from Arm, Left Updated: 07/16/25 2009     hs TnI 2hr 4 ng/L      Delta 2hr hsTnI 0 ng/L     APTT [[id number]]     Lab Status: No result Specimen: Blood     HS Troponin I 4hr [[id number]]     Lab Status: No result Specimen: Blood     D-dimer, quantitative [[id number]]  (Normal) Collected: 07/16/25 1708    Lab Status: Final result Specimen: Blood from Arm, Left Updated: 07/16/25 1810     D-Dimer, Quant 0.33 ug/ml FEU     APTT six (6) hours after Heparin bolus/drip initiation or dosing change [[id number]]  (Normal) Collected: 07/16/25 1708    Lab Status: Final result Specimen: Blood from Arm, Left Updated: 07/16/25 1807      PTT 27 seconds     Protime-INR [[id number]]  (Normal) Collected: 07/16/25 1708    Lab Status: Final result Specimen: Blood from Arm, Left Updated: 07/16/25 1807     Protime 13.6 seconds      INR 1.01    Narrative:      INR Therapeutic Range    Indication                                             INR Range      Atrial Fibrillation                                               2.0-3.0  Hypercoagulable State                                    2.0.2.3  Left Ventricular Asist Device                            2.0-3.0  Mechanical Heart Valve                                  -    Aortic(with afib, MI, embolism, HF, LA enlargement,    and/or coagulopathy)                                     2.0-3.0 (2.5-3.5)     Mitral                                                             2.5-3.5  Prosthetic/Bioprosthetic Heart Valve               2.0-3.0  Venous thromboembolism (VTE: VT, PE        2.0-3.0    HS Troponin 0hr (reflex protocol) [[id number]]  (Normal) Collected: 07/16/25 1717    Lab Status: Final result Specimen: Blood from Arm, Left Updated: 07/16/25 1755     hs TnI 0hr 4 ng/L     Basic metabolic panel [[id number]]  (Abnormal) Collected: 07/16/25 1717    Lab Status: Final result Specimen: Blood from Arm, Left Updated: 07/16/25 1752     Sodium 139 mmol/L      Potassium 3.4 mmol/L      Chloride 109 mmol/L      CO2 23 mmol/L      ANION GAP 7 mmol/L      BUN 12 mg/dL      Creatinine 0.97 mg/dL      Glucose 99 mg/dL      Calcium 8.7 mg/dL      eGFR 96 ml/min/1.73sq m     Narrative:      National Kid[employer] guidelines for Chronic Kidney Disease (CKD):     Stage 1 with normal or high GFR (GFR > 90 mL/min/1.73 square meters)    Stage 2 Mild CKD (GFR = 60-89 mL/min/1.73 square meters)    Stage 3A Moderate CKD (GFR = 45-59 mL/min/1.73 square meters)    Stage 3B Moderate CKD (GFR = 30-44 mL/min/1.73 square meters)    Stage 4 Severe CKD (GFR = 15-29 mL/min/1.73 square meters)    Stage 5 End Stage CKD (GFR <15  mL/min/1.73 square meters)  Note: GFR calculation is accurate only with a steady state creatinine    CBC [[id number]]  (Abnormal) Collected: 07/16/25 1708    Lab Status: Final result Specimen: Blood from Arm, Left Updated: 07/16/25 1726     WBC 3.43 Thousand/uL      RBC 4.37 Million/uL      Hemoglobin 13.8 g/dL      Hematocrit 39.9 %      MCV 91 fL      MCH 31.6 pg      MCHC 34.6 g/dL      RDW 15.4 %      Platelets 272 Thousands/uL      MPV 10.5 fL             X-ray chest 1 view portable   ED Interpretation by Sai Rascon DO (07/16 2201)   Chest x-ray does not indicate acute cardiopulmonary pathology or osseous abnormalities.          POC MSK/Soft Tissue US    Date/Time: 7/16/2025 9:20 PM    Performed by: Sai Rascon DO  Authorized by: Sai Rascon DO    Patient location:  ED  Performed by:  Resident  Other Assisting Provider: No    Procedure:     Performed: soft tissue ultrasound    Procedure details:     Exam Type:  Diagnostic    Longitudinal view:  Obtained    Transverse view:  Obtained    Image quality: diagnostic      Image availability:  Images available in PACS  Soft tissue ultrasound:     Soft tissue indications: swelling, pain and suspected abscess      Anatomic location:  Axilla (Left axilla)    Soft tissue findings: subcutaneous collection (comment)      Soft tissue findings comment:  Small fluid collection 0.3 cm 0.3cm    Collection size (cm):  0.3  Interpretation:     Soft tissue impressions: consistent with abscess        ED Medication and Procedure Management   Prior to Admission Medications   Prescriptions Last Dose Informant Patient Reported? Taking?   DULoxetine (CYMBALTA) 20 mg capsule   No No   Sig: Take 1 capsule (20 mg total) by mouth daily   QUEtiapine (SEROquel) 300 mg tablet   No No   Sig: Take 1 tablet (300 mg total) by mouth daily at bedtime   albuterol (PROVENTIL HFA,VENTOLIN HFA) 90 mcg/act inhaler   No No   Sig: Inhale 2 puffs every 4 (four) hours as needed for  wheezing   gabapentin (NEURONTIN) 300 mg capsule   No No   Sig: Take 2 capsules (600 mg total) by mouth 3 (three) times a day   Patient not taking: Reported on 6/27/2025   gabapentin (NEURONTIN) 600 MG tablet   Yes No   ibuprofen (MOTRIN) 600 mg tablet   Yes No   lisinopril (ZESTRIL) 10 mg tablet   Yes No   lisinopril (ZESTRIL) 20 mg tablet   No No   Sig: Take 1 tablet (20 mg total) by mouth daily   Patient not taking: Reported on 6/27/2025   methocarbamol (ROBAXIN) 750 mg tablet   No No   Sig: Take 1 tablet (750 mg total) by mouth 4 (four) times a day as needed for muscle spasms   naproxen (NAPROSYN) 500 mg tablet   Yes No   Sig: Take 500 mg by mouth every 12 (twelve) hours as needed   nicotine (NICODERM CQ) 14 mg/24hr TD 24 hr patch   No No   Sig: Place 1 patch on the skin over 24 hours daily      Facility-Administered Medications: None     Discharge Medication List as of 7/16/2025  9:33 PM        START taking these medications    Details   sulfamethoxazole-trimethoprim (BACTRIM DS) 800-160 mg per tablet Take 1 tablet by mouth 2 (two) times a day for 7 days smx-tmp DS (BACTRIM) 800-160 mg tabs (1tab q12 D10), Starting Wed 7/16/2025, Until Wed 7/23/2025, Normal           CONTINUE these medications which have NOT CHANGED    Details   albuterol (PROVENTIL HFA,VENTOLIN HFA) 90 mcg/act inhaler Inhale 2 puffs every 4 (four) hours as needed for wheezing, Starting Mon 5/12/2025, Print      DULoxetine (CYMBALTA) 20 mg capsule Take 1 capsule (20 mg total) by mouth daily, Starting Mon 5/12/2025, Print      gabapentin (NEURONTIN) 300 mg capsule Take 2 capsules (600 mg total) by mouth 3 (three) times a day, Starting Mon 5/12/2025, Normal      gabapentin (NEURONTIN) 600 MG tablet Historical Med      ibuprofen (MOTRIN) 600 mg tablet Historical Med      !! lisinopril (ZESTRIL) 10 mg tablet Historical Med      !! lisinopril (ZESTRIL) 20 mg tablet Take 1 tablet (20 mg total) by mouth daily, Starting Mon 5/12/2025, Print       methocarbamol (ROBAXIN) 750 mg tablet Take 1 tablet (750 mg total) by mouth 4 (four) times a day as needed for muscle spasms, Starting Mon 5/12/2025, Print      naproxen (NAPROSYN) 500 mg tablet Take 500 mg by mouth every 12 (twelve) hours as needed, Starting Sat 2/1/2025, Until Sun 2/1/2026 at 2359, Historical Med      nicotine (NICODERM CQ) 14 mg/24hr TD 24 hr patch Place 1 patch on the skin over 24 hours daily, Starting Tue 5/13/2025, Normal      QUEtiapine (SEROquel) 300 mg tablet Take 1 tablet (300 mg total) by mouth daily at bedtime, Starting Mon 5/12/2025, Print       !! - Potential duplicate medications found. Please discuss with provider.        No discharge procedures on file.  ED SEPSIS DOCUMENTATION   Time reflects when diagnosis was documented in both MDM as applicable and the Disposition within this note       Time User Action Codes Description Comment    7/16/2025  4:43 PM Sai Rascon [R07.9] Chest pain     7/16/2025  9:24 PM Sai Rascon [L02.412] Abscess of left axilla                      [1]   Past Medical History:  Diagnosis Date    Asthma     Chronic pain     Hypertension     Neck pain     Psychoactive 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HCC)     Thyroglossal duct cyst    [2]   Past Surgical History:  Procedure Laterality Date    THYROGLOSSAL DUCT EXCISION      THYROID SURGERY      THYROID SURGERY     [3]   Family History  Problem Relation Name Age of Onset    Hypertension Mother      No Known Problems Father     [4]   Social History  Tobacco Use    Smoking status: Every Day     Current packs/day: 0.50     Types: Cigarettes    Smokeless tobacco: Never    Tobacco comments:     5 cigerettes a day    Vaping Use    Vaping status: Former    Substances: Nicotine, Flavoring   Substance Use Topics    Alcohol use: Not Currently     Comment: occas    Drug use: Not Currently     Types: Marijuana, Methamphetamines     Comment: last used 09/2024        Sai Rascon DO  07/16/25  [id number]

## 2025-07-17 VITALS
BODY MASS INDEX: 31.49 KG/M2 | DIASTOLIC BLOOD PRESSURE: 108 MMHG | SYSTOLIC BLOOD PRESSURE: 157 MMHG | HEART RATE: 56 BPM | HEIGHT: 65 IN | WEIGHT: 189 LBS

## 2025-07-17 LAB
ATRIAL RATE: 84 BPM
BSA FOR ECHO PROCEDURE: 1.93 M2
P AXIS: 69 DEGREES
PR INTERVAL: 130 MS
QRS AXIS: 29 DEGREES
QRSD INTERVAL: 90 MS
QT INTERVAL: 340 MS
QTC INTERVAL: 401 MS
SL CV LV EF: 70
T WAVE AXIS: -53 DEGREES
VENTRICULAR RATE: 84 BPM

## 2025-07-17 PROCEDURE — 93010 ELECTROCARDIOGRAM REPORT: CPT | Performed by: INTERNAL MEDICINE

## 2025-07-17 NOTE — UTILIZATION REVIEW
Initial Clinical Review    Admission: Date/Time/Statement:   Admission Orders (From admission, onward)       Ordered        07/16/25 1848  INPATIENT ADMISSION  Once                          Orders Placed This Encounter   Procedures    INPATIENT ADMISSION     Standing Status:   Standing     Number of Occurrences:   1     Level of Care:   Med Surg [16]     Estimated length of stay:   More than 2 Midnights     Certification:   I certify that inpatient services are medically necessary for this patient for a duration of greater than two midnights. See H&P and MD Progress Notes for additional information about the patient's course of treatment.     ED Arrival Information       Expected   7/16/2025     Arrival   7/16/2025 16:13    Acuity   Emergent              Means of arrival   Ambulance    Escorted by   [facility]    Service   Emergency Medicine    Admission type   Emergency              Arrival complaint   Chest pain             Chief Complaint   Patient presents with    Abscess     Pt reports 4 days of an abscess under left arm that is painful, not draining, no fever.     Chest Pain     Pt reports chest pain for the last few hours, unsure if pain is radiating from the abscess under his arm. Pt reports pain is worse with a deep breath.        Initial Presentation: 39 y.o. male with a PMH of HTN, NSTEMI in May 2025, presented to the ED from home with complaint of chest pain. Per ED attending, patient states chest pain woke him up from sleep, increased pain with deep breaths. Patient also complains of  left axillary nodular swelling, states he has gotten ingrown hairs/abscesses in his axilla before. ECG upon arrival showed diffuse T wave inversions, mainly in inferolateral leads.  Findings prompted NSTEMI/level 2 MI alert.   Per cardiology consultation, patient given heparin bolus and drip, nitroglycerin drip.  Cardiology concerned for possible pulmonary embolus, D-dimer unremarkable.  Cardiology recommended  medicine admission, patient did not want to be admitted because he had things to attend to at home, but after discussion of risks he agreed to admission.  POC ultrasound utilized to evaluate axillary abscess, noted to have very minimal fluid collection, incision and drainage was not indicated at this time.  Upon re-evaluation patient chest pain is improved with nitro drip, still complaining of axillary pain.  After being seen by inpatient team, patient requested to go home.  Again risks were discussed with the patient including risk of death, cardiac arrest, MI, and permanent disability, and he still agreed to leave AGAINST MEDICAL ADVICE. Discussed return precautions with the patient including chest pain, shortness of breath, nausea, vomiting, fever/chills and they expressed understanding.     7/16/25 2141 Patient left AMA.       ED Treatment-Medication Administration from 07/16/2025 1613 to 07/17/2025 1224         Date/Time Order Dose Route Action     07/16/2025 1629 aspirin (FOR EMS ONLY) chewable tablet 324 mg 0 mg Does not apply Given to EMS     07/16/2025 1732 nitroGLYcerin (TRIDIL) 50 mg in 250 ml infusion (premix) 10 mcg/min Intravenous New Bag     07/16/2025 1830 nitroGLYcerin (TRIDIL) 50 mg in 250 ml infusion (premix) 15 mcg/min Intravenous Rate/Dose Change     07/16/2025 1730 heparin (porcine) injection 4,000 Units 4,000 Units Intravenous Given     07/16/2025 1732 heparin (porcine) 25,000 units in 0.45% NaCl 250 mL infusion (premix) 11.8 Units/kg/hr Intravenous New Bag            Scheduled Medications: None.       ED Triage Vitals   Temperature Pulse Respirations Blood Pressure SpO2 Pain Score   07/16/25 1620 07/16/25 1620 07/16/25 1620 07/16/25 1620 07/16/25 1651 07/16/25 1815   98.4 °F (36.9 °C) 84 20 132/84 98 % 8     Weight (last 2 days) before discharge       Date/Time Weight    07/16/25 1725 86 (189.6)            Vital Signs (last 3 days) before discharge       Date/Time Temp Pulse Resp BP MAP  (mmHg) SpO2 O2 Device Patient Position - Orthostatic VS Pain    07/16/25 2100 -- -- -- 157/108 126 -- -- -- --    07/16/25 1930 -- 56 18 123/75 92 95 % -- -- --    07/16/25 1915 -- 58 18 131/80 100 95 % None (Room air) Lying --    07/16/25 1832 -- 62 13 134/78 100 96 % None (Room air) -- --    07/16/25 1830 -- 61 -- 134/78 -- -- -- -- --    07/16/25 1815 -- 56 13 136/78 100 96 % None (Room air) -- 8    07/16/25 1800 -- 68 15 144/82 106 96 % None (Room air) -- --    07/16/25 1732 -- 70 -- 126/77 -- -- -- -- --    07/16/25 1700 -- 80 17 136/78 101 95 % None (Room air) -- --    07/16/25 1651 -- -- -- -- -- 98 % None (Room air) -- --    07/16/25 1620 98.4 °F (36.9 °C) 84 20 132/84 -- -- -- -- --              Pertinent Labs/Diagnostic Test Results:     Radiology:  X-ray chest 1 view portable   ED Interpretation by Sai Rascon DO (07/16 2201)   Chest x-ray does not indicate acute cardiopulmonary pathology or osseous abnormalities.      Final Interpretation by Chance Laura MD (07/17 1150)      No acute cardiopulmonary disease.         Cardiology:    7/16 Limited ECHO:  Left Ventricle: The left ventricular ejection fraction is 70%. Systolic function is vigorous. Wall motion is normal.     EKG:    Component  Ref Range & Units (hover) 7/16/25 1621   Ventricular Rate 84   Atrial Rate 84   DE Interval 130   QRSD Interval 90   QT Interval 340   QTC Interval 401   P Axis 69   QRS Axis 29   T Wave Axis -53         Results from last 7 days   Lab Units 07/16/25  1708   WBC Thousand/uL 3.43*   HEMOGLOBIN g/dL 13.8   HEMATOCRIT % 39.9   PLATELETS Thousands/uL 272         Results from last 7 days   Lab Units 07/16/25  1717   SODIUM mmol/L 139   POTASSIUM mmol/L 3.4*   CHLORIDE mmol/L 109*   CO2 mmol/L 23   ANION GAP mmol/L 7   BUN mg/dL 12   CREATININE mg/dL 0.97   EGFR ml/min/1.73sq m 96   CALCIUM mg/dL 8.7             Results from last 7 days   Lab Units 07/16/25  1717   GLUCOSE RANDOM mg/dL 99                 Results  from last 7 days   Lab Units 07/16/25  1933 07/16/25  1717   HS TNI 0HR ng/L  --  4   HS TNI 2HR ng/L 4  --    HSTNI D2 ng/L 0  --      Results from last 7 days   Lab Units 07/16/25  1708   D-DIMER QUANTITATIVE ug/ml FEU 0.33     Results from last 7 days   Lab Units 07/16/25  1708   PROTIME seconds 13.6   INR  1.01   PTT seconds 27           Admitting Diagnosis: Abscess [L02.91]  Chest pain [R07.9]  Age/Sex: 39 y.o. male      Network Utilization Review Department  ATTENTION: Please call with any questions or concerns to [phone] and carefully listen to the prompts so that you are directed to the right person. All voicemails are confidential.   For Discharge needs, contact Care Management DC Support Team at [phone] opt. 2  Send all requests for admission clinical reviews, approved or denied determinations and any other requests to dedicated fax number below belonging to the campus where the patient is receiving treatment. List of dedicated fax numbers for the Facilities:  FACILITY NAME UR FAX NUMBER   ADMISSION DENIALS (Administrative/Medical Necessity) [phone]   DISCHARGE SUPPORT TEAM (NETWORK) [phone]   PARENT CHILD HEALTH (Maternity/NICU/Pediatrics) [phone]   [facility] [phone]   [facility] [phone]   [facility] [phone]   [facility] [phone]   [facility] [phone]   [facility] [phone]   [facility] [phone]   [facility] [phone]   [facility] [phone]   [facility] [phone]   [facility] [phone]   [facility] [phone]

## 2025-07-17 NOTE — DISCHARGE INSTRUCTIONS
You were seen in the emergency department today for chest pain and left axillary abscess.  Although we recommended admission to the hospital to treat your acute cardiac condition, you requested to leave AGAINST MEDICAL ADVICE.  We discussed the risks which are included in your chart, including death, cardiac arrest, heart attack, and permanent disability.  The abscess in your left armpit was not determined to be drainable on ultrasound, you are being sent home with antibiotics to treat that condition.  As we discussed, if you experience return of your symptoms or worsening of your symptoms including chest pain, shortness of breath, painful breathing, fever, chills, please return to the emergency department.

## 2025-07-21 RX ORDER — GABAPENTIN 300 MG/1
300 CAPSULE ORAL 3 TIMES DAILY
Qty: 90 CAPSULE | Refills: 1 | OUTPATIENT
Start: 2025-07-21

## 2025-07-23 ENCOUNTER — HOSPITAL ENCOUNTER (EMERGENCY)
Facility: HOSPITAL | Age: 40
Discharge: HOME | End: 2025-07-23
Attending: EMERGENCY MEDICINE
Payer: MEDICARE

## 2025-07-23 VITALS
HEART RATE: 65 BPM | SYSTOLIC BLOOD PRESSURE: 130 MMHG | OXYGEN SATURATION: 99 % | DIASTOLIC BLOOD PRESSURE: 84 MMHG | TEMPERATURE: 98.7 F | RESPIRATION RATE: 18 BRPM

## 2025-07-23 DIAGNOSIS — L02.412 ABSCESS OF LEFT AXILLA: ICD-10-CM

## 2025-07-23 DIAGNOSIS — R07.9 CHEST PAIN: Primary | ICD-10-CM

## 2025-07-23 DIAGNOSIS — S99.912A INJURY OF LEFT ANKLE, INITIAL ENCOUNTER: Primary | ICD-10-CM

## 2025-07-23 LAB
2HR DELTA HS TROPONIN: -2 NG/L
ANION GAP SERPL CALCULATED.3IONS-SCNC: 9 MMOL/L (ref 4–13)
ATRIAL RATE: 60 BPM
BASOPHILS # BLD AUTO: 0.05 THOUSANDS/ÂΜL (ref 0–0.1)
BASOPHILS NFR BLD AUTO: 1 % (ref 0–1)
BUN SERPL-MCNC: 24 MG/DL (ref 5–25)
CALCIUM SERPL-MCNC: 8.9 MG/DL (ref 8.4–10.2)
CARDIAC TROPONIN I PNL SERPL HS: 3 NG/L (ref ?–50)
CARDIAC TROPONIN I PNL SERPL HS: 5 NG/L (ref ?–50)
CHLORIDE SERPL-SCNC: 106 MMOL/L (ref 96–108)
CO2 SERPL-SCNC: 22 MMOL/L (ref 21–32)
CREAT SERPL-MCNC: 1.19 MG/DL (ref 0.6–1.3)
EOSINOPHIL # BLD AUTO: 0.26 THOUSAND/ÂΜL (ref 0–0.61)
EOSINOPHIL NFR BLD AUTO: 5 % (ref 0–6)
ERYTHROCYTE [DISTWIDTH] IN BLOOD BY AUTOMATED COUNT: 14.9 % (ref 11.6–15.1)
GFR SERPL CREATININE-BSD FRML MDRD: 75 ML/MIN/1.73SQ M
GLUCOSE SERPL-MCNC: 90 MG/DL (ref 65–140)
HCT VFR BLD AUTO: 41.3 % (ref 36.5–49.3)
HGB BLD-MCNC: 14 G/DL (ref 12–17)
IMM GRANULOCYTES # BLD AUTO: 0.02 THOUSAND/UL (ref 0–0.2)
IMM GRANULOCYTES NFR BLD AUTO: 0 % (ref 0–2)
LYMPHOCYTES # BLD AUTO: 1.65 THOUSANDS/ÂΜL (ref 0.6–4.47)
LYMPHOCYTES NFR BLD AUTO: 34 % (ref 14–44)
MCH RBC QN AUTO: 31.5 PG (ref 26.8–34.3)
MCHC RBC AUTO-ENTMCNC: 33.9 G/DL (ref 31.4–37.4)
MCV RBC AUTO: 93 FL (ref 82–98)
MONOCYTES # BLD AUTO: 0.79 THOUSAND/ÂΜL (ref 0.17–1.22)
MONOCYTES NFR BLD AUTO: 16 % (ref 4–12)
NEUTROPHILS # BLD AUTO: 2.05 THOUSANDS/ÂΜL (ref 1.85–7.62)
NEUTS SEG NFR BLD AUTO: 44 % (ref 43–75)
NRBC BLD AUTO-RTO: 0 /100 WBCS
P AXIS: 81 DEGREES
PLATELET # BLD AUTO: 274 THOUSANDS/UL (ref 149–390)
PMV BLD AUTO: 10 FL (ref 8.9–12.7)
POTASSIUM SERPL-SCNC: 3.8 MMOL/L (ref 3.5–5.3)
PR INTERVAL: 136 MS
QRS AXIS: 20 DEGREES
QRSD INTERVAL: 86 MS
QT INTERVAL: 416 MS
QTC INTERVAL: 416 MS
RBC # BLD AUTO: 4.44 MILLION/UL (ref 3.88–5.62)
SODIUM SERPL-SCNC: 137 MMOL/L (ref 135–147)
T WAVE AXIS: 23 DEGREES
VENTRICULAR RATE: 60 BPM
WBC # BLD AUTO: 4.82 THOUSAND/UL (ref 4.31–10.16)

## 2025-07-23 PROCEDURE — 93010 ELECTROCARDIOGRAM REPORT: CPT | Performed by: INTERNAL MEDICINE

## 2025-07-23 PROCEDURE — 96374 THER/PROPH/DIAG INJ IV PUSH: CPT

## 2025-07-23 PROCEDURE — 80048 BASIC METABOLIC PNL TOTAL CA: CPT | Performed by: STUDENT IN AN ORGANIZED HEALTH CARE EDUCATION/TRAINING PROGRAM

## 2025-07-23 PROCEDURE — 84484 ASSAY OF TROPONIN QUANT: CPT | Performed by: STUDENT IN AN ORGANIZED HEALTH CARE EDUCATION/TRAINING PROGRAM

## 2025-07-23 PROCEDURE — 99285 EMERGENCY DEPT VISIT HI MDM: CPT | Performed by: EMERGENCY MEDICINE

## 2025-07-23 PROCEDURE — 93005 ELECTROCARDIOGRAM TRACING: CPT

## 2025-07-23 PROCEDURE — 96375 TX/PRO/DX INJ NEW DRUG ADDON: CPT

## 2025-07-23 PROCEDURE — 99284 EMERGENCY DEPT VISIT MOD MDM: CPT

## 2025-07-23 PROCEDURE — 85025 COMPLETE CBC W/AUTO DIFF WBC: CPT | Performed by: STUDENT IN AN ORGANIZED HEALTH CARE EDUCATION/TRAINING PROGRAM

## 2025-07-23 PROCEDURE — 36415 COLL VENOUS BLD VENIPUNCTURE: CPT | Performed by: STUDENT IN AN ORGANIZED HEALTH CARE EDUCATION/TRAINING PROGRAM

## 2025-07-23 RX ORDER — SULFAMETHOXAZOLE AND TRIMETHOPRIM 800; 160 MG/1; MG/1
1 TABLET ORAL 2 TIMES DAILY
Qty: 6 TABLET | Refills: 0 | Status: SHIPPED | OUTPATIENT
Start: 2025-07-23 | End: 2025-07-26

## 2025-07-23 RX ORDER — KETOROLAC TROMETHAMINE 30 MG/ML
15 INJECTION, SOLUTION INTRAMUSCULAR; INTRAVENOUS ONCE
Status: COMPLETED | OUTPATIENT
Start: 2025-07-23 | End: 2025-07-23

## 2025-07-23 RX ORDER — SUCRALFATE 1 G/1
1 TABLET ORAL ONCE
Status: COMPLETED | OUTPATIENT
Start: 2025-07-23 | End: 2025-07-23

## 2025-07-23 RX ORDER — ASPIRIN 81 MG/1
2 TABLET, CHEWABLE ORAL ONCE
Status: COMPLETED | OUTPATIENT
Start: 2025-07-23 | End: 2025-07-23

## 2025-07-23 RX ORDER — FAMOTIDINE 10 MG/ML
20 INJECTION, SOLUTION INTRAVENOUS ONCE
Status: COMPLETED | OUTPATIENT
Start: 2025-07-23 | End: 2025-07-23

## 2025-07-23 RX ORDER — MAGNESIUM HYDROXIDE/ALUMINUM HYDROXICE/SIMETHICONE 120; 1200; 1200 MG/30ML; MG/30ML; MG/30ML
30 SUSPENSION ORAL ONCE
Status: COMPLETED | OUTPATIENT
Start: 2025-07-23 | End: 2025-07-23

## 2025-07-23 RX ADMIN — ALUMINUM HYDROXIDE, MAGNESIUM HYDROXIDE, AND SIMETHICONE 30 ML: 200; 200; 20 SUSPENSION ORAL at 05:43

## 2025-07-23 RX ADMIN — KETOROLAC TROMETHAMINE 15 MG: 30 INJECTION, SOLUTION INTRAMUSCULAR; INTRAVENOUS at 05:44

## 2025-07-23 RX ADMIN — SUCRALFATE 1 G: 1 TABLET ORAL at 05:43

## 2025-07-23 RX ADMIN — FAMOTIDINE 20 MG: 10 INJECTION, SOLUTION INTRAVENOUS at 05:46

## 2025-07-23 NOTE — DISCHARGE INSTRUCTIONS
You were seen in the emergency department today for chest pain.    We did a workup which did not show signs of a heart attack.    You should return to the emergency department if you experience chest pain, shortness of breath, lightheadedness, nausea, vomiting, weakness.    Thank you for choosing [facility]!

## 2025-07-23 NOTE — ED PROVIDER NOTES
ED Disposition       None          Assessment & Plan       Medical Decision Making  39-year-old male with history of methamphetamine abuse presents to the emergency department today for evaluation of chest pain that woke him from sleep 1 hour prior.  Initial assessment he is seated in the bed, appears uncomfortable.  He is hemodynamically stable with reassuring vitals.  Initial EKG shows rate of 60 with early repolarization changes, no ST elevations or depressions consistent with ACS, or other pathologic dysrhythmias.  Cardiopulmonary examination is otherwise unremarkable.  Given patient's history we will perform cardiac workup with CBC CMP troponin.  Other diagnoses were considered including GERD.  Patient was given GI cocktail for potential symptomatic relief.  Initial troponin is 5, will require delta given onset of pain 1 to 2 hours prior to presentation.  Patient made hemodynamically stable while under my care was signed out to the morning resident for further evaluation, treatment, and disposition.    Amount and/or Complexity of Data Reviewed  Labs: ordered.    Risk  OTC drugs.  Prescription drug management.             Medications   aspirin (FOR EMS ONLY) chewable tablet 162 mg (0 mg Does not apply Given to EMS 7/23/25 0535)   aspirin (FOR EMS ONLY) chewable tablet 162 mg (0 mg Does not apply Given to EMS 7/23/25 0535)   Famotidine (PF) (PEPCID) injection 20 mg (20 mg Intravenous Given 7/23/25 0546)   aluminum-magnesium hydroxide-simethicone (MAALOX) oral suspension 30 mL (30 mL Oral Given 7/23/25 0543)   sucralfate (CARAFATE) tablet 1 g (1 g Oral Given 7/23/25 0543)   ketorolac (TORADOL) injection 15 mg (15 mg Intravenous Given 7/23/25 0544)       ED Risk Strat Scores                    No data recorded                            History of Present Illness       Chief Complaint   Patient presents with    Chest Pain     Pt brought in from home, states he has mid chest pain that its making it hard for him to  "breathe and feels tight. Per pt \"I had a heart attack last week\" also states he took ampicillin that his mom gave him for an abscess under his left armpit        Past Medical History[1]   Past Surgical History[2]   Family History[3]   Social History[4]   E-Cigarette/Vaping    E-Cigarette Use Former User     Cartridges/Day 1       E-Cigarette/Vaping Substances    Nicotine Yes     THC No     CBD No     Flavoring Yes     Other No     Unknown No       I have reviewed and agree with the history as documented.     39-year-old male with history of methamphetamine abuse and recent EKG changes concerning for NSTEMI presents to the emergency department today for evaluation of sudden onset chest pain that woke her from sleep approximately 1 to 2 hours prior.  He tells me the pain is substernal and does not radiate to the jaw back or arms.  He did not feel diaphoretic he did not feel lightheaded or dizzy.  He had no nausea or vomiting.  He tells me that this pain feels a bit different from the chest pain he experienced last week.  EMS administered aspirin and he does not feel any different.  Denies other associated symptoms.         Review of Systems   Constitutional:  Negative for activity change, chills and fatigue.   Eyes:  Negative for visual disturbance.   Respiratory:  Positive for chest tightness. Negative for shortness of breath.    Cardiovascular:  Positive for chest pain. Negative for palpitations.   Gastrointestinal:  Negative for abdominal pain, nausea and vomiting.   Musculoskeletal:  Negative for back pain and neck pain.   Neurological:  Negative for dizziness, light-headedness and headaches.           Objective       ED Triage Vitals   Temperature Pulse Blood Pressure Respirations SpO2 Patient Position - Orthostatic VS   07/23/25 0505 07/23/25 0505 07/23/25 0506 07/23/25 0505 07/23/25 0505 07/23/25 0505   98.7 °F (37.1 °C) 65 130/84 18 99 % Lying      Temp Source Heart Rate Source BP Location FiO2 (%) Pain Score  "   07/23/25 0505 07/23/25 0505 07/23/25 0505 -- 07/23/25 0505    Oral Monitor Left arm  10 - Worst Possible Pain      Vitals      Date and Time Temp Pulse SpO2 Resp BP Pain Score FACES Pain Rating User   07/23/25 0544 -- -- -- -- -- 8 -- MI   07/23/25 0506 -- -- -- -- 130/84 -- -- JULIA   07/23/25 0505 98.7 °F (37.1 °C) 65 99 % 18 -- 10 - Worst Possible Pain -- JULIA            Physical Exam  Vitals reviewed.   Constitutional:       Appearance: He is ill-appearing.   HENT:      Head: Normocephalic and atraumatic.     Cardiovascular:      Rate and Rhythm: Normal rate and regular rhythm.      Heart sounds: Normal heart sounds. Heart sounds not distant. No murmur heard.     No friction rub. No gallop.   Pulmonary:      Effort: Pulmonary effort is normal. No respiratory distress.      Breath sounds: Normal breath sounds.   Chest:      Chest wall: No mass, tenderness or crepitus.   Abdominal:      Palpations: Abdomen is soft.      Tenderness: There is abdominal tenderness. There is no guarding.      Comments: Mild epigastric TTP     Skin:     General: Skin is warm and dry.      Findings: No erythema or rash.     Neurological:      Mental Status: He is alert and oriented to person, place, and time.         Results Reviewed       Procedure Component Value Units Date/Time    HS Troponin I 2hr [[id number]]     Lab Status: No result Specimen: Blood     HS Troponin 0hr (reflex protocol) [[id number]]  (Normal) Collected: 07/23/25 0543    Lab Status: Final result Specimen: Blood from Arm, Right Updated: 07/23/25 0619     hs TnI 0hr 5 ng/L     Basic metabolic panel [[id number]] Collected: 07/23/25 0543    Lab Status: Final result Specimen: Blood from Arm, Right Updated: 07/23/25 0617     Sodium 137 mmol/L      Potassium 3.8 mmol/L      Chloride 106 mmol/L      CO2 22 mmol/L      ANION GAP 9 mmol/L      BUN 24 mg/dL      Creatinine 1.19 mg/dL      Glucose 90 mg/dL      Calcium 8.9 mg/dL      eGFR 75 ml/min/1.73sq m     Narrative:       National Kidney Disease Foundation guidelines for Chronic Kidney Disease (CKD):     Stage 1 with normal or high GFR (GFR > 90 mL/min/1.73 square meters)    Stage 2 Mild CKD (GFR = 60-89 mL/min/1.73 square meters)    Stage 3A Moderate CKD (GFR = 45-59 mL/min/1.73 square meters)    Stage 3B Moderate CKD (GFR = 30-44 mL/min/1.73 square meters)    Stage 4 Severe CKD (GFR = 15-29 mL/min/1.73 square meters)    Stage 5 End Stage CKD (GFR <15 mL/min/1.73 square meters)  Note: GFR calculation is accurate only with a steady state creatinine    CBC and differential [[id number]]  (Abnormal) Collected: 07/23/25 0543    Lab Status: Final result Specimen: Blood from Arm, Right Updated: 07/23/25 0557     WBC 4.82 Thousand/uL      RBC 4.44 Million/uL      Hemoglobin 14.0 g/dL      Hematocrit 41.3 %      MCV 93 fL      MCH 31.5 pg      MCHC 33.9 g/dL      RDW 14.9 %      MPV 10.0 fL      Platelets 274 Thousands/uL      nRBC 0 /100 WBCs      Segmented % 44 %      Immature Grans % 0 %      Lymphocytes % 34 %      Monocytes % 16 %      Eosinophils Relative 5 %      Basophils Relative 1 %      Absolute Neutrophils 2.05 Thousands/µL      Absolute Immature Grans 0.02 Thousand/uL      Absolute Lymphocytes 1.65 Thousands/µL      Absolute Monocytes 0.79 Thousand/µL      Eosinophils Absolute 0.26 Thousand/µL      Basophils Absolute 0.05 Thousands/µL             No orders to display       Procedures    ED Medication and Procedure Management   Prior to Admission Medications   Prescriptions Last Dose Informant Patient Reported? Taking?   DULoxetine (CYMBALTA) 20 mg capsule   No No   Sig: Take 1 capsule (20 mg total) by mouth daily   QUEtiapine (SEROquel) 300 mg tablet   No No   Sig: Take 1 tablet (300 mg total) by mouth daily at bedtime   albuterol (PROVENTIL HFA,VENTOLIN HFA) 90 mcg/act inhaler   No No   Sig: Inhale 2 puffs every 4 (four) hours as needed for wheezing   gabapentin (NEURONTIN) 300 mg capsule   No No   Sig: Take 2 capsules (600 mg  total) by mouth 3 (three) times a day   Patient not taking: Reported on 6/27/2025   gabapentin (NEURONTIN) 600 MG tablet   Yes No   ibuprofen (MOTRIN) 600 mg tablet   Yes No   lisinopril (ZESTRIL) 10 mg tablet   Yes No   lisinopril (ZESTRIL) 20 mg tablet   No No   Sig: Take 1 tablet (20 mg total) by mouth daily   Patient not taking: Reported on 6/27/2025   methocarbamol (ROBAXIN) 750 mg tablet   No No   Sig: Take 1 tablet (750 mg total) by mouth 4 (four) times a day as needed for muscle spasms   naproxen (NAPROSYN) 500 mg tablet   Yes No   Sig: Take 500 mg by mouth every 12 (twelve) hours as needed   nicotine (NICODERM CQ) 14 mg/24hr TD 24 hr patch   No No   Sig: Place 1 patch on the skin over 24 hours daily   sulfamethoxazole-trimethoprim (BACTRIM DS) 800-160 mg per tablet   No No   Sig: Take 1 tablet by mouth 2 (two) times a day for 7 days smx-tmp DS (BACTRIM) 800-160 mg tabs (1tab q12 D10)      Facility-Administered Medications: None     Patient's Medications   Discharge Prescriptions    No medications on file     No discharge procedures on file.  ED SEPSIS DOCUMENTATION              [1]   Past Medical History:  Diagnosis Date    Asthma     Chronic pain     Hypertension     Neck pain     Psychoactive 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HCC)     Thyroglossal duct cyst    [2]   Past Surgical History:  Procedure Laterality Date    THYROGLOSSAL DUCT EXCISION      THYROID SURGERY      THYROID SURGERY     [3]   Family History  Problem Relation Name Age of Onset    Hypertension Mother      No Known Problems Father     [4]   Social History  Tobacco Use    Smoking status: Every Day     Current packs/day: 0.50     Types: Cigarettes    Smokeless tobacco: Never    Tobacco comments:     5 cigerettes a day    Vaping Use    Vaping status: Former    Substances: Nicotine, Flavoring   Substance Use Topics    Alcohol use: Not Currently     Comment: occas    Drug use: Not Currently     Types: Marijuana, Methamphetamines     Comment: last  used 09/2024        Dieudonne Lee MD  07/23/25 0651

## 2025-07-23 NOTE — ED ATTENDING ATTESTATION
7/23/2025  I, Donal Warren Jr, DO, saw and evaluated the patient. I have discussed the patient with the resident/non-physician practitioner and agree with the resident's/non-physician practitioner's findings, Plan of Care, and MDM as documented in the resident's/non-physician practitioner's note, except where noted. All available labs and Radiology studies were reviewed.  I was present for key portions of any procedure(s) performed by the resident/non-physician practitioner and I was immediately available to provide assistance.       At this point I agree with the current assessment done in the Emergency Department.  I have conducted an independent evaluation of this patient a history and physical is as follows:    Final Diagnosis:  No diagnosis found.        MDM       DDX including but not limited to: chest wall pain, costochondritis, pleurisy, pericarditis, myocarditis, PTX, pneumonia, GI etiology, pneumomediastinum from methamphetamine abuse history (pt denies use last night), pill esophagitis from taking ampicillin last night to treat an abscess; doubt ACS or MI or dissection or PE     - Given patient's concerns, will do a cardiac workup.   - Will do an EKG for strain; arrhythmia, heart block, ST changes to rule out STEMI, pericarditis.  - Troponin for same as per protocol for evaluation of ACS.   - CBC to evaluate for anemia, evaluate for leukocytosis as sign of infectious source of symptoms.  - BMP to evaluate for electrolyte derangement, assess renal function.  - Will check CXR for pneumonia, pneumothorax, pleural effusion, signs of fluid overload.  - Disposition per workup.     Overall workup thus far is reassuring.  EKG is nonischemic.  Appears improved from prior EKG a few weeks ago.  Appears similar with early repolarization compared to May 2025.  Troponin is 5 so we will obtain a delta given his history of elevated troponins in the past.      Lab Results:   Abnormal Labs Reviewed   CBC AND  "DIFFERENTIAL - Abnormal; Notable for the following components:       Result Value    Monocytes % 16 (*)     All other components within normal limits     Lab Results: I have personally reviewed pertinent lab results.    Imaging:   No orders to display     I have personally reviewed pertinent reports.    EKG, Pathology, and Other Studies: I have personally reviewed pertinent films in PACS    Clinical Impression:    Final diagnoses:   None         Disposition    Signed out to the oncoming team to f/u on delta trop, EKG and dispo           New Prescriptions:    New Prescriptions    No medications on file            Follow-up Instructions:    No follow-up provider specified.        History of Present Illness   Amado Chin is a 39 y.o. male who presents with Chest Pain (Pt brought in from home, states he has mid chest pain that its making it hard for him to breathe and feels tight. Per pt \"I had a heart attack last week\" also states he took ampicillin that his mom gave him for an abscess under his left armpit )    has a past medical history of Asthma, Chronic pain, Hypertension, Neck pain, Psychoactive 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HCC), and Thyroglossal duct cyst..         Objective     Vitals:    07/23/25 0505 07/23/25 0506   BP:  130/84   Pulse: 65    Resp: 18    Temp: 98.7 °F (37.1 °C)    TempSrc: Oral    SpO2: 99%      There is no height or weight on file to calculate BMI.  No intake or output data in the 24 hours ending 07/23/25 0648  Invasive Devices       Peripheral Intravenous Line  Duration             Peripheral IV 07/23/25 Distal;Right;Upper;Ventral (anterior) Arm <1 day                    ED Course         Critical Care Time  Procedures      "

## 2025-08-17 ENCOUNTER — HOSPITAL ENCOUNTER (EMERGENCY)
Facility: HOSPITAL | Age: 40
Discharge: HOME | End: 2025-08-17
Attending: EMERGENCY MEDICINE
Payer: MEDICARE

## 2025-08-17 VITALS
OXYGEN SATURATION: 97 % | DIASTOLIC BLOOD PRESSURE: 109 MMHG | SYSTOLIC BLOOD PRESSURE: 169 MMHG | RESPIRATION RATE: 18 BRPM | HEART RATE: 84 BPM | TEMPERATURE: 99.2 F

## 2025-08-17 DIAGNOSIS — L02.91 ABSCESS: Primary | ICD-10-CM

## 2025-08-17 PROCEDURE — 99282 EMERGENCY DEPT VISIT SF MDM: CPT

## 2025-08-17 PROCEDURE — 96374 THER/PROPH/DIAG INJ IV PUSH: CPT

## 2025-08-17 RX ORDER — IBUPROFEN 400 MG/1
400 TABLET, FILM COATED ORAL ONCE
Status: COMPLETED | OUTPATIENT
Start: 2025-08-17 | End: 2025-08-17

## 2025-08-17 RX ORDER — NAPROXEN 500 MG/1
500 TABLET ORAL 2 TIMES DAILY WITH MEALS
Qty: 20 TABLET | Refills: 0 | Status: SHIPPED | OUTPATIENT
Start: 2025-08-17 | End: 2025-08-27

## 2025-08-17 RX ORDER — MIDAZOLAM HYDROCHLORIDE 2 MG/2ML
2 INJECTION, SOLUTION INTRAMUSCULAR; INTRAVENOUS ONCE
Status: COMPLETED | OUTPATIENT
Start: 2025-08-17 | End: 2025-08-17

## 2025-08-17 RX ORDER — SULFAMETHOXAZOLE AND TRIMETHOPRIM 800; 160 MG/1; MG/1
1 TABLET ORAL 2 TIMES DAILY
Qty: 14 TABLET | Refills: 0 | Status: SHIPPED | OUTPATIENT
Start: 2025-08-17 | End: 2025-08-24

## 2025-08-17 RX ORDER — ACETAMINOPHEN 325 MG/1
975 TABLET ORAL ONCE
Status: COMPLETED | OUTPATIENT
Start: 2025-08-17 | End: 2025-08-17

## 2025-08-17 RX ORDER — ACETAMINOPHEN 500 MG
1000 TABLET ORAL EVERY 8 HOURS
Qty: 60 TABLET | Refills: 0 | Status: SHIPPED | OUTPATIENT
Start: 2025-08-17 | End: 2025-08-27

## 2025-08-17 RX ORDER — OXYCODONE HYDROCHLORIDE 5 MG/1
5 TABLET ORAL ONCE
Refills: 0 | Status: COMPLETED | OUTPATIENT
Start: 2025-08-17 | End: 2025-08-17

## 2025-08-17 RX ORDER — LIDOCAINE HYDROCHLORIDE AND EPINEPHRINE 10; 10 MG/ML; UG/ML
20 INJECTION, SOLUTION INFILTRATION; PERINEURAL ONCE
Status: COMPLETED | OUTPATIENT
Start: 2025-08-17 | End: 2025-08-17

## 2025-08-17 RX ORDER — SULFAMETHOXAZOLE AND TRIMETHOPRIM 800; 160 MG/1; MG/1
1 TABLET ORAL ONCE
Status: COMPLETED | OUTPATIENT
Start: 2025-08-17 | End: 2025-08-17

## 2025-08-17 RX ADMIN — ACETAMINOPHEN 975 MG: 325 TABLET, FILM COATED ORAL at 04:41

## 2025-08-17 RX ADMIN — MIDAZOLAM 2 MG: 1 INJECTION INTRAMUSCULAR; INTRAVENOUS at 05:39

## 2025-08-17 RX ADMIN — OXYCODONE HYDROCHLORIDE 5 MG: 5 TABLET ORAL at 04:41

## 2025-08-17 RX ADMIN — SULFAMETHOXAZOLE AND TRIMETHOPRIM 1 TABLET: 800; 160 TABLET ORAL at 04:32

## 2025-08-17 RX ADMIN — LIDOCAINE HYDROCHLORIDE,EPINEPHRINE BITARTRATE 20 ML: 10; .01 INJECTION, SOLUTION INFILTRATION; PERINEURAL at 04:32

## 2025-08-17 RX ADMIN — IBUPROFEN 400 MG: 400 TABLET, FILM COATED ORAL at 04:41

## 2025-08-19 ENCOUNTER — TELEPHONE (OUTPATIENT)
Age: 40
End: 2025-08-19

## 2025-08-20 ENCOUNTER — HOSPITAL ENCOUNTER (EMERGENCY)
Facility: HOSPITAL | Age: 40
Discharge: HOME | End: 2025-08-20
Attending: EMERGENCY MEDICINE | Admitting: EMERGENCY MEDICINE
Payer: MEDICARE

## 2025-08-20 VITALS
DIASTOLIC BLOOD PRESSURE: 97 MMHG | RESPIRATION RATE: 18 BRPM | OXYGEN SATURATION: 96 % | TEMPERATURE: 98.9 F | SYSTOLIC BLOOD PRESSURE: 157 MMHG | HEART RATE: 67 BPM

## 2025-08-20 DIAGNOSIS — Z51.89 WOUND CHECK, ABSCESS: Primary | ICD-10-CM

## 2025-08-20 PROCEDURE — 99283 EMERGENCY DEPT VISIT LOW MDM: CPT

## 2025-08-20 PROCEDURE — 99284 EMERGENCY DEPT VISIT MOD MDM: CPT | Performed by: EMERGENCY MEDICINE

## 2025-08-20 PROCEDURE — 96372 THER/PROPH/DIAG INJ SC/IM: CPT

## 2025-08-20 RX ORDER — KETOROLAC TROMETHAMINE 30 MG/ML
15 INJECTION, SOLUTION INTRAMUSCULAR; INTRAVENOUS ONCE
Status: COMPLETED | OUTPATIENT
Start: 2025-08-20 | End: 2025-08-20

## 2025-08-20 RX ADMIN — KETOROLAC TROMETHAMINE 15 MG: 30 INJECTION, SOLUTION INTRAMUSCULAR at 03:18
